# Patient Record
Sex: FEMALE | Race: WHITE | NOT HISPANIC OR LATINO | URBAN - METROPOLITAN AREA
[De-identification: names, ages, dates, MRNs, and addresses within clinical notes are randomized per-mention and may not be internally consistent; named-entity substitution may affect disease eponyms.]

---

## 2018-05-22 ENCOUNTER — INPATIENT (INPATIENT)
Facility: HOSPITAL | Age: 63
LOS: 2 days | Discharge: HOME | DRG: 193 | End: 2018-05-25
Attending: HOSPITALIST | Admitting: INTERNAL MEDICINE
Payer: COMMERCIAL

## 2018-05-22 DIAGNOSIS — J44.1 CHRONIC OBSTRUCTIVE PULMONARY DISEASE WITH (ACUTE) EXACERBATION: ICD-10-CM

## 2018-05-22 PROCEDURE — 93010 ELECTROCARDIOGRAM REPORT: CPT

## 2018-05-22 PROCEDURE — 71045 X-RAY EXAM CHEST 1 VIEW: CPT | Mod: 26

## 2018-05-22 PROCEDURE — 71275 CT ANGIOGRAPHY CHEST: CPT | Mod: 26

## 2018-05-23 PROCEDURE — 99255 IP/OBS CONSLTJ NEW/EST HI 80: CPT

## 2018-05-23 PROCEDURE — 74178 CT ABD&PLV WO CNTR FLWD CNTR: CPT | Mod: 26

## 2018-05-24 PROCEDURE — 99232 SBSQ HOSP IP/OBS MODERATE 35: CPT

## 2018-05-25 PROCEDURE — 87486 CHLMYD PNEUM DNA AMP PROBE: CPT

## 2018-05-25 PROCEDURE — 97162 PT EVAL MOD COMPLEX 30 MIN: CPT

## 2018-05-25 PROCEDURE — 84484 ASSAY OF TROPONIN QUANT: CPT

## 2018-05-25 PROCEDURE — 94660 CPAP INITIATION&MGMT: CPT

## 2018-05-25 PROCEDURE — 85025 COMPLETE CBC W/AUTO DIFF WBC: CPT

## 2018-05-25 PROCEDURE — 85610 PROTHROMBIN TIME: CPT

## 2018-05-25 PROCEDURE — 96374 THER/PROPH/DIAG INJ IV PUSH: CPT | Mod: XU

## 2018-05-25 PROCEDURE — 36600 WITHDRAWAL OF ARTERIAL BLOOD: CPT

## 2018-05-25 PROCEDURE — 99239 HOSP IP/OBS DSCHRG MGMT >30: CPT

## 2018-05-25 PROCEDURE — 71275 CT ANGIOGRAPHY CHEST: CPT

## 2018-05-25 PROCEDURE — 82553 CREATINE MB FRACTION: CPT

## 2018-05-25 PROCEDURE — 99221 1ST HOSP IP/OBS SF/LOW 40: CPT

## 2018-05-25 PROCEDURE — 74178 CT ABD&PLV WO CNTR FLWD CNTR: CPT

## 2018-05-25 PROCEDURE — 99232 SBSQ HOSP IP/OBS MODERATE 35: CPT

## 2018-05-25 PROCEDURE — 82550 ASSAY OF CK (CPK): CPT

## 2018-05-25 PROCEDURE — 80069 RENAL FUNCTION PANEL: CPT

## 2018-05-25 PROCEDURE — 84100 ASSAY OF PHOSPHORUS: CPT

## 2018-05-25 PROCEDURE — 84145 PROCALCITONIN (PCT): CPT

## 2018-05-25 PROCEDURE — 87581 M.PNEUMON DNA AMP PROBE: CPT

## 2018-05-25 PROCEDURE — 83036 HEMOGLOBIN GLYCOSYLATED A1C: CPT

## 2018-05-25 PROCEDURE — 85027 COMPLETE CBC AUTOMATED: CPT

## 2018-05-25 PROCEDURE — 86300 IMMUNOASSAY TUMOR CA 15-3: CPT

## 2018-05-25 PROCEDURE — 82803 BLOOD GASES ANY COMBINATION: CPT

## 2018-05-25 PROCEDURE — 80048 BASIC METABOLIC PNL TOTAL CA: CPT

## 2018-05-25 PROCEDURE — 83880 ASSAY OF NATRIURETIC PEPTIDE: CPT

## 2018-05-25 PROCEDURE — 83735 ASSAY OF MAGNESIUM: CPT

## 2018-05-25 PROCEDURE — 85730 THROMBOPLASTIN TIME PARTIAL: CPT

## 2018-05-25 PROCEDURE — 93005 ELECTROCARDIOGRAM TRACING: CPT

## 2018-05-25 PROCEDURE — 94640 AIRWAY INHALATION TREATMENT: CPT

## 2018-05-25 PROCEDURE — 87400 INFLUENZA A/B EACH AG IA: CPT

## 2018-05-25 PROCEDURE — 80053 COMPREHEN METABOLIC PANEL: CPT

## 2018-05-25 PROCEDURE — 87633 RESP VIRUS 12-25 TARGETS: CPT

## 2018-05-25 PROCEDURE — 99291 CRITICAL CARE FIRST HOUR: CPT | Mod: 25

## 2018-05-25 PROCEDURE — 71045 X-RAY EXAM CHEST 1 VIEW: CPT

## 2018-11-30 ENCOUNTER — OUTPATIENT (OUTPATIENT)
Dept: OUTPATIENT SERVICES | Facility: HOSPITAL | Age: 63
LOS: 1 days | End: 2018-11-30
Payer: COMMERCIAL

## 2018-11-30 DIAGNOSIS — C50.919 MALIGNANT NEOPLASM OF UNSPECIFIED SITE OF UNSPECIFIED FEMALE BREAST: ICD-10-CM

## 2018-11-30 PROCEDURE — 71260 CT THORAX DX C+: CPT | Mod: 26

## 2018-11-30 PROCEDURE — 74177 CT ABD & PELVIS W/CONTRAST: CPT

## 2018-11-30 PROCEDURE — 71260 CT THORAX DX C+: CPT

## 2018-11-30 PROCEDURE — 74177 CT ABD & PELVIS W/CONTRAST: CPT | Mod: 26

## 2019-01-08 PROBLEM — Z00.00 ENCOUNTER FOR PREVENTIVE HEALTH EXAMINATION: Status: ACTIVE | Noted: 2019-01-08

## 2019-01-18 ENCOUNTER — INBOUND DOCUMENT (OUTPATIENT)
Age: 64
End: 2019-01-18

## 2019-01-22 ENCOUNTER — APPOINTMENT (OUTPATIENT)
Dept: UROLOGY | Facility: CLINIC | Age: 64
End: 2019-01-22
Payer: COMMERCIAL

## 2019-01-22 VITALS
RESPIRATION RATE: 17 BRPM | HEART RATE: 81 BPM | DIASTOLIC BLOOD PRESSURE: 68 MMHG | BODY MASS INDEX: 26.58 KG/M2 | TEMPERATURE: 98.3 F | SYSTOLIC BLOOD PRESSURE: 155 MMHG | WEIGHT: 150 LBS | HEIGHT: 63 IN

## 2019-01-22 PROCEDURE — 99204 OFFICE O/P NEW MOD 45 MIN: CPT

## 2019-01-22 RX ORDER — SENNOSIDES 8.6 MG TABLETS 8.6 MG/1
8.6 TABLET ORAL
Refills: 0 | Status: ACTIVE | COMMUNITY

## 2019-01-22 NOTE — REVIEW OF SYSTEMS
[Feeling Poorly] : feeling poorly [Feeling Tired] : feeling tired [Recent Weight Loss (___ Lbs)] : recent [unfilled] ~Ulb weight loss [see HPI] : see HPI [Arthralgias] : arthralgias [Fever] : fever [Abdominal Pain] : abdominal pain [Vomiting] : vomiting [Constipation] : constipation [Heartburn] : heartburn [Date of last menstrual period ____] : date of last menstrual period: [unfilled] [Leakage of urine with straining, coughing, laughing] : leakage of urine with straining, coughing, laughing [Joint Pain] : joint pain [Itching] : itching [Dizziness] : dizziness [Limb Weakness] : limb weakness [Difficulty Walking] : difficulty walking [Muscle Weakness] : muscle weakness [Feelings Of Weakness] : feelings of weakness [Negative] : Heme/Lymph

## 2019-01-22 NOTE — HISTORY OF PRESENT ILLNESS
[FreeTextEntry1] : This is a 63 year old female with a hx of breast cancer s/p mastectomy and chemotherapy 13 years ago who had undergone surveillance imaging which showed resolution of previously noted pulmonary nodules but new bony metastasis, in addition to interval growth in a known left renal mass.  She has been treated with Ibrance and Femara for the past several months and has been deemed stable for intervention.  \par \par She denies any hematuria.  But notes weakness, poor appetite, unintentional weight loss over the past several months.

## 2019-01-22 NOTE — ASSESSMENT
[FreeTextEntry1] : This is a 63 year old female with a 9 cm left upper pole renal mass presenting for evaluation.\par \par - counseled on risks/benefits/alternatives and wishes to proceed with left laparoscopic radical nephrectomy, possible radical, possible open

## 2019-01-31 ENCOUNTER — OUTPATIENT (OUTPATIENT)
Dept: OUTPATIENT SERVICES | Facility: HOSPITAL | Age: 64
LOS: 1 days | End: 2019-01-31
Payer: COMMERCIAL

## 2019-01-31 VITALS
DIASTOLIC BLOOD PRESSURE: 72 MMHG | HEART RATE: 87 BPM | OXYGEN SATURATION: 97 % | TEMPERATURE: 99 F | SYSTOLIC BLOOD PRESSURE: 130 MMHG | HEIGHT: 63 IN | RESPIRATION RATE: 14 BRPM | WEIGHT: 147.93 LBS

## 2019-01-31 DIAGNOSIS — N28.89 OTHER SPECIFIED DISORDERS OF KIDNEY AND URETER: ICD-10-CM

## 2019-01-31 DIAGNOSIS — Z90.12 ACQUIRED ABSENCE OF LEFT BREAST AND NIPPLE: Chronic | ICD-10-CM

## 2019-01-31 DIAGNOSIS — J43.9 EMPHYSEMA, UNSPECIFIED: ICD-10-CM

## 2019-01-31 DIAGNOSIS — R06.83 SNORING: ICD-10-CM

## 2019-01-31 DIAGNOSIS — Z98.890 OTHER SPECIFIED POSTPROCEDURAL STATES: Chronic | ICD-10-CM

## 2019-01-31 LAB
ALBUMIN SERPL ELPH-MCNC: 3.7 G/DL — SIGNIFICANT CHANGE UP (ref 3.3–5)
ALP SERPL-CCNC: 53 U/L — SIGNIFICANT CHANGE UP (ref 40–120)
ALT FLD-CCNC: < 4 U/L — LOW (ref 4–33)
ANION GAP SERPL CALC-SCNC: 15 MMO/L — HIGH (ref 7–14)
APPEARANCE UR: CLEAR — SIGNIFICANT CHANGE UP
AST SERPL-CCNC: 9 U/L — SIGNIFICANT CHANGE UP (ref 4–32)
BACTERIA # UR AUTO: NEGATIVE — SIGNIFICANT CHANGE UP
BILIRUB SERPL-MCNC: 0.2 MG/DL — SIGNIFICANT CHANGE UP (ref 0.2–1.2)
BILIRUB UR-MCNC: NEGATIVE — SIGNIFICANT CHANGE UP
BLD GP AB SCN SERPL QL: NEGATIVE — SIGNIFICANT CHANGE UP
BLOOD UR QL VISUAL: NEGATIVE — SIGNIFICANT CHANGE UP
BUN SERPL-MCNC: 12 MG/DL — SIGNIFICANT CHANGE UP (ref 7–23)
CALCIUM SERPL-MCNC: 9.1 MG/DL — SIGNIFICANT CHANGE UP (ref 8.4–10.5)
CHLORIDE SERPL-SCNC: 102 MMOL/L — SIGNIFICANT CHANGE UP (ref 98–107)
CO2 SERPL-SCNC: 27 MMOL/L — SIGNIFICANT CHANGE UP (ref 22–31)
COLOR SPEC: YELLOW — SIGNIFICANT CHANGE UP
CREAT SERPL-MCNC: 0.74 MG/DL — SIGNIFICANT CHANGE UP (ref 0.5–1.3)
GLUCOSE SERPL-MCNC: 94 MG/DL — SIGNIFICANT CHANGE UP (ref 70–99)
GLUCOSE UR-MCNC: NEGATIVE — SIGNIFICANT CHANGE UP
HCT VFR BLD CALC: 27.5 % — LOW (ref 34.5–45)
HGB BLD-MCNC: 8.1 G/DL — LOW (ref 11.5–15.5)
HYALINE CASTS # UR AUTO: HIGH
KETONES UR-MCNC: NEGATIVE — SIGNIFICANT CHANGE UP
LEUKOCYTE ESTERASE UR-ACNC: NEGATIVE — SIGNIFICANT CHANGE UP
MCHC RBC-ENTMCNC: 28.5 PG — SIGNIFICANT CHANGE UP (ref 27–34)
MCHC RBC-ENTMCNC: 29.5 % — LOW (ref 32–36)
MCV RBC AUTO: 96.8 FL — SIGNIFICANT CHANGE UP (ref 80–100)
NITRITE UR-MCNC: NEGATIVE — SIGNIFICANT CHANGE UP
NRBC # FLD: 0 K/UL — LOW (ref 25–125)
PH UR: 6 — SIGNIFICANT CHANGE UP (ref 5–8)
PLATELET # BLD AUTO: 438 K/UL — HIGH (ref 150–400)
PMV BLD: 10 FL — SIGNIFICANT CHANGE UP (ref 7–13)
POTASSIUM SERPL-MCNC: 3.6 MMOL/L — SIGNIFICANT CHANGE UP (ref 3.5–5.3)
POTASSIUM SERPL-SCNC: 3.6 MMOL/L — SIGNIFICANT CHANGE UP (ref 3.5–5.3)
PROT SERPL-MCNC: 7.6 G/DL — SIGNIFICANT CHANGE UP (ref 6–8.3)
PROT UR-MCNC: 30 — SIGNIFICANT CHANGE UP
RBC # BLD: 2.84 M/UL — LOW (ref 3.8–5.2)
RBC # FLD: 17.8 % — HIGH (ref 10.3–14.5)
RBC CASTS # UR COMP ASSIST: SIGNIFICANT CHANGE UP (ref 0–?)
RH IG SCN BLD-IMP: POSITIVE — SIGNIFICANT CHANGE UP
SODIUM SERPL-SCNC: 144 MMOL/L — SIGNIFICANT CHANGE UP (ref 135–145)
SP GR SPEC: 1.03 — SIGNIFICANT CHANGE UP (ref 1–1.04)
SQUAMOUS # UR AUTO: SIGNIFICANT CHANGE UP
UROBILINOGEN FLD QL: SIGNIFICANT CHANGE UP
WBC # BLD: 4.27 K/UL — SIGNIFICANT CHANGE UP (ref 3.8–10.5)
WBC # FLD AUTO: 4.27 K/UL — SIGNIFICANT CHANGE UP (ref 3.8–10.5)
WBC UR QL: SIGNIFICANT CHANGE UP (ref 0–?)

## 2019-01-31 PROCEDURE — 93010 ELECTROCARDIOGRAM REPORT: CPT

## 2019-01-31 RX ORDER — LETROZOLE 2.5 MG/1
1 TABLET, FILM COATED ORAL
Qty: 0 | Refills: 0 | COMMUNITY

## 2019-01-31 NOTE — H&P PST ADULT - PROBLEM SELECTOR PLAN 1
Patient is scheduled for Left laparoscopic radical nephrectomy, possible radical, possible open scheduled on 2/13/2019.     Preop instructions, pepcid, surgical scrub provided. Pt stated understanding.      Pending medical evaluation per surgeon and PST- History of Emphysema, managed by PMD -Dr. Lester Lebron - [phone]-PMD,    Pending last oncology note per PST-Dr. Shields [phone]- oncologist.

## 2019-01-31 NOTE — H&P PST ADULT - PMH
Anemia    Breast cancer  Left breast cancer - 13 years ago, s/p mastectomy, and chemoptherapy  Emphysema lung    Other specified disorders of kidney and ureter

## 2019-01-31 NOTE — H&P PST ADULT - NEGATIVE GENERAL GENITOURINARY SYMPTOMS
no flank pain R/no hematuria/no urinary hesitancy/no incontinence/no flank pain L/no urine discoloration/normal urinary frequency/no dysuria

## 2019-01-31 NOTE — H&P PST ADULT - ASSESSMENT
Pre op diagnosis : Other specified disorders of kidney and ureter. Patient is scheduled for Left laparoscopic radical nephrectomy, possible radical, possible open scheduled on 2/13/2019.

## 2019-01-31 NOTE — H&P PST ADULT - HISTORY OF PRESENT ILLNESS
63 year old female with a history of breast cancer, s/p left breast mastectomy and chemotherapy 13 years ago. Undergone surveillance   imaging approximately 8 months ago ( per patient ) noted to have new bony metastasis, left renal mass. Patient is currently under treatment with Ibrance and Femara. Patient was referred to Dr. Mathias for an evaluation. Pre op diagnosis : Other specified disorders of kidney and ureter. Patient is scheduled for Left laparoscopic radical nephrectomy, possible radical, possible open scheduled on 2/13/2019.

## 2019-01-31 NOTE — H&P PST ADULT - NEGATIVE NEUROLOGICAL SYMPTOMS
no difficulty walking/no loss of sensation/no transient paralysis/no paresthesias/no syncope/no vertigo/no tremors/no headache/no focal seizures/no generalized seizures

## 2019-01-31 NOTE — H&P PST ADULT - NEGATIVE ENMT SYMPTOMS
no vertigo/no hearing difficulty/no sinus symptoms/no nasal congestion/no gum bleeding/no throat pain/no recurrent cold sores/no nasal obstruction/no post-nasal discharge/no nose bleeds/no abnormal taste sensation/no dry mouth/no tinnitus/no ear pain/no dysphagia/no nasal discharge

## 2019-01-31 NOTE — H&P PST ADULT - NEGATIVE MUSCULOSKELETAL SYMPTOMS
no arthritis/no myalgia/no stiffness/no neck pain/no leg pain R/no arm pain L/no arm pain R/no muscle cramps/no joint swelling/no back pain/no leg pain L

## 2019-01-31 NOTE — H&P PST ADULT - NEGATIVE OPHTHALMOLOGIC SYMPTOMS
no discharge L/no pain R/no pain L/no photophobia/no irritation L/no irritation R/no blurred vision R/no discharge R/no diplopia/no blurred vision L

## 2019-01-31 NOTE — H&P PST ADULT - NSANTHOSAYNRD_GEN_A_CORE
No. ROCAEL screening performed.  STOP BANG Legend: 0-2 = LOW Risk; 3-4 = INTERMEDIATE Risk; 5-8 = HIGH Risk

## 2019-01-31 NOTE — H&P PST ADULT - RS GEN PE MLT RESP DETAILS PC
airway patent/breath sounds equal/no rales/good air movement/respirations non-labored/no wheezes/clear to auscultation bilaterally/no rhonchi

## 2019-01-31 NOTE — H&P PST ADULT - NEGATIVE BREAST SYMPTOMS
no nipple discharge R/no nipple discharge L/no breast tenderness R/no breast lump L/no breast tenderness L/no breast lump R

## 2019-02-02 LAB
BACTERIA UR CULT: SIGNIFICANT CHANGE UP
SPECIMEN SOURCE: SIGNIFICANT CHANGE UP

## 2019-02-12 ENCOUNTER — TRANSCRIPTION ENCOUNTER (OUTPATIENT)
Age: 64
End: 2019-02-12

## 2019-02-13 ENCOUNTER — INPATIENT (INPATIENT)
Facility: HOSPITAL | Age: 64
LOS: 2 days | Discharge: HOME | End: 2019-02-16
Attending: UROLOGY | Admitting: UROLOGY
Payer: COMMERCIAL

## 2019-02-13 ENCOUNTER — RESULT REVIEW (OUTPATIENT)
Age: 64
End: 2019-02-13

## 2019-02-13 ENCOUNTER — APPOINTMENT (OUTPATIENT)
Dept: UROLOGY | Facility: HOSPITAL | Age: 64
End: 2019-02-13

## 2019-02-13 VITALS
HEART RATE: 84 BPM | SYSTOLIC BLOOD PRESSURE: 138 MMHG | TEMPERATURE: 99 F | DIASTOLIC BLOOD PRESSURE: 64 MMHG | OXYGEN SATURATION: 95 % | HEIGHT: 63 IN | RESPIRATION RATE: 16 BRPM | WEIGHT: 147.93 LBS

## 2019-02-13 DIAGNOSIS — F32.9 MAJOR DEPRESSIVE DISORDER, SINGLE EPISODE, UNSPECIFIED: ICD-10-CM

## 2019-02-13 DIAGNOSIS — N28.89 OTHER SPECIFIED DISORDERS OF KIDNEY AND URETER: ICD-10-CM

## 2019-02-13 DIAGNOSIS — Z98.890 OTHER SPECIFIED POSTPROCEDURAL STATES: Chronic | ICD-10-CM

## 2019-02-13 DIAGNOSIS — Z29.9 ENCOUNTER FOR PROPHYLACTIC MEASURES, UNSPECIFIED: ICD-10-CM

## 2019-02-13 DIAGNOSIS — C50.919 MALIGNANT NEOPLASM OF UNSPECIFIED SITE OF UNSPECIFIED FEMALE BREAST: ICD-10-CM

## 2019-02-13 DIAGNOSIS — Z90.12 ACQUIRED ABSENCE OF LEFT BREAST AND NIPPLE: Chronic | ICD-10-CM

## 2019-02-13 DIAGNOSIS — D62 ACUTE POSTHEMORRHAGIC ANEMIA: ICD-10-CM

## 2019-02-13 LAB
ANION GAP SERPL CALC-SCNC: 11 MMO/L — SIGNIFICANT CHANGE UP (ref 7–14)
BASE EXCESS BLDA CALC-SCNC: -1.4 MMOL/L — SIGNIFICANT CHANGE UP
BASE EXCESS BLDA CALC-SCNC: 1.9 MMOL/L — SIGNIFICANT CHANGE UP
BUN SERPL-MCNC: 12 MG/DL — SIGNIFICANT CHANGE UP (ref 7–23)
CA-I BLDA-SCNC: 0.98 MMOL/L — LOW (ref 1.15–1.29)
CA-I BLDA-SCNC: 1.13 MMOL/L — LOW (ref 1.15–1.29)
CALCIUM SERPL-MCNC: 8.8 MG/DL — SIGNIFICANT CHANGE UP (ref 8.4–10.5)
CHLORIDE SERPL-SCNC: 100 MMOL/L — SIGNIFICANT CHANGE UP (ref 98–107)
CO2 SERPL-SCNC: 26 MMOL/L — SIGNIFICANT CHANGE UP (ref 22–31)
CREAT SERPL-MCNC: 0.77 MG/DL — SIGNIFICANT CHANGE UP (ref 0.5–1.3)
GLUCOSE BLDA-MCNC: 138 MG/DL — HIGH (ref 70–99)
GLUCOSE BLDA-MCNC: 163 MG/DL — HIGH (ref 70–99)
GLUCOSE SERPL-MCNC: 161 MG/DL — HIGH (ref 70–99)
HCO3 BLDA-SCNC: 23 MMOL/L — SIGNIFICANT CHANGE UP (ref 22–26)
HCO3 BLDA-SCNC: 26 MMOL/L — SIGNIFICANT CHANGE UP (ref 22–26)
HCT VFR BLD CALC: 27.3 % — LOW (ref 34.5–45)
HCT VFR BLDA CALC: 25 % — LOW (ref 34.5–46.5)
HCT VFR BLDA CALC: 26 % — LOW (ref 34.5–46.5)
HGB BLD-MCNC: 8.7 G/DL — LOW (ref 11.5–15.5)
HGB BLDA-MCNC: 8 G/DL — LOW (ref 11.5–15.5)
HGB BLDA-MCNC: 8.4 G/DL — LOW (ref 11.5–15.5)
MCHC RBC-ENTMCNC: 28.2 PG — SIGNIFICANT CHANGE UP (ref 27–34)
MCHC RBC-ENTMCNC: 31.9 % — LOW (ref 32–36)
MCV RBC AUTO: 88.3 FL — SIGNIFICANT CHANGE UP (ref 80–100)
NRBC # FLD: 0 K/UL — LOW (ref 25–125)
PCO2 BLDA: 37 MMHG — SIGNIFICANT CHANGE UP (ref 32–48)
PCO2 BLDA: 39 MMHG — SIGNIFICANT CHANGE UP (ref 32–48)
PH BLDA: 7.38 PH — SIGNIFICANT CHANGE UP (ref 7.35–7.45)
PH BLDA: 7.45 PH — SIGNIFICANT CHANGE UP (ref 7.35–7.45)
PLATELET # BLD AUTO: 279 K/UL — SIGNIFICANT CHANGE UP (ref 150–400)
PMV BLD: 10.7 FL — SIGNIFICANT CHANGE UP (ref 7–13)
PO2 BLDA: 460 MMHG — HIGH (ref 83–108)
PO2 BLDA: 484 MMHG — HIGH (ref 83–108)
POTASSIUM BLDA-SCNC: 4.1 MMOL/L — SIGNIFICANT CHANGE UP (ref 3.4–4.5)
POTASSIUM BLDA-SCNC: 4.3 MMOL/L — SIGNIFICANT CHANGE UP (ref 3.4–4.5)
POTASSIUM SERPL-MCNC: 4.2 MMOL/L — SIGNIFICANT CHANGE UP (ref 3.5–5.3)
POTASSIUM SERPL-SCNC: 4.2 MMOL/L — SIGNIFICANT CHANGE UP (ref 3.5–5.3)
RBC # BLD: 3.09 M/UL — LOW (ref 3.8–5.2)
RBC # FLD: 18.4 % — HIGH (ref 10.3–14.5)
RH IG SCN BLD-IMP: POSITIVE — SIGNIFICANT CHANGE UP
SAO2 % BLDA: 100 % — HIGH (ref 95–99)
SAO2 % BLDA: 99.9 % — HIGH (ref 95–99)
SODIUM BLDA-SCNC: 135 MMOL/L — LOW (ref 136–146)
SODIUM BLDA-SCNC: 137 MMOL/L — SIGNIFICANT CHANGE UP (ref 136–146)
SODIUM SERPL-SCNC: 137 MMOL/L — SIGNIFICANT CHANGE UP (ref 135–145)
WBC # BLD: 6.71 K/UL — SIGNIFICANT CHANGE UP (ref 3.8–10.5)
WBC # FLD AUTO: 6.71 K/UL — SIGNIFICANT CHANGE UP (ref 3.8–10.5)

## 2019-02-13 PROCEDURE — 88307 TISSUE EXAM BY PATHOLOGIST: CPT | Mod: 26

## 2019-02-13 PROCEDURE — 99223 1ST HOSP IP/OBS HIGH 75: CPT

## 2019-02-13 PROCEDURE — 50545 LAPARO RADICAL NEPHRECTOMY: CPT | Mod: LT

## 2019-02-13 PROCEDURE — 88305 TISSUE EXAM BY PATHOLOGIST: CPT | Mod: 26

## 2019-02-13 RX ORDER — PALBOCICLIB 100 MG/1
100 CAPSULE ORAL DAILY
Qty: 0 | Refills: 0 | Status: DISCONTINUED | OUTPATIENT
Start: 2019-02-13 | End: 2019-02-16

## 2019-02-13 RX ORDER — SODIUM CHLORIDE 9 MG/ML
1000 INJECTION, SOLUTION INTRAVENOUS
Qty: 0 | Refills: 0 | Status: DISCONTINUED | OUTPATIENT
Start: 2019-02-13 | End: 2019-02-13

## 2019-02-13 RX ORDER — TRAMADOL HYDROCHLORIDE 50 MG/1
50 TABLET ORAL EVERY 4 HOURS
Qty: 0 | Refills: 0 | Status: DISCONTINUED | OUTPATIENT
Start: 2019-02-13 | End: 2019-02-16

## 2019-02-13 RX ORDER — LETROZOLE 2.5 MG/1
2.5 TABLET, FILM COATED ORAL DAILY
Qty: 0 | Refills: 0 | Status: DISCONTINUED | OUTPATIENT
Start: 2019-02-13 | End: 2019-02-16

## 2019-02-13 RX ORDER — SODIUM CHLORIDE 9 MG/ML
1000 INJECTION, SOLUTION INTRAVENOUS
Qty: 0 | Refills: 0 | Status: DISCONTINUED | OUTPATIENT
Start: 2019-02-13 | End: 2019-02-14

## 2019-02-13 RX ORDER — HEPARIN SODIUM 5000 [USP'U]/ML
5000 INJECTION INTRAVENOUS; SUBCUTANEOUS EVERY 8 HOURS
Qty: 0 | Refills: 0 | Status: DISCONTINUED | OUTPATIENT
Start: 2019-02-13 | End: 2019-02-16

## 2019-02-13 RX ORDER — TRAMADOL HYDROCHLORIDE 50 MG/1
25 TABLET ORAL EVERY 4 HOURS
Qty: 0 | Refills: 0 | Status: DISCONTINUED | OUTPATIENT
Start: 2019-02-13 | End: 2019-02-16

## 2019-02-13 RX ORDER — KETOROLAC TROMETHAMINE 30 MG/ML
15 SYRINGE (ML) INJECTION EVERY 6 HOURS
Qty: 0 | Refills: 0 | Status: DISCONTINUED | OUTPATIENT
Start: 2019-02-13 | End: 2019-02-15

## 2019-02-13 RX ORDER — ESCITALOPRAM OXALATE 10 MG/1
20 TABLET, FILM COATED ORAL DAILY
Qty: 0 | Refills: 0 | Status: DISCONTINUED | OUTPATIENT
Start: 2019-02-13 | End: 2019-02-16

## 2019-02-13 RX ORDER — HYDROMORPHONE HYDROCHLORIDE 2 MG/ML
0.5 INJECTION INTRAMUSCULAR; INTRAVENOUS; SUBCUTANEOUS
Qty: 0 | Refills: 0 | Status: DISCONTINUED | OUTPATIENT
Start: 2019-02-13 | End: 2019-02-14

## 2019-02-13 RX ORDER — ONDANSETRON 8 MG/1
4 TABLET, FILM COATED ORAL ONCE
Qty: 0 | Refills: 0 | Status: DISCONTINUED | OUTPATIENT
Start: 2019-02-13 | End: 2019-02-16

## 2019-02-13 RX ADMIN — HYDROMORPHONE HYDROCHLORIDE 0.5 MILLIGRAM(S): 2 INJECTION INTRAMUSCULAR; INTRAVENOUS; SUBCUTANEOUS at 16:00

## 2019-02-13 RX ADMIN — HYDROMORPHONE HYDROCHLORIDE 0.5 MILLIGRAM(S): 2 INJECTION INTRAMUSCULAR; INTRAVENOUS; SUBCUTANEOUS at 16:40

## 2019-02-13 RX ADMIN — SODIUM CHLORIDE 125 MILLILITER(S): 9 INJECTION, SOLUTION INTRAVENOUS at 23:23

## 2019-02-13 RX ADMIN — SODIUM CHLORIDE 125 MILLILITER(S): 9 INJECTION, SOLUTION INTRAVENOUS at 14:07

## 2019-02-13 RX ADMIN — TRAMADOL HYDROCHLORIDE 50 MILLIGRAM(S): 50 TABLET ORAL at 21:32

## 2019-02-13 RX ADMIN — TRAMADOL HYDROCHLORIDE 50 MILLIGRAM(S): 50 TABLET ORAL at 22:20

## 2019-02-13 RX ADMIN — Medication 15 MILLIGRAM(S): at 23:19

## 2019-02-13 RX ADMIN — HYDROMORPHONE HYDROCHLORIDE 0.5 MILLIGRAM(S): 2 INJECTION INTRAMUSCULAR; INTRAVENOUS; SUBCUTANEOUS at 16:50

## 2019-02-13 RX ADMIN — SODIUM CHLORIDE 30 MILLILITER(S): 9 INJECTION, SOLUTION INTRAVENOUS at 09:23

## 2019-02-13 RX ADMIN — HYDROMORPHONE HYDROCHLORIDE 0.5 MILLIGRAM(S): 2 INJECTION INTRAMUSCULAR; INTRAVENOUS; SUBCUTANEOUS at 16:10

## 2019-02-13 RX ADMIN — SODIUM CHLORIDE 125 MILLILITER(S): 9 INJECTION, SOLUTION INTRAVENOUS at 14:10

## 2019-02-13 RX ADMIN — HEPARIN SODIUM 5000 UNIT(S): 5000 INJECTION INTRAVENOUS; SUBCUTANEOUS at 21:31

## 2019-02-13 NOTE — CONSULT NOTE ADULT - PROBLEM SELECTOR RECOMMENDATION 9
-s/p left lap radical nephrectomy/adrenalectomy on 2/13, Intraop EBL 1.5 L, infused 2 L crystalloids and transfused 3 U PRBC  -postop management per , pain control, IVF, check labs, incentive spirometry, DVT ppx

## 2019-02-13 NOTE — BRIEF OPERATIVE NOTE - PROCEDURE
<<-----Click on this checkbox to enter Procedure Radical nephrectomy and ipsilateral adrenalectomy  02/13/2019  left  Active  [email or web address]

## 2019-02-13 NOTE — CONSULT NOTE ADULT - PROBLEM SELECTOR RECOMMENDATION 3
-H/o L breast cancer s/p mastectomy/chemo 13 years ago, but now with cancer recurrence and bone mets  -c/w Femara and Ibrance  -outpt f/u oncology

## 2019-02-13 NOTE — CONSULT NOTE ADULT - SUBJECTIVE AND OBJECTIVE BOX
Carla Ruiz MD  Pager [phone]    HPI:  63 year old female with a history of COPD, depression, left breast cancer, s/p mastectomy and chemotherapy 13 years ago, undergone surveillance  imaging approximately 8 months ago and noted to have new bony metastasis, large left renal mass, admitted for left lap radical nephrectomy/adrenalectomy on 2/13. Patient seen postop in PACU, c/o incisional pain, denies chest pain/sob.  Denies h/o cardiac disease.  Intraop EBL 1.5 L, infused 2 L crystalloids and transfused 3 U PRBC.  Postop she's hemodynamically stable.    PAST MEDICAL & SURGICAL HISTORY:  Emphysema lung  Breast cancer: Left breast cancer - 13 years ago, s/p mastectomy, and chemoptherapy  Anemia  Other specified disorders of kidney and ureter  H/O left mastectomy  History of hip surgery: in 2007- Right hip surgery      Review of Systems:   CONSTITUTIONAL: No fever, weight loss, or fatigue  EYES: No eye pain, visual disturbances, or discharge  ENMT:  No difficulty hearing, tinnitus, vertigo; No sinus or throat pain  NECK: No pain or stiffness  BREASTS: No pain, masses, or nipple discharge  RESPIRATORY: No cough, wheezing, chills or hemoptysis; No shortness of breath  CARDIOVASCULAR: No chest pain, palpitations, dizziness, or leg swelling  GASTROINTESTINAL: No abdominal or epigastric pain. No nausea, vomiting, or hematemesis; No diarrhea or constipation. No melena or hematochezia.  GENITOURINARY: No dysuria, frequency, hematuria, or incontinence  NEUROLOGICAL: No headaches, memory loss, loss of strength, numbness, or tremors  SKIN: No itching, burning, rashes, or lesions   LYMPH NODES: No enlarged glands  ENDOCRINE: No heat or cold intolerance; No hair loss  MUSCULOSKELETAL: +bone pains  PSYCHIATRIC: No depression, anxiety, mood swings, or difficulty sleeping  HEME/LYMPH: No easy bruising, or bleeding gums  ALLERY AND IMMUNOLOGIC: No hives or eczema    Allergies    Cipro (Unknown)    Intolerances        Social History: former smoker 1 ppd x30 years, 1-2 drinks wine/month    FAMILY HISTORY:  No pertinent family history in first degree relatives      Home Medications:  Anoro Ellipta 62.5 mcg-25 mcg/inh inhalation powder: 1 puff(s) inhaled once a day in am (13 Feb 2019 08:46)  diphenhydrAMINE 25 mg oral tablet: 1 tab(s) orally once a day (at bedtime), As Needed (13 Feb 2019 08:46)  Femara 2.5 mg oral tablet: 1 tab(s) orally once a day in am (13 Feb 2019 08:46)  Ibrance 100 mg oral capsule: 1 cap(s) orally once a day in am (13 Feb 2019 08:46)  Lexapro 20 mg oral tablet: 1 tab(s) orally once a day in am (13 Feb 2019 08:46)  Milk of Magnesia 8% oral suspension: 15 milliliter(s) orally once a day (at bedtime), As Needed (13 Feb 2019 08:46)  Senna 8.6 mg oral tablet: 2 tab(s) orally once a day (at bedtime), As Needed (13 Feb 2019 08:46)  Vitamin D3 2000 intl units oral tablet: 1 tab(s) orally once a day (13 Feb 2019 08:46)      MEDICATIONS  (STANDING):  escitalopram 20 milliGRAM(s) Oral daily  heparin  Injectable 5000 Unit(s) SubCutaneous every 8 hours  lactated ringers. 1000 milliLiter(s) (125 mL/Hr) IV Continuous <Continuous>  letrozole 2.5 milliGRAM(s) Oral daily  palbociclib 100 milliGRAM(s) Oral daily    MEDICATIONS  (PRN):  HYDROmorphone  Injectable 0.5 milliGRAM(s) IV Push every 10 minutes PRN Severe Pain (7 - 10)  ondansetron Injectable 4 milliGRAM(s) IV Push once PRN Nausea and/or Vomiting  traMADol 50 milliGRAM(s) Oral every 4 hours PRN Severe Pain (7 - 10)  traMADol 25 milliGRAM(s) Oral every 4 hours PRN Moderate Pain (4 - 6)      Vital Signs Last 24 Hrs  T(C): 36.5 (13 Feb 2019 13:56), Max: 37 (13 Feb 2019 08:34)  T(F): 97.7 (13 Feb 2019 13:56), Max: 98.6 (13 Feb 2019 08:34)  HR: 67 (13 Feb 2019 15:00) (67 - 84)  BP: 140/69 (13 Feb 2019 15:00) (132/70 - 141/65)  BP(mean): 87 (13 Feb 2019 15:00) (84 - 87)  RR: 19 (13 Feb 2019 15:00) (16 - 21)  SpO2: 97% (13 Feb 2019 15:00) (95% - 98%)  CAPILLARY BLOOD GLUCOSE        I&O's Summary    13 Feb 2019 07:01  -  13 Feb 2019 15:20  --------------------------------------------------------  IN: 125 mL / OUT: 500 mL / NET: -375 mL        PHYSICAL EXAM:  GENERAL: NAD, well-developed  HEAD:  Atraumatic, Normocephalic  EYES: EOMI, PERRLA, conjunctiva and sclera clear  NECK: Supple, No JVD  CHEST/LUNG: Clear to auscultation bilaterally; No wheeze  HEART: Regular rate and rhythm; No murmurs, rubs, or gallops  ABDOMEN: Soft, incisional tenderness, ND  : mcgee  EXTREMITIES:  2+ Peripheral Pulses, No clubbing, cyanosis, or edema  PSYCH: AAOx3  NEUROLOGY: non-focal  SKIN: No rashes or lesions    LABS:                        8.7    6.71  )-----------( 279      ( 13 Feb 2019 14:00 )             27.3     02-13    137  |  100  |  12  ----------------------------<  161<H>  4.2   |  26  |  0.77    Ca    8.8      13 Feb 2019 14:00    Microbiology     RADIOLOGY & ADDITIONAL TESTS:    Imaging Personally Reviewed:  < from: CT Abdomen and Pelvis w/ IV Cont (11.30.18 @ 09:36) >  IMPRESSION:   1.  Stable to minimally larger heterogeneous appearing mass arising from   the upper pole of the left kidney, as above. This mass appears to abut   the left adrenal gland, spleen and pancreatic tail. No evidence of renal   vein invasion. No adenopathy.  2.  Indeterminate 1.2 cm lesion in the midpole of the right kidney,   unchanged in size when compared to prior CT from May 2018.  3.  Resolution of the previously noted pulmonary nodules, with newly   noted pulmonary nodules, including a 8 mm probable metastasis in the left   lower lobe, as above.  4.  Diffuse sclerotic appearing osseous metastases again noted.  5.  Stable small left adnexal cyst, which may again be further evaluated   with pelvic ultrasound if clinically deemed necessary.    Consultant(s) Notes Reviewed:      Care Discussed with Consultants/Other Providers:

## 2019-02-13 NOTE — CONSULT NOTE ADULT - PROBLEM SELECTOR RECOMMENDATION 2
c/w bronchodilator, incentive spirometry  if Anoro Ellipta is nonformulary, can use albuterol inhaler c/w bronchodilator, incentive spirometry  patient can use her own medication (Anoro Ellipta), if not able, then can substitute with albuterol inhaler 2 puffs bid and spiriva  daily

## 2019-02-13 NOTE — BRIEF OPERATIVE NOTE - PROCEDURE
<<-----Click on this checkbox to enter Procedure Laparoscopic radical nephrectomy  02/13/2019    Active  [id number]

## 2019-02-13 NOTE — CONSULT NOTE ADULT - ASSESSMENT
63 year old female with a history of COPD, depression, left breast cancer, s/p mastectomy and chemo 13 years ago, now with new bone mets, on Femera and Ibrance, large left renal mass, s/p left lap radical nephrectomy/adrenalectomy on 2/13, Intraop EBL 1.5 L, infused 2 L crystalloids and transfused 3 U PRBC

## 2019-02-14 LAB
ANION GAP SERPL CALC-SCNC: 10 MMO/L — SIGNIFICANT CHANGE UP (ref 7–14)
ANISOCYTOSIS BLD QL: SLIGHT — SIGNIFICANT CHANGE UP
BASOPHILS # BLD AUTO: 0.02 K/UL — SIGNIFICANT CHANGE UP (ref 0–0.2)
BASOPHILS NFR BLD AUTO: 0.4 % — SIGNIFICANT CHANGE UP (ref 0–2)
BASOPHILS NFR SPEC: 0.9 % — SIGNIFICANT CHANGE UP (ref 0–2)
BLASTS # FLD: 0 % — SIGNIFICANT CHANGE UP (ref 0–0)
BUN SERPL-MCNC: 13 MG/DL — SIGNIFICANT CHANGE UP (ref 7–23)
CALCIUM SERPL-MCNC: 7.8 MG/DL — LOW (ref 8.4–10.5)
CHLORIDE SERPL-SCNC: 101 MMOL/L — SIGNIFICANT CHANGE UP (ref 98–107)
CO2 SERPL-SCNC: 27 MMOL/L — SIGNIFICANT CHANGE UP (ref 22–31)
CREAT SERPL-MCNC: 1.05 MG/DL — SIGNIFICANT CHANGE UP (ref 0.5–1.3)
DACRYOCYTES BLD QL SMEAR: SLIGHT — SIGNIFICANT CHANGE UP
EOSINOPHIL # BLD AUTO: 0.05 K/UL — SIGNIFICANT CHANGE UP (ref 0–0.5)
EOSINOPHIL NFR BLD AUTO: 0.9 % — SIGNIFICANT CHANGE UP (ref 0–6)
EOSINOPHIL NFR FLD: 0 % — SIGNIFICANT CHANGE UP (ref 0–6)
GIANT PLATELETS BLD QL SMEAR: PRESENT — SIGNIFICANT CHANGE UP
GLUCOSE SERPL-MCNC: 97 MG/DL — SIGNIFICANT CHANGE UP (ref 70–99)
HCT VFR BLD CALC: 26.8 % — LOW (ref 34.5–45)
HGB BLD-MCNC: 8.2 G/DL — LOW (ref 11.5–15.5)
HYPOCHROMIA BLD QL: SLIGHT — SIGNIFICANT CHANGE UP
IMM GRANULOCYTES NFR BLD AUTO: 0.2 % — SIGNIFICANT CHANGE UP (ref 0–1.5)
LYMPHOCYTES # BLD AUTO: 1.28 K/UL — SIGNIFICANT CHANGE UP (ref 1–3.3)
LYMPHOCYTES # BLD AUTO: 23.4 % — SIGNIFICANT CHANGE UP (ref 13–44)
LYMPHOCYTES NFR SPEC AUTO: 17.6 % — SIGNIFICANT CHANGE UP (ref 13–44)
MACROCYTES BLD QL: SLIGHT — SIGNIFICANT CHANGE UP
MCHC RBC-ENTMCNC: 27.3 PG — SIGNIFICANT CHANGE UP (ref 27–34)
MCHC RBC-ENTMCNC: 30.6 % — LOW (ref 32–36)
MCV RBC AUTO: 89.3 FL — SIGNIFICANT CHANGE UP (ref 80–100)
METAMYELOCYTES # FLD: 0 % — SIGNIFICANT CHANGE UP (ref 0–1)
MONOCYTES # BLD AUTO: 0.47 K/UL — SIGNIFICANT CHANGE UP (ref 0–0.9)
MONOCYTES NFR BLD AUTO: 8.6 % — SIGNIFICANT CHANGE UP (ref 2–14)
MONOCYTES NFR BLD: 6.1 % — SIGNIFICANT CHANGE UP (ref 2–9)
MYELOCYTES NFR BLD: 0 % — SIGNIFICANT CHANGE UP (ref 0–0)
NEUTROPHIL AB SER-ACNC: 67.5 % — SIGNIFICANT CHANGE UP (ref 43–77)
NEUTROPHILS # BLD AUTO: 3.64 K/UL — SIGNIFICANT CHANGE UP (ref 1.8–7.4)
NEUTROPHILS NFR BLD AUTO: 66.5 % — SIGNIFICANT CHANGE UP (ref 43–77)
NEUTS BAND # BLD: 4.4 % — SIGNIFICANT CHANGE UP (ref 0–6)
NRBC # FLD: 0 K/UL — LOW (ref 25–125)
OTHER - HEMATOLOGY %: 0 — SIGNIFICANT CHANGE UP
OVALOCYTES BLD QL SMEAR: SLIGHT — SIGNIFICANT CHANGE UP
PLATELET # BLD AUTO: 293 K/UL — SIGNIFICANT CHANGE UP (ref 150–400)
PLATELET COUNT - ESTIMATE: NORMAL — SIGNIFICANT CHANGE UP
PMV BLD: 10.5 FL — SIGNIFICANT CHANGE UP (ref 7–13)
POIKILOCYTOSIS BLD QL AUTO: SLIGHT — SIGNIFICANT CHANGE UP
POLYCHROMASIA BLD QL SMEAR: SLIGHT — SIGNIFICANT CHANGE UP
POTASSIUM SERPL-MCNC: 3.8 MMOL/L — SIGNIFICANT CHANGE UP (ref 3.5–5.3)
POTASSIUM SERPL-SCNC: 3.8 MMOL/L — SIGNIFICANT CHANGE UP (ref 3.5–5.3)
PROMYELOCYTES # FLD: 0 % — SIGNIFICANT CHANGE UP (ref 0–0)
RBC # BLD: 3 M/UL — LOW (ref 3.8–5.2)
RBC # FLD: 18.9 % — HIGH (ref 10.3–14.5)
SODIUM SERPL-SCNC: 138 MMOL/L — SIGNIFICANT CHANGE UP (ref 135–145)
VARIANT LYMPHS # BLD: 3.5 % — SIGNIFICANT CHANGE UP
WBC # BLD: 5.47 K/UL — SIGNIFICANT CHANGE UP (ref 3.8–10.5)
WBC # FLD AUTO: 5.47 K/UL — SIGNIFICANT CHANGE UP (ref 3.8–10.5)

## 2019-02-14 PROCEDURE — 99233 SBSQ HOSP IP/OBS HIGH 50: CPT

## 2019-02-14 RX ORDER — TIOTROPIUM BROMIDE 18 UG/1
1 CAPSULE ORAL; RESPIRATORY (INHALATION) DAILY
Qty: 0 | Refills: 0 | Status: DISCONTINUED | OUTPATIENT
Start: 2019-02-14 | End: 2019-02-16

## 2019-02-14 RX ORDER — ACETAMINOPHEN 500 MG
1000 TABLET ORAL
Qty: 0 | Refills: 0 | Status: COMPLETED | OUTPATIENT
Start: 2019-02-14 | End: 2019-02-14

## 2019-02-14 RX ORDER — SODIUM CHLORIDE 9 MG/ML
1000 INJECTION, SOLUTION INTRAVENOUS
Qty: 0 | Refills: 0 | Status: DISCONTINUED | OUTPATIENT
Start: 2019-02-14 | End: 2019-02-15

## 2019-02-14 RX ORDER — ALBUTEROL 90 UG/1
1 AEROSOL, METERED ORAL
Qty: 0 | Refills: 0 | Status: DISCONTINUED | OUTPATIENT
Start: 2019-02-14 | End: 2019-02-16

## 2019-02-14 RX ADMIN — Medication 400 MILLIGRAM(S): at 15:01

## 2019-02-14 RX ADMIN — HEPARIN SODIUM 5000 UNIT(S): 5000 INJECTION INTRAVENOUS; SUBCUTANEOUS at 21:46

## 2019-02-14 RX ADMIN — Medication 15 MILLIGRAM(S): at 12:28

## 2019-02-14 RX ADMIN — HEPARIN SODIUM 5000 UNIT(S): 5000 INJECTION INTRAVENOUS; SUBCUTANEOUS at 05:18

## 2019-02-14 RX ADMIN — Medication 15 MILLIGRAM(S): at 18:23

## 2019-02-14 RX ADMIN — Medication 1000 MILLIGRAM(S): at 10:02

## 2019-02-14 RX ADMIN — Medication 400 MILLIGRAM(S): at 09:32

## 2019-02-14 RX ADMIN — SODIUM CHLORIDE 75 MILLILITER(S): 9 INJECTION, SOLUTION INTRAVENOUS at 09:32

## 2019-02-14 RX ADMIN — TRAMADOL HYDROCHLORIDE 50 MILLIGRAM(S): 50 TABLET ORAL at 05:30

## 2019-02-14 RX ADMIN — ESCITALOPRAM OXALATE 20 MILLIGRAM(S): 10 TABLET, FILM COATED ORAL at 05:18

## 2019-02-14 RX ADMIN — Medication 15 MILLIGRAM(S): at 23:53

## 2019-02-14 RX ADMIN — TRAMADOL HYDROCHLORIDE 50 MILLIGRAM(S): 50 TABLET ORAL at 06:28

## 2019-02-14 RX ADMIN — HEPARIN SODIUM 5000 UNIT(S): 5000 INJECTION INTRAVENOUS; SUBCUTANEOUS at 15:01

## 2019-02-14 RX ADMIN — Medication 15 MILLIGRAM(S): at 05:18

## 2019-02-14 RX ADMIN — LETROZOLE 2.5 MILLIGRAM(S): 2.5 TABLET, FILM COATED ORAL at 05:18

## 2019-02-14 RX ADMIN — Medication 400 MILLIGRAM(S): at 21:46

## 2019-02-14 NOTE — PROGRESS NOTE ADULT - PROBLEM SELECTOR PLAN 1
-s/p left lap radical nephrectomy/adrenalectomy on 2/13, Intraop EBL 1.5 L, infused 2 L crystalloids and transfused 3 U PRBC  -postop management per , pain control, IVF, check labs, incentive spirometry, DVT ppx.

## 2019-02-15 ENCOUNTER — TRANSCRIPTION ENCOUNTER (OUTPATIENT)
Age: 64
End: 2019-02-15

## 2019-02-15 PROCEDURE — 99233 SBSQ HOSP IP/OBS HIGH 50: CPT

## 2019-02-15 PROCEDURE — 71045 X-RAY EXAM CHEST 1 VIEW: CPT | Mod: 26

## 2019-02-15 RX ORDER — TRAMADOL HYDROCHLORIDE 50 MG/1
1 TABLET ORAL
Qty: 15 | Refills: 0 | OUTPATIENT
Start: 2019-02-15

## 2019-02-15 RX ADMIN — TRAMADOL HYDROCHLORIDE 25 MILLIGRAM(S): 50 TABLET ORAL at 23:25

## 2019-02-15 RX ADMIN — HEPARIN SODIUM 5000 UNIT(S): 5000 INJECTION INTRAVENOUS; SUBCUTANEOUS at 13:35

## 2019-02-15 RX ADMIN — ALBUTEROL 1 PUFF(S): 90 AEROSOL, METERED ORAL at 09:13

## 2019-02-15 RX ADMIN — ESCITALOPRAM OXALATE 20 MILLIGRAM(S): 10 TABLET, FILM COATED ORAL at 05:33

## 2019-02-15 RX ADMIN — TRAMADOL HYDROCHLORIDE 25 MILLIGRAM(S): 50 TABLET ORAL at 22:27

## 2019-02-15 RX ADMIN — TRAMADOL HYDROCHLORIDE 50 MILLIGRAM(S): 50 TABLET ORAL at 06:30

## 2019-02-15 RX ADMIN — TRAMADOL HYDROCHLORIDE 50 MILLIGRAM(S): 50 TABLET ORAL at 13:00

## 2019-02-15 RX ADMIN — TRAMADOL HYDROCHLORIDE 50 MILLIGRAM(S): 50 TABLET ORAL at 05:33

## 2019-02-15 RX ADMIN — TIOTROPIUM BROMIDE 1 CAPSULE(S): 18 CAPSULE ORAL; RESPIRATORY (INHALATION) at 09:13

## 2019-02-15 RX ADMIN — HEPARIN SODIUM 5000 UNIT(S): 5000 INJECTION INTRAVENOUS; SUBCUTANEOUS at 22:26

## 2019-02-15 RX ADMIN — HEPARIN SODIUM 5000 UNIT(S): 5000 INJECTION INTRAVENOUS; SUBCUTANEOUS at 05:33

## 2019-02-15 RX ADMIN — TRAMADOL HYDROCHLORIDE 50 MILLIGRAM(S): 50 TABLET ORAL at 12:16

## 2019-02-15 RX ADMIN — Medication 15 MILLIGRAM(S): at 05:32

## 2019-02-15 RX ADMIN — ALBUTEROL 1 PUFF(S): 90 AEROSOL, METERED ORAL at 22:27

## 2019-02-15 RX ADMIN — LETROZOLE 2.5 MILLIGRAM(S): 2.5 TABLET, FILM COATED ORAL at 05:33

## 2019-02-15 NOTE — DISCHARGE NOTE ADULT - NS AS ACTIVITY OBS
No Heavy lifting/straining/Stairs allowed/Showering allowed/Walking-Outdoors allowed/Walking-Indoors allowed

## 2019-02-15 NOTE — DISCHARGE NOTE ADULT - MEDICATION SUMMARY - MEDICATIONS TO TAKE
I will START or STAY ON the medications listed below when I get home from the hospital:    traMADol 50 mg oral tablet  -- 1 tab(s) by mouth every 6 hours, As Needed - for moderate pain - for severe pain MDD:4 tabs  -- Indication: For Pain    Milk of Magnesia 8% oral suspension  -- 15 milliliter(s) by mouth once a day (at bedtime), As Needed  -- Indication: For Constipation    Lexapro 20 mg oral tablet  -- 1 tab(s) by mouth once a day in am  -- Indication: For Anti-depressant    diphenhydrAMINE 25 mg oral tablet  -- 1 tab(s) by mouth once a day (at bedtime), As Needed  -- Indication: For Home med    Femara 2.5 mg oral tablet  -- 1 tab(s) by mouth once a day in am  -- Indication: For Breast cancer    Anoro Ellipta 62.5 mcg-25 mcg/inh inhalation powder  -- 1 puff(s) inhaled once a day in am  -- Indication: For Emphysema lung    Ibrance 100 mg oral capsule  -- 1 cap(s) by mouth once a day in am  -- Indication: For Breast cancer    Senna 8.6 mg oral tablet  -- 2 tab(s) by mouth once a day (at bedtime), As Needed  -- Indication: For Constipation    Vitamin D3 2000 intl units oral tablet  -- 1 tab(s) by mouth once a day  -- Indication: For Home med I will START or STAY ON the medications listed below when I get home from the hospital:    traMADol 50 mg oral tablet  -- 1 tab(s) by mouth every 6 hours, As Needed - for moderate pain - for severe pain MDD:4 tabs  -- Indication: For Pain    Milk of Magnesia 8% oral suspension  -- 15 milliliter(s) by mouth once a day (at bedtime), As Needed  -- Indication: For Constipation    Lexapro 20 mg oral tablet  -- 1 tab(s) by mouth once a day in am  -- Indication: For Anti-depressant    diphenhydrAMINE 25 mg oral tablet  -- 1 tab(s) by mouth once a day (at bedtime), As Needed  -- Indication: For Home med    Femara 2.5 mg oral tablet  -- 1 tab(s) by mouth once a day in am  -- Indication: For Breast cancer    Anoro Ellipta 62.5 mcg-25 mcg/inh inhalation powder  -- 1 puff(s) inhaled once a day in am  -- Indication: For Emphysema lung    Ibrance 100 mg oral capsule  -- 1 cap(s) by mouth once a day in am  -- Indication: For Breast cancer    Senna 8.6 mg oral tablet  -- 2 tab(s) by mouth once a day (at bedtime), As Needed  -- Indication: For Constipation    Bactrim  mg-160 mg oral tablet  -- 1 tab(s) by mouth 2 times a day   -- Avoid prolonged or excessive exposure to direct and/or artificial sunlight while taking this medication.  Finish all this medication unless otherwise directed by prescriber.  Medication should be taken with plenty of water.    -- Indication: For Treatment of possible UTI    Vitamin D3 2000 intl units oral tablet  -- 1 tab(s) by mouth once a day  -- Indication: For Home med

## 2019-02-15 NOTE — DISCHARGE NOTE ADULT - PATIENT PORTAL LINK FT
You can access the [facility], offered by [facility], by registering with the following website: [email or web address]

## 2019-02-15 NOTE — DISCHARGE NOTE ADULT - HOSPITAL COURSE
Pt had L radical Nephrectomy, pt NPO post-op, passed TOV on POD#1, passed flatus POD#2, advanced to clears then regular, tolerated well.  Will be discharged home Pt had L radical Nephrectomy, pt NPO post-op, passed TOV on POD#1, passed flatus POD#2, advanced to clears then regular, tolerated well.  Patient with mild fever POD #2, no further fevers. Presumed atelectasis but will discharge with abx. Will be discharged home

## 2019-02-15 NOTE — DISCHARGE NOTE ADULT - CARE PLAN
Principal Discharge DX:	Renal mass, left  Goal:	Recovery  Assessment and plan of treatment:	WOUND CARE:  Clean the area with warm soapy water, pat dry.   BATHING: Please do not submerge wound underwater. You may shower and/or sponge bathe.  ACTIVITY: No heavy lifting or straining. Otherwise, you may return to your usual level of physical activity. If you are taking narcotic pain medication (such as Percocet) DO NOT drive a car, operate machinery or make important decisions.  DIET: You may resume your regular diet.  NOTIFY YOUR SURGEON IF: You have any bleeding that does not stop, any pus draining from your wound(s), any fever (over 100.4 F) or chills, persistent nausea/vomiting, persistent diarrhea, or if your pain is not controlled on your discharge pain medications.  FOLLOW-UP: Please follow up with your primary care physician in week regarding your hospitalization.  Secondary Diagnosis:	Postoperative anemia due to acute blood loss

## 2019-02-15 NOTE — DISCHARGE NOTE ADULT - CARE PROVIDER_API CALL
Cassius Mathias)  Urology  [address] [place] [address]  Phone: [phone]  Fax: [phone]  Follow Up Time:

## 2019-02-15 NOTE — PROGRESS NOTE ADULT - PROBLEM SELECTOR PLAN 1
-s/p left lap radical nephrectomy/adrenalectomy on 2/13, Intraop EBL 1.5 L, infused 2 L crystalloids and transfused 3 U PRBC  -progressing well postop, advance diet as tolerated, pain control, d/c plan per primary team

## 2019-02-15 NOTE — DISCHARGE NOTE ADULT - INSTRUCTIONS
No changes, drink plenty of fluids. WOUND CARE:  Clean the area with warm soapy water, pat dry.   BATHING: Please do not submerge wound underwater. You may shower and/or sponge bathe.  ACTIVITY: No heavy lifting or straining. Otherwise, you may return to your usual level of physical activity. If you are taking narcotic pain medication (such as Percocet) DO NOT drink alcohol, drive a car, operate machinery or make important decisions.  DIET: You may resume your regular diet.  NOTIFY YOUR SURGEON IF: You have any bleeding that does not stop, any pus draining from your wound(s), any fever (over 100.4 F) or chills, persistent nausea/vomiting, persistent diarrhea, or if your pain is not controlled on your discharge pain medications.

## 2019-02-15 NOTE — CHART NOTE - NSCHARTNOTEFT_GEN_A_CORE
Patient with Temp of 100.1, , /63, 93% RA ~9PM.  Rectal temp 101.8.    Patient evaluated at that time.    Patient denies any complaints, denies feeling febrile, no diaphoresis.    Denies cough, shortness of breath, dysuria, worsening pain.    On exam HR is regular  Lungs with crackles at bases  Abdomen is soft and nontender    -Blood cultures and urine culture ordered.  -CXR ordered.  -Encouraged Incentive Spirometry.  -Patient received Tylenol.

## 2019-02-15 NOTE — DISCHARGE NOTE ADULT - PLAN OF CARE
Recovery WOUND CARE:  Clean the area with warm soapy water, pat dry.   BATHING: Please do not submerge wound underwater. You may shower and/or sponge bathe.  ACTIVITY: No heavy lifting or straining. Otherwise, you may return to your usual level of physical activity. If you are taking narcotic pain medication (such as Percocet) DO NOT drive a car, operate machinery or make important decisions.  DIET: You may resume your regular diet.  NOTIFY YOUR SURGEON IF: You have any bleeding that does not stop, any pus draining from your wound(s), any fever (over 100.4 F) or chills, persistent nausea/vomiting, persistent diarrhea, or if your pain is not controlled on your discharge pain medications.  FOLLOW-UP: Please follow up with your primary care physician in week regarding your hospitalization.

## 2019-02-16 VITALS
TEMPERATURE: 99 F | HEART RATE: 84 BPM | RESPIRATION RATE: 18 BRPM | DIASTOLIC BLOOD PRESSURE: 59 MMHG | SYSTOLIC BLOOD PRESSURE: 117 MMHG | OXYGEN SATURATION: 98 %

## 2019-02-16 DIAGNOSIS — R50.82 POSTPROCEDURAL FEVER: ICD-10-CM

## 2019-02-16 LAB
HCT VFR BLD CALC: 25.3 % — LOW (ref 34.5–45)
HGB BLD-MCNC: 7.7 G/DL — LOW (ref 11.5–15.5)
MCHC RBC-ENTMCNC: 27.7 PG — SIGNIFICANT CHANGE UP (ref 27–34)
MCHC RBC-ENTMCNC: 30.4 % — LOW (ref 32–36)
MCV RBC AUTO: 91 FL — SIGNIFICANT CHANGE UP (ref 80–100)
NRBC # FLD: 0 K/UL — LOW (ref 25–125)
PLATELET # BLD AUTO: 295 K/UL — SIGNIFICANT CHANGE UP (ref 150–400)
PMV BLD: 10.3 FL — SIGNIFICANT CHANGE UP (ref 7–13)
RBC # BLD: 2.78 M/UL — LOW (ref 3.8–5.2)
RBC # FLD: 17.9 % — HIGH (ref 10.3–14.5)
SPECIMEN SOURCE: SIGNIFICANT CHANGE UP
WBC # BLD: 12.02 K/UL — HIGH (ref 3.8–10.5)
WBC # FLD AUTO: 12.02 K/UL — HIGH (ref 3.8–10.5)

## 2019-02-16 PROCEDURE — 99233 SBSQ HOSP IP/OBS HIGH 50: CPT

## 2019-02-16 RX ORDER — AZTREONAM 2 G
1 VIAL (EA) INJECTION
Qty: 6 | Refills: 0 | OUTPATIENT
Start: 2019-02-16 | End: 2019-02-18

## 2019-02-16 RX ADMIN — TIOTROPIUM BROMIDE 1 CAPSULE(S): 18 CAPSULE ORAL; RESPIRATORY (INHALATION) at 09:46

## 2019-02-16 RX ADMIN — ALBUTEROL 1 PUFF(S): 90 AEROSOL, METERED ORAL at 09:45

## 2019-02-16 RX ADMIN — HEPARIN SODIUM 5000 UNIT(S): 5000 INJECTION INTRAVENOUS; SUBCUTANEOUS at 05:23

## 2019-02-16 RX ADMIN — LETROZOLE 2.5 MILLIGRAM(S): 2.5 TABLET, FILM COATED ORAL at 05:23

## 2019-02-16 RX ADMIN — ESCITALOPRAM OXALATE 20 MILLIGRAM(S): 10 TABLET, FILM COATED ORAL at 05:23

## 2019-02-16 NOTE — PROGRESS NOTE ADULT - PROBLEM SELECTOR PLAN 4
-acute blood loss anemia superimposed on anemia of chronic disease  -monitor CBC, transfused 3 U PRBC intraop, transfuse prn.
-H/o L breast cancer s/p mastectomy/chemo 13 years ago, but now with cancer recurrence and bone mets  -c/w Femara and Ibrance  -outpt f/u oncology.
-acute blood loss anemia superimposed on anemia of chronic disease  -monitor CBC, transfused 3 U PRBC intraop, transfuse prn.

## 2019-02-16 NOTE — PROGRESS NOTE ADULT - PROBLEM SELECTOR PLAN 1
-could be due to atelectasis  -monitor temp curve, f/u cultures and CXR official read (no focal infiltrate to my view)  -incentive spirometry and early mobilization

## 2019-02-16 NOTE — PROGRESS NOTE ADULT - PROBLEM SELECTOR PROBLEM 4
Postoperative anemia due to acute blood loss
Breast cancer
Postoperative anemia due to acute blood loss

## 2019-02-16 NOTE — PROGRESS NOTE ADULT - PROBLEM SELECTOR PLAN 1
- CBC to trend white count  - f/u UCx and BCx  - CXR reviewed, no consolidation -> await official read  - OOB  - c/w regular diet - CBC to trend white count  - f/u UCx and BCx  - CXR reviewed, no consolidation -> await official read  - OOB  - c/w regular diet  - likely d/c home

## 2019-02-16 NOTE — PROGRESS NOTE ADULT - PROBLEM SELECTOR PLAN 2
c/w bronchodilator, incentive spirometry  patient advised to bring her home med Anoro Ellipta, if unable can substitute with   albuterol inhaler 1 puff bid and spiriva daily.
-s/p left lap radical nephrectomy/adrenalectomy on 2/13, Intraop EBL 1.5 L, infused 2 L crystalloids and transfused 3 U PRBC  -progressing well postop, tolerating diet, d/c plan per primary team
c/w bronchodilator, incentive spirometry  patient's sister brought in her home med Anoro Ellipta, pt can use her own med instead of albuterol inhaler/spiriva

## 2019-02-16 NOTE — PROGRESS NOTE ADULT - ASSESSMENT
63 year old female with a history of COPD, depression, left breast cancer, s/p mastectomy and chemo 13 years ago, now with new bone mets, on Femera and Ibrance, large left renal mass, s/p left lap radical nephrectomy/adrenalectomy on 2/13, Intraop EBL 1.5 L, infused 2 L crystalloids and transfused 3 U PRBC
63y female s/p L lap rad nephx
64 y/o F POD #3 s/p left laparoscopic radical nephrectomy, now w/ post op fever, clinically stable
This is a 62 y/o F POD #1 s/p left laparoscopic radical nephrectomy.    - CBC, BMP  - NPO, IVF, monitor GI function  - pain control: tylenol, toradol, tramadol  - OOB/Amb  - DVT PPx/IS
This is a 64 y/o F POD #2 s/p left laparoscopic radical nephrectomy.    - will send UCx today  - CLD, IVF, monitor GI function --> may advance to Reg diet if tolerates  - pain control: tylenol, toradol, tramadol  - OOB/Amb  - DVT PPx/IS  - Possible discharge home later today

## 2019-02-16 NOTE — PROGRESS NOTE ADULT - PROBLEM SELECTOR PLAN 5
c/w lexapro
-acute blood loss anemia superimposed on anemia of chronic disease  -monitor CBC, transfused 3 U PRBC intraop, transfuse prn.
c/w lexapro

## 2019-02-16 NOTE — PROGRESS NOTE ADULT - ATTENDING COMMENTS
PT seen and examined agree with assessment and plan
On call attending covering for Dr Mathias. Pt seen and examined. Agree with above and edited as appropriate.

## 2019-02-16 NOTE — PROGRESS NOTE ADULT - PROBLEM SELECTOR PLAN 3
-H/o L breast cancer s/p mastectomy/chemo 13 years ago, but now with cancer recurrence and bone mets  -c/w Femara and Ibrance  -outpt f/u oncology.
-H/o L breast cancer s/p mastectomy/chemo 13 years ago, but now with cancer recurrence and bone mets  -c/w Femara and Ibrance  -outpt f/u oncology.
c/w bronchodilator, incentive spirometry  patient's sister brought in her home med Anoro Ellipta, pt can use her own med instead of albuterol inhaler/spiriva

## 2019-02-16 NOTE — PROGRESS NOTE ADULT - SUBJECTIVE AND OBJECTIVE BOX
Carla Ruiz MD  Pager [phone]    CHIEF COMPLAINT: Patient is a 63y old  female who presents with a chief complaint of left renal mass (14 Feb 2019 06:33)      SUBJECTIVE / OVERNIGHT EVENTS:  pt reports pain relatively controlled 6/10, no flatus, tolerating clears, no chest pain/sob    MEDICATIONS  (STANDING):  acetaminophen  IVPB .. 1000 milliGRAM(s) IV Intermittent <User Schedule>  acetaminophen  IVPB .. 1000 milliGRAM(s) IV Intermittent <User Schedule>  acetaminophen  IVPB .. 1000 milliGRAM(s) IV Intermittent <User Schedule>  dextrose 5% + sodium chloride 0.45%. 1000 milliLiter(s) (75 mL/Hr) IV Continuous <Continuous>  escitalopram 20 milliGRAM(s) Oral daily  heparin  Injectable 5000 Unit(s) SubCutaneous every 8 hours  ketorolac   Injectable 15 milliGRAM(s) IV Push every 6 hours  letrozole 2.5 milliGRAM(s) Oral daily  palbociclib 100 milliGRAM(s) Oral daily    MEDICATIONS  (PRN):  ondansetron Injectable 4 milliGRAM(s) IV Push once PRN Nausea and/or Vomiting  traMADol 50 milliGRAM(s) Oral every 4 hours PRN Severe Pain (7 - 10)  traMADol 25 milliGRAM(s) Oral every 4 hours PRN Moderate Pain (4 - 6)      VITALS:  T(F): 98.1 (02-14-19 @ 10:20), Max: 98.3 (02-14-19 @ 00:55)  HR: 73 (02-14-19 @ 10:20) (66 - 76)  BP: 96/51 (02-14-19 @ 10:20) (96/51 - 142/70)  RR: 18 (02-14-19 @ 10:20) (14 - 21)  SpO2: 97% (02-14-19 @ 10:20)    Weight (kg): 65 (13:56)    CAPILLARY BLOOD GLUCOSE    Output     Weight (kg): 65 (13:56)  I&O's Summary  T(F): 98.1 (02-14-19 @ 10:20), Max: 98.3 (02-14-19 @ 00:55)  HR: 73 (02-14-19 @ 10:20) (66 - 76)  BP: 96/51 (02-14-19 @ 10:20) (96/51 - 142/70)  RR: 18 (02-14-19 @ 10:20) (14 - 21)  SpO2: 97% (02-14-19 @ 10:20)    PHYSICAL EXAM:  GENERAL: NAD, well-developed  HEAD:  Atraumatic, Normocephalic  EYES: EOMI, PERRLA, conjunctiva and sclera clear  NECK: Supple, No JVD  CHEST/LUNG: Clear to auscultation bilaterally; No wheeze  HEART: Regular rate and rhythm; No murmurs, rubs, or gallops  ABDOMEN: Soft, NT, ND, incision c/d/i  EXTREMITIES:  2+ Peripheral Pulses, No clubbing, cyanosis, or edema  PSYCH: AAOx3  NEUROLOGY: non-focal  SKIN: No rashes or lesions    LABS:              8.2                  138  | 27   | 13           5.47  >-----------< 293     ------------------------< 97                    26.8                 3.8  | 101  | 1.05                                         Ca 7.8   Mg x     Ph x                    ABG - ( 13 Feb 2019 13:19 )  pH, Arterial: 7.45  pH, Blood: x     /  pCO2: 37    /  pO2: 460   / HCO3: 26    / Base Excess: 1.9   /  SaO2: 100                   MICROBIOLOGY:        RADIOLOGY & ADDITIONAL TESTS:    Imaging Personally Reviewed:    [ ] Consultant(s) Notes Reviewed:  [ ] Care Discussed with Consultants/Other Providers:
 Note    Post op Check    s/p : L lap rad nephx    Pt seen / examined without complaints pain controlled    Vital Signs Last 24 Hrs  T(C): 36.5 (13 Feb 2019 21:04), Max: 37 (13 Feb 2019 08:34)  T(F): 97.7 (13 Feb 2019 21:04), Max: 98.6 (13 Feb 2019 08:34)  HR: 73 (13 Feb 2019 21:04) (66 - 84)  BP: 121/61 (13 Feb 2019 21:04) (97/61 - 142/70)  BP(mean): 75 (13 Feb 2019 19:30) (70 - 89)  RR: 16 (13 Feb 2019 21:04) (14 - 21)  SpO2: 98% (13 Feb 2019 21:04) (91% - 100%)    I&O's Summary    13 Feb 2019 07:01  -  13 Feb 2019 21:33  --------------------------------------------------------  IN: 625 mL / OUT: 1155 mL / NET: -530 mL    QTT=4744gv  Fol=1055    PHYSICAL EXAM:       Constitutional: awake alert NAD    Respiratory: no resp distress    Cardiovascular: RRR    Gastrointestinal: softly distended, trocar sites / dressings CDI,     Genitourinary: mcgee in place , draining well yellow    Extremities: (+) venodynes                              8.7    6.71  )-----------( 279      ( 13 Feb 2019 14:00 )             27.3       02-13    137  |  100  |  12  ----------------------------<  161<H>  4.2   |  26  |  0.77    Ca    8.8      13 Feb 2019 14:00
ANESTHESIA POSTOP CHECK    63y Female POSTOP DAY 1    NO APPARENT ANESTHESIA COMPLICATIONS
Carla Ruiz MD  Pager [phone]    CHIEF COMPLAINT: Patient is a 63y old  female who presents with a chief complaint of s/p surgery (15 Feb 2019 12:17)      SUBJECTIVE / OVERNIGHT EVENTS:  pt had fever 101.8F last night, cultured and CXR done no focal infiltrate, has cough with white sputum, no dysuria/abd pain/diarrhea    MEDICATIONS  (STANDING):  ALBUTerol    90 MICROgram(s) HFA Inhaler 1 Puff(s) Inhalation two times a day  escitalopram 20 milliGRAM(s) Oral daily  heparin  Injectable 5000 Unit(s) SubCutaneous every 8 hours  letrozole 2.5 milliGRAM(s) Oral daily  palbociclib 100 milliGRAM(s) Oral daily  tiotropium 18 MICROgram(s) Capsule 1 Capsule(s) Inhalation daily    MEDICATIONS  (PRN):  ondansetron Injectable 4 milliGRAM(s) IV Push once PRN Nausea and/or Vomiting  traMADol 50 milliGRAM(s) Oral every 4 hours PRN Severe Pain (7 - 10)  traMADol 25 milliGRAM(s) Oral every 4 hours PRN Moderate Pain (4 - 6)      VITALS:  T(F): 99.3 (02-16-19 @ 09:46), Max: 101.8 (02-15-19 @ 21:19)  HR: 84 (02-16-19 @ 09:46) (73 - 100)  BP: 117/59 (02-16-19 @ 09:46) (117/59 - 155/63)  RR: 18 (02-16-19 @ 09:46) (16 - 98)  SpO2: 98% (02-16-19 @ 09:46)      CAPILLARY BLOOD GLUCOSE    Output     I&O's Summary  T(F): 99.3 (02-16-19 @ 09:46), Max: 101.8 (02-15-19 @ 21:19)  HR: 84 (02-16-19 @ 09:46) (73 - 100)  BP: 117/59 (02-16-19 @ 09:46) (117/59 - 155/63)  RR: 18 (02-16-19 @ 09:46) (16 - 98)  SpO2: 98% (02-16-19 @ 09:46)    PHYSICAL EXAM:  GENERAL: NAD, well-developed  HEAD:  Atraumatic, Normocephalic  EYES: EOMI,  conjunctiva and sclera clear  NECK: Supple, No JVD  CHEST/LUNG: Clear to auscultation bilaterally; No wheeze  HEART: Regular rate and rhythm; No murmurs, rubs, or gallops  ABDOMEN: Soft, NT, ND, incision c/d/i  EXTREMITIES:  2+ Peripheral Pulses, No clubbing, cyanosis, or edema  PSYCH: AAOx3  NEUROLOGY: non-focal  SKIN: No rashes or lesions    LABS:              7.7                  x    | x    | x            12.02 >-----------< 295     ------------------------< x                     25.3                 x    | x    | x                                            Ca x     Mg x     Ph x            MICROBIOLOGY:    Culture - Urine (collected 15 Feb 2019 11:49)  Source: URINE MIDSTREAM  Preliminary Report (16 Feb 2019 07:56):    NO GROWTH TO DATE      RADIOLOGY & ADDITIONAL TESTS:    Imaging Personally Reviewed:    [ ] Consultant(s) Notes Reviewed:  [ ] Care Discussed with Consultants/Other Providers:
Carla Ruiz MD  Pager [phone]    CHIEF COMPLAINT: Patient is a 63y old  female who presents with a chief complaint of left renal mass (15 Feb 2019 06:39)      SUBJECTIVE / OVERNIGHT EVENTS:  pt passed flatus, tolerating diet , no sob    MEDICATIONS  (STANDING):  ALBUTerol    90 MICROgram(s) HFA Inhaler 1 Puff(s) Inhalation two times a day  dextrose 5% + sodium chloride 0.45%. 1000 milliLiter(s) (75 mL/Hr) IV Continuous <Continuous>  escitalopram 20 milliGRAM(s) Oral daily  heparin  Injectable 5000 Unit(s) SubCutaneous every 8 hours  letrozole 2.5 milliGRAM(s) Oral daily  palbociclib 100 milliGRAM(s) Oral daily  tiotropium 18 MICROgram(s) Capsule 1 Capsule(s) Inhalation daily    MEDICATIONS  (PRN):  ondansetron Injectable 4 milliGRAM(s) IV Push once PRN Nausea and/or Vomiting  traMADol 50 milliGRAM(s) Oral every 4 hours PRN Severe Pain (7 - 10)  traMADol 25 milliGRAM(s) Oral every 4 hours PRN Moderate Pain (4 - 6)      VITALS:  T(F): 98.1 (02-15-19 @ 09:17), Max: 98.4 (02-14-19 @ 21:41)  HR: 66 (02-15-19 @ 09:17) (62 - 71)  BP: 111/62 (02-15-19 @ 09:17) (99/54 - 136/66)  RR: 16 (02-15-19 @ 09:17) (16 - 17)  SpO2: 100% (02-15-19 @ 09:17)      CAPILLARY BLOOD GLUCOSE    Output     I&O's Summary  T(F): 98.1 (02-15-19 @ 09:17), Max: 98.4 (02-14-19 @ 21:41)  HR: 66 (02-15-19 @ 09:17) (62 - 71)  BP: 111/62 (02-15-19 @ 09:17) (99/54 - 136/66)  RR: 16 (02-15-19 @ 09:17) (16 - 17)  SpO2: 100% (02-15-19 @ 09:17)    PHYSICAL EXAM:  GENERAL: NAD, well-developed  HEAD:  Atraumatic, Normocephalic  EYES: EOMI,  conjunctiva and sclera clear  NECK: Supple, No JVD  CHEST/LUNG: Clear to auscultation bilaterally; No wheeze  HEART: Regular rate and rhythm; No murmurs, rubs, or gallops  ABDOMEN: Soft, NT, ND, incision c/d/i  EXTREMITIES:  2+ Peripheral Pulses, No clubbing, cyanosis, or edema  PSYCH: AAOx3  NEUROLOGY: non-focal  SKIN: No rashes or lesions    LABS:              8.2                  138  | 27   | 13           5.47  >-----------< 293     ------------------------< 97                    26.8                 3.8  | 101  | 1.05                                         Ca 7.8   Mg x     Ph x                    ABG - ( 13 Feb 2019 13:19 )  pH, Arterial: 7.45  pH, Blood: x     /  pCO2: 37    /  pO2: 460   / HCO3: 26    / Base Excess: 1.9   /  SaO2: 100                   MICROBIOLOGY:        RADIOLOGY & ADDITIONAL TESTS:    Imaging Personally Reviewed:    [ ] Consultant(s) Notes Reviewed:  [ ] Care Discussed with Consultants/Other Providers:
Subjective    Overnight events reviewed, fever of 101.8, cultures were obtained. Pt states she feels well, pain controlled, OOB, passing flatus and having BMs.    Objective    Vital signs  T(F): , Max: 101.8 (02-15-19 @ 21:19)  HR: 73 (02-16-19 @ 05:19)  BP: 127/63 (02-16-19 @ 05:19)  SpO2: 98% (02-16-19 @ 01:08)  Wt(kg): --    Output     OUT:    Voided: 1000 mL  Total OUT: 1000 mL    Total NET: -1000 mL    Gen NAD  Abd S/mild distension/NT, incisions clean, no erythema or drainage   no suprapubic tenderness, no flank tenderness    Labs    CBC pending    Urine Cx: pending  Blood Cx: pending    Imaging    CXR (prelim): no consolidation, no blunting of costophrenic angle
Subjective  Awaiting GI function   No nausea/vomiting  Pain control greatly improved    Objective  Vital signs  T(F): , Max: 98.6 (02-13-19 @ 08:34)  HR: 75 (02-14-19 @ 05:12)  BP: 118/54 (02-14-19 @ 05:12)  SpO2: 92% (02-14-19 @ 05:12)  Mcgee 350 - marc    Gen: NAD  Abd: Soft, non-tender, incisions c/d/i with staples in place  : mcgee removed    Labs      02-13 @ 14:00    WBC 6.71  / Hct 27.3  / SCr 0.77
Subjective  Passing flatus overnight  Reports urinary frequency  Pain controlled    Objective  Vital signs  T(F): , Max: 98.4 (02-14-19 @ 21:41)  HR: 71 (02-15-19 @ 05:27)  BP: 136/66 (02-15-19 @ 05:27)  SpO2: 98% (02-15-19 @ 05:27)  Void -1560      Gen: NAD  Abd: Soft, incisions c/d/i with staples in place  : bladder non-palpable    Labs      02-14 @ 06:15    WBC 5.47  / Hct 26.8  / SCr 1.05     02-13 @ 14:00    WBC 6.71  / Hct 27.3  / SCr 0.77

## 2019-02-17 ENCOUNTER — INPATIENT (INPATIENT)
Facility: HOSPITAL | Age: 64
LOS: 11 days | Discharge: NURSING, REHAB OR LONG-TERM CARE | End: 2019-03-01
Attending: UROLOGY | Admitting: UROLOGY
Payer: COMMERCIAL

## 2019-02-17 ENCOUNTER — EMERGENCY (EMERGENCY)
Facility: HOSPITAL | Age: 64
LOS: 1 days | Discharge: HOME | End: 2019-02-17
Attending: EMERGENCY MEDICINE | Admitting: EMERGENCY MEDICINE
Payer: COMMERCIAL

## 2019-02-17 VITALS
OXYGEN SATURATION: 98 % | RESPIRATION RATE: 19 BRPM | HEART RATE: 87 BPM | DIASTOLIC BLOOD PRESSURE: 36 MMHG | TEMPERATURE: 97 F | SYSTOLIC BLOOD PRESSURE: 75 MMHG | HEIGHT: 64 IN | WEIGHT: 149.91 LBS

## 2019-02-17 VITALS
SYSTOLIC BLOOD PRESSURE: 84 MMHG | OXYGEN SATURATION: 100 % | RESPIRATION RATE: 16 BRPM | HEART RATE: 82 BPM | DIASTOLIC BLOOD PRESSURE: 55 MMHG | TEMPERATURE: 98 F

## 2019-02-17 VITALS
RESPIRATION RATE: 20 BRPM | OXYGEN SATURATION: 97 % | DIASTOLIC BLOOD PRESSURE: 46 MMHG | HEART RATE: 88 BPM | TEMPERATURE: 98 F | SYSTOLIC BLOOD PRESSURE: 88 MMHG

## 2019-02-17 DIAGNOSIS — E87.2 ACIDOSIS: ICD-10-CM

## 2019-02-17 DIAGNOSIS — E89.6 POSTPROCEDURAL ADRENOCORTICAL (-MEDULLARY) HYPOFUNCTION: Chronic | ICD-10-CM

## 2019-02-17 DIAGNOSIS — Z98.890 OTHER SPECIFIED POSTPROCEDURAL STATES: Chronic | ICD-10-CM

## 2019-02-17 DIAGNOSIS — Z90.12 ACQUIRED ABSENCE OF LEFT BREAST AND NIPPLE: Chronic | ICD-10-CM

## 2019-02-17 DIAGNOSIS — N28.89 OTHER SPECIFIED DISORDERS OF KIDNEY AND URETER: ICD-10-CM

## 2019-02-17 DIAGNOSIS — R57.8 OTHER SHOCK: ICD-10-CM

## 2019-02-17 DIAGNOSIS — Z90.5 ACQUIRED ABSENCE OF KIDNEY: Chronic | ICD-10-CM

## 2019-02-17 DIAGNOSIS — R57.1 HYPOVOLEMIC SHOCK: ICD-10-CM

## 2019-02-17 DIAGNOSIS — C50.919 MALIGNANT NEOPLASM OF UNSPECIFIED SITE OF UNSPECIFIED FEMALE BREAST: ICD-10-CM

## 2019-02-17 DIAGNOSIS — J43.9 EMPHYSEMA, UNSPECIFIED: ICD-10-CM

## 2019-02-17 DIAGNOSIS — N17.9 ACUTE KIDNEY FAILURE, UNSPECIFIED: ICD-10-CM

## 2019-02-17 PROBLEM — D64.9 ANEMIA, UNSPECIFIED: Chronic | Status: ACTIVE | Noted: 2019-01-31

## 2019-02-17 LAB
ABO RH CONFIRMATION: SIGNIFICANT CHANGE UP
ALBUMIN SERPL ELPH-MCNC: 2 G/DL — LOW (ref 3.3–5)
ALBUMIN SERPL ELPH-MCNC: 2.2 G/DL — LOW (ref 3.3–5)
ALBUMIN SERPL ELPH-MCNC: 2.3 G/DL — LOW (ref 3.3–5)
ALP SERPL-CCNC: 119 U/L — SIGNIFICANT CHANGE UP (ref 40–120)
ALP SERPL-CCNC: 57 U/L — SIGNIFICANT CHANGE UP (ref 40–120)
ALP SERPL-CCNC: 92 U/L — SIGNIFICANT CHANGE UP (ref 40–120)
ALT FLD-CCNC: 5 U/L — SIGNIFICANT CHANGE UP (ref 4–33)
ALT FLD-CCNC: 7 U/L DA — LOW (ref 10–45)
ALT FLD-CCNC: < 5 U/L — SIGNIFICANT CHANGE UP (ref 4–33)
ANION GAP SERPL CALC-SCNC: 12 MMO/L — SIGNIFICANT CHANGE UP (ref 7–14)
ANION GAP SERPL CALC-SCNC: 16 MMO/L — HIGH (ref 7–14)
ANION GAP SERPL CALC-SCNC: 20 MMOL/L — HIGH (ref 5–17)
ANISOCYTOSIS BLD QL: SLIGHT — SIGNIFICANT CHANGE UP
APTT BLD: 20.3 SEC — LOW (ref 27.5–36.3)
APTT BLD: 23.2 SEC — LOW (ref 27.5–36.3)
APTT BLD: 27.1 SEC — LOW (ref 27.5–36.3)
AST SERPL-CCNC: 14 U/L — SIGNIFICANT CHANGE UP (ref 4–32)
AST SERPL-CCNC: 14 U/L — SIGNIFICANT CHANGE UP (ref 4–32)
AST SERPL-CCNC: 18 U/L — SIGNIFICANT CHANGE UP (ref 10–40)
BACTERIA UR CULT: SIGNIFICANT CHANGE UP
BACTERIA UR CULT: SIGNIFICANT CHANGE UP
BASE EXCESS BLDA CALC-SCNC: -3.9 MMOL/L — SIGNIFICANT CHANGE UP
BASE EXCESS BLDA CALC-SCNC: -4.5 MMOL/L — SIGNIFICANT CHANGE UP
BASE EXCESS BLDA CALC-SCNC: -4.7 MMOL/L — SIGNIFICANT CHANGE UP
BASE EXCESS BLDA CALC-SCNC: -4.8 MMOL/L — SIGNIFICANT CHANGE UP
BASE EXCESS BLDA CALC-SCNC: -7.9 MMOL/L — SIGNIFICANT CHANGE UP
BASE EXCESS BLDV CALC-SCNC: -7.7 MMOL/L — SIGNIFICANT CHANGE UP
BASOPHILS # BLD AUTO: 0.07 K/UL — SIGNIFICANT CHANGE UP (ref 0–0.2)
BASOPHILS # BLD AUTO: 0.2 K/UL — SIGNIFICANT CHANGE UP (ref 0–0.2)
BASOPHILS NFR BLD AUTO: 0.4 % — SIGNIFICANT CHANGE UP (ref 0–2)
BASOPHILS NFR BLD AUTO: 1.3 % — SIGNIFICANT CHANGE UP (ref 0–2)
BASOPHILS NFR SPEC: 0 % — SIGNIFICANT CHANGE UP (ref 0–2)
BILIRUB SERPL-MCNC: 0.4 MG/DL — SIGNIFICANT CHANGE UP (ref 0.2–1.2)
BILIRUB SERPL-MCNC: 0.6 MG/DL — SIGNIFICANT CHANGE UP (ref 0.2–1.2)
BILIRUB SERPL-MCNC: 0.8 MG/DL — SIGNIFICANT CHANGE UP (ref 0.2–1.2)
BLASTS # FLD: 0 % — SIGNIFICANT CHANGE UP (ref 0–0)
BLD GP AB SCN SERPL QL: NEGATIVE — SIGNIFICANT CHANGE UP
BLD GP AB SCN SERPL QL: SIGNIFICANT CHANGE UP
BLOOD GAS VENOUS - CREATININE: 1.44 MG/DL — HIGH (ref 0.5–1.3)
BUN SERPL-MCNC: 14 MG/DL — SIGNIFICANT CHANGE UP (ref 7–23)
BUN SERPL-MCNC: 16 MG/DL — SIGNIFICANT CHANGE UP (ref 7–23)
BUN SERPL-MCNC: 17 MG/DL — SIGNIFICANT CHANGE UP (ref 7–23)
BURR CELLS BLD QL SMEAR: PRESENT — SIGNIFICANT CHANGE UP
CA-I BLDA-SCNC: 0.94 MMOL/L — LOW (ref 1.15–1.29)
CA-I BLDA-SCNC: 0.94 MMOL/L — LOW (ref 1.15–1.29)
CA-I BLDA-SCNC: 0.95 MMOL/L — LOW (ref 1.15–1.29)
CA-I BLDA-SCNC: 0.96 MMOL/L — LOW (ref 1.15–1.29)
CA-I BLDA-SCNC: 0.96 MMOL/L — LOW (ref 1.15–1.29)
CALCIUM SERPL-MCNC: 6.2 MG/DL — CRITICAL LOW (ref 8.4–10.5)
CALCIUM SERPL-MCNC: 6.7 MG/DL — LOW (ref 8.4–10.5)
CALCIUM SERPL-MCNC: 7.7 MG/DL — LOW (ref 8.4–10.5)
CHLORIDE BLDV-SCNC: 109 MMOL/L — HIGH (ref 96–108)
CHLORIDE SERPL-SCNC: 102 MMOL/L — SIGNIFICANT CHANGE UP (ref 96–108)
CHLORIDE SERPL-SCNC: 106 MMOL/L — SIGNIFICANT CHANGE UP (ref 98–107)
CHLORIDE SERPL-SCNC: 107 MMOL/L — SIGNIFICANT CHANGE UP (ref 98–107)
CK MB BLD-MCNC: 2.46 NG/ML — SIGNIFICANT CHANGE UP (ref 1–4.7)
CK MB BLD-MCNC: SIGNIFICANT CHANGE UP (ref 0–2.5)
CK SERPL-CCNC: 55 U/L — SIGNIFICANT CHANGE UP (ref 25–170)
CO2 SERPL-SCNC: 18 MMOL/L — LOW (ref 22–31)
CO2 SERPL-SCNC: 19 MMOL/L — LOW (ref 22–31)
CO2 SERPL-SCNC: 20 MMOL/L — LOW (ref 22–31)
CREAT SERPL-MCNC: 1.34 MG/DL — HIGH (ref 0.5–1.3)
CREAT SERPL-MCNC: 1.46 MG/DL — HIGH (ref 0.5–1.3)
CREAT SERPL-MCNC: 1.55 MG/DL — HIGH (ref 0.5–1.3)
EOSINOPHIL # BLD AUTO: 0.02 K/UL — SIGNIFICANT CHANGE UP (ref 0–0.5)
EOSINOPHIL # BLD AUTO: 0.2 K/UL — SIGNIFICANT CHANGE UP (ref 0–0.5)
EOSINOPHIL NFR BLD AUTO: 0.1 % — SIGNIFICANT CHANGE UP (ref 0–6)
EOSINOPHIL NFR BLD AUTO: 1.6 % — SIGNIFICANT CHANGE UP (ref 0–6)
EOSINOPHIL NFR FLD: 0 % — SIGNIFICANT CHANGE UP (ref 0–6)
FIBRINOGEN PPP-MCNC: 491.6 MG/DL — SIGNIFICANT CHANGE UP (ref 350–510)
FLU A RESULT: SIGNIFICANT CHANGE UP
FLU A RESULT: SIGNIFICANT CHANGE UP
FLUAV AG NPH QL: SIGNIFICANT CHANGE UP
FLUBV AG NPH QL: SIGNIFICANT CHANGE UP
GAS PNL BLDV: 138 MMOL/L — SIGNIFICANT CHANGE UP (ref 136–146)
GLUCOSE BLDA-MCNC: 104 MG/DL — HIGH (ref 70–99)
GLUCOSE BLDA-MCNC: 107 MG/DL — HIGH (ref 70–99)
GLUCOSE BLDA-MCNC: 114 MG/DL — HIGH (ref 70–99)
GLUCOSE BLDA-MCNC: 119 MG/DL — HIGH (ref 70–99)
GLUCOSE BLDA-MCNC: 93 MG/DL — SIGNIFICANT CHANGE UP (ref 70–99)
GLUCOSE BLDV-MCNC: 113 — HIGH (ref 70–99)
GLUCOSE SERPL-MCNC: 118 MG/DL — HIGH (ref 70–99)
GLUCOSE SERPL-MCNC: 128 MG/DL — HIGH (ref 70–99)
GLUCOSE SERPL-MCNC: 76 MG/DL — SIGNIFICANT CHANGE UP (ref 70–99)
HCO3 BLDA-SCNC: 18 MMOL/L — LOW (ref 22–26)
HCO3 BLDA-SCNC: 20 MMOL/L — LOW (ref 22–26)
HCO3 BLDA-SCNC: 20 MMOL/L — LOW (ref 22–26)
HCO3 BLDA-SCNC: 21 MMOL/L — LOW (ref 22–26)
HCO3 BLDA-SCNC: 21 MMOL/L — LOW (ref 22–26)
HCO3 BLDV-SCNC: 17 MMOL/L — LOW (ref 20–27)
HCT VFR BLD CALC: 21.1 % — LOW (ref 34.5–45)
HCT VFR BLD CALC: 24.4 % — LOW (ref 34.5–45)
HCT VFR BLD CALC: 27.4 % — LOW (ref 34.5–45)
HCT VFR BLDA CALC: 18.1 % — CRITICAL LOW (ref 34.5–46.5)
HCT VFR BLDA CALC: 18.1 % — CRITICAL LOW (ref 34.5–46.5)
HCT VFR BLDA CALC: 19 % — CRITICAL LOW (ref 34.5–46.5)
HCT VFR BLDA CALC: 28.5 % — LOW (ref 34.5–46.5)
HCT VFR BLDA CALC: 29 % — LOW (ref 34.5–46.5)
HCT VFR BLDV CALC: 24.5 % — LOW (ref 34.5–45)
HGB BLD-MCNC: 6.5 G/DL — CRITICAL LOW (ref 11.5–15.5)
HGB BLD-MCNC: 7.5 G/DL — LOW (ref 11.5–15.5)
HGB BLD-MCNC: 9.1 G/DL — LOW (ref 11.5–15.5)
HGB BLDA-MCNC: 5.7 G/DL — CRITICAL LOW (ref 11.5–15.5)
HGB BLDA-MCNC: 5.7 G/DL — CRITICAL LOW (ref 11.5–15.5)
HGB BLDA-MCNC: 6 G/DL — CRITICAL LOW (ref 11.5–15.5)
HGB BLDA-MCNC: 9.2 G/DL — LOW (ref 11.5–15.5)
HGB BLDA-MCNC: 9.4 G/DL — LOW (ref 11.5–15.5)
HGB BLDV-MCNC: 7.9 G/DL — LOW (ref 11.5–15.5)
IMM GRANULOCYTES NFR BLD AUTO: 4.1 % — HIGH (ref 0–1.5)
INR BLD: 1.24 RATIO — HIGH (ref 0.88–1.16)
INR BLD: 1.25 — HIGH (ref 0.88–1.17)
INR BLD: 1.36 — HIGH (ref 0.88–1.17)
LACTATE BLDA-SCNC: 1.4 MMOL/L — SIGNIFICANT CHANGE UP (ref 0.5–2)
LACTATE BLDA-SCNC: 1.8 MMOL/L — SIGNIFICANT CHANGE UP (ref 0.5–2)
LACTATE BLDV-MCNC: 3.9 MMOL/L — HIGH (ref 0.5–2)
LACTATE SERPL-SCNC: 8 MMOL/L — CRITICAL HIGH (ref 0.7–2)
LYMPHOCYTES # BLD AUTO: 1.13 K/UL — SIGNIFICANT CHANGE UP (ref 1–3.3)
LYMPHOCYTES # BLD AUTO: 1.5 K/UL — SIGNIFICANT CHANGE UP (ref 1–3.3)
LYMPHOCYTES # BLD AUTO: 12 % — LOW (ref 13–44)
LYMPHOCYTES # BLD AUTO: 6.5 % — LOW (ref 13–44)
LYMPHOCYTES NFR SPEC AUTO: 2.6 % — LOW (ref 13–44)
MACROCYTES BLD QL: SLIGHT — SIGNIFICANT CHANGE UP
MAGNESIUM SERPL-MCNC: 1.9 MG/DL — SIGNIFICANT CHANGE UP (ref 1.6–2.6)
MCHC RBC-ENTMCNC: 28.9 PG — SIGNIFICANT CHANGE UP (ref 27–34)
MCHC RBC-ENTMCNC: 29.1 PG — SIGNIFICANT CHANGE UP (ref 27–34)
MCHC RBC-ENTMCNC: 29.6 PG — SIGNIFICANT CHANGE UP (ref 27–34)
MCHC RBC-ENTMCNC: 30.7 % — LOW (ref 32–36)
MCHC RBC-ENTMCNC: 30.8 GM/DL — LOW (ref 32–36)
MCHC RBC-ENTMCNC: 33.2 % — SIGNIFICANT CHANGE UP (ref 32–36)
MCV RBC AUTO: 87.5 FL — SIGNIFICANT CHANGE UP (ref 80–100)
MCV RBC AUTO: 94 FL — SIGNIFICANT CHANGE UP (ref 80–100)
MCV RBC AUTO: 96.4 FL — SIGNIFICANT CHANGE UP (ref 80–100)
METAMYELOCYTES # FLD: 0.9 % — SIGNIFICANT CHANGE UP (ref 0–1)
MONOCYTES # BLD AUTO: 0.6 K/UL — SIGNIFICANT CHANGE UP (ref 0–0.9)
MONOCYTES # BLD AUTO: 1.22 K/UL — HIGH (ref 0–0.9)
MONOCYTES NFR BLD AUTO: 4.7 % — SIGNIFICANT CHANGE UP (ref 1–9)
MONOCYTES NFR BLD AUTO: 7.1 % — SIGNIFICANT CHANGE UP (ref 2–14)
MONOCYTES NFR BLD: 6.1 % — SIGNIFICANT CHANGE UP (ref 2–9)
MYELOCYTES NFR BLD: 0.9 % — HIGH (ref 0–0)
NEUTROPHIL AB SER-ACNC: 86.1 % — HIGH (ref 43–77)
NEUTROPHILS # BLD AUTO: 14.14 K/UL — HIGH (ref 1.8–7.4)
NEUTROPHILS # BLD AUTO: 9.9 K/UL — HIGH (ref 1.8–7.4)
NEUTROPHILS NFR BLD AUTO: 80.4 % — HIGH (ref 43–77)
NEUTROPHILS NFR BLD AUTO: 81.8 % — HIGH (ref 43–77)
NEUTS BAND # BLD: 2.6 % — SIGNIFICANT CHANGE UP (ref 0–6)
NRBC # FLD: 0 K/UL — LOW (ref 25–125)
NRBC # FLD: 0 K/UL — LOW (ref 25–125)
OTHER - HEMATOLOGY %: 0 — SIGNIFICANT CHANGE UP
OVALOCYTES BLD QL SMEAR: SLIGHT — SIGNIFICANT CHANGE UP
PCO2 BLDA: 36 MMHG — SIGNIFICANT CHANGE UP (ref 32–48)
PCO2 BLDA: 37 MMHG — SIGNIFICANT CHANGE UP (ref 32–48)
PCO2 BLDA: 39 MMHG — SIGNIFICANT CHANGE UP (ref 32–48)
PCO2 BLDA: 41 MMHG — SIGNIFICANT CHANGE UP (ref 32–48)
PCO2 BLDA: 46 MMHG — SIGNIFICANT CHANGE UP (ref 32–48)
PCO2 BLDV: 51 MMHG — SIGNIFICANT CHANGE UP (ref 41–51)
PH BLDA: 7.29 PH — LOW (ref 7.35–7.45)
PH BLDA: 7.31 PH — LOW (ref 7.35–7.45)
PH BLDA: 7.33 PH — LOW (ref 7.35–7.45)
PH BLDA: 7.33 PH — LOW (ref 7.35–7.45)
PH BLDA: 7.35 PH — SIGNIFICANT CHANGE UP (ref 7.35–7.45)
PH BLDV: 7.2 PH — CRITICAL LOW (ref 7.32–7.43)
PHOSPHATE SERPL-MCNC: 4.2 MG/DL — SIGNIFICANT CHANGE UP (ref 2.5–4.5)
PLATELET # BLD AUTO: 218 K/UL — SIGNIFICANT CHANGE UP (ref 150–400)
PLATELET # BLD AUTO: 283 K/UL — SIGNIFICANT CHANGE UP (ref 150–400)
PLATELET # BLD AUTO: 385 K/UL — SIGNIFICANT CHANGE UP (ref 150–400)
PLATELET COUNT - ESTIMATE: NORMAL — SIGNIFICANT CHANGE UP
PMV BLD: 10.6 FL — SIGNIFICANT CHANGE UP (ref 7–13)
PMV BLD: 10.8 FL — SIGNIFICANT CHANGE UP (ref 7–13)
PO2 BLDA: 189 MMHG — HIGH (ref 83–108)
PO2 BLDA: 223 MMHG — HIGH (ref 83–108)
PO2 BLDA: 232 MMHG — HIGH (ref 83–108)
PO2 BLDA: 267 MMHG — HIGH (ref 83–108)
PO2 BLDA: 67 MMHG — LOW (ref 83–108)
PO2 BLDV: < 24 MMHG — LOW (ref 35–40)
POIKILOCYTOSIS BLD QL AUTO: SLIGHT — SIGNIFICANT CHANGE UP
POLYCHROMASIA BLD QL SMEAR: SLIGHT — SIGNIFICANT CHANGE UP
POTASSIUM BLDA-SCNC: 3.9 MMOL/L — SIGNIFICANT CHANGE UP (ref 3.4–4.5)
POTASSIUM BLDA-SCNC: 3.9 MMOL/L — SIGNIFICANT CHANGE UP (ref 3.4–4.5)
POTASSIUM BLDA-SCNC: 4 MMOL/L — SIGNIFICANT CHANGE UP (ref 3.4–4.5)
POTASSIUM BLDA-SCNC: 4 MMOL/L — SIGNIFICANT CHANGE UP (ref 3.4–4.5)
POTASSIUM BLDA-SCNC: 4.2 MMOL/L — SIGNIFICANT CHANGE UP (ref 3.4–4.5)
POTASSIUM BLDV-SCNC: 3.8 MMOL/L — SIGNIFICANT CHANGE UP (ref 3.4–4.5)
POTASSIUM SERPL-MCNC: 3.3 MMOL/L — LOW (ref 3.5–5.3)
POTASSIUM SERPL-MCNC: 4 MMOL/L — SIGNIFICANT CHANGE UP (ref 3.5–5.3)
POTASSIUM SERPL-MCNC: 4.3 MMOL/L — SIGNIFICANT CHANGE UP (ref 3.5–5.3)
POTASSIUM SERPL-SCNC: 3.3 MMOL/L — LOW (ref 3.5–5.3)
POTASSIUM SERPL-SCNC: 4 MMOL/L — SIGNIFICANT CHANGE UP (ref 3.5–5.3)
POTASSIUM SERPL-SCNC: 4.3 MMOL/L — SIGNIFICANT CHANGE UP (ref 3.5–5.3)
PROMYELOCYTES # FLD: 0 % — SIGNIFICANT CHANGE UP (ref 0–0)
PROT SERPL-MCNC: 4.1 G/DL — LOW (ref 6–8.3)
PROT SERPL-MCNC: 4.6 G/DL — LOW (ref 6–8.3)
PROT SERPL-MCNC: 5.6 G/DL — LOW (ref 6–8.3)
PROTHROM AB SERPL-ACNC: 14 SEC — HIGH (ref 10–12.9)
PROTHROM AB SERPL-ACNC: 14 SEC — HIGH (ref 9.8–13.1)
PROTHROM AB SERPL-ACNC: 15.6 SEC — HIGH (ref 9.8–13.1)
RBC # BLD: 2.25 M/UL — LOW (ref 3.8–5.2)
RBC # BLD: 2.53 M/UL — LOW (ref 3.8–5.2)
RBC # BLD: 3.13 M/UL — LOW (ref 3.8–5.2)
RBC # FLD: 14.8 % — HIGH (ref 10.3–14.5)
RBC # FLD: 15.9 % — HIGH (ref 10.3–14.5)
RBC # FLD: 18 % — HIGH (ref 10.3–14.5)
RH IG SCN BLD-IMP: POSITIVE — SIGNIFICANT CHANGE UP
RSV RESULT: SIGNIFICANT CHANGE UP
RSV RNA RESP QL NAA+PROBE: SIGNIFICANT CHANGE UP
SAO2 % BLDA: 100 % — HIGH (ref 95–99)
SAO2 % BLDA: 94.3 % — LOW (ref 95–99)
SAO2 % BLDA: 99.6 % — HIGH (ref 95–99)
SAO2 % BLDA: 99.7 % — HIGH (ref 95–99)
SAO2 % BLDA: 99.8 % — HIGH (ref 95–99)
SAO2 % BLDV: 23.5 % — LOW (ref 60–85)
SODIUM BLDA-SCNC: 136 MMOL/L — SIGNIFICANT CHANGE UP (ref 136–146)
SODIUM BLDA-SCNC: 137 MMOL/L — SIGNIFICANT CHANGE UP (ref 136–146)
SODIUM SERPL-SCNC: 139 MMOL/L — SIGNIFICANT CHANGE UP (ref 135–145)
SODIUM SERPL-SCNC: 140 MMOL/L — SIGNIFICANT CHANGE UP (ref 135–145)
SODIUM SERPL-SCNC: 141 MMOL/L — SIGNIFICANT CHANGE UP (ref 135–145)
SPECIMEN SOURCE: SIGNIFICANT CHANGE UP
TROPONIN T, HIGH SENSITIVITY: 44 NG/L — SIGNIFICANT CHANGE UP (ref ?–14)
TROPONIN T, HIGH SENSITIVITY: 70 NG/L — CRITICAL HIGH (ref ?–14)
VARIANT LYMPHS # BLD: 0.8 % — SIGNIFICANT CHANGE UP
WBC # BLD: 10.12 K/UL — SIGNIFICANT CHANGE UP (ref 3.8–10.5)
WBC # BLD: 12.3 K/UL — HIGH (ref 3.8–10.5)
WBC # BLD: 17.29 K/UL — HIGH (ref 3.8–10.5)
WBC # FLD AUTO: 10.12 K/UL — SIGNIFICANT CHANGE UP (ref 3.8–10.5)
WBC # FLD AUTO: 12.3 K/UL — HIGH (ref 3.8–10.5)
WBC # FLD AUTO: 17.29 K/UL — HIGH (ref 3.8–10.5)

## 2019-02-17 PROCEDURE — 76937 US GUIDE VASCULAR ACCESS: CPT | Mod: 26

## 2019-02-17 PROCEDURE — 36556 INSERT NON-TUNNEL CV CATH: CPT

## 2019-02-17 PROCEDURE — 93010 ELECTROCARDIOGRAM REPORT: CPT

## 2019-02-17 PROCEDURE — 71045 X-RAY EXAM CHEST 1 VIEW: CPT | Mod: 26

## 2019-02-17 PROCEDURE — 99291 CRITICAL CARE FIRST HOUR: CPT

## 2019-02-17 PROCEDURE — 99223 1ST HOSP IP/OBS HIGH 75: CPT

## 2019-02-17 PROCEDURE — 74176 CT ABD & PELVIS W/O CONTRAST: CPT | Mod: 26

## 2019-02-17 PROCEDURE — 36620 INSERTION CATHETER ARTERY: CPT

## 2019-02-17 PROCEDURE — 99254 IP/OBS CNSLTJ NEW/EST MOD 60: CPT | Mod: 25

## 2019-02-17 RX ORDER — CHLORHEXIDINE GLUCONATE 213 G/1000ML
1 SOLUTION TOPICAL
Qty: 0 | Refills: 0 | Status: DISCONTINUED | OUTPATIENT
Start: 2019-02-17 | End: 2019-02-22

## 2019-02-17 RX ORDER — ACETAMINOPHEN 500 MG
1000 TABLET ORAL ONCE
Qty: 0 | Refills: 0 | Status: COMPLETED | OUTPATIENT
Start: 2019-02-17 | End: 2019-02-17

## 2019-02-17 RX ORDER — CALCIUM GLUCONATE 100 MG/ML
2 VIAL (ML) INTRAVENOUS ONCE
Qty: 0 | Refills: 0 | Status: COMPLETED | OUTPATIENT
Start: 2019-02-17 | End: 2019-02-17

## 2019-02-17 RX ORDER — HYDROMORPHONE HYDROCHLORIDE 2 MG/ML
0.5 INJECTION INTRAMUSCULAR; INTRAVENOUS; SUBCUTANEOUS ONCE
Qty: 0 | Refills: 0 | Status: DISCONTINUED | OUTPATIENT
Start: 2019-02-17 | End: 2019-02-17

## 2019-02-17 RX ORDER — SODIUM CHLORIDE 9 MG/ML
2200 INJECTION INTRAMUSCULAR; INTRAVENOUS; SUBCUTANEOUS ONCE
Qty: 0 | Refills: 0 | Status: COMPLETED | OUTPATIENT
Start: 2019-02-17 | End: 2019-02-17

## 2019-02-17 RX ORDER — MAGNESIUM SULFATE 500 MG/ML
2 VIAL (ML) INJECTION ONCE
Qty: 0 | Refills: 0 | Status: COMPLETED | OUTPATIENT
Start: 2019-02-17 | End: 2019-02-18

## 2019-02-17 RX ORDER — SODIUM CHLORIDE 9 MG/ML
1000 INJECTION, SOLUTION INTRAVENOUS
Qty: 0 | Refills: 0 | Status: DISCONTINUED | OUTPATIENT
Start: 2019-02-17 | End: 2019-02-19

## 2019-02-17 RX ADMIN — Medication 1000 MILLIGRAM(S): at 17:00

## 2019-02-17 RX ADMIN — Medication 400 MILLIGRAM(S): at 16:30

## 2019-02-17 RX ADMIN — SODIUM CHLORIDE 2200 MILLILITER(S): 9 INJECTION INTRAMUSCULAR; INTRAVENOUS; SUBCUTANEOUS at 11:40

## 2019-02-17 RX ADMIN — Medication 400 MILLIGRAM(S): at 23:24

## 2019-02-17 RX ADMIN — SODIUM CHLORIDE 100 MILLILITER(S): 9 INJECTION, SOLUTION INTRAVENOUS at 22:30

## 2019-02-17 RX ADMIN — SODIUM CHLORIDE 100 MILLILITER(S): 9 INJECTION, SOLUTION INTRAVENOUS at 16:48

## 2019-02-17 RX ADMIN — Medication 200 GRAM(S): at 23:37

## 2019-02-17 RX ADMIN — Medication 1000 MILLIGRAM(S): at 23:54

## 2019-02-17 RX ADMIN — HYDROMORPHONE HYDROCHLORIDE 0.5 MILLIGRAM(S): 2 INJECTION INTRAMUSCULAR; INTRAVENOUS; SUBCUTANEOUS at 23:37

## 2019-02-17 NOTE — ED ADULT NURSE NOTE - CHIEF COMPLAINT QUOTE
Pt a transfer from [place] for bleeding s/p left nephrostomy 4 days ago. Pt arrives with 2 units of blood that completed during transfer.

## 2019-02-17 NOTE — ED ADULT NURSE NOTE - ED STAT RN HANDOFF DETAILS
pt being transferred by [facility] emt. pt being transfused. report given to caitie bo at [facility].

## 2019-02-17 NOTE — CONSULT NOTE ADULT - ASSESSMENT
63 year old female with a history of COPD 4 days s/p left lap radical nephrectomy/adrenalectomy on 2/13 for renal mass presented with weakness associated with dyspnea found to be in hemorrhagic shock secondary to large subcapsular splenic hematoma measuring seen on CT scan requiring multiple blood transfusion (2 OSH and 2 here).    - Patient is planned for IR angiogram with possible embolization for hemorrhage control   - General surgery (Dr. Christiano Vazquez) will be standby in the event emergent splenectomy is required 63 year old female with a history of COPD 4 days s/p left lap radical nephrectomy/adrenalectomy on 2/13 for renal mass presented with weakness associated with dyspnea found to be in hemorrhagic shock secondary to large subcapsular splenic hematoma measuring seen on CT scan requiring multiple blood transfusion (2 OSH and 2 here).    - Patient is planned for IR angiogram with possible embolization for hemorrhage control   - General surgery will be standby in the event emergent splenectomy is required

## 2019-02-17 NOTE — ED ADULT NURSE NOTE - OBJECTIVE STATEMENT
pt received to [facility], as a transfer from [place] ed.  Pt is s/p left nephrectomy and adrenalectomy. pt presents with staples to LLQ area and suprapubic area.  pt received PRBC's x 2 units by EMS.  emergency blood release initiated, blood bank notified.  pt aox3.  appears pale.  pt on tele monitor, v/s as noted.  presents with SL x 2, no active bleeding noted.  Surgery team at bedside, pt to go to SICU for further eval

## 2019-02-17 NOTE — ED ADULT NURSE REASSESSMENT NOTE - NS ED NURSE REASSESS COMMENT FT1
pt first name misspelled on blood transfusion form by blood bank specialist Bernadine. blood product and form taken upstairs for correction. correction made. see blood transfusion form.

## 2019-02-17 NOTE — ED PROVIDER NOTE - PHYSICAL EXAMINATION
Gen:  alert, awake, no acute distress  Head:  atraumatic, normocephalic  HEENT: PERRLA, EOMI, normal nose, normal oropharynx, no tonsillar edema, erythema, or exudate  CV:  rrr, nl S1, S2, no m/r/g  Pulm:  lungs CTA b/l  Abd: stapled left abd incision, c/d/i  MSK:  moving all extremities, no back midline ttp, no stepoffs, no cva TTP  Neuro:  grossly intact, no focal deficits  Skin:  clear, dry, intact, pale  Psych: AOx3, normal affect, no apparent risk to self or others Gen:  alert, awake, ill appearing  Head:  atraumatic, normocephalic  HEENT: PERRLA, EOMI, normal nose, normal oropharynx, no tonsillar edema, erythema, or exudate  CV:  rrr, nl S1, S2, no m/r/g  Pulm:  lungs CTA b/l  Abd: stapled left abd incision, c/d/i  MSK:  moving all extremities, no back midline ttp, no stepoffs, no cva TTP  Neuro:  grossly intact, no focal deficits  Skin:  clear, dry, intact, pale  Psych: AOx3, normal affect, no apparent risk to self or others

## 2019-02-17 NOTE — ED PROVIDER NOTE - NS ED ROS FT
Except as otherwise indicated in HPI:  CONSTITUTIONAL: no fever no chills, +generalized fatigue  HEENT: neg  CV: neg  Resp: neg  GI: Neg  : Neg  MSK: Neg  SKIN: Neg  NEURO: Neg  PSYCHIATRIC: Neg  Heme/Onc: Neg Except as otherwise indicated in HPI:  CONSTITUTIONAL: no fever no chills, +generalized fatigue  HEENT: neg  CV: neg  Resp: cough- dry  GI: Neg  : Neg  MSK: Neg  SKIN: Neg  NEURO: Neg  PSYCHIATRIC: Neg  Heme/Onc: Neg

## 2019-02-17 NOTE — H&P ADULT - ATTENDING COMMENTS
Pt seen and examined and evaluated in SICU. Hemodynamically improved and not on pressors but now on massive transfusion protocol with inappropriate response in hct (21 to 24) with 3U PRBC. Concern for ongoing bleeding and now with increasing abd distension and resultant increased work of breathing. Discussed with gen surg and IR. Plan on OR in hybrid room for angiograophy with splenic embolization and possible ex-lap. Case discussed with Dr Mathias.

## 2019-02-17 NOTE — ED ADULT TRIAGE NOTE - CHIEF COMPLAINT QUOTE
Pt a transfer from [place] for bleeding s/p left nephrostomy 4 days ago. Pt arrives with 2 units of blood that completed during transfer.

## 2019-02-17 NOTE — ED PROVIDER NOTE - ATTENDING CONTRIBUTION TO CARE
Dr. Saldaña: I performed a face to face bedside interview with patient regarding history of present illness, review of symptoms and past medical history. I completed an independent physical exam.  I have discussed patient's plan of care with PA.   I agree with note as stated above, having amended the EMR as needed to reflect my findings.   This includes HISTORY OF PRESENT ILLNESS, HIV, PAST MEDICAL/SURGICAL/FAMILY/SOCIAL HISTORY, ALLERGIES AND HOME MEDICATIONS, REVIEW OF SYSTEMS, PHYSICAL EXAM, and any PROGRESS NOTES during the time I functioned as the attending physician for this patient.    dr saldaña: pt s/p left adrenalectomy and nephrectomy 2/11, had post op fever, now with generalzid weakenss, will check bloodwrok, sepsis eval, re-asess, will contact surgeon

## 2019-02-17 NOTE — CHART NOTE - NSCHARTNOTEFT_GEN_A_CORE
Interventional Radiology Brief- Operative Note    Operators: Matt Clayton MD; Stanton Telles MD    Procedure:   Celiac angiogram, splenic angiography  proximal splenic artery embolization  Mynx and manual compression for hemostasis    Post-op Dx: subcapsular splenic hemorrhage    EBL: 20 mL    Medications: 1% lidocaine, IV sedation administered by anesthesia    Contrast: 40 cc    Complications: no immediate complications    Findings/Plan:  No active extravasation, pseudoaneurysm, AV fistula, or arterial injury identified with splenic angiography  Successful proximal splenic artery embolization with diminished flow into the spleen

## 2019-02-17 NOTE — ED PROVIDER NOTE - OBJECTIVE STATEMENT
Pt s/p left nephrectomy and adrenalectomy at [facility] 4 days ago. Pt d/c yesterday. Had post op fever. Pt c/o generalzied weakness and fatigue today. No fever. States mild perincisional pain. No back pain. No cp, no sob. Pt s/p left nephrectomy and adrenalectomy at [facility] 4 days ago. Pt d/c yesterday. Had post op fever and was given a few days of Bactrim. Bcx, Ucx and cxr were all normal. Pt c/o generalized weakness and fatigue today. No fever. States mild perincisional pain. No back pain. No cp, no sob. Pt s/p left nephrectomy and adrenalectomy at [facility] 4 days ago. Pt d/c yesterday. Had post op fever and was given a few days of Bactrim. Bcx, Ucx and cxr were all normal. Pt c/o generalized severe weakness and fatigue today. No fever. States mild abi-incisional pain. No back pain. No cp, no sob.

## 2019-02-17 NOTE — ED PROVIDER NOTE - PROGRESS NOTE DETAILS
Sidle: weight given by pt and confirmed from recent hospital note Sidle: weight given by pt and confirmed from recent hospital note  will contact surgeon no evidence of infection, pt with internal bleeding, emergent blood transfusion, xfer to dr shira ojeda aware [place]: called blood bank and pt has type and screen from [facility] 2/13 and they are unable to use that result and need a new type and screen sent down even if it is an emergency. john: spoke with blood bank. will need to for emergency release of blood form to release o neg blood. concern for active internal bleeding. form signed and sent john: spoke with Dr. Devi resident for Dr. silva and rec if wound exam is normal: checking h/h to make sure it is stable, RVP, Influenza john: h/h dropped from yesterday. Ct abd/pelvis ordered to eval for bleed. Spoke with Dr. silva's fellow and rec transfer to [facility] after CT and blood given to pt.  Dr. Saldaña spoke with transfer center and accepted to ED. Dr Jacobson and will be placed in ICU john: name wrong on 2nd unit of blood. spoke with blood bank and he said he could fix the form but we would have to walk the blood back and he could send anther blood. discussed the urgency of the situation that pt needs blood now. blood bank not able to send new blood while blood with wrong name on it is being walked back to blood bank

## 2019-02-17 NOTE — ED PROVIDER NOTE - PSH
H/O left mastectomy    H/O total adrenalectomy    History of hip surgery  in 2007- Right hip surgery  S/p nephrectomy

## 2019-02-17 NOTE — ED ADULT NURSE NOTE - NSIMPLEMENTINTERV_GEN_ALL_ED
Implemented All Fall with Harm Risk Interventions:  [place] to call system. Call bell, personal items and telephone within reach. Instruct patient to call for assistance. Room bathroom lighting operational. Non-slip footwear when patient is off stretcher. Physically safe environment: no spills, clutter or unnecessary equipment. Stretcher in lowest position, wheels locked, appropriate side rails in place. Provide visual cue, wrist band, yellow gown, etc. Monitor gait and stability. Monitor for mental status changes and reorient to person, place, and time. Review medications for side effects contributing to fall risk. Reinforce activity limits and safety measures with patient and family. Provide visual clues: red socks.

## 2019-02-17 NOTE — ED ADULT NURSE NOTE - NSIMPLEMENTINTERV_GEN_ALL_ED
Implemented All Fall Risk Interventions:  [place] to call system. Call bell, personal items and telephone within reach. Instruct patient to call for assistance. Room bathroom lighting operational. Non-slip footwear when patient is off stretcher. Physically safe environment: no spills, clutter or unnecessary equipment. Stretcher in lowest position, wheels locked, appropriate side rails in place. Provide visual cue, wrist band, yellow gown, etc. Monitor gait and stability. Monitor for mental status changes and reorient to person, place, and time. Review medications for side effects contributing to fall risk. Reinforce activity limits and safety measures with patient and family.

## 2019-02-17 NOTE — ED ADULT NURSE NOTE - OBJECTIVE STATEMENT
pt biba c/o weakness. pt is a/3, reports had surgery last week and woke up this morning feeling weak. pt placed on cardiac monitor and . md at bedside.

## 2019-02-17 NOTE — PROGRESS NOTE ADULT - SUBJECTIVE AND OBJECTIVE BOX
Vascular & Interventional Radiology Pre-Procedure Note    Procedure Name: visceral angiogram with possible embolization    HPI: 63y Female with acute blood loss anemia found to have large splenic hematoma requiring multiple transfusion. The patient is status post left nephrectomy for RCC on 2/13/2019.     Allergies: Cipro (Unknown)    Medications (Abx/Cardiac/Anticoagulation/Blood Products)      Data:  162.56, 162.56  71.2, 68  T(C): 36.6  HR: 85  BP: 84/38  RR: 19  SpO2: 100%    -WBC 17.29 / HgB 7.5 / Hct 24.4 / Plt 283  -Na 141 / Cl 106 / BUN 16 / Glucose 118  -K 4.0 / CO2 19 / Cr 1.46  -ALT < 5 / Alk Phos 92 / T.Bili 0.4  -INR1.36    Imaging:   CT a/p: New large subcapsular hematoma of the spleen measuring approximately 10.0 x 8.3 x 14.2 cm with associated mass effect on the spleen.     Plan:   -63y Female presents for visceral angiogram and possible embolization.   -Risks/Benefits/alternatives explained with the patient and/or healthcare proxy and witnessed informed consent obtained.

## 2019-02-17 NOTE — ED CLERICAL - NS ED CLERK NOTE PRE-ARRIVAL INFORMATION; ADDITIONAL PRE-ARRIVAL INFORMATION
S/P left nephrectomy 4 days ago at [facility].  c/o weakness Hb6.5 Hct 21.1. 1st unit PRCB up at 1:45pm.  CT abd pelvis done.  Lactate 8.  To be accepted by SICU.

## 2019-02-17 NOTE — ED ADULT NURSE REASSESSMENT NOTE - NS ED NURSE REASSESS COMMENT FT1
blood rapid transfused as per md urolayo blood rapid transfused as per md urolayo. see transfusion form.

## 2019-02-17 NOTE — H&P ADULT - ASSESSMENT
This is a 62 y/o F with a splenic hematoma after a left radical nephrectomy on 2/13/19.    - NPO, IVF  - aggressive resuscitation  - may need operative splenectomy versus embolization (general surgery recommendations pending)  - appreciate SICU care

## 2019-02-17 NOTE — ED PROVIDER NOTE - CLINICAL SUMMARY MEDICAL DECISION MAKING FREE TEXT BOX
pt s/p left adrenalectomy and nephrectomy 2/11, had post op fever, now with generalzid weakenss, will check bloodwrok, sepsis eval, re-asess, will contact surgeon pt s/p left adrenalectomy and nephrectomy 2/11, had post op fever, now with generalized weakness, will check bloodwork, sepsis eval, re-asess, will contact surgeon

## 2019-02-17 NOTE — CONSULT NOTE ADULT - ASSESSMENT
63F s/p COPD, breast CA, L lap radical nephrectomy/adrenalectomy for large L renal mass on 2/13, discharged yesterday, had postop fever 101.8F, pre-discharge Hgb 7.7, developed weakness, dizziness, found to be in shock, Hgb 6.6, CT showed large perisplenic hematoma [id number] cm, transfused 2 units so far

## 2019-02-17 NOTE — ED PROVIDER NOTE - CLINICAL SUMMARY MEDICAL DECISION MAKING FREE TEXT BOX
hypovolemic shock from post op with known abdominal hematoma. Pt hypotensive will give Blood and admit to SICU.

## 2019-02-17 NOTE — ED PROVIDER NOTE - NS ED ROS FT
CONSTITUTIONAL: No fevers, no chills  Eyes: no visual changes  Ears: no ear drainage, no ear pain  Nose: no nasal congestion  Mouth/Throat: no sore throat  Cardiovascular: No Chest pain  Respiratory: No SOB  Gastrointestinal: No n/v/d, +abd pain  Genitourinary: no dysuria  SKIN: no rashes.  NEURO: +weakness

## 2019-02-17 NOTE — ED PROVIDER NOTE - CONSTITUTIONAL, MLM
normal... ill appearing  awake, alert, oriented to person, place, time/situation and in no apparent distress.

## 2019-02-17 NOTE — ED PROVIDER NOTE - OBJECTIVE STATEMENT
63 YOF Transfer with Hematoma in abd s/p nephrectomy 2 days ago. Pt received 2 units prbc but remains hypotensive. Pt states she feels unwell, denies chest pain, denies SOB, endorses lightheadedness.

## 2019-02-17 NOTE — CONSULT NOTE ADULT - ASSESSMENT
Assessment:   63 year old female with a history of COPD, depression, left breast cancer, large left renal mass, s/p  left lap radical nephrectomy/adrenalectomy on 2/13, discharged yesterday, had postop fever 101.8F, pre-discharge Hgb 7.7, developed weakness, dizziness, found to be in shock, Hgb 6.6, CT showed large subcapsular splenic hematoma measuring [id number] cm, 2 units pRBC at [facility] ED and transferred to [facility] for further management, now going to IR for possible intervention     Plan:  Neurologic: patient AAOx3  - tylenol PRN for pain    Respiratory: hx of empyhsema,   - saturating 100% on RA    Cardiovascular: + hemorrhagic shock  - hypotensive to 50s on arrival; improving to 80s systolic  - will resuscitate with transfusion   - start pressors if BPs dont improve  - lactate 8  - IVF @ 100     Gastrointenstinal: CT w/ subcapsular splenic hematoma, + hemoperitoneum  - pending IR for possible embolization   - NPO    Renal/: s/p left lap radical nephrectomy/adrenalectomy on 2/13/19  - mcgee inserted  - ERASMO: worsening Cr fx 1.55, baseline .77    Heme: acute blood loss anemia  - current H/H 6.5/21.1  - monitor H/H  - patient requiring 2 u pRBC at ; predischarge Hgb 7.7  - rapid transfusion protocol initiated: 2 pRBC, 2 ffp, 1 plt  - IVC filter in place   - going to IR     Infectious Disease:  - flu and RVP (-)   - white count elevated (14)     Endocrine: VITALIY     Disposition: SICU     --------------------------------------------------------------------------------------    Critical Care Diagnoses: acute blood loss anemia, subcapsular hematoma, s/p nephrectomy, hemorrhagic shock

## 2019-02-17 NOTE — H&P ADULT - NSHPLABSRESULTS_GEN_ALL_CORE
7.5    17.29 )-----------( 283      ( 17 Feb 2019 15:45 )             24.4   17 Feb 2019 15:45    141    |  106    |  16     ----------------------------<  118    4.0     |  19     |  1.46     Ca    6.7        17 Feb 2019 15:45    PT/INR - ( 17 Feb 2019 15:45 )   PT: 15.6 SEC;   INR: 1.36          PTT - ( 17 Feb 2019 15:45 )  PTT:23.2 SEC    < from: CT Abdomen and Pelvis No Cont (02.17.19 @ 13:03) >      EXAM:  CT ABDOMEN AND PELVIS      PROCEDURE DATE:  02/17/2019      Impression: Small bilateral pleural effusions. Smallanterior pericardial   effusion.    Small right lung nodule; metastasis cannot be excluded.    Interval left nephrectomy. Small pneumoperitoneum and air in the   nephrectomy surgical bed, likely postoperative in nature. New large   subcapsular hematoma of the spleen measuring approximately 10.0 x 8.3 x   14.2 cm with associated mass effect on the spleen. Mild to moderate   hemoperitoneum is also noted in the bilateral abdomen and pelvis. Small   amount of fluid in the nephrectomy bed measuring approximately 2.3 x 4.8   cm, probably representing postsurgical seroma.    Small pneumoperitoneum and air in the nephrectomy surgical bed, likely   postoperative in nature.    Extensive sclerotic lesions in the visualized osseous structures,   compatible with osseous metastasis.    PA Ms. Barrow is informed.                  MAYRA TIWARI M.D., ATTENDING RADIOLOGIST  This document has been electronically signed. Feb 17 2019  1:37PM                < end of copied text >

## 2019-02-18 DIAGNOSIS — Z09 ENCOUNTER FOR FOLLOW-UP EXAMINATION AFTER COMPLETED TREATMENT FOR CONDITIONS OTHER THAN MALIGNANT NEOPLASM: ICD-10-CM

## 2019-02-18 LAB
BASE EXCESS BLDA CALC-SCNC: -3.6 MMOL/L — SIGNIFICANT CHANGE UP
CHLORIDE BLDA-SCNC: 108 MMOL/L — SIGNIFICANT CHANGE UP (ref 96–108)
GLUCOSE BLDA-MCNC: 120 MG/DL — HIGH (ref 70–99)
HCO3 BLDA-SCNC: 21 MMOL/L — LOW (ref 22–26)
HCT VFR BLD CALC: 21.2 % — LOW (ref 34.5–45)
HCT VFR BLD CALC: 22.9 % — LOW (ref 34.5–45)
HCT VFR BLD CALC: 23.8 % — LOW (ref 34.5–45)
HCT VFR BLD CALC: 24.6 % — LOW (ref 34.5–45)
HCT VFR BLD CALC: 24.7 % — LOW (ref 34.5–45)
HCT VFR BLD CALC: 24.9 % — LOW (ref 34.5–45)
HCT VFR BLDA CALC: 27.7 % — LOW (ref 34.5–46.5)
HGB BLD-MCNC: 7.2 G/DL — LOW (ref 11.5–15.5)
HGB BLD-MCNC: 7.8 G/DL — LOW (ref 11.5–15.5)
HGB BLD-MCNC: 8 G/DL — LOW (ref 11.5–15.5)
HGB BLD-MCNC: 8.4 G/DL — LOW (ref 11.5–15.5)
HGB BLD-MCNC: 8.4 G/DL — LOW (ref 11.5–15.5)
HGB BLD-MCNC: 8.7 G/DL — LOW (ref 11.5–15.5)
HGB BLDA-MCNC: 8.9 G/DL — LOW (ref 11.5–15.5)
LACTATE BLDA-SCNC: 1 MMOL/L — SIGNIFICANT CHANGE UP (ref 0.5–2)
MCHC RBC-ENTMCNC: 28.5 PG — SIGNIFICANT CHANGE UP (ref 27–34)
MCHC RBC-ENTMCNC: 28.7 PG — SIGNIFICANT CHANGE UP (ref 27–34)
MCHC RBC-ENTMCNC: 28.9 PG — SIGNIFICANT CHANGE UP (ref 27–34)
MCHC RBC-ENTMCNC: 29.1 PG — SIGNIFICANT CHANGE UP (ref 27–34)
MCHC RBC-ENTMCNC: 29.2 PG — SIGNIFICANT CHANGE UP (ref 27–34)
MCHC RBC-ENTMCNC: 29.9 PG — SIGNIFICANT CHANGE UP (ref 27–34)
MCHC RBC-ENTMCNC: 33.6 % — SIGNIFICANT CHANGE UP (ref 32–36)
MCHC RBC-ENTMCNC: 33.7 % — SIGNIFICANT CHANGE UP (ref 32–36)
MCHC RBC-ENTMCNC: 34 % — SIGNIFICANT CHANGE UP (ref 32–36)
MCHC RBC-ENTMCNC: 34 % — SIGNIFICANT CHANGE UP (ref 32–36)
MCHC RBC-ENTMCNC: 34.1 % — SIGNIFICANT CHANGE UP (ref 32–36)
MCHC RBC-ENTMCNC: 35.4 % — SIGNIFICANT CHANGE UP (ref 32–36)
MCV RBC AUTO: 84.3 FL — SIGNIFICANT CHANGE UP (ref 80–100)
MCV RBC AUTO: 84.5 FL — SIGNIFICANT CHANGE UP (ref 80–100)
MCV RBC AUTO: 84.7 FL — SIGNIFICANT CHANGE UP (ref 80–100)
MCV RBC AUTO: 85.1 FL — SIGNIFICANT CHANGE UP (ref 80–100)
MCV RBC AUTO: 85.8 FL — SIGNIFICANT CHANGE UP (ref 80–100)
MCV RBC AUTO: 86.2 FL — SIGNIFICANT CHANGE UP (ref 80–100)
NRBC # FLD: 0.02 K/UL — LOW (ref 25–125)
NRBC # FLD: 0.03 K/UL — LOW (ref 25–125)
NRBC # FLD: 0.06 K/UL — LOW (ref 25–125)
NRBC # FLD: 0.07 K/UL — LOW (ref 25–125)
NRBC # FLD: 0.1 K/UL — LOW (ref 25–125)
NRBC # FLD: 0.11 K/UL — LOW (ref 25–125)
PCO2 BLDA: 41 MMHG — SIGNIFICANT CHANGE UP (ref 32–48)
PH BLDA: 7.34 PH — LOW (ref 7.35–7.45)
PLATELET # BLD AUTO: 203 K/UL — SIGNIFICANT CHANGE UP (ref 150–400)
PLATELET # BLD AUTO: 207 K/UL — SIGNIFICANT CHANGE UP (ref 150–400)
PLATELET # BLD AUTO: 209 K/UL — SIGNIFICANT CHANGE UP (ref 150–400)
PLATELET # BLD AUTO: 221 K/UL — SIGNIFICANT CHANGE UP (ref 150–400)
PLATELET # BLD AUTO: 225 K/UL — SIGNIFICANT CHANGE UP (ref 150–400)
PLATELET # BLD AUTO: 226 K/UL — SIGNIFICANT CHANGE UP (ref 150–400)
PMV BLD: 10.7 FL — SIGNIFICANT CHANGE UP (ref 7–13)
PMV BLD: 10.9 FL — SIGNIFICANT CHANGE UP (ref 7–13)
PMV BLD: 10.9 FL — SIGNIFICANT CHANGE UP (ref 7–13)
PMV BLD: 11 FL — SIGNIFICANT CHANGE UP (ref 7–13)
PMV BLD: 11.2 FL — SIGNIFICANT CHANGE UP (ref 7–13)
PMV BLD: 11.2 FL — SIGNIFICANT CHANGE UP (ref 7–13)
PO2 BLDA: 89 MMHG — SIGNIFICANT CHANGE UP (ref 83–108)
POTASSIUM BLDA-SCNC: 4 MMOL/L — SIGNIFICANT CHANGE UP (ref 3.4–4.5)
RBC # BLD: 2.49 M/UL — LOW (ref 3.8–5.2)
RBC # BLD: 2.67 M/UL — LOW (ref 3.8–5.2)
RBC # BLD: 2.81 M/UL — LOW (ref 3.8–5.2)
RBC # BLD: 2.89 M/UL — LOW (ref 3.8–5.2)
RBC # BLD: 2.91 M/UL — LOW (ref 3.8–5.2)
RBC # BLD: 2.93 M/UL — LOW (ref 3.8–5.2)
RBC # FLD: 15.3 % — HIGH (ref 10.3–14.5)
RBC # FLD: 15.5 % — HIGH (ref 10.3–14.5)
RBC # FLD: 15.7 % — HIGH (ref 10.3–14.5)
RBC # FLD: 15.7 % — HIGH (ref 10.3–14.5)
RBC # FLD: 15.8 % — HIGH (ref 10.3–14.5)
RBC # FLD: 16.7 % — HIGH (ref 10.3–14.5)
SAO2 % BLDA: 97.4 % — SIGNIFICANT CHANGE UP (ref 95–99)
SODIUM BLDA-SCNC: 137 MMOL/L — SIGNIFICANT CHANGE UP (ref 136–146)
WBC # BLD: 11.55 K/UL — HIGH (ref 3.8–10.5)
WBC # BLD: 11.67 K/UL — HIGH (ref 3.8–10.5)
WBC # BLD: 13.14 K/UL — HIGH (ref 3.8–10.5)
WBC # BLD: 13.82 K/UL — HIGH (ref 3.8–10.5)
WBC # BLD: 14.92 K/UL — HIGH (ref 3.8–10.5)
WBC # BLD: 16.29 K/UL — HIGH (ref 3.8–10.5)
WBC # FLD AUTO: 11.55 K/UL — HIGH (ref 3.8–10.5)
WBC # FLD AUTO: 11.67 K/UL — HIGH (ref 3.8–10.5)
WBC # FLD AUTO: 13.14 K/UL — HIGH (ref 3.8–10.5)
WBC # FLD AUTO: 13.82 K/UL — HIGH (ref 3.8–10.5)
WBC # FLD AUTO: 14.92 K/UL — HIGH (ref 3.8–10.5)
WBC # FLD AUTO: 16.29 K/UL — HIGH (ref 3.8–10.5)

## 2019-02-18 PROCEDURE — 99291 CRITICAL CARE FIRST HOUR: CPT

## 2019-02-18 PROCEDURE — 71045 X-RAY EXAM CHEST 1 VIEW: CPT | Mod: 26

## 2019-02-18 RX ORDER — PANTOPRAZOLE SODIUM 20 MG/1
40 TABLET, DELAYED RELEASE ORAL DAILY
Qty: 0 | Refills: 0 | Status: DISCONTINUED | OUTPATIENT
Start: 2019-02-18 | End: 2019-02-19

## 2019-02-18 RX ORDER — TIOTROPIUM BROMIDE 18 UG/1
1 CAPSULE ORAL; RESPIRATORY (INHALATION) DAILY
Qty: 0 | Refills: 0 | Status: DISCONTINUED | OUTPATIENT
Start: 2019-02-18 | End: 2019-03-01

## 2019-02-18 RX ORDER — ESCITALOPRAM OXALATE 10 MG/1
20 TABLET, FILM COATED ORAL DAILY
Qty: 0 | Refills: 0 | Status: DISCONTINUED | OUTPATIENT
Start: 2019-02-18 | End: 2019-03-01

## 2019-02-18 RX ORDER — ALBUTEROL 90 UG/1
1 AEROSOL, METERED ORAL
Qty: 0 | Refills: 0 | Status: DISCONTINUED | OUTPATIENT
Start: 2019-02-18 | End: 2019-03-01

## 2019-02-18 RX ORDER — HYDROMORPHONE HYDROCHLORIDE 2 MG/ML
0.5 INJECTION INTRAMUSCULAR; INTRAVENOUS; SUBCUTANEOUS EVERY 4 HOURS
Qty: 0 | Refills: 0 | Status: DISCONTINUED | OUTPATIENT
Start: 2019-02-18 | End: 2019-02-19

## 2019-02-18 RX ORDER — HYDROMORPHONE HYDROCHLORIDE 2 MG/ML
0.5 INJECTION INTRAMUSCULAR; INTRAVENOUS; SUBCUTANEOUS ONCE
Qty: 0 | Refills: 0 | Status: DISCONTINUED | OUTPATIENT
Start: 2019-02-18 | End: 2019-02-18

## 2019-02-18 RX ORDER — CALCIUM CARBONATE 500(1250)
1 TABLET ORAL ONCE
Qty: 0 | Refills: 0 | Status: COMPLETED | OUTPATIENT
Start: 2019-02-18 | End: 2019-02-18

## 2019-02-18 RX ORDER — HYDROMORPHONE HYDROCHLORIDE 2 MG/ML
0.5 INJECTION INTRAMUSCULAR; INTRAVENOUS; SUBCUTANEOUS EVERY 4 HOURS
Qty: 0 | Refills: 0 | Status: DISCONTINUED | OUTPATIENT
Start: 2019-02-18 | End: 2019-02-18

## 2019-02-18 RX ADMIN — ESCITALOPRAM OXALATE 20 MILLIGRAM(S): 10 TABLET, FILM COATED ORAL at 12:22

## 2019-02-18 RX ADMIN — HYDROMORPHONE HYDROCHLORIDE 0.5 MILLIGRAM(S): 2 INJECTION INTRAMUSCULAR; INTRAVENOUS; SUBCUTANEOUS at 06:15

## 2019-02-18 RX ADMIN — HYDROMORPHONE HYDROCHLORIDE 0.5 MILLIGRAM(S): 2 INJECTION INTRAMUSCULAR; INTRAVENOUS; SUBCUTANEOUS at 19:59

## 2019-02-18 RX ADMIN — HYDROMORPHONE HYDROCHLORIDE 0.5 MILLIGRAM(S): 2 INJECTION INTRAMUSCULAR; INTRAVENOUS; SUBCUTANEOUS at 20:10

## 2019-02-18 RX ADMIN — Medication 1 TABLET(S): at 10:24

## 2019-02-18 RX ADMIN — HYDROMORPHONE HYDROCHLORIDE 0.5 MILLIGRAM(S): 2 INJECTION INTRAMUSCULAR; INTRAVENOUS; SUBCUTANEOUS at 08:27

## 2019-02-18 RX ADMIN — ALBUTEROL 1 PUFF(S): 90 AEROSOL, METERED ORAL at 23:58

## 2019-02-18 RX ADMIN — SODIUM CHLORIDE 100 MILLILITER(S): 9 INJECTION, SOLUTION INTRAVENOUS at 19:58

## 2019-02-18 RX ADMIN — Medication 50 GRAM(S): at 00:28

## 2019-02-18 RX ADMIN — Medication 30 MILLILITER(S): at 16:16

## 2019-02-18 RX ADMIN — HYDROMORPHONE HYDROCHLORIDE 0.5 MILLIGRAM(S): 2 INJECTION INTRAMUSCULAR; INTRAVENOUS; SUBCUTANEOUS at 00:07

## 2019-02-18 RX ADMIN — HYDROMORPHONE HYDROCHLORIDE 0.5 MILLIGRAM(S): 2 INJECTION INTRAMUSCULAR; INTRAVENOUS; SUBCUTANEOUS at 10:26

## 2019-02-18 RX ADMIN — HYDROMORPHONE HYDROCHLORIDE 0.5 MILLIGRAM(S): 2 INJECTION INTRAMUSCULAR; INTRAVENOUS; SUBCUTANEOUS at 06:00

## 2019-02-18 RX ADMIN — TIOTROPIUM BROMIDE 1 CAPSULE(S): 18 CAPSULE ORAL; RESPIRATORY (INHALATION) at 18:01

## 2019-02-18 RX ADMIN — HYDROMORPHONE HYDROCHLORIDE 0.5 MILLIGRAM(S): 2 INJECTION INTRAMUSCULAR; INTRAVENOUS; SUBCUTANEOUS at 12:22

## 2019-02-18 RX ADMIN — PANTOPRAZOLE SODIUM 40 MILLIGRAM(S): 20 TABLET, DELAYED RELEASE ORAL at 12:22

## 2019-02-18 RX ADMIN — SODIUM CHLORIDE 100 MILLILITER(S): 9 INJECTION, SOLUTION INTRAVENOUS at 08:27

## 2019-02-18 RX ADMIN — HYDROMORPHONE HYDROCHLORIDE 0.5 MILLIGRAM(S): 2 INJECTION INTRAMUSCULAR; INTRAVENOUS; SUBCUTANEOUS at 13:58

## 2019-02-18 NOTE — PROGRESS NOTE ADULT - SUBJECTIVE AND OBJECTIVE BOX
SICU Daily Progress Note  =====================================================  Interval/Overnight Events:  Patient went to OR w/ IR/Urology team. No active extravasation, pseudoaneurysm, AV fistula, or arterial injury identified with splenic angiography. Successful proximal splenic artery embolization with diminished flow into the spleen. H/H remained stable post procedure, patient hemodynamically stable, abdominal exam stable.     POD # 1         	SICU Day #   2    HPI:  63 year old female with a history of COPD, depression, left breast cancer, large left renal mass, s/p  left lap radical nephrectomy/adrenalectomy on 2/13, discharged yesterday, had postop fever 101.8F, pre-discharge Hgb 7.7, developed weakness, dizziness, found to be in shock, Hgb 6.6, CT showed large perisplenic hematoma [id number] cm. She was transfused 2 units pRBC at [facility] ED and transferred to [facility] for further management. SICU consulted for hemodynamic monitoring. Patient seen and exmained in ED. Patient awake and alert, urology at bedside in ED. Patient hypotensive, receiving an additional 2 prbc. IR consulted for possible intervention.     Allergies: Cipro (Unknown)      MEDICATIONS:   --------------------------------------------------------------------------------------  Neurologic Medications    Respiratory Medications    Cardiovascular Medications    Gastrointestinal Medications  lactated ringers. 1000 milliLiter(s) IV Continuous <Continuous>    Genitourinary Medications    Hematologic/Oncologic Medications    Antimicrobial/Immunologic Medications    Endocrine/Metabolic Medications    Topical/Other Medications  chlorhexidine 4% Liquid 1 Application(s) Topical <User Schedule>    --------------------------------------------------------------------------------------    VITAL SIGNS, INS/OUTS (last 24 hours):  --------------------------------------------------------------------------------------  ((Insert SICU Vitals/Is+Os here))***  --------------------------------------------------------------------------------------    EXAM  NEUROLOGY  RASS: 0  Exam: Normal, NAD, alert, oriented x3, no focal deficits.     HEENT  Exam: Normocephalic, atraumatic, EOMI.     RESPIRATORY  Exam: Lungs clear to auscultation, Normal expansion/effort.     CARDIOVASCULAR  Exam: S1, S2.  Regular rate and rhythm.       GI/NUTRITION  Exam: Abdomen taught & distended, soft, appropriately tender   Current Diet:  NPO    VASCULAR  Exam: Extremities warm, pink, well-perfused.    MUSCULOSKELETAL  Exam: All extremities moving spontaneously without limitations.     SKIN  Exam: Good skin turgor, no skin breakdown.    METABOLIC/FLUIDS/ELECTROLYTES  lactated ringers. 1000 milliLiter(s) IV Continuous <Continuous>      HEMATOLOGIC  [x] VTE Prophylaxis:   Transfusions:	[] PRBC	[] Platelets		[] FFP	[] Cryoprecipitate    INFECTIOUS DISEASE  Antimicrobials/Immunologic Medications: None    Tubes/Lines/Drains    [x] Peripheral IV  [x] Central Venous Line     	[] R	[x] L	[] IJ	[x] Fem	[] SC	Date Placed:   [x] Arterial Line		[] R	[x] L	[x] Fem	[] Rad	[] Ax	Date Placed:   [] PICC		[] Midline		[] Mediport  [] Urinary Catheter		Date Placed:   [x] Necessity of urinary, arterial, and venous catheters discussed    LABS  --------------------------------------------------------------------------------------  ((Insert SICU Labs here))***  --------------------------------------------------------------------------------------    OTHER LABORATORY:     IMAGING STUDIES:   CXR:

## 2019-02-18 NOTE — PROGRESS NOTE ADULT - SUBJECTIVE AND OBJECTIVE BOX
Subjective  Patient is complaining of lower abdominal pain, no nausea, no vomiting.  Objective    Vital signs  T(F): , Max: 99 (02-17-19 @ 22:02)  HR: 85 (02-17-19 @ 23:00)  BP: 133/59 (02-17-19 @ 22:02)  SpO2: 100% (02-17-19 @ 23:00)  Wt(kg): --    Output     02-17 @ 07:01  -  02-18 @ 00:05  --------------------------------------------------------  IN: 1211 mL / OUT: 190 mL / NET: 1021 mL        Gen: no distress observed  Abd: soft,  distended, + mild generalized tenderness, + staples, no drainage, no bleeding  + dressing in right groin c/d/i  : + mcgee, clear urine    Labs      02-17 @ 22:28    WBC 10.12 / Hct 27.4  / SCr 1.34     02-17 @ 15:45    WBC 17.29 / Hct 24.4  / SCr 1.46       Urine Cx: ?  Blood Cx: ?    Imaging

## 2019-02-18 NOTE — PROCEDURE NOTE - NSINDICATIONS_GEN_A_CORE
monitoring purposes/critical patient/arterial puncture to obtain ABG's
venous access/volume resuscitation/critical illness

## 2019-02-18 NOTE — PROGRESS NOTE ADULT - SUBJECTIVE AND OBJECTIVE BOX
Surgery Progress Note    S: Patient seen and examined. s/p IR last night, pt now stable. Off pressors. Last tranfusions 2u pRBCs, 2 FFP & 1 Plt around 5-6pm yesterday.    O:  Vital Signs Last 24 Hrs  T(C): 36.7 (18 Feb 2019 04:00), Max: 37.2 (17 Feb 2019 22:02)  T(F): 98 (18 Feb 2019 04:00), Max: 99 (17 Feb 2019 22:02)  HR: 85 (18 Feb 2019 06:00) (76 - 101)  BP: 133/59 (17 Feb 2019 22:02) (75/36 - 133/59)  BP(mean): 76 (17 Feb 2019 22:02) (47 - 76)  RR: 19 (18 Feb 2019 06:00) (15 - 24)  SpO2: 99% (18 Feb 2019 06:00) (94% - 100%)    I&O's Detail    17 Feb 2019 07:01  -  18 Feb 2019 07:00  --------------------------------------------------------  IN:    Cryoprecipitate: 211 mL    lactated ringers.: 1200 mL    Packed Red Blood Cells: 300 mL    Plasma: 400 mL  Total IN: 2111 mL    OUT:    Indwelling Catheter - Urethral: 410 mL  Total OUT: 410 mL    Total NET: 1701 mL          MEDICATIONS  (STANDING):  chlorhexidine 4% Liquid 1 Application(s) Topical <User Schedule>  escitalopram 20 milliGRAM(s) Oral daily  lactated ringers. 1000 milliLiter(s) (100 mL/Hr) IV Continuous <Continuous>  pantoprazole  Injectable 40 milliGRAM(s) IV Push daily    MEDICATIONS  (PRN):                            8.4    11.67 )-----------( 226      ( 18 Feb 2019 06:15 )             24.9       02-17    139  |  107  |  17  ----------------------------<  128<H>  4.3   |  20<L>  |  1.34<H>    Ca    6.2<LL>      17 Feb 2019 22:28  Phos  4.2     02-17  Mg     1.9     02-17    TPro  4.1<L>  /  Alb  2.2<L>  /  TBili  0.8  /  DBili  x   /  AST  14  /  ALT  5   /  AlkPhos  57  02-17      Physical Exam:  Gen: Laying in bed, NAD, mentating appropriately  Resp: Unlabored breathing  Abd: soft, moderately distended, tender diffusely but mostly in LUQ  Ext: WWP  Skin: No rashes

## 2019-02-18 NOTE — PROGRESS NOTE ADULT - SUBJECTIVE AND OBJECTIVE BOX
Subjective  Pain controlled on 0.5mg dilaudid q 6 hours    Objective  Vital signs  T(F): , Max: 99 (02-17-19 @ 22:02)  HR: 85 (02-18-19 @ 06:00)  BP: 133/59 (02-17-19 @ 22:02)  SpO2: 99% (02-18-19 @ 06:00)  Mcgee 280 - clear yellow    Gen: NAD  Abd: distended, mild TTP in LUQ, incisions w/ staples in place  : mcgee secure, draining clear yellow    Labs      02-18 @ 04:00    WBC 11.55 / Hct 24.6  / SCr --       02-17 @ 22:28    WBC 10.12 / Hct 27.4  / SCr 1.34

## 2019-02-18 NOTE — PROGRESS NOTE ADULT - ASSESSMENT
This is a 64 y/o F s/p left laparoscopic radical nephrectomy on 2/14 who presented with an acute splenic hemorrhage requiring 6 U PRBCs, 2 U FFP, 1 Plt and ablation of a proximal anterior splenic artery, PPD #1    - NPO, IVF  - serial CBC, BMP  - monitor abdominal exams  - monitor GI function  - bedrest until Hct stable  - appreciate SICU and general surgery input

## 2019-02-18 NOTE — PROGRESS NOTE ADULT - ASSESSMENT
63 year old female with a history of COPD 4 days s/p left lap radical nephrectomy/adrenalectomy on 2/13 for renal mass presented with weakness associated with dyspnea found to be in hemorrhagic shock secondary to large subcapsular splenic hematoma requiring multiple blood transfusions, now s/p IR coiling, stable.    - Monitor hemodynamics & H&H, transfuse prn  - General surgery will be standby in the event emergent splenectomy is required     Troy Yap, PGY-2  B Team Surgery [id number]

## 2019-02-18 NOTE — PROCEDURE NOTE - NSPROCDETAILS_GEN_ALL_CORE
sutured in place/hemostasis with direct pressure, dressing applied/all materials/supplies accounted for at end of procedure/connected to a pressurized flush line/Seldinger technique/ultrasound guidance
lumen(s) aspirated and flushed/sterile dressing applied/ultrasound guidance/guidewire recovered/sterile technique, catheter placed

## 2019-02-19 LAB
ANION GAP SERPL CALC-SCNC: 15 MMO/L — HIGH (ref 7–14)
APTT BLD: 25.2 SEC — LOW (ref 27.5–36.3)
BUN SERPL-MCNC: 29 MG/DL — HIGH (ref 7–23)
CA-I BLD-SCNC: 1.01 MMOL/L — LOW (ref 1.03–1.23)
CALCIUM SERPL-MCNC: 7.1 MG/DL — LOW (ref 8.4–10.5)
CHLORIDE SERPL-SCNC: 105 MMOL/L — SIGNIFICANT CHANGE UP (ref 98–107)
CHLORIDE UR-SCNC: 85 MMOL/L — SIGNIFICANT CHANGE UP
CO2 SERPL-SCNC: 19 MMOL/L — LOW (ref 22–31)
CREAT SERPL-MCNC: 1.38 MG/DL — HIGH (ref 0.5–1.3)
GLUCOSE SERPL-MCNC: 105 MG/DL — HIGH (ref 70–99)
HCT VFR BLD CALC: 21.9 % — LOW (ref 34.5–45)
HCT VFR BLD CALC: 22.1 % — LOW (ref 34.5–45)
HCT VFR BLD CALC: 22.7 % — LOW (ref 34.5–45)
HGB BLD-MCNC: 7.4 G/DL — LOW (ref 11.5–15.5)
HGB BLD-MCNC: 7.5 G/DL — LOW (ref 11.5–15.5)
HGB BLD-MCNC: 7.8 G/DL — LOW (ref 11.5–15.5)
INR BLD: 1.04 — SIGNIFICANT CHANGE UP (ref 0.88–1.17)
MAGNESIUM SERPL-MCNC: 2.6 MG/DL — SIGNIFICANT CHANGE UP (ref 1.6–2.6)
MCHC RBC-ENTMCNC: 28.6 PG — SIGNIFICANT CHANGE UP (ref 27–34)
MCHC RBC-ENTMCNC: 29 PG — SIGNIFICANT CHANGE UP (ref 27–34)
MCHC RBC-ENTMCNC: 29.3 PG — SIGNIFICANT CHANGE UP (ref 27–34)
MCHC RBC-ENTMCNC: 33.8 % — SIGNIFICANT CHANGE UP (ref 32–36)
MCHC RBC-ENTMCNC: 33.9 % — SIGNIFICANT CHANGE UP (ref 32–36)
MCHC RBC-ENTMCNC: 34.4 % — SIGNIFICANT CHANGE UP (ref 32–36)
MCV RBC AUTO: 84.6 FL — SIGNIFICANT CHANGE UP (ref 80–100)
MCV RBC AUTO: 85.3 FL — SIGNIFICANT CHANGE UP (ref 80–100)
MCV RBC AUTO: 85.3 FL — SIGNIFICANT CHANGE UP (ref 80–100)
NRBC # FLD: 0.09 K/UL — LOW (ref 25–125)
NRBC # FLD: 0.12 K/UL — LOW (ref 25–125)
NRBC # FLD: 0.12 K/UL — LOW (ref 25–125)
PHOSPHATE SERPL-MCNC: 2 MG/DL — LOW (ref 2.5–4.5)
PLATELET # BLD AUTO: 203 K/UL — SIGNIFICANT CHANGE UP (ref 150–400)
PLATELET # BLD AUTO: 207 K/UL — SIGNIFICANT CHANGE UP (ref 150–400)
PLATELET # BLD AUTO: 214 K/UL — SIGNIFICANT CHANGE UP (ref 150–400)
PMV BLD: 10.4 FL — SIGNIFICANT CHANGE UP (ref 7–13)
PMV BLD: 10.7 FL — SIGNIFICANT CHANGE UP (ref 7–13)
PMV BLD: 10.8 FL — SIGNIFICANT CHANGE UP (ref 7–13)
POTASSIUM SERPL-MCNC: 4.1 MMOL/L — SIGNIFICANT CHANGE UP (ref 3.5–5.3)
POTASSIUM SERPL-SCNC: 4.1 MMOL/L — SIGNIFICANT CHANGE UP (ref 3.5–5.3)
POTASSIUM UR-SCNC: 33.7 MMOL/L — SIGNIFICANT CHANGE UP
PROTHROM AB SERPL-ACNC: 11.6 SEC — SIGNIFICANT CHANGE UP (ref 9.8–13.1)
RBC # BLD: 2.59 M/UL — LOW (ref 3.8–5.2)
RBC # BLD: 2.59 M/UL — LOW (ref 3.8–5.2)
RBC # BLD: 2.66 M/UL — LOW (ref 3.8–5.2)
RBC # FLD: 16.9 % — HIGH (ref 10.3–14.5)
RBC # FLD: 17 % — HIGH (ref 10.3–14.5)
RBC # FLD: 17.1 % — HIGH (ref 10.3–14.5)
SODIUM SERPL-SCNC: 139 MMOL/L — SIGNIFICANT CHANGE UP (ref 135–145)
SODIUM UR-SCNC: 56 MMOL/L — SIGNIFICANT CHANGE UP
WBC # BLD: 15.16 K/UL — HIGH (ref 3.8–10.5)
WBC # BLD: 16.91 K/UL — HIGH (ref 3.8–10.5)
WBC # BLD: 17.15 K/UL — HIGH (ref 3.8–10.5)
WBC # FLD AUTO: 15.16 K/UL — HIGH (ref 3.8–10.5)
WBC # FLD AUTO: 16.91 K/UL — HIGH (ref 3.8–10.5)
WBC # FLD AUTO: 17.15 K/UL — HIGH (ref 3.8–10.5)

## 2019-02-19 PROCEDURE — 99233 SBSQ HOSP IP/OBS HIGH 50: CPT

## 2019-02-19 RX ORDER — HEPARIN SODIUM 5000 [USP'U]/ML
5000 INJECTION INTRAVENOUS; SUBCUTANEOUS EVERY 8 HOURS
Qty: 0 | Refills: 0 | Status: DISCONTINUED | OUTPATIENT
Start: 2019-02-19 | End: 2019-03-01

## 2019-02-19 RX ORDER — PANTOPRAZOLE SODIUM 20 MG/1
40 TABLET, DELAYED RELEASE ORAL
Qty: 0 | Refills: 0 | Status: DISCONTINUED | OUTPATIENT
Start: 2019-02-19 | End: 2019-02-20

## 2019-02-19 RX ORDER — ACETAMINOPHEN 500 MG
650 TABLET ORAL EVERY 6 HOURS
Qty: 0 | Refills: 0 | Status: COMPLETED | OUTPATIENT
Start: 2019-02-19 | End: 2019-02-22

## 2019-02-19 RX ORDER — METOPROLOL TARTRATE 50 MG
25 TABLET ORAL ONCE
Qty: 0 | Refills: 0 | Status: COMPLETED | OUTPATIENT
Start: 2019-02-19 | End: 2019-02-19

## 2019-02-19 RX ORDER — METOPROLOL TARTRATE 50 MG
5 TABLET ORAL EVERY 6 HOURS
Qty: 0 | Refills: 0 | Status: DISCONTINUED | OUTPATIENT
Start: 2019-02-19 | End: 2019-02-21

## 2019-02-19 RX ORDER — OXYCODONE HYDROCHLORIDE 5 MG/1
10 TABLET ORAL EVERY 4 HOURS
Qty: 0 | Refills: 0 | Status: DISCONTINUED | OUTPATIENT
Start: 2019-02-19 | End: 2019-02-23

## 2019-02-19 RX ORDER — LETROZOLE 2.5 MG/1
2.5 TABLET, FILM COATED ORAL DAILY
Qty: 0 | Refills: 0 | Status: DISCONTINUED | OUTPATIENT
Start: 2019-02-19 | End: 2019-02-19

## 2019-02-19 RX ORDER — SODIUM CHLORIDE 9 MG/ML
1000 INJECTION, SOLUTION INTRAVENOUS
Qty: 0 | Refills: 0 | Status: DISCONTINUED | OUTPATIENT
Start: 2019-02-19 | End: 2019-02-21

## 2019-02-19 RX ORDER — PALBOCICLIB 100 MG/1
100 CAPSULE ORAL DAILY
Qty: 0 | Refills: 0 | Status: DISCONTINUED | OUTPATIENT
Start: 2019-02-19 | End: 2019-02-19

## 2019-02-19 RX ORDER — OXYCODONE HYDROCHLORIDE 5 MG/1
5 TABLET ORAL EVERY 4 HOURS
Qty: 0 | Refills: 0 | Status: DISCONTINUED | OUTPATIENT
Start: 2019-02-19 | End: 2019-02-23

## 2019-02-19 RX ADMIN — Medication 650 MILLIGRAM(S): at 23:23

## 2019-02-19 RX ADMIN — SODIUM CHLORIDE 50 MILLILITER(S): 9 INJECTION, SOLUTION INTRAVENOUS at 09:16

## 2019-02-19 RX ADMIN — HEPARIN SODIUM 5000 UNIT(S): 5000 INJECTION INTRAVENOUS; SUBCUTANEOUS at 12:06

## 2019-02-19 RX ADMIN — ALBUTEROL 1 PUFF(S): 90 AEROSOL, METERED ORAL at 10:38

## 2019-02-19 RX ADMIN — CHLORHEXIDINE GLUCONATE 1 APPLICATION(S): 213 SOLUTION TOPICAL at 09:14

## 2019-02-19 RX ADMIN — HYDROMORPHONE HYDROCHLORIDE 0.5 MILLIGRAM(S): 2 INJECTION INTRAMUSCULAR; INTRAVENOUS; SUBCUTANEOUS at 09:15

## 2019-02-19 RX ADMIN — OXYCODONE HYDROCHLORIDE 5 MILLIGRAM(S): 5 TABLET ORAL at 20:43

## 2019-02-19 RX ADMIN — ALBUTEROL 1 PUFF(S): 90 AEROSOL, METERED ORAL at 22:37

## 2019-02-19 RX ADMIN — TIOTROPIUM BROMIDE 1 CAPSULE(S): 18 CAPSULE ORAL; RESPIRATORY (INHALATION) at 10:39

## 2019-02-19 RX ADMIN — OXYCODONE HYDROCHLORIDE 5 MILLIGRAM(S): 5 TABLET ORAL at 21:13

## 2019-02-19 RX ADMIN — PANTOPRAZOLE SODIUM 40 MILLIGRAM(S): 20 TABLET, DELAYED RELEASE ORAL at 12:06

## 2019-02-19 RX ADMIN — HYDROMORPHONE HYDROCHLORIDE 0.5 MILLIGRAM(S): 2 INJECTION INTRAMUSCULAR; INTRAVENOUS; SUBCUTANEOUS at 09:00

## 2019-02-19 RX ADMIN — Medication 650 MILLIGRAM(S): at 23:53

## 2019-02-19 RX ADMIN — ESCITALOPRAM OXALATE 20 MILLIGRAM(S): 10 TABLET, FILM COATED ORAL at 12:06

## 2019-02-19 RX ADMIN — HEPARIN SODIUM 5000 UNIT(S): 5000 INJECTION INTRAVENOUS; SUBCUTANEOUS at 23:22

## 2019-02-19 NOTE — PHYSICAL THERAPY INITIAL EVALUATION ADULT - PERTINENT HX OF CURRENT PROBLEM, REHAB EVAL
This is a 63 year old F s/p left laparoscopic radical nephrectomy for RCC on 2/13/19 who had presented with malaise, weakness, dyspnea, and a persistent cough starting this morning.  She was taken to [facility] ED where a CT scan revealed a 10cm left perisplenic hematoma.  She was also found to be severely  anemic

## 2019-02-19 NOTE — PROGRESS NOTE ADULT - SUBJECTIVE AND OBJECTIVE BOX
Surgery Progress Note    S: Patient seen and examined. No acute events overnight. Transfused 1 unit pRBCs at 15:00 yesterday. Feels well. Had a BM this morning. Passing flatus.    O:  Vital Signs Last 24 Hrs  T(C): 36.4 (19 Feb 2019 04:00), Max: 36.6 (18 Feb 2019 12:00)  T(F): 97.6 (19 Feb 2019 04:00), Max: 97.9 (18 Feb 2019 12:00)  HR: 79 (19 Feb 2019 06:00) (73 - 92)  BP: 122/65 (18 Feb 2019 20:00) (112/59 - 122/65)  BP(mean): 78 (18 Feb 2019 20:00) (72 - 78)  RR: 22 (19 Feb 2019 06:00) (18 - 24)  SpO2: 98% (19 Feb 2019 06:00) (92% - 99%)    I&O's Detail    17 Feb 2019 07:01  -  18 Feb 2019 07:00  --------------------------------------------------------  IN:    Cryoprecipitate: 211 mL    lactated ringers.: 1200 mL    Packed Red Blood Cells: 300 mL    Plasma: 400 mL  Total IN: 2111 mL    OUT:    Indwelling Catheter - Urethral: 410 mL  Total OUT: 410 mL    Total NET: 1701 mL      18 Feb 2019 07:01  -  19 Feb 2019 06:39  --------------------------------------------------------  IN:    lactated ringers.: 2400 mL    Packed Red Blood Cells: 300 mL  Total IN: 2700 mL    OUT:    Indwelling Catheter - Urethral: 517 mL  Total OUT: 517 mL    Total NET: 2183 mL          MEDICATIONS  (STANDING):  ALBUTerol    90 MICROgram(s) HFA Inhaler 1 Puff(s) Inhalation two times a day  chlorhexidine 4% Liquid 1 Application(s) Topical <User Schedule>  escitalopram 20 milliGRAM(s) Oral daily  lactated ringers. 1000 milliLiter(s) (100 mL/Hr) IV Continuous <Continuous>  pantoprazole  Injectable 40 milliGRAM(s) IV Push daily  tiotropium 18 MICROgram(s) Capsule 1 Capsule(s) Inhalation daily    MEDICATIONS  (PRN):  HYDROmorphone  Injectable 0.5 milliGRAM(s) IV Push every 4 hours PRN moderate and severe pain                            7.8    15.16 )-----------( 203      ( 19 Feb 2019 03:00 )             22.7       02-19    139  |  105  |  29<H>  ----------------------------<  105<H>  4.1   |  19<L>  |  1.38<H>    Ca    7.1<L>      19 Feb 2019 03:00  Phos  2.0     02-19  Mg     2.6     02-19    TPro  4.1<L>  /  Alb  2.2<L>  /  TBili  0.8  /  DBili  x   /  AST  14  /  ALT  5   /  AlkPhos  57  02-17      Physical Exam:  Gen: Laying in bed, NAD  Resp: Unlabored breathing  Abd: soft, moderately distended, tender diffusely but mostly in LUQ  Ext: WWP  Skin: No rashes

## 2019-02-19 NOTE — PROGRESS NOTE ADULT - ASSESSMENT
This is a 64 y/o F s/p L laparoscopic radical nephrectomy on 2/14, post-operative course c/b readmission for splenic bleed requiring IR angioembolization of a proximal splenic artery on 2/17, now with persistently downtrending Hct    - Summary of units received: 7U PRBCs, 2FFP, 1 Plt  - serial Hct  - Would continue aggressive resuscitation in light of oliguria and downtrending Hct  - would consider GI consultation given heartburn and ? hemoptysis vs hematemesis yesterday, monitor stool quality  - Strict intake and output  - appreciate general surgery input regarding downtrending Hct after IR embolization, ? need for repeat CT  - appreciate SICU care

## 2019-02-19 NOTE — PHYSICAL THERAPY INITIAL EVALUATION ADULT - DIAGNOSIS, PT EVAL
Pt admitted for bleeding s/p left nephrostomy; pt presents with decreased strength Pt admitted for bleeding s/p left nephrostomy; hemorrhagic shock;  pt presents with decreased strength

## 2019-02-19 NOTE — PROGRESS NOTE ADULT - ASSESSMENT
63 year old female with a history of COPD 4 days s/p left lap radical nephrectomy/adrenalectomy on 2/13 for renal mass presented with weakness associated with dyspnea found to be in hemorrhagic shock secondary to large subcapsular splenic hematoma requiring multiple blood transfusions, now s/p IR coiling, stable.    - Monitor hemodynamics & H&H, transfuse as indicated  - General surgery will be standby in the event emergent splenectomy is required     Troy Yap, PGY-2  B Team Surgery [id number]

## 2019-02-19 NOTE — PHYSICAL THERAPY INITIAL EVALUATION ADULT - PATIENT PROFILE REVIEW, REHAB EVAL
No formal Activity Order in the computer; spoke with RN Rona Kay prior to PT evaluation--> Pt OK for PT consult/yes

## 2019-02-19 NOTE — PROVIDER CONTACT NOTE (OTHER) - BACKGROUND
arterial line and non invasive blood pressure cuff not correlating-(20 pts off) and patients SBP in 70's for several hours.

## 2019-02-19 NOTE — PHYSICAL THERAPY INITIAL EVALUATION ADULT - GENERAL OBSERVATIONS, REHAB EVAL
Pt encountered in semisupine position in SICU, no distress, AxOX4,+ O2 through nasal cannula, with +IV, Left femoral line, and +tele

## 2019-02-19 NOTE — PHYSICAL THERAPY INITIAL EVALUATION ADULT - ACTIVE RANGE OF MOTION EXAMINATION, REHAB EVAL
leonor. upper extremity Active ROM was WNL (within normal limits)/bilateral lower extremity Active ROM was WNL (within normal limits)

## 2019-02-19 NOTE — PROGRESS NOTE ADULT - ASSESSMENT
Assessment:   63 year old female with a history of COPD, depression, left breast cancer, large left renal mass, s/p  left lap radical nephrectomy/adrenalectomy on 2/13, discharged yesterday, had postop fever 101.8F, pre-discharge Hgb 7.7, developed weakness, dizziness, found to be in shock, Hgb 6.6, CT showed large subcapsular splenic hematoma measuring [id number] cm, 2 units pRBC at [facility] ED and transferred to [facility] for further management. Now s/p IR angiogram     Plan:  Neurologic: patient AAOx3  - tylenol PRN for pain  - dilaudid PRN    Respiratory: hx of empyhsema,   - on 2L NC  - oxygen at home intermittently     Cardiovascular: + hemorrhagic shock  - hypotensive to 50s on arrival; improving to 80s systolic  -resuscitated with transfusion   - did not require pressors  - lactate normal  - IVF @ 100     Gastrointenstinal: CT w/ subcapsular splenic hematoma, + hemoperitoneum  - s/p IR embolization  - protonix  - NPO; 3 stable h/hs and will feed     Renal/: s/p left lap radical nephrectomy/adrenalectomy on 2/13/19  - mcgee inserted, reduced UOP  - repleted ca   - ERASMO: worsening Cr fx 1.34, baseline .77; downtrending     Heme: acute blood loss anemia  - monitor H/H  - patient requiring 2 u pRBC at ; predischarge Hgb 7.7 = in total 6prb, 2ffp, 1 platelet  - IVC filter in place   - s/p embolization of proximal portion  of splenic artery by IR  - SCDS in place       Infectious Disease:  - flu and RVP (-)   - white count elevated (11)  - take introducer out      Endocrine: VITALIY     Disposition: SICU     --------------------------------------------------------------------------------------    Critical Care Diagnoses: acute blood loss anemia, subcapsular splenic hematoma, s/p embolization of splenic artery, hemorrhagic shock, COPD, respiratory abnormality Assessment:   63 year old female with a history of COPD, depression, left breast cancer, large left renal mass, s/p  left lap radical nephrectomy/adrenalectomy on 2/13, discharged yesterday, had postop fever 101.8F, pre-discharge Hgb 7.7, developed weakness, dizziness, found to be in shock, Hgb 6.6, CT showed large subcapsular splenic hematoma measuring [id number] cm, 2 units pRBC at [facility] ED and transferred to [facility] for further management. Now s/p IR angiogram     Plan:  Neurologic: patient AAOx3  - tylenol PRN for pain  - dilaudid PRN  - lexapro, home med     Respiratory: hx of empyhsema,   - on 2L NC  - oxygen at home intermittently     Cardiovascular: + hemorrhagic shock  - BPs overnight in the 140-160  - add lopressor   - lactate normal  - maintenance IVF    Gastrointenstinal: CT w/ subcapsular splenic hematoma, + hemoperitoneum  - s/p IR embolization  - protonix  - NPO; 3 stable h/hs and will feed     Renal/: s/p left lap radical nephrectomy/adrenalectomy on 2/13/19  - Nicholas, reduced UOP 25cc/hr   - repleted ca   - ERASMO: worsening Cr fx 1.38, baseline .77; downtrending, BUN increased to 29 from 17  - Urine lytes, continue to monitor AG    Heme: acute blood loss anemia  - monitor H/H  - 1 unit PRBC yesterday 2/19, Hgb 7.5  - patient requiring 2 u pRBC at ; predischarge Hgb 7.7 = in total 6prb, 2ffp, 1 platelet  - IVC filter in place   - s/p embolization of proximal portion of splenic artery by IR  - SCDS in place   - [facility]    Infectious Disease:  - flu and RVP (-)   - white count elevated (11)  - take introducer out      Endocrine: VITALIY     Disposition: SICU     ----------------------------------------------------------------------------------  Critical Care Diagnoses: acute blood loss anemia, subcapsular splenic hematoma, s/p embolization of splenic artery, hemorrhagic shock, COPD, respiratory abnormality

## 2019-02-19 NOTE — PROGRESS NOTE ADULT - SUBJECTIVE AND OBJECTIVE BOX
Subjective  Pt noted to have downtrending Hct despite 1 U PRBCs yesterday: 24.7 --> 23.9 --> 22.7  No further episodes of hemoptysis, but does endorse some heartburn  Continued oliguria  Reports energy and breathing have improved    Objective  Vital signs  T(F): , Max: 97.9 (02-18-19 @ 12:00)  HR: 79 (02-19-19 @ 06:00)  BP: 122/65 (02-18-19 @ 20:00)  SpO2: 98% (02-19-19 @ 06:00)  F 285 - clear yellow    Gen: NAD  Abd: Moderately distended, non-tender to palpation, incisions c/d/i with staples in place  : mcgee draining as above    Labs  02-19 @ 03:00  WBC 15.16 / Hct 22.7  / SCr 1.38     02-18 @ 22:30    WBC 16.29 / Hct 23.8  / SCr --

## 2019-02-19 NOTE — PROVIDER CONTACT NOTE (OTHER) - ACTION/TREATMENT ORDERED:
-MIGUELINA Amaya and MD Rollins aware and assessed pt at bedside  -500cc LR bolus administered  -no further interventions at this time as per MIGUELINA Rollins. Will continue to monitor closely.

## 2019-02-19 NOTE — PROGRESS NOTE ADULT - SUBJECTIVE AND OBJECTIVE BOX
SICU Daily Progress Note  =====================================================  Interval/Overnight Events: Pt stable; 1u PRBCs given due to downtrending H/h, which the patient responded adequately. Urine output decreased,     POD # 2         	SICU Day #   3    HPI:  63 year old female with a history of COPD, depression, left breast cancer, large left renal mass, s/p  left lap radical nephrectomy/adrenalectomy on 2/13, discharged yesterday, had postop fever 101.8F, pre-discharge Hgb 7.7, developed weakness, dizziness, found to be in shock, Hgb 6.6, CT showed large perisplenic hematoma 53l16y2 cm. She was transfused 2 units pRBC at [facility] ED and transferred to [facility] for further management. SICU consulted for hemodynamic monitoring. Patient seen and exmained in ED. Patient awake and alert, urology at bedside in ED. Patient hypotensive, receiving an additional 2 prbc. IR consulted for possible intervention.     SUBJECTIVE/ROS:  [x] A ten-point review of systems was otherwise negative except as noted.  [ ] Due to altered mental status/intubation, subjective information were not able to be obtained from the patient. History was obtained, to the extent possible, from review of the chart and collateral sources of information.    NEURO  RASS:     GCS:     CAM ICU:  Exam:   Meds: escitalopram 20 milliGRAM(s) Oral daily  HYDROmorphone  Injectable 0.5 milliGRAM(s) IV Push every 4 hours PRN moderate and severe pain    [x] Adequacy of sedation and pain control has been assessed and adjusted      RESPIRATORY  RR: 20 (02-19-19 @ 00:00) (17 - 24)  SpO2: 96% (02-19-19 @ 00:03) (95% - 100%)  Wt(kg): --  Exam: unlabored, clear to auscultation bilaterally  Mechanical Ventilation:   ABG - ( 18 Feb 2019 04:00 )  pH: 7.34  /  pCO2: 41    /  pO2: 89    / HCO3: 21    / Base Excess: -3.6  /  SaO2: 97.4    Lactate: 1.0        [ ] Extubation Readiness Assessed  Meds: ALBUTerol    90 MICROgram(s) HFA Inhaler 1 Puff(s) Inhalation two times a day  tiotropium 18 MICROgram(s) Capsule 1 Capsule(s) Inhalation daily        CARDIOVASCULAR  HR: 73 (02-19-19 @ 00:03) (73 - 92)  BP: 122/65 (02-18-19 @ 20:00) (112/59 - 122/65)  BP(mean): 78 (02-18-19 @ 20:00) (72 - 78)  ABP: 149/70 (02-19-19 @ 00:00) (109/58 - 156/71)  ABP(mean): 95 (02-19-19 @ 00:00) (73 - 100)  Wt(kg): --  CVP(cm H2O): --  VBG - ( 17 Feb 2019 15:45 )  pH: 7.20  /  pCO2: 51    /  pO2: < 24  / HCO3: 17    / Base Excess: -7.7  /  SaO2: 23.5   Lactate: 3.9        Exam:  Cardiac Rhythm:  Perfusion     [ ]Adequate   [ ]Inadequate  Mentation   [ ]Normal       [ ]Reduced  Extremities  [ ]Warm         [ ]Cool  Volume Status [ ]Hypervolemic [ ]Euvolemic [ ]Hypovolemic  Meds:       GI/NUTRITION  Exam:  Diet:  Meds: pantoprazole  Injectable 40 milliGRAM(s) IV Push daily      GENITOURINARY  I&O's Detail    02-17 @ 07:01  -  02-18 @ 07:00  --------------------------------------------------------  IN:    Cryoprecipitate: 211 mL    lactated ringers.: 1200 mL    Packed Red Blood Cells: 300 mL    Plasma: 400 mL  Total IN: 2111 mL    OUT:    Indwelling Catheter - Urethral: 410 mL  Total OUT: 410 mL    Total NET: 1701 mL      02-18 @ 07:01  -  02-19 @ 01:28  --------------------------------------------------------  IN:    lactated ringers.: 1700 mL    Packed Red Blood Cells: 300 mL  Total IN: 2000 mL    OUT:    Indwelling Catheter - Urethral: 357 mL  Total OUT: 357 mL    Total NET: 1643 mL          02-17    139  |  107  |  17  ----------------------------<  128<H>  4.3   |  20<L>  |  1.34<H>    Ca    6.2<LL>      17 Feb 2019 22:28  Phos  4.2     02-17  Mg     1.9     02-17    TPro  4.1<L>  /  Alb  2.2<L>  /  TBili  0.8  /  DBili  x   /  AST  14  /  ALT  5   /  AlkPhos  57  02-17    [ ] Amador catheter, indication: N/A  Meds: lactated ringers. 1000 milliLiter(s) IV Continuous <Continuous>        HEMATOLOGIC  Meds:   [x] VTE Prophylaxis                        8.0    16.29 )-----------( 207      ( 18 Feb 2019 22:30 )             23.8     PT/INR - ( 17 Feb 2019 22:28 )   PT: 14.0 SEC;   INR: 1.25          PTT - ( 17 Feb 2019 22:28 )  PTT:27.1 SEC  Transfusion     [ ] PRBC   [ ] Platelets   [ ] FFP   [ ] Cryoprecipitate      INFECTIOUS DISEASES  T(C): 36.4 (02-19-19 @ 00:00), Max: 36.7 (02-18-19 @ 04:00)  Wt(kg): --  WBC Count: 16.29 K/uL (02-18 @ 22:30)  WBC Count: 14.92 K/uL (02-18 @ 18:00)  WBC Count: 13.82 K/uL (02-18 @ 14:30)  WBC Count: 13.14 K/uL (02-18 @ 10:48)  WBC Count: 11.67 K/uL (02-18 @ 06:15)  WBC Count: 11.55 K/uL (02-18 @ 04:00)    Recent Cultures:  Specimen Source: .Blood Blood-Peripheral, 02-17 @ 12:40; Results   No growth to date.; Gram Stain: --; Organism: --  Specimen Source: URINE MIDSTREAM, 02-16 @ 07:32; Results --; Gram Stain: --; Organism: --  Specimen Source: BLOOD PERIPHERAL, 02-16 @ 01:42; Results --; Gram Stain: --; Organism: --  Specimen Source: URINE MIDSTREAM, 02-15 @ 11:49; Results --; Gram Stain: --; Organism: --    Meds:       ENDOCRINE  Capillary Blood Glucose    Meds:     Tubes/Lines/Drains    [x] Peripheral IV  [x] Central Venous Line     	[] R	[x] L	[] IJ	[x] Fem	[] SC	Date Placed:   [x] Arterial Line		[] R	[x] L	[x] Fem	[] Rad	[] Ax	Date Placed:   [] PICC		[] Midline		[] Mediport  [] Urinary Catheter		Date Placed:   [x] Necessity of urinary, arterial, and venous catheters discussed    LABS  --------------------------------------------------------------------------------------  ((Insert SICU Labs here))***  --------------------------------------------------------------------------------------    OTHER LABORATORY:

## 2019-02-19 NOTE — PHYSICAL THERAPY INITIAL EVALUATION ADULT - ADDITIONAL COMMENTS
Pt reports that she lives alone in a private house with no steps to negotiate. Prior to hospital admission pt was completely independent and used no assistive device with ambulation. Pt denies any recent falls.    Pt left comfortable in bed, NAD, all lines intact, all precautions maintained, with call bell in reach, and RN aware of PT evaluation.

## 2019-02-20 LAB
ANION GAP SERPL CALC-SCNC: 11 MMO/L — SIGNIFICANT CHANGE UP (ref 7–14)
BUN SERPL-MCNC: 20 MG/DL — SIGNIFICANT CHANGE UP (ref 7–23)
CALCIUM SERPL-MCNC: 7.1 MG/DL — LOW (ref 8.4–10.5)
CHLORIDE SERPL-SCNC: 107 MMOL/L — SIGNIFICANT CHANGE UP (ref 98–107)
CO2 SERPL-SCNC: 22 MMOL/L — SIGNIFICANT CHANGE UP (ref 22–31)
CREAT SERPL-MCNC: 1.01 MG/DL — SIGNIFICANT CHANGE UP (ref 0.5–1.3)
GLUCOSE SERPL-MCNC: 117 MG/DL — HIGH (ref 70–99)
HCT VFR BLD CALC: 20.3 % — CRITICAL LOW (ref 34.5–45)
HCT VFR BLD CALC: 25.6 % — LOW (ref 34.5–45)
HCT VFR BLD CALC: 26.1 % — LOW (ref 34.5–45)
HGB BLD-MCNC: 6.7 G/DL — CRITICAL LOW (ref 11.5–15.5)
HGB BLD-MCNC: 8.3 G/DL — LOW (ref 11.5–15.5)
HGB BLD-MCNC: 8.3 G/DL — LOW (ref 11.5–15.5)
MAGNESIUM SERPL-MCNC: 2.4 MG/DL — SIGNIFICANT CHANGE UP (ref 1.6–2.6)
MCHC RBC-ENTMCNC: 28.1 PG — SIGNIFICANT CHANGE UP (ref 27–34)
MCHC RBC-ENTMCNC: 28.9 PG — SIGNIFICANT CHANGE UP (ref 27–34)
MCHC RBC-ENTMCNC: 28.9 PG — SIGNIFICANT CHANGE UP (ref 27–34)
MCHC RBC-ENTMCNC: 31.8 % — LOW (ref 32–36)
MCHC RBC-ENTMCNC: 32.4 % — SIGNIFICANT CHANGE UP (ref 32–36)
MCHC RBC-ENTMCNC: 33 % — SIGNIFICANT CHANGE UP (ref 32–36)
MCV RBC AUTO: 87.5 FL — SIGNIFICANT CHANGE UP (ref 80–100)
MCV RBC AUTO: 88.5 FL — SIGNIFICANT CHANGE UP (ref 80–100)
MCV RBC AUTO: 89.2 FL — SIGNIFICANT CHANGE UP (ref 80–100)
NRBC # FLD: 0.16 K/UL — LOW (ref 25–125)
NRBC # FLD: 0.21 K/UL — LOW (ref 25–125)
NRBC # FLD: 0.31 K/UL — LOW (ref 25–125)
NRBC FLD-RTO: 1.2 — SIGNIFICANT CHANGE UP
NRBC FLD-RTO: 1.9 — SIGNIFICANT CHANGE UP
OB PNL STL: POSITIVE — SIGNIFICANT CHANGE UP
PHOSPHATE SERPL-MCNC: 1.3 MG/DL — LOW (ref 2.5–4.5)
PLATELET # BLD AUTO: 210 K/UL — SIGNIFICANT CHANGE UP (ref 150–400)
PLATELET # BLD AUTO: 212 K/UL — SIGNIFICANT CHANGE UP (ref 150–400)
PLATELET # BLD AUTO: 226 K/UL — SIGNIFICANT CHANGE UP (ref 150–400)
PMV BLD: 10.6 FL — SIGNIFICANT CHANGE UP (ref 7–13)
PMV BLD: 10.6 FL — SIGNIFICANT CHANGE UP (ref 7–13)
PMV BLD: 10.7 FL — SIGNIFICANT CHANGE UP (ref 7–13)
POTASSIUM SERPL-MCNC: 3.9 MMOL/L — SIGNIFICANT CHANGE UP (ref 3.5–5.3)
POTASSIUM SERPL-SCNC: 3.9 MMOL/L — SIGNIFICANT CHANGE UP (ref 3.5–5.3)
RBC # BLD: 2.32 M/UL — LOW (ref 3.8–5.2)
RBC # BLD: 2.87 M/UL — LOW (ref 3.8–5.2)
RBC # BLD: 2.95 M/UL — LOW (ref 3.8–5.2)
RBC # FLD: 16.4 % — HIGH (ref 10.3–14.5)
RBC # FLD: 16.5 % — HIGH (ref 10.3–14.5)
RBC # FLD: 17.1 % — HIGH (ref 10.3–14.5)
SODIUM SERPL-SCNC: 140 MMOL/L — SIGNIFICANT CHANGE UP (ref 135–145)
WBC # BLD: 16.59 K/UL — HIGH (ref 3.8–10.5)
WBC # BLD: 16.86 K/UL — HIGH (ref 3.8–10.5)
WBC # BLD: 18.24 K/UL — HIGH (ref 3.8–10.5)
WBC # FLD AUTO: 16.59 K/UL — HIGH (ref 3.8–10.5)
WBC # FLD AUTO: 16.86 K/UL — HIGH (ref 3.8–10.5)
WBC # FLD AUTO: 18.24 K/UL — HIGH (ref 3.8–10.5)

## 2019-02-20 PROCEDURE — 87040 BLOOD CULTURE FOR BACTERIA: CPT

## 2019-02-20 PROCEDURE — 74176 CT ABD & PELVIS W/O CONTRAST: CPT

## 2019-02-20 PROCEDURE — 86850 RBC ANTIBODY SCREEN: CPT

## 2019-02-20 PROCEDURE — 86900 BLOOD TYPING SEROLOGIC ABO: CPT

## 2019-02-20 PROCEDURE — 99285 EMERGENCY DEPT VISIT HI MDM: CPT | Mod: 25

## 2019-02-20 PROCEDURE — 83605 ASSAY OF LACTIC ACID: CPT

## 2019-02-20 PROCEDURE — 36415 COLL VENOUS BLD VENIPUNCTURE: CPT

## 2019-02-20 PROCEDURE — P9016: CPT

## 2019-02-20 PROCEDURE — 85730 THROMBOPLASTIN TIME PARTIAL: CPT

## 2019-02-20 PROCEDURE — 87631 RESP VIRUS 3-5 TARGETS: CPT

## 2019-02-20 PROCEDURE — 80053 COMPREHEN METABOLIC PANEL: CPT

## 2019-02-20 PROCEDURE — 86923 COMPATIBILITY TEST ELECTRIC: CPT

## 2019-02-20 PROCEDURE — 85027 COMPLETE CBC AUTOMATED: CPT

## 2019-02-20 PROCEDURE — 71045 X-RAY EXAM CHEST 1 VIEW: CPT

## 2019-02-20 PROCEDURE — 86901 BLOOD TYPING SEROLOGIC RH(D): CPT

## 2019-02-20 PROCEDURE — 99222 1ST HOSP IP/OBS MODERATE 55: CPT | Mod: GC

## 2019-02-20 PROCEDURE — 99233 SBSQ HOSP IP/OBS HIGH 50: CPT

## 2019-02-20 PROCEDURE — 93005 ELECTROCARDIOGRAM TRACING: CPT

## 2019-02-20 PROCEDURE — 85610 PROTHROMBIN TIME: CPT

## 2019-02-20 PROCEDURE — 36430 TRANSFUSION BLD/BLD COMPNT: CPT

## 2019-02-20 RX ORDER — PANTOPRAZOLE SODIUM 20 MG/1
40 TABLET, DELAYED RELEASE ORAL
Qty: 0 | Refills: 0 | Status: DISCONTINUED | OUTPATIENT
Start: 2019-02-20 | End: 2019-02-21

## 2019-02-20 RX ADMIN — Medication 5 MILLIGRAM(S): at 00:08

## 2019-02-20 RX ADMIN — Medication 5 MILLIGRAM(S): at 17:54

## 2019-02-20 RX ADMIN — Medication 63.75 MILLIMOLE(S): at 07:12

## 2019-02-20 RX ADMIN — ALBUTEROL 1 PUFF(S): 90 AEROSOL, METERED ORAL at 10:23

## 2019-02-20 RX ADMIN — HEPARIN SODIUM 5000 UNIT(S): 5000 INJECTION INTRAVENOUS; SUBCUTANEOUS at 05:33

## 2019-02-20 RX ADMIN — TIOTROPIUM BROMIDE 1 CAPSULE(S): 18 CAPSULE ORAL; RESPIRATORY (INHALATION) at 10:23

## 2019-02-20 RX ADMIN — Medication 650 MILLIGRAM(S): at 06:03

## 2019-02-20 RX ADMIN — Medication 5 MILLIGRAM(S): at 11:55

## 2019-02-20 RX ADMIN — Medication 650 MILLIGRAM(S): at 11:56

## 2019-02-20 RX ADMIN — HEPARIN SODIUM 5000 UNIT(S): 5000 INJECTION INTRAVENOUS; SUBCUTANEOUS at 13:59

## 2019-02-20 RX ADMIN — ESCITALOPRAM OXALATE 20 MILLIGRAM(S): 10 TABLET, FILM COATED ORAL at 11:55

## 2019-02-20 RX ADMIN — SODIUM CHLORIDE 50 MILLILITER(S): 9 INJECTION, SOLUTION INTRAVENOUS at 13:59

## 2019-02-20 RX ADMIN — Medication 650 MILLIGRAM(S): at 05:33

## 2019-02-20 RX ADMIN — ALBUTEROL 1 PUFF(S): 90 AEROSOL, METERED ORAL at 23:43

## 2019-02-20 RX ADMIN — CHLORHEXIDINE GLUCONATE 1 APPLICATION(S): 213 SOLUTION TOPICAL at 11:06

## 2019-02-20 RX ADMIN — Medication 5 MILLIGRAM(S): at 05:33

## 2019-02-20 RX ADMIN — Medication 650 MILLIGRAM(S): at 19:00

## 2019-02-20 RX ADMIN — Medication 650 MILLIGRAM(S): at 23:54

## 2019-02-20 RX ADMIN — Medication 650 MILLIGRAM(S): at 11:55

## 2019-02-20 RX ADMIN — PANTOPRAZOLE SODIUM 40 MILLIGRAM(S): 20 TABLET, DELAYED RELEASE ORAL at 17:54

## 2019-02-20 RX ADMIN — Medication 650 MILLIGRAM(S): at 17:54

## 2019-02-20 RX ADMIN — HEPARIN SODIUM 5000 UNIT(S): 5000 INJECTION INTRAVENOUS; SUBCUTANEOUS at 23:54

## 2019-02-20 RX ADMIN — PANTOPRAZOLE SODIUM 40 MILLIGRAM(S): 20 TABLET, DELAYED RELEASE ORAL at 06:02

## 2019-02-20 NOTE — PROGRESS NOTE ADULT - ASSESSMENT
This is a 62 y/o F s/p L laparoscopic radical nephrectomy on 2/14, post-operative course c/b readmission for splenic bleed requiring IR angioembolization of a proximal splenic artery on 2/17, now with persistently downtrending Hct    - Summary of units received: 7U PRBCs, 2FFP, 1 Plt  - serial Hct  - transfuse for downtrending Hct  - FOBT if tarry stools  - Strict intake and output  - appreciate general surgery input, repeat CT today  - appreciate SICU care

## 2019-02-20 NOTE — PROGRESS NOTE ADULT - SUBJECTIVE AND OBJECTIVE BOX
Subjective  Hct noted to be downtrending  Respiratory symptoms improving  Abdominal pain improving    Objective  Vital signs  T(F): , Max: 98.4 (02-19-19 @ 20:00)  HR: 66 (02-20-19 @ 06:00)  BP: 126/61 (02-20-19 @ 06:00)  SpO2: 99% (02-20-19 @ 06:00)  Mcgee 430 - clear yellow      Gen: NAD  Abd: abdomen softly distended, incision c/d/i with staples in place  : mcgee secure    Labs      02-20 @ 02:20    WBC 18.24 / Hct 20.3  / SCr 1.01     02-19 @ 12:15    WBC 17.15 / Hct 21.9  / SCr --

## 2019-02-20 NOTE — CONSULT NOTE ADULT - ASSESSMENT
Impression:  1)Acute blood loss anemia- initially likely 2/2 perisplenic hematoma, but in setting of black stools concerned for upper GI bleed versus left sided lower GI bleed. Differential includes peptic ulcer disease, stress-induced ulcer, NSAID gastropathy, angioectasia, Impression:  1)Acute blood loss anemia- initially likely 2/2 perisplenic hematoma, but in setting of black stools concerned for upper GI bleed versus left sided lower GI bleed. Differential includes peptic ulcer disease, stress-induced ulcer, NSAID gastropathy, angioectasia, left sided colonic angioectasia, diverticular bleed, colon cancer. In addition could be concerned about ischemic injury to stomach or intestine given hypovolemic shock on admission.     Recommendations:  -f/u CBC to be performed this evening  -consider repeat abdominal CT imaging to reassess size of perisplenic hematoma  -would start IV PPI therapy (via gtt)  -would make patient NPO past MN tonight, plan for EGD tomorrow  Risks, benefits, alternatives described to patient including, but not limited to, bleeding, infection, organ damage, perforation, missed lesions and risks of anesthesia. Patient understands and agrees to these risks.  Discussed with SICU team     Please call with questions  Adrianna Arango  GI Fellow  Pager: [phone] Impression:  1)Acute blood loss anemia- initially likely 2/2 perisplenic hematoma, but in setting of black stools concerned for upper GI bleed versus left sided lower GI bleed. Differential includes peptic ulcer disease, stress-induced ulcer, NSAID gastropathy, tumor metastic to the stomach or duodenum, angioectasia, left sided colonic angioectasia, diverticular bleed, colon cancer. In addition could be concerned about ischemic injury to stomach or intestine given hypovolemic shock on admission.     Recommendations:  -f/u CBC to be performed this evening  -consider repeat abdominal CT imaging to reassess size of perisplenic hematoma  -would start IV PPI therapy (via gtt)  -would make patient NPO past MN tonight, plan for EGD tomorrow  Risks, benefits, alternatives described to patient including, but not limited to, bleeding, infection, organ damage, perforation, missed lesions and risks of anesthesia. Patient understands and agrees to these risks.  Discussed with SICU team     Please call with questions  Adrianna Arango  GI Fellow  Pager: [phone]

## 2019-02-20 NOTE — PROGRESS NOTE ADULT - ASSESSMENT
Assessment:   63 year old female with a history of COPD, depression, left breast cancer, large left renal mass, s/p  left lap radical nephrectomy/adrenalectomy on 2/13, discharged yesterday, had postop fever 101.8F, pre-discharge Hgb 7.7, developed weakness, dizziness, found to be in shock, Hgb 6.6, CT showed large subcapsular splenic hematoma measuring 79m09x4 cm, 2 units pRBC at [facility] ED and transferred to [facility] for further management. Now s/p IR angiogram     Plan:  Neurologic: patient AAOx3  - Continue tylenol for pain control  - PRN oxycodone   - Continue lexapro     Respiratory: hx of empyhsema,   - on 2L NC  - oxygen at home intermittently   - IS/OOB as tolerated    Cardiovascular: + hemorrhagic shock  - BPs overnight in the 140-160  - Metoprolol added for hypertension   - Maintenance IVF, discontinue when diet advanced     Gastrointenstinal: CT w/ subcapsular splenic hematoma, + hemoperitoneum  - s/p IR embolization  - protonix  - Tolerating CLD    Renal/: s/p left lap radical nephrectomy/adrenalectomy on 2/13/19  - Maintain mcgee, UOP borderline though increased from yesterday  - ERASMO on CKD  - Monitor electrolytes, replete PRN    Heme: acute blood loss anemia  - Monitor daily H/H  - IVC filter in place   - s/p embolization of proximal portion of splenic artery by IR  - SCDS in place   - [facility]    Infectious Disease:  - flu and RVP (-)   - Mild leukocytosis  - Monitor off antibiotics     Endocrine: VITALIY     Disposition: SICU     ----------------------------------------------------------------------------------  Critical Care Diagnoses: acute blood loss anemia, subcapsular splenic hematoma, s/p embolization of splenic artery, hemorrhagic shock, COPD, respiratory abnormality Assessment:   63 year old female with a history of COPD, depression, left breast cancer, large left renal mass, s/p  left lap radical nephrectomy/adrenalectomy on 2/13, discharged yesterday, had postop fever 101.8F, pre-discharge Hgb 7.7, developed weakness, dizziness, found to be in shock, Hgb 6.6, CT showed large subcapsular splenic hematoma measuring [id number] cm, 2 units pRBC at [facility] ED and transferred to [facility] for further management. Now s/p IR angiogram     Plan:  Neurologic: patient AAOx3  - Continue tylenol for pain control  - PRN oxycodone   - Continue lexapro     Respiratory: hx of empyhsema,   - on 2L NC  - oxygen at home intermittently   - inhalers  - IS/OOB as tolerated    Cardiovascular: + hemorrhagic shock  - BPs overnight in the 140-160  - Metoprolol added for hypertension   - Maintenance IVF, discontinue when diet advanced     Gastrointenstinal: CT w/ subcapsular splenic hematoma, + hemoperitoneum  - s/p IR embolization  - protonix  - Tolerating CLD, will advance to regular today     Renal/: s/p left lap radical nephrectomy/adrenalectomy on 2/13/19  - Maintain mcgee, UOP borderline though increased from yesterday  - ERASMO on CKD  - Monitor electrolytes, replete PRN  - 1L pre and post CTA    Heme: acute blood loss anemia  - Monitor daily H/H, due at 4pm. if low, will order H&H  - IVC filter in place   - s/p embolization of proximal portion of splenic artery by IR  - SCDS in place   - [facility]    Infectious Disease:  - flu and RVP (-)   - Mild leukocytosis  - Monitor off antibiotics     Endocrine: VIATLIY     Disposition: SICU     ----------------------------------------------------------------------------------  Critical Care Diagnoses: acute blood loss anemia, subcapsular splenic hematoma, s/p embolization of splenic artery, hemorrhagic shock, COPD, respiratory abnormality

## 2019-02-20 NOTE — CONSULT NOTE ADULT - ATTENDING COMMENTS
63 year old female with a history of COPD, depression, left breast cancer, large left renal mass, s/p  left lap radical nephrectomy/adrenalectomy on 2/13, discharged yesterday, had postop fever 101.8F, pre-discharge Hgb 7.7, developed weakness, dizziness, found to be in shock, Hgb 6.6, CT showed large subcapsular splenic hematoma measuring 22z20d2 cm, 2 units pRBC at [facility] ED and transferred to [facility] for further management, now going to IR for possible intervention     Plan:  Neurologic: patient AAOx3  - tylenol PRN for pain    Respiratory: hx of empyhsema,   - saturating 100% on RA    Cardiovascular: + hemorrhagic shock  - hypotensive to 50s on arrival; improving to 80s systolic  - will resuscitate with transfusion   - start pressors if BPs dont improve  - lactate 8  - IVF @ 100     Gastrointenstinal: CT w/ subcapsular splenic hematoma, + hemoperitoneum  - pending IR for possible embolization   - NPO    Renal/: s/p left lap radical nephrectomy/adrenalectomy on 2/13/19  - mcgee inserted  - ERASMO: worsening Cr fx 1.55, baseline .77    Heme: acute blood loss anemia  - current H/H 6.5/21.1  - monitor H/H  - patient requiring 2 u pRBC at ; predischarge Hgb 7.7  - rapid transfusion protocol initiated: 2 pRBC, 2 ffp, 1 plt  - IVC filter in place   - going to IR     Infectious Disease:  - flu and RVP (-)   - white count elevated (14)     Endocrine: VITALIY     Disposition: SICU     Critical Care Diagnoses: acute blood loss anemia, subcapsular hematoma, s/p nephrectomy, hemorrhagic shock.  The patient is a critical care patient with life threatening hemodynamic and metabolic instability in SICU.  I have personally interviewed when possible and examined the patient, reviewed data and laboratory tests/x-rays and all pertinent electronic images.  I was physically present for the key portions of the evaluation and management (E/M) service provided.   The SICU team has a constant risk benefit analyzes discussion with the primary team, all consultants, House Staff and PA's on all decisions.  These diagnoses are unrelated to the surgical procedure noted above.  I meet with family if needed to get further history, discuss the case and make care decisions for this patient who might not be able to participate.  Time involved in performance of separately billable procedures was not counted toward my critical care time. There is no overlap.  I spent 55-75 minutes of critical care time for the diagnoses, assessment, plan and interventions.
I have seen and evaluated the patient with the GI Fellow and GI Team.  I agree with the findings, formulation and plan of care as documented in the GI fellow's note, except as noted.

## 2019-02-20 NOTE — PROGRESS NOTE ADULT - SUBJECTIVE AND OBJECTIVE BOX
Surgery Progress Note    S: Patient seen and examined. No acute events overnight. Patient having BMs and passing flatus. Denies abd pain. Hemodynamically stable. Transfused 1 unit pRBCs around 3am this morning.    O:  Vital Signs Last 24 Hrs  T(C): 36.6 (20 Feb 2019 04:00), Max: 36.9 (19 Feb 2019 20:00)  T(F): 97.8 (20 Feb 2019 04:00), Max: 98.4 (19 Feb 2019 20:00)  HR: 66 (20 Feb 2019 06:00) (66 - 96)  BP: 126/61 (20 Feb 2019 06:00) (126/61 - 156/68)  BP(mean): 77 (20 Feb 2019 06:00) (77 - 95)  RR: 20 (20 Feb 2019 06:00) (18 - 28)  SpO2: 99% (20 Feb 2019 06:00) (91% - 100%)    I&O's Detail    19 Feb 2019 07:01  -  20 Feb 2019 07:00  --------------------------------------------------------  IN:    dextrose 5% + sodium chloride 0.45%.: 1150 mL    IV PiggyBack: 250 mL    lactated ringers.: 100 mL    Packed Red Blood Cells: 300 mL  Total IN: 1800 mL    OUT:    Indwelling Catheter - Urethral: 975 mL  Total OUT: 975 mL    Total NET: 825 mL          MEDICATIONS  (STANDING):  acetaminophen   Tablet .. 650 milliGRAM(s) Oral every 6 hours  ALBUTerol    90 MICROgram(s) HFA Inhaler 1 Puff(s) Inhalation two times a day  chlorhexidine 4% Liquid 1 Application(s) Topical <User Schedule>  dextrose 5% + sodium chloride 0.45%. 1000 milliLiter(s) (50 mL/Hr) IV Continuous <Continuous>  escitalopram 20 milliGRAM(s) Oral daily  heparin  Injectable 5000 Unit(s) SubCutaneous every 8 hours  metoprolol tartrate Injectable 5 milliGRAM(s) IV Push every 6 hours  pantoprazole    Tablet 40 milliGRAM(s) Oral before breakfast  tiotropium 18 MICROgram(s) Capsule 1 Capsule(s) Inhalation daily    MEDICATIONS  (PRN):  oxyCODONE    IR 5 milliGRAM(s) Oral every 4 hours PRN Moderate Pain (4 - 6)  oxyCODONE    IR 10 milliGRAM(s) Oral every 4 hours PRN Severe Pain (7 - 10)                            6.7    18.24 )-----------( 210      ( 20 Feb 2019 02:20 )             20.3       02-20    140  |  107  |  20  ----------------------------<  117<H>  3.9   |  22  |  1.01    Ca    7.1<L>      20 Feb 2019 02:20  Phos  1.3     02-20  Mg     2.4     02-20        Physical Exam:  Gen: Laying in bed, NAD  Resp: Unlabored breathing  Abd: soft, distended, minimally tender in the epigastrium/LUQ  Ext: WWP  Skin: No rashes

## 2019-02-20 NOTE — PROGRESS NOTE ADULT - ASSESSMENT
63 year old female with a history of COPD 4 days s/p left lap radical nephrectomy/adrenalectomy on 2/13 for renal mass presented with weakness associated with dyspnea found to be in hemorrhagic shock secondary to large subcapsular splenic hematoma requiring multiple blood transfusions, now s/p IR coiling, stable.    - Post-transfusion CBC this morning  - Advance diet as tolerated  - Monitor hemodynamics & H&H, transfuse as indicated    Troy Yap, PGY-2  B Team Surgery [id number]

## 2019-02-21 ENCOUNTER — RESULT REVIEW (OUTPATIENT)
Age: 64
End: 2019-02-21

## 2019-02-21 LAB
ANION GAP SERPL CALC-SCNC: 11 MMO/L — SIGNIFICANT CHANGE UP (ref 7–14)
ANION GAP SERPL CALC-SCNC: 9 MMO/L — SIGNIFICANT CHANGE UP (ref 7–14)
BACTERIA BLD CULT: SIGNIFICANT CHANGE UP
BACTERIA BLD CULT: SIGNIFICANT CHANGE UP
BLD GP AB SCN SERPL QL: NEGATIVE — SIGNIFICANT CHANGE UP
BUN SERPL-MCNC: 13 MG/DL — SIGNIFICANT CHANGE UP (ref 7–23)
BUN SERPL-MCNC: 14 MG/DL — SIGNIFICANT CHANGE UP (ref 7–23)
CA-I BLD-SCNC: 0.93 MMOL/L — LOW (ref 1.03–1.23)
CALCIUM SERPL-MCNC: 6.9 MG/DL — LOW (ref 8.4–10.5)
CALCIUM SERPL-MCNC: 7.5 MG/DL — LOW (ref 8.4–10.5)
CHLORIDE SERPL-SCNC: 105 MMOL/L — SIGNIFICANT CHANGE UP (ref 98–107)
CHLORIDE SERPL-SCNC: 107 MMOL/L — SIGNIFICANT CHANGE UP (ref 98–107)
CO2 SERPL-SCNC: 23 MMOL/L — SIGNIFICANT CHANGE UP (ref 22–31)
CO2 SERPL-SCNC: 26 MMOL/L — SIGNIFICANT CHANGE UP (ref 22–31)
CREAT SERPL-MCNC: 0.81 MG/DL — SIGNIFICANT CHANGE UP (ref 0.5–1.3)
CREAT SERPL-MCNC: 0.84 MG/DL — SIGNIFICANT CHANGE UP (ref 0.5–1.3)
GLUCOSE SERPL-MCNC: 116 MG/DL — HIGH (ref 70–99)
GLUCOSE SERPL-MCNC: 131 MG/DL — HIGH (ref 70–99)
HCT VFR BLD CALC: 25.4 % — LOW (ref 34.5–45)
HGB BLD-MCNC: 8.2 G/DL — LOW (ref 11.5–15.5)
MAGNESIUM SERPL-MCNC: 2 MG/DL — SIGNIFICANT CHANGE UP (ref 1.6–2.6)
MAGNESIUM SERPL-MCNC: 2.1 MG/DL — SIGNIFICANT CHANGE UP (ref 1.6–2.6)
MCHC RBC-ENTMCNC: 28.5 PG — SIGNIFICANT CHANGE UP (ref 27–34)
MCHC RBC-ENTMCNC: 32.3 % — SIGNIFICANT CHANGE UP (ref 32–36)
MCV RBC AUTO: 88.2 FL — SIGNIFICANT CHANGE UP (ref 80–100)
NRBC # FLD: 0.6 K/UL — LOW (ref 25–125)
NRBC FLD-RTO: 3.4 — SIGNIFICANT CHANGE UP
PHOSPHATE SERPL-MCNC: 1.3 MG/DL — LOW (ref 2.5–4.5)
PHOSPHATE SERPL-MCNC: 2.6 MG/DL — SIGNIFICANT CHANGE UP (ref 2.5–4.5)
PLATELET # BLD AUTO: 253 K/UL — SIGNIFICANT CHANGE UP (ref 150–400)
PMV BLD: 10.6 FL — SIGNIFICANT CHANGE UP (ref 7–13)
POTASSIUM SERPL-MCNC: 3.5 MMOL/L — SIGNIFICANT CHANGE UP (ref 3.5–5.3)
POTASSIUM SERPL-MCNC: 4.3 MMOL/L — SIGNIFICANT CHANGE UP (ref 3.5–5.3)
POTASSIUM SERPL-SCNC: 3.5 MMOL/L — SIGNIFICANT CHANGE UP (ref 3.5–5.3)
POTASSIUM SERPL-SCNC: 4.3 MMOL/L — SIGNIFICANT CHANGE UP (ref 3.5–5.3)
RBC # BLD: 2.88 M/UL — LOW (ref 3.8–5.2)
RBC # FLD: 16.7 % — HIGH (ref 10.3–14.5)
RH IG SCN BLD-IMP: POSITIVE — SIGNIFICANT CHANGE UP
SODIUM SERPL-SCNC: 140 MMOL/L — SIGNIFICANT CHANGE UP (ref 135–145)
SODIUM SERPL-SCNC: 141 MMOL/L — SIGNIFICANT CHANGE UP (ref 135–145)
SURGICAL PATHOLOGY STUDY: SIGNIFICANT CHANGE UP
WBC # BLD: 17.62 K/UL — HIGH (ref 3.8–10.5)
WBC # FLD AUTO: 17.62 K/UL — HIGH (ref 3.8–10.5)

## 2019-02-21 PROCEDURE — 88305 TISSUE EXAM BY PATHOLOGIST: CPT | Mod: 26

## 2019-02-21 PROCEDURE — 88312 SPECIAL STAINS GROUP 1: CPT | Mod: 26

## 2019-02-21 PROCEDURE — 99233 SBSQ HOSP IP/OBS HIGH 50: CPT

## 2019-02-21 PROCEDURE — 88342 IMHCHEM/IMCYTCHM 1ST ANTB: CPT | Mod: 26

## 2019-02-21 PROCEDURE — 43239 EGD BIOPSY SINGLE/MULTIPLE: CPT | Mod: GC

## 2019-02-21 RX ORDER — CALCIUM GLUCONATE 100 MG/ML
2 VIAL (ML) INTRAVENOUS ONCE
Qty: 0 | Refills: 0 | Status: COMPLETED | OUTPATIENT
Start: 2019-02-21 | End: 2019-02-21

## 2019-02-21 RX ORDER — PANTOPRAZOLE SODIUM 20 MG/1
40 TABLET, DELAYED RELEASE ORAL
Qty: 0 | Refills: 0 | Status: DISCONTINUED | OUTPATIENT
Start: 2019-02-21 | End: 2019-03-01

## 2019-02-21 RX ORDER — POTASSIUM PHOSPHATE, MONOBASIC POTASSIUM PHOSPHATE, DIBASIC 236; 224 MG/ML; MG/ML
30 INJECTION, SOLUTION INTRAVENOUS ONCE
Qty: 0 | Refills: 0 | Status: COMPLETED | OUTPATIENT
Start: 2019-02-21 | End: 2019-02-21

## 2019-02-21 RX ORDER — POTASSIUM CHLORIDE 20 MEQ
10 PACKET (EA) ORAL
Qty: 0 | Refills: 0 | Status: COMPLETED | OUTPATIENT
Start: 2019-02-21 | End: 2019-02-21

## 2019-02-21 RX ORDER — METOPROLOL TARTRATE 50 MG
12.5 TABLET ORAL
Qty: 0 | Refills: 0 | Status: DISCONTINUED | OUTPATIENT
Start: 2019-02-21 | End: 2019-03-01

## 2019-02-21 RX ADMIN — ALBUTEROL 1 PUFF(S): 90 AEROSOL, METERED ORAL at 22:23

## 2019-02-21 RX ADMIN — ALBUTEROL 1 PUFF(S): 90 AEROSOL, METERED ORAL at 09:23

## 2019-02-21 RX ADMIN — Medication 63.75 MILLIMOLE(S): at 18:33

## 2019-02-21 RX ADMIN — PANTOPRAZOLE SODIUM 40 MILLIGRAM(S): 20 TABLET, DELAYED RELEASE ORAL at 18:29

## 2019-02-21 RX ADMIN — Medication 650 MILLIGRAM(S): at 18:36

## 2019-02-21 RX ADMIN — ESCITALOPRAM OXALATE 20 MILLIGRAM(S): 10 TABLET, FILM COATED ORAL at 18:29

## 2019-02-21 RX ADMIN — Medication 100 MILLIEQUIVALENT(S): at 06:03

## 2019-02-21 RX ADMIN — Medication 12.5 MILLIGRAM(S): at 18:29

## 2019-02-21 RX ADMIN — Medication 200 GRAM(S): at 09:31

## 2019-02-21 RX ADMIN — POTASSIUM PHOSPHATE, MONOBASIC POTASSIUM PHOSPHATE, DIBASIC 85 MILLIMOLE(S): 236; 224 INJECTION, SOLUTION INTRAVENOUS at 06:04

## 2019-02-21 RX ADMIN — SODIUM CHLORIDE 75 MILLILITER(S): 9 INJECTION, SOLUTION INTRAVENOUS at 08:37

## 2019-02-21 RX ADMIN — TIOTROPIUM BROMIDE 1 CAPSULE(S): 18 CAPSULE ORAL; RESPIRATORY (INHALATION) at 09:23

## 2019-02-21 RX ADMIN — HEPARIN SODIUM 5000 UNIT(S): 5000 INJECTION INTRAVENOUS; SUBCUTANEOUS at 14:52

## 2019-02-21 RX ADMIN — Medication 650 MILLIGRAM(S): at 00:24

## 2019-02-21 RX ADMIN — HEPARIN SODIUM 5000 UNIT(S): 5000 INJECTION INTRAVENOUS; SUBCUTANEOUS at 22:38

## 2019-02-21 RX ADMIN — Medication 100 MILLIEQUIVALENT(S): at 08:37

## 2019-02-21 RX ADMIN — Medication 650 MILLIGRAM(S): at 23:38

## 2019-02-21 RX ADMIN — Medication 5 MILLIGRAM(S): at 12:00

## 2019-02-21 RX ADMIN — Medication 650 MILLIGRAM(S): at 18:29

## 2019-02-21 RX ADMIN — HEPARIN SODIUM 5000 UNIT(S): 5000 INJECTION INTRAVENOUS; SUBCUTANEOUS at 06:26

## 2019-02-21 RX ADMIN — PANTOPRAZOLE SODIUM 40 MILLIGRAM(S): 20 TABLET, DELAYED RELEASE ORAL at 06:26

## 2019-02-21 RX ADMIN — Medication 650 MILLIGRAM(S): at 06:26

## 2019-02-21 RX ADMIN — Medication 650 MILLIGRAM(S): at 06:56

## 2019-02-21 RX ADMIN — Medication 100 MILLIEQUIVALENT(S): at 04:31

## 2019-02-21 NOTE — PROGRESS NOTE ADULT - SUBJECTIVE AND OBJECTIVE BOX
Subjective  Tx 1 U PRBCs yesterday, Hct stable  + FOBT, planned for endoscopy today    Objective  Vital signs  T(F): , Max: 99.5 (02-21-19 @ 04:00)  HR: 76 (02-21-19 @ 04:00)  BP: 145/63 (02-21-19 @ 04:00)  SpO2: 98% (02-21-19 @ 04:00)  Mcgee 330 - clear yellow    Gen: NAD  Abd: Softly distended, non-tender, incisions c/d/i with staples in place  : mcgee secure and draining as above    Labs      02-21 @ 03:24    WBC 17.62 / Hct 25.4  / SCr 0.81     02-20 @ 17:10    WBC 16.59 / Hct 25.6  / SCr --

## 2019-02-21 NOTE — PROGRESS NOTE ADULT - SUBJECTIVE AND OBJECTIVE BOX
SICU Daily Progress Note  =====================================================  Interval/Overnight Events:   VITALIY overnight    HPI: 63 year old female with a history of COPD, depression, left breast cancer, large left renal mass, s/p  left lap radical nephrectomy/adrenalectomy on 2/13, discharged yesterday, had postop fever 101.8F, pre-discharge Hgb 7.7, developed weakness, dizziness, found to be in shock, Hgb 6.6, CT showed large perisplenic hematoma [id number] cm. She was transfused 2 units pRBC at [facility] ED and transferred to [facility] for further management. SICU consulted for hemodynamic monitoring. Patient hypotensive, receiving an additional 2 prbc. IR consulted for possible intervention s/p embolization of splenic artery. Pt received an additional 1u yesterday 2/20/19 and 1u today 2/21/19.    Allergies: Cipro (Unknown)    MEDICATIONS:   --------------------------------------------------------------------------------------  Neurologic Medications  acetaminophen   Tablet .. 650 milliGRAM(s) Oral every 6 hours  escitalopram 20 milliGRAM(s) Oral daily  oxyCODONE    IR 5 milliGRAM(s) Oral every 4 hours PRN Moderate Pain (4 - 6)  oxyCODONE    IR 10 milliGRAM(s) Oral every 4 hours PRN Severe Pain (7 - 10)    Respiratory Medications  ALBUTerol    90 MICROgram(s) HFA Inhaler 1 Puff(s) Inhalation two times a day  tiotropium 18 MICROgram(s) Capsule 1 Capsule(s) Inhalation daily    Cardiovascular Medications  metoprolol tartrate Injectable 5 milliGRAM(s) IV Push every 6 hours    Gastrointestinal Medications  dextrose 5% + sodium chloride 0.45%. 1000 milliLiter(s) IV Continuous <Continuous>  pantoprazole  Injectable 40 milliGRAM(s) IV Push two times a day    Genitourinary Medications    Hematologic/Oncologic Medications  heparin  Injectable 5000 Unit(s) SubCutaneous every 8 hours    Antimicrobial/Immunologic Medications    Endocrine/Metabolic Medications    Topical/Other Medications  chlorhexidine 4% Liquid 1 Application(s) Topical <User Schedule>    --------------------------------------------------------------------------------------  ICU Vital Signs Last 24 Hrs  T(C): 37.3 (21 Feb 2019 00:00), Max: 37.3 (20 Feb 2019 12:00)  T(F): 99.2 (21 Feb 2019 00:00), Max: 99.2 (21 Feb 2019 00:00)  HR: 76 (21 Feb 2019 00:00) (61 - 84)  BP: 131/97 (21 Feb 2019 00:00) (122/69 - 150/64)  BP(mean): 104 (21 Feb 2019 00:00) (73 - 121)  ABP: 162/71 (21 Feb 2019 00:00) (138/54 - 187/89)  ABP(mean): 100 (21 Feb 2019 00:00) (81 - 123)  RR: 19 (21 Feb 2019 00:00) (18 - 25)  SpO2: 100% (21 Feb 2019 00:00) (95% - 100%)    --------------------------------------------------------------------------------------  I&O's Detail    19 Feb 2019 07:01  -  20 Feb 2019 07:00  --------------------------------------------------------  IN:    dextrose 5% + sodium chloride 0.45%.: 1150 mL    IV PiggyBack: 250 mL    lactated ringers.: 100 mL    Packed Red Blood Cells: 300 mL  Total IN: 1800 mL    OUT:    Indwelling Catheter - Urethral: 975 mL  Total OUT: 975 mL    Total NET: 825 mL      20 Feb 2019 07:01  -  21 Feb 2019 01:33  --------------------------------------------------------  IN:    dextrose 5% + sodium chloride 0.45%.: 875 mL  Total IN: 875 mL    OUT:    Indwelling Catheter - Urethral: 530 mL  Total OUT: 530 mL    Total NET: 345 mL    --------------------------------------------------------------------------------------    EXAM  NEUROLOGY  Exam: Normal, NAD, alert, oriented x3, no focal deficits.    RESPIRATORY  Exam: Lungs clear to auscultation, Normal expansion/effort.     CARDIOVASCULAR  Exam: S1, S2.  Regular rate and rhythm.      GI/NUTRITION  Exam: Abdomen soft, distended, appropriately tender   Current Diet:  CLD    VASCULAR  Exam: Extremities warm, pink, well-perfused.     METABOLIC/FLUIDS/ELECTROLYTES  dextrose 5% + sodium chloride 0.45%. 1000 milliLiter(s) IV Continuous <Continuous>      HEMATOLOGIC  [x] VTE Prophylaxis: heparin  Injectable 5000 Unit(s) SubCutaneous every 8 hours    INFECTIOUS DISEASE  Antimicrobials/Immunologic Medications:    Tubes/Lines/Drains  Left femoral A line  [x] Peripheral IV  x[] Urinary Catheter	  [x] Necessity of urinary, arterial, and venous catheters discussed    LABS  --------------------------------------------------------------------------------------  CBC (02-20 @ 17:10)                              8.3<L>                         16.59<H>  )----------------(  212        --    % Neutrophils, --    % Lymphocytes, ANC: --                                  25.6<L>  CBC (02-20 @ 11:20)                              8.3<L>                         16.86<H>  )----------------(  226        --    % Neutrophils, --    % Lymphocytes, ANC: --                                  26.1<L>    BMP (02-20 @ 02:20)             140     |  107     |  20    		Ca++ --      Ca 7.1<L>             ---------------------------------( 117<H>		Mg 2.4                3.9     |  22      |  1.01  			Ph 1.3<L>        --------------------------------------------------------------------------------------  Assessment:   63 year old female with a history of COPD, depression, left breast cancer, large left renal mass, s/p left lap radical nephrectomy/adrenalectomy on 2/13, discharged, re-presented in hemorrhagic shock with CT showing large subcapsular splenic hematoma measuring [id number] cm, s/p multiple units PRBC given now, transferred to [facility] for further mgmt now s/p IR for embolization of splenic artery     Plan:  Neurologic:  - AAOx3  - Continue tylenol for pain control  - PRN oxycodone   - Continue lexapro     Respiratory: hx of empyhsema   - on 2L NC  - oxygen at home intermittently   - inhalers  - IS/OOB as tolerated    Cardiovascular: + hemorrhagic shock  - BPs overnight in the 120-140 (cuff pressure), A line pressures higher  - Metoprolol added for hypertension   - Maintenance IVF, discontinue when diet advanced     Gastrointenstinal: CT w/ subcapsular splenic hematoma, + hemoperitoneum  - s/p IR embolization  - protonix  - Tolerating CLD, will advance as tolerated    Renal/: s/p left lap radical nephrectomy/adrenalectomy on 2/13/19  - Maintain mcgee, monitor UOP   - ERASMO on CKD  - Monitor electrolytes, replete PRN  - 1L pre and post CTA    Heme: acute blood loss anemia  - Monitor daily H/H, 8.3/25.6  - IVC filter in place   - s/p embolization of proximal portion of splenic artery by IR  - SCDS in place   - [facility]    Infectious Disease:  - flu and RVP (-)   - Mild leukocytosis  - Monitor off antibiotics     Endocrine: VITALIY     Disposition: SICU     ----------------------------------------------------------------------------------  Critical Care Diagnoses: acute blood loss anemia, subcapsular splenic hematoma, s/p embolization of splenic artery, hemorrhagic shock, COPD, respiratory abnormality SICU Daily Progress Note  =====================================================  Interval/Overnight Events:   VITALIY overnight, h&h stable, but stool heme occult positive, endoscopy today. canceled by GI for low phosphorus. 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 and MRSA bacteremia, noted to have paraspinal abscess from staples to L1 with associated loss of lower extremity motor and sensation s/p washout 2/18    PLAN:   Neurologic:   - PRN pain control w/ acetaminophen   - oxycodone solution added as adjuvant     Respiratory:   - on BIPAP Home settings 20/12   - CXR w/ atelectasis   - Frequent turning/chest PT/metanebs/hypertonic saline     Cardiovascular:   - Monitor vital signs  - Continue midodrine for chronic hypotension     Gastrointestinal/Nutrition:   - Resume J-tube feeds (Peptamen @ 45)   - Continue senna/colace     Renal/Genitourinary:   - Intake & output per routine   - Continue home oxybutynin   - Replete electrolytes PRN  - LR @ 75, IVL    Hematologic:   - Trend H/H, stable s/p 1u PRBC  - Follow output of hemovacs     Infectious Disease:   - Continue Daptomycin per ID for MRSA bacteremia/wound via PICC line  - Day 14/42 of Daptomycin   - Meropenem Day 3    Tubes/Lines/Drains:  - PICC, urostomy, colostomy, hemovac x 2, J tube, mucous fistula    Endocrine:   - No active issues     Disposition: SICU    --------------------------------------------------------------------------------------    Critical Care Diagnoses: MRSA bacteremia, MRSA hardware associated wound infection, Chronic respiratory failure with hypercapniaASSESSMENT:  26y Male with spina bifida with multiple sequale requiring surgical procedures, s/p thoracolumbar wound washout and removal of hardware with complex closure and removal of infected tissue for infection of previously done lumbar spinal fusion (2003) with revision (12/2018), 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 and MRSA bacteremia, noted to have paraspinal abscess from staples to L1 with associated loss of lower extremity motor and sensation s/p washout 2/18    PLAN:   Neurologic:   - PRN pain control w/ acetaminophen   - oxycodone solution added as adjuvant     Respiratory:   - on BIPAP Home settings 20/12   - CXR w/ atelectasis   - Frequent turning/chest PT/metanebs/hypertonic saline     Cardiovascular:   - Monitor vital signs  - Continue midodrine for chronic hypotension     Gastrointestinal/Nutrition:   - Resume J-tube feeds (Peptamen @ 45)   - Continue senna/colace     Renal/Genitourinary:   - Intake & output per routine   - Continue home oxybutynin   - Replete electrolytes PRN  - LR @ 75, IVL    Hematologic:   - Trend H/H, stable s/p 1u PRBC  - Follow output of hemovacs     Infectious Disease:   - Continue Daptomycin per ID for MRSA bacteremia/wound via PICC line  - Day 14/42 of Daptomycin   - Meropenem Day 3    Tubes/Lines/Drains:  - PICC, urostomy, colostomy, hemovac x 2, J tube, mucous fistula    Endocrine:   - No active issues     Disposition: SICU    --------------------------------------------------------------------------------------    Critical Care Diagnoses: MRSA bacteremia, MRSA hardware associated wound infection, Chronic respiratory failure with hypercapniaASSESSMENT:  26y Male with spina bifida with multiple sequale requiring surgical procedures, s/p thoracolumbar wound washout and removal of hardware with complex closure and removal of infected tissue for infection of previously done lumbar spinal fusion (2003) with revision (12/2018), 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 and MRSA bacteremia, noted to have paraspinal abscess from staples to L1 with associated loss of lower extremity motor and sensation s/p washout 2/18    PLAN:   Neurologic:   - PRN pain control w/ acetaminophen   - oxycodone solution added as adjuvant     Respiratory:   - on BIPAP Home settings 20/12   - CXR w/ atelectasis   - Frequent turning/chest PT/metanebs/hypertonic saline     Cardiovascular:   - Monitor vital signs  - Continue midodrine for chronic hypotension     Gastrointestinal/Nutrition:   - Resume J-tube feeds (Peptamen @ 45)   - Continue senna/colace     Renal/Genitourinary:   - Intake & output per routine   - Continue home oxybutynin   - Replete electrolytes PRN  - LR @ 75, IVL    Hematologic:   - Trend H/H, stable s/p 1u PRBC  - Follow output of hemovacs     Infectious Disease:   - Continue Daptomycin per ID for MRSA bacteremia/wound via PICC line  - Day 14/42 of Daptomycin   - Meropenem Day 3    Tubes/Lines/Drains:  - PICC, urostomy, colostomy, hemovac x 2, J tube, mucous fistula    Endocrine:   - No active issues     Disposition: SICU    --------------------------------------------------------------------------------------    Critical Care Diagnoses: MRSA bacteremia, MRSA hardware associated wound infection, Chronic respiratory failure with hypercapniame occult positive yesterday, upper endoscopy today. Cancelled by anesthesia for low phosphate.     HPI: 63 year old female with a history of COPD, depression, left breast cancer, large left renal mass, s/p  left lap radical nephrectomy/adrenalectomy on 2/13, discharged yesterday, had postop fever 101.8F, pre-discharge Hgb 7.7, developed weakness, dizziness, found to be in shock, Hgb 6.6, CT showed large perisplenic hematoma [id number] cm. She was transfused 2 units pRBC at [facility] ED and transferred to [facility] for further management. SICU consulted for hemodynamic monitoring. Patient hypotensive, receiving an additional 2 prbc. IR consulted for possible intervention s/p embolization of splenic artery. Pt received an additional 1u yesterday 2/20/19 and 1u today 2/21/19.    Allergies: Cipro (Unknown)    MEDICATIONS:   --------------------------------------------------------------------------------------  Neurologic Medications  acetaminophen   Tablet .. 650 milliGRAM(s) Oral every 6 hours  escitalopram 20 milliGRAM(s) Oral daily  oxyCODONE    IR 5 milliGRAM(s) Oral every 4 hours PRN Moderate Pain (4 - 6)  oxyCODONE    IR 10 milliGRAM(s) Oral every 4 hours PRN Severe Pain (7 - 10)    Respiratory Medications  ALBUTerol    90 MICROgram(s) HFA Inhaler 1 Puff(s) Inhalation two times a day  tiotropium 18 MICROgram(s) Capsule 1 Capsule(s) Inhalation daily    Cardiovascular Medications  metoprolol tartrate Injectable 5 milliGRAM(s) IV Push every 6 hours    Gastrointestinal Medications  dextrose 5% + sodium chloride 0.45%. 1000 milliLiter(s) IV Continuous <Continuous>  pantoprazole  Injectable 40 milliGRAM(s) IV Push two times a day    Genitourinary Medications    Hematologic/Oncologic Medications  heparin  Injectable 5000 Unit(s) SubCutaneous every 8 hours    Antimicrobial/Immunologic Medications    Endocrine/Metabolic Medications    Topical/Other Medications  chlorhexidine 4% Liquid 1 Application(s) Topical <User Schedule>    --------------------------------------------------------------------------------------  ICU Vital Signs Last 24 Hrs  T(C): 37.3 (21 Feb 2019 00:00), Max: 37.3 (20 Feb 2019 12:00)  T(F): 99.2 (21 Feb 2019 00:00), Max: 99.2 (21 Feb 2019 00:00)  HR: 76 (21 Feb 2019 00:00) (61 - 84)  BP: 131/97 (21 Feb 2019 00:00) (122/69 - 150/64)  BP(mean): 104 (21 Feb 2019 00:00) (73 - 121)  ABP: 162/71 (21 Feb 2019 00:00) (138/54 - 187/89)  ABP(mean): 100 (21 Feb 2019 00:00) (81 - 123)  RR: 19 (21 Feb 2019 00:00) (18 - 25)  SpO2: 100% (21 Feb 2019 00:00) (95% - 100%)    --------------------------------------------------------------------------------------  I&O's Detail    19 Feb 2019 07:01  -  20 Feb 2019 07:00  --------------------------------------------------------  IN:    dextrose 5% + sodium chloride 0.45%.: 1150 mL    IV PiggyBack: 250 mL    lactated ringers.: 100 mL    Packed Red Blood Cells: 300 mL  Total IN: 1800 mL    OUT:    Indwelling Catheter - Urethral: 975 mL  Total OUT: 975 mL    Total NET: 825 mL      20 Feb 2019 07:01  -  21 Feb 2019 01:33  --------------------------------------------------------  IN:    dextrose 5% + sodium chloride 0.45%.: 875 mL  Total IN: 875 mL    OUT:    Indwelling Catheter - Urethral: 530 mL  Total OUT: 530 mL    Total NET: 345 mL    --------------------------------------------------------------------------------------    EXAM  NEUROLOGY  Exam: Normal, NAD, alert, oriented x3, no focal deficits.    RESPIRATORY  Exam: Lungs clear to auscultation, Normal expansion/effort.     CARDIOVASCULAR  Exam: S1, S2.  Regular rate and rhythm.      GI/NUTRITION  Exam: Abdomen soft, distended, appropriately tender   Current Diet:  CLD    VASCULAR  Exam: Extremities warm, pink, well-perfused.     METABOLIC/FLUIDS/ELECTROLYTES  dextrose 5% + sodium chloride 0.45%. 1000 milliLiter(s) IV Continuous <Continuous>      HEMATOLOGIC  [x] VTE Prophylaxis: heparin  Injectable 5000 Unit(s) SubCutaneous every 8 hours    INFECTIOUS DISEASE  Antimicrobials/Immunologic Medications:    Tubes/Lines/Drains  Left femoral A line  [x] Peripheral IV  x[] Urinary Catheter	  [x] Necessity of urinary, arterial, and venous catheters discussed    LABS  --------------------------------------------------------------------------------------  CBC (02-20 @ 17:10)                              8.3<L>                         16.59<H>  )----------------(  212        --    % Neutrophils, --    % Lymphocytes, ANC: --                                  25.6<L>  CBC (02-20 @ 11:20)                              8.3<L>                         16.86<H>  )----------------(  226        --    % Neutrophils, --    % Lymphocytes, ANC: --                                  26.1<L>    BMP (02-20 @ 02:20)             140     |  107     |  20    		Ca++ --      Ca 7.1<L>             ---------------------------------( 117<H>		Mg 2.4                3.9     |  22      |  1.01  			Ph 1.3<L>        --------------------------------------------------------------------------------------  Assessment:   63 year old female with a history of COPD, depression, left breast cancer, large left renal mass, s/p left lap radical nephrectomy/adrenalectomy on 2/13, discharged, re-presented in hemorrhagic shock with CT showing large subcapsular splenic hematoma measuring [id number] cm, s/p multiple units PRBC given now, transferred to [facility] for further mgmt now s/p IR for embolization of splenic artery     Plan:  Neurologic:  - AAOx3  - Continue tylenol for pain control  - PRN oxycodone   - Continue lexapro     Respiratory: hx of empyhsema   - on 2L NC  - oxygen at home intermittently   - inhalers  - IS/OOB as tolerated    Cardiovascular: + hemorrhagic shock  - BPs overnight in the 120-140 (cuff pressure), A line pressures higher  - Metoprolol added for hypertension   - Maintenance IVF, discontinue when diet advanced     Gastrointenstinal: CT w/ subcapsular splenic hematoma, + hemoperitoneum  - s/p IR embolization  - protonix  - Tolerating CLD, will advance as tolerated    Renal/: s/p left lap radical nephrectomy/adrenalectomy on 2/13/19  - Maintain mcgee, monitor UOP   - ERASMO on CKD  - Monitor electrolytes, replete PRN  - 1L pre and post CTA    Heme: acute blood loss anemia  - Monitor daily H/H, 8.3/25.6  - IVC filter in place   - s/p embolization of proximal portion of splenic artery by IR  - SCDS in place   - [facility]    Infectious Disease:  - flu and RVP (-)   - Mild leukocytosis  - Monitor off antibiotics     Endocrine: VITALIY     Disposition: SICU     ----------------------------------------------------------------------------------  Critical Care Diagnoses: acute blood loss anemia, subcapsular splenic hematoma, s/p embolization of splenic artery, hemorrhagic shock, COPD, respiratory abnormality SICU Daily Progress Note  =====================================================  Interval/Overnight Events:   VITALIY overnight. h&h stable, but stool heme occult positive, endoscopy scheduled for today. Initially canceled by GI for low phos. after electrolytes repletion/labs repeated will have scope. Post scope appropriate for floor.     HPI: 63 year old female with a history of COPD, depression, left breast cancer, large left renal mass, s/p  left lap radical nephrectomy/adrenalectomy on 2/13, discharged yesterday, had postop fever 101.8F, pre-discharge Hgb 7.7, developed weakness, dizziness, found to be in shock, Hgb 6.6, CT showed large perisplenic hematoma [id number] cm. She was transfused 2 units pRBC at [facility] ED and transferred to [facility] for further management. SICU consulted for hemodynamic monitoring. Patient hypotensive, receiving an additional 2 prbc. IR consulted for possible intervention s/p embolization of splenic artery. Pt received an additional 1u yesterday 2/20/19 and 1u today 2/21/19.    Allergies: Cipro (Unknown)    MEDICATIONS:   --------------------------------------------------------------------------------------  Neurologic Medications  acetaminophen   Tablet .. 650 milliGRAM(s) Oral every 6 hours  escitalopram 20 milliGRAM(s) Oral daily  oxyCODONE    IR 5 milliGRAM(s) Oral every 4 hours PRN Moderate Pain (4 - 6)  oxyCODONE    IR 10 milliGRAM(s) Oral every 4 hours PRN Severe Pain (7 - 10)    Respiratory Medications  ALBUTerol    90 MICROgram(s) HFA Inhaler 1 Puff(s) Inhalation two times a day  tiotropium 18 MICROgram(s) Capsule 1 Capsule(s) Inhalation daily    Cardiovascular Medications  metoprolol tartrate Injectable 5 milliGRAM(s) IV Push every 6 hours    Gastrointestinal Medications  dextrose 5% + sodium chloride 0.45%. 1000 milliLiter(s) IV Continuous <Continuous>  pantoprazole  Injectable 40 milliGRAM(s) IV Push two times a day    Genitourinary Medications    Hematologic/Oncologic Medications  heparin  Injectable 5000 Unit(s) SubCutaneous every 8 hours    Antimicrobial/Immunologic Medications    Endocrine/Metabolic Medications    Topical/Other Medications  chlorhexidine 4% Liquid 1 Application(s) Topical <User Schedule>    --------------------------------------------------------------------------------------  ICU Vital Signs Last 24 Hrs  T(C): 37.3 (21 Feb 2019 00:00), Max: 37.3 (20 Feb 2019 12:00)  T(F): 99.2 (21 Feb 2019 00:00), Max: 99.2 (21 Feb 2019 00:00)  HR: 76 (21 Feb 2019 00:00) (61 - 84)  BP: 131/97 (21 Feb 2019 00:00) (122/69 - 150/64)  BP(mean): 104 (21 Feb 2019 00:00) (73 - 121)  ABP: 162/71 (21 Feb 2019 00:00) (138/54 - 187/89)  ABP(mean): 100 (21 Feb 2019 00:00) (81 - 123)  RR: 19 (21 Feb 2019 00:00) (18 - 25)  SpO2: 100% (21 Feb 2019 00:00) (95% - 100%)    --------------------------------------------------------------------------------------  I&O's Detail    19 Feb 2019 07:01  -  20 Feb 2019 07:00  --------------------------------------------------------  IN:    dextrose 5% + sodium chloride 0.45%.: 1150 mL    IV PiggyBack: 250 mL    lactated ringers.: 100 mL    Packed Red Blood Cells: 300 mL  Total IN: 1800 mL    OUT:    Indwelling Catheter - Urethral: 975 mL  Total OUT: 975 mL    Total NET: 825 mL      20 Feb 2019 07:01  -  21 Feb 2019 01:33  --------------------------------------------------------  IN:    dextrose 5% + sodium chloride 0.45%.: 875 mL  Total IN: 875 mL    OUT:    Indwelling Catheter - Urethral: 530 mL  Total OUT: 530 mL    Total NET: 345 mL    --------------------------------------------------------------------------------------    EXAM  NEUROLOGY  Exam: Normal, NAD, alert, oriented x3, no focal deficits.    RESPIRATORY  Exam: Lungs clear to auscultation, Normal expansion/effort.     CARDIOVASCULAR  Exam: S1, S2.  Regular rate and rhythm.      GI/NUTRITION  Exam: Abdomen soft, distended, appropriately tender   Current Diet:  CLD    VASCULAR  Exam: Extremities warm, pink, well-perfused.     METABOLIC/FLUIDS/ELECTROLYTES  dextrose 5% + sodium chloride 0.45%. 1000 milliLiter(s) IV Continuous <Continuous>      HEMATOLOGIC  [x] VTE Prophylaxis: heparin  Injectable 5000 Unit(s) SubCutaneous every 8 hours    INFECTIOUS DISEASE  Antimicrobials/Immunologic Medications:    Tubes/Lines/Drains  Left femoral A line  [x] Peripheral IV  x[] Urinary Catheter	  [x] Necessity of urinary, arterial, and venous catheters discussed    LABS  --------------------------------------------------------------------------------------  CBC (02-20 @ 17:10)                              8.3<L>                         16.59<H>  )----------------(  212        --    % Neutrophils, --    % Lymphocytes, ANC: --                                  25.6<L>  CBC (02-20 @ 11:20)                              8.3<L>                         16.86<H>  )----------------(  226        --    % Neutrophils, --    % Lymphocytes, ANC: --                                  26.1<L>    BMP (02-20 @ 02:20)             140     |  107     |  20    		Ca++ --      Ca 7.1<L>             ---------------------------------( 117<H>		Mg 2.4                3.9     |  22      |  1.01  			Ph 1.3<L>        --------------------------------------------------------------------------------------

## 2019-02-21 NOTE — DIETITIAN INITIAL EVALUATION ADULT. - PERTINENT MEDS FT
Test Reason : 

Blood Pressure : ***/*** mmHG

Vent. Rate : 092 BPM     Atrial Rate : 092 BPM

   P-R Int : 120 ms          QRS Dur : 072 ms

    QT Int : 266 ms       P-R-T Axes : [phone] degrees

   QTc Int : 328 ms

 

NORMAL SINUS RHYTHM

POSSIBLE LEFT ATRIAL ENLARGEMENT

LEFT VENTRICULAR HYPERTROPHY

NONSPECIFIC T WAVE ABNORMALITY

ABNORMAL ECG

NO PREVIOUS ECGS AVAILABLE

Confirmed by CLARIBEL LEAL MD ([id number]) on 1/19/2017 5:13:02 PM

 

Referred By:             Confirmed By:CLARIBEL LEAL MD
IVF. Pt supplemented w/ calcium gluconate, KPhos, and KCl- now d/c'd

## 2019-02-21 NOTE — PROGRESS NOTE ADULT - ASSESSMENT
This is a 62 y/o F s/p L laparoscopic radical nephrectomy on 2/14, post-operative course c/b readmission for splenic bleed requiring IR angioembolization of a proximal splenic artery on 2/17, now with stable Hct but newly diagnosed GIB.    - Summary of units received: 8U PRBCs, 2FFP, 1 Plt  - serial Hct  - transfuse for downtrending Hct  - appreciate GI input, will f/u EGD results  - Strict intake and output  - appreciate general surgery input  - appreciate SICU care

## 2019-02-21 NOTE — DIETITIAN INITIAL EVALUATION ADULT. - NS AS NUTRI INTERV COLLABORAT
Collaboration with other providers/SLP to determine safety of oral food intake and appropriate food/beverage consistencies

## 2019-02-21 NOTE — CHART NOTE - NSCHARTNOTEFT_GEN_A_CORE
NUTRITION SERVICES     Upon Nutritional Assessment by the Registered Dietitian your patient was determined to meet criteria/ has evidence of the following diagnosis/diagnoses:  [ ] Mild Protein Calorie Malnutrition   [ x Moderate Protein Calorie Malnutrition   [ ] Severe Protein Calorie Malnutrition   [ ] Unspecified Protein Calorie Malnutrition   [ ] Underweight / BMI <19  [ ] Morbid Obesity / BMI >40    Findings as based on:  •  Comprehensive nutrition assessment and consultation NUTRITION SERVICES     Upon Nutritional Assessment by the Registered Dietitian your patient was determined to meet criteria/ has evidence of the following diagnosis/diagnoses:  [ ] Mild Protein Calorie Malnutrition   [ x Moderate Protein Calorie Malnutrition   [ ] Severe Protein Calorie Malnutrition   [ ] Unspecified Protein Calorie Malnutrition   [ ] Underweight / BMI <19  [ ] Morbid Obesity / BMI >40    Findings as based on:  •  Comprehensive nutrition assessment and consultation  Agree

## 2019-02-21 NOTE — DIETITIAN INITIAL EVALUATION ADULT. - PHYSICAL APPEARANCE
overweight/NFPE:  pt noted w/moderate temporal wasting, mild clavicle wasting and moderate loss of suborbital fat stores.

## 2019-02-21 NOTE — DIETITIAN INITIAL EVALUATION ADULT. - OTHER INFO
Pt seen for critical care nutrition extended LOS.  Pt admitted w/dx of hypovolemic shock.  Pt w/recent surgery for nephrectomy/adrenalectomy PTA.  Returned to hospital 1 day after d/c.  Met w/ pt today who provided nutrition hx.  Pt denies food allergies or difficulty swallowing PTA.  She reports that she is now having issues swallowing fluids and medications.  Noted in flowsheet, that pt tends to hold fluids in her mouth.  Pt reports that she has not been eating for the last 2 weeks.  Denies modified diet PTA.

## 2019-02-21 NOTE — PROGRESS NOTE ADULT - ASSESSMENT
Assessment:   63 year old female with a history of COPD, depression, left breast cancer, large left renal mass, s/p left lap radical nephrectomy/adrenalectomy on 2/13, discharged, re-presented in hemorrhagic shock with CT showing large subcapsular splenic hematoma measuring [id number] cm, s/p multiple units PRBC given now, transferred to [facility] for further mgmt now s/p IR for embolization of splenic artery     Plan:  Neurologic:  - AAOx3  - Continue tylenol for pain control  - PRN oxycodone   - Continue home lexapro     Respiratory: hx of empyhsema   - on 2L NC  - oxygen at home intermittently   - inhalers  - IS/OOB as tolerated    Cardiovascular: + hemorrhagic shock  - BPs overnight in the 120-140 (cuff pressure), A line pressures higher  - Metoprolol added for hypertension   - Maintenance IVF, discontinue when diet advanced     Gastrointenstinal: CT w/ subcapsular splenic hematoma, + hemoperitoneum  - s/p IR embolization  - protonix  - NPO for endoscopy, will resume diet after scope    Renal/: s/p left lap radical nephrectomy/adrenalectomy on 2/13/19  - Maintain mcgee, monitor UOP   - ERASMO on CKD  - Monitor electrolytes, replete PRN  - Low phos in AM, repleted. After BMP will have scope.     Heme: acute blood loss anemia  - Monitor daily H/H, 8.3/25.6  - IVC filter in place   - s/p embolization of proximal portion of splenic artery by IR  - SCDS in place   - [facility]    Infectious Disease:  - flu and RVP (-)   - Mild leukocytosis  - Monitor off antibiotics     Endocrine: VITALIY     Disposition: Post endoscopy to the floors    ----------------------------------------------------------------------------------  Critical Care Diagnoses: acute blood loss anemia, subcapsular splenic hematoma, s/p embolization of splenic artery, hemorrhagic shock, COPD, respiratory abnormality

## 2019-02-21 NOTE — PROGRESS NOTE ADULT - SUBJECTIVE AND OBJECTIVE BOX
Surgery Progress Note    S: Patient seen and examined. No acute events overnight. No new transfusions in last 24 hours. H&H stable.    O:  Vital Signs Last 24 Hrs  T(C): 36 (21 Feb 2019 08:00), Max: 37.5 (21 Feb 2019 04:00)  T(F): 96.8 (21 Feb 2019 08:00), Max: 99.5 (21 Feb 2019 04:00)  HR: 72 (21 Feb 2019 10:00) (61 - 79)  BP: 150/62 (21 Feb 2019 10:00) (122/69 - 150/62)  BP(mean): 84 (21 Feb 2019 10:00) (72 - 121)  RR: 25 (21 Feb 2019 10:00) (19 - 27)  SpO2: 99% (21 Feb 2019 10:00) (96% - 100%)    I&O's Detail    20 Feb 2019 07:01  -  21 Feb 2019 07:00  --------------------------------------------------------  IN:    dextrose 5% + sodium chloride 0.45%.: 1325 mL    IV PiggyBack: 700 mL  Total IN: 2025 mL    OUT:    Indwelling Catheter - Urethral: 845 mL  Total OUT: 845 mL    Total NET: 1180 mL      21 Feb 2019 07:01  -  21 Feb 2019 10:21  --------------------------------------------------------  IN:    dextrose 5% + sodium chloride 0.45%.: 225 mL    IV PiggyBack: 200 mL  Total IN: 425 mL    OUT:    Indwelling Catheter - Urethral: 145 mL  Total OUT: 145 mL    Total NET: 280 mL          MEDICATIONS  (STANDING):  acetaminophen   Tablet .. 650 milliGRAM(s) Oral every 6 hours  ALBUTerol    90 MICROgram(s) HFA Inhaler 1 Puff(s) Inhalation two times a day  chlorhexidine 4% Liquid 1 Application(s) Topical <User Schedule>  dextrose 5% + sodium chloride 0.45%. 1000 milliLiter(s) (75 mL/Hr) IV Continuous <Continuous>  escitalopram 20 milliGRAM(s) Oral daily  heparin  Injectable 5000 Unit(s) SubCutaneous every 8 hours  metoprolol tartrate Injectable 5 milliGRAM(s) IV Push every 6 hours  pantoprazole  Injectable 40 milliGRAM(s) IV Push two times a day  tiotropium 18 MICROgram(s) Capsule 1 Capsule(s) Inhalation daily    MEDICATIONS  (PRN):  oxyCODONE    IR 5 milliGRAM(s) Oral every 4 hours PRN Moderate Pain (4 - 6)  oxyCODONE    IR 10 milliGRAM(s) Oral every 4 hours PRN Severe Pain (7 - 10)                            8.2    17.62 )-----------( 253      ( 21 Feb 2019 03:24 )             25.4       02-21    141  |  107  |  14  ----------------------------<  116<H>  3.5   |  23  |  0.81    Ca    6.9<L>      21 Feb 2019 03:24  Phos  1.3     02-21  Mg     2.1     02-21        Physical Exam:  Gen: Laying in bed, NAD  Resp: Unlabored breathing  Abd: soft, distended, minimally tender in the epigastrium/LUQ  Ext: WWP  Skin: No rashes

## 2019-02-21 NOTE — PROGRESS NOTE ADULT - ASSESSMENT
63 year old female with a history of COPD 4 days s/p left lap radical nephrectomy/adrenalectomy on 2/13 for renal mass presented with weakness associated with dyspnea found to be in hemorrhagic shock secondary to large subcapsular splenic hematoma requiring multiple blood transfusions, now s/p IR coiling, stable.    - NPO since midnight for EGD today  - Monitor hemodynamics & H&H, transfuse as indicated    Troy Yap, PGY-2  B Team Surgery [id number]

## 2019-02-22 ENCOUNTER — TRANSCRIPTION ENCOUNTER (OUTPATIENT)
Age: 64
End: 2019-02-22

## 2019-02-22 DIAGNOSIS — C50.912 MALIGNANT NEOPLASM OF UNSPECIFIED SITE OF LEFT FEMALE BREAST: ICD-10-CM

## 2019-02-22 DIAGNOSIS — J43.9 EMPHYSEMA, UNSPECIFIED: ICD-10-CM

## 2019-02-22 DIAGNOSIS — D72.829 ELEVATED WHITE BLOOD CELL COUNT, UNSPECIFIED: ICD-10-CM

## 2019-02-22 DIAGNOSIS — D62 ACUTE POSTHEMORRHAGIC ANEMIA: ICD-10-CM

## 2019-02-22 DIAGNOSIS — F32.9 MAJOR DEPRESSIVE DISORDER, SINGLE EPISODE, UNSPECIFIED: ICD-10-CM

## 2019-02-22 DIAGNOSIS — E44.0 MODERATE PROTEIN-CALORIE MALNUTRITION: ICD-10-CM

## 2019-02-22 DIAGNOSIS — Z29.9 ENCOUNTER FOR PROPHYLACTIC MEASURES, UNSPECIFIED: ICD-10-CM

## 2019-02-22 LAB
ANION GAP SERPL CALC-SCNC: 11 MMO/L — SIGNIFICANT CHANGE UP (ref 7–14)
BUN SERPL-MCNC: 12 MG/DL — SIGNIFICANT CHANGE UP (ref 7–23)
CALCIUM SERPL-MCNC: 7.6 MG/DL — LOW (ref 8.4–10.5)
CHLORIDE SERPL-SCNC: 105 MMOL/L — SIGNIFICANT CHANGE UP (ref 98–107)
CO2 SERPL-SCNC: 26 MMOL/L — SIGNIFICANT CHANGE UP (ref 22–31)
CREAT SERPL-MCNC: 0.81 MG/DL — SIGNIFICANT CHANGE UP (ref 0.5–1.3)
CULTURE RESULTS: SIGNIFICANT CHANGE UP
CULTURE RESULTS: SIGNIFICANT CHANGE UP
GLUCOSE SERPL-MCNC: 104 MG/DL — HIGH (ref 70–99)
HCT VFR BLD CALC: 27.4 % — LOW (ref 34.5–45)
HGB BLD-MCNC: 8.8 G/DL — LOW (ref 11.5–15.5)
MAGNESIUM SERPL-MCNC: 1.9 MG/DL — SIGNIFICANT CHANGE UP (ref 1.6–2.6)
MCHC RBC-ENTMCNC: 28.7 PG — SIGNIFICANT CHANGE UP (ref 27–34)
MCHC RBC-ENTMCNC: 32.1 % — SIGNIFICANT CHANGE UP (ref 32–36)
MCV RBC AUTO: 89.3 FL — SIGNIFICANT CHANGE UP (ref 80–100)
NRBC # FLD: 0.6 K/UL — LOW (ref 25–125)
NRBC FLD-RTO: 3.3 — SIGNIFICANT CHANGE UP
PLATELET # BLD AUTO: 326 K/UL — SIGNIFICANT CHANGE UP (ref 150–400)
PMV BLD: 10.6 FL — SIGNIFICANT CHANGE UP (ref 7–13)
POTASSIUM SERPL-MCNC: 3.7 MMOL/L — SIGNIFICANT CHANGE UP (ref 3.5–5.3)
POTASSIUM SERPL-SCNC: 3.7 MMOL/L — SIGNIFICANT CHANGE UP (ref 3.5–5.3)
RBC # BLD: 3.07 M/UL — LOW (ref 3.8–5.2)
RBC # FLD: 16.9 % — HIGH (ref 10.3–14.5)
SODIUM SERPL-SCNC: 142 MMOL/L — SIGNIFICANT CHANGE UP (ref 135–145)
SPECIMEN SOURCE: SIGNIFICANT CHANGE UP
SPECIMEN SOURCE: SIGNIFICANT CHANGE UP
WBC # BLD: 18.21 K/UL — HIGH (ref 3.8–10.5)
WBC # FLD AUTO: 18.21 K/UL — HIGH (ref 3.8–10.5)

## 2019-02-22 PROCEDURE — 99232 SBSQ HOSP IP/OBS MODERATE 35: CPT | Mod: GC

## 2019-02-22 PROCEDURE — 99233 SBSQ HOSP IP/OBS HIGH 50: CPT

## 2019-02-22 RX ORDER — LETROZOLE 2.5 MG/1
2.5 TABLET, FILM COATED ORAL DAILY
Qty: 0 | Refills: 0 | Status: DISCONTINUED | OUTPATIENT
Start: 2019-02-22 | End: 2019-03-01

## 2019-02-22 RX ORDER — SODIUM CHLORIDE 9 MG/ML
1000 INJECTION, SOLUTION INTRAVENOUS
Qty: 0 | Refills: 0 | Status: DISCONTINUED | OUTPATIENT
Start: 2019-02-22 | End: 2019-02-24

## 2019-02-22 RX ORDER — PALBOCICLIB 100 MG/1
100 CAPSULE ORAL DAILY
Qty: 0 | Refills: 0 | Status: DISCONTINUED | OUTPATIENT
Start: 2019-02-22 | End: 2019-02-22

## 2019-02-22 RX ADMIN — HEPARIN SODIUM 5000 UNIT(S): 5000 INJECTION INTRAVENOUS; SUBCUTANEOUS at 21:27

## 2019-02-22 RX ADMIN — HEPARIN SODIUM 5000 UNIT(S): 5000 INJECTION INTRAVENOUS; SUBCUTANEOUS at 15:14

## 2019-02-22 RX ADMIN — OXYCODONE HYDROCHLORIDE 5 MILLIGRAM(S): 5 TABLET ORAL at 12:18

## 2019-02-22 RX ADMIN — Medication 12.5 MILLIGRAM(S): at 18:14

## 2019-02-22 RX ADMIN — OXYCODONE HYDROCHLORIDE 5 MILLIGRAM(S): 5 TABLET ORAL at 11:05

## 2019-02-22 RX ADMIN — LETROZOLE 2.5 MILLIGRAM(S): 2.5 TABLET, FILM COATED ORAL at 12:23

## 2019-02-22 RX ADMIN — OXYCODONE HYDROCHLORIDE 5 MILLIGRAM(S): 5 TABLET ORAL at 17:30

## 2019-02-22 RX ADMIN — OXYCODONE HYDROCHLORIDE 5 MILLIGRAM(S): 5 TABLET ORAL at 16:39

## 2019-02-22 RX ADMIN — HEPARIN SODIUM 5000 UNIT(S): 5000 INJECTION INTRAVENOUS; SUBCUTANEOUS at 06:03

## 2019-02-22 RX ADMIN — SODIUM CHLORIDE 75 MILLILITER(S): 9 INJECTION, SOLUTION INTRAVENOUS at 18:48

## 2019-02-22 RX ADMIN — ESCITALOPRAM OXALATE 20 MILLIGRAM(S): 10 TABLET, FILM COATED ORAL at 12:23

## 2019-02-22 RX ADMIN — Medication 12.5 MILLIGRAM(S): at 06:02

## 2019-02-22 RX ADMIN — Medication 650 MILLIGRAM(S): at 12:22

## 2019-02-22 RX ADMIN — TIOTROPIUM BROMIDE 1 CAPSULE(S): 18 CAPSULE ORAL; RESPIRATORY (INHALATION) at 12:23

## 2019-02-22 RX ADMIN — Medication 650 MILLIGRAM(S): at 18:14

## 2019-02-22 RX ADMIN — Medication 650 MILLIGRAM(S): at 06:03

## 2019-02-22 RX ADMIN — Medication 650 MILLIGRAM(S): at 00:08

## 2019-02-22 RX ADMIN — ALBUTEROL 1 PUFF(S): 90 AEROSOL, METERED ORAL at 10:13

## 2019-02-22 RX ADMIN — ALBUTEROL 1 PUFF(S): 90 AEROSOL, METERED ORAL at 21:29

## 2019-02-22 NOTE — CONSULT NOTE ADULT - PROBLEM SELECTOR RECOMMENDATION 9
-postop acute blood loss anemia with large subcapsular hematoma of the spleen measuring approximately 10.0 x 8.3 x 14.2 cm with associated mass effect on the spleen.  -rapid blood transfusion , transfused 2 units pRBC at [facility] ED, need more blood transfusion until hemodynamically stable  -consider 1 unit platelet transfusion if pt requires massive transfusion  -admit patient to SICU for hemodynamic stabilization/monitor, may need vasopressor if remains hypotension despite transfusion/IVF
Presented with hemorrhagic shock on admission requiring SICU course due to large subcapsular splenic hematoma s/p IR angioembolization of a proximal splenic artery on 2/17  s/p total 8 U PRBC, 2 U FFP, 1 platelet. H/H now stable, continue CBC checks daily  Last PRBC transfusion on 2/20  EGD performed 2/21/19 with finding of single localized, 2 mm non-bleeding erosion was found in the prepyloric region of the stomach. Appreciate GI input  Continue protonix 40mg PO qd

## 2019-02-22 NOTE — DISCHARGE NOTE ADULT - PROVIDER TOKENS
PROVIDER:[TOKEN:[[id number]]],PROVIDER:[TOKEN:[[id number]]],PROVIDER:[TOKEN:[[id number]]] PROVIDER:[TOKEN:[[id number]]],PROVIDER:[TOKEN:[[id number]]],PROVIDER:[TOKEN:[[id number]]],PROVIDER:[TOKEN:[[id number]]]

## 2019-02-22 NOTE — PROGRESS NOTE ADULT - ASSESSMENT
This is a 64 y/o F s/p L laparoscopic radical nephrectomy on 2/14, post-operative course c/b readmission for splenic bleed requiring IR angioembolization of a proximal splenic artery on 2/17, now with stable Hct and EGD notable only for erosions without overt ulceration.    - Summary of units received: 8U PRBCs, 2FFP, 1 Plt  - serial Hct  - transfuse for downtrending Hct  - Strict intake and output  - appreciate general surgery input  - appreciate SICU care

## 2019-02-22 NOTE — CONSULT NOTE ADULT - PROBLEM SELECTOR RECOMMENDATION 4
recs as per nutrition
lactate 8 due to hemorrhagic shock,   need transfusion, IVF, hemodynamic stabilization

## 2019-02-22 NOTE — DISCHARGE NOTE ADULT - MEDICATION SUMMARY - MEDICATIONS TO STOP TAKING
I will STOP taking the medications listed below when I get home from the hospital:    Ibrance 100 mg oral capsule  -- 1 cap(s) by mouth once a day in am    Lexapro 20 mg oral tablet  -- 1 tab(s) by mouth once a day in am    diphenhydrAMINE 25 mg oral tablet  -- 1 tab(s) by mouth once a day (at bedtime), As Needed    Senna 8.6 mg oral tablet  -- 2 tab(s) by mouth once a day (at bedtime), As Needed    traMADol 50 mg oral tablet  -- 1 tab(s) by mouth every 6 hours, As Needed - for moderate pain - for severe pain MDD:4 tabs    Bactrim  mg-160 mg oral tablet  -- 1 tab(s) by mouth 2 times a day   -- Avoid prolonged or excessive exposure to direct and/or artificial sunlight while taking this medication.  Finish all this medication unless otherwise directed by prescriber.  Medication should be taken with plenty of water.

## 2019-02-22 NOTE — CONSULT NOTE ADULT - PROBLEM SELECTOR RECOMMENDATION 6
continue Femara  Also on Ibrance but currently on hold per Onc recs
-s/p L radical nephrectomy, c/b subcapsular splenic hematoma  -transfusion, ICU admission, management per surgery/urology

## 2019-02-22 NOTE — CONSULT NOTE ADULT - ASSESSMENT
63 female PMH COPD/emphysema on 2 L home O2 at bedtime, depression, left breast cancer with bone mets s/p mastectomy and chemo 13 years ago (on Femera and Ibrance), large left renal mass, s/p left lap radical nephrectomy/adrenalectomy on 2/13 presented with weakness associated with dyspnea found to be in hemorrhagic shock secondary to large subcapsular splenic hematoma measuring seen on CT scan requiring multiple blood transfusion and IR angioembolization of a proximal splenic artery on 2/17

## 2019-02-22 NOTE — DISCHARGE NOTE ADULT - CARE PROVIDERS DIRECT ADDRESSES
,[email or web address],DirectAddress_Unknown,DirectAddress_Unknown [email or web address],DirectAddress_Unknown,DirectAddress_Unknown,[email or web address]

## 2019-02-22 NOTE — DISCHARGE NOTE ADULT - PLAN OF CARE
Recovery WOUND CARE:  Clean the area with warm soapy water, pat dry.   BATHING: Please do not submerge wound underwater. You may shower and/or sponge bathe.  ACTIVITY: No heavy lifting or straining. Otherwise, you may return to your usual level of physical activity. If you are taking narcotic pain medication (such as Tramadol) DO NOT drive a car, operate machinery or make important decisions.  DIET: You may resume your regular diet.  NOTIFY YOUR SURGEON IF: You have any bleeding that does not stop, any pus draining from your wound(s), any fever (over 100.4 F) or chills, persistent nausea/vomiting, persistent diarrhea, or if your pain is not controlled on your discharge pain medications.  FOLLOW-UP: Please follow up with your primary care physician in week regarding your hospitalization. WOUND CARE:  Clean the area with warm soapy water, pat dry.   BATHING: Please do not submerge wound underwater. You may shower and/or sponge bathe.  ACTIVITY: No heavy lifting or straining. Otherwise, you may return to your usual level of physical activity. If you are taking narcotic pain medication (such as Tramadol) DO NOT drive a car, operate machinery or make important decisions.  DIET: You may resume your regular diet.  NOTIFY YOUR SURGEON IF: You have any bleeding that does not stop, any pus draining from your wound(s), any fever (over 100.4 F) or chills, persistent nausea/vomiting, persistent diarrhea, or if your pain is not controlled on your discharge pain medications.  FOLLOW-UP: Please follow up with your primary care physician in week regarding your hospitalization.  Two weeks follow up with Dr. Damon for Pulmonary Function Tests and a repeat Chest -[phone]

## 2019-02-22 NOTE — DISCHARGE NOTE ADULT - ADDITIONAL INSTRUCTIONS
Please follow up with Dr. Mathias in 1-2 weeks of discharge.    Please follow up with Dr. Campbell within 2 weeks of discharge. Please follow up with Dr. Mathias in 1-2 weeks of discharge.    Please follow up with Dr. Campbell within 2 weeks of discharge.  Please follow up with Dr. Peralta in 2 weeks as above

## 2019-02-22 NOTE — DISCHARGE NOTE ADULT - NS AS ACTIVITY OBS
No Heavy lifting/straining/Walking-Indoors allowed/Walking-Outdoors allowed/Stairs allowed/Showering allowed

## 2019-02-22 NOTE — DISCHARGE NOTE ADULT - PATIENT PORTAL LINK FT
You can access the [facility] Patient Portal, offered by [facility], by registering with the following website: [email or web address]

## 2019-02-22 NOTE — PROGRESS NOTE ADULT - ASSESSMENT
63 year old female with a history of COPD 4 days s/p left lap radical nephrectomy/adrenalectomy on 2/13 for renal mass presented with weakness associated with dyspnea found to be in hemorrhagic shock secondary to large subcapsular splenic hematoma requiring multiple blood transfusions, now s/p IR coiling, stable.    - H&H stable with no new transfusions x48 hrs  - Unlikely to require splenectomy at this point  - Please page with further questions    Troy Yap, PGY-2  B Team Surgery [id number]

## 2019-02-22 NOTE — PROGRESS NOTE ADULT - SUBJECTIVE AND OBJECTIVE BOX
Chief Complaint:  Patient is a 63y old  Female who presents with a chief complaint of splenic bleed after left nephrectomy (2019 08:12)      Interval Events:   Patient without complaints this morning. Denies any nausea, vomiting. She had one brown BM this morning.     Hospital Medications:  acetaminophen   Tablet .. 650 milliGRAM(s) Oral every 6 hours  ALBUTerol    90 MICROgram(s) HFA Inhaler 1 Puff(s) Inhalation two times a day  escitalopram 20 milliGRAM(s) Oral daily  heparin  Injectable 5000 Unit(s) SubCutaneous every 8 hours  letrozole 2.5 milliGRAM(s) Oral daily  metoprolol tartrate 12.5 milliGRAM(s) Oral two times a day  oxyCODONE    IR 5 milliGRAM(s) Oral every 4 hours PRN  oxyCODONE    IR 10 milliGRAM(s) Oral every 4 hours PRN  palbociclib 100 milliGRAM(s) Oral daily  pantoprazole    Tablet 40 milliGRAM(s) Oral before breakfast  tiotropium 18 MICROgram(s) Capsule 1 Capsule(s) Inhalation daily        PHYSICAL EXAM:   Vital Signs:  Vital Signs Last 24 Hrs  T(C): 36.9 (2019 06:00), Max: 37.8 (2019 20:00)  T(F): 98.4 (2019 06:00), Max: 100 (2019 20:00)  HR: 80 (2019 06:00) (70 - 96)  BP: 147/72 (2019 06:00) (133/64 - 157/81)  BP(mean): 79 (2019 00:00) (79 - 100)  RR: 18 (2019 06:00) (18 - 27)  SpO2: 99% (2019 06:00) (96% - 100%)  Daily     Daily Weight in k.9 (2019 14:16)    GENERAL:  Appears stated age,  no distress  HEENT:   sclera -anicteric  CHEST:   no increased effort, breath sounds clear  HEART:  Regular rhythm, S1, S2,   ABDOMEN:  Soft, mild tenderness to palpation, non-distended, normoactive bowel sounds,  +surgical scars   EXTEREMITIES:  no cyanosis,clubbing or edema  SKIN:  No rash/no jaundice   NEURO:  Alert, oriented    LABS:                        8.2    17.62 )-----------( 253      ( 2019 03:24 )             25.4       02-    140  |  105  |  13  ----------------------------<  131<H>  4.3   |  26  |  0.84    Ca    7.5<L>      2019 12:35  Phos  2.6       Mg     2.0                                         8.2    17.62 )-----------( 253      ( 2019 03:24 )             25.4                         8.3    16.59 )-----------( 212      ( 2019 17:10 )             25.6                         8.3    16.86 )-----------( 226      ( 2019 11:20 )             26.1                         6.7    18.24 )-----------( 210      ( 2019 02:20 )             20.3                         7.4    17.15 )-----------( 207      ( 2019 12:15 )             21.9     Imaging:  < from: Upper Endoscopy (19 @ 15:48) >  [facility]  _______________________________________________________________________________  Patient Name: Marissa Ortega      Procedure Date: 2019 3:48 PM  MRN: [id number]                     Account Number: [id number]  YOB: 1955             Admit Type: Inpatient  Room: 3                               Gender: Female  Attending MD: LOUISA PAVON MD    _______________________________________________________________________________     Procedure:           Upper GI endoscopy  Indications:         Acute post hemorrhagic anemia, Melena  Providers:           LOUISA PAVON MD, HENRI KELLY MD (Fellow)  Referring MD:        PAULINE JURADO MD  Medicines:           Monitored AnesthesiaCare  Complications:       No immediate complications. Estimated blood loss:                        Minimal.  Procedure:           Pre-Anesthesia Assessment:                       - Universal Protocol:                       - Pre-procedure Verification: Prior to the procedure,                        the patient's identity was verified by full name, date                        of birth and medical record number. The patient's                        identity was verified on all pertinent medical records,                        including History and Physical, nursing assessment and                        pre-anesthesia assessment. Also prior to the procedure,                        a History and Physical was performed, and patient           medications, allergies and sensitivities were reviewed.                        The patient's tolerance of previous anesthesia was                        reviewed. The risks and benefits of the procedure and                        the sedationoptions and risks were discussed with the                        patient. All questions were answered and informed                        consent was obtained.                       - Marking: The endoscopic procedure was visually marked             on a patient wrist band delineating the patient name,                        proposed procedure and endoscopist's initials.                       - Time-Out: Prior to the start of the procedure, the                        patient's identification, proposed procedure, accurate                        signed consent, correctly labeled images and records,                        and need for prophylactic antibiotics were verified by                        the physician, the nurse, the anesthesiologist and the                        anesthetist in the pre-procedure area in the procedure                        room.                       After obtaining informed consent, the endoscope was                        passed under direct vision. Throughout the procedure,                        the patient's blood pressure, pulse, and oxygen                        saturations were monitored continuously. The Endoscope                        was introduced through the mouth, and advanced to the                       second part of duodenum. The upper GI endoscopy was                        accomplished without difficulty. The patient tolerated                        the procedure well.                             Findings:       The oropharynx was normal.       A 3 cm hiatus hernia was present.       The Z-line was regular and was found 35 cm from the incisors.       The exam of the esophagus was otherwise normal.       A single localized, 2 mm non-bleeding erosion was found in the        prepyloric region of the stomach. There were no stigmata of recent        bleeding. Biopsies were taken with a cold forceps for Helicobacter        pylori testing. Verification of patient identification for the specimen        was done. Estimated blood loss was minimal.       No other significant abnormalities were identified in a careful        examination of the stomach.       The examined duodenum was normal.                                                       Impression:          - Normal oropharynx.                       - 3 cm hiatus hernia.                       - Z-line regular, 35 cm from the incisors.                       - Non-bleeding erosive gastropathy. Biopsied.                       - Normal examined duodenum.  Recommendation:      - Return patient to ICU for ongoing care.                       - Advance diet as tolerated.                       - Await pathology results.        - Use a proton pump inhibitor PO daily.                                                                                     < end of copied text >

## 2019-02-22 NOTE — DISCHARGE NOTE ADULT - CARE PLAN
Principal Discharge DX:	Hypovolemic shock  Goal:	Recovery  Assessment and plan of treatment:	WOUND CARE:  Clean the area with warm soapy water, pat dry.   BATHING: Please do not submerge wound underwater. You may shower and/or sponge bathe.  ACTIVITY: No heavy lifting or straining. Otherwise, you may return to your usual level of physical activity. If you are taking narcotic pain medication (such as Tramadol) DO NOT drive a car, operate machinery or make important decisions.  DIET: You may resume your regular diet.  NOTIFY YOUR SURGEON IF: You have any bleeding that does not stop, any pus draining from your wound(s), any fever (over 100.4 F) or chills, persistent nausea/vomiting, persistent diarrhea, or if your pain is not controlled on your discharge pain medications.  FOLLOW-UP: Please follow up with your primary care physician in week regarding your hospitalization. Principal Discharge DX:	Hypovolemic shock  Goal:	Recovery  Assessment and plan of treatment:	WOUND CARE:  Clean the area with warm soapy water, pat dry.   BATHING: Please do not submerge wound underwater. You may shower and/or sponge bathe.  ACTIVITY: No heavy lifting or straining. Otherwise, you may return to your usual level of physical activity. If you are taking narcotic pain medication (such as Tramadol) DO NOT drive a car, operate machinery or make important decisions.  DIET: You may resume your regular diet.  NOTIFY YOUR SURGEON IF: You have any bleeding that does not stop, any pus draining from your wound(s), any fever (over 100.4 F) or chills, persistent nausea/vomiting, persistent diarrhea, or if your pain is not controlled on your discharge pain medications.  FOLLOW-UP: Please follow up with your primary care physician in week regarding your hospitalization.  Two weeks follow up with Dr. Damon for Pulmonary Function Tests and a repeat Chest [phone]

## 2019-02-22 NOTE — DISCHARGE NOTE ADULT - CARE PROVIDER_API CALL
Cassius Mathias)  Urology  [address], [address]  [place] [address]  Phone: [phone]  Fax: [phone]  Follow Up Time:     Serafin Campbell)  Surgery  [address], [address]  [place] [address]  Phone: [phone]  Fax: [phone]  Follow Up Time:     Matt Clayton)  Radiology Intrventional  [address] [place] [address]  Phone: [phone]  Fax: [phone]  Follow Up Time: Cassius Mathias (MD)  Urology  [address], [address]  [place] [address]  Phone: [phone]  Fax: [phone]  Follow Up Time:     Serafin Campbell)  Surgery  [address], [address]  [place] [address]  Phone: [phone]  Fax: [phone]  Follow Up Time:     Matt Clayton)  Radiology Intrventional  [address] [place] [address]  Phone: [phone]  Fax: [phone]  Follow Up Time:     Bill Damon)  Surgery; Thoracic Surgery  [address] [place] [address], [place] [address]  Phone: [phone]  Fax: [phone]  Follow Up Time:

## 2019-02-22 NOTE — DISCHARGE NOTE ADULT - INSTRUCTIONS
Call MD for any c/o bloody urine, difficulty urinating, nausea, vomit, fever pain not relieved after medicated for pain, and a return appointment. continue current diet

## 2019-02-22 NOTE — DISCHARGE NOTE ADULT - MEDICATION SUMMARY - MEDICATIONS TO TAKE
I will START or STAY ON the medications listed below when I get home from the hospital:    acetaminophen 325 mg oral tablet  -- 2 tab(s) by mouth every 6 hours  -- Indication: For Pain    oxyCODONE 5 mg oral tablet  -- 1 tab(s) by mouth every 4 hours, As needed, mild-mod pain  -- Indication: For Pain    oxyCODONE 10 mg oral tablet  -- 1 tab(s) by mouth every 4 hours, As needed, Severe Pain (7 - 10)  -- Indication: For Pain    Milk of Magnesia 8% oral suspension  -- 15 milliliter(s) by mouth once a day (at bedtime), As Needed  -- Indication: For Dyspepsia    escitalopram 20 mg oral tablet  -- 1 tab(s) by mouth once a day  -- Indication: For Anxiety ,Depression    letrozole 2.5 mg oral tablet  -- 1 tab(s) by mouth once a day  -- Indication: For Antineoplastic    metoprolol  -- 12.5 milligram(s) by mouth 2 times a day  -- Indication: For HTN    albuterol 90 mcg/inh inhalation aerosol  -- 1 puff(s) inhaled 2 times a day  -- Indication: For Emphysema    tiotropium 18 mcg inhalation capsule  -- 1 cap(s) inhaled once a day  -- Indication: For Emphysema    Anoro Ellipta 62.5 mcg-25 mcg/inh inhalation powder  -- 1 puff(s) inhaled once a day in am  -- Indication: For Emphysema    senna oral tablet  -- 2 tab(s) by mouth once a day (at bedtime)  -- Indication: For Bowel regimen    docusate sodium 100 mg oral capsule  -- 1 cap(s) by mouth 2 times a day  -- Indication: For Bowel regimen    polyethylene glycol 3350 oral powder for reconstitution  -- 17 gram(s) by mouth once a day, As needed, Constipation  -- Indication: For Bowel regimen    pantoprazole 40 mg oral delayed release tablet  -- 1 tab(s) by mouth once a day (before a meal)  -- Indication: For Gastric Erosions    Vitamin D3 2000 intl units oral tablet  -- 1 tab(s) by mouth once a day  -- Indication: For Home med

## 2019-02-22 NOTE — PROGRESS NOTE ADULT - SUBJECTIVE AND OBJECTIVE BOX
Surgery Progress Note    S: Patient seen and examined. No acute events overnight. Vitals stable. No new transfusions. Pt ambulating. Having GI function.    O:  Vital Signs Last 24 Hrs  T(C): 36.9 (22 Feb 2019 06:00), Max: 37.8 (21 Feb 2019 20:00)  T(F): 98.4 (22 Feb 2019 06:00), Max: 100 (21 Feb 2019 20:00)  HR: 80 (22 Feb 2019 06:00) (70 - 96)  BP: 147/72 (22 Feb 2019 06:00) (133/64 - 157/81)  BP(mean): 79 (22 Feb 2019 00:00) (79 - 100)  RR: 18 (22 Feb 2019 06:00) (18 - 27)  SpO2: 99% (22 Feb 2019 06:00) (96% - 100%)    I&O's Detail    20 Feb 2019 07:01  -  21 Feb 2019 07:00  --------------------------------------------------------  IN:    dextrose 5% + sodium chloride 0.45%.: 1325 mL    IV PiggyBack: 700 mL  Total IN: 2025 mL    OUT:    Indwelling Catheter - Urethral: 845 mL  Total OUT: 845 mL    Total NET: 1180 mL      21 Feb 2019 07:01  -  22 Feb 2019 06:38  --------------------------------------------------------  IN:    dextrose 5% + sodium chloride 0.45%.: 600 mL    IV PiggyBack: 450 mL  Total IN: 1050 mL    OUT:    Indwelling Catheter - Urethral: 890 mL  Total OUT: 890 mL    Total NET: 160 mL          MEDICATIONS  (STANDING):  acetaminophen   Tablet .. 650 milliGRAM(s) Oral every 6 hours  ALBUTerol    90 MICROgram(s) HFA Inhaler 1 Puff(s) Inhalation two times a day  chlorhexidine 4% Liquid 1 Application(s) Topical <User Schedule>  escitalopram 20 milliGRAM(s) Oral daily  heparin  Injectable 5000 Unit(s) SubCutaneous every 8 hours  letrozole 2.5 milliGRAM(s) Oral daily  metoprolol tartrate 12.5 milliGRAM(s) Oral two times a day  palbociclib 100 milliGRAM(s) Oral daily  pantoprazole    Tablet 40 milliGRAM(s) Oral before breakfast  tiotropium 18 MICROgram(s) Capsule 1 Capsule(s) Inhalation daily    MEDICATIONS  (PRN):  oxyCODONE    IR 5 milliGRAM(s) Oral every 4 hours PRN Moderate Pain (4 - 6)  oxyCODONE    IR 10 milliGRAM(s) Oral every 4 hours PRN Severe Pain (7 - 10)                            8.2    17.62 )-----------( 253      ( 21 Feb 2019 03:24 )             25.4       02-21    140  |  105  |  13  ----------------------------<  131<H>  4.3   |  26  |  0.84    Ca    7.5<L>      21 Feb 2019 12:35  Phos  2.6     02-21  Mg     2.0     02-21        Physical Exam:  Gen: Laying in bed, NAD  Resp: Unlabored breathing  Abd: soft, NTND  Ext: WWP  Skin: No rashes

## 2019-02-22 NOTE — DISCHARGE NOTE ADULT - HOSPITAL COURSE
Pt presented day after discharge to OSH with c/o cough and weakness.  Found to be anemic and hypotensive, CT showed large splenic given 2U pRBC then transferred to [facility].  At [facility], pt noted to be severely hypotensive (52/30mmHg) after third unit of blood.  Gen surg consulted for emergent splenectomy, and they recommended IR consult for splenic angio embo.  IR was able to successful embolix the proximal splenic artery. Pt presented day after discharge to OSH with c/o cough and weakness.  Found to be anemic and hypotensive, CT showed large perisplenic hematoma given 2U pRBC then transferred to [facility].  At [facility], pt noted to be in hemorrhagic shock.  Gen surg consulted for emergent splenectomy, and they recommended IR consult for splenic angio embo.  IR was able to successful embolization of  the proximal splenic artery.  Pt was then monitored in SICU for next 5 days.  She was treated with additional PRBC's totalling 8 units for symptomatic anemia.  During SICU stay there was concern for GI bleeding and subsequently a GI consult was called and an EGD performed.  The results showed GI erosions but no overt ulcerations and pt was  started on a PPI.  She was stabilized and transferred to the floor on 2/22.  A chest and Abdominal CT was performed which showed unchanged size of splenic hematoma and increased size of known pulmonary nodule which  resulted in a Thoracic surgery consult.  Dr. Damon concluded there was no need for emergent intervention and advised outpatient  follow up in two weeks for PFT's and repeat chest CT.   ([phone]).   Pt continued to do well, was seen by PT / PM&R  and will be transferred to inpatient rehab.

## 2019-02-22 NOTE — CONSULT NOTE ADULT - PROBLEM SELECTOR RECOMMENDATION 3
likely reactive  monitor off Abx, monitor for fevers  2/17 Bcx NGTD, continue incentive spirometry  Consider repeat CXR. 2/18 CXR: patchy opacity within the left lower lung parts of which are new since the prior study may be due to pulmonary edema or infection
c/w Anoro Ellipita, can substitute with albuterol inhaler 1 puff bid and spiriva 18 mg daily   monitor O2 sats

## 2019-02-22 NOTE — CONSULT NOTE ADULT - PROBLEM SELECTOR PROBLEM 6
Malignant neoplasm of left female breast, unspecified estrogen receptor status, unspecified site of breast
Renal mass, left

## 2019-02-22 NOTE — CONSULT NOTE ADULT - PROBLEM SELECTOR RECOMMENDATION 5
s/p left lap radical nephrectomy/adrenalectomy on 2/13 c/b subcapsular splenic hematoma
-likely ischemic ATN in setting of shock, creat bump from 1 to 1.5 mg/dl  -transfusion, hemodynamic stabilization, avoid nephrotoxins, dose meds per renal fxn

## 2019-02-22 NOTE — PROGRESS NOTE ADULT - SUBJECTIVE AND OBJECTIVE BOX
Subjective    Objective    Vital signs  T(F): , Max: 100 (02-21-19 @ 20:00)  HR: 80 (02-22-19 @ 06:00)  BP: 147/72 (02-22-19 @ 06:00)  SpO2: 99% (02-22-19 @ 06:00)  Mcgee - 825    Gen: NAD  Abd: incisions c/d/i with staples  : mcgee draining as above    Labs  02-21 @ 12:35  WBC --    / Hct --    / SCr 0.84     02-21 @ 03:24  WBC 17.62 / Hct 25.4  / SCr 0.81

## 2019-02-22 NOTE — PROGRESS NOTE ADULT - ASSESSMENT
Impression:  1)Acute blood loss anemia- initially 2/2 perisplenic hematoma, possible component of GI blood loss as erosions seen on EGD performed 2/21/19.     Recommendations:  -monitor hemoglobin and bowel movements  -c/w PPI PO daily   -advance diet as tolerated  -f/u surgery recs     Please call with questions  Adrianna Arango  GI Fellow  Pager: [phone]

## 2019-02-23 DIAGNOSIS — T14.8XXA OTHER INJURY OF UNSPECIFIED BODY REGION, INITIAL ENCOUNTER: ICD-10-CM

## 2019-02-23 LAB
HCT VFR BLD CALC: 30.5 % — LOW (ref 34.5–45)
HGB BLD-MCNC: 9.2 G/DL — LOW (ref 11.5–15.5)
MCHC RBC-ENTMCNC: 28.6 PG — SIGNIFICANT CHANGE UP (ref 27–34)
MCHC RBC-ENTMCNC: 30.2 % — LOW (ref 32–36)
MCV RBC AUTO: 94.7 FL — SIGNIFICANT CHANGE UP (ref 80–100)
NRBC # FLD: 0.3 K/UL — LOW (ref 25–125)
NRBC FLD-RTO: 1.3 — SIGNIFICANT CHANGE UP
PLATELET # BLD AUTO: 419 K/UL — HIGH (ref 150–400)
PMV BLD: 10.7 FL — SIGNIFICANT CHANGE UP (ref 7–13)
RBC # BLD: 3.22 M/UL — LOW (ref 3.8–5.2)
RBC # FLD: 17 % — HIGH (ref 10.3–14.5)
WBC # BLD: 22.91 K/UL — HIGH (ref 3.8–10.5)
WBC # FLD AUTO: 22.91 K/UL — HIGH (ref 3.8–10.5)

## 2019-02-23 PROCEDURE — 99233 SBSQ HOSP IP/OBS HIGH 50: CPT

## 2019-02-23 PROCEDURE — 71045 X-RAY EXAM CHEST 1 VIEW: CPT | Mod: 26

## 2019-02-23 RX ORDER — FUROSEMIDE 40 MG
10 TABLET ORAL ONCE
Qty: 0 | Refills: 0 | Status: COMPLETED | OUTPATIENT
Start: 2019-02-23 | End: 2019-02-23

## 2019-02-23 RX ORDER — DOCUSATE SODIUM 100 MG
100 CAPSULE ORAL
Qty: 0 | Refills: 0 | Status: DISCONTINUED | OUTPATIENT
Start: 2019-02-23 | End: 2019-03-01

## 2019-02-23 RX ORDER — ACETAMINOPHEN 500 MG
650 TABLET ORAL EVERY 6 HOURS
Qty: 0 | Refills: 0 | Status: DISCONTINUED | OUTPATIENT
Start: 2019-02-23 | End: 2019-03-01

## 2019-02-23 RX ORDER — SODIUM CHLORIDE 9 MG/ML
500 INJECTION, SOLUTION INTRAVENOUS ONCE
Qty: 0 | Refills: 0 | Status: COMPLETED | OUTPATIENT
Start: 2019-02-23 | End: 2019-02-23

## 2019-02-23 RX ORDER — SENNA PLUS 8.6 MG/1
2 TABLET ORAL AT BEDTIME
Qty: 0 | Refills: 0 | Status: DISCONTINUED | OUTPATIENT
Start: 2019-02-23 | End: 2019-03-01

## 2019-02-23 RX ORDER — POLYETHYLENE GLYCOL 3350 17 G/17G
17 POWDER, FOR SOLUTION ORAL DAILY
Qty: 0 | Refills: 0 | Status: DISCONTINUED | OUTPATIENT
Start: 2019-02-23 | End: 2019-03-01

## 2019-02-23 RX ORDER — KETOROLAC TROMETHAMINE 30 MG/ML
15 SYRINGE (ML) INJECTION EVERY 6 HOURS
Qty: 0 | Refills: 0 | Status: DISCONTINUED | OUTPATIENT
Start: 2019-02-23 | End: 2019-02-26

## 2019-02-23 RX ADMIN — Medication 10 MILLIGRAM(S): at 20:44

## 2019-02-23 RX ADMIN — HEPARIN SODIUM 5000 UNIT(S): 5000 INJECTION INTRAVENOUS; SUBCUTANEOUS at 21:08

## 2019-02-23 RX ADMIN — Medication 650 MILLIGRAM(S): at 12:08

## 2019-02-23 RX ADMIN — ESCITALOPRAM OXALATE 20 MILLIGRAM(S): 10 TABLET, FILM COATED ORAL at 12:08

## 2019-02-23 RX ADMIN — OXYCODONE HYDROCHLORIDE 10 MILLIGRAM(S): 5 TABLET ORAL at 10:57

## 2019-02-23 RX ADMIN — Medication 10 MILLIGRAM(S): at 16:10

## 2019-02-23 RX ADMIN — LETROZOLE 2.5 MILLIGRAM(S): 2.5 TABLET, FILM COATED ORAL at 12:09

## 2019-02-23 RX ADMIN — OXYCODONE HYDROCHLORIDE 10 MILLIGRAM(S): 5 TABLET ORAL at 03:35

## 2019-02-23 RX ADMIN — Medication 15 MILLIGRAM(S): at 21:08

## 2019-02-23 RX ADMIN — ALBUTEROL 1 PUFF(S): 90 AEROSOL, METERED ORAL at 09:37

## 2019-02-23 RX ADMIN — Medication 12.5 MILLIGRAM(S): at 18:26

## 2019-02-23 RX ADMIN — HEPARIN SODIUM 5000 UNIT(S): 5000 INJECTION INTRAVENOUS; SUBCUTANEOUS at 05:04

## 2019-02-23 RX ADMIN — Medication 12.5 MILLIGRAM(S): at 05:04

## 2019-02-23 RX ADMIN — Medication 650 MILLIGRAM(S): at 23:56

## 2019-02-23 RX ADMIN — HEPARIN SODIUM 5000 UNIT(S): 5000 INJECTION INTRAVENOUS; SUBCUTANEOUS at 13:38

## 2019-02-23 RX ADMIN — OXYCODONE HYDROCHLORIDE 10 MILLIGRAM(S): 5 TABLET ORAL at 09:29

## 2019-02-23 RX ADMIN — OXYCODONE HYDROCHLORIDE 10 MILLIGRAM(S): 5 TABLET ORAL at 03:05

## 2019-02-23 RX ADMIN — TIOTROPIUM BROMIDE 1 CAPSULE(S): 18 CAPSULE ORAL; RESPIRATORY (INHALATION) at 09:38

## 2019-02-23 RX ADMIN — Medication 650 MILLIGRAM(S): at 18:25

## 2019-02-23 RX ADMIN — ALBUTEROL 1 PUFF(S): 90 AEROSOL, METERED ORAL at 20:44

## 2019-02-23 RX ADMIN — SENNA PLUS 2 TABLET(S): 8.6 TABLET ORAL at 21:08

## 2019-02-23 RX ADMIN — SODIUM CHLORIDE 500 MILLILITER(S): 9 INJECTION, SOLUTION INTRAVENOUS at 18:28

## 2019-02-23 RX ADMIN — Medication 15 MILLIGRAM(S): at 13:35

## 2019-02-23 RX ADMIN — SODIUM CHLORIDE 500 MILLILITER(S): 9 INJECTION, SOLUTION INTRAVENOUS at 16:07

## 2019-02-23 NOTE — PROGRESS NOTE ADULT - SUBJECTIVE AND OBJECTIVE BOX
Patient is a 63y old  Female who presents with a chief complaint of splenic bleed after left nephrectomy (23 Feb 2019 07:37)      SUBJECTIVE / OVERNIGHT EVENTS:  no melena or tarry stools.  minimum abd discomfort at incision site - no n/v, tolerating po.      MEDICATIONS  (STANDING):  ALBUTerol    90 MICROgram(s) HFA Inhaler 1 Puff(s) Inhalation two times a day  bisacodyl Suppository 10 milliGRAM(s) Rectal once  escitalopram 20 milliGRAM(s) Oral daily  heparin  Injectable 5000 Unit(s) SubCutaneous every 8 hours  lactated ringers. 1000 milliLiter(s) (75 mL/Hr) IV Continuous <Continuous>  letrozole 2.5 milliGRAM(s) Oral daily  metoprolol tartrate 12.5 milliGRAM(s) Oral two times a day  pantoprazole    Tablet 40 milliGRAM(s) Oral before breakfast  tiotropium 18 MICROgram(s) Capsule 1 Capsule(s) Inhalation daily    MEDICATIONS  (PRN):  oxyCODONE    IR 5 milliGRAM(s) Oral every 4 hours PRN Moderate Pain (4 - 6)  oxyCODONE    IR 10 milliGRAM(s) Oral every 4 hours PRN Severe Pain (7 - 10)      Vital Signs Last 24 Hrs  T(C): 37 (23 Feb 2019 08:56), Max: 37 (23 Feb 2019 08:56)  T(F): 98.6 (23 Feb 2019 08:56), Max: 98.6 (23 Feb 2019 08:56)  HR: 87 (23 Feb 2019 08:56) (72 - 98)  BP: 123/75 (23 Feb 2019 08:56) (121/69 - 150/171)  BP(mean): --  RR: 16 (23 Feb 2019 08:56) (14 - 17)  SpO2: 97% (23 Feb 2019 08:56) (96% - 100%)  CAPILLARY BLOOD GLUCOSE        I&O's Summary    22 Feb 2019 07:01  -  23 Feb 2019 07:00  --------------------------------------------------------  IN: 150 mL / OUT: 1100 mL / NET: -950 mL        PHYSICAL EXAM:  GENERAL: NAD, on 2 L NC  EYES: EOMI, sclera clear  ENMT: Moist mucous membranes  BREAST: left mastectomy s/p reconstruction, implant of right breast palpable  NECK: Supple, No JVD  RESPIRATORY: Clear to auscultation bilaterally; No crackles or wheezing  CARDIOVASCULAR: s1/s2, Regular rate and rhythm; No murmurs appreciated  GASTROINTESTINAL: Soft, slightly tender, + suprapubic staples C/D/I, Nondistended; Bowel sounds present  GENITOURINARY: no mcgee  EXTREMITIES:  2+ Peripheral Pulses, No clubbing, cyanosis, or edema  NERVOUS SYSTEM:  Alert & Oriented X3; Moving all 4 extremities; No gross sensory deficits  SKIN: No rashes or lesions; Incisions C/D/I    LABS:                        9.2    22.91 )-----------( 419      ( 23 Feb 2019 05:49 )             30.5     02-22    142  |  105  |  12  ----------------------------<  104<H>  3.7   |  26  |  0.81    Ca    7.6<L>      22 Feb 2019 09:20  Phos  2.6     02-21  Mg     1.9     02-22

## 2019-02-23 NOTE — PROGRESS NOTE ADULT - PROBLEM SELECTOR PLAN 3
likely reactive  monitor off Abx, monitor for fevers  2/17 Bcx NGTD, continue incentive spirometry  Consider repeat CXR. 2/18 CXR: patchy opacity within the left lower lung parts of which are new since the prior study may be due to pulmonary edema or infection.

## 2019-02-23 NOTE — PROGRESS NOTE ADULT - ASSESSMENT
This is a 62 y/o F s/p L laparoscopic radical nephrectomy on 2/14, post-operative course c/b readmission for splenic bleed requiring IR angioembolization of a proximal splenic artery on 2/17, now with stable Hct and EGD notable only for erosions without overt ulceration.    - AM CBC reviewed  - Summary of units received: 8U PRBCs, 2FFP, 1 Plt  - Hct stable  - Dulcolax  - Strict intake and output  - Check diet tolerance  - Follow up Med, Surgery, GI, Heme/onc recs  - Rehab planning

## 2019-02-23 NOTE — PROGRESS NOTE ADULT - PROBLEM SELECTOR PROBLEM 5
Renal mass, left Malignant neoplasm of left female breast, unspecified estrogen receptor status, unspecified site of breast

## 2019-02-23 NOTE — PROGRESS NOTE ADULT - ASSESSMENT
This is a 64 y/o F s/p L laparoscopic radical nephrectomy on 2/14, post-operative course c/b readmission for splenic bleed requiring IR angioembolization of a proximal splenic artery on 2/17, now with stable Hct and EGD notable only for erosions without overt ulceration.    - Summary of units received: 8U PRBCs, 2FFP, 1 Plt  - Dulcolax given, senna, colace and PRN miralax ordered  - On standing Tylenol and Toradol  - Avoid narcotics  - Strict intake and output  - Getting 500cc bolus, monitor UOP  - Tolerating regular diet  - Follow up Med, Surgery, GI, Heme/onc recs  - Rehab planning

## 2019-02-23 NOTE — PROGRESS NOTE ADULT - SUBJECTIVE AND OBJECTIVE BOX
Patient now on standing Tylenol and Toradol, pain better controlled.  Only voided 100cc today, bladder scan demonstrated 380cc, straight cath performed but only returned 75cc dark mrac urine.  CXR done this AM at hospitalist recommendations, shows increased pulmonary edema.  Hospitalist consulted, will give 500cc bolus now for low UOP and monitor UOP over next 1-2 hours prior to giving more fluids.  Tolerating regular diet.  OOB in halls today.    T(F): 98.2, Max: 98.6 (02-23-19 @ 08:56)  HR: 100  BP: 129/68  SpO2: 98%    OUT:    Void: 100    Straight cath: 75    Gen NAD  Abd Soft, mod distended   Urine dark marc    02-23 @ 05:49  WBC 22.91 / Hct 30.5  / SCr --       02-22 @ 09:20  WBC 18.21 / Hct 27.4  / SCr 0.81

## 2019-02-23 NOTE — PROGRESS NOTE ADULT - PROBLEM SELECTOR PROBLEM 6
Malignant neoplasm of left female breast, unspecified estrogen receptor status, unspecified site of breast Depression, unspecified depression type

## 2019-02-24 DIAGNOSIS — K59.00 CONSTIPATION, UNSPECIFIED: ICD-10-CM

## 2019-02-24 DIAGNOSIS — R34 ANURIA AND OLIGURIA: ICD-10-CM

## 2019-02-24 LAB
ANION GAP SERPL CALC-SCNC: 8 MMO/L — SIGNIFICANT CHANGE UP (ref 7–14)
BUN SERPL-MCNC: 19 MG/DL — SIGNIFICANT CHANGE UP (ref 7–23)
CALCIUM SERPL-MCNC: 7.7 MG/DL — LOW (ref 8.4–10.5)
CHLORIDE SERPL-SCNC: 108 MMOL/L — HIGH (ref 98–107)
CO2 SERPL-SCNC: 28 MMOL/L — SIGNIFICANT CHANGE UP (ref 22–31)
CREAT SERPL-MCNC: 0.96 MG/DL — SIGNIFICANT CHANGE UP (ref 0.5–1.3)
GLUCOSE SERPL-MCNC: 95 MG/DL — SIGNIFICANT CHANGE UP (ref 70–99)
HCT VFR BLD CALC: 30.4 % — LOW (ref 34.5–45)
HGB BLD-MCNC: 8.9 G/DL — LOW (ref 11.5–15.5)
MAGNESIUM SERPL-MCNC: 1.9 MG/DL — SIGNIFICANT CHANGE UP (ref 1.6–2.6)
MCHC RBC-ENTMCNC: 28.4 PG — SIGNIFICANT CHANGE UP (ref 27–34)
MCHC RBC-ENTMCNC: 29.3 % — LOW (ref 32–36)
MCV RBC AUTO: 97.1 FL — SIGNIFICANT CHANGE UP (ref 80–100)
NRBC # FLD: 0.12 K/UL — LOW (ref 25–125)
PLATELET # BLD AUTO: 464 K/UL — HIGH (ref 150–400)
PMV BLD: 10.7 FL — SIGNIFICANT CHANGE UP (ref 7–13)
POTASSIUM SERPL-MCNC: 4.1 MMOL/L — SIGNIFICANT CHANGE UP (ref 3.5–5.3)
POTASSIUM SERPL-SCNC: 4.1 MMOL/L — SIGNIFICANT CHANGE UP (ref 3.5–5.3)
RBC # BLD: 3.13 M/UL — LOW (ref 3.8–5.2)
RBC # FLD: 17.5 % — HIGH (ref 10.3–14.5)
SODIUM SERPL-SCNC: 144 MMOL/L — SIGNIFICANT CHANGE UP (ref 135–145)
WBC # BLD: 19.2 K/UL — HIGH (ref 3.8–10.5)
WBC # FLD AUTO: 19.2 K/UL — HIGH (ref 3.8–10.5)

## 2019-02-24 PROCEDURE — 99233 SBSQ HOSP IP/OBS HIGH 50: CPT

## 2019-02-24 RX ADMIN — TIOTROPIUM BROMIDE 1 CAPSULE(S): 18 CAPSULE ORAL; RESPIRATORY (INHALATION) at 11:05

## 2019-02-24 RX ADMIN — Medication 15 MILLIGRAM(S): at 11:06

## 2019-02-24 RX ADMIN — Medication 15 MILLIGRAM(S): at 04:32

## 2019-02-24 RX ADMIN — Medication 12.5 MILLIGRAM(S): at 05:28

## 2019-02-24 RX ADMIN — Medication 15 MILLIGRAM(S): at 21:20

## 2019-02-24 RX ADMIN — LETROZOLE 2.5 MILLIGRAM(S): 2.5 TABLET, FILM COATED ORAL at 11:06

## 2019-02-24 RX ADMIN — Medication 12.5 MILLIGRAM(S): at 17:25

## 2019-02-24 RX ADMIN — ESCITALOPRAM OXALATE 20 MILLIGRAM(S): 10 TABLET, FILM COATED ORAL at 11:06

## 2019-02-24 RX ADMIN — HEPARIN SODIUM 5000 UNIT(S): 5000 INJECTION INTRAVENOUS; SUBCUTANEOUS at 21:20

## 2019-02-24 RX ADMIN — Medication 650 MILLIGRAM(S): at 12:45

## 2019-02-24 RX ADMIN — Medication 650 MILLIGRAM(S): at 05:27

## 2019-02-24 RX ADMIN — ALBUTEROL 1 PUFF(S): 90 AEROSOL, METERED ORAL at 11:05

## 2019-02-24 RX ADMIN — Medication 650 MILLIGRAM(S): at 18:29

## 2019-02-24 RX ADMIN — Medication 15 MILLIGRAM(S): at 17:24

## 2019-02-24 RX ADMIN — HEPARIN SODIUM 5000 UNIT(S): 5000 INJECTION INTRAVENOUS; SUBCUTANEOUS at 13:49

## 2019-02-24 RX ADMIN — HEPARIN SODIUM 5000 UNIT(S): 5000 INJECTION INTRAVENOUS; SUBCUTANEOUS at 05:27

## 2019-02-24 RX ADMIN — ALBUTEROL 1 PUFF(S): 90 AEROSOL, METERED ORAL at 21:22

## 2019-02-24 NOTE — PROGRESS NOTE ADULT - PROBLEM SELECTOR PLAN 2
pt with acute blood loss anemia due to subscapsular hematoma - requiring sicu stay for hemorrhagic shock   - s/p  8 U PRBC, 2 U FFP, 1 platelet transfusions and IR embolization of splenic artery on 2/17  - cbc now stable - continue to monitor - consider adding miralax to the existing bowel regimen  - pt is passing flatus

## 2019-02-24 NOTE — PROGRESS NOTE ADULT - PROBLEM SELECTOR PLAN 4
likely reactive  monitor off Abx, monitor for fevers  2/17 Bcx NGTD, continue incentive spirometry  repeat CXR from 2/23 shows congestion - awaiting official read stable  continue nebs PRN  2 L home O2 via NC at bedtime.

## 2019-02-24 NOTE — PROGRESS NOTE ADULT - ASSESSMENT
This is a 64 y/o F s/p L laparoscopic radical nephrectomy on 2/14, post-operative course c/b readmission for splenic bleed requiring IR angioembolization of a proximal splenic artery on 2/17, now with stable Hct and EGD notable only for erosions without overt ulceration.  Summary of units received: 8U PRBCs, 2FFP, 1 Plt    - AM CBC/BMP reviewed, leukocytosis improving  - Bowel regimen: senna, colace and PRN miralax ordered  - On standing Tylenol and Toradol, avoid narcotics  - Possible IVL, strict intake and output  - Tolerating regular diet, encourage PO intake  - Follow up Med, Surgery, GI, Heme/onc recs  - Rehab planning

## 2019-02-24 NOTE — PROGRESS NOTE ADULT - PROBLEM SELECTOR PLAN 5
s/p left lap radical nephrectomy/adrenalectomy on 2/13 c/b subcapsular splenic hematoma. likely reactive  monitor off Abx, monitor for fevers  2/17 Bcx NGTD, continue incentive spirometry  repeat CXR from 2/23 shows congestion - awaiting official read

## 2019-02-24 NOTE — PROGRESS NOTE ADULT - SUBJECTIVE AND OBJECTIVE BOX
UROLOGY Progress Note  ZEYNEP GOLDMAN    S: Patient seen at bedside. Overnight, she was given 2x boluses of 500cc LR for oliguria.  In addition, CXR showed possible pulmonary edema.  Given 10mg IV Lasix with adequate response. Denies any cp/sob.   Passing flatus/having bowel movements.     O:  T(C): 36.3 (02-24-19 @ 04:49), Max: 37 (02-23-19 @ 08:56)  HR: 69 (02-24-19 @ 04:49) (69 - 100)  BP: 136/57 (02-24-19 @ 04:49) (123/75 - 136/57)  RR: 14 (02-24-19 @ 04:49) (14 - 18)  SpO2: 98% (02-24-19 @ 04:49) (88% - 99%)        Physical Exam:  Gen: NAD  Resp: No acute respiratory distress, normal effort on 2L nc  Abd: Soft, NT, mild-moderate distention  Incision: Clean, no evidence of hematoma or active bleeding      Labs:  CBC (02-24 @ 05:40)                              8.9<L>                         19.20<H>  )----------------(  464<H>     --    % Neutrophils, --    % Lymphocytes, ANC: --                                  30.4<L>              CBC (02-23 @ 05:49)                              9.2<L>                         22.91<H>  )----------------(  419<H>     --    % Neutrophils, --    % Lymphocytes, ANC: --                                  30.5<L>                BMP (02-24 @ 05:40)             144     |  108<H>  |  19    		Ca++ --      Ca 7.7<L>             ---------------------------------( 95    		Mg 1.9                4.1     |  28      |  0.96  			Ph --                -> .Blood Blood-Peripheral Culture (02-17 @ 12:40)     NG    NG    No growth at 5 days.    -> URINE MIDSTREAM Culture (02-16 @ 07:32)     NG    NG  NG    -> BLOOD PERIPHERAL Culture (02-16 @ 01:42)     NG    NG  NG    -> URINE MIDSTREAM Culture (02-15 @ 11:49)     NG    NG  NG

## 2019-02-24 NOTE — PROGRESS NOTE ADULT - PROBLEM SELECTOR PROBLEM 7
Depression, unspecified depression type Malignant neoplasm of left female breast, unspecified estrogen receptor status, unspecified site of breast

## 2019-02-24 NOTE — PROGRESS NOTE ADULT - PROBLEM SELECTOR PLAN 6
continue Femara  Also on Ibrance but currently on hold per Onc recs. s/p left lap radical nephrectomy/adrenalectomy on 2/13 c/b subcapsular splenic hematoma.

## 2019-02-24 NOTE — PROGRESS NOTE ADULT - SUBJECTIVE AND OBJECTIVE BOX
Patient is a 63y old  Female who presents with a chief complaint of splenic bleed after left nephrectomy (24 Feb 2019 08:00)      SUBJECTIVE / OVERNIGHT EVENTS:  s/p 2 X 500cc fluid boluses overnight due to decreased urine outpt - urine outpt increased after that.  she has minimal appetite but encouraged her to drink and eat.  denies pain.      MEDICATIONS  (STANDING):  acetaminophen   Tablet .. 650 milliGRAM(s) Oral every 6 hours  ALBUTerol    90 MICROgram(s) HFA Inhaler 1 Puff(s) Inhalation two times a day  docusate sodium 100 milliGRAM(s) Oral two times a day  escitalopram 20 milliGRAM(s) Oral daily  heparin  Injectable 5000 Unit(s) SubCutaneous every 8 hours  ketorolac   Injectable 15 milliGRAM(s) IV Push every 6 hours  letrozole 2.5 milliGRAM(s) Oral daily  metoprolol tartrate 12.5 milliGRAM(s) Oral two times a day  pantoprazole    Tablet 40 milliGRAM(s) Oral before breakfast  senna 2 Tablet(s) Oral at bedtime  tiotropium 18 MICROgram(s) Capsule 1 Capsule(s) Inhalation daily    MEDICATIONS  (PRN):  polyethylene glycol 3350 17 Gram(s) Oral daily PRN Constipation      Vital Signs Last 24 Hrs  T(C): 36.3 (24 Feb 2019 04:49), Max: 36.8 (23 Feb 2019 13:08)  T(F): 97.4 (24 Feb 2019 04:49), Max: 98.2 (23 Feb 2019 13:08)  HR: 69 (24 Feb 2019 04:49) (69 - 100)  BP: 136/57 (24 Feb 2019 04:49) (123/76 - 136/57)  BP(mean): --  RR: 14 (24 Feb 2019 04:49) (14 - 18)  SpO2: 98% (24 Feb 2019 04:49) (88% - 99%)  CAPILLARY BLOOD GLUCOSE        I&O's Summary    23 Feb 2019 07:01  -  24 Feb 2019 07:00  --------------------------------------------------------  IN: 1750 mL / OUT: 825 mL / NET: 925 mL        PHYSICAL EXAM:  GENERAL: NAD  EYES: EOMI, sclera clear  ENMT: Moist mucous membranes  BREAST: left mastectomy s/p reconstruction, implant of right breast palpable  NECK: Supple, No JVD  RESPIRATORY: Clear to auscultation bilaterally; No crackles or wheezing  CARDIOVASCULAR: s1/s2, Regular rate and rhythm; No murmurs appreciated  GASTROINTESTINAL: Soft, slightly tender, + suprapubic staples C/D/I, Nondistended; Bowel sounds present  GENITOURINARY: no mcgee  EXTREMITIES:  2+ Peripheral Pulses, No clubbing, cyanosis, or edema  NERVOUS SYSTEM:  Alert & Oriented X3; Moving all 4 extremities; No gross sensory deficits  SKIN: No rashes or lesions; Incisions C/D/I    LABS:                        8.9    19.20 )-----------( 464      ( 24 Feb 2019 05:40 )             30.4     02-24    144  |  108<H>  |  19  ----------------------------<  95  4.1   |  28  |  0.96    Ca    7.7<L>      24 Feb 2019 05:40  Mg     1.9     02-24 Patient is a 63y old  Female who presents with a chief complaint of splenic bleed after left nephrectomy (24 Feb 2019 08:00)      SUBJECTIVE / OVERNIGHT EVENTS:  s/p 2 X 500cc fluid boluses overnight due to decreased urine outpt - urine outpt increased after that.  she has minimal appetite but encouraged her to drink and eat.  denies pain.  small hard BM yesterday     MEDICATIONS  (STANDING):  acetaminophen   Tablet .. 650 milliGRAM(s) Oral every 6 hours  ALBUTerol    90 MICROgram(s) HFA Inhaler 1 Puff(s) Inhalation two times a day  docusate sodium 100 milliGRAM(s) Oral two times a day  escitalopram 20 milliGRAM(s) Oral daily  heparin  Injectable 5000 Unit(s) SubCutaneous every 8 hours  ketorolac   Injectable 15 milliGRAM(s) IV Push every 6 hours  letrozole 2.5 milliGRAM(s) Oral daily  metoprolol tartrate 12.5 milliGRAM(s) Oral two times a day  pantoprazole    Tablet 40 milliGRAM(s) Oral before breakfast  senna 2 Tablet(s) Oral at bedtime  tiotropium 18 MICROgram(s) Capsule 1 Capsule(s) Inhalation daily    MEDICATIONS  (PRN):  polyethylene glycol 3350 17 Gram(s) Oral daily PRN Constipation      Vital Signs Last 24 Hrs  T(C): 36.3 (24 Feb 2019 04:49), Max: 36.8 (23 Feb 2019 13:08)  T(F): 97.4 (24 Feb 2019 04:49), Max: 98.2 (23 Feb 2019 13:08)  HR: 69 (24 Feb 2019 04:49) (69 - 100)  BP: 136/57 (24 Feb 2019 04:49) (123/76 - 136/57)  BP(mean): --  RR: 14 (24 Feb 2019 04:49) (14 - 18)  SpO2: 98% (24 Feb 2019 04:49) (88% - 99%)  CAPILLARY BLOOD GLUCOSE        I&O's Summary    23 Feb 2019 07:01  -  24 Feb 2019 07:00  --------------------------------------------------------  IN: 1750 mL / OUT: 825 mL / NET: 925 mL        PHYSICAL EXAM:  GENERAL: NAD  EYES: EOMI, sclera clear  ENMT: Moist mucous membranes  BREAST: left mastectomy s/p reconstruction, implant of right breast palpable  NECK: Supple, No JVD  RESPIRATORY: Clear to auscultation bilaterally; No crackles or wheezing  CARDIOVASCULAR: s1/s2, Regular rate and rhythm; No murmurs appreciated  GASTROINTESTINAL: Soft, slightly tender, + suprapubic staples C/D/I, mildy distended; Bowel sounds present  GENITOURINARY: no mcgee  EXTREMITIES:  2+ Peripheral Pulses, No clubbing, cyanosis, or edema  NERVOUS SYSTEM:  Alert & Oriented X3; Moving all 4 extremities; No gross sensory deficits  SKIN: No rashes or lesions; Incisions C/D/I    LABS:                        8.9    19.20 )-----------( 464      ( 24 Feb 2019 05:40 )             30.4     02-24    144  |  108<H>  |  19  ----------------------------<  95  4.1   |  28  |  0.96    Ca    7.7<L>      24 Feb 2019 05:40  Mg     1.9     02-24

## 2019-02-24 NOTE — PROGRESS NOTE ADULT - PROBLEM SELECTOR PLAN 3
stable  continue nebs PRN  2 L home O2 via NC at bedtime. pt with acute blood loss anemia due to subscapsular hematoma - requiring sicu stay for hemorrhagic shock   - s/p  8 U PRBC, 2 U FFP, 1 platelet transfusions and IR embolization of splenic artery on 2/17  - cbc now stable - continue to monitor

## 2019-02-25 LAB
ANION GAP SERPL CALC-SCNC: 11 MMO/L — SIGNIFICANT CHANGE UP (ref 7–14)
BACTERIA UR CULT: SIGNIFICANT CHANGE UP
BASOPHILS # BLD AUTO: 0.05 K/UL — SIGNIFICANT CHANGE UP (ref 0–0.2)
BASOPHILS NFR BLD AUTO: 0.3 % — SIGNIFICANT CHANGE UP (ref 0–2)
BUN SERPL-MCNC: 20 MG/DL — SIGNIFICANT CHANGE UP (ref 7–23)
CALCIUM SERPL-MCNC: 7.7 MG/DL — LOW (ref 8.4–10.5)
CHLORIDE SERPL-SCNC: 106 MMOL/L — SIGNIFICANT CHANGE UP (ref 98–107)
CO2 SERPL-SCNC: 28 MMOL/L — SIGNIFICANT CHANGE UP (ref 22–31)
CREAT SERPL-MCNC: 0.85 MG/DL — SIGNIFICANT CHANGE UP (ref 0.5–1.3)
EOSINOPHIL # BLD AUTO: 0.83 K/UL — HIGH (ref 0–0.5)
EOSINOPHIL NFR BLD AUTO: 4.3 % — SIGNIFICANT CHANGE UP (ref 0–6)
GLUCOSE SERPL-MCNC: 98 MG/DL — SIGNIFICANT CHANGE UP (ref 70–99)
HCT VFR BLD CALC: 28.2 % — LOW (ref 34.5–45)
HCT VFR BLD CALC: 28.2 % — LOW (ref 34.5–45)
HGB BLD-MCNC: 8.5 G/DL — LOW (ref 11.5–15.5)
HGB BLD-MCNC: 8.5 G/DL — LOW (ref 11.5–15.5)
IMM GRANULOCYTES NFR BLD AUTO: 1.8 % — HIGH (ref 0–1.5)
LYMPHOCYTES # BLD AUTO: 1.18 K/UL — SIGNIFICANT CHANGE UP (ref 1–3.3)
LYMPHOCYTES # BLD AUTO: 6.1 % — LOW (ref 13–44)
MCHC RBC-ENTMCNC: 28.4 PG — SIGNIFICANT CHANGE UP (ref 27–34)
MCHC RBC-ENTMCNC: 28.4 PG — SIGNIFICANT CHANGE UP (ref 27–34)
MCHC RBC-ENTMCNC: 30.1 % — LOW (ref 32–36)
MCHC RBC-ENTMCNC: 30.1 % — LOW (ref 32–36)
MCV RBC AUTO: 94.3 FL — SIGNIFICANT CHANGE UP (ref 80–100)
MCV RBC AUTO: 94.3 FL — SIGNIFICANT CHANGE UP (ref 80–100)
MONOCYTES # BLD AUTO: 2.01 K/UL — HIGH (ref 0–0.9)
MONOCYTES NFR BLD AUTO: 10.3 % — SIGNIFICANT CHANGE UP (ref 2–14)
NEUTROPHILS # BLD AUTO: 15.03 K/UL — HIGH (ref 1.8–7.4)
NEUTROPHILS NFR BLD AUTO: 77.2 % — HIGH (ref 43–77)
NRBC # FLD: 0.06 K/UL — LOW (ref 25–125)
NRBC # FLD: 0.06 K/UL — LOW (ref 25–125)
PLATELET # BLD AUTO: 553 K/UL — HIGH (ref 150–400)
PLATELET # BLD AUTO: 553 K/UL — HIGH (ref 150–400)
PMV BLD: 10.7 FL — SIGNIFICANT CHANGE UP (ref 7–13)
PMV BLD: 10.7 FL — SIGNIFICANT CHANGE UP (ref 7–13)
POTASSIUM SERPL-MCNC: 3.6 MMOL/L — SIGNIFICANT CHANGE UP (ref 3.5–5.3)
POTASSIUM SERPL-SCNC: 3.6 MMOL/L — SIGNIFICANT CHANGE UP (ref 3.5–5.3)
RBC # BLD: 2.99 M/UL — LOW (ref 3.8–5.2)
RBC # BLD: 2.99 M/UL — LOW (ref 3.8–5.2)
RBC # FLD: 17.6 % — HIGH (ref 10.3–14.5)
RBC # FLD: 17.6 % — HIGH (ref 10.3–14.5)
SODIUM SERPL-SCNC: 145 MMOL/L — SIGNIFICANT CHANGE UP (ref 135–145)
SPECIMEN SOURCE: SIGNIFICANT CHANGE UP
WBC # BLD: 19.3 K/UL — HIGH (ref 3.8–10.5)
WBC # BLD: 19.3 K/UL — HIGH (ref 3.8–10.5)
WBC # FLD AUTO: 19.3 K/UL — HIGH (ref 3.8–10.5)
WBC # FLD AUTO: 19.3 K/UL — HIGH (ref 3.8–10.5)

## 2019-02-25 PROCEDURE — 74177 CT ABD & PELVIS W/CONTRAST: CPT | Mod: 26

## 2019-02-25 PROCEDURE — 99233 SBSQ HOSP IP/OBS HIGH 50: CPT

## 2019-02-25 PROCEDURE — 71260 CT THORAX DX C+: CPT | Mod: 26

## 2019-02-25 RX ORDER — FUROSEMIDE 40 MG
40 TABLET ORAL ONCE
Qty: 0 | Refills: 0 | Status: COMPLETED | OUTPATIENT
Start: 2019-02-25 | End: 2019-02-25

## 2019-02-25 RX ADMIN — Medication 15 MILLIGRAM(S): at 10:49

## 2019-02-25 RX ADMIN — Medication 650 MILLIGRAM(S): at 23:49

## 2019-02-25 RX ADMIN — Medication 40 MILLIGRAM(S): at 22:06

## 2019-02-25 RX ADMIN — ESCITALOPRAM OXALATE 20 MILLIGRAM(S): 10 TABLET, FILM COATED ORAL at 13:02

## 2019-02-25 RX ADMIN — Medication 12.5 MILLIGRAM(S): at 17:45

## 2019-02-25 RX ADMIN — Medication 650 MILLIGRAM(S): at 13:02

## 2019-02-25 RX ADMIN — TIOTROPIUM BROMIDE 1 CAPSULE(S): 18 CAPSULE ORAL; RESPIRATORY (INHALATION) at 10:49

## 2019-02-25 RX ADMIN — Medication 12.5 MILLIGRAM(S): at 06:20

## 2019-02-25 RX ADMIN — LETROZOLE 2.5 MILLIGRAM(S): 2.5 TABLET, FILM COATED ORAL at 13:02

## 2019-02-25 RX ADMIN — HEPARIN SODIUM 5000 UNIT(S): 5000 INJECTION INTRAVENOUS; SUBCUTANEOUS at 13:02

## 2019-02-25 RX ADMIN — Medication 650 MILLIGRAM(S): at 06:20

## 2019-02-25 RX ADMIN — Medication 650 MILLIGRAM(S): at 00:12

## 2019-02-25 RX ADMIN — Medication 650 MILLIGRAM(S): at 17:46

## 2019-02-25 RX ADMIN — Medication 100 MILLIGRAM(S): at 17:46

## 2019-02-25 RX ADMIN — ALBUTEROL 1 PUFF(S): 90 AEROSOL, METERED ORAL at 10:49

## 2019-02-25 RX ADMIN — HEPARIN SODIUM 5000 UNIT(S): 5000 INJECTION INTRAVENOUS; SUBCUTANEOUS at 06:20

## 2019-02-25 RX ADMIN — HEPARIN SODIUM 5000 UNIT(S): 5000 INJECTION INTRAVENOUS; SUBCUTANEOUS at 22:06

## 2019-02-25 RX ADMIN — Medication 15 MILLIGRAM(S): at 16:38

## 2019-02-25 RX ADMIN — Medication 15 MILLIGRAM(S): at 22:06

## 2019-02-25 RX ADMIN — PANTOPRAZOLE SODIUM 40 MILLIGRAM(S): 20 TABLET, DELAYED RELEASE ORAL at 06:20

## 2019-02-25 NOTE — PROGRESS NOTE ADULT - PROBLEM SELECTOR PLAN 4
Stable, patient with chronic hypoxic respiratory failure. No evidence of exacerbation. On long term oxygen therapy as outpatient.  continue nebs PRN  c/w 2 L home O2 via NC at bedtime.

## 2019-02-25 NOTE — PROGRESS NOTE ADULT - ASSESSMENT
63 W PMH COPD on long-term oxygen therapy, h/o BrCa with bone mets s/p mastectomy and chemo 13 years ago (on letrozole and palbociclib), large left renal mass, s/p left lap radical nephrectomy/adrenalectomy on 2/13 presented with subcapsular splenic hematoma and hemorrhagic shock. Patient underwent IR-guided angioembolization of proximal splenic artery. She also required a SICU stay and received 8U pRBC and 2U FFPs.

## 2019-02-25 NOTE — PROGRESS NOTE ADULT - SUBJECTIVE AND OBJECTIVE BOX
Patient is a 63y old  Female who presents with a chief complaint of splenic bleed after left nephrectomy (25 Feb 2019 06:37)    SUBJECTIVE / OVERNIGHT EVENTS: No events overnight. This AM, patient states she fells well. Reports an intermittent cough which she's had for at least 3 weeks. No f/c/n/v/d.    MEDICATIONS  (STANDING):  acetaminophen   Tablet .. 650 milliGRAM(s) Oral every 6 hours  ALBUTerol    90 MICROgram(s) HFA Inhaler 1 Puff(s) Inhalation two times a day  docusate sodium 100 milliGRAM(s) Oral two times a day  escitalopram 20 milliGRAM(s) Oral daily  heparin  Injectable 5000 Unit(s) SubCutaneous every 8 hours  ketorolac   Injectable 15 milliGRAM(s) IV Push every 6 hours  letrozole 2.5 milliGRAM(s) Oral daily  metoprolol tartrate 12.5 milliGRAM(s) Oral two times a day  pantoprazole    Tablet 40 milliGRAM(s) Oral before breakfast  senna 2 Tablet(s) Oral at bedtime  tiotropium 18 MICROgram(s) Capsule 1 Capsule(s) Inhalation daily    MEDICATIONS  (PRN):  polyethylene glycol 3350 17 Gram(s) Oral daily PRN Constipation      PHYSICAL EXAM:  T(C): 36.4 (02-25-19 @ 09:33), Max: 36.7 (02-24-19 @ 21:00)  HR: 85 (02-25-19 @ 09:33) (74 - 90)  BP: 140/70 (02-25-19 @ 09:33) (119/73 - 153/70)  RR: 18 (02-25-19 @ 09:33) (16 - 19)  SpO2: 98% (02-25-19 @ 09:33) (96% - 99%)  I&O's Summary    24 Feb 2019 07:01  -  25 Feb 2019 07:00  --------------------------------------------------------  IN: 0 mL / OUT: 700 mL / NET: -700 mL    25 Feb 2019 07:01  -  25 Feb 2019 12:20  --------------------------------------------------------  IN: 0 mL / OUT: 350 mL / NET: -350 mL    GENERAL: NAD, well-developed, lying in bed, pleasant  HEAD:  Atraumatic, Normocephalic, MMM  CHEST/LUNG: Clear to auscultation bilaterally; No wheeze  HEART: Regular rate and rhythm; No murmurs, rubs, or gallops  ABDOMEN: Soft, Nontender, Nondistended; Bowel sounds present; umbilical and suprapubic incisions healing well and without any erythema or discharge  EXTREMITIES:  2+ Peripheral Pulses, No clubbing, cyanosis, or edema  PSYCH: AAOx3  NEUROLOGY: CN II-XII grossly intact, moving all extremities    LABS:  CAPILLARY BLOOD GLUCOSE                      8.5    19.30 )-----------( 553      ( 25 Feb 2019 05:35 )             28.2     02-25    145  |  106  |  20  ----------------------------<  98  3.6   |  28  |  0.85    Ca    7.7<L>      25 Feb 2019 05:35  Mg     1.9     02-24      RADIOLOGY & ADDITIONAL TESTS:    Telemetry Personally Reviewed -     Imaging Personally Reviewed -     Imaging Reviewed -     Consultant(s) Notes Reviewed -       Care Discussed with Consultants/Other Providers -

## 2019-02-25 NOTE — PROGRESS NOTE ADULT - ASSESSMENT
This is a 62 y/o F s/p L laparoscopic radical nephrectomy on 2/14, post-operative course c/b readmission for splenic bleed requiring IR angioembolization of a proximal splenic artery on 2/17, now with stable Hct and EGD notable only for erosions without overt ulceration.  Summary of units received: 8U PRBCs, 2FFP, 1 Plt    - awaiting AM CBC  - follow up urine culture  - Bowel regimen: senna, colace and PRN miralax ordered  - On standing Tylenol and Toradol, avoid narcotics  - IV locked  - Tolerating regular diet, encourage PO intake, dulcolax this AM for continued distension  - Follow up Med, Surgery, GI, Heme/onc recs  - Rehab planning

## 2019-02-25 NOTE — PROGRESS NOTE ADULT - PROBLEM SELECTOR PLAN 3
Pt with acute blood loss anemia due to subscapsular hematoma - requiring SICU stay for hemorrhagic shock   - s/p  8 U PRBC, 2 U FFP, 1 platelet transfusions and IR embolization of splenic artery on 2/17  - CBC now stable - continue to monitor

## 2019-02-25 NOTE — PROGRESS NOTE ADULT - PROBLEM SELECTOR PLAN 5
Likely reactive in setting of critical illness and hemorrhagic shock. She remains afebrile. She reports 3 weeks of a mild cough, but no clear e/o pneumonia on CXR.  Monitor off Abx, temperature  2/17 Bcx NGTD  repeat CXR from 2/23 shows bibasilar lung opacities; c/w incentive spirometry, ambulation as tolerated

## 2019-02-25 NOTE — PROGRESS NOTE ADULT - SUBJECTIVE AND OBJECTIVE BOX
Subjective  Reports bloating after eating overnight  Continued improvement in UOP with improved PO hydration, urine remains dark  No nausea or vomiting  Passing flatus    Objective  Vital signs  T(F): , Max: 98 (02-24-19 @ 21:00)  HR: 84 (02-25-19 @ 06:00)  BP: 153/70 (02-25-19 @ 06:00)  SpO2: 96% (02-25-19 @ 06:00)  Void 200    Gen: NAD  Abd: distended, non-tender, incisions c/d/i   : bladder non-palpable    Labs  02-24 @ 05:40    WBC 19.20 / Hct 30.4  / SCr 0.96       Urine Cx: Pending

## 2019-02-26 LAB
HCT VFR BLD CALC: 30.1 % — LOW (ref 34.5–45)
HGB BLD-MCNC: 9 G/DL — LOW (ref 11.5–15.5)
MCHC RBC-ENTMCNC: 28.4 PG — SIGNIFICANT CHANGE UP (ref 27–34)
MCHC RBC-ENTMCNC: 29.9 % — LOW (ref 32–36)
MCV RBC AUTO: 95 FL — SIGNIFICANT CHANGE UP (ref 80–100)
NRBC # FLD: 0.02 K/UL — LOW (ref 25–125)
PLATELET # BLD AUTO: 611 K/UL — HIGH (ref 150–400)
PMV BLD: 10.7 FL — SIGNIFICANT CHANGE UP (ref 7–13)
RBC # BLD: 3.17 M/UL — LOW (ref 3.8–5.2)
RBC # FLD: 17.7 % — HIGH (ref 10.3–14.5)
SURGICAL PATHOLOGY STUDY: SIGNIFICANT CHANGE UP
WBC # BLD: 16.49 K/UL — HIGH (ref 3.8–10.5)
WBC # FLD AUTO: 16.49 K/UL — HIGH (ref 3.8–10.5)

## 2019-02-26 PROCEDURE — 99233 SBSQ HOSP IP/OBS HIGH 50: CPT

## 2019-02-26 RX ADMIN — Medication 650 MILLIGRAM(S): at 07:10

## 2019-02-26 RX ADMIN — HEPARIN SODIUM 5000 UNIT(S): 5000 INJECTION INTRAVENOUS; SUBCUTANEOUS at 21:29

## 2019-02-26 RX ADMIN — HEPARIN SODIUM 5000 UNIT(S): 5000 INJECTION INTRAVENOUS; SUBCUTANEOUS at 15:26

## 2019-02-26 RX ADMIN — ALBUTEROL 1 PUFF(S): 90 AEROSOL, METERED ORAL at 09:45

## 2019-02-26 RX ADMIN — ESCITALOPRAM OXALATE 20 MILLIGRAM(S): 10 TABLET, FILM COATED ORAL at 12:39

## 2019-02-26 RX ADMIN — Medication 12.5 MILLIGRAM(S): at 07:10

## 2019-02-26 RX ADMIN — Medication 12.5 MILLIGRAM(S): at 18:20

## 2019-02-26 RX ADMIN — PANTOPRAZOLE SODIUM 40 MILLIGRAM(S): 20 TABLET, DELAYED RELEASE ORAL at 07:09

## 2019-02-26 RX ADMIN — LETROZOLE 2.5 MILLIGRAM(S): 2.5 TABLET, FILM COATED ORAL at 12:39

## 2019-02-26 RX ADMIN — Medication 15 MILLIGRAM(S): at 09:46

## 2019-02-26 RX ADMIN — Medication 15 MILLIGRAM(S): at 15:26

## 2019-02-26 RX ADMIN — Medication 650 MILLIGRAM(S): at 23:21

## 2019-02-26 RX ADMIN — Medication 650 MILLIGRAM(S): at 18:20

## 2019-02-26 RX ADMIN — Medication 650 MILLIGRAM(S): at 12:39

## 2019-02-26 RX ADMIN — HEPARIN SODIUM 5000 UNIT(S): 5000 INJECTION INTRAVENOUS; SUBCUTANEOUS at 07:09

## 2019-02-26 RX ADMIN — TIOTROPIUM BROMIDE 1 CAPSULE(S): 18 CAPSULE ORAL; RESPIRATORY (INHALATION) at 09:44

## 2019-02-26 NOTE — PROGRESS NOTE ADULT - SUBJECTIVE AND OBJECTIVE BOX
Subjective  CT with bilateral pleural effusions and ascites, given 40mg lasix IV    Objective  Vital signs  T(F): , Max: 98.1 (02-25-19 @ 16:56)  HR: 75 (02-26-19 @ 00:48)  BP: 142/69 (02-26-19 @ 00:48)  SpO2: 99% (02-26-19 @ 00:48)  Void 1290    Gen: NAD  Abd: incisions c/d/i with staples in place, distension improving  : bladder non-palpable    Labs      02-25 @ 05:35    WBC 19.30 / Hct 28.2  / SCr 0.85     UCx: Neg

## 2019-02-26 NOTE — PROGRESS NOTE ADULT - PROBLEM SELECTOR PLAN 7
Anabel mets and ascites noted on CT. Patient reports she was diagnosed with disease recurrence around 5/2018. Continue letrozole.  Also on Ibrance but currently on hold per Onc recs.

## 2019-02-26 NOTE — PROGRESS NOTE ADULT - PROBLEM SELECTOR PLAN 5
Improving. Likely reactive in setting of critical illness and hemorrhagic shock. She remains afebrile. She reports 3 weeks of a mild cough, but no clear e/o pneumonia on CXR. CT chest/abdo/pelvis without obvious infection.  Monitor off Abx, temperature  2/17 Bcx NG x5 days

## 2019-02-26 NOTE — PROGRESS NOTE ADULT - ASSESSMENT
This is a 64 y/o F s/p L laparoscopic radical nephrectomy on 2/14, post-operative course c/b readmission for splenic bleed requiring IR angioembolization of a proximal splenic artery on 2/17, now with stable Hct and EGD notable only for erosions without overt ulceration.  Summary of units received: 8U PRBCs, 2FFP, 1 Plt    - awaiting AM CBC  - follow up urine culture  - Bowel regimen: senna, colace and PRN miralax ordered  - On standing Tylenol and Toradol, avoid narcotics  - IV locked  - Tolerating regular diet, encourage PO intake, dulcolax this AM for continued distension  - Follow up Med, Surgery, GI, Heme/onc recs  - Rehab planning

## 2019-02-26 NOTE — PROGRESS NOTE ADULT - SUBJECTIVE AND OBJECTIVE BOX
Patient is a 63y old  Female who presents with a chief complaint of splenic bleed after left nephrectomy (25 Feb 2019 06:37)    SUBJECTIVE / OVERNIGHT EVENTS: No events overnight. This AM, patient states she fells well. No f/c/n/v/d.    MEDICATIONS  (STANDING):  acetaminophen   Tablet .. 650 milliGRAM(s) Oral every 6 hours  ALBUTerol    90 MICROgram(s) HFA Inhaler 1 Puff(s) Inhalation two times a day  docusate sodium 100 milliGRAM(s) Oral two times a day  escitalopram 20 milliGRAM(s) Oral daily  heparin  Injectable 5000 Unit(s) SubCutaneous every 8 hours  ketorolac   Injectable 15 milliGRAM(s) IV Push every 6 hours  letrozole 2.5 milliGRAM(s) Oral daily  metoprolol tartrate 12.5 milliGRAM(s) Oral two times a day  pantoprazole    Tablet 40 milliGRAM(s) Oral before breakfast  senna 2 Tablet(s) Oral at bedtime  tiotropium 18 MICROgram(s) Capsule 1 Capsule(s) Inhalation daily    MEDICATIONS  (PRN):  polyethylene glycol 3350 17 Gram(s) Oral daily PRN Constipation      T(C): 36.4 (02-26-19 @ 09:40), Max: 36.7 (02-25-19 @ 16:56)  HR: 72 (02-26-19 @ 09:40) (72 - 87)  BP: 143/67 (02-26-19 @ 09:40) (142/69 - 166/72)  RR: 17 (02-26-19 @ 09:40) (16 - 18)  SpO2: 97% (02-26-19 @ 09:40) (95% - 99%)      02-25-19 @ 07:01  -  02-26-19 @ 07:00  --------------------------------------------------------  IN: 0 mL / OUT: 2090 mL / NET: -2090 mL    02-26-19 @ 07:01  -  02-26-19 @ 13:09  --------------------------------------------------------  IN: 0 mL / OUT: 400 mL / NET: -400 mL        GENERAL: NAD, well-developed, lying in bed, pleasant  HEAD:  Atraumatic, Normocephalic, MMM  CHEST/LUNG: Clear to auscultation bilaterally; No wheeze  HEART: Regular rate and rhythm; No murmurs, rubs, or gallops  ABDOMEN: Soft, Nontender, Nondistended; Bowel sounds present; umbilical and suprapubic incisions healing well and without any erythema or discharge  EXTREMITIES:  2+ Peripheral Pulses, No clubbing, cyanosis, or edema  PSYCH: AAOx3  NEUROLOGY: CN II-XII grossly intact, moving all extremities      LABS:  CAPILLARY BLOOD GLUCOSE                          9.0    16.49 )-----------( 611      ( 26 Feb 2019 06:52 )             30.1     02-25    145  |  106  |  20  ----------------------------<  98  3.6   |  28  |  0.85    Ca    7.7<L>      25 Feb 2019 05:35      RADIOLOGY & ADDITIONAL TESTS:    Telemetry Personally Reviewed -     Imaging Personally Reviewed -     Imaging Reviewed - CT chest/abdo/pelvis - ascites, jeff mets; no pneumonia or collections    Consultant(s) Notes Reviewed -       Care Discussed with Consultants/Other Providers -

## 2019-02-27 DIAGNOSIS — R91.1 SOLITARY PULMONARY NODULE: ICD-10-CM

## 2019-02-27 LAB
HCT VFR BLD CALC: 29.7 % — LOW (ref 34.5–45)
HGB BLD-MCNC: 9 G/DL — LOW (ref 11.5–15.5)
MCHC RBC-ENTMCNC: 28.8 PG — SIGNIFICANT CHANGE UP (ref 27–34)
MCHC RBC-ENTMCNC: 30.3 % — LOW (ref 32–36)
MCV RBC AUTO: 94.9 FL — SIGNIFICANT CHANGE UP (ref 80–100)
NRBC # FLD: 0 K/UL — LOW (ref 25–125)
PLATELET # BLD AUTO: 609 K/UL — HIGH (ref 150–400)
PMV BLD: 11.1 FL — SIGNIFICANT CHANGE UP (ref 7–13)
RBC # BLD: 3.13 M/UL — LOW (ref 3.8–5.2)
RBC # FLD: 17.9 % — HIGH (ref 10.3–14.5)
WBC # BLD: 13.54 K/UL — HIGH (ref 3.8–10.5)
WBC # FLD AUTO: 13.54 K/UL — HIGH (ref 3.8–10.5)

## 2019-02-27 PROCEDURE — 99233 SBSQ HOSP IP/OBS HIGH 50: CPT

## 2019-02-27 RX ORDER — OXYCODONE HYDROCHLORIDE 5 MG/1
5 TABLET ORAL EVERY 4 HOURS
Qty: 0 | Refills: 0 | Status: DISCONTINUED | OUTPATIENT
Start: 2019-02-27 | End: 2019-03-01

## 2019-02-27 RX ORDER — OXYCODONE HYDROCHLORIDE 5 MG/1
10 TABLET ORAL EVERY 4 HOURS
Qty: 0 | Refills: 0 | Status: DISCONTINUED | OUTPATIENT
Start: 2019-02-27 | End: 2019-03-01

## 2019-02-27 RX ADMIN — Medication 100 MILLIGRAM(S): at 17:38

## 2019-02-27 RX ADMIN — HEPARIN SODIUM 5000 UNIT(S): 5000 INJECTION INTRAVENOUS; SUBCUTANEOUS at 13:01

## 2019-02-27 RX ADMIN — PANTOPRAZOLE SODIUM 40 MILLIGRAM(S): 20 TABLET, DELAYED RELEASE ORAL at 06:02

## 2019-02-27 RX ADMIN — Medication 12.5 MILLIGRAM(S): at 06:02

## 2019-02-27 RX ADMIN — Medication 650 MILLIGRAM(S): at 06:03

## 2019-02-27 RX ADMIN — OXYCODONE HYDROCHLORIDE 10 MILLIGRAM(S): 5 TABLET ORAL at 22:00

## 2019-02-27 RX ADMIN — OXYCODONE HYDROCHLORIDE 10 MILLIGRAM(S): 5 TABLET ORAL at 21:00

## 2019-02-27 RX ADMIN — TIOTROPIUM BROMIDE 1 CAPSULE(S): 18 CAPSULE ORAL; RESPIRATORY (INHALATION) at 10:20

## 2019-02-27 RX ADMIN — OXYCODONE HYDROCHLORIDE 10 MILLIGRAM(S): 5 TABLET ORAL at 08:45

## 2019-02-27 RX ADMIN — Medication 650 MILLIGRAM(S): at 13:02

## 2019-02-27 RX ADMIN — LETROZOLE 2.5 MILLIGRAM(S): 2.5 TABLET, FILM COATED ORAL at 13:02

## 2019-02-27 RX ADMIN — HEPARIN SODIUM 5000 UNIT(S): 5000 INJECTION INTRAVENOUS; SUBCUTANEOUS at 21:00

## 2019-02-27 RX ADMIN — ESCITALOPRAM OXALATE 20 MILLIGRAM(S): 10 TABLET, FILM COATED ORAL at 13:01

## 2019-02-27 RX ADMIN — HEPARIN SODIUM 5000 UNIT(S): 5000 INJECTION INTRAVENOUS; SUBCUTANEOUS at 06:02

## 2019-02-27 RX ADMIN — OXYCODONE HYDROCHLORIDE 10 MILLIGRAM(S): 5 TABLET ORAL at 09:30

## 2019-02-27 RX ADMIN — ALBUTEROL 1 PUFF(S): 90 AEROSOL, METERED ORAL at 10:21

## 2019-02-27 RX ADMIN — Medication 12.5 MILLIGRAM(S): at 17:38

## 2019-02-27 NOTE — CONSULT NOTE ADULT - ASSESSMENT
63 W PMH COPD on long-term oxygen therapy, h/o BrCa with bone mets s/p mastectomy and chemo 13 years ago (on letrozole and palbociclib), large left renal mass, s/p left lap radical nephrectomy/adrenalectomy p/w with splenic artery bleed. Pt noted to have multiple lung nodules in CT.     CT Chest 5/22/18 showed a R apical 7mm nodule, a RML 3mm nodule, RML 2mm nodule  CT Chest 11/30/18- resolution of R lung nodules. New LLL 8mm nodule, new LAURIE 4mm nodule  CT Chest 2/25/19- LAURIE nodule ?increased to 5mm. LLL nodule not seen    The resolution of nodules points to likely inflammation vs infection. Only LAURIE nodule seems to be stable. Will d/w Dr. Damon the risk vs. benefits of VATS bx    Delmar Henriquez PAC  [id number]

## 2019-02-27 NOTE — PROGRESS NOTE ADULT - SUBJECTIVE AND OBJECTIVE BOX
Patient is a 63y old  Female who presents with a chief complaint of splenic bleed after left nephrectomy (25 Feb 2019 06:37)    SUBJECTIVE / OVERNIGHT EVENTS: No events overnight. This AM, patient states she fells well. No f/c/n/v/d.    MEDICATIONS  (STANDING):  acetaminophen   Tablet .. 650 milliGRAM(s) Oral every 6 hours  ALBUTerol    90 MICROgram(s) HFA Inhaler 1 Puff(s) Inhalation two times a day  docusate sodium 100 milliGRAM(s) Oral two times a day  escitalopram 20 milliGRAM(s) Oral daily  heparin  Injectable 5000 Unit(s) SubCutaneous every 8 hours  letrozole 2.5 milliGRAM(s) Oral daily  metoprolol tartrate 12.5 milliGRAM(s) Oral two times a day  pantoprazole    Tablet 40 milliGRAM(s) Oral before breakfast  senna 2 Tablet(s) Oral at bedtime  tiotropium 18 MICROgram(s) Capsule 1 Capsule(s) Inhalation daily    MEDICATIONS  (PRN):  oxyCODONE    IR 5 milliGRAM(s) Oral every 4 hours PRN mild-mod pain  oxyCODONE    IR 10 milliGRAM(s) Oral every 4 hours PRN Severe Pain (7 - 10)  polyethylene glycol 3350 17 Gram(s) Oral daily PRN Constipation      T(C): 36.7 (02-27-19 @ 10:25), Max: 36.8 (02-26-19 @ 15:20)  HR: 71 (02-27-19 @ 10:25) (66 - 76)  BP: 164/64 (02-27-19 @ 10:25) (150/72 - 164/64)  RR: 18 (02-27-19 @ 10:25) (17 - 18)  SpO2: 99% (02-27-19 @ 10:25) (96% - 99%)      02-26-19 @ 07:01  -  02-27-19 @ 07:00  --------------------------------------------------------  IN: 0 mL / OUT: 400 mL / NET: -400 mL    02-27-19 @ 07:01  -  02-27-19 @ 11:29  --------------------------------------------------------  IN: 0 mL / OUT: 600 mL / NET: -600 mL          GENERAL: NAD, well-developed, seated in chair watching TV, pleasant  HEAD:  Atraumatic, Normocephalic, MMM  CHEST/LUNG: Clear to auscultation bilaterally; No wheeze  HEART: Regular rate and rhythm; No murmurs, rubs, or gallops  ABDOMEN: Soft, Nontender, Nondistended; Bowel sounds present; umbilical and suprapubic incisions healing well and without any erythema or discharge  EXTREMITIES:  2+ Peripheral Pulses, No clubbing, cyanosis, or edema  PSYCH: AAOx3  NEUROLOGY: CN II-XII grossly intact, moving all extremities    LABS:  CAPILLARY BLOOD GLUCOSE                                9.0    13.54 )-----------( 609      ( 27 Feb 2019 06:18 )             29.7                       RADIOLOGY & ADDITIONAL TESTS:    Telemetry Personally Reviewed:    ECG Personally Reviewed:    Imaging Personally Reviewed:    Imaging Reviewed:     Consultant(s) Notes Reviewed:      Care Discussed with Consultants/Other Providers:

## 2019-02-27 NOTE — PROGRESS NOTE ADULT - SUBJECTIVE AND OBJECTIVE BOX
Subjective  CT notable for interval enlargement in previously seen pulmonary nodule  Objective  Vital signs  T(F): , Max: 98.3 (02-26-19 @ 15:20)  HR: 73 (02-27-19 @ 06:00)  BP: 156/70 (02-27-19 @ 06:00)  SpO2: 98% (02-27-19 @ 06:00)  Voids not recorded      Gen: NAD  Abd: incisions c/d/i with staples in place  : bladder non-palpable    Labs  02-26 @ 06:52    WBC 16.49 / Hct 30.1  / SCr --

## 2019-02-27 NOTE — PROGRESS NOTE ADULT - ASSESSMENT
This is a 64 y/o F s/p L laparoscopic radical nephrectomy on 2/14, post-operative course c/b readmission for splenic bleed requiring IR angioembolization of a proximal splenic artery on 2/17, now with stable Hct and EGD notable only for erosions without overt ulceration.  Summary of units received: 8U PRBCs, 2FFP, 1 Plt    - awaiting AM CBC  - Bowel regimen: senna, colace and PRN miralax ordered  - On standing Tylenol and Toradol, avoid narcotics  - IV locked  - Tolerating regular diet, encourage PO intake, dulcolax this AM for continued distension  - d/c staples today  - Follow up Med, Surgery, GI, Heme/onc recs  - will consult thoracic surgery re: pulmonary nodule  - Rehab planning

## 2019-02-27 NOTE — CONSULT NOTE ADULT - REASON FOR ADMISSION
splenic bleed after left nephrectomy
hemorrhagic shock

## 2019-02-27 NOTE — PROGRESS NOTE ADULT - PROBLEM SELECTOR PLAN 5
Pt with acute blood loss anemia due to subscapsular hematoma - requiring SICU stay for hemorrhagic shock   - s/p  8 U PRBC, 2 U FFP, 1 platelet transfusions and IR embolization of splenic artery on 2/17  - CBC now stable, bleed resolved

## 2019-02-27 NOTE — CONSULT NOTE ADULT - CONSULT REASON
Sun
hemorrhagic shock secondary to subcapsular hematoma
subcapsular splenic hematoma
Lung nodules
management of chronic medical problems: anemia, emphysema, breast ca, anemia
medical comanagement

## 2019-02-27 NOTE — CONSULT NOTE ADULT - SUBJECTIVE AND OBJECTIVE BOX
Carla Ruiz MD  Pager [phone]    HPI:  63 year old female with a history of COPD, depression, left breast cancer, large left renal mass, s/p  left lap radical nephrectomy/adrenalectomy on 2/13, discharged yesterday, had postop fever 101.8F, pre-discharge Hgb 7.7, developed weakness, dizziness, found to be in shock, Hgb 6.6, CT showed large perisplenic hematoma [id number] cm. She was transfused 2 units pRBC at [facility] ED and transferred to [facility] for further management.   Pt feels very weak and dizzy, denies chest pain/sob/abdominal pain/fever.     PAST MEDICAL & SURGICAL HISTORY:  Emphysema lung  Breast cancer: Left breast cancer - 13 years ago, s/p mastectomy, and chemoptherapy  Anemia  Other specified disorders of kidney and ureter  H/O total adrenalectomy  S/p nephrectomy  H/O left mastectomy  History of hip surgery: in 2007- Right hip surgery      Review of Systems:   CONSTITUTIONAL: No fever, +weight loss, fatigue  EYES: No eye pain, visual disturbances, or discharge  ENMT:  No difficulty hearing, tinnitus, vertigo; No sinus or throat pain  NECK: No pain or stiffness  BREASTS: No pain, masses, or nipple discharge  RESPIRATORY: No cough, wheezing, chills or hemoptysis; No shortness of breath  CARDIOVASCULAR: No chest pain, palpitations, + dizziness  GASTROINTESTINAL: No abdominal or epigastric pain. No nausea, vomiting, or hematemesis; No diarrhea or constipation. No melena or hematochezia.  GENITOURINARY: No dysuria, frequency, hematuria, or incontinence  NEUROLOGICAL: No headaches, memory loss, loss of strength, numbness, or tremors  SKIN: No itching, burning, rashes, or lesions   LYMPH NODES: No enlarged glands  ENDOCRINE: No heat or cold intolerance; No hair loss  MUSCULOSKELETAL: No joint pain or swelling; No muscle, back, or extremity pain  PSYCHIATRIC: +depression, anxiety, mood swings, or difficulty sleeping  HEME/LYMPH: No easy bruising, or bleeding gums  ALLERY AND IMMUNOLOGIC: No hives or eczema    Allergies    Cipro (Unknown)    Intolerances    Social History: : former smoker 1 ppd x30 years, 1-2 drinks wine/month      FAMILY HISTORY:  No pertinent family history in first degree relatives      Home Medications:  Anoro Ellipta 62.5 mcg-25 mcg/inh inhalation powder: 1 puff(s) inhaled once a day in am (13 Feb 2019 08:46)  diphenhydrAMINE 25 mg oral tablet: 1 tab(s) orally once a day (at bedtime), As Needed (13 Feb 2019 08:46)  Femara 2.5 mg oral tablet: 1 tab(s) orally once a day in am (13 Feb 2019 08:46)  Ibrance 100 mg oral capsule: 1 cap(s) orally once a day in am (13 Feb 2019 08:46)  Lexapro 20 mg oral tablet: 1 tab(s) orally once a day in am (13 Feb 2019 08:46)  Milk of Magnesia 8% oral suspension: 15 milliliter(s) orally once a day (at bedtime), As Needed (13 Feb 2019 08:46)  Senna 8.6 mg oral tablet: 2 tab(s) orally once a day (at bedtime), As Needed (13 Feb 2019 08:46)  Vitamin D3 2000 intl units oral tablet: 1 tab(s) orally once a day (13 Feb 2019 08:46)      MEDICATIONS  (STANDING):    MEDICATIONS  (PRN):      Vital Signs Last 24 Hrs  T(C): 36.7 (17 Feb 2019 15:09), Max: 36.7 (17 Feb 2019 15:09)  T(F): 98 (17 Feb 2019 15:09), Max: 98 (17 Feb 2019 15:09)  HR: 82 (17 Feb 2019 15:09) (81 - 90)  BP: 84/55 (17 Feb 2019 15:09) (75/36 - 98/59)  BP(mean): --  RR: 16 (17 Feb 2019 15:09) (16 - 20)  SpO2: 100% (17 Feb 2019 15:09) (97% - 100%)  CAPILLARY BLOOD GLUCOSE        I&O's Summary      PHYSICAL EXAM:  GENERAL:very pale  HEAD:  Atraumatic, Normocephalic  EYES: EOMI, conjunctiva and sclera clear  NECK: Supple, No JVD  CHEST/LUNG: Clear to auscultation bilaterally; No wheeze  HEART: Regular rate and rhythm; No murmurs, rubs, or gallops  ABDOMEN: Soft, incision clean, mildlly distended Bowel sounds present  EXTREMITIES:  2+ Peripheral Pulses, No clubbing, cyanosis, or edema  PSYCH: AAOx3  NEUROLOGY: non-focal  SKIN: No rashes or lesions    LABS:                        6.5    12.3  )-----------( 385      ( 17 Feb 2019 11:46 )             21.1     02-17    140  |  102  |  14  ----------------------------<  76  3.3<L>   |  18<L>  |  1.55<H>    Ca    7.7<L>      17 Feb 2019 11:46    TPro  5.6<L>  /  Alb  2.0<L>  /  TBili  0.6  /  DBili  x   /  AST  18  /  ALT  7<L>  /  AlkPhos  119  02-17    PT/INR - ( 17 Feb 2019 11:46 )   PT: 14.0 sec;   INR: 1.24 ratio         PTT - ( 17 Feb 2019 11:46 )  PTT:20.3 sec        Microbiology     RADIOLOGY & ADDITIONAL TESTS:    < from: CT Abdomen and Pelvis No Cont (02.17.19 @ 13:03) >  Interval left nephrectomy. Small pneumoperitoneum and air in the   nephrectomy surgical bed, likely postoperative in nature. There is a   large subcapsular hematoma of the spleen measuring approximately 10.0 x   8.3 x 14.2 cm with associated mass effect on the spleen. Mild to moderate   hemoperitoneum is also noted in the bilateral abdomen and pelvis. Small   amount of fluid in the nephrectomy bed measuring approximately 2.3 x 4.8   cm, probably representing postsurgical seroma.    Allowing for the noncontrast technique, the liver, gallbladder, pancreas,   and the right kidney appear grossly unremarkable. Nonspecific mild right   adrenal thickening. The left adrenal is not visualized.    Stable IVC filter.    Soft tissue air in the anterior abdominal wall, likely due to recent   surgery.    The appendix appears normal. Colonic diverticulosis without evidence for   diverticulitis. No bowel obstruction, or grossly thickened bowel wall.    No grossly enlarged lymph node.    Small air in the urinary bladder, probably due to prior Amador catheter   placement. The uterus is not discernible due to adjacent hemoperitoneum.     Extensive sclerotic lesions in the visualized osseous structures,   compatible with osseous metastasis.    Impression: Small bilateral pleural effusions. Smallanterior pericardial   effusion.    Small right lung nodule; metastasis cannot be excluded.    Interval left nephrectomy. Small pneumoperitoneum and air in the   nephrectomy surgical bed, likely postoperative in nature. New large   subcapsular hematoma of the spleen measuring approximately 10.0 x 8.3 x   14.2 cm with associated mass effect on the spleen. Mild to moderate   hemoperitoneum is also noted in the bilateral abdomen and pelvis. Small   amount of fluid in the nephrectomy bed measuring approximately 2.3 x 4.8   cm, probably representing postsurgical seroma.    Small pneumoperitoneum and air in the nephrectomy surgical bed, likely   postoperative in nature.    Extensive sclerotic lesions in the visualized osseous structures,   compatible with osseous metastasis.      Imaging Personally Reviewed:      < from: Xray Chest 1 View-PORTABLE IMMEDIATE (02.17.19 @ 12:10) >  IMPRESSION:    Clear lungs.          Consultant(s) Notes Reviewed:      Care Discussed with Consultants/Other Providers:
HPI:  This is a 63 year old F s/p left laparoscopic radical nephrectomy for RCC on 2/13/19 who had presented with malaise, weakness, dyspnea, and a persistent cough starting this morning.  She was taken to [place] ED where a CT scan revealed a 10cm left perisplenic hematoma.  She was also found to be severe anemic with an Hct of 21.5 from 25 and was transfused 2 U PRBCs with persistent hypotension and worsening abdominal pain, distension, and mottling.  At the time of arrival to [facility] ED, the patient was noted to be in hemorrhagic shock and was emergently transfused another unit of PRBCs with a resultant Hct of 24.4 from 21.5.  She was admitted to SICU for resuscitation and further management.  Aside from a persistent cough, the patient denies any straining prior to the onset of her symptoms. (17 Feb 2019 16:45)    Pt admitted to SICU received 8U pRBC and 2U FFPs and IR-guided angioembolization of proximal splenic artery. CTS consulted for lung nodules on CT      PAST MEDICAL & SURGICAL HISTORY:  Emphysema lung  Breast cancer: Left breast cancer - 13 years ago, s/p mastectomy, and chemoptherapy  Anemia  Other specified disorders of kidney and ureter  H/O total adrenalectomy  S/p nephrectomy  H/O left mastectomy  History of hip surgery: in 2007- Right hip surgery      REVIEW OF SYSTEMS      General:No Weight change/ Fatigue/ HA/Dizzy	  Skin/Breast: No Rashes/ Lesions/ Masses  Ophthalmologic: No Blurry vision/ Glaucoma/ Blindness  ENMT: No Hearing loss/ Drainage/ Lesions	  Respiratory and Thorax: No Cough/ Wheezing/ SOB/ Hemoptysis/ Sputum production  Cardiovascular: No Chest pain/ Palpitations/ Diaphoresis	  Gastrointestinal: No Nausea/ Vomiting/ Constipation/ Appetite Change	  Genitourinary: No Heamturia/ Dysuria/ Frequency change/ Impotence	  Musculoskeletal: No Pain/ Weakness/ Claudication	  Neurological: No Seizures/ TIA/CVA/ Parastesias	  Psychiatric: No Dementia/ Depression/ SI/HI	  Hematology/Lymphatics: bleeding stabilized Edema	  Endocrine:	No Hyperglycemia/ Hypoglycemia    Allergic/Immunologic:	 No Anaphylaxis/ Intolerance/ Recent illnesses    MEDICATIONS  (STANDING):  acetaminophen   Tablet .. 650 milliGRAM(s) Oral every 6 hours  ALBUTerol    90 MICROgram(s) HFA Inhaler 1 Puff(s) Inhalation two times a day  docusate sodium 100 milliGRAM(s) Oral two times a day  escitalopram 20 milliGRAM(s) Oral daily  heparin  Injectable 5000 Unit(s) SubCutaneous every 8 hours  letrozole 2.5 milliGRAM(s) Oral daily  metoprolol tartrate 12.5 milliGRAM(s) Oral two times a day  pantoprazole    Tablet 40 milliGRAM(s) Oral before breakfast  senna 2 Tablet(s) Oral at bedtime  tiotropium 18 MICROgram(s) Capsule 1 Capsule(s) Inhalation daily    MEDICATIONS  (PRN):  oxyCODONE    IR 5 milliGRAM(s) Oral every 4 hours PRN mild-mod pain  oxyCODONE    IR 10 milliGRAM(s) Oral every 4 hours PRN Severe Pain (7 - 10)  polyethylene glycol 3350 17 Gram(s) Oral daily PRN Constipation      Allergies    Cipro (Unknown)    Intolerances        SOCIAL HISTORY:  Occupation:  Smoking Hx: denies  Etoh Hx: denies  IVDA Hx: denies    FAMILY HISTORY:  No pertinent family history in first degree relatives    unless noted, no significant family hx with Mother, Father, Siblings    Vital Signs Last 24 Hrs  T(C): 36.7 (27 Feb 2019 10:25), Max: 36.8 (26 Feb 2019 15:20)  T(F): 98 (27 Feb 2019 10:25), Max: 98.3 (26 Feb 2019 15:20)  HR: 71 (27 Feb 2019 10:25) (66 - 76)  BP: 164/64 (27 Feb 2019 10:25) (150/72 - 164/64)  BP(mean): --  RR: 18 (27 Feb 2019 10:25) (17 - 18)  SpO2: 99% (27 Feb 2019 10:25) (96% - 99%)    General: WN/WD NAD  Neurology: Awake, nonfocal, GARCIA x 4  Eyes: Scleras clear, PERRLA/ EOMI, Gross vision intact  ENT:Gross hearing intact, grossly patent pharynx, no stridor  Neck: Neck supple, trachea midline, No JVD,   Respiratory: CTA B/L, No wheezing, rales, rhonchi  CV: RRR, S1S2, no murmurs, rubs or gallops  Abdominal: Soft, NT, ND +BS,   Extremities: No edema, + peripheral pulses  Skin: No Rashes, Hematoma, Ecchymosis  Lymphatic: No Neck, axilla, groin LAD  Psych: Oriented x 3, normal affect    LABS:                        9.0    13.54 )-----------( 609      ( 27 Feb 2019 06:18 )             29.7                 RADIOLOGY & ADDITIONAL STUDIES:      < from: CT Chest w/ IV Cont (11.30.18 @ 09:35) >  3.  Resolution of the previously noted pulmonary nodules, with newly   noted pulmonary nodules, including a 8 mm probable metastasis in the left   lower lobe, as above.    < end of copied text >    < from: CT Chest w/ IV Cont (02.25.19 @ 19:37) >  Increased size of pulmonary nodule in the anterior left upper lobe,   suspicious for metastasis.    < end of copied text >      ASSESSMENT:   63yFemalePAST MEDICAL & SURGICAL HISTORY:  Emphysema lung  Breast cancer: Left breast cancer - 13 years ago, s/p mastectomy, and chemoptherapy  Anemia  Other specified disorders of kidney and ureter  H/O total adrenalectomy  S/p nephrectomy  H/O left mastectomy  History of hip surgery: in 2007- Right hip surgery  HEALTH ISSUES - PROBLEM Dx:  Constipation, unspecified constipation type: Constipation, unspecified constipation type  Oliguria: Oliguria  Hematoma: Hematoma  Prophylactic measure: Prophylactic measure  Depression, unspecified depression type: Depression, unspecified depression type  Malignant neoplasm of left female breast, unspecified estrogen receptor status, unspecified site of breast: Malignant neoplasm of left female breast, unspecified estrogen receptor status, unspecified site of breast  Moderate protein-calorie malnutrition: Moderate protein-calorie malnutrition  Leukocytosis, unspecified type: Leukocytosis, unspecified type  Pulmonary emphysema, unspecified emphysema type: Pulmonary emphysema, unspecified emphysema type  Acute blood loss anemia: Acute blood loss anemia  Postop check: Postop check  Renal mass, left: Renal mass, left  ERASMO (acute kidney injury): ERASMO (acute kidney injury)  Lactic acidosis: Lactic acidosis  Emphysema lung: Emphysema lung  Breast cancer: Breast cancer  Hemorrhagic shock: Hemorrhagic shock      HEALTH ISSUES - R/O PROBLEM Dx:      PLAN:
Chief Complaint:  Patient is a 63y old  Female who presents with a chief complaint of splenic bleed after left nephrectomy (2019 08:42)      HPI:  63F with COPD, depression, h/o left breast cancer, left renal mass s/p left lap. radical nephrectomy with adrenalectomy 19, readmission 19 with hemorrhagic shock in setting of large perisplenic hematoma seen on imaging, s/p IR evaluation with empiric embolization of proximal splenic artery, GI consulted for black stools in setting of anemia. The patient reports one dark BM yesterday morning. Her Hgb this AM was 6.7 and she was given 1 unit PRBC with appropriate response. She denies any history of black BM's or red blood in BM's. She denies any epigastric pain, nausea, vomiting, heartburn or reflux. She is able to tolerate clear liquid diet but notes she has a poor appetite. She takes NSAIDs at home regularly (usually at bedtime every night) but over the past month has cut down. She has never had an upper endoscopy but reports colonoscopy which showed mild diverticulosis about 3-4 years ago.   Hgb obtained 2019 was 8.1, MCV 96.8- no prior data in our system.     Allergies:  Cipro (Unknown)      Home Medications: (as of 18)    Anoro Ellipta 62.5 mcg-25 mcg/inh inhalation powder: 1 puff(s) inhaled once a day in am (2019 08:46)  diphenhydrAMINE 25 mg oral tablet: 1 tab(s) orally once a day (at bedtime), As Needed (2019 08:46)  Femara 2.5 mg oral tablet: 1 tab(s) orally once a day in am (2019 08:46)  Ibrance 100 mg oral capsule: 1 cap(s) orally once a day in am (2019 08:46)  Lexapro 20 mg oral tablet: 1 tab(s) orally once a day in am (2019 08:46)  Milk of Magnesia 8% oral suspension: 15 milliliter(s) orally once a day (at bedtime), As Needed (2019 08:46)  Senna 8.6 mg oral tablet: 2 tab(s) orally once a day (at bedtime), As Needed (2019 08:46)  Vitamin D3 2000 intl units oral tablet: 1 tab(s) orally once a day (2019 08:46)    Hospital Medications:  acetaminophen   Tablet .. 650 milliGRAM(s) Oral every 6 hours  ALBUTerol    90 MICROgram(s) HFA Inhaler 1 Puff(s) Inhalation two times a day  chlorhexidine 4% Liquid 1 Application(s) Topical <User Schedule>  dextrose 5% + sodium chloride 0.45%. 1000 milliLiter(s) IV Continuous <Continuous>  escitalopram 20 milliGRAM(s) Oral daily  heparin  Injectable 5000 Unit(s) SubCutaneous every 8 hours  metoprolol tartrate Injectable 5 milliGRAM(s) IV Push every 6 hours  oxyCODONE    IR 5 milliGRAM(s) Oral every 4 hours PRN  oxyCODONE    IR 10 milliGRAM(s) Oral every 4 hours PRN  pantoprazole    Tablet 40 milliGRAM(s) Oral before breakfast  tiotropium 18 MICROgram(s) Capsule 1 Capsule(s) Inhalation daily      PMHX/PSHX:  Emphysema lung  Breast cancer  Anemia  Other specified disorders of kidney and ureter  H/O total adrenalectomy  S/p nephrectomy  H/O left mastectomy  History of hip surgery      Family history:  No pertinent family history in first degree relatives  Denies family history of colon cancer, colon polyps, gastric cancer      Social History: former tobacco, no ETOH, no illict drug use     ROS:     General:  No wt loss, fevers, chills, night sweats, fatigue,   Eyes:  Good vision, no reported pain  ENT:  No sore throat, pain, runny nose, dysphagia  CV:  No pain, palpitations, hypo/hypertension  Resp:  No dyspnea, cough, tachypnea, wheezing  GI:  No pain, No nausea, No vomiting, No diarrhea, No constipation, No weight loss, No fever, No pruritis, No rectal bleeding, + tarry stools, No dysphagia,  :  No pain, bleeding, incontinence, nocturia  Muscle:  No pain, weakness  Neuro:  No weakness, tingling, memory problems  Psych:  No fatigue, insomnia, mood problems, depression  Endocrine:  No polyuria, polydipsia, cold/heat intolerance  Heme:  No petechiae, ecchymosis, easy bruisability  Skin:  No rash, tattoos, scars, edema      PHYSICAL EXAM:     GENERAL:  Appears stated age, well-groomed, well-nourished, no distress  HEENT:  NC/AT,  conjunctivae clear and pink, no thyromegaly, nodules, adenopathy, no JVD, sclera -anicteric  CHEST:  Full & symmetric excursion, no increased effort, breath sounds clear  HEART:  Regular rhythm, S1, S2, no murmur/rub/S3/S4, no abdominal bruit, no edema  ABDOMEN:  Soft, mild tenderness around surgical sites, staples in place, non-distended, normoactive bowel sounds  EXTEREMITIES:  no cyanosis,clubbing or edema  SKIN:  No rash/erythema, intact   NEURO:  Alert, oriented, no asterixis, no tremor, no encephalopathy    Vital Signs:  Vital Signs Last 24 Hrs  T(C): 37.3 (2019 12:00), Max: 37.3 (2019 12:00)  T(F): 99.1 (2019 12:00), Max: 99.1 (2019 12:00)  HR: 68 (2019 15:00) (61 - 92)  BP: 134/55 (2019 15:00) (124/60 - 156/68)  BP(mean): 75 (2019 15:00) (75 - 95)  RR: 23 (2019 15:00) (18 - 28)  SpO2: 100% (2019 15:00) (91% - 100%)  Daily     Daily Weight in k.4 (2019 06:00)    LABS:                        8.3    16.86 )-----------( 226      ( 2019 11:20 )             26.1     Hemoglobin: 8.3 g/dL (19 @ 11:20)  Hemoglobin: 6.7 g/dL (19 @ 02:20)  Hemoglobin: 7.4 g/dL (19 @ 12:15)  Hemoglobin: 7.5 g/dL (19 @ 08:10)  Hemoglobin: 7.8 g/dL (19 @ 03:00)    PRBC requirements: 2 PRBC ; 2 Plasma , 1 PLT ; 1 PRBC ; 1 PRBC    Mean Cell Volume: 88.5 fL (19 @ 11:20)        140  |  107  |  20  ----------------------------<  117<H>  3.9   |  22  |  1.01    Ca    7.1<L>      2019 02:20  Phos  1.3       Mg     2.4             PT/INR - ( 2019 03:00 )   PT: 11.6 SEC;   INR: 1.04          PTT - ( 2019 03:00 )  PTT:25.2 SEC                            8.3    16.86 )-----------( 226      ( 2019 11:20 )             26.1                         6.7    18.24 )-----------( 210      ( 2019 02:20 )             20.3                         7.4    17.15 )-----------( 207      ( 2019 12:15 )             21.9                         7.5    16.91 )-----------( 214      ( 2019 08:10 )             22.1                         7.8    15.16 )-----------( 203      ( 2019 03:00 )             22.7     Imaging:  < from: CT Abdomen and Pelvis No Cont (19 @ 13:03) >  EXAM:  CT ABDOMEN AND PELVIS      PROCEDURE DATE:  2019        INTERPRETATION:  CT of the abdomen and pelvis without IV or oral contrast    Clinical Indication: weakness. Status post left nephrectomy on 2019.   Decreased hematocrit.    Technique: Axial multidetector CT images of the abdomen and pelvis are   acquired without IV or oral contrast.    Comparison: 2018.    Findings: Limited sections through the lung bases demonstrate small   bilateral pleural effusions. Small anterior pericardial effusion. Mild   hiatal hernia. Mild bilateral atelectasis. There is a 3 mm right lower   lobe lung nodule (image 5 series 2 views); metastasis cannot be excluded.   Bilateral breast implants.    Interval left nephrectomy. Small pneumoperitoneum and air in the   nephrectomy surgical bed, likely postoperative in nature. There is a   large subcapsular hematoma of the spleen measuring approximately 10.0 x   8.3 x 14.2 cm with associated mass effect on the spleen. Mild to moderate   hemoperitoneum is also noted in the bilateral abdomen and pelvis. Small   amount of fluid in the nephrectomy bed measuring approximately 2.3 x 4.8   cm, probably representing postsurgical seroma.    Allowing for the noncontrast technique, the liver, gallbladder, pancreas,   and the right kidney appear grossly unremarkable. Nonspecific mild right   adrenal thickening. The left adrenal is not visualized.    Stable IVC filter.    Soft tissue air in the anterior abdominal wall, likely due to recent   surgery.    The appendix appears normal. Colonic diverticulosis without evidence for   diverticulitis. No bowel obstruction, or grossly thickened bowel wall.    No grossly enlarged lymph node.    Small air in the urinary bladder, probably due to prior Amador catheter   placement. The uterus is not discernible due to adjacent hemoperitoneum.     Extensive sclerotic lesions in the visualized osseous structures,   compatible with osseous metastasis.    Impression: Small bilateral pleural effusions. Smallanterior pericardial   effusion.    Small right lung nodule; metastasis cannot be excluded.    Interval left nephrectomy. Small pneumoperitoneum and air in the   nephrectomy surgical bed, likely postoperative in nature. New large   subcapsular hematoma of the spleen measuring approximately 10.0 x 8.3 x   14.2 cm with associated mass effect on the spleen. Mild to moderate   hemoperitoneum is also noted in the bilateral abdomen and pelvis. Small   amount of fluid in the nephrectomy bed measuring approximately 2.3 x 4.8   cm, probably representing postsurgical seroma.    Small pneumoperitoneum and air in the nephrectomy surgical bed, likely   postoperative in nature.    Extensive sclerotic lesions in the visualized osseous structures,   compatible with osseous metastasis.    PA MsCesario Pushpa is informed.    < end of copied text >
GENERAL SURGERY CONSULT NOTE    Patient is a 63y old  Female who presents with a chief complaint of splenic bleed after left nephrectomy    HPI: This is a 63 year old F s/p left laparoscopic radical nephrectomy for RCC on 19 who had presented with malaise, weakness, dyspnea, and a persistent cough starting this morning.  She was taken to [place] ED where a CT scan revealed a 10cm left perisplenic hematoma.  She was also found to be anemic with an Hct of 21.5 from 25 and was transfused 2 U PRBCs with persistent hypotension and worsening abdominal pain, distension, and mottling.  At the time of arrival to [facility] ED, the patient was noted to be in hemorrhagic shock and was emergently transfused another unit of PRBCs with a resultant Hct of 24.4 from 21.5.  She was admitted to SICU for resuscitation and further management.  Aside from a persistent cough, the patient denies any straining prior to the onset of her symptoms. Left femoral A-line and introducer were placed when patient arrived in SICU, FFP and platelet was also given for balanced transfusion. Patient planned for IR in hybrid room with general surgery standby for possible splenectomy if the embolization is not successful.     10-points review of system performed with pertinent negative and postive findings documented in the HPI     PAST MEDICAL & SURGICAL HISTORY:  Emphysema lung  Breast cancer: Left breast cancer - 13 years ago, s/p mastectomy, and chemoptherapy  Anemia  Other specified disorders of kidney and ureter  H/O total adrenalectomy  S/p nephrectomy  H/O left mastectomy  History of hip surgery: in - Right hip surgery    FAMILY HISTORY: No pertinent family history in first degree relatives    SOCIAL HISTORY: No pertinent social history    Home Medications:  Anoro Ellipta 62.5 mcg-25 mcg/inh inhalation powder: 1 puff(s) inhaled once a day in am (2019 08:46)  diphenhydrAMINE 25 mg oral tablet: 1 tab(s) orally once a day (at bedtime), As Needed (2019 08:46)  Femara 2.5 mg oral tablet: 1 tab(s) orally once a day in am (2019 08:46)  Ibrance 100 mg oral capsule: 1 cap(s) orally once a day in am (2019 08:46)  Lexapro 20 mg oral tablet: 1 tab(s) orally once a day in am (2019 08:46)  Milk of Magnesia 8% oral suspension: 15 milliliter(s) orally once a day (at bedtime), As Needed (2019 08:46)  Senna 8.6 mg oral tablet: 2 tab(s) orally once a day (at bedtime), As Needed (2019 08:46)  Vitamin D3 2000 intl units oral tablet: 1 tab(s) orally once a day (2019 08:46)    Allergies: Cipro (Unknown)    Exam:  Vital Signs Last 24 Hrs  T(C): 36.6 (2019 16:15), Max: 36.7 (2019 15:09)  T(F): 97.8 (2019 16:15), Max: 98 (2019 15:09)  HR: 88 (2019 18:30) (81 - 101)  BP: 84/38 (2019 17:34) (75/36 - 98/59)  BP(mean): 48 (2019 17:34) (47 - 70)  RR: 22 (2019 18:30) (15 - 23)  SpO2: 98% (2019 18:30) (94% - 100%)  Daily Height in cm: 162.56 (2019 16:15)    Daily Weight in k.2 (2019 16:15)    General: patient is pale, fragile looking but mentating well, nontoxic appearing and comfortable  HEENT: no sclera icterus   Respiratory: unlabored breathing on NC   Abd: mildly distended but soft, tender around incision and left lower quadrant                         7.5    17.29 )-----------( 283      ( 2019 15:45 )             24.4     02-17    141  |  106  |  16  ----------------------------<  118<H>  4.0   |  19<L>  |  1.46<H>    Ca    6.7<L>      2019 15:45    TPro  4.6<L>  /  Alb  2.3<L>  /  TBili  0.4  /  DBili  x   /  AST  14  /  ALT  < 5  /  AlkPhos  92  02-17    PT/INR - ( 2019 15:45 )   PT: 15.6 SEC;   INR: 1.36        PTT - ( 2019 15:45 )  PTT:23.2 SEC    IMAGING STUDIES:  EXAM:  CT ABDOMEN AND PELVIS    Findings: Limited sections through the lung bases demonstrate small   bilateral pleural effusions. Small anterior pericardial effusion. Mild   hiatal hernia. Mild bilateral atelectasis. There is a 3 mm right lower   lobe lung nodule (image 5 series 2 views); metastasis cannot be excluded.   Bilateral breast implants.    Interval left nephrectomy. Small pneumoperitoneum and air in the   nephrectomy surgical bed, likely postoperative in nature. There is a   large subcapsular hematoma of the spleen measuring approximately 10.0 x   8.3 x 14.2 cm with associated mass effect on the spleen. Mild to moderate   hemoperitoneum is also noted in the bilateral abdomen and pelvis. Small   amount of fluid in the nephrectomy bed measuring approximately 2.3 x 4.8   cm, probably representing postsurgical seroma.    Allowing for the noncontrast technique, the liver, gallbladder, pancreas,   and the right kidney appear grossly unremarkable. Nonspecific mild right   adrenal thickening. The left adrenal is not visualized.    Stable IVC filter.    Soft tissue air in the anterior abdominal wall, likely due to recent   surgery.    The appendix appears normal. Colonic diverticulosis without evidence for   diverticulitis. No bowel obstruction, or grossly thickened bowel wall.    No grossly enlarged lymph node.    Small air in the urinary bladder, probably due to prior Amador catheter   placement. The uterus is not discernible due to adjacent hemoperitoneum.     Extensive sclerotic lesions in the visualized osseous structures,   compatible with osseous metastasis.    Impression: Small bilateral pleural effusions. Smallanterior pericardial   effusion.    Small right lung nodule; metastasis cannot be excluded.    Interval left nephrectomy. Small pneumoperitoneum and air in the   nephrectomy surgical bed, likely postoperative in nature. New large   subcapsular hematoma of the spleen measuring approximately 10.0 x 8.3 x   14.2 cm with associated mass effect on the spleen. Mild to moderate   hemoperitoneum is also noted in the bilateral abdomen and pelvis. Small   amount of fluid in the nephrectomy bed measuring approximately 2.3 x 4.8   cm, probably representing postsurgical seroma.    Small pneumoperitoneum and air in the nephrectomy surgical bed, likely   postoperative in nature.    Extensive sclerotic lesions in the visualized osseous structures,   compatible with osseous metastasis.
SICU Consultation Note  =====================================================  HPI:   63 year old female with a history of COPD, depression, left breast cancer, large left renal mass, s/p  left lap radical nephrectomy/adrenalectomy on 2/13, discharged yesterday, had postop fever 101.8F, pre-discharge Hgb 7.7, developed weakness, dizziness, found to be in shock, Hgb 6.6, CT showed large perisplenic hematoma [id number] cm. She was transfused 2 units pRBC at [facility] ED and transferred to [facility] for further management. SICU consulted for hemodynamic monitoring. Patient seen and exmained in ED. Patient awake and alert, urology at bedside in ED. Patient hypotensive, receiving an additional 2 prbc. IR consulted for possible intervention.         HISTORY  63y Female  Allergies: Cipro (Unknown)    PAST MEDICAL & SURGICAL HISTORY:  Emphysema lung  Breast cancer: Left breast cancer - 13 years ago, s/p mastectomy, and chemoptherapy  Anemia  Other specified disorders of kidney and ureter  H/O total adrenalectomy  S/p nephrectomy  H/O left mastectomy  History of hip surgery: in 2007- Right hip surgery    FAMILY HISTORY:  No pertinent family history in first degree relatives      SOCIAL HISTORY:  ***    ADVANCE DIRECTIVES: Presumed Full Code  ***    REVIEW OF SYSTEMS:  ***  General: Non-Contributory  Skin/Breast: Non-Contributory  Ophthalmologic: Non-Contributory  ENMT: Non-Contributory  Respiratory and Thorax: Non-Contributory  Cardiovascular: Non-Contributory  Gastrointestinal: Non-Contributory  Genitourinary: Non-Contributory  Musculoskeletal: Non-Contributory  Neurological: Non-Contributory  Psychiatric: Non-Contributory  Hematology/Lymphatics: Non-Contributory  Endocrine: Non-Contributory  Allergic/Immunologic: Non-Contributory    HOME MEDICATIONS:  ***    CURRENT MEDICATIONS:   --------------------------------------------------------------------------------------  Neurologic Medications    Respiratory Medications    Cardiovascular Medications    Gastrointestinal Medications  lactated ringers. 1000 milliLiter(s) IV Continuous <Continuous>    Genitourinary Medications    Hematologic/Oncologic Medications    Antimicrobial/Immunologic Medications    Endocrine/Metabolic Medications    Topical/Other Medications    --------------------------------------------------------------------------------------    VITAL SIGNS, INS/OUTS (last 24 hours):  --------------------------------------------------------------------------------------  Vital Signs Last 24 Hrs  T(C): 36.6 (17 Feb 2019 16:15), Max: 36.7 (17 Feb 2019 15:09)  T(F): 97.8 (17 Feb 2019 16:15), Max: 98 (17 Feb 2019 15:09)  HR: 85 (17 Feb 2019 17:45) (81 - 101)  BP: 84/38 (17 Feb 2019 17:34) (75/36 - 98/59)  BP(mean): 48 (17 Feb 2019 17:34) (47 - 70)  RR: 19 (17 Feb 2019 17:45) (15 - 21)  SpO2: 100% (17 Feb 2019 17:45) (94% - 100%)  --------------------------------------------------------------------------------------    EXAM  NEUROLOGY  RASS:   	GCS:    Exam: Normal, NAD, alert, oriented x 3, no focal deficits. ***    HEENT  Exam: Normocephalic, atraumatic.  EOMI ***    RESPIRATORY  Exam: Lungs clear to auscultation, Normal expansion/effort.  ***  Mechanical Ventilation:     CARDIOVASCULAR  Exam: S1, S2.  Regular rate and rhythm.  Peripheral edema  ***    GI/NUTRITION  Exam: Abdomen soft, non-distended. multiple incision sites with staples, CDI   Current Diet:  NPO ***    VASCULAR  Exam: cold  ***    MUSCULOSKELETAL  Exam: All extremities moving spontaneously without limitations.  ***    SKIN:  Exam: pale .  ***    METABOLIC/FLUIDS/ELECTROLYTES  lactated ringers. 1000 milliLiter(s) IV Continuous <Continuous>      HEMATOLOGIC  [x] DVT Prophylaxis:   Transfusions:	[x] PRBC	[] Platelets		[] FFP	[] Cryoprecipitate    INFECTIOUS DISEASE  Antimicrobials/Immunologic Medications:    Day #1  	of  1    Tubes/Lines/Drains  ***  [x] Peripheral IV  [x] Central Venous Line     	[] R	[x] L	[] IJ	[] Fem	[] SC	Date Placed:   [x] Arterial Line		[] R	[] L	[] Fem	[] Rad	[] Ax	Date Placed:   [] PICC:         	[] Midline		[] Mediport  [] Urinary Catheter		Date Placed:     LABS  --------------------------------------------------------------------------------------                                            7.5                   Neurophils% (auto):   81.8   (02-17 @ 15:45):    17.29)-----------(283          Lymphocytes% (auto):  6.5                                           24.4                   Eosinphils% (auto):   0.1      Manual%: Neutrophils 86.1 ; Lymphocytes 2.6  ; Eosinophils 0.0  ; Bands%: 2.6  ; Blasts 0          02-17    141  |  106  |  16  ----------------------------<  118<H>  4.0   |  19<L>  |  1.46<H>    Ca    6.7<L>      17 Feb 2019 15:45    TPro  4.6<L>  /  Alb  2.3<L>  /  TBili  0.4  /  DBili  x   /  AST  14  /  ALT  < 5  /  AlkPhos  92  02-17    ( 02-17 @ 15:45 )   PT: 15.6 SEC;   INR: 1.36   aPTT: 23.2 SEC    ABG - ( 17 Feb 2019 17:27 )  pH: 7.31  /  pCO2: 36    /  pO2: 189   / HCO3: 18    / Base Excess: -7.9  /  SaO2: 99.6  / Lactate: 1.8      VBG - ( 17 Feb 2019 15:45 )  pH: 7.20  /  pCO2: 51    /  pO2: < 24  / HCO3: 17    / Base Excess: -7.7  /  SvO2: 23.5  / Lactate: 3.9      RECENT CULTURES:  02-16 @ 07:32 URINE MIDSTREAM         02-16 @ 01:42 BLOOD PERIPHERAL         NO ORGANISMS ISOLATED  NO ORGANISMS ISOLATED AT 24 HOURS  02-15 @ 11:49 URINE MIDSTREAM             --------------------------------------------------------------------------------------    OTHER LABS    IMAGING RESULTS  < from: CT Abdomen and Pelvis No Cont (02.17.19 @ 13:03) >  Comparison: 11/30/2018.    Findings: Limited sections through the lung bases demonstrate small   bilateral pleural effusions. Small anterior pericardial effusion. Mild   hiatal hernia. Mild bilateral atelectasis. There is a 3 mm right lower   lobe lung nodule (image 5 series 2 views); metastasis cannot be excluded.   Bilateral breast implants.    Interval left nephrectomy. Small pneumoperitoneum and air in the   nephrectomy surgical bed, likely postoperative in nature. There is a   large subcapsular hematoma of the spleen measuring approximately 10.0 x   8.3 x 14.2 cm with associated mass effect on the spleen. Mild to moderate   hemoperitoneum is also noted in the bilateral abdomen and pelvis. Small   amount of fluid in the nephrectomy bed measuring approximately 2.3 x 4.8   cm, probably representing postsurgical seroma.    Allowing for the noncontrast technique, the liver, gallbladder, pancreas,   and the right kidney appear grossly unremarkable. Nonspecific mild right   adrenal thickening. The left adrenal is not visualized.    Stable IVC filter.    Soft tissue air in the anterior abdominal wall, likely due to recent   surgery.    The appendix appears normal. Colonic diverticulosis without evidence for   diverticulitis. No bowel obstruction, or grossly thickened bowel wall.    No grossly enlarged lymph node.    Small air in the urinary bladder, probably due to prior Amador catheter   placement. The uterus is not discernible due to adjacent hemoperitoneum.     Extensive sclerotic lesions in the visualized osseous structures,   compatible with osseous metastasis.    Impression: Small bilateral pleural effusions. Smallanterior pericardial   effusion.    Small right lung nodule; metastasis cannot be excluded.    Interval left nephrectomy. Small pneumoperitoneum and air in the   nephrectomy surgical bed, likely postoperative in nature. New large   subcapsular hematoma of the spleen measuring approximately 10.0 x 8.3 x   14.2 cm with associated mass effect on the spleen. Mild to moderate   hemoperitoneum is also noted in the bilateral abdomen and pelvis. Small   amount of fluid in the nephrectomy bed measuring approximately 2.3 x 4.8   cm, probably representing postsurgical seroma.    Small pneumoperitoneum and air in the nephrectomy surgical bed, likely   postoperative in nature.    Extensive sclerotic lesions in the visualized osseous structures,   compatible with osseous metastasis.    < end of copied text >
CHIEF COMPLAINT: Patient is a 63y old  Female who presents with a chief complaint of splenic bleed after left nephrectomy (22 Feb 2019 08:44)      HPI: HPI:  This is a 63 year old F s/p left laparoscopic radical nephrectomy for RCC on 2/13/19 who had presented with malaise, weakness, dyspnea, and a persistent cough starting this morning.  She was taken to [facility] ED where a CT scan revealed a 10cm left perisplenic hematoma.  She was also found to be severe anemic with an Hct of 21.5 from 25 and was transfused 2 U PRBCs with persistent hypotension and worsening abdominal pain, distension, and mottling.  At the time of arrival to [facility] ED, the patient was noted to be in hemorrhagic shock and was emergently transfused another unit of PRBCs with a resultant Hct of 24.4 from 21.5.  She was admitted to SICU for resuscitation and further management.  Aside from a persistent cough, the patient denies any straining prior to the onset of her symptoms. (17 Feb 2019 16:45)    Reports feeling better. Denies chest pain, SOB, cough, N/V. Had bowel movement.    Pain Symptoms if applicable:             	                         none	   mild         moderate         severe  	                            0	    1-3	     4-6	         7-10  Pain:  Location:	  Modifying factors:	  Associated symptoms:	    Allergies    Cipro (Unknown)    Intolerances        HOME MEDICATIONS: [x] Reviewed    MEDICATIONS  (STANDING):  acetaminophen   Tablet .. 650 milliGRAM(s) Oral every 6 hours  ALBUTerol    90 MICROgram(s) HFA Inhaler 1 Puff(s) Inhalation two times a day  escitalopram 20 milliGRAM(s) Oral daily  heparin  Injectable 5000 Unit(s) SubCutaneous every 8 hours  letrozole 2.5 milliGRAM(s) Oral daily  metoprolol tartrate 12.5 milliGRAM(s) Oral two times a day  pantoprazole    Tablet 40 milliGRAM(s) Oral before breakfast  tiotropium 18 MICROgram(s) Capsule 1 Capsule(s) Inhalation daily    MEDICATIONS  (PRN):  oxyCODONE    IR 5 milliGRAM(s) Oral every 4 hours PRN Moderate Pain (4 - 6)  oxyCODONE    IR 10 milliGRAM(s) Oral every 4 hours PRN Severe Pain (7 - 10)      PAST MEDICAL & SURGICAL HISTORY:  Emphysema lung  Breast cancer: Left breast cancer - 13 years ago, s/p mastectomy, and chemoptherapy  Anemia  Other specified disorders of kidney and ureter  H/O total adrenalectomy  S/p nephrectomy  H/O left mastectomy  History of hip surgery: in 2007- Right hip surgery  [ ] Reviewed     SOCIAL HISTORY:  [x] Substance abuse: denies  [x] Tobacco: 10 cigs daily for 35 yrs, quit 10 months ago  [x] Alcohol use: denies    FAMILY HISTORY:  No pertinent family history in first degree relatives  [x] No pertinent family history in first degree relatives     REVIEW OF SYSTEMS:    [x] All other ROS negative  [  ] Unable to obtain due to poor mental status    Vital Signs Last 24 Hrs  T(C): 36.8 (22 Feb 2019 09:27), Max: 37.8 (21 Feb 2019 20:00)  T(F): 98.3 (22 Feb 2019 09:27), Max: 100 (21 Feb 2019 20:00)  HR: 68 (22 Feb 2019 09:27) (68 - 96)  BP: 140/74 (22 Feb 2019 09:27) (133/64 - 157/81)  BP(mean): 79 (22 Feb 2019 00:00) (79 - 100)  RR: 17 (22 Feb 2019 09:27) (17 - 27)  SpO2: 100% (22 Feb 2019 09:27) (96% - 100%)    PHYSICAL EXAM:  GENERAL: NAD, on 2 L NC  EYES: EOMI, sclera clear  ENMT: Moist mucous membranes  BREAST: left mastectomy s/p reconstruction, implant of right breast palpable  NECK: Supple, No JVD  RESPIRATORY: Clear to auscultation bilaterally; No crackles or wheezing  CARDIOVASCULAR: s1/s2, Regular rate and rhythm; No murmurs appreciated  GASTROINTESTINAL: Soft, slightly tender, + suprapubic staples C/D/I, Nondistended; Bowel sounds present  GENITOURINARY: no mcgee  EXTREMITIES:  2+ Peripheral Pulses, No clubbing, cyanosis, or edema  NERVOUS SYSTEM:  Alert & Oriented X3; Moving all 4 extremities; No gross sensory deficits  SKIN: No rashes or lesions; Incisions C/D/I    LABS:                        8.8    18.21 )-----------( 326      ( 22 Feb 2019 09:20 )             27.4     02-22    142  |  105  |  12  ----------------------------<  104<H>  3.7   |  26  |  0.81    Ca    7.6<L>      22 Feb 2019 09:20  Phos  2.6     02-21  Mg     1.9     02-22          CAPILLARY BLOOD GLUCOSE          RADIOLOGY & ADDITIONAL STUDIES:    EKG:   Personally Reviewed:  [x] YES     Imaging:   Personally Reviewed:  [x] YES               [ ] Consultant(s) Notes Reviewed  [x] Care Discussed with Consultants/Other Providers: Urology PA - discussed

## 2019-02-28 LAB
HCT VFR BLD CALC: 31.4 % — LOW (ref 34.5–45)
HGB BLD-MCNC: 9.2 G/DL — LOW (ref 11.5–15.5)
MCHC RBC-ENTMCNC: 28.1 PG — SIGNIFICANT CHANGE UP (ref 27–34)
MCHC RBC-ENTMCNC: 29.3 % — LOW (ref 32–36)
MCV RBC AUTO: 96 FL — SIGNIFICANT CHANGE UP (ref 80–100)
NRBC # FLD: 0 K/UL — LOW (ref 25–125)
PLATELET # BLD AUTO: 594 K/UL — HIGH (ref 150–400)
PMV BLD: 10.9 FL — SIGNIFICANT CHANGE UP (ref 7–13)
RBC # BLD: 3.27 M/UL — LOW (ref 3.8–5.2)
RBC # FLD: 17.5 % — HIGH (ref 10.3–14.5)
WBC # BLD: 11.28 K/UL — HIGH (ref 3.8–10.5)
WBC # FLD AUTO: 11.28 K/UL — HIGH (ref 3.8–10.5)

## 2019-02-28 PROCEDURE — 99233 SBSQ HOSP IP/OBS HIGH 50: CPT

## 2019-02-28 RX ADMIN — HEPARIN SODIUM 5000 UNIT(S): 5000 INJECTION INTRAVENOUS; SUBCUTANEOUS at 13:04

## 2019-02-28 RX ADMIN — Medication 650 MILLIGRAM(S): at 05:27

## 2019-02-28 RX ADMIN — OXYCODONE HYDROCHLORIDE 10 MILLIGRAM(S): 5 TABLET ORAL at 05:27

## 2019-02-28 RX ADMIN — LETROZOLE 2.5 MILLIGRAM(S): 2.5 TABLET, FILM COATED ORAL at 13:04

## 2019-02-28 RX ADMIN — HEPARIN SODIUM 5000 UNIT(S): 5000 INJECTION INTRAVENOUS; SUBCUTANEOUS at 05:28

## 2019-02-28 RX ADMIN — Medication 12.5 MILLIGRAM(S): at 05:28

## 2019-02-28 RX ADMIN — HEPARIN SODIUM 5000 UNIT(S): 5000 INJECTION INTRAVENOUS; SUBCUTANEOUS at 22:00

## 2019-02-28 RX ADMIN — ESCITALOPRAM OXALATE 20 MILLIGRAM(S): 10 TABLET, FILM COATED ORAL at 13:02

## 2019-02-28 RX ADMIN — Medication 100 MILLIGRAM(S): at 18:03

## 2019-02-28 RX ADMIN — TIOTROPIUM BROMIDE 1 CAPSULE(S): 18 CAPSULE ORAL; RESPIRATORY (INHALATION) at 09:18

## 2019-02-28 RX ADMIN — Medication 650 MILLIGRAM(S): at 18:04

## 2019-02-28 RX ADMIN — OXYCODONE HYDROCHLORIDE 10 MILLIGRAM(S): 5 TABLET ORAL at 13:03

## 2019-02-28 RX ADMIN — PANTOPRAZOLE SODIUM 40 MILLIGRAM(S): 20 TABLET, DELAYED RELEASE ORAL at 05:28

## 2019-02-28 RX ADMIN — OXYCODONE HYDROCHLORIDE 10 MILLIGRAM(S): 5 TABLET ORAL at 23:24

## 2019-02-28 RX ADMIN — OXYCODONE HYDROCHLORIDE 10 MILLIGRAM(S): 5 TABLET ORAL at 06:25

## 2019-02-28 RX ADMIN — OXYCODONE HYDROCHLORIDE 10 MILLIGRAM(S): 5 TABLET ORAL at 22:00

## 2019-02-28 RX ADMIN — Medication 650 MILLIGRAM(S): at 13:03

## 2019-02-28 RX ADMIN — OXYCODONE HYDROCHLORIDE 10 MILLIGRAM(S): 5 TABLET ORAL at 13:45

## 2019-02-28 RX ADMIN — Medication 12.5 MILLIGRAM(S): at 18:03

## 2019-02-28 RX ADMIN — ALBUTEROL 1 PUFF(S): 90 AEROSOL, METERED ORAL at 09:18

## 2019-02-28 NOTE — PROGRESS NOTE ADULT - SUBJECTIVE AND OBJECTIVE BOX
Patient is a 63y old  Female who presents with a chief complaint of splenic bleed after left nephrectomy (25 Feb 2019 06:37)    SUBJECTIVE / OVERNIGHT EVENTS: No events overnight. This AM, patient states she fells well. No f/c/n/v/d.    MEDICATIONS  (STANDING):  acetaminophen   Tablet .. 650 milliGRAM(s) Oral every 6 hours  ALBUTerol    90 MICROgram(s) HFA Inhaler 1 Puff(s) Inhalation two times a day  docusate sodium 100 milliGRAM(s) Oral two times a day  escitalopram 20 milliGRAM(s) Oral daily  heparin  Injectable 5000 Unit(s) SubCutaneous every 8 hours  letrozole 2.5 milliGRAM(s) Oral daily  metoprolol tartrate 12.5 milliGRAM(s) Oral two times a day  pantoprazole    Tablet 40 milliGRAM(s) Oral before breakfast  senna 2 Tablet(s) Oral at bedtime  tiotropium 18 MICROgram(s) Capsule 1 Capsule(s) Inhalation daily    MEDICATIONS  (PRN):  oxyCODONE    IR 5 milliGRAM(s) Oral every 4 hours PRN mild-mod pain  oxyCODONE    IR 10 milliGRAM(s) Oral every 4 hours PRN Severe Pain (7 - 10)  polyethylene glycol 3350 17 Gram(s) Oral daily PRN Constipation      T(C): 36.6 (02-28-19 @ 09:04), Max: 36.9 (02-27-19 @ 17:35)  HR: 68 (02-28-19 @ 09:04) (68 - 86)  BP: 139/72 (02-28-19 @ 09:04) (139/72 - 158/78)  RR: 17 (02-28-19 @ 09:04) (16 - 18)  SpO2: 96% (02-28-19 @ 09:04) (94% - 98%)      02-27-19 @ 07:01  -  02-28-19 @ 07:00  --------------------------------------------------------  IN: 0 mL / OUT: 800 mL / NET: -800 mL            GENERAL: NAD, well-developed, seated in chair watching TV, pleasant  HEAD:  Atraumatic, Normocephalic, MMM  CHEST/LUNG: Clear to auscultation bilaterally; No wheeze  HEART: Regular rate and rhythm; No murmurs, rubs, or gallops  ABDOMEN: Soft, Nontender, Nondistended; Bowel sounds present; umbilical and suprapubic incisions healing well and without any erythema or discharge  EXTREMITIES:  2+ Peripheral Pulses, No clubbing, cyanosis, or edema  PSYCH: AAOx3  NEUROLOGY: CN II-XII grossly intact, moving all extremities    LABS:  CAPILLARY BLOOD GLUCOSE                                9.2    11.28 )-----------( 594      ( 28 Feb 2019 10:40 )             31.4                       RADIOLOGY & ADDITIONAL TESTS:    Telemetry Personally Reviewed:    ECG Personally Reviewed:    Imaging Personally Reviewed:    Imaging Reviewed:     Consultant(s) Notes Reviewed:      Care Discussed with Consultants/Other Providers:

## 2019-02-28 NOTE — PROGRESS NOTE ADULT - PROBLEM SELECTOR PLAN 9
HSQ for DVT ppx  PT eval.
Clinically euthymic.  - continue lexapro
Clinically euthymic.  - continue lexapro
HSQ for DVT ppx
HSQ for DVT ppx  PT eval.

## 2019-02-28 NOTE — PROGRESS NOTE ADULT - SUBJECTIVE AND OBJECTIVE BOX
Subjective  No acute overnight events  Tolerating regular diet and WBC count downtrending    Objective  Vital signs  T(F): , Max: 98.4 (02-27-19 @ 17:35)  HR: 74 (02-28-19 @ 05:23)  BP: 152/67 (02-28-19 @ 05:23)  SpO2: 96% (02-28-19 @ 05:23)  2 missed voids    Gen: NAD  Abd: incisions c/d/i with staples in place  : bladder non-palpable    Labs  02-27 @ 06:18    WBC 13.54 / Hct 29.7  / SCr --

## 2019-02-28 NOTE — PROGRESS NOTE ADULT - PROBLEM SELECTOR PLAN 1
- Improving  - would saline lock IVF and encourage po intake  - CXR from 2/23 did show some pulmonary vascular congestion - would not give significant amounts of IVF
- s/p 2 x 500 cc fluid boluses followed by lasix overnight to help with decreased urine outpt  - now improved  - would saline lock IVF and encourage po intake  - CXR from 2/23 did show some pulmonary vascular congestion - would not give significant amounts of IVF
LAURIE incidentally noted on pan-scan CT. CT surgery has been consulted and is considering VATS biopsy.    Patient has known stage IV breast cancer. Invasive testing may pose more risk than benefit in this patient with a guarded long-term prognosis. Would involve oncology team in determining whether biopsy of this nodule (to presumably diagnose cancer) would influence her management.
Pain management  Bedrest  Serial CBC  Transfuse PRN
pt with acute blood loss anemia due to subscapsular hematoma - requiring sicu stay for hemorrhagic shock   - s/p  8 U PRBC, 2 U FFP, 1 platelet transfusions and IR embolization of splenic artery on 2/17  - cbc now stable - continue to monitor
- Improving  - would saline lock IVF and encourage po intake  - CXR from 2/23 did show some pulmonary vascular congestion - would not give significant amounts of IVF
LAURIE incidentally noted on pan-scan CT. CT surgery has been consulted and is recommends outpatient f/u.    Patient has known stage IV breast cancer. Invasive testing may pose more risk than benefit in this patient with a guarded long-term prognosis. Would involve oncology team in determining whether biopsy of this nodule (to presumably diagnose cancer) would influence her management.

## 2019-02-28 NOTE — PROGRESS NOTE ADULT - ASSESSMENT
This is a 62 y/o F s/p L laparoscopic radical nephrectomy on 2/14, post-operative course c/b readmission for splenic bleed requiring IR angioembolization of a proximal splenic artery on 2/17, now with stable Hct and EGD notable only for erosions without overt ulceration.  Summary of units received: 8U PRBCs, 2FFP, 1 Plt    - Bowel regimen: senna, colace and PRN miralax ordered  - On standing Tylenol and Toradol, avoid narcotics  - IV locked  - Tolerating regular diet, encourage PO intake, dulcolax this AM for continued distension  - d/c staples today  - Follow up Med, Surgery, GI, Heme/onc recs  - will follow up with Dr. Damon as an outpatient with repeat imaging  - Rehab planning

## 2019-02-28 NOTE — PROGRESS NOTE ADULT - PROBLEM SELECTOR PROBLEM 9
Prophylactic measure
Depression, unspecified depression type
Depression, unspecified depression type
Prophylactic measure
Prophylactic measure

## 2019-03-01 VITALS
OXYGEN SATURATION: 95 % | RESPIRATION RATE: 16 BRPM | HEART RATE: 81 BPM | TEMPERATURE: 98 F | SYSTOLIC BLOOD PRESSURE: 148 MMHG | DIASTOLIC BLOOD PRESSURE: 70 MMHG

## 2019-03-01 RX ORDER — LETROZOLE 2.5 MG/1
1 TABLET, FILM COATED ORAL
Qty: 0 | Refills: 0 | COMMUNITY

## 2019-03-01 RX ORDER — PANTOPRAZOLE SODIUM 20 MG/1
1 TABLET, DELAYED RELEASE ORAL
Qty: 0 | Refills: 0 | DISCHARGE
Start: 2019-03-01

## 2019-03-01 RX ORDER — POLYETHYLENE GLYCOL 3350 17 G/17G
17 POWDER, FOR SOLUTION ORAL
Qty: 0 | Refills: 0 | DISCHARGE
Start: 2019-03-01

## 2019-03-01 RX ORDER — OXYCODONE HYDROCHLORIDE 5 MG/1
1 TABLET ORAL
Qty: 0 | Refills: 0 | COMMUNITY
Start: 2019-03-01

## 2019-03-01 RX ORDER — ESCITALOPRAM OXALATE 10 MG/1
1 TABLET, FILM COATED ORAL
Qty: 0 | Refills: 0 | DISCHARGE
Start: 2019-03-01

## 2019-03-01 RX ORDER — TIOTROPIUM BROMIDE 18 UG/1
1 CAPSULE ORAL; RESPIRATORY (INHALATION)
Qty: 0 | Refills: 0 | DISCHARGE
Start: 2019-03-01

## 2019-03-01 RX ORDER — METOPROLOL TARTRATE 50 MG
12.5 TABLET ORAL
Qty: 0 | Refills: 0 | DISCHARGE
Start: 2019-03-01

## 2019-03-01 RX ORDER — LETROZOLE 2.5 MG/1
1 TABLET, FILM COATED ORAL
Qty: 0 | Refills: 0 | DISCHARGE
Start: 2019-03-01

## 2019-03-01 RX ORDER — SENNA PLUS 8.6 MG/1
2 TABLET ORAL
Qty: 0 | Refills: 0 | DISCHARGE
Start: 2019-03-01

## 2019-03-01 RX ORDER — ESCITALOPRAM OXALATE 10 MG/1
1 TABLET, FILM COATED ORAL
Qty: 0 | Refills: 0 | COMMUNITY

## 2019-03-01 RX ORDER — ACETAMINOPHEN 500 MG
2 TABLET ORAL
Qty: 0 | Refills: 0 | DISCHARGE
Start: 2019-03-01

## 2019-03-01 RX ORDER — SENNA PLUS 8.6 MG/1
2 TABLET ORAL
Qty: 0 | Refills: 0 | COMMUNITY

## 2019-03-01 RX ORDER — ALBUTEROL 90 UG/1
1 AEROSOL, METERED ORAL
Qty: 0 | Refills: 0 | DISCHARGE
Start: 2019-03-01

## 2019-03-01 RX ORDER — DOCUSATE SODIUM 100 MG
1 CAPSULE ORAL
Qty: 0 | Refills: 0 | DISCHARGE
Start: 2019-03-01

## 2019-03-01 RX ORDER — PALBOCICLIB 100 MG/1
1 CAPSULE ORAL
Qty: 0 | Refills: 0 | COMMUNITY

## 2019-03-01 RX ORDER — DIPHENHYDRAMINE HCL 50 MG
1 CAPSULE ORAL
Qty: 0 | Refills: 0 | COMMUNITY

## 2019-03-01 RX ADMIN — HEPARIN SODIUM 5000 UNIT(S): 5000 INJECTION INTRAVENOUS; SUBCUTANEOUS at 12:55

## 2019-03-01 RX ADMIN — HEPARIN SODIUM 5000 UNIT(S): 5000 INJECTION INTRAVENOUS; SUBCUTANEOUS at 05:29

## 2019-03-01 RX ADMIN — Medication 12.5 MILLIGRAM(S): at 05:28

## 2019-03-01 RX ADMIN — LETROZOLE 2.5 MILLIGRAM(S): 2.5 TABLET, FILM COATED ORAL at 12:55

## 2019-03-01 RX ADMIN — ESCITALOPRAM OXALATE 20 MILLIGRAM(S): 10 TABLET, FILM COATED ORAL at 12:55

## 2019-03-01 RX ADMIN — OXYCODONE HYDROCHLORIDE 10 MILLIGRAM(S): 5 TABLET ORAL at 10:24

## 2019-03-01 RX ADMIN — Medication 650 MILLIGRAM(S): at 12:55

## 2019-03-01 RX ADMIN — ALBUTEROL 1 PUFF(S): 90 AEROSOL, METERED ORAL at 10:25

## 2019-03-01 RX ADMIN — PANTOPRAZOLE SODIUM 40 MILLIGRAM(S): 20 TABLET, DELAYED RELEASE ORAL at 05:29

## 2019-03-01 RX ADMIN — TIOTROPIUM BROMIDE 1 CAPSULE(S): 18 CAPSULE ORAL; RESPIRATORY (INHALATION) at 10:24

## 2019-03-01 RX ADMIN — Medication 12.5 MILLIGRAM(S): at 18:16

## 2019-03-01 RX ADMIN — OXYCODONE HYDROCHLORIDE 10 MILLIGRAM(S): 5 TABLET ORAL at 11:00

## 2019-03-01 RX ADMIN — Medication 650 MILLIGRAM(S): at 05:28

## 2019-03-01 NOTE — PROGRESS NOTE ADULT - ATTENDING COMMENTS
Above noted. Feels better than yesterday.  Physical Exam: Vital signs remain stable. Afebrile.  Abdomen: Less firm than yesterday. Also less tender.  Labs: As above received one unit of blood today.  Plan: Continue close monitoring.
Above noted. Feels much better, had a bowel movement, passing flatus. received one unit of blood yesterday morning, none since then.  Physical Exam: Vital signs are stable.  Abdomen: Soft, less tense, less tender.  Labs: Hemoglobin is stable.  Plan: Full liquid diet, ambulate.
Above noted. Feels well. No further transfusions yesterday.  Plan: Upper endoscopy today. Transfer to the floor.
Above noted. She is slightly dyspneic. Upper endoscopy report reviewed.  Labs: Hb stable.  Plan As per Urology Service.
Above noted. feels well. She is having tarry stools (guaiac positive-has a history of diverticulosis)). Received one unit of blood today.  Labs: Hb as above.  Plan: Upper endoscopy if she continues to bleed.
I have seen and evaluated the patient with the GI Fellow and GI Team.  I agree with the findings, formulation and plan of care as documented in the GI fellow's note, except as noted.
PT seen and examined agree with assessment and plan
PT seen and examined agree with assessment and plan by telerounding
Patient seen and examined.  Agree with above evaluation and management plan.
63 year old female with a history of COPD, depression, left breast cancer, large left renal mass, s/p  left lap radical nephrectomy/adrenalectomy on 2/13, discharged yesterday, had postop fever 101.8F, pre-discharge Hgb 7.7, developed weakness, dizziness, found to be in shock, Hgb 6.6, CT showed large subcapsular splenic hematoma measuring [id number] cm, 2 units pRBC at [facility] ED and transferred to [facility] for further management. Now s/p IR angiogram     Plan:  Neurologic: patient AAOx3  - tylenol PRN for pain  - dilaudid PRN    Respiratory: hx of empyhsema,   - on 2L NC  - oxygen at home intermittently     Cardiovascular: + hemorrhagic shock  - hypotensive to 50s on arrival; improving to 80s systolic  -resuscitated with transfusion   - did not require pressors  - lactate normal  - IVF @ 100     Gastrointenstinal: CT w/ subcapsular splenic hematoma, + hemoperitoneum  - s/p IR embolization  - protonix  - NPO; 3 stable h/hs and will feed     Renal/: s/p left lap radical nephrectomy/adrenalectomy on 2/13/19  - mcgee inserted, reduced UOP  - repleted ca   - ERASMO: worsening Cr fx 1.34, baseline .77; downtrending     Heme: acute blood loss anemia  - monitor H/H  - patient requiring 2 u pRBC at ; predischarge Hgb 7.7 = in total 6prb, 2ffp, 1 platelet  - IVC filter in place   - s/p embolization of proximal portion  of splenic artery by IR  - SCDS in place       Infectious Disease:  - flu and RVP (-)   - white count elevated (11)  - take introducer out      Endocrine: VITALIY     Disposition: SICU   Critical Care Diagnoses: acute blood loss anemia, subcapsular splenic hematoma, s/p embolization of splenic artery, hemorrhagic shock, COPD, respiratory abnormality   The patient is a critical care patient with life threatening hemodynamic and metabolic instability in SICU.  I have personally interviewed when possible and examined the patient, reviewed data and laboratory tests/x-rays and all pertinent electronic images.  I was physically present for the key portions of the evaluation and management (E/M) service provided.   The SICU team has a constant risk benefit analyzes discussion with the primary team, all consultants, House Staff and PA's on all decisions.  These diagnoses are unrelated to the surgical procedure noted above.  I meet with family if needed to get further history, discuss the case and make care decisions for this patient who might not be able to participate.  Time involved in performance of separately billable procedures was not counted toward my critical care time. There is no overlap.  I spent 55-75 minutes of critical care time for the diagnoses, assessment, plan and interventions.
63 year old female with a history of COPD, depression, left breast cancer, large left renal mass, s/p  left lap radical nephrectomy/adrenalectomy on 2/13, discharged yesterday, had postop fever 101.8F, pre-discharge Hgb 7.7, developed weakness, dizziness, found to be in shock, Hgb 6.6, CT showed large subcapsular splenic hematoma measuring [id number] cm, 2 units pRBC at [facility] ED and transferred to [facility] for further management. Now s/p IR angiogram     Plan:  Neurologic: patient AAOx3  - Continue tylenol for pain control  - PRN oxycodone   - Continue lexapro     Respiratory: hx of empyhsema,   - on 2L NC  - oxygen at home intermittently   - inhalers  - IS/OOB as tolerated    Cardiovascular: + hemorrhagic shock  - BPs overnight in the 140-160  - Metoprolol added for hypertension   - Maintenance IVF, discontinue when diet advanced     Gastrointenstinal: CT w/ subcapsular splenic hematoma, + hemoperitoneum  - s/p IR embolization  - protonix  - Tolerating CLD, will advance to regular today     Renal/: s/p left lap radical nephrectomy/adrenalectomy on 2/13/19  - Maintain mcgee, UOP borderline though increased from yesterday  - ERASMO on CKD  - Monitor electrolytes, replete PRN  - 1L pre and post CTA    Heme: acute blood loss anemia  - Monitor daily H/H, due at 4pm. if low, will order H&H  - IVC filter in place   - s/p embolization of proximal portion of splenic artery by IR  - SCDS in place   - [facility]    Infectious Disease:  - flu and RVP (-)   - Mild leukocytosis  - Monitor off antibiotics     Endocrine: VITALIY     Disposition: SICU     ----------------------------------------------------------------------------------  Critical Care Diagnoses: acute blood loss anemia, subcapsular splenic hematoma, s/p embolization of splenic artery, hemorrhagic shock, COPD, respiratory abnormality   The patient is a critical care patient with life threatening hemodynamic and metabolic instability in SICU.  I have personally interviewed when possible and examined the patient, reviewed data and laboratory tests/x-rays and all pertinent electronic images.  I was physically present for the key portions of the evaluation and management (E/M) service provided.   The SICU team has a constant risk benefit analyzes discussion with the primary team, all consultants, House Staff and PA's on all decisions.  These diagnoses are unrelated to the surgical procedure noted above.  I meet with family if needed to get further history, discuss the case and make care decisions for this patient who might not be able to participate.  Time involved in performance of separately billable procedures was not counted toward my critical care time. There is no overlap.  I spent 55-75 minutes of critical care time for the diagnoses, assessment, plan and interventions.
63 year old female with a history of COPD, depression, left breast cancer, large left renal mass, s/p  left lap radical nephrectomy/adrenalectomy on 2/13, discharged yesterday, had postop fever 101.8F, pre-discharge Hgb 7.7, developed weakness, dizziness, found to be in shock, Hgb 6.6, CT showed large subcapsular splenic hematoma measuring [id number] cm, 2 units pRBC at [facility] ED and transferred to [facility] for further management. Now s/p IR angiogram     Plan:  Neurologic: patient AAOx3  - tylenol PRN for pain  - dilaudid PRN  - lexapro, home med     Respiratory: hx of empyhsema,   - on 2L NC  - oxygen at home intermittently     Cardiovascular: + hemorrhagic shock  - BPs overnight in the 140-160  - add lopressor   - lactate normal  - maintenance IVF    Gastrointenstinal: CT w/ subcapsular splenic hematoma, + hemoperitoneum  - s/p IR embolization  - protonix  - NPO; 3 stable h/hs and will feed     Renal/: s/p left lap radical nephrectomy/adrenalectomy on 2/13/19  - Nicholas, reduced UOP 25cc/hr   - repleted ca   - ERASMO: worsening Cr fx 1.38, baseline .77; downtrending, BUN increased to 29 from 17  - Urine lytes, continue to monitor AG    Heme: acute blood loss anemia  - monitor H/H  - 1 unit PRBC yesterday 2/19, Hgb 7.5  - patient requiring 2 u pRBC at ; predischarge Hgb 7.7 = in total 6prb, 2ffp, 1 platelet  - IVC filter in place   - s/p embolization of proximal portion of splenic artery by IR  - SCDS in place   - [facility]    Infectious Disease:  - flu and RVP (-)   - white count elevated (11)  - take introducer out      Endocrine: VITALIY     Disposition: SICU     ----------------------------------------------------------------------------------  Critical Care Diagnoses: acute blood loss anemia, subcapsular splenic hematoma, s/p embolization of splenic artery, hemorrhagic shock, COPD, respiratory abnormality   The patient is a critical care patient with life threatening hemodynamic and metabolic instability in SICU.  I have personally interviewed when possible and examined the patient, reviewed data and laboratory tests/x-rays and all pertinent electronic images.  I was physically present for the key portions of the evaluation and management (E/M) service provided.   The SICU team has a constant risk benefit analyzes discussion with the primary team, all consultants, House Staff and PA's on all decisions.  These diagnoses are unrelated to the surgical procedure noted above.  I meet with family if needed to get further history, discuss the case and make care decisions for this patient who might not be able to participate.  Time involved in performance of separately billable procedures was not counted toward my critical care time. There is no overlap.  I spent 55-75 minutes of critical care time for the diagnoses, assessment, plan and interventions.
63 year old female with a history of COPD, depression, left breast cancer, large left renal mass, s/p left lap radical nephrectomy/adrenalectomy on 2/13, discharged, re-presented in hemorrhagic shock with CT showing large subcapsular splenic hematoma measuring [id number] cm, s/p multiple units PRBC given now, transferred to [facility] for further mgmt now s/p IR for embolization of splenic artery     Plan:  Neurologic:  - AAOx3  - Continue tylenol for pain control  - PRN oxycodone   - Continue home lexapro     Respiratory: hx of empyhsema   - on 2L NC  - oxygen at home intermittently   - inhalers  - IS/OOB as tolerated    Cardiovascular: + hemorrhagic shock  - BPs overnight in the 120-140 (cuff pressure), A line pressures higher  - Metoprolol added for hypertension   - Maintenance IVF, discontinue when diet advanced     Gastrointenstinal: CT w/ subcapsular splenic hematoma, + hemoperitoneum  - s/p IR embolization  - protonix  - NPO for endoscopy, will resume diet after scope    Renal/: s/p left lap radical nephrectomy/adrenalectomy on 2/13/19  - Maintain mcgee, monitor UOP   - ERASMO on CKD  - Monitor electrolytes, replete PRN  - Low phos in AM, repleted. After BMP will have scope.     Heme: acute blood loss anemia  - Monitor daily H/H, 8.3/25.6  - IVC filter in place   - s/p embolization of proximal portion of splenic artery by IR  - SCDS in place   - [facility]    Infectious Disease:  - flu and RVP (-)   - Mild leukocytosis  - Monitor off antibiotics     Endocrine: VITALIY     Disposition: Post endoscopy to the floors    Critical Care Diagnoses: acute blood loss anemia, subcapsular splenic hematoma, s/p embolization of splenic artery, hemorrhagic shock, COPD, respiratory abnormality   The patient is not a critical care patient with no life threatening hemodynamic and metabolic instability in SICU.  I have personally interviewed when possible and examined the patient, reviewed data and laboratory tests/x-rays and all pertinent electronic images.  I was physically present for the key portions of the evaluation and management (E/M) service provided.   The SICU team has a constant risk benefit analyzes discussion with the primary team, all consultants, House Staff and PA's on all decisions.  These diagnoses are unrelated to the surgical procedure noted above.  I meet with family if needed to get further history, discuss the case and make care decisions for this patient who might not be able to participate.  Time involved in performance of separately billable procedures was not counted toward my critical care time. There is no overlap.  I spent 55-75 minutes of critical care time for the diagnoses, assessment, plan and interventions.

## 2019-03-01 NOTE — PROGRESS NOTE ADULT - ASSESSMENT
This is a 62 y/o F s/p L laparoscopic radical nephrectomy on 2/14, post-operative course c/b readmission for splenic bleed requiring IR angioembolization of a proximal splenic artery on 2/17, now with stable Hct and EGD notable only for erosions without overt ulceration.  Summary of units received: 8U PRBCs, 2FFP, 1 Plt    - Bowel regimen: senna, colace and PRN miralax ordered  - On standing Tylenol and Toradol, avoid narcotics  - IV locked  - Tolerating Reg diet, encourage PO intake, dulcolax this AM for continued distension  - Follow up Med, Surgery, GI, Heme/onc recs  - will follow up with Dr. Damon as an outpatient with repeat imaging  - Rehab planning

## 2019-03-01 NOTE — PROGRESS NOTE ADULT - REASON FOR ADMISSION
splenic bleed after left nephrectomy

## 2019-03-01 NOTE — PROGRESS NOTE ADULT - PROVIDER SPECIALTY LIST ADULT
Hospitalist
Intervent Radiology
SICU
Surgery
Urology
SICU
Surgery
Gastroenterology
Urology
Hospitalist

## 2019-03-01 NOTE — PROGRESS NOTE ADULT - SUBJECTIVE AND OBJECTIVE BOX
Subjective  No acute overnight events  Pain controlled and staples removed yesterday    Objective  Vital signs  T(F): , Max: 98.8 (02-28-19 @ 17:53)  HR: 83 (03-01-19 @ 05:20)  BP: 139/64 (03-01-19 @ 05:20)  SpO2: 97% (03-01-19 @ 05:20)  Void 400    Gen: NAD  Abd: incisions well-healed and staples removed  : Bladder non-palpable    Labs  02-28 @ 10:40  WBC 11.28 / Hct 31.4  / SCr --

## 2019-03-19 ENCOUNTER — APPOINTMENT (OUTPATIENT)
Dept: UROLOGY | Facility: CLINIC | Age: 64
End: 2019-03-19
Payer: COMMERCIAL

## 2019-03-19 PROCEDURE — 99024 POSTOP FOLLOW-UP VISIT: CPT

## 2019-03-19 NOTE — PHYSICAL EXAM
[General Appearance - Well Nourished] : well nourished [General Appearance - Well Developed] : well developed [Edema] : no peripheral edema [] : no respiratory distress [Exaggerated Use Of Accessory Muscles For Inspiration] : no accessory muscle use [Normal Station and Gait] : the gait and station were normal for the patient's age [FreeTextEntry1] : no suprapubic tenderness, no flank tenderness

## 2019-03-19 NOTE — HISTORY OF PRESENT ILLNESS
[FreeTextEntry1] : 64 y/o F s/p L radical Nx ccRCC pT3a FG3, postop course complicated by splenic bleeding requiring angioembo. Pt has been in rehab and has been getting better. Able to get moving, drinking plenty of fluids, notes she has abdominal pain w/ food and has lower abdominal cramping. She has been taking bowel stimulants such as sennakot in addition to laxatives.\par \par Denies F/C, N/V, CP, SOB, abd pain, dysuria, hematuria.

## 2019-03-19 NOTE — ASSESSMENT
[FreeTextEntry1] : 64 y/o F s/p L radical Nx pT3a disease\par - CT scan and CXR in 6 months given high risk disease\par - ongoing abd cramping not likely to be related to her surgery, advised her to stop all bowel stimulants\par - referred to gastroenterology for further w/u\par - f/u in 6 months

## 2019-03-20 LAB
BASOPHILS # BLD AUTO: 0.03 K/UL
BASOPHILS NFR BLD AUTO: 0.3 %
EOSINOPHIL # BLD AUTO: 0.03 K/UL
EOSINOPHIL NFR BLD AUTO: 0.3 %
HCT VFR BLD CALC: 37.9 %
HGB BLD-MCNC: 11.2 G/DL
IMM GRANULOCYTES NFR BLD AUTO: 0.4 %
LYMPHOCYTES # BLD AUTO: 1.07 K/UL
LYMPHOCYTES NFR BLD AUTO: 9.5 %
MAN DIFF?: NORMAL
MCHC RBC-ENTMCNC: 26.5 PG
MCHC RBC-ENTMCNC: 29.6 GM/DL
MCV RBC AUTO: 89.8 FL
MONOCYTES # BLD AUTO: 0.88 K/UL
MONOCYTES NFR BLD AUTO: 7.8 %
NEUTROPHILS # BLD AUTO: 9.19 K/UL
NEUTROPHILS NFR BLD AUTO: 81.7 %
PLATELET # BLD AUTO: 437 K/UL
RBC # BLD: 4.22 M/UL
RBC # FLD: 16.4 %
WBC # FLD AUTO: 11.25 K/UL

## 2019-03-23 ENCOUNTER — EMERGENCY (EMERGENCY)
Facility: HOSPITAL | Age: 64
LOS: 1 days | Discharge: ANOTHER HOSPITAL | End: 2019-03-23
Attending: EMERGENCY MEDICINE | Admitting: EMERGENCY MEDICINE
Payer: COMMERCIAL

## 2019-03-23 ENCOUNTER — INPATIENT (INPATIENT)
Facility: HOSPITAL | Age: 64
LOS: 7 days | Discharge: HOME | End: 2019-03-31
Attending: UROLOGY | Admitting: UROLOGY
Payer: COMMERCIAL

## 2019-03-23 VITALS
OXYGEN SATURATION: 96 % | WEIGHT: 149.91 LBS | SYSTOLIC BLOOD PRESSURE: 110 MMHG | TEMPERATURE: 97 F | RESPIRATION RATE: 20 BRPM | DIASTOLIC BLOOD PRESSURE: 76 MMHG | HEIGHT: 64 IN | HEART RATE: 84 BPM

## 2019-03-23 VITALS
DIASTOLIC BLOOD PRESSURE: 72 MMHG | OXYGEN SATURATION: 98 % | HEART RATE: 80 BPM | RESPIRATION RATE: 16 BRPM | SYSTOLIC BLOOD PRESSURE: 114 MMHG

## 2019-03-23 VITALS
HEIGHT: 64 IN | RESPIRATION RATE: 18 BRPM | TEMPERATURE: 98 F | SYSTOLIC BLOOD PRESSURE: 114 MMHG | WEIGHT: 149.91 LBS | HEART RATE: 93 BPM | DIASTOLIC BLOOD PRESSURE: 67 MMHG | OXYGEN SATURATION: 92 %

## 2019-03-23 DIAGNOSIS — R10.9 UNSPECIFIED ABDOMINAL PAIN: ICD-10-CM

## 2019-03-23 DIAGNOSIS — Z90.5 ACQUIRED ABSENCE OF KIDNEY: Chronic | ICD-10-CM

## 2019-03-23 DIAGNOSIS — E89.6 POSTPROCEDURAL ADRENOCORTICAL (-MEDULLARY) HYPOFUNCTION: Chronic | ICD-10-CM

## 2019-03-23 DIAGNOSIS — Z98.890 OTHER SPECIFIED POSTPROCEDURAL STATES: Chronic | ICD-10-CM

## 2019-03-23 DIAGNOSIS — Z90.12 ACQUIRED ABSENCE OF LEFT BREAST AND NIPPLE: Chronic | ICD-10-CM

## 2019-03-23 DIAGNOSIS — R10.10 UPPER ABDOMINAL PAIN, UNSPECIFIED: ICD-10-CM

## 2019-03-23 DIAGNOSIS — J90 PLEURAL EFFUSION, NOT ELSEWHERE CLASSIFIED: ICD-10-CM

## 2019-03-23 LAB
ALBUMIN SERPL ELPH-MCNC: 2.2 G/DL — LOW (ref 3.3–5)
ALP SERPL-CCNC: 57 U/L — SIGNIFICANT CHANGE UP (ref 40–120)
ALT FLD-CCNC: <6 U/L DA — LOW (ref 10–45)
ANION GAP SERPL CALC-SCNC: 12 MMOL/L — SIGNIFICANT CHANGE UP (ref 5–17)
APTT BLD: 32.5 SEC — SIGNIFICANT CHANGE UP (ref 27.5–36.3)
AST SERPL-CCNC: 16 U/L — SIGNIFICANT CHANGE UP (ref 10–40)
BASOPHILS # BLD AUTO: 0.1 K/UL — SIGNIFICANT CHANGE UP (ref 0–0.2)
BASOPHILS NFR BLD AUTO: 0.5 % — SIGNIFICANT CHANGE UP (ref 0–2)
BILIRUB SERPL-MCNC: 0.4 MG/DL — SIGNIFICANT CHANGE UP (ref 0.2–1.2)
BUN SERPL-MCNC: 23 MG/DL — SIGNIFICANT CHANGE UP (ref 7–23)
CALCIUM SERPL-MCNC: 8.9 MG/DL — SIGNIFICANT CHANGE UP (ref 8.4–10.5)
CHLORIDE SERPL-SCNC: 97 MMOL/L — SIGNIFICANT CHANGE UP (ref 96–108)
CO2 SERPL-SCNC: 33 MMOL/L — HIGH (ref 22–31)
CREAT SERPL-MCNC: 1.58 MG/DL — HIGH (ref 0.5–1.3)
EOSINOPHIL # BLD AUTO: 0.1 K/UL — SIGNIFICANT CHANGE UP (ref 0–0.5)
EOSINOPHIL NFR BLD AUTO: 0.5 % — SIGNIFICANT CHANGE UP (ref 0–6)
GLUCOSE SERPL-MCNC: 130 MG/DL — HIGH (ref 70–99)
HCT VFR BLD CALC: 41.5 % — SIGNIFICANT CHANGE UP (ref 34.5–45)
HGB BLD-MCNC: 12.5 G/DL — SIGNIFICANT CHANGE UP (ref 11.5–15.5)
INR BLD: 1.93 RATIO — HIGH (ref 0.88–1.16)
LACTATE SERPL-SCNC: 0.8 MMOL/L — SIGNIFICANT CHANGE UP (ref 0.7–2)
LIDOCAIN IGE QN: 408 U/L — HIGH (ref 73–393)
LYMPHOCYTES # BLD AUTO: 1 K/UL — SIGNIFICANT CHANGE UP (ref 1–3.3)
LYMPHOCYTES # BLD AUTO: 7.1 % — LOW (ref 13–44)
MCHC RBC-ENTMCNC: 27.3 PG — SIGNIFICANT CHANGE UP (ref 27–34)
MCHC RBC-ENTMCNC: 30 GM/DL — LOW (ref 32–36)
MCV RBC AUTO: 90.9 FL — SIGNIFICANT CHANGE UP (ref 80–100)
MONOCYTES # BLD AUTO: 1 K/UL — HIGH (ref 0–0.9)
MONOCYTES NFR BLD AUTO: 6.6 % — SIGNIFICANT CHANGE UP (ref 1–9)
NEUTROPHILS # BLD AUTO: 12.4 K/UL — HIGH (ref 1.8–7.4)
NEUTROPHILS NFR BLD AUTO: 85.4 % — HIGH (ref 43–77)
PLATELET # BLD AUTO: 436 K/UL — HIGH (ref 150–400)
POTASSIUM SERPL-MCNC: 3.1 MMOL/L — LOW (ref 3.5–5.3)
POTASSIUM SERPL-SCNC: 3.1 MMOL/L — LOW (ref 3.5–5.3)
PROT SERPL-MCNC: 7.1 G/DL — SIGNIFICANT CHANGE UP (ref 6–8.3)
PROTHROM AB SERPL-ACNC: 22.1 SEC — HIGH (ref 10–12.9)
RBC # BLD: 4.57 M/UL — SIGNIFICANT CHANGE UP (ref 3.8–5.2)
RBC # FLD: 15.6 % — HIGH (ref 10.3–14.5)
SODIUM SERPL-SCNC: 142 MMOL/L — SIGNIFICANT CHANGE UP (ref 135–145)
WBC # BLD: 14.5 K/UL — HIGH (ref 3.8–10.5)
WBC # FLD AUTO: 14.5 K/UL — HIGH (ref 3.8–10.5)

## 2019-03-23 PROCEDURE — 74022 RADEX COMPL AQT ABD SERIES: CPT

## 2019-03-23 PROCEDURE — 99253 IP/OBS CNSLTJ NEW/EST LOW 45: CPT

## 2019-03-23 PROCEDURE — 71250 CT THORAX DX C-: CPT

## 2019-03-23 PROCEDURE — 99285 EMERGENCY DEPT VISIT HI MDM: CPT | Mod: 25

## 2019-03-23 PROCEDURE — 36415 COLL VENOUS BLD VENIPUNCTURE: CPT

## 2019-03-23 PROCEDURE — 74176 CT ABD & PELVIS W/O CONTRAST: CPT | Mod: 26

## 2019-03-23 PROCEDURE — 85610 PROTHROMBIN TIME: CPT

## 2019-03-23 PROCEDURE — 87040 BLOOD CULTURE FOR BACTERIA: CPT

## 2019-03-23 PROCEDURE — 83605 ASSAY OF LACTIC ACID: CPT

## 2019-03-23 PROCEDURE — 85730 THROMBOPLASTIN TIME PARTIAL: CPT

## 2019-03-23 PROCEDURE — 83690 ASSAY OF LIPASE: CPT

## 2019-03-23 PROCEDURE — 71250 CT THORAX DX C-: CPT | Mod: 26

## 2019-03-23 PROCEDURE — 74176 CT ABD & PELVIS W/O CONTRAST: CPT

## 2019-03-23 PROCEDURE — 80053 COMPREHEN METABOLIC PANEL: CPT

## 2019-03-23 PROCEDURE — 70450 CT HEAD/BRAIN W/O DYE: CPT | Mod: 26

## 2019-03-23 PROCEDURE — 74022 RADEX COMPL AQT ABD SERIES: CPT | Mod: 26

## 2019-03-23 PROCEDURE — 93005 ELECTROCARDIOGRAM TRACING: CPT

## 2019-03-23 PROCEDURE — 85027 COMPLETE CBC AUTOMATED: CPT

## 2019-03-23 PROCEDURE — 99285 EMERGENCY DEPT VISIT HI MDM: CPT

## 2019-03-23 PROCEDURE — 96376 TX/PRO/DX INJ SAME DRUG ADON: CPT

## 2019-03-23 PROCEDURE — 96375 TX/PRO/DX INJ NEW DRUG ADDON: CPT | Mod: XU

## 2019-03-23 PROCEDURE — 96365 THER/PROPH/DIAG IV INF INIT: CPT

## 2019-03-23 PROCEDURE — 96361 HYDRATE IV INFUSION ADD-ON: CPT

## 2019-03-23 PROCEDURE — 93010 ELECTROCARDIOGRAM REPORT: CPT

## 2019-03-23 RX ORDER — METOPROLOL TARTRATE 50 MG
12.5 TABLET ORAL
Qty: 0 | Refills: 0 | Status: DISCONTINUED | OUTPATIENT
Start: 2019-03-23 | End: 2019-03-23

## 2019-03-23 RX ORDER — SODIUM CHLORIDE 9 MG/ML
1000 INJECTION, SOLUTION INTRAVENOUS
Qty: 0 | Refills: 0 | Status: DISCONTINUED | OUTPATIENT
Start: 2019-03-23 | End: 2019-03-24

## 2019-03-23 RX ORDER — CHOLECALCIFEROL (VITAMIN D3) 125 MCG
2000 CAPSULE ORAL DAILY
Qty: 0 | Refills: 0 | Status: DISCONTINUED | OUTPATIENT
Start: 2019-03-23 | End: 2019-03-31

## 2019-03-23 RX ORDER — PIPERACILLIN AND TAZOBACTAM 4; .5 G/20ML; G/20ML
3.38 INJECTION, POWDER, LYOPHILIZED, FOR SOLUTION INTRAVENOUS ONCE
Qty: 0 | Refills: 0 | Status: COMPLETED | OUTPATIENT
Start: 2019-03-23 | End: 2019-03-23

## 2019-03-23 RX ORDER — ONDANSETRON 8 MG/1
4 TABLET, FILM COATED ORAL ONCE
Qty: 0 | Refills: 0 | Status: COMPLETED | OUTPATIENT
Start: 2019-03-23 | End: 2019-03-23

## 2019-03-23 RX ORDER — POLYETHYLENE GLYCOL 3350 17 G/17G
17 POWDER, FOR SOLUTION ORAL DAILY
Qty: 0 | Refills: 0 | Status: DISCONTINUED | OUTPATIENT
Start: 2019-03-23 | End: 2019-03-28

## 2019-03-23 RX ORDER — PHYTONADIONE (VIT K1) 5 MG
5 TABLET ORAL ONCE
Qty: 0 | Refills: 0 | Status: COMPLETED | OUTPATIENT
Start: 2019-03-23 | End: 2019-03-23

## 2019-03-23 RX ORDER — LETROZOLE 2.5 MG/1
2.5 TABLET, FILM COATED ORAL DAILY
Qty: 0 | Refills: 0 | Status: DISCONTINUED | OUTPATIENT
Start: 2019-03-23 | End: 2019-03-31

## 2019-03-23 RX ORDER — SODIUM CHLORIDE 9 MG/ML
1000 INJECTION INTRAMUSCULAR; INTRAVENOUS; SUBCUTANEOUS ONCE
Qty: 0 | Refills: 0 | Status: COMPLETED | OUTPATIENT
Start: 2019-03-23 | End: 2019-03-23

## 2019-03-23 RX ORDER — MORPHINE SULFATE 50 MG/1
2 CAPSULE, EXTENDED RELEASE ORAL ONCE
Qty: 0 | Refills: 0 | Status: DISCONTINUED | OUTPATIENT
Start: 2019-03-23 | End: 2019-03-23

## 2019-03-23 RX ORDER — PANTOPRAZOLE SODIUM 20 MG/1
40 TABLET, DELAYED RELEASE ORAL
Qty: 0 | Refills: 0 | Status: DISCONTINUED | OUTPATIENT
Start: 2019-03-23 | End: 2019-03-31

## 2019-03-23 RX ORDER — MORPHINE SULFATE 50 MG/1
4 CAPSULE, EXTENDED RELEASE ORAL ONCE
Qty: 0 | Refills: 0 | Status: DISCONTINUED | OUTPATIENT
Start: 2019-03-23 | End: 2019-03-23

## 2019-03-23 RX ORDER — SENNA PLUS 8.6 MG/1
2 TABLET ORAL AT BEDTIME
Qty: 0 | Refills: 0 | Status: DISCONTINUED | OUTPATIENT
Start: 2019-03-23 | End: 2019-03-31

## 2019-03-23 RX ORDER — ACETAMINOPHEN 500 MG
1000 TABLET ORAL ONCE
Qty: 0 | Refills: 0 | Status: COMPLETED | OUTPATIENT
Start: 2019-03-23 | End: 2019-03-23

## 2019-03-23 RX ORDER — BUDESONIDE AND FORMOTEROL FUMARATE DIHYDRATE 160; 4.5 UG/1; UG/1
2 AEROSOL RESPIRATORY (INHALATION)
Qty: 0 | Refills: 0 | Status: DISCONTINUED | OUTPATIENT
Start: 2019-03-23 | End: 2019-03-31

## 2019-03-23 RX ORDER — ENOXAPARIN SODIUM 100 MG/ML
40 INJECTION SUBCUTANEOUS DAILY
Qty: 0 | Refills: 0 | Status: DISCONTINUED | OUTPATIENT
Start: 2019-03-23 | End: 2019-03-31

## 2019-03-23 RX ORDER — ESCITALOPRAM OXALATE 10 MG/1
20 TABLET, FILM COATED ORAL DAILY
Qty: 0 | Refills: 0 | Status: DISCONTINUED | OUTPATIENT
Start: 2019-03-23 | End: 2019-03-31

## 2019-03-23 RX ORDER — SODIUM CHLORIDE 9 MG/ML
3 INJECTION INTRAMUSCULAR; INTRAVENOUS; SUBCUTANEOUS ONCE
Qty: 0 | Refills: 0 | Status: COMPLETED | OUTPATIENT
Start: 2019-03-23 | End: 2019-03-23

## 2019-03-23 RX ORDER — ACETAMINOPHEN 500 MG
650 TABLET ORAL EVERY 6 HOURS
Qty: 0 | Refills: 0 | Status: DISCONTINUED | OUTPATIENT
Start: 2019-03-23 | End: 2019-03-28

## 2019-03-23 RX ORDER — METOPROLOL TARTRATE 50 MG
12.5 TABLET ORAL
Qty: 0 | Refills: 0 | Status: DISCONTINUED | OUTPATIENT
Start: 2019-03-23 | End: 2019-03-31

## 2019-03-23 RX ORDER — TIOTROPIUM BROMIDE 18 UG/1
1 CAPSULE ORAL; RESPIRATORY (INHALATION) DAILY
Qty: 0 | Refills: 0 | Status: DISCONTINUED | OUTPATIENT
Start: 2019-03-23 | End: 2019-03-31

## 2019-03-23 RX ORDER — ALBUTEROL 90 UG/1
1 AEROSOL, METERED ORAL
Qty: 0 | Refills: 0 | Status: DISCONTINUED | OUTPATIENT
Start: 2019-03-23 | End: 2019-03-28

## 2019-03-23 RX ORDER — SIMETHICONE 80 MG/1
80 TABLET, CHEWABLE ORAL EVERY 8 HOURS
Qty: 0 | Refills: 0 | Status: DISCONTINUED | OUTPATIENT
Start: 2019-03-23 | End: 2019-03-31

## 2019-03-23 RX ADMIN — PIPERACILLIN AND TAZOBACTAM 3.38 GRAM(S): 4; .5 INJECTION, POWDER, LYOPHILIZED, FOR SOLUTION INTRAVENOUS at 13:30

## 2019-03-23 RX ADMIN — SODIUM CHLORIDE 2000 MILLILITER(S): 9 INJECTION INTRAMUSCULAR; INTRAVENOUS; SUBCUTANEOUS at 12:54

## 2019-03-23 RX ADMIN — SODIUM CHLORIDE 1000 MILLILITER(S): 9 INJECTION INTRAMUSCULAR; INTRAVENOUS; SUBCUTANEOUS at 13:45

## 2019-03-23 RX ADMIN — SODIUM CHLORIDE 1000 MILLILITER(S): 9 INJECTION INTRAMUSCULAR; INTRAVENOUS; SUBCUTANEOUS at 10:15

## 2019-03-23 RX ADMIN — MORPHINE SULFATE 4 MILLIGRAM(S): 50 CAPSULE, EXTENDED RELEASE ORAL at 12:09

## 2019-03-23 RX ADMIN — Medication 1000 MILLIGRAM(S): at 18:46

## 2019-03-23 RX ADMIN — ONDANSETRON 4 MILLIGRAM(S): 8 TABLET, FILM COATED ORAL at 09:19

## 2019-03-23 RX ADMIN — Medication 12.5 MILLIGRAM(S): at 21:39

## 2019-03-23 RX ADMIN — ENOXAPARIN SODIUM 40 MILLIGRAM(S): 100 INJECTION SUBCUTANEOUS at 18:31

## 2019-03-23 RX ADMIN — SENNA PLUS 2 TABLET(S): 8.6 TABLET ORAL at 21:39

## 2019-03-23 RX ADMIN — MORPHINE SULFATE 4 MILLIGRAM(S): 50 CAPSULE, EXTENDED RELEASE ORAL at 09:18

## 2019-03-23 RX ADMIN — SODIUM CHLORIDE 1000 MILLILITER(S): 9 INJECTION INTRAMUSCULAR; INTRAVENOUS; SUBCUTANEOUS at 09:15

## 2019-03-23 RX ADMIN — SODIUM CHLORIDE 3 MILLILITER(S): 9 INJECTION INTRAMUSCULAR; INTRAVENOUS; SUBCUTANEOUS at 09:11

## 2019-03-23 RX ADMIN — MORPHINE SULFATE 4 MILLIGRAM(S): 50 CAPSULE, EXTENDED RELEASE ORAL at 09:35

## 2019-03-23 RX ADMIN — Medication 400 MILLIGRAM(S): at 18:31

## 2019-03-23 RX ADMIN — Medication 5 MILLIGRAM(S): at 19:30

## 2019-03-23 RX ADMIN — MORPHINE SULFATE 2 MILLIGRAM(S): 50 CAPSULE, EXTENDED RELEASE ORAL at 15:08

## 2019-03-23 RX ADMIN — PIPERACILLIN AND TAZOBACTAM 200 GRAM(S): 4; .5 INJECTION, POWDER, LYOPHILIZED, FOR SOLUTION INTRAVENOUS at 12:54

## 2019-03-23 RX ADMIN — MORPHINE SULFATE 4 MILLIGRAM(S): 50 CAPSULE, EXTENDED RELEASE ORAL at 12:23

## 2019-03-23 NOTE — H&P ADULT - PROBLEM SELECTOR PLAN 1
- suspect her pain to be related to gaseous distension, based on her imaging findings, her splenic hematoma has improved, she has significant improvement of her hemoperitoneum, there is no evidence of LUQ subdiaphragmatic abscess or collection, there is notable distension of her splenic flexure, highly unlikely to be constipation as she has minimal stool burden, would not favor mesenteric ischemia either given focal exam findings, normal lactate, and unremarkable small bowel on CT scan  - will treat with conservative measures, may have regular diet w/ supplement as her INR is elevated and she has hypoalbuminemia  - silmethicone prn  - no narcotics, Tylenol only for pain control

## 2019-03-23 NOTE — H&P ADULT - PROBLEM SELECTOR PLAN 2
- etiology of L sided effusion is unclear, may be contributing to her pain as she may have referred shoulder pain from diaphragmatic irritation  - consult CT surgery to evaluate for possible thoracocentesis vs thoracostomy tube placement

## 2019-03-23 NOTE — ED PROVIDER NOTE - NSRISKOFTRANSFER_ED_A_ED
Increased Pain/Deterioration of Condition En Route/Death or Disability/Transportation Risk (There is always a risk of traffic delays resulting in deterioration of condition.)

## 2019-03-23 NOTE — ED PROVIDER NOTE - PHYSICAL EXAMINATION
Gen:  alert, awake, no acute distress  Head:  atraumatic, normocephalic  HEENT: PERRLA, EOMI, normal nose, normal oropharynx, no tonsillar edema, erythema, or exudate  CV:  rrr, nl S1, S2, no m/r/g  Pulm:  decreased bs lower 1/2 left lung,   Abd: diffuse abd ttp and abd distended, no rebound, voluntary guarding, normal bs in all quadrants, rectal exam: no stool in rectal vault  MSK:  moving all extremities, no back midline ttp, no stepoffs, no cva TTP  Neuro:  grossly intact, no focal deficits  Skin:  clear, dry, intact  Psych: AOx3, normal affect, no apparent risk to self or others

## 2019-03-23 NOTE — H&P ADULT - NSHPSOCIALHISTORY_GEN_ALL_CORE
Tobacco Usage:  · Tobacco Usage: Former smoker  · Tobacco Type: cigarettes  · Number of Packs per Day: 1  · Number of yrs: 30  · Pack yrs: 30  · Longest Period Tobacco-Free: quit 8 months ago  · Cessation Medication Offered: refused

## 2019-03-23 NOTE — ED PROVIDER NOTE - OBJECTIVE STATEMENT
Pt with hx breast cancer with bone mets and renal mets, has left nephrectomy 1 month ago. Yesterday developed left shoulder pain that went down to chest and then to abdomen. Now only abdominal pain, constant and severe. Also complains of constipation from pain meds, states only small pellet like bm's for weeks. no fever no chills. no cp, no sob. denies cp sob back pain.

## 2019-03-23 NOTE — H&P ADULT - NSHPPHYSICALEXAM_GEN_ALL_CORE
T(C): 36.6 (03-23-19 @ 16:16), Max: 36.6 (03-23-19 @ 16:16)  HR: 93 (03-23-19 @ 16:16)  BP: 114/67 (03-23-19 @ 16:16)  SpO2: 92% (03-23-19 @ 16:16)    Gen: NAD  HEENT: NC/AT, sclera w/o icterus, conjuctiva w/o pallor  CV: no JVD, notable tenderness of the L chest wall, maximal over the 9th and 10th ribs  Pulm: barrel chest, no suprasternal retraction, no subcostal retraction  Abd: soft, moderate distension, tympanic over the LUQ, tender over the LUQ, no rebound tenderness, no gaurding, incisions well healed  : no flank tenderness, no flank ecchymosis, no surapubic tenderness  Ext: no pedal edema

## 2019-03-23 NOTE — CONSULT NOTE ADULT - SUBJECTIVE AND OBJECTIVE BOX
HPI:  63 year old female with a h/o breast CA, COPD, & a L radical Nephrectomy on 2/13/19 that was complicated by a splenic injury causing hemoperitoneum and hemorrhagic shock and requiring angioembolization.  She was transferred from [facility] for further evaluation after presenting for abdominal pain that has since improved.  A CT chest showed that a previously noted left pleural effusion has increased in size.  Additionally bilateral nodules are still noted.  Thoracic surgery had been consulted about them during a previous admission and the pt was recommended to follow up with Dr Damon with a repeat CT scan and PFTs.  She currently denies abdominal pain.  She also denies SOB, NGO, CP.      PAST MEDICAL & SURGICAL HISTORY:  Emphysema lung  Left Breast cancer (13 years ago)-> mastectomy, chemotherapy  Anemia  Left Renal mass-> nephrectomy, adrenalectomy  R hip surgery in 2007    REVIEW OF SYSTEMS    General: No Weight change/ Fatigue/ HA/ Dizziness	  Skin/Breast: No Rashes/ Lesions/ Masses  Ophthalmologic: No Blurry vision/ Glaucoma/ Blindness  ENMT: No Hearing loss/ Drainage/ Lesions	  Respiratory and Thorax: No Cough/ Wheezing/ SOB/ Hemoptysis/ Sputum production  Cardiovascular: No Chest pain/ Palpitations/ Diaphoresis	  Gastrointestinal: No Nausea/ Vomiting/ Constipation/ Appetite Change/ + abdominal pain, now resolved	  Genitourinary: No Hematuria/ Dysuria/ Frequency change  Musculoskeletal: No Pain/ Weakness/ Claudication	  Neurological: No Seizures/ TIA/ CVA/ Paresthesias	  Psychiatric: No Dementia/ Depression/ SI/ HI	  Hematology/Lymphatics: No hx of bleeding/ Edema	  Endocrine: No Hyperglycemia/ Hypoglycemia    Allergic/Immunologic:	 No Anaphylaxis/ Intolerance/ Recent illnesses    MEDICATIONS  (STANDING):  ALBUTerol    90 MICROgram(s) HFA Inhaler 1 Puff(s) Inhalation two times a day  buDESOnide  80 MICROgram(s)/formoterol 4.5 MICROgram(s) Inhaler 2 Puff(s) Inhalation two times a day  cholecalciferol 2000 Unit(s) Oral daily  enoxaparin Injectable 40 milliGRAM(s) SubCutaneous daily  escitalopram 20 milliGRAM(s) Oral daily  lactated ringers. 1000 milliLiter(s) (75 mL/Hr) IV Continuous <Continuous>  letrozole 2.5 milliGRAM(s) Oral daily  metoprolol tartrate 12.5 milliGRAM(s) Oral two times a day  pantoprazole    Tablet 40 milliGRAM(s) Oral before breakfast  senna 2 Tablet(s) Oral at bedtime  tiotropium 18 MICROgram(s) Capsule 1 Capsule(s) Inhalation daily    MEDICATIONS  (PRN):  acetaminophen   Tablet .. 650 milliGRAM(s) Oral every 6 hours PRN Mild Pain (1 - 3)  polyethylene glycol 3350 17 Gram(s) Oral daily PRN Constipation  simethicone 80 milliGRAM(s) Chew every 8 hours PRN Gas      Allergies    Cipro (Unknown)    FAMILY HISTORY:  No pertinent family history in first degree relatives      Vital Signs Last 24 Hrs  T(C): 36.6 (23 Mar 2019 20:45), Max: 37.1 (23 Mar 2019 14:54)  T(F): 97.9 (23 Mar 2019 20:45), Max: 98.7 (23 Mar 2019 14:54)  HR: 89 (23 Mar 2019 20:45) (80 - 100)  BP: 115/59 (23 Mar 2019 20:45) (106/71 - 119/68)  RR: 17 (23 Mar 2019 20:45) (16 - 20)  SpO2: 96% (23 Mar 2019 20:45) (92% - 98%)    General: WN/ WD NAD on O2 via NC  Neurology: Awake, nonfocal, GARCIA x 4  Eyes: Scleras clear, PERRLA/ EOMI, Gross vision intact  ENT: Gross hearing intact, grossly patent pharynx, no stridor  Neck: Neck supple, trachea midline, No JVD,   Respiratory: CTA B/L except for decreased BS at the left base; No wheezing, rales, rhonchi  CV: RRR, S1, S2, no murmurs  Abdominal: Soft, NT, ND +BS,   Extremities: No edema, + peripheral pulses  Skin: No Rashes, Hematoma, Ecchymosis  Lymphatic: No Neck, axilla, groin LAD  Psych: Oriented x 3, normal affect      LABS:                        12.5   14.5  )-----------( 436      ( 23 Mar 2019 09:10 )             41.5     03-23    142  |  97  |  23  ----------------------------<  130<H>  3.1<L>   |  33<H>  |  1.58<H>    Ca    8.9      23 Mar 2019 09:10    TPro  7.1  /  Alb  2.2<L>  /  TBili  0.4  /  DBili  x   /  AST  16  /  ALT  <6<L>  /  AlkPhos  57  03-23    PT/INR - ( 23 Mar 2019 09:10 )   PT: 22.1 sec;   INR: 1.93 ratio    PTT - ( 23 Mar 2019 09:10 )  PTT:32.5 sec      RADIOLOGY & ADDITIONAL STUDIES:    CT C/A/P w/ PO contrast    Moderate to large left pleural effusion, increased since the   	previous examination. The previously noted mild right pleural effusion   	has resolved. Trace pericardial effusion. The heart is not enlarged.   	Aortic and coronary artery calcifications are present. No evidence for   	aortic aneurysm. Small subcentimeter hypodense lesions in the thyroid   	bilaterally. Allowing for the noncontrast technique, there is no grossly   	enlarged mediastinal, hilar, or axillary lymph node. Stable bilateral   	breast implants. The trachea and central bronchi appear grossly unremarkable.    Near-complete compressive atelectasis of the left lower lobe. Mild   	atelectasis in other lung fields bilaterally. Emphysematous changes in   	both lungs, compatible with COPD. There are a few small lung nodules   	bilaterally with the largest measuring up to 7 mm in the right upper lobe     Moderate free fluid in the abdomen and pelvis, improved, suggestive of     improvement of hemoperitoneum.    ASSESSMENT:     Mod to Large Left pleural effusion    PLAN:    Dr Damon to review films and follow up with recommendations; Repeat INR and CXR ordered for AM 3/24

## 2019-03-23 NOTE — H&P ADULT - HISTORY OF PRESENT ILLNESS
62 y/o F h/o breast CA s/p B/L mastectomy, COPD, s/p L radical Nx c/b splenic injury causing hemoperitoneum and hemorrhagic shock requiring angioembolization, transferred from [place] today for further evaluation after presenting for abdominal pain. pt states yesterday evening, she noted sudden onset, sharp, nonradiating, constant, progressively worse L sided shoulder pain, mainly over her L scapula. There were no exacerbating or alleviating factors. This pain then proceed to migrate from her L shoulder region to her L hemiabdomen. During this time, she denies any changes to her physical activity, lifting heavy objects, parasthesias of her left upper extremity, weakness of her left upper extremity, tussis, singultus, or dyspnea. She does note pleurisy however. The abdominal pain was similar in quality to her L sided shoulder pain, also noted to be non-radiating, constant, somewhat worse with food intake. She denies having any changes to her diet, sick contacts, diarrhea, melena, mucus in her stool, hematochezia, constipation, urinary frequency, urinary urgency, dysuria, suprapubic pain, flank pain, hematuria. She does admit to having less flatus.

## 2019-03-23 NOTE — H&P ADULT - ASSESSMENT
64 y/o F w/ chest & abd pain, found to have a large L sided pleural effusion, now w/ ongoing moderate abdominal pain, presently stable

## 2019-03-23 NOTE — ED ADULT NURSE NOTE - OBJECTIVE STATEMENT
Pt arrived from home via EMS c/o lower abdominal pain 10/10. Pt states pain began last night in her left shoulder and radiated down to her breast. Pt states "I took oxycodone last night and went right to bed." Pt states today the pain is mainly on the left side and radiates across the stomach. Pt states 10/10 pain that is constant and worseness with touch, movement, and breathing. Pt states she was sweating and felt weak. Pt denies any fever, nausea, or vomiting. Pt reports being constipated for over a month and reports having one very small bowel movement a day.

## 2019-03-23 NOTE — ED PROVIDER NOTE - CLINICAL SUMMARY MEDICAL DECISION MAKING FREE TEXT BOX
pt with metastatic breast cancer, s/p left nephrectomy 1 month ago, now with abd pain, bloodwork, analgesia, xray, iv fluids, ct abd/pelvis

## 2019-03-23 NOTE — H&P ADULT - NSICDXPASTSURGICALHX_GEN_ALL_CORE_FT
PAST SURGICAL HISTORY:  H/O left mastectomy     H/O total adrenalectomy     History of hip surgery in 2007- Right hip surgery    S/p nephrectomy

## 2019-03-23 NOTE — H&P ADULT - NSICDXPASTMEDICALHX_GEN_ALL_CORE_FT
PAST MEDICAL HISTORY:  Anemia     Breast cancer Left breast cancer - 13 years ago, s/p mastectomy, and chemoptherapy    Emphysema lung     Other specified disorders of kidney and ureter

## 2019-03-23 NOTE — H&P ADULT - NSHPLABSRESULTS_GEN_ALL_CORE
Labs             12.5   14.5  )-----------( 436      ( 23 Mar 2019 09:10 )             41.5   23 Mar 2019 09:10    142    |  97     |  23     ----------------------------<  130    3.1     |  33     |  1.58     Ca    8.9        23 Mar 2019 09:10    PT/INR - ( 23 Mar 2019 09:10 )   PT: 22.1 sec;   INR: 1.93 ratio         PTT - ( 23 Mar 2019 09:10 )  PTT:32.5 sec    Imaging  CT C/A/P w/ PO contrast    Findings: Moderate to large left pleural effusion, increased since the   previous examination. The previously noted mild right pleural effusion   has resolved. Trace pericardial effusion. The heart is not enlarged.   Aortic and coronary artery calcifications are present. No evidence for   aortic aneurysm. Small subcentimeter hypodense lesions in the thyroid   bilaterally. Allowing for the noncontrast technique, there is no grossly   enlarged mediastinal, hilar, or axillary lymph node. Stable bilateral   breast implants.    The trachea and central bronchi appear grossly unremarkable.    Near-complete compressive atelectasis of the left lower lobe. Mild   atelectasis in other lung fields bilaterally. Emphysematous changes in   both lungs, compatible with COPD. There are a few small lung nodules   bilaterally with the largest measuring up to 7 mm in the right upper lobe   (image 63 series 2).    Moderate free fluid in the abdomen and pelvis, improved, suggestive of   improvement of hemoperitoneum.

## 2019-03-23 NOTE — ED PROVIDER NOTE - NS ED ROS FT
Except as otherwise indicated in HPI:  CONSTITUTIONAL: Neg  HEENT: neg  CV: neg  Resp: neg  GI: +abd pain, no nasuea no emesis, +constipation  : Neg  MSK: Neg  SKIN: Neg  NEURO: Neg  PSYCHIATRIC: Neg  Heme/Onc: Neg

## 2019-03-23 NOTE — ED PROVIDER NOTE - PROGRESS NOTE DETAILS
pt with large pleural effusion, will await results ct chest and abd/pelvis d/w dr silva (pt's surgeon) will xfer to [facility] for further evaluation and continuity of care

## 2019-03-24 ENCOUNTER — RESULT REVIEW (OUTPATIENT)
Age: 64
End: 2019-03-24

## 2019-03-24 LAB
ALBUMIN FLD-MCNC: 2.1 G/DL — SIGNIFICANT CHANGE UP
ANION GAP SERPL CALC-SCNC: 16 MMO/L — HIGH (ref 7–14)
APTT BLD: 31.5 SEC — SIGNIFICANT CHANGE UP (ref 27.5–36.3)
BUN SERPL-MCNC: 22 MG/DL — SIGNIFICANT CHANGE UP (ref 7–23)
CALCIUM SERPL-MCNC: 7.9 MG/DL — LOW (ref 8.4–10.5)
CHLORIDE SERPL-SCNC: 98 MMOL/L — SIGNIFICANT CHANGE UP (ref 98–107)
CO2 SERPL-SCNC: 26 MMOL/L — SIGNIFICANT CHANGE UP (ref 22–31)
CREAT SERPL-MCNC: 1.47 MG/DL — HIGH (ref 0.5–1.3)
FERRITIN SERPL-MCNC: 1635 NG/ML — HIGH (ref 15–150)
GLUCOSE SERPL-MCNC: 85 MG/DL — SIGNIFICANT CHANGE UP (ref 70–99)
GRAM STN FLD: SIGNIFICANT CHANGE UP
HCT VFR BLD CALC: 30.5 % — LOW (ref 34.5–45)
HGB BLD-MCNC: 8.7 G/DL — LOW (ref 11.5–15.5)
INR BLD: 1.37 — HIGH (ref 0.88–1.17)
IRON SATN MFR SERPL: 16 UG/DL — LOW (ref 30–160)
IRON SATN MFR SERPL: 85 UG/DL — LOW (ref 140–530)
LDH SERPL L TO P-CCNC: 506 U/L — SIGNIFICANT CHANGE UP
MCHC RBC-ENTMCNC: 27.1 PG — SIGNIFICANT CHANGE UP (ref 27–34)
MCHC RBC-ENTMCNC: 28.5 % — LOW (ref 32–36)
MCV RBC AUTO: 95 FL — SIGNIFICANT CHANGE UP (ref 80–100)
NRBC # FLD: 0 K/UL — LOW (ref 25–125)
PLATELET # BLD AUTO: 322 K/UL — SIGNIFICANT CHANGE UP (ref 150–400)
PMV BLD: 11.7 FL — SIGNIFICANT CHANGE UP (ref 7–13)
POTASSIUM SERPL-MCNC: 3.6 MMOL/L — SIGNIFICANT CHANGE UP (ref 3.5–5.3)
POTASSIUM SERPL-SCNC: 3.6 MMOL/L — SIGNIFICANT CHANGE UP (ref 3.5–5.3)
PROT FLD-MCNC: 4.3 G/DL — SIGNIFICANT CHANGE UP
PROTHROM AB SERPL-ACNC: 15.3 SEC — HIGH (ref 9.8–13.1)
RBC # BLD: 3.21 M/UL — LOW (ref 3.8–5.2)
RBC # FLD: 17.1 % — HIGH (ref 10.3–14.5)
RETICS #: 55 K/UL — SIGNIFICANT CHANGE UP (ref 25–125)
RETICS/RBC NFR: 1.7 % — SIGNIFICANT CHANGE UP (ref 0.5–2.5)
SODIUM SERPL-SCNC: 140 MMOL/L — SIGNIFICANT CHANGE UP (ref 135–145)
SPECIMEN SOURCE: SIGNIFICANT CHANGE UP
UIBC SERPL-MCNC: 69.4 UG/DL — LOW (ref 110–370)
WBC # BLD: 11.77 K/UL — HIGH (ref 3.8–10.5)
WBC # FLD AUTO: 11.77 K/UL — HIGH (ref 3.8–10.5)

## 2019-03-24 PROCEDURE — 71045 X-RAY EXAM CHEST 1 VIEW: CPT | Mod: 26

## 2019-03-24 PROCEDURE — 88305 TISSUE EXAM BY PATHOLOGIST: CPT | Mod: 26

## 2019-03-24 PROCEDURE — 88112 CYTOPATH CELL ENHANCE TECH: CPT | Mod: 26

## 2019-03-24 PROCEDURE — 32557 INSERT CATH PLEURA W/ IMAGE: CPT

## 2019-03-24 RX ORDER — TRAMADOL HYDROCHLORIDE 50 MG/1
25 TABLET ORAL ONCE
Qty: 0 | Refills: 0 | Status: DISCONTINUED | OUTPATIENT
Start: 2019-03-24 | End: 2019-03-24

## 2019-03-24 RX ADMIN — Medication 650 MILLIGRAM(S): at 13:40

## 2019-03-24 RX ADMIN — ENOXAPARIN SODIUM 40 MILLIGRAM(S): 100 INJECTION SUBCUTANEOUS at 13:11

## 2019-03-24 RX ADMIN — TRAMADOL HYDROCHLORIDE 25 MILLIGRAM(S): 50 TABLET ORAL at 18:40

## 2019-03-24 RX ADMIN — LETROZOLE 2.5 MILLIGRAM(S): 2.5 TABLET, FILM COATED ORAL at 05:41

## 2019-03-24 RX ADMIN — Medication 2000 UNIT(S): at 05:41

## 2019-03-24 RX ADMIN — Medication 12.5 MILLIGRAM(S): at 21:01

## 2019-03-24 RX ADMIN — ESCITALOPRAM OXALATE 20 MILLIGRAM(S): 10 TABLET, FILM COATED ORAL at 05:41

## 2019-03-24 RX ADMIN — ALBUTEROL 1 PUFF(S): 90 AEROSOL, METERED ORAL at 09:04

## 2019-03-24 RX ADMIN — Medication 650 MILLIGRAM(S): at 13:12

## 2019-03-24 RX ADMIN — PANTOPRAZOLE SODIUM 40 MILLIGRAM(S): 20 TABLET, DELAYED RELEASE ORAL at 05:42

## 2019-03-24 RX ADMIN — TIOTROPIUM BROMIDE 1 CAPSULE(S): 18 CAPSULE ORAL; RESPIRATORY (INHALATION) at 09:06

## 2019-03-24 RX ADMIN — TRAMADOL HYDROCHLORIDE 25 MILLIGRAM(S): 50 TABLET ORAL at 18:06

## 2019-03-24 RX ADMIN — BUDESONIDE AND FORMOTEROL FUMARATE DIHYDRATE 2 PUFF(S): 160; 4.5 AEROSOL RESPIRATORY (INHALATION) at 08:54

## 2019-03-24 RX ADMIN — Medication 12.5 MILLIGRAM(S): at 05:40

## 2019-03-24 RX ADMIN — Medication 650 MILLIGRAM(S): at 21:01

## 2019-03-24 RX ADMIN — Medication 650 MILLIGRAM(S): at 21:15

## 2019-03-24 NOTE — PROGRESS NOTE ADULT - SUBJECTIVE AND OBJECTIVE BOX
Subjective    After admission yesterday, she was noted to have momentary binocular diploplia which spontaneously resolved, per family, pt's oncologist was expecting to order a CT head. pt states she feels better, still w/ abd pain but improved, able to tolerate PO.    Objective    Vital signs  T(F): , Max: 98.7 (03-23-19 @ 14:54)  HR: 83 (03-24-19 @ 05:34)  BP: 121/68 (03-24-19 @ 05:34)  SpO2: 95% (03-24-19 @ 05:34)  Wt(kg): --    Output         Gen NAD  Abd S/moderate distension/mild LUQ tenderness   no flank tenderness, no suprapubic    Labs      03-24 @ 06:09    WBC 11.77 / Hct 30.5  / SCr 1.47     03-23 @ 09:10    WBC 14.5  / Hct 41.5  / SCr 1.58     PT/INR - ( 24 Mar 2019 06:09 )   PT: 15.3 SEC;   INR: 1.37          PTT - ( 24 Mar 2019 06:09 )  PTT:31.5 SEC

## 2019-03-24 NOTE — PROGRESS NOTE ADULT - SUBJECTIVE AND OBJECTIVE BOX
Thoracic Surgery  CC: bilateral malignant pleural effusions   HPI: 63F h/o breast CA, COPD, L radical Nephrectomy on 17 that was complicated by a splenic injury causing hemoperitoneum and hemorrhagic shock and requiring angioembolization. Known bilateral pleural nodules w/increasing left-sided pleural effusion   24/Overnight events: Patient seen and examined at bedside, doing well. Breathing comfortably on 2L NC. Mildly dyspneic moderate pleuritic chest pain on exam, worse since last admission. CT chows large left-sided pleural effusion, will need to be drained     Objective:  Vital Signs Last 24 Hrs  T(C): 36.6 (24 Mar 2019 13:13), Max: 37.1 (23 Mar 2019 14:54)  T(F): 97.9 (24 Mar 2019 13:13), Max: 98.7 (23 Mar 2019 14:54)  HR: 84 (24 Mar 2019 13:13) (73 - 100)  BP: 107/55 (24 Mar 2019 13:13) (99/62 - 121/68)  BP(mean): --  RR: 17 (24 Mar 2019 13:13) (16 - 18)  SpO2: 95% (24 Mar 2019 13:13) (92% - 98%)    Physical Exam:  General: doing well, sitting in chair comfortably  Respiratory: breathing well on 2L NC, mild pleuritic chest pain     I&O's Detail    23 Mar 2019 07:01  -  24 Mar 2019 07:00  --------------------------------------------------------  IN:    lactated ringers.: 75 mL  Total IN: 75 mL    OUT:  Total OUT: 0 mL    Total NET: 75 mL        Daily Height in cm: 162.56 (23 Mar 2019 16:16)    Daily Weight in k.9 (24 Mar 2019 01:26)    LABS:                        8.7    11.77 )-----------( 322      ( 24 Mar 2019 06:09 )             30.5     03-24    140  |  98  |  22  ----------------------------<  85  3.6   |  26  |  1.47<H>    Ca    7.9<L>      24 Mar 2019 06:09    TPro  7.1  /  Alb  2.2<L>  /  TBili  0.4  /  DBili  x   /  AST  16  /  ALT  <6<L>  /  AlkPhos  57  03-23    PT/INR - ( 24 Mar 2019 06:09 )   PT: 15.3 SEC;   INR: 1.37          PTT - ( 24 Mar 2019 06:09 )  PTT:31.5 SEC

## 2019-03-24 NOTE — PROCEDURE NOTE - NSPROCDETAILS_GEN_ALL_CORE
percutaneous/thoracostomy tube placed percutaneously/Seldinger technique/dressing applied/secured in place/ultrasound assessment of fluid (location)

## 2019-03-24 NOTE — PHYSICAL THERAPY INITIAL EVALUATION ADULT - PERTINENT HX OF CURRENT PROBLEM, REHAB EVAL
admitted with abd. pain, CT of lung + for Pleural Effusion.  Pt. with hx. of Breast Ca, b/l mastectomy, COPD

## 2019-03-24 NOTE — PROGRESS NOTE ADULT - PROBLEM SELECTOR PLAN 2
- await CT surgery input, repeat CXR demonstrates white-out of the L hemithorax  - c/w O2 supplementation  - HOB elevated to 60 degree

## 2019-03-25 DIAGNOSIS — C50.919 MALIGNANT NEOPLASM OF UNSPECIFIED SITE OF UNSPECIFIED FEMALE BREAST: ICD-10-CM

## 2019-03-25 DIAGNOSIS — Z90.5 ACQUIRED ABSENCE OF KIDNEY: ICD-10-CM

## 2019-03-25 DIAGNOSIS — Z29.9 ENCOUNTER FOR PROPHYLACTIC MEASURES, UNSPECIFIED: ICD-10-CM

## 2019-03-25 DIAGNOSIS — J43.9 EMPHYSEMA, UNSPECIFIED: ICD-10-CM

## 2019-03-25 DIAGNOSIS — D64.9 ANEMIA, UNSPECIFIED: ICD-10-CM

## 2019-03-25 LAB
ANION GAP SERPL CALC-SCNC: 10 MMO/L — SIGNIFICANT CHANGE UP (ref 7–14)
BUN SERPL-MCNC: 29 MG/DL — HIGH (ref 7–23)
CALCIUM SERPL-MCNC: 8.6 MG/DL — SIGNIFICANT CHANGE UP (ref 8.4–10.5)
CHLORIDE SERPL-SCNC: 98 MMOL/L — SIGNIFICANT CHANGE UP (ref 98–107)
CO2 SERPL-SCNC: 35 MMOL/L — HIGH (ref 22–31)
CREAT SERPL-MCNC: 1.5 MG/DL — HIGH (ref 0.5–1.3)
GLUCOSE SERPL-MCNC: 104 MG/DL — HIGH (ref 70–99)
HCT VFR BLD CALC: 34.4 % — LOW (ref 34.5–45)
HGB BLD-MCNC: 10.3 G/DL — LOW (ref 11.5–15.5)
MCHC RBC-ENTMCNC: 27.2 PG — SIGNIFICANT CHANGE UP (ref 27–34)
MCHC RBC-ENTMCNC: 29.9 % — LOW (ref 32–36)
MCV RBC AUTO: 91 FL — SIGNIFICANT CHANGE UP (ref 80–100)
NRBC # FLD: 0 K/UL — LOW (ref 25–125)
PLATELET # BLD AUTO: 372 K/UL — SIGNIFICANT CHANGE UP (ref 150–400)
PMV BLD: 12 FL — SIGNIFICANT CHANGE UP (ref 7–13)
POTASSIUM SERPL-MCNC: 3.9 MMOL/L — SIGNIFICANT CHANGE UP (ref 3.5–5.3)
POTASSIUM SERPL-SCNC: 3.9 MMOL/L — SIGNIFICANT CHANGE UP (ref 3.5–5.3)
RBC # BLD: 3.78 M/UL — LOW (ref 3.8–5.2)
RBC # FLD: 17 % — HIGH (ref 10.3–14.5)
SODIUM SERPL-SCNC: 143 MMOL/L — SIGNIFICANT CHANGE UP (ref 135–145)
WBC # BLD: 11.97 K/UL — HIGH (ref 3.8–10.5)
WBC # FLD AUTO: 11.97 K/UL — HIGH (ref 3.8–10.5)

## 2019-03-25 PROCEDURE — 99024 POSTOP FOLLOW-UP VISIT: CPT

## 2019-03-25 PROCEDURE — 99233 SBSQ HOSP IP/OBS HIGH 50: CPT

## 2019-03-25 PROCEDURE — 71045 X-RAY EXAM CHEST 1 VIEW: CPT | Mod: 26

## 2019-03-25 RX ORDER — OXYCODONE HYDROCHLORIDE 5 MG/1
5 TABLET ORAL EVERY 4 HOURS
Qty: 0 | Refills: 0 | Status: DISCONTINUED | OUTPATIENT
Start: 2019-03-25 | End: 2019-03-28

## 2019-03-25 RX ADMIN — Medication 12.5 MILLIGRAM(S): at 17:17

## 2019-03-25 RX ADMIN — ESCITALOPRAM OXALATE 20 MILLIGRAM(S): 10 TABLET, FILM COATED ORAL at 05:44

## 2019-03-25 RX ADMIN — BUDESONIDE AND FORMOTEROL FUMARATE DIHYDRATE 2 PUFF(S): 160; 4.5 AEROSOL RESPIRATORY (INHALATION) at 08:42

## 2019-03-25 RX ADMIN — OXYCODONE HYDROCHLORIDE 5 MILLIGRAM(S): 5 TABLET ORAL at 13:10

## 2019-03-25 RX ADMIN — OXYCODONE HYDROCHLORIDE 5 MILLIGRAM(S): 5 TABLET ORAL at 13:50

## 2019-03-25 RX ADMIN — LETROZOLE 2.5 MILLIGRAM(S): 2.5 TABLET, FILM COATED ORAL at 05:44

## 2019-03-25 RX ADMIN — Medication 2000 UNIT(S): at 05:44

## 2019-03-25 RX ADMIN — Medication 12.5 MILLIGRAM(S): at 05:44

## 2019-03-25 RX ADMIN — ENOXAPARIN SODIUM 40 MILLIGRAM(S): 100 INJECTION SUBCUTANEOUS at 11:34

## 2019-03-25 NOTE — CONSULT NOTE ADULT - PROBLEM SELECTOR RECOMMENDATION 5
-anemia of chronic disease and acute blood loss with serosanguinous pleural drainage  -monitor CBC, transfuse prn to keep Hgb>7

## 2019-03-25 NOTE — CONSULT NOTE ADULT - PROBLEM SELECTOR RECOMMENDATION 3
-h/o metastatic breast cancer s/p mastectomy/chemo 13 years ago, was previously on ibrance and femera, ibrance on hold per oncologist Dr. Addie Shields prior to surgery, hasn't followed up with her since surgery  -has bone mets and possible lung mets  -c/w femera, will speak to her oncologist about resuming ibrance -h/o metastatic breast cancer s/p mastectomy/chemo 13 years ago, was previously on ibrance and femera, ibrance on hold per oncologist Dr. Addie Shields prior to surgery, hasn't followed up with her since surgery  -has bone mets and possible lung mets  -c/w femera  -I discussed case with her oncologist, cont femera until cytology comes back, outpt f/u for restaging (contrast CT or PET scan), may have to change her antineoplastic therapy

## 2019-03-25 NOTE — PROGRESS NOTE ADULT - SUBJECTIVE AND OBJECTIVE BOX
Subjective    She is now s/p L thoracostomy tube placement. Pt notes ongoing LUQ pain, now worse w/ movement. Tolerating diet, passing flatus, OOB. PT recs no skilled needs.    Objective    Vital signs  T(F): , Max: 98.4 (03-24-19 @ 17:11)  HR: 70 (03-25-19 @ 05:40)  BP: 113/61 (03-25-19 @ 05:40)  SpO2: 99% (03-25-19 @ 05:40)  Wt(kg): --    Output     OUT:    Voided: 500 mL  Total OUT: 500 mL    Total NET: -500 mL          Gen NAD  Abd S/mild distension/LUQ tenderness, no RT no gaurding   no flank tenderness, no suprapubic tenderness    Labs      03-24 @ 06:09    WBC 11.77 / Hct 30.5  / SCr 1.47     03-23 @ 09:10    WBC 14.5  / Hct 41.5  / SCr 1.58     Reticulocyte Percent: 1.7 % (03.24.19 @ 06:09)  Absolute Reticulocytes: 55 k/uL (03.24.19 @ 06:09)    Reticulocyte index <2 (after adjustment of anemia)    Ferritin, Serum: 1635 ng/mL (03.24.19 @ 06:09)  Iron with Total Binding Capacity (03.24.19 @ 06:09)    Iron Total, Serum: 16 ug/dL    Unsaturated Iron Binding Capacity: 69.4 ug/dL    Total Iron Binding Capacity.: 85 ug/dL

## 2019-03-25 NOTE — CONSULT NOTE ADULT - PROBLEM SELECTOR RECOMMENDATION 2
-increasing L pleural effusion, s/p chest tube drainage 1.5 L serosanguinous fluid removal  -likely malignant pleural effusion with known h/o metastatic breast CA with bone mets and possible lung mets (known small lung/pleural nodules)  -pleural fluid is exudative, c/w malignant effusion, pleural fluid protein/serum protein = 0.6, f/u cytology/culture  -f/u CT surgery recs

## 2019-03-25 NOTE — CONSULT NOTE ADULT - PROBLEM SELECTOR RECOMMENDATION 6
-h/o L nephrectomy 2/13 c/b splenic injury/splenic hematoma requiring IR angioembolization  -management per

## 2019-03-25 NOTE — CONSULT NOTE ADULT - PROBLEM SELECTOR RECOMMENDATION 9
-likely from pleural/osseous mets, with h/o metastatic breast cancer with bone mets and known pleural nodules   -pain control per primary team, recommend add oxycodone 5 mg q4h prn,  bowel regimen  -consider bone scan -likely from pleural/osseous mets, with h/o metastatic breast cancer with bone mets and known pleural nodules   -pain control per primary team, recommend add oxycodone 5 mg q4h prn,  bowel regimen  -d/w her oncologist Dr. Shields, outpt f/u for restaging contrast CT or PET

## 2019-03-25 NOTE — CONSULT NOTE ADULT - SUBJECTIVE AND OBJECTIVE BOX
Carla Ruiz MD  Pager [phone]    HPI:  62 y/o F h/o metastatic breast CA s/p mastectomy and chemo 13 years ago (bone mets),  COPD, left renal neoplasm, s/p L radical Nephrectomy 2/13/19 c/b splenic injury causing splenic hematoma and hemoperitoneum and hemorrhagic shock requiring angioembolization, known pleural nodules, transferred from [place] today for c/o severe LUQ pain radiating to her L scapula, found to have increasing L pleural effusion, s/p chest tube drainage by CT surgery, drained ~1.5L serosanguinous fluid, pleural fluid sent for cytology/culture/chem. Patient reports pain is improved, but still has significant LUQ pain.  She denies fever/chill/chest pain/dyspnea/cough.  She states her oncologist is Dr. Addie Shields and she was on ibrance and femera for breast cancer and ibrance was held prior to surgery.     PAST MEDICAL & SURGICAL HISTORY:  Emphysema lung  Breast cancer: Left breast cancer - 13 years ago, s/p mastectomy, and chemoptherapy  Anemia  Other specified disorders of kidney and ureter  H/O total adrenalectomy  S/p nephrectomy  H/O left mastectomy  History of hip surgery: in 2007- Right hip surgery      Review of Systems:   CONSTITUTIONAL: No fever, +weight loss, or fatigue  EYES: No eye pain, visual disturbances, or discharge  ENMT:  No difficulty hearing, tinnitus, vertigo; No sinus or throat pain  NECK: No pain or stiffness  BREASTS: No pain, masses, or nipple discharge  RESPIRATORY: No cough, wheezing, chills or hemoptysis; No shortness of breath  CARDIOVASCULAR: No chest pain, palpitations, dizziness, or leg swelling  GASTROINTESTINAL: severe LUQ pain   No nausea, vomiting, or hematemesis; No diarrhea or constipation. No melena or hematochezia.  GENITOURINARY: No dysuria, frequency, hematuria, or incontinence  NEUROLOGICAL: No headaches, memory loss, loss of strength, numbness, or tremors  SKIN: No itching, burning, rashes, or lesions   LYMPH NODES: No enlarged glands  ENDOCRINE: No heat or cold intolerance; No hair loss  MUSCULOSKELETAL: No joint pain or swelling; No muscle, back, or extremity pain  PSYCHIATRIC: No depression, anxiety, mood swings, or difficulty sleeping  HEME/LYMPH: No easy bruising, or bleeding gums  ALLERY AND IMMUNOLOGIC: No hives or eczema    Allergies    Cipro (Unknown)    Intolerances    Social History:  quit smoking 8 months ago, former smoker 1 ppd x30 years, denies etoh    FAMILY HISTORY:  No pertinent family history in first degree relatives      Home Medications:  acetaminophen 325 mg oral tablet: 2 tab(s) orally every 6 hours (01 Mar 2019 13:13)  albuterol 90 mcg/inh inhalation aerosol: 1 puff(s) inhaled 2 times a day (01 Mar 2019 13:13)  Anoro Ellipta 62.5 mcg-25 mcg/inh inhalation powder: 1 puff(s) inhaled once a day in am (13 Feb 2019 08:46)  docusate sodium 100 mg oral capsule: 1 cap(s) orally 2 times a day (01 Mar 2019 13:13)  escitalopram 20 mg oral tablet: 1 tab(s) orally once a day (01 Mar 2019 13:13)  letrozole 2.5 mg oral tablet: 1 tab(s) orally once a day (01 Mar 2019 13:13)  metoprolol: 12.5 milligram(s) orally 2 times a day (01 Mar 2019 13:13)  Milk of Magnesia 8% oral suspension: 15 milliliter(s) orally once a day (at bedtime), As Needed (13 Feb 2019 08:46)  oxyCODONE 10 mg oral tablet: 1 tab(s) orally every 4 hours, As needed, Severe Pain (7 - 10) (01 Mar 2019 13:13)  oxyCODONE 5 mg oral tablet: 1 tab(s) orally every 4 hours, As needed, mild-mod pain (01 Mar 2019 13:13)  pantoprazole 40 mg oral delayed release tablet: 1 tab(s) orally once a day (before a meal) (01 Mar 2019 13:13)  polyethylene glycol 3350 oral powder for reconstitution: 17 gram(s) orally once a day, As needed, Constipation (01 Mar 2019 13:13)  senna oral tablet: 2 tab(s) orally once a day (at bedtime) (01 Mar 2019 13:13)  tiotropium 18 mcg inhalation capsule: 1 cap(s) inhaled once a day (01 Mar 2019 13:13)  Vitamin D3 2000 intl units oral tablet: 1 tab(s) orally once a day (13 Feb 2019 08:46)      MEDICATIONS  (STANDING):  ALBUTerol    90 MICROgram(s) HFA Inhaler 1 Puff(s) Inhalation two times a day  buDESOnide  80 MICROgram(s)/formoterol 4.5 MICROgram(s) Inhaler 2 Puff(s) Inhalation two times a day  cholecalciferol 2000 Unit(s) Oral daily  enoxaparin Injectable 40 milliGRAM(s) SubCutaneous daily  escitalopram 20 milliGRAM(s) Oral daily  letrozole 2.5 milliGRAM(s) Oral daily  metoprolol tartrate 12.5 milliGRAM(s) Oral two times a day  pantoprazole    Tablet 40 milliGRAM(s) Oral before breakfast  senna 2 Tablet(s) Oral at bedtime  tiotropium 18 MICROgram(s) Capsule 1 Capsule(s) Inhalation daily    MEDICATIONS  (PRN):  acetaminophen   Tablet .. 650 milliGRAM(s) Oral every 6 hours PRN Mild Pain (1 - 3)  polyethylene glycol 3350 17 Gram(s) Oral daily PRN Constipation  simethicone 80 milliGRAM(s) Chew every 8 hours PRN Gas      Vital Signs Last 24 Hrs  T(C): 36.7 (25 Mar 2019 05:40), Max: 36.9 (24 Mar 2019 17:11)  T(F): 98 (25 Mar 2019 05:40), Max: 98.4 (24 Mar 2019 17:11)  HR: 70 (25 Mar 2019 05:40) (66 - 84)  BP: 113/61 (25 Mar 2019 05:40) (103/59 - 118/70)  BP(mean): --  RR: 17 (25 Mar 2019 05:40) (17 - 17)  SpO2: 99% (25 Mar 2019 05:40) (95% - 100%)  CAPILLARY BLOOD GLUCOSE        I&O's Summary    24 Mar 2019 07:01  -  25 Mar 2019 07:00  --------------------------------------------------------  IN: 0 mL / OUT: 3430 mL / NET: -3430 mL    25 Mar 2019 07:01  -  25 Mar 2019 10:40  --------------------------------------------------------  IN: 0 mL / OUT: 50 mL / NET: -50 mL        PHYSICAL EXAM:  GENERAL: NAD, well-developed  HEAD:  Atraumatic, Normocephalic  EYES: EOMI, PERRLA, conjunctiva and sclera clear  NECK: Supple, No JVD  CHEST/LUNG: Clear to auscultation bilaterally; No wheeze  HEART: Regular rate and rhythm; No murmurs, rubs, or gallops  ABDOMEN: Soft, LUQ tender to palpation with left chest tube draining serosanguinous fluid; Bowel sounds present  EXTREMITIES:  2+ Peripheral Pulses, No clubbing, cyanosis, or edema  PSYCH: AAOx3  NEUROLOGY: non-focal  SKIN: No rashes or lesions    LABS:                        10.3   11.97 )-----------( 372      ( 25 Mar 2019 06:00 )             34.4     03-25    143  |  98  |  29<H>  ----------------------------<  104<H>  3.9   |  35<H>  |  1.50<H>    Ca    8.6      25 Mar 2019 06:00      PT/INR - ( 24 Mar 2019 06:09 )   PT: 15.3 SEC;   INR: 1.37          PTT - ( 24 Mar 2019 06:09 )  PTT:31.5 SEC        Microbiology Culture Results:   No growth to date. (03-23-19 @ 09:10)  Culture Results:   No growth to date. (03-23-19 @ 09:10)      RADIOLOGY & ADDITIONAL TESTS:    Imaging Personally Reviewed:  < from: Xray Chest 1 View- PORTABLE-Routine (03.25.19 @ 08:00) >  Heart size and the mediastinum cannot be accurately evaluated on this   projection. The thoracic aorta is calcified. Left breast implant is seen.  Right lung is clear.  A left basilar pleural pigtail catheter is again seen.  There is a small residual left pleural effusion is seen which is   decreased in size. There is likely associated passive atelectasis. No   pneumothorax is seen. No right pleural effusion is noted.        IMPRESSION:  Basilar pleural pigtail catheter again seen.    Small residual left pleural effusion, decreased in size. Likely   associated passive atelectasis.      < from: CT Head No Cont (03.23.19 @ 20:02) >  IMPRESSION:    No CT evidence for acute infarct or acute hemorrhage.    Evaluation for the presence or absence of intracranial metastatic disease   is limited and incomplete without intravenous contrast. Consider   follow-up contrast-enhanced MRI if clinically warranted, and if there are   no contrast or MRI contraindications.    Multiple areas of sclerosis involves the calvarium suspicious for bony   metastatic disease given the patient's known history of breast cancer.   Correlation with whole-body bone scan is suggested.     < from: CT Chest No Cont (03.23.19 @ 11:52) >    Impression:Moderate to large left pleural effusion, increased since the   previous examination.    Trace pericardial effusion.     A few small lung nodules bilaterally; metastasis cannot be excluded.    Subcapsular hematoma of the spleen again noted, improved.     Mild layering density in the gallbladder, suggestive of sludge or small   gallstones. Apparent slight dilatation of the common duct at 8 mm in   caliber.     Moderate free fluid in the abdomen and pelvis, improved, suggestive of   improvement of hemoperitoneum.      Consultant(s) Notes Reviewed:      Care Discussed with Consultants/Other Providers: JAVAN Schmidt, f/u cytology Carla Ruiz MD  Pager [phone]    HPI:  64 y/o F h/o metastatic breast CA s/p mastectomy and chemo 13 years ago (bone mets),  COPD, left renal neoplasm, s/p L radical Nephrectomy 2/13/19 c/b splenic injury causing splenic hematoma and hemoperitoneum and hemorrhagic shock requiring angioembolization, known pleural nodules, transferred from [place] today for c/o severe LUQ pain radiating to her L scapula, found to have increasing L pleural effusion, s/p chest tube drainage by CT surgery, drained ~1.5L serosanguinous fluid, pleural fluid sent for cytology/culture/chem. Patient reports pain is improved, but still has significant LUQ pain.  She denies fever/chill/chest pain/dyspnea/cough.  She states her oncologist is Dr. Addie Shields and she was on ibrance and femera for breast cancer and ibrance was held prior to surgery.     PAST MEDICAL & SURGICAL HISTORY:  Emphysema lung  Breast cancer: Left breast cancer - 13 years ago, s/p mastectomy, and chemoptherapy  Anemia  Other specified disorders of kidney and ureter  H/O total adrenalectomy  S/p nephrectomy  H/O left mastectomy  History of hip surgery: in 2007- Right hip surgery      Review of Systems:   CONSTITUTIONAL: No fever, +weight loss, or fatigue  EYES: No eye pain, visual disturbances, or discharge  ENMT:  No difficulty hearing, tinnitus, vertigo; No sinus or throat pain  NECK: No pain or stiffness  BREASTS: No pain, masses, or nipple discharge  RESPIRATORY: No cough, wheezing, chills or hemoptysis; No shortness of breath  CARDIOVASCULAR: No chest pain, palpitations, dizziness, or leg swelling  GASTROINTESTINAL: severe LUQ pain   No nausea, vomiting, or hematemesis; No diarrhea or constipation. No melena or hematochezia.  GENITOURINARY: No dysuria, frequency, hematuria, or incontinence  NEUROLOGICAL: No headaches, memory loss, loss of strength, numbness, or tremors  SKIN: No itching, burning, rashes, or lesions   LYMPH NODES: No enlarged glands  ENDOCRINE: No heat or cold intolerance; No hair loss  MUSCULOSKELETAL: No joint pain or swelling; No muscle, back, or extremity pain  PSYCHIATRIC: No depression, anxiety, mood swings, or difficulty sleeping  HEME/LYMPH: No easy bruising, or bleeding gums  ALLERY AND IMMUNOLOGIC: No hives or eczema    Allergies    Cipro (Unknown)    Intolerances    Social History:  quit smoking 8 months ago, former smoker 1 ppd x30 years, denies etoh    FAMILY HISTORY:  No pertinent family history in first degree relatives      Home Medications:  acetaminophen 325 mg oral tablet: 2 tab(s) orally every 6 hours (01 Mar 2019 13:13)  albuterol 90 mcg/inh inhalation aerosol: 1 puff(s) inhaled 2 times a day (01 Mar 2019 13:13)  Anoro Ellipta 62.5 mcg-25 mcg/inh inhalation powder: 1 puff(s) inhaled once a day in am (13 Feb 2019 08:46)  docusate sodium 100 mg oral capsule: 1 cap(s) orally 2 times a day (01 Mar 2019 13:13)  escitalopram 20 mg oral tablet: 1 tab(s) orally once a day (01 Mar 2019 13:13)  letrozole 2.5 mg oral tablet: 1 tab(s) orally once a day (01 Mar 2019 13:13)  metoprolol: 12.5 milligram(s) orally 2 times a day (01 Mar 2019 13:13)  Milk of Magnesia 8% oral suspension: 15 milliliter(s) orally once a day (at bedtime), As Needed (13 Feb 2019 08:46)  oxyCODONE 10 mg oral tablet: 1 tab(s) orally every 4 hours, As needed, Severe Pain (7 - 10) (01 Mar 2019 13:13)  oxyCODONE 5 mg oral tablet: 1 tab(s) orally every 4 hours, As needed, mild-mod pain (01 Mar 2019 13:13)  pantoprazole 40 mg oral delayed release tablet: 1 tab(s) orally once a day (before a meal) (01 Mar 2019 13:13)  polyethylene glycol 3350 oral powder for reconstitution: 17 gram(s) orally once a day, As needed, Constipation (01 Mar 2019 13:13)  senna oral tablet: 2 tab(s) orally once a day (at bedtime) (01 Mar 2019 13:13)  tiotropium 18 mcg inhalation capsule: 1 cap(s) inhaled once a day (01 Mar 2019 13:13)  Vitamin D3 2000 intl units oral tablet: 1 tab(s) orally once a day (13 Feb 2019 08:46)      MEDICATIONS  (STANDING):  ALBUTerol    90 MICROgram(s) HFA Inhaler 1 Puff(s) Inhalation two times a day  buDESOnide  80 MICROgram(s)/formoterol 4.5 MICROgram(s) Inhaler 2 Puff(s) Inhalation two times a day  cholecalciferol 2000 Unit(s) Oral daily  enoxaparin Injectable 40 milliGRAM(s) SubCutaneous daily  escitalopram 20 milliGRAM(s) Oral daily  letrozole 2.5 milliGRAM(s) Oral daily  metoprolol tartrate 12.5 milliGRAM(s) Oral two times a day  pantoprazole    Tablet 40 milliGRAM(s) Oral before breakfast  senna 2 Tablet(s) Oral at bedtime  tiotropium 18 MICROgram(s) Capsule 1 Capsule(s) Inhalation daily    MEDICATIONS  (PRN):  acetaminophen   Tablet .. 650 milliGRAM(s) Oral every 6 hours PRN Mild Pain (1 - 3)  polyethylene glycol 3350 17 Gram(s) Oral daily PRN Constipation  simethicone 80 milliGRAM(s) Chew every 8 hours PRN Gas      Vital Signs Last 24 Hrs  T(C): 36.7 (25 Mar 2019 05:40), Max: 36.9 (24 Mar 2019 17:11)  T(F): 98 (25 Mar 2019 05:40), Max: 98.4 (24 Mar 2019 17:11)  HR: 70 (25 Mar 2019 05:40) (66 - 84)  BP: 113/61 (25 Mar 2019 05:40) (103/59 - 118/70)  BP(mean): --  RR: 17 (25 Mar 2019 05:40) (17 - 17)  SpO2: 99% (25 Mar 2019 05:40) (95% - 100%)  CAPILLARY BLOOD GLUCOSE        I&O's Summary    24 Mar 2019 07:01  -  25 Mar 2019 07:00  --------------------------------------------------------  IN: 0 mL / OUT: 3430 mL / NET: -3430 mL    25 Mar 2019 07:01  -  25 Mar 2019 10:40  --------------------------------------------------------  IN: 0 mL / OUT: 50 mL / NET: -50 mL        PHYSICAL EXAM:  GENERAL: NAD, well-developed  HEAD:  Atraumatic, Normocephalic  EYES: EOMI, PERRLA, conjunctiva and sclera clear  NECK: Supple, No JVD  CHEST/LUNG: decreased BS left lung base  HEART: Regular rate and rhythm; No murmurs, rubs, or gallops  ABDOMEN: Soft, LUQ tender to palpation with left chest tube draining serosanguinous fluid; Bowel sounds present  EXTREMITIES:  2+ Peripheral Pulses, No clubbing, cyanosis, or edema  PSYCH: AAOx3  NEUROLOGY: non-focal  SKIN: No rashes or lesions    LABS:                        10.3   11.97 )-----------( 372      ( 25 Mar 2019 06:00 )             34.4     03-25    143  |  98  |  29<H>  ----------------------------<  104<H>  3.9   |  35<H>  |  1.50<H>    Ca    8.6      25 Mar 2019 06:00      PT/INR - ( 24 Mar 2019 06:09 )   PT: 15.3 SEC;   INR: 1.37          PTT - ( 24 Mar 2019 06:09 )  PTT:31.5 SEC        Microbiology Culture Results:   No growth to date. (03-23-19 @ 09:10)  Culture Results:   No growth to date. (03-23-19 @ 09:10)      RADIOLOGY & ADDITIONAL TESTS:    Imaging Personally Reviewed:  < from: Xray Chest 1 View- PORTABLE-Routine (03.25.19 @ 08:00) >  Heart size and the mediastinum cannot be accurately evaluated on this   projection. The thoracic aorta is calcified. Left breast implant is seen.  Right lung is clear.  A left basilar pleural pigtail catheter is again seen.  There is a small residual left pleural effusion is seen which is   decreased in size. There is likely associated passive atelectasis. No   pneumothorax is seen. No right pleural effusion is noted.        IMPRESSION:  Basilar pleural pigtail catheter again seen.    Small residual left pleural effusion, decreased in size. Likely   associated passive atelectasis.      < from: CT Head No Cont (03.23.19 @ 20:02) >  IMPRESSION:    No CT evidence for acute infarct or acute hemorrhage.    Evaluation for the presence or absence of intracranial metastatic disease   is limited and incomplete without intravenous contrast. Consider   follow-up contrast-enhanced MRI if clinically warranted, and if there are   no contrast or MRI contraindications.    Multiple areas of sclerosis involves the calvarium suspicious for bony   metastatic disease given the patient's known history of breast cancer.   Correlation with whole-body bone scan is suggested.     < from: CT Chest No Cont (03.23.19 @ 11:52) >    Impression:Moderate to large left pleural effusion, increased since the   previous examination.    Trace pericardial effusion.     A few small lung nodules bilaterally; metastasis cannot be excluded.    Subcapsular hematoma of the spleen again noted, improved.     Mild layering density in the gallbladder, suggestive of sludge or small   gallstones. Apparent slight dilatation of the common duct at 8 mm in   caliber.     Moderate free fluid in the abdomen and pelvis, improved, suggestive of   improvement of hemoperitoneum.      Consultant(s) Notes Reviewed:      Care Discussed with Consultants/Other Providers: JAVAN Schmidt, f/u cytology

## 2019-03-25 NOTE — CONSULT NOTE ADULT - PROBLEM SELECTOR RECOMMENDATION 4
-former smoker, sating well on RA  -c/w bronchodilator, symbicort/albuterol/spiriva  -monitor O2 sat

## 2019-03-26 ENCOUNTER — TRANSCRIPTION ENCOUNTER (OUTPATIENT)
Age: 64
End: 2019-03-26

## 2019-03-26 DIAGNOSIS — N17.9 ACUTE KIDNEY FAILURE, UNSPECIFIED: ICD-10-CM

## 2019-03-26 LAB
APPEARANCE UR: CLEAR — SIGNIFICANT CHANGE UP
BACTERIA # UR AUTO: SIGNIFICANT CHANGE UP
BILIRUB UR-MCNC: NEGATIVE — SIGNIFICANT CHANGE UP
BLD GP AB SCN SERPL QL: NEGATIVE — SIGNIFICANT CHANGE UP
BLOOD UR QL VISUAL: SIGNIFICANT CHANGE UP
COLOR SPEC: YELLOW — SIGNIFICANT CHANGE UP
GLUCOSE UR-MCNC: NEGATIVE — SIGNIFICANT CHANGE UP
HCG UR-SCNC: NEGATIVE — SIGNIFICANT CHANGE UP
HYALINE CASTS # UR AUTO: HIGH
KETONES UR-MCNC: NEGATIVE — SIGNIFICANT CHANGE UP
LEUKOCYTE ESTERASE UR-ACNC: NEGATIVE — SIGNIFICANT CHANGE UP
NITRITE UR-MCNC: NEGATIVE — SIGNIFICANT CHANGE UP
NON-GYNECOLOGICAL CYTOLOGY STUDY: SIGNIFICANT CHANGE UP
PH UR: 6.5 — SIGNIFICANT CHANGE UP (ref 5–8)
PROT UR-MCNC: 100 — HIGH
RBC CASTS # UR COMP ASSIST: SIGNIFICANT CHANGE UP (ref 0–?)
RH IG SCN BLD-IMP: POSITIVE — SIGNIFICANT CHANGE UP
SP GR SPEC: 1.02 — SIGNIFICANT CHANGE UP (ref 1–1.04)
SQUAMOUS # UR AUTO: SIGNIFICANT CHANGE UP
UROBILINOGEN FLD QL: NORMAL — SIGNIFICANT CHANGE UP
WBC UR QL: HIGH (ref 0–?)

## 2019-03-26 PROCEDURE — 71045 X-RAY EXAM CHEST 1 VIEW: CPT | Mod: 26

## 2019-03-26 PROCEDURE — 99233 SBSQ HOSP IP/OBS HIGH 50: CPT

## 2019-03-26 PROCEDURE — 93010 ELECTROCARDIOGRAM REPORT: CPT

## 2019-03-26 RX ORDER — SODIUM CHLORIDE 9 MG/ML
1000 INJECTION, SOLUTION INTRAVENOUS
Qty: 0 | Refills: 0 | Status: DISCONTINUED | OUTPATIENT
Start: 2019-03-26 | End: 2019-03-28

## 2019-03-26 RX ADMIN — OXYCODONE HYDROCHLORIDE 5 MILLIGRAM(S): 5 TABLET ORAL at 23:41

## 2019-03-26 RX ADMIN — BUDESONIDE AND FORMOTEROL FUMARATE DIHYDRATE 2 PUFF(S): 160; 4.5 AEROSOL RESPIRATORY (INHALATION) at 09:56

## 2019-03-26 RX ADMIN — ALBUTEROL 1 PUFF(S): 90 AEROSOL, METERED ORAL at 09:57

## 2019-03-26 RX ADMIN — Medication 12.5 MILLIGRAM(S): at 17:25

## 2019-03-26 RX ADMIN — TIOTROPIUM BROMIDE 1 CAPSULE(S): 18 CAPSULE ORAL; RESPIRATORY (INHALATION) at 11:47

## 2019-03-26 RX ADMIN — ENOXAPARIN SODIUM 40 MILLIGRAM(S): 100 INJECTION SUBCUTANEOUS at 11:47

## 2019-03-26 RX ADMIN — LETROZOLE 2.5 MILLIGRAM(S): 2.5 TABLET, FILM COATED ORAL at 05:16

## 2019-03-26 RX ADMIN — Medication 2000 UNIT(S): at 05:16

## 2019-03-26 RX ADMIN — Medication 12.5 MILLIGRAM(S): at 05:16

## 2019-03-26 RX ADMIN — OXYCODONE HYDROCHLORIDE 5 MILLIGRAM(S): 5 TABLET ORAL at 22:36

## 2019-03-26 RX ADMIN — OXYCODONE HYDROCHLORIDE 5 MILLIGRAM(S): 5 TABLET ORAL at 17:26

## 2019-03-26 RX ADMIN — ESCITALOPRAM OXALATE 20 MILLIGRAM(S): 10 TABLET, FILM COATED ORAL at 05:15

## 2019-03-26 NOTE — PROGRESS NOTE ADULT - PROBLEM SELECTOR PLAN 2
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

## 2019-03-26 NOTE — PROGRESS NOTE ADULT - SUBJECTIVE AND OBJECTIVE BOX
Thoracic Surgery  CC: bilateral pleural effusions   HPI: 63F h/o breast CA, COPD, L radical Nephrectomy on 19 that was complicated by a splenic injury causing hemoperitoneum and hemorrhagic shock and requiring angioembolization. Known bilateral pleural nodules w/increasing left-sided pleural effusion. L PTC placed 3/24/19. L pleural fluid negative for malignancy on path. NGTD.   24/Overnight events: Patient seen and examined at bedside, doing well. Breathing comfortably on 2L NC. L PTC in place on WS. Patient denies pain, and SOB. AM CXR revealed Small loculated left pleural effusion which appears larger. Hazy opacity overlying the lower left chest may be due to a layering component of the effusion.    Preop Dx: Left pleural effusion and B/L lung nodules   Surgeon: Dr. Damon   Procedure: Left VATS, biopsy, pleurodesis    Vital Signs Last 24 Hrs  T(C): 37 (26 Mar 2019 17:13), Max: 37 (25 Mar 2019 20:46)  T(F): 98.6 (26 Mar 2019 17:13), Max: 98.6 (25 Mar 2019 20:46)  HR: 81 (26 Mar 2019 17:13) (67 - 81)  BP: 144/72 (26 Mar 2019 17:13) (119/68 - 144/76)  BP(mean): --  RR: 17 (26 Mar 2019 17:13) (16 - 17)  SpO2: 96% (26 Mar 2019 17:13) (96% - 100%)    Daily Weight in k.3 (26 Mar 2019 01:56)    CBC Full  -  ( 25 Mar 2019 06:00 )  WBC Count : 11.97 K/uL  RBC Count : 3.78 M/uL  Hemoglobin : 10.3 g/dL  Hematocrit : 34.4 %  Platelet Count - Automated : 372 K/uL          143  |  98  |  29<H>  ----------------------------<  104<H>  3.9   |  35<H>  |  1.50<H>    Ca    8.6      25 Mar 2019 06:00      Type and Screen: Pending     UA: Pending     Pregnant: Pending     EKG: In chart    CXR: see above       Plan:  - NPO after midnight except meds  - IVF while NPO  - Stat and AM labs  - Consent pending  - Medical clearance for OR appreciated       Patsy Austin PA-C  Thoracic Surgery   #[phone]

## 2019-03-26 NOTE — PROGRESS NOTE ADULT - PROBLEM SELECTOR PLAN 6
-h/o L nephrectomy 2/13 c/b splenic injury/splenic hematoma requiring IR angioembolization  -management per . creat bump from baseline 0.9 to 1.5, avoid nephrotoxins  adjust meds per renal fxn

## 2019-03-26 NOTE — PROGRESS NOTE ADULT - PROBLEM SELECTOR PLAN 7
Hypercoagulable state due to malignancy  lovenox sc for dvt ppx. -h/o L nephrectomy 2/13 c/b splenic injury/splenic hematoma requiring IR angioembolization  -management per .

## 2019-03-26 NOTE — PROGRESS NOTE ADULT - SUBJECTIVE AND OBJECTIVE BOX
Carla Ruiz MD  Pager [phone]    CHIEF COMPLAINT: Patient is a 63y old  female who presents with a chief complaint of LUQ pain, Left pleural effusion (25 Mar 2019 10:39)      SUBJECTIVE / OVERNIGHT EVENTS:  pt reports luq pain improved, controlled on oxycodone    MEDICATIONS  (STANDING):  ALBUTerol    90 MICROgram(s) HFA Inhaler 1 Puff(s) Inhalation two times a day  buDESOnide  80 MICROgram(s)/formoterol 4.5 MICROgram(s) Inhaler 2 Puff(s) Inhalation two times a day  cholecalciferol 2000 Unit(s) Oral daily  enoxaparin Injectable 40 milliGRAM(s) SubCutaneous daily  escitalopram 20 milliGRAM(s) Oral daily  letrozole 2.5 milliGRAM(s) Oral daily  metoprolol tartrate 12.5 milliGRAM(s) Oral two times a day  pantoprazole    Tablet 40 milliGRAM(s) Oral before breakfast  senna 2 Tablet(s) Oral at bedtime  tiotropium 18 MICROgram(s) Capsule 1 Capsule(s) Inhalation daily    MEDICATIONS  (PRN):  acetaminophen   Tablet .. 650 milliGRAM(s) Oral every 6 hours PRN Mild Pain (1 - 3)  oxyCODONE    IR 5 milliGRAM(s) Oral every 4 hours PRN mod-severe pain  polyethylene glycol 3350 17 Gram(s) Oral daily PRN Constipation  simethicone 80 milliGRAM(s) Chew every 8 hours PRN Gas      VITALS:  T(F): 98 (03-26-19 @ 10:00), Max: 98.6 (03-25-19 @ 20:46)  HR: 76 (03-26-19 @ 10:00) (67 - 77)  BP: 144/76 (03-26-19 @ 10:00) (114/65 - 144/76)  RR: 16 (03-26-19 @ 10:00) (16 - 17)  SpO2: 96% (03-26-19 @ 10:00)      CAPILLARY BLOOD GLUCOSE    Output     I&O's Summary  T(F): 98 (03-26-19 @ 10:00), Max: 98.6 (03-25-19 @ 20:46)  HR: 76 (03-26-19 @ 10:00) (67 - 77)  BP: 144/76 (03-26-19 @ 10:00) (114/65 - 144/76)  RR: 16 (03-26-19 @ 10:00) (16 - 17)  SpO2: 96% (03-26-19 @ 10:00)    PHYSICAL EXAM:  GENERAL: NAD, well-developed  HEAD:  Atraumatic, Normocephalic  EYES: EOMI, PERRLA, conjunctiva and sclera clear  NECK: Supple, No JVD  CHEST/LUNG: decreased BS left lung base  HEART: Regular rate and rhythm; No murmurs, rubs, or gallops  ABDOMEN: Soft, LUQ tender to palpation with left chest tube draining serosanguinous fluid; Bowel sounds present  EXTREMITIES:  2+ Peripheral Pulses, No clubbing, cyanosis, or edema  PSYCH: AAOx3  NEUROLOGY: non-focal  SKIN: No rashes or lesions    LABS:              10.3                 143  | 35   | 29           11.97 >-----------< 372     ------------------------< 104                   34.4                 3.9  | 98   | 1.50                                         Ca 8.6   Mg x     Ph x          MICROBIOLOGY:    Culture - Body Fluid with Gram Stain (collected 24 Mar 2019 14:14)  Source: PLEURAL FLUID  Gram Stain (24 Mar 2019 15:40):    WBC^White Blood Cells    QNTY CELLS IN GRAM STAIN: FEW (2+)    NOS^No Organisms Seen  Preliminary Report (26 Mar 2019 09:17):    NO GROWTH - PRELIMINARY RESULTS    NO ORGANISMS ISOLATED AT 24 HOURS      RADIOLOGY & ADDITIONAL TESTS:    Imaging Personally Reviewed:    [ ] Consultant(s) Notes Reviewed:  [x ] Care Discussed with Consultants/Other Providers: CTS MIGUELINA Garner, plan for VATS/pleural bx/pleurodesis tomorrow

## 2019-03-26 NOTE — PROGRESS NOTE ADULT - SUBJECTIVE AND OBJECTIVE BOX
Subjective    Pt states her pain has improved overall. Able to get OOB. Output from chest tube decreased significant.    Objective    Vital signs  T(F): , Max: 98.9 (03-25-19 @ 09:11)  HR: 75 (03-26-19 @ 05:12)  BP: 127/60 (03-26-19 @ 05:12)  SpO2: 96% (03-26-19 @ 05:12)  Wt(kg): --    Output     OUT:    Voided: 750 mL  Total OUT: 750 mL    Total NET: -750 mL      OUT:    Voided: 1050 mL  Total OUT: 1050 mL    Total NET: -1050 mL          Gen NAD  Abd S/ND/NT   no flank tenderness, no suprapubic tenderness    Labs      03-25 @ 06:00    WBC 11.97 / Hct 34.4  / SCr 1.50

## 2019-03-27 ENCOUNTER — APPOINTMENT (OUTPATIENT)
Age: 64
End: 2019-03-27

## 2019-03-27 ENCOUNTER — RESULT REVIEW (OUTPATIENT)
Age: 64
End: 2019-03-27

## 2019-03-27 LAB
ANION GAP SERPL CALC-SCNC: 10 MMO/L — SIGNIFICANT CHANGE UP (ref 7–14)
APTT BLD: 30.3 SEC — SIGNIFICANT CHANGE UP (ref 27.5–36.3)
BLD GP AB SCN SERPL QL: NEGATIVE — SIGNIFICANT CHANGE UP
BUN SERPL-MCNC: 15 MG/DL — SIGNIFICANT CHANGE UP (ref 7–23)
CALCIUM SERPL-MCNC: 8.2 MG/DL — LOW (ref 8.4–10.5)
CHLORIDE SERPL-SCNC: 97 MMOL/L — LOW (ref 98–107)
CO2 SERPL-SCNC: 35 MMOL/L — HIGH (ref 22–31)
CREAT SERPL-MCNC: 0.85 MG/DL — SIGNIFICANT CHANGE UP (ref 0.5–1.3)
GLUCOSE SERPL-MCNC: 78 MG/DL — SIGNIFICANT CHANGE UP (ref 70–99)
GRAM STN WND: SIGNIFICANT CHANGE UP
GRAM STN WND: SIGNIFICANT CHANGE UP
HCT VFR BLD CALC: 32.4 % — LOW (ref 34.5–45)
HGB BLD-MCNC: 9.9 G/DL — LOW (ref 11.5–15.5)
INR BLD: 1.25 — HIGH (ref 0.88–1.17)
MCHC RBC-ENTMCNC: 26.5 PG — LOW (ref 27–34)
MCHC RBC-ENTMCNC: 30.6 % — LOW (ref 32–36)
MCV RBC AUTO: 86.9 FL — SIGNIFICANT CHANGE UP (ref 80–100)
NRBC # FLD: 0 K/UL — SIGNIFICANT CHANGE UP (ref 0–0)
PLATELET # BLD AUTO: 326 K/UL — SIGNIFICANT CHANGE UP (ref 150–400)
PMV BLD: 11.3 FL — SIGNIFICANT CHANGE UP (ref 7–13)
POTASSIUM SERPL-MCNC: 3.6 MMOL/L — SIGNIFICANT CHANGE UP (ref 3.5–5.3)
POTASSIUM SERPL-SCNC: 3.6 MMOL/L — SIGNIFICANT CHANGE UP (ref 3.5–5.3)
PROTHROM AB SERPL-ACNC: 14 SEC — HIGH (ref 9.8–13.1)
RBC # BLD: 3.73 M/UL — LOW (ref 3.8–5.2)
RBC # FLD: 17.1 % — HIGH (ref 10.3–14.5)
RH IG SCN BLD-IMP: POSITIVE — SIGNIFICANT CHANGE UP
SODIUM SERPL-SCNC: 142 MMOL/L — SIGNIFICANT CHANGE UP (ref 135–145)
SPECIMEN SOURCE: SIGNIFICANT CHANGE UP
SPECIMEN SOURCE: SIGNIFICANT CHANGE UP
WBC # BLD: 8.67 K/UL — SIGNIFICANT CHANGE UP (ref 3.8–10.5)
WBC # FLD AUTO: 8.67 K/UL — SIGNIFICANT CHANGE UP (ref 3.8–10.5)

## 2019-03-27 PROCEDURE — 71045 X-RAY EXAM CHEST 1 VIEW: CPT | Mod: 26

## 2019-03-27 PROCEDURE — 32651 THORACOSCOPY REMOVE CORTEX: CPT

## 2019-03-27 PROCEDURE — 88341 IMHCHEM/IMCYTCHM EA ADD ANTB: CPT | Mod: 26

## 2019-03-27 PROCEDURE — 88304 TISSUE EXAM BY PATHOLOGIST: CPT | Mod: 26

## 2019-03-27 PROCEDURE — 88341 IMHCHEM/IMCYTCHM EA ADD ANTB: CPT | Mod: 26,59

## 2019-03-27 PROCEDURE — 88342 IMHCHEM/IMCYTCHM 1ST ANTB: CPT | Mod: 26

## 2019-03-27 PROCEDURE — 31622 DX BRONCHOSCOPE/WASH: CPT | Mod: 59

## 2019-03-27 PROCEDURE — 88305 TISSUE EXAM BY PATHOLOGIST: CPT | Mod: 26,59

## 2019-03-27 PROCEDURE — 88112 CYTOPATH CELL ENHANCE TECH: CPT | Mod: 26

## 2019-03-27 PROCEDURE — 88342 IMHCHEM/IMCYTCHM 1ST ANTB: CPT | Mod: 26,59

## 2019-03-27 PROCEDURE — 99233 SBSQ HOSP IP/OBS HIGH 50: CPT

## 2019-03-27 PROCEDURE — 88305 TISSUE EXAM BY PATHOLOGIST: CPT | Mod: 26

## 2019-03-27 PROCEDURE — 32653 THORACOSCOPY REMOV FB/FIBRIN: CPT

## 2019-03-27 RX ORDER — HYDROMORPHONE HYDROCHLORIDE 2 MG/ML
30 INJECTION INTRAMUSCULAR; INTRAVENOUS; SUBCUTANEOUS
Qty: 0 | Refills: 0 | Status: DISCONTINUED | OUTPATIENT
Start: 2019-03-27 | End: 2019-03-31

## 2019-03-27 RX ORDER — NALOXONE HYDROCHLORIDE 4 MG/.1ML
0.1 SPRAY NASAL
Qty: 0 | Refills: 0 | Status: DISCONTINUED | OUTPATIENT
Start: 2019-03-27 | End: 2019-03-31

## 2019-03-27 RX ORDER — DIPHENHYDRAMINE HCL 50 MG
25 CAPSULE ORAL EVERY 4 HOURS
Qty: 0 | Refills: 0 | Status: DISCONTINUED | OUTPATIENT
Start: 2019-03-27 | End: 2019-03-31

## 2019-03-27 RX ORDER — HYDROMORPHONE HYDROCHLORIDE 2 MG/ML
0.5 INJECTION INTRAMUSCULAR; INTRAVENOUS; SUBCUTANEOUS
Qty: 0 | Refills: 0 | Status: DISCONTINUED | OUTPATIENT
Start: 2019-03-27 | End: 2019-03-30

## 2019-03-27 RX ORDER — CHLORHEXIDINE GLUCONATE 213 G/1000ML
1 SOLUTION TOPICAL
Qty: 0 | Refills: 0 | Status: DISCONTINUED | OUTPATIENT
Start: 2019-03-27 | End: 2019-03-30

## 2019-03-27 RX ORDER — ONDANSETRON 8 MG/1
4 TABLET, FILM COATED ORAL EVERY 6 HOURS
Qty: 0 | Refills: 0 | Status: DISCONTINUED | OUTPATIENT
Start: 2019-03-27 | End: 2019-03-31

## 2019-03-27 RX ADMIN — BUDESONIDE AND FORMOTEROL FUMARATE DIHYDRATE 2 PUFF(S): 160; 4.5 AEROSOL RESPIRATORY (INHALATION) at 09:53

## 2019-03-27 RX ADMIN — ALBUTEROL 1 PUFF(S): 90 AEROSOL, METERED ORAL at 09:54

## 2019-03-27 RX ADMIN — Medication 30 MILLILITER(S): at 23:28

## 2019-03-27 RX ADMIN — Medication 12.5 MILLIGRAM(S): at 06:52

## 2019-03-27 RX ADMIN — CHLORHEXIDINE GLUCONATE 1 APPLICATION(S): 213 SOLUTION TOPICAL at 11:09

## 2019-03-27 RX ADMIN — TIOTROPIUM BROMIDE 1 CAPSULE(S): 18 CAPSULE ORAL; RESPIRATORY (INHALATION) at 09:53

## 2019-03-27 RX ADMIN — ESCITALOPRAM OXALATE 20 MILLIGRAM(S): 10 TABLET, FILM COATED ORAL at 06:52

## 2019-03-27 RX ADMIN — HYDROMORPHONE HYDROCHLORIDE 30 MILLILITER(S): 2 INJECTION INTRAMUSCULAR; INTRAVENOUS; SUBCUTANEOUS at 17:51

## 2019-03-27 RX ADMIN — HYDROMORPHONE HYDROCHLORIDE 30 MILLILITER(S): 2 INJECTION INTRAMUSCULAR; INTRAVENOUS; SUBCUTANEOUS at 21:58

## 2019-03-27 RX ADMIN — LETROZOLE 2.5 MILLIGRAM(S): 2.5 TABLET, FILM COATED ORAL at 06:52

## 2019-03-27 NOTE — PROGRESS NOTE ADULT - PROBLEM SELECTOR PLAN 7
-h/o L nephrectomy 2/13 c/b splenic injury/splenic hematoma requiring IR angioembolization  -management per .

## 2019-03-27 NOTE — PROGRESS NOTE ADULT - SUBJECTIVE AND OBJECTIVE BOX
Subjective    Thoracic Sx plans noted, OR today for VATS, Bx, and pleurodesis. Pt states she feels well, notes mild abd discomfort, passing flatus, getting OOB, tolerating PO.    Objective    Vital signs  T(F): , Max: 98.6 (03-26-19 @ 17:13)  HR: 74 (03-27-19 @ 02:33)  BP: 136/76 (03-27-19 @ 02:33)  SpO2: 98% (03-27-19 @ 02:33)  Wt(kg): --    Output     OUT:    Voided: 1050 mL  Total OUT: 1050 mL    Total NET: -1050 mL          Gen NAD  Abd S/mild distension/minimal LUQ tenderness, no rebound, no gaurding   no suprapubic tenderness, no flank tendnerness

## 2019-03-27 NOTE — BRIEF OPERATIVE NOTE - NSICDXBRIEFPROCEDURE_GEN_ALL_CORE_FT
PROCEDURES:  Thoracoscopy with drainage of pericardial effusion 27-Mar-2019 15:47:30  Mark Lopez  Flexible bronchoscopy by cardiothoracic surgery 27-Mar-2019 15:44:41  Mark Lopez  Chest tube insertion 24-Mar-2019 13:24:59  Mark Lopez

## 2019-03-27 NOTE — PROGRESS NOTE ADULT - SUBJECTIVE AND OBJECTIVE BOX
Patient s/p flex bronch, left uniportal vats, drainage of effusion, decortication.  Patient resting comfortably, IV PCA in place.  Incision with dressing clean and dry.  Chest tubes connected to suction, no air leak noted.    Vital Signs Last 24 Hrs  T(C): 36.6 (27 Mar 2019 21:51), Max: 36.9 (27 Mar 2019 02:33)  T(F): 97.9 (27 Mar 2019 21:51), Max: 98.5 (27 Mar 2019 02:33)  HR: 85 (27 Mar 2019 21:51) (74 - 99)  BP: 112/58 (27 Mar 2019 21:51) (102/70 - 136/76)  BP(mean): 78 (27 Mar 2019 20:00) (46 - 96)  RR: 16 (27 Mar 2019 21:51) (16 - 28)  SpO2: 98% (27 Mar 2019 21:51) (90% - 99%)    I&O's Detail    26 Mar 2019 07:01  -  27 Mar 2019 07:00  --------------------------------------------------------  IN:  Total IN: 0 mL    OUT:  Total OUT: 0 mL    Total NET: 0 mL      27 Mar 2019 07:01  -  27 Mar 2019 23:57  --------------------------------------------------------  IN:    lactated ringers.: 210 mL    Oral Fluid: 30 mL    Other: 300 mL  Total IN: 540 mL    OUT:    Chest Tube: 70 mL    Chest Tube: 4 mL    Voided: 400 mL  Total OUT: 474 mL    Total NET: 66 mL      MEDICATIONS  (STANDING):  ALBUTerol    90 MICROgram(s) HFA Inhaler 1 Puff(s) Inhalation two times a day  buDESOnide  80 MICROgram(s)/formoterol 4.5 MICROgram(s) Inhaler 2 Puff(s) Inhalation two times a day  chlorhexidine 4% Liquid 1 Application(s) Topical two times a day  cholecalciferol 2000 Unit(s) Oral daily  enoxaparin Injectable 40 milliGRAM(s) SubCutaneous daily  escitalopram 20 milliGRAM(s) Oral daily  HYDROmorphone PCA (1 mG/mL) 30 milliLiter(s) PCA Continuous PCA Continuous  lactated ringers. 1000 milliLiter(s) (30 mL/Hr) IV Continuous <Continuous>  letrozole 2.5 milliGRAM(s) Oral daily  metoprolol tartrate 12.5 milliGRAM(s) Oral two times a day  pantoprazole    Tablet 40 milliGRAM(s) Oral before breakfast  senna 2 Tablet(s) Oral at bedtime  tiotropium 18 MICROgram(s) Capsule 1 Capsule(s) Inhalation daily

## 2019-03-27 NOTE — PROGRESS NOTE ADULT - SUBJECTIVE AND OBJECTIVE BOX
Carla Ruiz MD  Pager [phone]    CHIEF COMPLAINT: Patient is a 63y old  female who presents with a chief complaint of pleural effusion, s/p chest tube (26 Mar 2019 11:31)      SUBJECTIVE / OVERNIGHT EVENTS:  pt's luq pain relatively controlled, waiting for VATS today    MEDICATIONS  (STANDING):  ALBUTerol    90 MICROgram(s) HFA Inhaler 1 Puff(s) Inhalation two times a day  buDESOnide  80 MICROgram(s)/formoterol 4.5 MICROgram(s) Inhaler 2 Puff(s) Inhalation two times a day  chlorhexidine 4% Liquid 1 Application(s) Topical two times a day  cholecalciferol 2000 Unit(s) Oral daily  enoxaparin Injectable 40 milliGRAM(s) SubCutaneous daily  escitalopram 20 milliGRAM(s) Oral daily  lactated ringers. 1000 milliLiter(s) (30 mL/Hr) IV Continuous <Continuous>  letrozole 2.5 milliGRAM(s) Oral daily  metoprolol tartrate 12.5 milliGRAM(s) Oral two times a day  pantoprazole    Tablet 40 milliGRAM(s) Oral before breakfast  senna 2 Tablet(s) Oral at bedtime  tiotropium 18 MICROgram(s) Capsule 1 Capsule(s) Inhalation daily    MEDICATIONS  (PRN):  acetaminophen   Tablet .. 650 milliGRAM(s) Oral every 6 hours PRN Mild Pain (1 - 3)  oxyCODONE    IR 5 milliGRAM(s) Oral every 4 hours PRN mod-severe pain  polyethylene glycol 3350 17 Gram(s) Oral daily PRN Constipation  simethicone 80 milliGRAM(s) Chew every 8 hours PRN Gas      VITALS:  T(F): 97.9 (19 @ 11:06), Max: 98.6 (19 @ 17:13)  HR: 88 (19 @ 11:06) (66 - 90)  BP: 123/77 (19 @ 11:06) (123/77 - 144/72)  RR: 16 (19 @ 11:08) (16 - 17)  SpO2: 97% (19 @ 11:08)  Height (cm): 162.56 (07:54)  Weight (kg): 68 (07:54)  BMI (kg/m2): 25.7 (07:54)    CAPILLARY BLOOD GLUCOSE    Output   Height (cm): 162.56 (07:54)  Weight (kg): 68 (07:54)  BMI (kg/m2): 25.7 (07:54)  I&O's Summary  T(F): 97.9 (19 @ 11:06), Max: 98.6 (19 @ 17:13)  HR: 88 (19 @ 11:06) (66 - 90)  BP: 123/77 (19 @ 11:06) (123/77 - 144/72)  RR: 16 (19 @ 11:08) (16 - 17)  SpO2: 97% (19 @ 11:08)    PHYSICAL EXAM:  GENERAL: NAD, well-developed  HEAD:  Atraumatic, Normocephalic  EYES: EOMI, PERRLA, conjunctiva and sclera clear  NECK: Supple, No JVD  CHEST/LUNG: decreased BS left lung base  HEART: Regular rate and rhythm; No murmurs, rubs, or gallops  ABDOMEN: Soft, LUQ tender to palpation with left chest tube draining serosanguinous fluid; Bowel sounds present  EXTREMITIES:  2+ Peripheral Pulses, No clubbing, cyanosis, or edema  PSYCH: AAOx3  NEUROLOGY: non-focal  SKIN: No rashes or lesions    LABS:              9.9                  142  | 35   | 15           8.67  >-----------< 326     ------------------------< 78                    32.4                 3.6  | 97   | 0.85                                         Ca 8.2   Mg x     Ph x            INR: 1.25<H>;    PT: 14.0 SEC<H>;    PTT: 30.3 SEC        Urinalysis Basic - ( 26 Mar 2019 19:56 )    Color: YELLOW / Appearance: CLEAR / S.025 / pH: 6.5  Gluc: NEGATIVE / Ketone: NEGATIVE  / Bili: NEGATIVE / Urobili: NORMAL   Blood: TRACE / Protein: 100 / Nitrite: NEGATIVE   Leuk Esterase: NEGATIVE / RBC: 3-5 / WBC 6-10   Sq Epi: FEW / Non Sq Epi: x / Bacteria: SMALL        MICROBIOLOGY:    Culture - Body Fluid with Gram Stain (collected 24 Mar 2019 14:14)  Source: PLEURAL FLUID  Gram Stain (24 Mar 2019 15:40):    WBC^White Blood Cells    QNTY CELLS IN GRAM STAIN: FEW (2+)    NOS^No Organisms Seen  Preliminary Report (27 Mar 2019 10:40):    NO GROWTH - PRELIMINARY RESULTS    NO ORGANISMS ISOLATED AT 24 HOURS    NO ORGANISMS ISOLATED AT 48 HRS.    Cytopathology - Non Gyn Report (19 @ 14:54)    Cytopathology - Non Gyn Report:   ACCESSION No:  [id number]    ZEYNEP GOLDMAN                 1    Cytopathology Report    Specimen(s) Submitted  PLEURAL FLUID, LEFT    Clinical History  b/l lung nodules with enlarging. Left sided pleural effusion  Gross Description  Received: 60  ml of bloody fluid received  Prepared: 1 Thin Prep slide and 1 Smear  1 cell block  Final Diagnosis  PLEURAL FLUID, LEFT  NEGATIVE FOR MALIGNANT CELLS.    Cytology slides and cell block show benign and reactive  mesothelial cells with mixed inflammation and  macrophages.      RADIOLOGY & ADDITIONAL TESTS:    Imaging Personally Reviewed:    [ ] Consultant(s) Notes Reviewed:  [x ] Care Discussed with Consultants/Other Providers: urology MIGUELINA Schmidt, plan for VATS today

## 2019-03-27 NOTE — BRIEF OPERATIVE NOTE - OPERATION/FINDINGS
Flexible bronchoscopy; Left uniportal VATS drainage off effusion. Organizing hemothorax without signs of pleural implants c/w retained hemothorax. No indication for pleurodesis.

## 2019-03-27 NOTE — PROGRESS NOTE ADULT - PROBLEM SELECTOR PLAN 2
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  -plan for VATS and possible pleural bx/pleurodesis per CTS  -pt is medically optimized for OR

## 2019-03-28 LAB
CULTURE - ACID FAST SMEAR CONCENTRATED: SIGNIFICANT CHANGE UP
CULTURE - ACID FAST SMEAR CONCENTRATED: SIGNIFICANT CHANGE UP
CULTURE RESULTS: SIGNIFICANT CHANGE UP
CULTURE RESULTS: SIGNIFICANT CHANGE UP
SPECIMEN SOURCE: SIGNIFICANT CHANGE UP

## 2019-03-28 PROCEDURE — 71045 X-RAY EXAM CHEST 1 VIEW: CPT | Mod: 26

## 2019-03-28 PROCEDURE — 99233 SBSQ HOSP IP/OBS HIGH 50: CPT

## 2019-03-28 RX ORDER — GABAPENTIN 400 MG/1
100 CAPSULE ORAL
Qty: 0 | Refills: 0 | Status: DISCONTINUED | OUTPATIENT
Start: 2019-03-28 | End: 2019-03-31

## 2019-03-28 RX ORDER — ACETAMINOPHEN 500 MG
1000 TABLET ORAL ONCE
Qty: 0 | Refills: 0 | Status: COMPLETED | OUTPATIENT
Start: 2019-03-28 | End: 2019-03-28

## 2019-03-28 RX ORDER — DOCUSATE SODIUM 100 MG
100 CAPSULE ORAL
Qty: 0 | Refills: 0 | Status: DISCONTINUED | OUTPATIENT
Start: 2019-03-28 | End: 2019-03-31

## 2019-03-28 RX ORDER — POLYETHYLENE GLYCOL 3350 17 G/17G
17 POWDER, FOR SOLUTION ORAL DAILY
Qty: 0 | Refills: 0 | Status: DISCONTINUED | OUTPATIENT
Start: 2019-03-28 | End: 2019-03-31

## 2019-03-28 RX ORDER — IPRATROPIUM/ALBUTEROL SULFATE 18-103MCG
3 AEROSOL WITH ADAPTER (GRAM) INHALATION EVERY 6 HOURS
Qty: 0 | Refills: 0 | Status: DISCONTINUED | OUTPATIENT
Start: 2019-03-28 | End: 2019-03-31

## 2019-03-28 RX ADMIN — Medication 400 MILLIGRAM(S): at 08:53

## 2019-03-28 RX ADMIN — HYDROMORPHONE HYDROCHLORIDE 30 MILLILITER(S): 2 INJECTION INTRAMUSCULAR; INTRAVENOUS; SUBCUTANEOUS at 20:12

## 2019-03-28 RX ADMIN — ENOXAPARIN SODIUM 40 MILLIGRAM(S): 100 INJECTION SUBCUTANEOUS at 09:03

## 2019-03-28 RX ADMIN — Medication 3 MILLILITER(S): at 15:05

## 2019-03-28 RX ADMIN — GABAPENTIN 100 MILLIGRAM(S): 400 CAPSULE ORAL at 08:53

## 2019-03-28 RX ADMIN — Medication 3 MILLILITER(S): at 22:04

## 2019-03-28 RX ADMIN — ESCITALOPRAM OXALATE 20 MILLIGRAM(S): 10 TABLET, FILM COATED ORAL at 05:17

## 2019-03-28 RX ADMIN — BUDESONIDE AND FORMOTEROL FUMARATE DIHYDRATE 2 PUFF(S): 160; 4.5 AEROSOL RESPIRATORY (INHALATION) at 09:04

## 2019-03-28 RX ADMIN — SENNA PLUS 2 TABLET(S): 8.6 TABLET ORAL at 21:17

## 2019-03-28 RX ADMIN — HYDROMORPHONE HYDROCHLORIDE 30 MILLILITER(S): 2 INJECTION INTRAMUSCULAR; INTRAVENOUS; SUBCUTANEOUS at 08:20

## 2019-03-28 RX ADMIN — TIOTROPIUM BROMIDE 1 CAPSULE(S): 18 CAPSULE ORAL; RESPIRATORY (INHALATION) at 09:04

## 2019-03-28 RX ADMIN — BUDESONIDE AND FORMOTEROL FUMARATE DIHYDRATE 2 PUFF(S): 160; 4.5 AEROSOL RESPIRATORY (INHALATION) at 21:18

## 2019-03-28 RX ADMIN — Medication 12.5 MILLIGRAM(S): at 17:49

## 2019-03-28 RX ADMIN — LETROZOLE 2.5 MILLIGRAM(S): 2.5 TABLET, FILM COATED ORAL at 05:17

## 2019-03-28 RX ADMIN — PANTOPRAZOLE SODIUM 40 MILLIGRAM(S): 20 TABLET, DELAYED RELEASE ORAL at 05:17

## 2019-03-28 RX ADMIN — GABAPENTIN 100 MILLIGRAM(S): 400 CAPSULE ORAL at 21:17

## 2019-03-28 RX ADMIN — Medication 12.5 MILLIGRAM(S): at 05:17

## 2019-03-28 NOTE — PROGRESS NOTE ADULT - PROBLEM SELECTOR PLAN 2
-increasing L pleural effusion, s/p chest tube drainage 1.5 L serosanguinous fluid removal  -r/o malignant effusion with h/o metastatic breast CA with bone mets and possible lung mets (known small lung nodules)  -pleural fluid is exudative, pleural fluid protein/serum protein = 0.6, cytology negative for malignant cells, benign and reactive  mesothelial cells with mixed inflammation and macrophages.  -s/p VATS 3/27 with 2 chest tubes placed; flexible bronchoscopy; Left uniportal VATS drainage off effusion. Organizing hemothorax without signs of pleural implants c/w retained hemothorax. No indication for pleurodesis.  -f/u CTS recs

## 2019-03-28 NOTE — PROGRESS NOTE ADULT - SUBJECTIVE AND OBJECTIVE BOX
Day __2_ of Anesthesia Pain Management Service    SUBJECTIVE:    Therapy:	  [ x] IV PCA	   [ ] Epidural           [ ] s/p Spinal Opoid              [ ] Postpartum infusion	  [ ] Patient controlled regional anesthesia (PCRA)    [ ] prn Analgesics    OBJECTIVE:   x[ ] No new signs     [ ] Other:    Side Effects:  [x ] None			[ ] Other:    Assessment of Catheter Site:		[ ] Intact		[ ] Other:    ASSESSMENT/PLAN  [ x] Continue current therapy    [ ] Therapy changed to:    [ ] IV PCA       [ ] Epidural     [ ] prn Analgesics     Comments:

## 2019-03-28 NOTE — PROVIDER CONTACT NOTE (OTHER) - SITUATION
Pt has not voided since 10 am, pt OOB, denies pain, denies any pain or discomfort or urgency to void.

## 2019-03-28 NOTE — PROGRESS NOTE ADULT - SUBJECTIVE AND OBJECTIVE BOX
Anesthesia Pain Management Service    SUBJECTIVE: the pump really helps     Pain Scale Score	At rest: __7_ 	With Activity: ___ 	[X ] Refer to charted pain scores    THERAPY:    [ ] IV PCA Morphine		[ ] 5 mg/mL	[ ] 1 mg/mL  [X ] IV PCA Hydromorphone	[ ] 5 mg/mL	[X ] 1 mg/mL  [ ] IV PCA Fentanyl		[ ] 50 micrograms/mL    Demand dose __0.2_ lockout __6_ (minutes) Continuous Rate _0__ Total: _1.8__  mg used (in past 24 hours)      MEDICATIONS  (STANDING):  acetaminophen  IVPB .. 1000 milliGRAM(s) IV Intermittent once  ALBUTerol/ipratropium for Nebulization 3 milliLiter(s) Nebulizer every 6 hours  buDESOnide  80 MICROgram(s)/formoterol 4.5 MICROgram(s) Inhaler 2 Puff(s) Inhalation two times a day  chlorhexidine 4% Liquid 1 Application(s) Topical two times a day  cholecalciferol 2000 Unit(s) Oral daily  enoxaparin Injectable 40 milliGRAM(s) SubCutaneous daily  escitalopram 20 milliGRAM(s) Oral daily  gabapentin 100 milliGRAM(s) Oral two times a day  HYDROmorphone PCA (1 mG/mL) 30 milliLiter(s) PCA Continuous PCA Continuous  lactated ringers. 1000 milliLiter(s) (30 mL/Hr) IV Continuous <Continuous>  letrozole 2.5 milliGRAM(s) Oral daily  metoprolol tartrate 12.5 milliGRAM(s) Oral two times a day  pantoprazole    Tablet 40 milliGRAM(s) Oral before breakfast  senna 2 Tablet(s) Oral at bedtime  tiotropium 18 MICROgram(s) Capsule 1 Capsule(s) Inhalation daily    MEDICATIONS  (PRN):  aluminum hydroxide/magnesium hydroxide/simethicone Suspension 30 milliLiter(s) Oral every 4 hours PRN Dyspepsia  diphenhydrAMINE   Injectable 25 milliGRAM(s) IV Push every 4 hours PRN Pruritus  HYDROmorphone PCA (1 mG/mL) Rescue Clinician Bolus 0.5 milliGRAM(s) IV Push every 15 minutes PRN for Pain Scale GREATER THAN 6  naloxone Injectable 0.1 milliGRAM(s) IV Push every 3 minutes PRN For ANY of the following changes in patient status:  A. RR LESS THAN 10 breaths per minute, B. Oxygen saturation LESS THAN 90%, C. Sedation score of 6  ondansetron Injectable 4 milliGRAM(s) IV Push every 6 hours PRN Nausea  polyethylene glycol 3350 17 Gram(s) Oral daily PRN Constipation  simethicone 80 milliGRAM(s) Chew every 8 hours PRN Gas      OBJECTIVE: laying in bed     Sedation Score:	[ X] Alert	[ ] Drowsy 	[ ] Arousable	[ ] Asleep	[ ] Unresponsive    Side Effects:	[X ] None	[ ] Nausea	[ ] Vomiting	[ ] Pruritus  		[ ] Other:    Vital Signs Last 24 Hrs  T(C): 36.5 (28 Mar 2019 05:11), Max: 37 (28 Mar 2019 01:39)  T(F): 97.7 (28 Mar 2019 05:11), Max: 98.6 (28 Mar 2019 01:39)  HR: 80 (28 Mar 2019 05:11) (76 - 99)  BP: 126/58 (28 Mar 2019 05:11) (102/70 - 129/87)  BP(mean): 78 (27 Mar 2019 20:00) (46 - 96)  RR: 18 (28 Mar 2019 05:11) (16 - 28)  SpO2: 99% (28 Mar 2019 05:11) (90% - 99%)    ASSESSMENT/ PLAN    Therapy to  be:	[ X] Continue   [ ] Discontinued   [ ] Change to prn Analgesics    Documentation and Verification of current medications:   [X] Done	[ ] Not done, not elligible    Comments: continue current therapy CTx2 in place. Informed RN to give IV tylenol

## 2019-03-28 NOTE — PROGRESS NOTE ADULT - SUBJECTIVE AND OBJECTIVE BOX
Subjective: "I feel ok but i'ts hard for me to take a deep breath" pt w poor inspiratory effort. C/o pain at CT site. Only using IS to 500. Not OOB yet for today.     Vital Signs:  Vital Signs Last 24 Hrs  T(C): 36.6 (03-28-19 @ 09:17), Max: 37 (03-28-19 @ 01:39)  T(F): 97.9 (03-28-19 @ 09:17), Max: 98.6 (03-28-19 @ 01:39)  HR: 72 (03-28-19 @ 09:17) (72 - 99)  BP: 115/65 (03-28-19 @ 09:17) (102/70 - 126/58)  RR: 17 (03-28-19 @ 09:17) (16 - 28)    SpO2: 96% (03-28-19 @ 09:17) (94% - 99%) on (O2)    Telemetry/Alarms:  General: WN/WD NAD  Neurology: Awake, nonfocal, GARCIA x 4  Eyes: Scleras clear, PERRLA/ EOMI, Gross vision intact  ENT:Gross hearing intact, grossly patent pharynx, no stridor  Neck: Neck supple, trachea midline, No JVD,   Respiratory: decreased BS left LL  CV: RRR, S1S2, no murmurs, rubs or gallops  Abdominal: Soft, NT, ND +BS, + void  Extremities: No edema, + peripheral pulses  Skin: No Rashes, Hematoma, Ecchymosis  Lymphatic: No Neck, axilla, groin LAD  Psych: Oriented x 3, normal affect  Incisions: left VATS c/d/i.   Tubes: Left Ant CT- 5cc/24hrs left Post CT- 120cc/24hrs on suction no air leak.s   Relevant labs, radiology and Medications reviewed           CXR w minimal effusion, no ptx.              9.9    8.67  )-----------( 326      ( 27 Mar 2019 05:26 )             32.4     03-27    142  |  97<L>  |  15  ----------------------------<  78  3.6   |  35<H>  |  0.85    Ca    8.2<L>      27 Mar 2019 05:26      PT/INR - ( 27 Mar 2019 05:26 )   PT: 14.0 SEC;   INR: 1.25          PTT - ( 27 Mar 2019 05:26 )  PTT:30.3 SEC  MEDICATIONS  (STANDING):  ALBUTerol/ipratropium for Nebulization 3 milliLiter(s) Nebulizer every 6 hours  buDESOnide  80 MICROgram(s)/formoterol 4.5 MICROgram(s) Inhaler 2 Puff(s) Inhalation two times a day  chlorhexidine 4% Liquid 1 Application(s) Topical two times a day  cholecalciferol 2000 Unit(s) Oral daily  enoxaparin Injectable 40 milliGRAM(s) SubCutaneous daily  escitalopram 20 milliGRAM(s) Oral daily  gabapentin 100 milliGRAM(s) Oral two times a day  HYDROmorphone PCA (1 mG/mL) 30 milliLiter(s) PCA Continuous PCA Continuous  lactated ringers. 1000 milliLiter(s) (30 mL/Hr) IV Continuous <Continuous>  letrozole 2.5 milliGRAM(s) Oral daily  metoprolol tartrate 12.5 milliGRAM(s) Oral two times a day  pantoprazole    Tablet 40 milliGRAM(s) Oral before breakfast  senna 2 Tablet(s) Oral at bedtime  tiotropium 18 MICROgram(s) Capsule 1 Capsule(s) Inhalation daily    MEDICATIONS  (PRN):  aluminum hydroxide/magnesium hydroxide/simethicone Suspension 30 milliLiter(s) Oral every 4 hours PRN Dyspepsia  diphenhydrAMINE   Injectable 25 milliGRAM(s) IV Push every 4 hours PRN Pruritus  HYDROmorphone PCA (1 mG/mL) Rescue Clinician Bolus 0.5 milliGRAM(s) IV Push every 15 minutes PRN for Pain Scale GREATER THAN 6  naloxone Injectable 0.1 milliGRAM(s) IV Push every 3 minutes PRN For ANY of the following changes in patient status:  A. RR LESS THAN 10 breaths per minute, B. Oxygen saturation LESS THAN 90%, C. Sedation score of 6  ondansetron Injectable 4 milliGRAM(s) IV Push every 6 hours PRN Nausea  polyethylene glycol 3350 17 Gram(s) Oral daily PRN Constipation  simethicone 80 milliGRAM(s) Chew every 8 hours PRN Gas    Pertinent Physical Exam  I&O's Summary    27 Mar 2019 07:01  -  28 Mar 2019 07:00  --------------------------------------------------------  IN: 540 mL / OUT: 722 mL / NET: -182 mL    28 Mar 2019 07:01  -  28 Mar 2019 12:58  --------------------------------------------------------  IN: 0 mL / OUT: 359 mL / NET: -359 mL        Assessment  63y Female  w/ PAST MEDICAL & SURGICAL HISTORY:  Emphysema lung  Breast cancer: Left breast cancer - 13 years ago, s/p mastectomy, and chemoptherapy  Anemia  Other specified disorders of kidney and ureter  H/O total adrenalectomy  S/p nephrectomy  H/O left mastectomy  History of hip surgery: in 2007- Right hip surgery  admitted with complaints of Patient is a 63y old  Female who presents with a chief complaint of pleural effusion s/p surgery (28 Mar 2019 11:14)  On 3/27/19 pt had a Left VATS drainage of effusion w decortication.       PLAN  Neuro: Pain management. cont PCA. Will add neurontin and IV Tylenol.   Pulm: Encourage coughing, deep breathing and use of incentive spirometry. Wean off supplemental oxygen as able. Daily CXR. Will add nebs and chest Pt.   Cardio: Monitor telemetry/alarms  GI: Tolerating diet. Continue stool softeners.  Renal: monitor urine output, supplement electrolytes as needed  Vasc: Heparin SC/SCDs for DVT prophylaxis  Heme: Stable H/H. .   ID: Off antibiotics. Stable.  Therapy: OOB/ambulate. Must walk 4 x per day. Ok to come off suction to walk   Tubes: Monitor Chest tube output, will keep to suction.   Disposition: Aim to D/C to home on  Discussed with Cardiothoracic Team at AM rounds.

## 2019-03-28 NOTE — PROVIDER CONTACT NOTE (OTHER) - ACTION/TREATMENT ORDERED:
MD miradna made aware of bladder scan 160 and no urine output since 10 am. Pt denies any discomfort. No further action required.
MIGUELINA Schmidt made aware ,will watch output throughout shift
Bedside evaluation done by MD Dr Barrientos continue to monitor,

## 2019-03-28 NOTE — PROGRESS NOTE ADULT - SUBJECTIVE AND OBJECTIVE BOX
Subjective    pt states she feels well, mild pain over the L thorax, controlled w/ PCA.    Objective    Vital signs  T(F): , Max: 98.6 (03-28-19 @ 01:39)  HR: 80 (03-28-19 @ 05:11)  BP: 126/58 (03-28-19 @ 05:11)  SpO2: 99% (03-28-19 @ 05:11)  Wt(kg): --    Output     OUT:    Voided: 600 mL  Total OUT: 600 mL    Total NET: -600 mL          Gen NAD  Abd S/ND/NT   no suprapubic tenderness, no flank tenderness    Labs      03-27 @ 05:26    WBC 8.67  / Hct 32.4  / SCr 0.85

## 2019-03-28 NOTE — PROVIDER CONTACT NOTE (OTHER) - ASSESSMENT
Pt has not voided since 10 am, pt OOB, denies pain, denies any pain or discomfort or urgency to void. bladder scanner used 160 in bladder at this time.
Pt only voided once this shift, pt denies any pain or discomfort. Pt eating and drinking
patient states "she has black out", VSS, denies any chest [pain, denies any SOB, on oxygen 2 litres,   FS normal, oriented x 4

## 2019-03-28 NOTE — PROGRESS NOTE ADULT - SUBJECTIVE AND OBJECTIVE BOX
ANESTHESIA POSTOP CHECK    63y Female POSTOP DAY 1 S/P   [x ] General Anesthesia  [ ] Hector Anesthesia  [ ] MAC    Vital Signs Last 24 Hrs  T(C): 36.6 (28 Mar 2019 09:17), Max: 37 (28 Mar 2019 01:39)  T(F): 97.9 (28 Mar 2019 09:17), Max: 98.6 (28 Mar 2019 01:39)  HR: 72 (28 Mar 2019 09:17) (72 - 99)  BP: 115/65 (28 Mar 2019 09:17) (102/70 - 126/58)  BP(mean): 78 (27 Mar 2019 20:00) (46 - 96)  RR: 17 (28 Mar 2019 09:17) (16 - 28)  SpO2: 96% (28 Mar 2019 09:17) (94% - 99%)  I&O's Summary    27 Mar 2019 07:01  -  28 Mar 2019 07:00  --------------------------------------------------------  IN: 540 mL / OUT: 722 mL / NET: -182 mL    28 Mar 2019 07:01  -  28 Mar 2019 11:20  --------------------------------------------------------  IN: 0 mL / OUT: 359 mL / NET: -359 mL        [x ] NO APPARENT ANESTHESIA COMPLICATIONS      Comments:

## 2019-03-28 NOTE — PROGRESS NOTE ADULT - SUBJECTIVE AND OBJECTIVE BOX
Carla Ruiz MD  Pager [phone]    CHIEF COMPLAINT: Patient is a 63y old  female who presents with a chief complaint of pleural effusion, luq pain (27 Mar 2019 11:41)      SUBJECTIVE / OVERNIGHT EVENTS:  pt had 2 left chest tube, s/p OR yesterday no pleural implants found, decortication and 2 chest tubes placed  this morning, no chest pain/sob, luq pain relatively controlled on pca    MEDICATIONS  (STANDING):  ALBUTerol/ipratropium for Nebulization 3 milliLiter(s) Nebulizer every 6 hours  buDESOnide  80 MICROgram(s)/formoterol 4.5 MICROgram(s) Inhaler 2 Puff(s) Inhalation two times a day  chlorhexidine 4% Liquid 1 Application(s) Topical two times a day  cholecalciferol 2000 Unit(s) Oral daily  enoxaparin Injectable 40 milliGRAM(s) SubCutaneous daily  escitalopram 20 milliGRAM(s) Oral daily  gabapentin 100 milliGRAM(s) Oral two times a day  HYDROmorphone PCA (1 mG/mL) 30 milliLiter(s) PCA Continuous PCA Continuous  lactated ringers. 1000 milliLiter(s) (30 mL/Hr) IV Continuous <Continuous>  letrozole 2.5 milliGRAM(s) Oral daily  metoprolol tartrate 12.5 milliGRAM(s) Oral two times a day  pantoprazole    Tablet 40 milliGRAM(s) Oral before breakfast  senna 2 Tablet(s) Oral at bedtime  tiotropium 18 MICROgram(s) Capsule 1 Capsule(s) Inhalation daily    MEDICATIONS  (PRN):  aluminum hydroxide/magnesium hydroxide/simethicone Suspension 30 milliLiter(s) Oral every 4 hours PRN Dyspepsia  diphenhydrAMINE   Injectable 25 milliGRAM(s) IV Push every 4 hours PRN Pruritus  HYDROmorphone PCA (1 mG/mL) Rescue Clinician Bolus 0.5 milliGRAM(s) IV Push every 15 minutes PRN for Pain Scale GREATER THAN 6  naloxone Injectable 0.1 milliGRAM(s) IV Push every 3 minutes PRN For ANY of the following changes in patient status:  A. RR LESS THAN 10 breaths per minute, B. Oxygen saturation LESS THAN 90%, C. Sedation score of 6  ondansetron Injectable 4 milliGRAM(s) IV Push every 6 hours PRN Nausea  polyethylene glycol 3350 17 Gram(s) Oral daily PRN Constipation  simethicone 80 milliGRAM(s) Chew every 8 hours PRN Gas      VITALS:  T(F): 97.9 (19 @ 09:17), Max: 98.6 (19 @ 01:39)  HR: 72 (19 @ 09:17) (72 - 99)  BP: 115/65 (19 @ 09:17) (102/70 - 126/58)  RR: 17 (19 @ 09:17) (16 - 28)  SpO2: 96% (19 @ 09:17)  Height (cm): 160.02 (13:05)    CAPILLARY BLOOD GLUCOSE    Output   Height (cm): 160.02 (13:05)  I&O's Summary  T(F): 97.9 (19 @ 09:17), Max: 98.6 (19 @ 01:39)  HR: 72 (19 @ 09:17) (72 - 99)  BP: 115/65 (19 @ 09:17) (102/70 - 126/58)  RR: 17 (19 @ 09:17) (16 - 28)  SpO2: 96% (19 @ 09:17)    PHYSICAL EXAM:  GENERAL: NAD, well-developed  HEAD:  Atraumatic, Normocephalic  EYES: EOMI, PERRLA, conjunctiva and sclera clear  NECK: Supple, No JVD  CHEST/LUNG: b/l air entry, 2 left chest tubes in place  HEART: Regular rate and rhythm; No murmurs, rubs, or gallops  ABDOMEN: Soft, LUQ tender ,+ bowel sound  EXTREMITIES:  2+ Peripheral Pulses, No clubbing, cyanosis, or edema  PSYCH: AAOx3  NEUROLOGY: non-focal  SKIN: No rashes or lesions      LABS:              9.9                  142  | 35   | 15           8.67  >-----------< 326     ------------------------< 78                    32.4                 3.6  | 97   | 0.85                                         Ca 8.2   Mg x     Ph x            INR: 1.25<H>;    PT: 14.0 SEC<H>;    PTT: 30.3 SEC        Urinalysis Basic - ( 26 Mar 2019 19:56 )    Color: YELLOW / Appearance: CLEAR / S.025 / pH: 6.5  Gluc: NEGATIVE / Ketone: NEGATIVE  / Bili: NEGATIVE / Urobili: NORMAL   Blood: TRACE / Protein: 100 / Nitrite: NEGATIVE   Leuk Esterase: NEGATIVE / RBC: 3-5 / WBC 6-10   Sq Epi: FEW / Non Sq Epi: x / Bacteria: SMALL      MICROBIOLOGY:    Culture - Surg Site Aerob/Anaer w/Gm St (collected 27 Mar 2019 18:20)  Source: SURGERY    Culture - Surg Site Aerob/Anaer w/Gm St (collected 27 Mar 2019 18:17)  Source: SURGERY      RADIOLOGY & ADDITIONAL TESTS:    Imaging Personally Reviewed:  < from: Xray Chest 1 View- PORTABLE-Routine (19 @ 08:17) >  IMPRESSION:  Status post left thoracotomy with chest tube and drainage of   pleural fluid post removal of pigtail catheter.    [ ] Consultant(s) Notes Reviewed:  [x ] Care Discussed with Consultants/Other Providers: urology MIGUELINA Schmidt, 2 left CT's placed

## 2019-03-29 PROCEDURE — 99233 SBSQ HOSP IP/OBS HIGH 50: CPT

## 2019-03-29 PROCEDURE — 71045 X-RAY EXAM CHEST 1 VIEW: CPT | Mod: 26

## 2019-03-29 RX ORDER — ACETAMINOPHEN 500 MG
650 TABLET ORAL EVERY 6 HOURS
Qty: 0 | Refills: 0 | Status: COMPLETED | OUTPATIENT
Start: 2019-03-29 | End: 2019-03-31

## 2019-03-29 RX ADMIN — Medication 100 MILLIGRAM(S): at 18:03

## 2019-03-29 RX ADMIN — Medication 12.5 MILLIGRAM(S): at 06:38

## 2019-03-29 RX ADMIN — HYDROMORPHONE HYDROCHLORIDE 30 MILLILITER(S): 2 INJECTION INTRAMUSCULAR; INTRAVENOUS; SUBCUTANEOUS at 20:01

## 2019-03-29 RX ADMIN — ENOXAPARIN SODIUM 40 MILLIGRAM(S): 100 INJECTION SUBCUTANEOUS at 12:17

## 2019-03-29 RX ADMIN — LETROZOLE 2.5 MILLIGRAM(S): 2.5 TABLET, FILM COATED ORAL at 06:38

## 2019-03-29 RX ADMIN — Medication 3 MILLILITER(S): at 09:39

## 2019-03-29 RX ADMIN — Medication 3 MILLILITER(S): at 21:59

## 2019-03-29 RX ADMIN — Medication 3 MILLILITER(S): at 04:53

## 2019-03-29 RX ADMIN — HYDROMORPHONE HYDROCHLORIDE 30 MILLILITER(S): 2 INJECTION INTRAMUSCULAR; INTRAVENOUS; SUBCUTANEOUS at 08:42

## 2019-03-29 RX ADMIN — BUDESONIDE AND FORMOTEROL FUMARATE DIHYDRATE 2 PUFF(S): 160; 4.5 AEROSOL RESPIRATORY (INHALATION) at 12:16

## 2019-03-29 RX ADMIN — Medication 100 MILLIGRAM(S): at 06:41

## 2019-03-29 RX ADMIN — GABAPENTIN 100 MILLIGRAM(S): 400 CAPSULE ORAL at 21:02

## 2019-03-29 RX ADMIN — BUDESONIDE AND FORMOTEROL FUMARATE DIHYDRATE 2 PUFF(S): 160; 4.5 AEROSOL RESPIRATORY (INHALATION) at 21:01

## 2019-03-29 RX ADMIN — TIOTROPIUM BROMIDE 1 CAPSULE(S): 18 CAPSULE ORAL; RESPIRATORY (INHALATION) at 12:16

## 2019-03-29 RX ADMIN — Medication 12.5 MILLIGRAM(S): at 18:02

## 2019-03-29 RX ADMIN — ESCITALOPRAM OXALATE 20 MILLIGRAM(S): 10 TABLET, FILM COATED ORAL at 06:38

## 2019-03-29 RX ADMIN — POLYETHYLENE GLYCOL 3350 17 GRAM(S): 17 POWDER, FOR SOLUTION ORAL at 12:17

## 2019-03-29 RX ADMIN — Medication 3 MILLILITER(S): at 15:44

## 2019-03-29 RX ADMIN — SENNA PLUS 2 TABLET(S): 8.6 TABLET ORAL at 21:02

## 2019-03-29 NOTE — PROGRESS NOTE ADULT - PROBLEM SELECTOR PLAN 2
-increasing L pleural effusion, s/p chest tube drainage 1.5 L serosanguinous fluid removal  -r/o malignant effusion with h/o metastatic breast CA with bone mets and possible lung mets (known small lung nodules)  -pleural fluid is exudative, pleural fluid protein/serum protein = 0.6, cytology negative for malignant cells, benign and reactive  mesothelial cells with mixed inflammation and macrophages.  -s/p VATS 3/27 with 2 chest tubes placed; flexible bronchoscopy; Left uniportal VATS drainage off effusion. Organizing hemothorax without signs of pleural implants c/w retained hemothorax. No indication for pleurodesis.  --no pleural implants on VATS, AFB neg/culture neg to date  -f/u CTS recs -increasing L pleural effusion, s/p chest tube drainage 1.5 L serosanguinous fluid removal  -r/o malignant effusion with h/o metastatic breast CA with bone mets and possible lung mets (known small lung nodules)  -pleural fluid is exudative, pleural fluid protein/serum protein = 0.6, cytology negative for malignant cells, benign and reactive  mesothelial cells with mixed inflammation and macrophages.  -s/p VATS 3/27 with 2 chest tubes placed; flexible bronchoscopy; Left uniportal VATS drainage off effusion. Organizing hemothorax without signs of pleural implants c/w retained hemothorax. No indication for pleurodesis.  --no pleural implants on VATS, AFB neg/culture neg to date  -1 chest tube removed today, f/u CTS recs

## 2019-03-29 NOTE — PROGRESS NOTE ADULT - SUBJECTIVE AND OBJECTIVE BOX
63y Female found sitting up in bed in NAD. s/p chest tube removal with 1x remaining chest tube in place.    24hr Events: Straight Chest tube removed    Vital Signs:  Vital Signs Last 24 Hrs  T(C): 36.6 (03-29-19 @ 09:48), Max: 36.8 (03-29-19 @ 01:30)  T(F): 97.9 (03-29-19 @ 09:48), Max: 98.2 (03-29-19 @ 01:30)  HR: 68 (03-29-19 @ 09:48) (67 - 78)  BP: 136/69 (03-29-19 @ 09:48) (120/60 - 136/69)  RR: 17 (03-29-19 @ 09:48) (15 - 18)  SpO2: 99% (03-29-19 @ 09:48) (96% - 99%) on (O2)    Telemetry/Alarms:    Chest Tube:     R           Output:    29                Air Leaks:    []Yes    [x]No    Relevant labs, Radiology and Medications reviewed    CXR: < from: Xray Chest 1 View- PORTABLE-Routine (03.29.19 @ 08:36) >  IMPRESSION:    Bilateral chest tubes are unchanged.    Stable small bilateral pleural effusions and trace left apical   pneumothorax.      < end of copied text >      Pertinent Physical Exam  Gen: WN/WD, NAD  Pulm: CTA B/L  Ext: No edema, +peripheral pulses    Assessment  63 year old female with a h/o breast CA, COPD, & a L radical Nephrectomy on 2/13/19 that was complicated by a splenic injury causing hemoperitoneum and hemorrhagic shock and requiring angioembolization.  She was transferred from [place] today for further evaluation after presenting for abdominal pain that has since improved.  A CT chest showed that a previously noted left pleural effusion has increased in size.  Additionally bilateral nodules are still noted.     3/29 POD 2 Flex Bronch, L uniportal VATS drainage of effusion with decortication. Straight chest tube removed today. CXR without ptx.         Plan  Continue care per primary team  Will reassess Angled chest tube, maintain to sx   CXR in AM   Discussed at AM rounds

## 2019-03-29 NOTE — PROGRESS NOTE ADULT - SUBJECTIVE AND OBJECTIVE BOX
Anesthesia Pain Management Service    SUBJECTIVE: Patient is doing well with IV PCA and no significant problems reported. Patient feels that pain pump is helping her, and that she is eating a little more than yesterday.  Denies nausea or vomiting.    Pain Scale Score	At rest: _5/10__ 	With Activity: ___ 	[X ] Refer to charted pain scores    THERAPY:    [ ] IV PCA Morphine		[ ] 5 mg/mL	[ ] 1 mg/mL  [X ] IV PCA Hydromorphone	[ ] 5 mg/mL	[X ] 1 mg/mL  [ ] IV PCA Fentanyl		[ ] 50 micrograms/mL    Demand dose __0.2_ lockout __6_ (minutes) Continuous Rate _0__ Total: _2__  mg used (in past 24 hours)      MEDICATIONS  (STANDING):  acetaminophen   Tablet .. 650 milliGRAM(s) Oral every 6 hours  ALBUTerol/ipratropium for Nebulization 3 milliLiter(s) Nebulizer every 6 hours  buDESOnide  80 MICROgram(s)/formoterol 4.5 MICROgram(s) Inhaler 2 Puff(s) Inhalation two times a day  chlorhexidine 4% Liquid 1 Application(s) Topical two times a day  cholecalciferol 2000 Unit(s) Oral daily  docusate sodium 100 milliGRAM(s) Oral two times a day  enoxaparin Injectable 40 milliGRAM(s) SubCutaneous daily  escitalopram 20 milliGRAM(s) Oral daily  gabapentin 100 milliGRAM(s) Oral two times a day  HYDROmorphone PCA (1 mG/mL) 30 milliLiter(s) PCA Continuous PCA Continuous  letrozole 2.5 milliGRAM(s) Oral daily  metoprolol tartrate 12.5 milliGRAM(s) Oral two times a day  pantoprazole    Tablet 40 milliGRAM(s) Oral before breakfast  polyethylene glycol 3350 17 Gram(s) Oral daily  senna 2 Tablet(s) Oral at bedtime  tiotropium 18 MICROgram(s) Capsule 1 Capsule(s) Inhalation daily    MEDICATIONS  (PRN):  aluminum hydroxide/magnesium hydroxide/simethicone Suspension 30 milliLiter(s) Oral every 4 hours PRN Dyspepsia  diphenhydrAMINE   Injectable 25 milliGRAM(s) IV Push every 4 hours PRN Pruritus  HYDROmorphone PCA (1 mG/mL) Rescue Clinician Bolus 0.5 milliGRAM(s) IV Push every 15 minutes PRN for Pain Scale GREATER THAN 6  naloxone Injectable 0.1 milliGRAM(s) IV Push every 3 minutes PRN For ANY of the following changes in patient status:  A. RR LESS THAN 10 breaths per minute, B. Oxygen saturation LESS THAN 90%, C. Sedation score of 6  ondansetron Injectable 4 milliGRAM(s) IV Push every 6 hours PRN Nausea  simethicone 80 milliGRAM(s) Chew every 8 hours PRN Gas      OBJECTIVE: Patient lying in bed with 2 Chest tubes.    Sedation Score:	[ X] Alert	[ ] Drowsy 	[ ] Arousable	[ ] Asleep	[ ] Unresponsive    Side Effects:	[X ] None	[ ] Nausea	[ ] Vomiting	[ ] Pruritus  		[ ] Other:    Vital Signs Last 24 Hrs  T(C): 36.6 (29 Mar 2019 06:33), Max: 36.8 (29 Mar 2019 01:30)  T(F): 97.8 (29 Mar 2019 06:33), Max: 98.2 (29 Mar 2019 01:30)  HR: 75 (29 Mar 2019 06:33) (67 - 78)  BP: 124/60 (29 Mar 2019 06:33) (120/60 - 128/62)  BP(mean): --  RR: 15 (29 Mar 2019 06:33) (15 - 18)  SpO2: 96% (29 Mar 2019 06:33) (96% - 99%)    ASSESSMENT/ PLAN    Therapy to  be:	[ X] Continue   [ ] Discontinued   [ ] Change to prn Analgesics    Documentation and Verification of current medications:   [X] Done	[ ] Not done, not elligible    Comments:  Continue current pain regimen.  Added po Tylenol. Encourage ambulation.  Will transition to po pain medication, when patient tolerating po diet, ambulating and chest tubes soon to be removed.

## 2019-03-29 NOTE — PROGRESS NOTE ADULT - SUBJECTIVE AND OBJECTIVE BOX
Subjective    Pt states she feels well, been getting OOB, no BMs, passing flatus, tolerating diet. Pain improved.    Objective    Vital signs  T(F): , Max: 98.2 (03-29-19 @ 01:30)  HR: 75 (03-29-19 @ 06:33)  BP: 124/60 (03-29-19 @ 06:33)  SpO2: 96% (03-29-19 @ 06:33)  Wt(kg): --    Output     OUT:    Voided: 600 mL  Total OUT: 600 mL    Total NET: -600 mL      OUT:    Voided: 450 mL  Total OUT: 450 mL    Total NET: -450 mL      Gen NAD  Abd S/ND/NT   no flank tenderness, no suprapubic tenderness Subjective    Pt states she feels well, been getting OOB, no BMs, passing flatus, tolerating diet. Pain improved.    Objective    Vital signs  T(F): , Max: 98.2 (03-29-19 @ 01:30)  HR: 75 (03-29-19 @ 06:33)  BP: 124/60 (03-29-19 @ 06:33)  SpO2: 96% (03-29-19 @ 06:33)  Wt(kg): --    Output     OUT:    Voided: 600 mL  Total OUT: 600 mL    Total NET: -600 mL      OUT:    Voided: 450 mL  Total OUT: 450 mL    Total NET: -450 mL      Gen NAD  Abd S/mild distension/minimal tenderness   no flank tenderness, no suprapubic tenderness

## 2019-03-29 NOTE — PROGRESS NOTE ADULT - SUBJECTIVE AND OBJECTIVE BOX
Carla Ruiz MD  Pager [phone]    CHIEF COMPLAINT: Patient is a 63y old  female who presents with a chief complaint of pleural effusion s/p surgery (28 Mar 2019 11:14)      SUBJECTIVE / OVERNIGHT EVENTS:  patient states pain controlled on pca, denies chest pain/sob, tolerating diet    MEDICATIONS  (STANDING):  acetaminophen   Tablet .. 650 milliGRAM(s) Oral every 6 hours  ALBUTerol/ipratropium for Nebulization 3 milliLiter(s) Nebulizer every 6 hours  buDESOnide  80 MICROgram(s)/formoterol 4.5 MICROgram(s) Inhaler 2 Puff(s) Inhalation two times a day  chlorhexidine 4% Liquid 1 Application(s) Topical two times a day  cholecalciferol 2000 Unit(s) Oral daily  docusate sodium 100 milliGRAM(s) Oral two times a day  enoxaparin Injectable 40 milliGRAM(s) SubCutaneous daily  escitalopram 20 milliGRAM(s) Oral daily  gabapentin 100 milliGRAM(s) Oral two times a day  HYDROmorphone PCA (1 mG/mL) 30 milliLiter(s) PCA Continuous PCA Continuous  letrozole 2.5 milliGRAM(s) Oral daily  metoprolol tartrate 12.5 milliGRAM(s) Oral two times a day  pantoprazole    Tablet 40 milliGRAM(s) Oral before breakfast  polyethylene glycol 3350 17 Gram(s) Oral daily  senna 2 Tablet(s) Oral at bedtime  tiotropium 18 MICROgram(s) Capsule 1 Capsule(s) Inhalation daily    MEDICATIONS  (PRN):  aluminum hydroxide/magnesium hydroxide/simethicone Suspension 30 milliLiter(s) Oral every 4 hours PRN Dyspepsia  diphenhydrAMINE   Injectable 25 milliGRAM(s) IV Push every 4 hours PRN Pruritus  HYDROmorphone PCA (1 mG/mL) Rescue Clinician Bolus 0.5 milliGRAM(s) IV Push every 15 minutes PRN for Pain Scale GREATER THAN 6  naloxone Injectable 0.1 milliGRAM(s) IV Push every 3 minutes PRN For ANY of the following changes in patient status:  A. RR LESS THAN 10 breaths per minute, B. Oxygen saturation LESS THAN 90%, C. Sedation score of 6  ondansetron Injectable 4 milliGRAM(s) IV Push every 6 hours PRN Nausea  simethicone 80 milliGRAM(s) Chew every 8 hours PRN Gas      VITALS:  T(F): 97.9 (03-29-19 @ 09:48), Max: 98.2 (03-29-19 @ 01:30)  HR: 68 (03-29-19 @ 09:48) (67 - 78)  BP: 136/69 (03-29-19 @ 09:48) (120/60 - 136/69)  RR: 17 (03-29-19 @ 09:48) (15 - 18)  SpO2: 99% (03-29-19 @ 09:48)      CAPILLARY BLOOD GLUCOSE    Output     I&O's Summary  T(F): 97.9 (03-29-19 @ 09:48), Max: 98.2 (03-29-19 @ 01:30)  HR: 68 (03-29-19 @ 09:48) (67 - 78)  BP: 136/69 (03-29-19 @ 09:48) (120/60 - 136/69)  RR: 17 (03-29-19 @ 09:48) (15 - 18)  SpO2: 99% (03-29-19 @ 09:48)    PHYSICAL EXAM:  GENERAL: NAD, well-developed  HEAD:  Atraumatic, Normocephalic  EYES: EOMI, PERRLA, conjunctiva and sclera clear  NECK: Supple, No JVD  CHEST/LUNG: b/l air entry, 2 left chest tubes in place  HEART: Regular rate and rhythm; No murmurs, rubs, or gallops  ABDOMEN: Soft, LUQ tender ,+ bowel sound  EXTREMITIES:  2+ Peripheral Pulses, No clubbing, cyanosis, or edema  PSYCH: AAOx3  NEUROLOGY: non-focal  SKIN: No rashes or lesions    LABS:        MICROBIOLOGY:    Culture - Surg Site Aerob/Anaer w/Gm St (collected 27 Mar 2019 18:20)  Source: SURGERY  Preliminary Report (29 Mar 2019 08:49):    NO ORGANISMS ISOLATED AT 24 HOURS    Culture - Acid Fast Smear Concentrated (collected 27 Mar 2019 18:20)  Source: SURGERY  Final Report (28 Mar 2019 15:39):    AFB SMEAR= NO ACID FAST BACILLI SEEN    Culture - Surg Site Aerob/Anaer w/Gm St (collected 27 Mar 2019 18:17)  Source: SURGERY  Preliminary Report (29 Mar 2019 08:50):    NO ORGANISMS ISOLATED AT 24 HOURS    Culture - Acid Fast Smear Concentrated (collected 27 Mar 2019 18:17)  Source: SURGERY  Final Report (28 Mar 2019 15:39):    AFB SMEAR= NO ACID FAST BACILLI SEEN      RADIOLOGY & ADDITIONAL TESTS:    Imaging Personally Reviewed:    [ ] Consultant(s) Notes Reviewed:  [ x] Care Discussed with Consultants/Other Providers: JAVAN Lyons, f/u CTS recs Carla Ruiz MD  Pager [phone]    CHIEF COMPLAINT: Patient is a 63y old  female who presents with a chief complaint of pleural effusion s/p surgery (28 Mar 2019 11:14)      SUBJECTIVE / OVERNIGHT EVENTS:  patient states pain controlled on pca, denies chest pain/sob, tolerating diet    MEDICATIONS  (STANDING):  acetaminophen   Tablet .. 650 milliGRAM(s) Oral every 6 hours  ALBUTerol/ipratropium for Nebulization 3 milliLiter(s) Nebulizer every 6 hours  buDESOnide  80 MICROgram(s)/formoterol 4.5 MICROgram(s) Inhaler 2 Puff(s) Inhalation two times a day  chlorhexidine 4% Liquid 1 Application(s) Topical two times a day  cholecalciferol 2000 Unit(s) Oral daily  docusate sodium 100 milliGRAM(s) Oral two times a day  enoxaparin Injectable 40 milliGRAM(s) SubCutaneous daily  escitalopram 20 milliGRAM(s) Oral daily  gabapentin 100 milliGRAM(s) Oral two times a day  HYDROmorphone PCA (1 mG/mL) 30 milliLiter(s) PCA Continuous PCA Continuous  letrozole 2.5 milliGRAM(s) Oral daily  metoprolol tartrate 12.5 milliGRAM(s) Oral two times a day  pantoprazole    Tablet 40 milliGRAM(s) Oral before breakfast  polyethylene glycol 3350 17 Gram(s) Oral daily  senna 2 Tablet(s) Oral at bedtime  tiotropium 18 MICROgram(s) Capsule 1 Capsule(s) Inhalation daily    MEDICATIONS  (PRN):  aluminum hydroxide/magnesium hydroxide/simethicone Suspension 30 milliLiter(s) Oral every 4 hours PRN Dyspepsia  diphenhydrAMINE   Injectable 25 milliGRAM(s) IV Push every 4 hours PRN Pruritus  HYDROmorphone PCA (1 mG/mL) Rescue Clinician Bolus 0.5 milliGRAM(s) IV Push every 15 minutes PRN for Pain Scale GREATER THAN 6  naloxone Injectable 0.1 milliGRAM(s) IV Push every 3 minutes PRN For ANY of the following changes in patient status:  A. RR LESS THAN 10 breaths per minute, B. Oxygen saturation LESS THAN 90%, C. Sedation score of 6  ondansetron Injectable 4 milliGRAM(s) IV Push every 6 hours PRN Nausea  simethicone 80 milliGRAM(s) Chew every 8 hours PRN Gas      VITALS:  T(F): 97.9 (03-29-19 @ 09:48), Max: 98.2 (03-29-19 @ 01:30)  HR: 68 (03-29-19 @ 09:48) (67 - 78)  BP: 136/69 (03-29-19 @ 09:48) (120/60 - 136/69)  RR: 17 (03-29-19 @ 09:48) (15 - 18)  SpO2: 99% (03-29-19 @ 09:48)      CAPILLARY BLOOD GLUCOSE    Output     I&O's Summary  T(F): 97.9 (03-29-19 @ 09:48), Max: 98.2 (03-29-19 @ 01:30)  HR: 68 (03-29-19 @ 09:48) (67 - 78)  BP: 136/69 (03-29-19 @ 09:48) (120/60 - 136/69)  RR: 17 (03-29-19 @ 09:48) (15 - 18)  SpO2: 99% (03-29-19 @ 09:48)    PHYSICAL EXAM:  GENERAL: NAD, well-developed  HEAD:  Atraumatic, Normocephalic  EYES: EOMI, PERRLA, conjunctiva and sclera clear  NECK: Supple, No JVD  CHEST/LUNG: b/l air entry, 1 left chest tube in place  HEART: Regular rate and rhythm; No murmurs, rubs, or gallops  ABDOMEN: Soft, LUQ tender ,+ bowel sound  EXTREMITIES:  2+ Peripheral Pulses, No clubbing, cyanosis, or edema  PSYCH: AAOx3  NEUROLOGY: non-focal  SKIN: No rashes or lesions    LABS:        MICROBIOLOGY:    Culture - Surg Site Aerob/Anaer w/Gm St (collected 27 Mar 2019 18:20)  Source: SURGERY  Preliminary Report (29 Mar 2019 08:49):    NO ORGANISMS ISOLATED AT 24 HOURS    Culture - Acid Fast Smear Concentrated (collected 27 Mar 2019 18:20)  Source: SURGERY  Final Report (28 Mar 2019 15:39):    AFB SMEAR= NO ACID FAST BACILLI SEEN    Culture - Surg Site Aerob/Anaer w/Gm St (collected 27 Mar 2019 18:17)  Source: SURGERY  Preliminary Report (29 Mar 2019 08:50):    NO ORGANISMS ISOLATED AT 24 HOURS    Culture - Acid Fast Smear Concentrated (collected 27 Mar 2019 18:17)  Source: SURGERY  Final Report (28 Mar 2019 15:39):    AFB SMEAR= NO ACID FAST BACILLI SEEN      RADIOLOGY & ADDITIONAL TESTS:    Imaging Personally Reviewed:    [ ] Consultant(s) Notes Reviewed:  [ x] Care Discussed with Consultants/Other Providers: JAVAN Lyons, f/u CTS recs

## 2019-03-29 NOTE — PROGRESS NOTE ADULT - SUBJECTIVE AND OBJECTIVE BOX
Anesthesia Pain Management Service    SUBJECTIVE: Pt doing well with IV PCA without problems reported.    Therapy:	  [ X] IV PCA	   [ ] Epidural           [ ] s/p Spinal Opoid              [ ] Postpartum infusion	  [ ] Patient controlled regional anesthesia (PCRA)    [ ] prn Analgesics    Allergies    Cipro (Unknown)    Intolerances      MEDICATIONS  (STANDING):  acetaminophen   Tablet .. 650 milliGRAM(s) Oral every 6 hours  ALBUTerol/ipratropium for Nebulization 3 milliLiter(s) Nebulizer every 6 hours  buDESOnide  80 MICROgram(s)/formoterol 4.5 MICROgram(s) Inhaler 2 Puff(s) Inhalation two times a day  chlorhexidine 4% Liquid 1 Application(s) Topical two times a day  cholecalciferol 2000 Unit(s) Oral daily  docusate sodium 100 milliGRAM(s) Oral two times a day  enoxaparin Injectable 40 milliGRAM(s) SubCutaneous daily  escitalopram 20 milliGRAM(s) Oral daily  gabapentin 100 milliGRAM(s) Oral two times a day  HYDROmorphone PCA (1 mG/mL) 30 milliLiter(s) PCA Continuous PCA Continuous  letrozole 2.5 milliGRAM(s) Oral daily  metoprolol tartrate 12.5 milliGRAM(s) Oral two times a day  pantoprazole    Tablet 40 milliGRAM(s) Oral before breakfast  polyethylene glycol 3350 17 Gram(s) Oral daily  senna 2 Tablet(s) Oral at bedtime  tiotropium 18 MICROgram(s) Capsule 1 Capsule(s) Inhalation daily    MEDICATIONS  (PRN):  aluminum hydroxide/magnesium hydroxide/simethicone Suspension 30 milliLiter(s) Oral every 4 hours PRN Dyspepsia  diphenhydrAMINE   Injectable 25 milliGRAM(s) IV Push every 4 hours PRN Pruritus  HYDROmorphone PCA (1 mG/mL) Rescue Clinician Bolus 0.5 milliGRAM(s) IV Push every 15 minutes PRN for Pain Scale GREATER THAN 6  naloxone Injectable 0.1 milliGRAM(s) IV Push every 3 minutes PRN For ANY of the following changes in patient status:  A. RR LESS THAN 10 breaths per minute, B. Oxygen saturation LESS THAN 90%, C. Sedation score of 6  ondansetron Injectable 4 milliGRAM(s) IV Push every 6 hours PRN Nausea  simethicone 80 milliGRAM(s) Chew every 8 hours PRN Gas      OBJECTIVE:   [X] No new signs     [ ] Other:    Side Effects:  [X ] None			[ ] Other:    Assessment of Catheter Site:		[ ] Intact		[ ] Other:    ASSESSMENT/PLAN  [ X] Continue current therapy    [ ] Therapy changed to:    [ ] IV PCA       [ ] Epidural     [ ] prn Analgesics     Comments:

## 2019-03-30 LAB
BACTERIA FLD CULT: SIGNIFICANT CHANGE UP
SPECIMEN SOURCE: SIGNIFICANT CHANGE UP
SPECIMEN SOURCE: SIGNIFICANT CHANGE UP

## 2019-03-30 PROCEDURE — 99233 SBSQ HOSP IP/OBS HIGH 50: CPT

## 2019-03-30 PROCEDURE — 71045 X-RAY EXAM CHEST 1 VIEW: CPT | Mod: 26

## 2019-03-30 RX ORDER — TRAMADOL HYDROCHLORIDE 50 MG/1
25 TABLET ORAL EVERY 4 HOURS
Qty: 0 | Refills: 0 | Status: DISCONTINUED | OUTPATIENT
Start: 2019-03-30 | End: 2019-03-31

## 2019-03-30 RX ADMIN — Medication 3 MILLILITER(S): at 03:42

## 2019-03-30 RX ADMIN — HYDROMORPHONE HYDROCHLORIDE 30 MILLILITER(S): 2 INJECTION INTRAMUSCULAR; INTRAVENOUS; SUBCUTANEOUS at 19:28

## 2019-03-30 RX ADMIN — Medication 2000 UNIT(S): at 05:01

## 2019-03-30 RX ADMIN — Medication 650 MILLIGRAM(S): at 11:56

## 2019-03-30 RX ADMIN — Medication 100 MILLIGRAM(S): at 05:01

## 2019-03-30 RX ADMIN — SENNA PLUS 2 TABLET(S): 8.6 TABLET ORAL at 21:42

## 2019-03-30 RX ADMIN — TIOTROPIUM BROMIDE 1 CAPSULE(S): 18 CAPSULE ORAL; RESPIRATORY (INHALATION) at 09:34

## 2019-03-30 RX ADMIN — GABAPENTIN 100 MILLIGRAM(S): 400 CAPSULE ORAL at 09:29

## 2019-03-30 RX ADMIN — Medication 3 MILLILITER(S): at 09:55

## 2019-03-30 RX ADMIN — Medication 3 MILLILITER(S): at 22:40

## 2019-03-30 RX ADMIN — BUDESONIDE AND FORMOTEROL FUMARATE DIHYDRATE 2 PUFF(S): 160; 4.5 AEROSOL RESPIRATORY (INHALATION) at 21:41

## 2019-03-30 RX ADMIN — LETROZOLE 2.5 MILLIGRAM(S): 2.5 TABLET, FILM COATED ORAL at 05:01

## 2019-03-30 RX ADMIN — GABAPENTIN 100 MILLIGRAM(S): 400 CAPSULE ORAL at 21:41

## 2019-03-30 RX ADMIN — ESCITALOPRAM OXALATE 20 MILLIGRAM(S): 10 TABLET, FILM COATED ORAL at 05:00

## 2019-03-30 RX ADMIN — ENOXAPARIN SODIUM 40 MILLIGRAM(S): 100 INJECTION SUBCUTANEOUS at 11:55

## 2019-03-30 RX ADMIN — BUDESONIDE AND FORMOTEROL FUMARATE DIHYDRATE 2 PUFF(S): 160; 4.5 AEROSOL RESPIRATORY (INHALATION) at 09:29

## 2019-03-30 RX ADMIN — Medication 650 MILLIGRAM(S): at 17:43

## 2019-03-30 RX ADMIN — Medication 3 MILLILITER(S): at 15:38

## 2019-03-30 RX ADMIN — Medication 12.5 MILLIGRAM(S): at 05:00

## 2019-03-30 RX ADMIN — Medication 100 MILLIGRAM(S): at 17:43

## 2019-03-30 RX ADMIN — HYDROMORPHONE HYDROCHLORIDE 30 MILLILITER(S): 2 INJECTION INTRAMUSCULAR; INTRAVENOUS; SUBCUTANEOUS at 08:24

## 2019-03-30 RX ADMIN — Medication 12.5 MILLIGRAM(S): at 17:43

## 2019-03-30 NOTE — PROGRESS NOTE ADULT - SUBJECTIVE AND OBJECTIVE BOX
Pt seen. Resting in bed. Feels well. NO issues overnight  Using IS. Went for walk this am.    Vital Signs Last 24 Hrs  T(C): 36.7 (30 Mar 2019 10:21), Max: 36.7 (29 Mar 2019 17:17)  T(F): 98 (30 Mar 2019 10:21), Max: 98 (29 Mar 2019 17:17)  HR: 76 (30 Mar 2019 10:21) (68 - 97)  BP: 133/66 (30 Mar 2019 10:21) (133/66 - 150/71)  BP(mean): --  RR: 17 (30 Mar 2019 10:21) (15 - 18)  SpO2: 99% (30 Mar 2019 10:21) (96% - 99%)    A+O x 3  Lung dec BS left base. CTA Rt.   RRR s1S2  Left VATS site c/d/i  Remaining CT w minimal output, no air leak  Removed at bedside this am  FU CXR showed no obvious ptx    A/P: 62yo F s/p Left VATS drainage of effusion/decortication POD#3.    -CTs removed  -CXR stable.  -Can transition to oral pain meds  -Cont pulm toilet, IS use, ambulating  No objection to discharge from Thoracic surgery standpoint  Left Chest dressing to remain in place until Monday then can be removed and  pt can begin to shower at that time. Suture will be removed in office.  She should see Dr. Damon in 2week, [phone] with a repeat CXR  prior to that apt.  Above dw pt and Uro team

## 2019-03-30 NOTE — PROGRESS NOTE ADULT - PROBLEM SELECTOR PLAN 4
-former smoker, sating well on RA  -c/w bronchodilator, symbicort/albuterol/spiriva  -monitor O2 sat.

## 2019-03-30 NOTE — PROGRESS NOTE ADULT - PROBLEM SELECTOR PLAN 1
- abd pain likely related to the pleural effusion, she is passing flatus, having BMs, and now he pain has resolved w/o any intervention from a GI standpoint  - c/w nutritional supplements  - dispo planning
- c/w simethicone  - regular diet w/ supplements  - ? musculoskeletal pain now, con't to monitor vs GI consultation
- c/w simethicone  - regular diet w/ supplements  - ongoing elevated PT/INR, suspect poor nutrition  - PT eval b/c of deconditioning  - await CT head read
- c/w supplements  - pain Rx prn, minimize narcotics  - bowel regimen
- improved, no changes to present management  - c/w supplements  - pain Rx prn, minimize narcotics  - bowel regimen
-likely from pleural/osseous mets, with h/o metastatic breast cancer with bone mets  -pain controlled on oxycodone 5 mg q4h prn,  bowel regimen  -d/w her oncologist Dr. Shields, outpt f/u for restaging contrast CT or PET.
-likely from pleural/osseous mets, with h/o metastatic breast cancer with bone mets  -pain controlled on oxycodone 5 mg q4h prn,  bowel regimen  -d/w her oncologist Dr. Shields, outpt f/u for restaging contrast CT or PET.
-s/p VATS with 2 left chest tubes, currently on PCA, f/u pain management  -no pleural implants on VATS
-s/p VATS with 2 left chest tubes, currently on PCA, f/u pain management  -no pleural implants on VATS, AFB neg/culture neg to date
S/p VATS with 2 left chest tubes. Straight CT removed 3/29  -c/w pain management. Currently on PCA. Continue to appreciate pain management recs

## 2019-03-30 NOTE — PROGRESS NOTE ADULT - SUBJECTIVE AND OBJECTIVE BOX
Patient is a 63y old  Female who presents with a chief complaint of s/p VATS for pleural effusion (29 Mar 2019 10:39)      SUBJECTIVE / OVERNIGHT EVENTS:    Review of Systems:     MEDICATIONS  (STANDING):  acetaminophen   Tablet .. 650 milliGRAM(s) Oral every 6 hours  ALBUTerol/ipratropium for Nebulization 3 milliLiter(s) Nebulizer every 6 hours  buDESOnide  80 MICROgram(s)/formoterol 4.5 MICROgram(s) Inhaler 2 Puff(s) Inhalation two times a day  chlorhexidine 4% Liquid 1 Application(s) Topical two times a day  cholecalciferol 2000 Unit(s) Oral daily  docusate sodium 100 milliGRAM(s) Oral two times a day  enoxaparin Injectable 40 milliGRAM(s) SubCutaneous daily  escitalopram 20 milliGRAM(s) Oral daily  gabapentin 100 milliGRAM(s) Oral two times a day  HYDROmorphone PCA (1 mG/mL) 30 milliLiter(s) PCA Continuous PCA Continuous  letrozole 2.5 milliGRAM(s) Oral daily  metoprolol tartrate 12.5 milliGRAM(s) Oral two times a day  pantoprazole    Tablet 40 milliGRAM(s) Oral before breakfast  polyethylene glycol 3350 17 Gram(s) Oral daily  senna 2 Tablet(s) Oral at bedtime  tiotropium 18 MICROgram(s) Capsule 1 Capsule(s) Inhalation daily    MEDICATIONS  (PRN):  aluminum hydroxide/magnesium hydroxide/simethicone Suspension 30 milliLiter(s) Oral every 4 hours PRN Dyspepsia  diphenhydrAMINE   Injectable 25 milliGRAM(s) IV Push every 4 hours PRN Pruritus  HYDROmorphone PCA (1 mG/mL) Rescue Clinician Bolus 0.5 milliGRAM(s) IV Push every 15 minutes PRN for Pain Scale GREATER THAN 6  naloxone Injectable 0.1 milliGRAM(s) IV Push every 3 minutes PRN For ANY of the following changes in patient status:  A. RR LESS THAN 10 breaths per minute, B. Oxygen saturation LESS THAN 90%, C. Sedation score of 6  ondansetron Injectable 4 milliGRAM(s) IV Push every 6 hours PRN Nausea  simethicone 80 milliGRAM(s) Chew every 8 hours PRN Gas      PHYSICAL EXAM:    I&O's Summary    29 Mar 2019 07:01  -  30 Mar 2019 07:00  --------------------------------------------------------  IN: 0 mL / OUT: 380 mL / NET: -380 mL  Vital Signs Last 24 Hrs  T(C): 36.6 (30 Mar 2019 04:57), Max: 36.7 (29 Mar 2019 17:17)  T(F): 97.9 (30 Mar 2019 04:57), Max: 98 (29 Mar 2019 17:17)  HR: 69 (30 Mar 2019 04:57) (67 - 97)  BP: 141/67 (30 Mar 2019 04:57) (135/73 - 150/71)  BP(mean): --  RR: 15 (30 Mar 2019 04:57) (15 - 18)  SpO2: 97% (30 Mar 2019 04:57) (96% - 99%)      LABS:  CAPILLARY BLOOD GLUCOSE    RADIOLOGY & ADDITIONAL TESTS:    Imaging Personally Reviewed:    Consultant(s) Notes Reviewed:      Care Discussed with Consultants/Other Providers: Patient is a 63y old  Female who presents with a chief complaint of s/p VATS for pleural effusion (29 Mar 2019 10:39)      SUBJECTIVE / OVERNIGHT EVENTS: Pt denies n/v, SOB, CP. She reports having ,ild pain at sx site.     Review of Systems:     MEDICATIONS  (STANDING):  acetaminophen   Tablet .. 650 milliGRAM(s) Oral every 6 hours  ALBUTerol/ipratropium for Nebulization 3 milliLiter(s) Nebulizer every 6 hours  buDESOnide  80 MICROgram(s)/formoterol 4.5 MICROgram(s) Inhaler 2 Puff(s) Inhalation two times a day  chlorhexidine 4% Liquid 1 Application(s) Topical two times a day  cholecalciferol 2000 Unit(s) Oral daily  docusate sodium 100 milliGRAM(s) Oral two times a day  enoxaparin Injectable 40 milliGRAM(s) SubCutaneous daily  escitalopram 20 milliGRAM(s) Oral daily  gabapentin 100 milliGRAM(s) Oral two times a day  HYDROmorphone PCA (1 mG/mL) 30 milliLiter(s) PCA Continuous PCA Continuous  letrozole 2.5 milliGRAM(s) Oral daily  metoprolol tartrate 12.5 milliGRAM(s) Oral two times a day  pantoprazole    Tablet 40 milliGRAM(s) Oral before breakfast  polyethylene glycol 3350 17 Gram(s) Oral daily  senna 2 Tablet(s) Oral at bedtime  tiotropium 18 MICROgram(s) Capsule 1 Capsule(s) Inhalation daily    MEDICATIONS  (PRN):  aluminum hydroxide/magnesium hydroxide/simethicone Suspension 30 milliLiter(s) Oral every 4 hours PRN Dyspepsia  diphenhydrAMINE   Injectable 25 milliGRAM(s) IV Push every 4 hours PRN Pruritus  HYDROmorphone PCA (1 mG/mL) Rescue Clinician Bolus 0.5 milliGRAM(s) IV Push every 15 minutes PRN for Pain Scale GREATER THAN 6  naloxone Injectable 0.1 milliGRAM(s) IV Push every 3 minutes PRN For ANY of the following changes in patient status:  A. RR LESS THAN 10 breaths per minute, B. Oxygen saturation LESS THAN 90%, C. Sedation score of 6  ondansetron Injectable 4 milliGRAM(s) IV Push every 6 hours PRN Nausea  simethicone 80 milliGRAM(s) Chew every 8 hours PRN Gas      PHYSICAL EXAM:    I&O's Summary    29 Mar 2019 07:01  -  30 Mar 2019 07:00  --------------------------------------------------------  IN: 0 mL / OUT: 380 mL / NET: -380 mL  Vital Signs Last 24 Hrs  T(C): 36.6 (30 Mar 2019 04:57), Max: 36.7 (29 Mar 2019 17:17)  T(F): 97.9 (30 Mar 2019 04:57), Max: 98 (29 Mar 2019 17:17)  HR: 69 (30 Mar 2019 04:57) (67 - 97)  BP: 141/67 (30 Mar 2019 04:57) (135/73 - 150/71)  BP(mean): --  RR: 15 (30 Mar 2019 04:57) (15 - 18)  SpO2: 97% (30 Mar 2019 04:57) (96% - 99%)  PHYSICAL EXAM:  GENERAL: NAD,   HEAD:  Atraumatic, Normocephalic  EYES: EOMI, conjunctiva and sclera clear  NECK: Supple, No JVD  CHEST/LUNG: b/l air entry, Left chest tube in place  HEART: Regular rate and rhythm; No murmurs, rubs, or gallops  ABDOMEN: Soft, LUQ tender ,+ bowel sound  EXTREMITIES:  2+ Peripheral Pulses, No clubbing, cyanosis, or edema  PSYCH: AAOx3  NEUROLOGY: non-focal  SKIN: No rashes or lesions        LABS:  CAPILLARY BLOOD GLUCOSE    RADIOLOGY & ADDITIONAL TESTS:    Imaging Personally Reviewed:    Consultant(s) Notes Reviewed:      Care Discussed with Consultants/Other Providers:

## 2019-03-30 NOTE — PROGRESS NOTE ADULT - PROBLEM SELECTOR PLAN 7
H/o L nephrectomy 2/13 c/b splenic injury/splenic hematoma requiring IR angioembolization  -management per .

## 2019-03-30 NOTE — PROGRESS NOTE ADULT - SUBJECTIVE AND OBJECTIVE BOX
Subjective    No acute events overnight. pt states she had her first decent BM, passing flatus, OOB.    Objective    Vital signs  T(F): , Max: 98 (03-29-19 @ 17:17)  HR: 69 (03-30-19 @ 04:57)  BP: 141/67 (03-30-19 @ 04:57)  SpO2: 97% (03-30-19 @ 04:57)  Wt(kg): --    Output     OUT:    Voided: 450 mL  Total OUT: 450 mL    Total NET: -450 mL      OUT:    Voided: 350 mL  Total OUT: 350 mL    Total NET: -350 mL          Gen NAD  Abd S/mild distension/NT   No flank tenderness, no suprapubic tenderness    Labs        Urine Cx: ?  Blood Cx: ?    Imaging

## 2019-03-30 NOTE — PROGRESS NOTE ADULT - PROBLEM SELECTOR PLAN 2
Increasing L pleural effusion, S/p 2 chest tube on 3/27. 1 Chest tube dcd 3/29.  -r/o malignant effusion with h/o metastatic breast CA with bone mets and possible lung mets (known small lung nodules)  -pleural fluid is exudative, pleural fluid protein/serum protein = 0.6, cytology negative for malignant cells, benign and reactive w/ mesothelial cells with mixed inflammation and macrophages.  -no pleural implants on VATS, AFB neg/culture neg to date  -f/u Chest tube  output  - f/u CTS recs

## 2019-03-30 NOTE — PROGRESS NOTE ADULT - PROBLEM SELECTOR PLAN 2
- f/u CT surgery  - OOB  - D/C PCA if possible  - no further acute  intervention  - disposition pending CT surgery

## 2019-03-30 NOTE — PROGRESS NOTE ADULT - PROBLEM SELECTOR PLAN 3
-h/o metastatic breast cancer s/p mastectomy/chemo 13 years ago, was previously on ibrance and femera, ibrance on hold per oncologist Dr. Addie Shields prior to surgery, hasn't followed up with her since surgery  -has bone mets and possible lung mets  -c/w femera  -I discussed case with her oncologist, cont femera pending fluid cytology,   outpt f/u for restaging (contrast CT or PET scan), may have to change her antineoplastic therapy.
-h/o metastatic breast cancer s/p mastectomy/chemo 13 years ago, was previously on ibrance and femera, ibrance on hold per oncologist Dr. Addie Shields prior to surgery, hasn't followed up with her since surgery  -has bone mets and possible lung mets  -c/w femera  -d/w pt's oncologist, madelin dennis, outpt f/u for restaging (contrast CT or PET scan), may have to change her antineoplastic therapy.
H/o metastatic breast cancer s/p mastectomy/chemo 13 years ago, was previously on ibrance and femera, ibrance on hold per oncologist Dr. Addie Shields prior to surgery, hasn't followed up with her since surgery  -has bone mets and possible lung mets  -c/w femera  - Case d/w pt's oncologist during course w/ recs to: cont femera, outpt f/u for restaging (contrast CT or PET scan), may have to change her antineoplastic therapy.

## 2019-03-30 NOTE — PROGRESS NOTE ADULT - SUBJECTIVE AND OBJECTIVE BOX
Anesthesia Pain Management Service    SUBJECTIVE: Patient is doing well with IV PCA and no significant problems reported.    Pain Scale Score	At rest: ___ 	With Activity: ___ 	[X ] Refer to charted pain scores    THERAPY:    [ ] IV PCA Morphine		[ ] 5 mg/mL	[ ] 1 mg/mL  [X ] IV PCA Hydromorphone	[ ] 5 mg/mL	[X ] 1 mg/mL  [ ] IV PCA Fentanyl		[ ] 50 micrograms/mL    Demand dose __0.2_ lockout __6_ (minutes) Continuous Rate _0__ Total: ___  Daily      MEDICATIONS  (STANDING):  acetaminophen   Tablet .. 650 milliGRAM(s) Oral every 6 hours  ALBUTerol/ipratropium for Nebulization 3 milliLiter(s) Nebulizer every 6 hours  buDESOnide  80 MICROgram(s)/formoterol 4.5 MICROgram(s) Inhaler 2 Puff(s) Inhalation two times a day  chlorhexidine 4% Liquid 1 Application(s) Topical two times a day  cholecalciferol 2000 Unit(s) Oral daily  docusate sodium 100 milliGRAM(s) Oral two times a day  enoxaparin Injectable 40 milliGRAM(s) SubCutaneous daily  escitalopram 20 milliGRAM(s) Oral daily  gabapentin 100 milliGRAM(s) Oral two times a day  HYDROmorphone PCA (1 mG/mL) 30 milliLiter(s) PCA Continuous PCA Continuous  letrozole 2.5 milliGRAM(s) Oral daily  metoprolol tartrate 12.5 milliGRAM(s) Oral two times a day  pantoprazole    Tablet 40 milliGRAM(s) Oral before breakfast  polyethylene glycol 3350 17 Gram(s) Oral daily  senna 2 Tablet(s) Oral at bedtime  tiotropium 18 MICROgram(s) Capsule 1 Capsule(s) Inhalation daily    MEDICATIONS  (PRN):  aluminum hydroxide/magnesium hydroxide/simethicone Suspension 30 milliLiter(s) Oral every 4 hours PRN Dyspepsia  diphenhydrAMINE   Injectable 25 milliGRAM(s) IV Push every 4 hours PRN Pruritus  HYDROmorphone PCA (1 mG/mL) Rescue Clinician Bolus 0.5 milliGRAM(s) IV Push every 15 minutes PRN for Pain Scale GREATER THAN 6  naloxone Injectable 0.1 milliGRAM(s) IV Push every 3 minutes PRN For ANY of the following changes in patient status:  A. RR LESS THAN 10 breaths per minute, B. Oxygen saturation LESS THAN 90%, C. Sedation score of 6  ondansetron Injectable 4 milliGRAM(s) IV Push every 6 hours PRN Nausea  simethicone 80 milliGRAM(s) Chew every 8 hours PRN Gas      OBJECTIVE:    Sedation Score:	[ X] Alert	[ ] Drowsy 	[ ] Arousable	[ ] Asleep	[ ] Unresponsive    Side Effects:	[X ] None	[ ] Nausea	[ ] Vomiting	[ ] Pruritus  		[ ] Other:    Vital Signs Last 24 Hrs  T(C): 36.6 (30 Mar 2019 04:57), Max: 36.7 (29 Mar 2019 17:17)  T(F): 97.9 (30 Mar 2019 04:57), Max: 98 (29 Mar 2019 17:17)  HR: 69 (30 Mar 2019 04:57) (67 - 97)  BP: 141/67 (30 Mar 2019 04:57) (135/73 - 150/71)  BP(mean): --  RR: 15 (30 Mar 2019 04:57) (15 - 18)  SpO2: 97% (30 Mar 2019 04:57) (96% - 99%)    ASSESSMENT/ PLAN    Therapy to  be:	[ X] Continue   [ ] Discontinued   [ ] Change to prn Analgesics    Documentation and Verification of current medications:   [X] Done	[ ] Not done, not elligible    Comments:

## 2019-03-30 NOTE — PROGRESS NOTE ADULT - PROBLEM SELECTOR PLAN 5
-anemia of chronic disease and acute blood loss with serosanguinous pleural drainage  -monitor CBC, transfuse prn to keep Hgb>7.

## 2019-03-30 NOTE — PROGRESS NOTE ADULT - PROBLEM SELECTOR PLAN 8
Hypercoagulable state due to malignancy  lovenox sc for dvt ppx.

## 2019-03-31 ENCOUNTER — TRANSCRIPTION ENCOUNTER (OUTPATIENT)
Age: 64
End: 2019-03-31

## 2019-03-31 VITALS
HEART RATE: 71 BPM | OXYGEN SATURATION: 98 % | DIASTOLIC BLOOD PRESSURE: 66 MMHG | SYSTOLIC BLOOD PRESSURE: 134 MMHG | TEMPERATURE: 98 F | RESPIRATION RATE: 17 BRPM

## 2019-03-31 DIAGNOSIS — J94.2 HEMOTHORAX: ICD-10-CM

## 2019-03-31 RX ORDER — OXYCODONE HYDROCHLORIDE 5 MG/1
10 TABLET ORAL
Qty: 0 | Refills: 0 | Status: DISCONTINUED | OUTPATIENT
Start: 2019-03-31 | End: 2019-03-31

## 2019-03-31 RX ORDER — ACETAMINOPHEN 500 MG
650 TABLET ORAL EVERY 6 HOURS
Qty: 0 | Refills: 0 | Status: DISCONTINUED | OUTPATIENT
Start: 2019-03-31 | End: 2019-03-31

## 2019-03-31 RX ORDER — SODIUM CHLORIDE 9 MG/ML
1000 INJECTION, SOLUTION INTRAVENOUS
Qty: 0 | Refills: 0 | Status: DISCONTINUED | OUTPATIENT
Start: 2019-03-31 | End: 2019-03-31

## 2019-03-31 RX ORDER — GABAPENTIN 400 MG/1
1 CAPSULE ORAL
Qty: 60 | Refills: 0
Start: 2019-03-31 | End: 2019-04-29

## 2019-03-31 RX ORDER — OXYCODONE HYDROCHLORIDE 5 MG/1
5 TABLET ORAL
Qty: 0 | Refills: 0 | Status: DISCONTINUED | OUTPATIENT
Start: 2019-03-31 | End: 2019-03-31

## 2019-03-31 RX ADMIN — Medication 100 MILLIGRAM(S): at 05:22

## 2019-03-31 RX ADMIN — LETROZOLE 2.5 MILLIGRAM(S): 2.5 TABLET, FILM COATED ORAL at 05:22

## 2019-03-31 RX ADMIN — GABAPENTIN 100 MILLIGRAM(S): 400 CAPSULE ORAL at 10:41

## 2019-03-31 RX ADMIN — ESCITALOPRAM OXALATE 20 MILLIGRAM(S): 10 TABLET, FILM COATED ORAL at 05:22

## 2019-03-31 RX ADMIN — SODIUM CHLORIDE 30 MILLILITER(S): 9 INJECTION, SOLUTION INTRAVENOUS at 06:55

## 2019-03-31 RX ADMIN — Medication 3 MILLILITER(S): at 03:18

## 2019-03-31 RX ADMIN — Medication 3 MILLILITER(S): at 10:55

## 2019-03-31 RX ADMIN — HYDROMORPHONE HYDROCHLORIDE 30 MILLILITER(S): 2 INJECTION INTRAMUSCULAR; INTRAVENOUS; SUBCUTANEOUS at 07:11

## 2019-03-31 RX ADMIN — Medication 650 MILLIGRAM(S): at 10:42

## 2019-03-31 RX ADMIN — POLYETHYLENE GLYCOL 3350 17 GRAM(S): 17 POWDER, FOR SOLUTION ORAL at 10:44

## 2019-03-31 RX ADMIN — BUDESONIDE AND FORMOTEROL FUMARATE DIHYDRATE 2 PUFF(S): 160; 4.5 AEROSOL RESPIRATORY (INHALATION) at 10:40

## 2019-03-31 RX ADMIN — Medication 650 MILLIGRAM(S): at 11:09

## 2019-03-31 RX ADMIN — ENOXAPARIN SODIUM 40 MILLIGRAM(S): 100 INJECTION SUBCUTANEOUS at 10:41

## 2019-03-31 RX ADMIN — TIOTROPIUM BROMIDE 1 CAPSULE(S): 18 CAPSULE ORAL; RESPIRATORY (INHALATION) at 10:40

## 2019-03-31 RX ADMIN — Medication 12.5 MILLIGRAM(S): at 05:23

## 2019-03-31 RX ADMIN — PANTOPRAZOLE SODIUM 40 MILLIGRAM(S): 20 TABLET, DELAYED RELEASE ORAL at 05:22

## 2019-03-31 NOTE — PROGRESS NOTE ADULT - SUBJECTIVE AND OBJECTIVE BOX
Subjective    No acute events overnight. CT removed successfully yesterday, able to get OOB, worked w/ PT yesterday who did not recommend any skilled needs, pain controlled.    Objective    Vital signs  T(F): , Max: 98.5 (03-30-19 @ 22:40)  HR: 74 (03-31-19 @ 05:18)  BP: 143/68 (03-31-19 @ 05:18)  SpO2: 97% (03-31-19 @ 05:18)  Wt(kg): --    Output     OUT:    Voided: 725 mL  Total OUT: 725 mL    Total NET: -725 mL          Gen NAD  Abd S/ND/NT   no flank tenderness, no suprapubic tenderness

## 2019-03-31 NOTE — DISCHARGE NOTE NURSING/CASE MANAGEMENT/SOCIAL WORK - NSDCPEEMAIL_GEN_ALL_CORE
[facility] email [email or web address]

## 2019-03-31 NOTE — DISCHARGE NOTE NURSING/CASE MANAGEMENT/SOCIAL WORK - NSDCPEWEB_GEN_ALL_CORE
[place] website --- [email or web address] [email or web address] website --- [email or web address]

## 2019-03-31 NOTE — PROGRESS NOTE ADULT - SUBJECTIVE AND OBJECTIVE BOX
Anesthesia Pain Management Service    SUBJECTIVE: Patient is doing well with IV PCA and no significant problems reported.    Pain Scale Score	At rest: ___ 	With Activity: ___ 	[X ] Refer to charted pain scores    THERAPY:    [ ] IV PCA Morphine		[ ] 5 mg/mL	[ ] 1 mg/mL  [X ] IV PCA Hydromorphone	[ ] 5 mg/mL	[X ] 1 mg/mL  [ ] IV PCA Fentanyl		[ ] 50 micrograms/mL    Demand dose __0.2_ lockout __6_ (minutes) Continuous Rate _0__ Total: __1.6_  mg used (in past 24 hours)      MEDICATIONS  (STANDING):  ALBUTerol/ipratropium for Nebulization 3 milliLiter(s) Nebulizer every 6 hours  buDESOnide  80 MICROgram(s)/formoterol 4.5 MICROgram(s) Inhaler 2 Puff(s) Inhalation two times a day  cholecalciferol 2000 Unit(s) Oral daily  dextrose 5% + sodium chloride 0.45%. 1000 milliLiter(s) (30 mL/Hr) IV Continuous <Continuous>  docusate sodium 100 milliGRAM(s) Oral two times a day  enoxaparin Injectable 40 milliGRAM(s) SubCutaneous daily  escitalopram 20 milliGRAM(s) Oral daily  gabapentin 100 milliGRAM(s) Oral two times a day  letrozole 2.5 milliGRAM(s) Oral daily  metoprolol tartrate 12.5 milliGRAM(s) Oral two times a day  pantoprazole    Tablet 40 milliGRAM(s) Oral before breakfast  polyethylene glycol 3350 17 Gram(s) Oral daily  senna 2 Tablet(s) Oral at bedtime  tiotropium 18 MICROgram(s) Capsule 1 Capsule(s) Inhalation daily    MEDICATIONS  (PRN):  acetaminophen   Tablet .. 650 milliGRAM(s) Oral every 6 hours PRN Mild Pain (1 - 3)  aluminum hydroxide/magnesium hydroxide/simethicone Suspension 30 milliLiter(s) Oral every 4 hours PRN Dyspepsia  oxyCODONE    IR 5 milliGRAM(s) Oral every 3 hours PRN Moderate Pain (4 - 6)  oxyCODONE    IR 10 milliGRAM(s) Oral every 3 hours PRN Severe Pain (7 - 10)  simethicone 80 milliGRAM(s) Chew every 8 hours PRN Gas  traMADol 25 milliGRAM(s) Oral every 4 hours PRN moderate to severe pain      OBJECTIVE:    Sedation Score:	[ X] Alert	[ ] Drowsy 	[ ] Arousable	[ ] Asleep	[ ] Unresponsive    Side Effects:	[X ] None	[ ] Nausea	[ ] Vomiting	[ ] Pruritus  		[ ] Other:    Vital Signs Last 24 Hrs  T(C): 36.9 (31 Mar 2019 05:18), Max: 36.9 (30 Mar 2019 22:40)  T(F): 98.4 (31 Mar 2019 05:18), Max: 98.5 (30 Mar 2019 22:40)  HR: 74 (31 Mar 2019 05:18) (67 - 97)  BP: 143/68 (31 Mar 2019 05:18) (133/66 - 145/76)  BP(mean): --  RR: 16 (31 Mar 2019 05:18) (16 - 17)  SpO2: 97% (31 Mar 2019 05:18) (94% - 99%)    ASSESSMENT/ PLAN    Therapy to  be:	[ ] Continue   [x ] Discontinued   [ x] Change to prn Analgesics    Documentation and Verification of current medications:   [X] Done	[ ] Not done, not elligible    Comments: IV PCA discontinued switched to oral analgesics as needed.     Progress Note written now but Patient was seen earlier.

## 2019-03-31 NOTE — DISCHARGE NOTE NURSING/CASE MANAGEMENT/SOCIAL WORK - NSDCDPATPORTLINK_GEN_ALL_CORE
You can access the [email or web address], offered by [facility], by registering with the following website: [email or web address]

## 2019-03-31 NOTE — PROGRESS NOTE ADULT - ATTENDING COMMENTS
Pt seen and examined  doing well this am  agree with plan above  BM last night
Pt seen and examined  no sig changes  doing well; ambulating  Agree with plan above
PT seen and examined agree with assessment and plan per thoracic
PT seen and examined agree with assessment and plan

## 2019-03-31 NOTE — DISCHARGE NOTE PROVIDER - NSDCCPTREATMENT_GEN_ALL_CORE_FT
PRINCIPAL PROCEDURE  Procedure: Thoracoscopy with drainage of pleural effusion and poudrage  Findings and Treatment:

## 2019-03-31 NOTE — DISCHARGE NOTE NURSING/CASE MANAGEMENT/SOCIAL WORK - NSDCPNINST_GEN_ALL_CORE
Maintain left chest incision clean dry and intact.  Call MD with any signs of infection ie fever/shaking chills, redness or drainage, or with signs of bleeding or persistent nausea. Continue to drink plenty of fluids and avoid straining and constipation which may be a side effect from taking narcotic pain meds. No heavy lifting greater than 10 pounds (ie a gallon of milk.) Follow-up with MD as well as PMD in office as instructed for continuity of care post-operatively, as per safe Dc plan.

## 2019-03-31 NOTE — PROGRESS NOTE ADULT - PROVIDER SPECIALTY LIST ADULT
Anesthesia
Anesthesia
CT Surgery
CT Surgery
Hospitalist
Pain Medicine
Thoracic Surgery
Urology
CT Surgery
Hospitalist
Pain Medicine
Pain Medicine

## 2019-03-31 NOTE — PROGRESS NOTE ADULT - PROBLEM SELECTOR PROBLEM 2
Pleural effusion

## 2019-03-31 NOTE — DISCHARGE NOTE PROVIDER - NSDCCPCAREPLAN_GEN_ALL_CORE_FT
PRINCIPAL DISCHARGE DIAGNOSIS  Diagnosis: Hemothorax, left  Assessment and Plan of Treatment: Please follow-up with Dr. Mathias at your next scheduled appointment.  Please follow-up with Dr. Damon in 2 weeks to assess resolution of the hemothorax. You need to have a chest x-ray just prior to the appointment.      SECONDARY DISCHARGE DIAGNOSES  Diagnosis: Breast cancer  Assessment and Plan of Treatment: Please followup wth your breast surgeon within 1-2 weeks after dischage for follow-up regarding your breast cancer.    Diagnosis: Hemothorax, left  Assessment and Plan of Treatment: PRINCIPAL DISCHARGE DIAGNOSIS  Diagnosis: Hemothorax, left  Assessment and Plan of Treatment: Please follow-up with Dr. Mathias at your next scheduled appointment.  Please follow-up with Dr. Damon in 2 weeks to assess resolution of the hemothorax. You need to have a chest x-ray just prior to the appointment. Please call [phone] to schedule.      SECONDARY DISCHARGE DIAGNOSES  Diagnosis: Breast cancer  Assessment and Plan of Treatment: Please followup wth your breast surgeon within 1-2 weeks after dischage for follow-up regarding your breast cancer.    Diagnosis: Hemothorax, left  Assessment and Plan of Treatment:

## 2019-03-31 NOTE — DISCHARGE NOTE PROVIDER - HOSPITAL COURSE
Ms Ortega came to the hospital on 3/23 due to shortness of breath and abdominal pain. She was found to have a large left sided pleural effusion. CT surgery was consulted and a pigtail catheter was placed at bedside. Her effusion did not completely resolve after a couple of days and she went to the OR for a VATS procedure. Intraop, she was noted to have a large hemothorax. There was no evidence of a malignant process in the thorax. Two chest tubes were placed at that time.        Over the next couple of days, both chest tubes were removed. She was seen by physical therapy who made the recommendation that she had no skilled needs.        On 3/31 the patient was breathing well and he pain was controlled. She was discharged with instructions for followup and a prescription for outpatient PT.

## 2019-03-31 NOTE — PROGRESS NOTE ADULT - ASSESSMENT
62 y/o F w/ chest & abd pain, found to have a large L sided pleural effusion s/p L pigtail thoracostomy tube, presently stable
62 y/o F w/ chest & abd pain, found to have a large L sided pleural effusion s/p L pigtail thoracostomy tube, s/p L VATS presently stable
62 y/o F w/ chest & abd pain, found to have a large L sided pleural effusion s/p L pigtail thoracostomy tube, s/p L VATS presently stable
62 y/o F w/ chest & abd pain, found to have a large L sided pleural effusion, now w/ ongoing moderate abdominal pain, presently improved
63F h/o breast CA, COPD, L radical Nephrectomy on 2/13/19 that was complicated by a splenic injury causing hemoperitoneum and hemorrhagic shock and requiring angioembolization. Known bilateral pleural nodules w/increasing left-sided pleural effusion       - left-sided 14Fr pigtail catheter placed for enlarging pleural effusion; diagnostic and therapeutic. ~1500cc serosanguinous fluid removed and clamped when patient developed a moderate cough. Will unclamp in one to two hours  - pleural fluid sent for cytology / culture / chemistry  - CXR pending  - CXR in the AM daily for duration of chest tube  - will continue to follow  - above plan d/w Dr. Nelson Flores [facility] PGY3  Thoracic Surgery  [id number]
64 y/o F h/o metastatic breast CA s/p mastectomy and chemo 13 years ago (bone mets),  COPD, left renal neoplasm, s/p L radical Nephrectomy 2/13/19 c/b splenic injury causing splenic hematoma and hemorrhagic shock requiring angioembolization, known pleural nodules, transferred from [place] today for c/o severe LUQ pain radiating to her L scapula, found to have increasing L pleural effusion, s/p chest tube drainage by CT surgery, drained ~1.5L serosanguinous fluid
64 y/o F h/o metastatic breast CA s/p mastectomy and chemo 13 years ago (bone mets),  COPD, left renal neoplasm, s/p L radical Nephrectomy 2/13/19 c/b splenic injury causing splenic hematoma and hemorrhagic shock requiring angioembolization, known pleural nodules, transferred from [place] today for c/o severe LUQ pain radiating to her L scapula, found to have increasing L pleural effusion, s/p chest tube drainage by CT surgery, drained ~1.5L serosanguinous fluid, s/p VATS 3/27
64 y/o F h/o metastatic breast CA s/p mastectomy and chemo 13 years ago (bone mets),  COPD, left renal neoplasm, s/p L radical Nephrectomy 2/13/19 c/b splenic injury causing splenic hematoma and hemorrhagic shock requiring angioembolization, known pleural nodules, transferred from [place] today for c/o severe LUQ pain radiating to her L scapula, found to have increasing L pleural effusion, s/p chest tube drainage by CT surgery, drained ~1.5L serosanguinous fluid, for VATS today
64 y/o F h/o metastatic breast CA s/p mastectomy and chemo 13 years ago (bone mets),  COPD, left renal neoplasm, s/p L radical Nephrectomy 2/13/19 c/b splenic injury causing splenic hematoma and hemorrhagic shock requiring angioembolization, known pleural nodules, transferred from [place] today for c/o severe LUQ pain radiating to her L scapula, found to have increasing L pleural effusion, s/p chest tube drainage by CT surgery, drained ~1.5L serosanguinous fluid, s/p VATS 3/27
64 y/o F w/ chest & abd pain, found to have a large L sided pleural effusion s/p L pigtail thoracostomy tube, s/p L VATS presently stable
64 y/o F w/ chest & abd pain, found to have a large L sided pleural effusion s/p L pigtail thoracostomy tube, s/p L VATS presently stable
64 y/o F w/ chest & abd pain, found to have a large L sided pleural effusion, now w/ ongoing abd pain, presently stable
64 y/o F w/ chest & abd pain, found to have a large L sided pleural effusion, now w/ ongoing moderate abdominal pain, presently improved
Patient s/p flex bronch, left uniportal vats, drainage of effusion and decortication    Plan:  Continue chest tube to suction  Follow up daily chest x-ray while chest tube in   Continue IV PCA for pain management   Chest PT, ambulation and incentive spirometer   Care as per primary team
64 y/o F h/o metastatic breast CA s/p mastectomy and chemo 13 years ago (bone mets),  COPD, left renal neoplasm, s/p L radical Nephrectomy 2/13/19 c/b splenic injury causing splenic hematoma and hemorrhagic shock requiring angioembolization, known pleural nodules, transferred from [place] today for c/o severe LUQ pain radiating to her L scapula, found to have increasing L pleural effusion, s/p chest tube drainage by CT surgery, s/p VATS 3/27

## 2019-04-02 LAB
CULTURE - SURGICAL SITE: SIGNIFICANT CHANGE UP
CULTURE - SURGICAL SITE: SIGNIFICANT CHANGE UP

## 2019-04-03 LAB
NON-GYNECOLOGICAL CYTOLOGY STUDY: SIGNIFICANT CHANGE UP
SPECIMEN SOURCE: SIGNIFICANT CHANGE UP
SPECIMEN SOURCE: SIGNIFICANT CHANGE UP

## 2019-04-12 ENCOUNTER — OUTPATIENT (OUTPATIENT)
Dept: OUTPATIENT SERVICES | Facility: HOSPITAL | Age: 64
LOS: 1 days | End: 2019-04-12
Payer: COMMERCIAL

## 2019-04-12 DIAGNOSIS — Z00.00 ENCOUNTER FOR GENERAL ADULT MEDICAL EXAMINATION WITHOUT ABNORMAL FINDINGS: ICD-10-CM

## 2019-04-12 DIAGNOSIS — Z90.12 ACQUIRED ABSENCE OF LEFT BREAST AND NIPPLE: Chronic | ICD-10-CM

## 2019-04-12 DIAGNOSIS — Z98.890 OTHER SPECIFIED POSTPROCEDURAL STATES: Chronic | ICD-10-CM

## 2019-04-12 DIAGNOSIS — E89.6 POSTPROCEDURAL ADRENOCORTICAL (-MEDULLARY) HYPOFUNCTION: Chronic | ICD-10-CM

## 2019-04-12 DIAGNOSIS — Z90.5 ACQUIRED ABSENCE OF KIDNEY: Chronic | ICD-10-CM

## 2019-04-12 PROCEDURE — 71046 X-RAY EXAM CHEST 2 VIEWS: CPT | Mod: 26

## 2019-04-12 PROCEDURE — 71046 X-RAY EXAM CHEST 2 VIEWS: CPT

## 2019-04-16 LAB — SURGICAL PATHOLOGY STUDY: SIGNIFICANT CHANGE UP

## 2019-04-19 ENCOUNTER — APPOINTMENT (OUTPATIENT)
Dept: UROLOGY | Facility: CLINIC | Age: 64
End: 2019-04-19
Payer: COMMERCIAL

## 2019-04-19 PROCEDURE — 99024 POSTOP FOLLOW-UP VISIT: CPT

## 2019-04-19 NOTE — HISTORY OF PRESENT ILLNESS
[FreeTextEntry1] : Radical left nephrectomy done February 22 for RCC FG 3 with negative lymph nodes . Post op course complicated by spleenic bleed . She also had post op effusion . VAC by Dr Damon  [None] : no symptoms

## 2019-04-19 NOTE — ASSESSMENT
[FreeTextEntry1] : She is not feeling well. She has had some GI issues related to intake of food . She gets severe gas pains when she eats . Recommend GI . In regards to her Lungs she will follow up with Dr Iqbal .

## 2019-04-19 NOTE — PHYSICAL EXAM
[General Appearance - Well Developed] : well developed [General Appearance - Well Nourished] : well nourished [Abdomen Soft] : soft [Skin Color & Pigmentation] : normal skin color and pigmentation [Skin Turgor] : supple [Heart Rate And Rhythm] : Heart rate and rhythm were normal [Respiration, Rhythm And Depth] : normal respiratory rhythm and effort [Oriented To Time, Place, And Person] : oriented to person, place, and time [] : no respiratory distress [Normal Station and Gait] : the gait and station were normal for the patient's age [No Palpable Adenopathy] : no palpable adenopathy [No Focal Deficits] : no focal deficits

## 2019-04-24 LAB
FUNGUS SPEC QL CULT: SIGNIFICANT CHANGE UP
FUNGUS SPEC QL CULT: SIGNIFICANT CHANGE UP

## 2019-05-08 LAB
ACID FAST STN SPEC: SIGNIFICANT CHANGE UP
ACID FAST STN SPEC: SIGNIFICANT CHANGE UP

## 2019-05-19 NOTE — PROGRESS NOTE ADULT - PROBLEM SELECTOR PLAN 6
Stable, patient with chronic hypoxic respiratory failure. No evidence of exacerbation. On long term oxygen therapy as outpatient.  continue nebs PRN  c/w 2 L home O2 via NC at bedtime. no

## 2019-07-08 ENCOUNTER — INBOUND DOCUMENT (OUTPATIENT)
Age: 64
End: 2019-07-08

## 2019-08-19 ENCOUNTER — INBOUND DOCUMENT (OUTPATIENT)
Age: 64
End: 2019-08-19

## 2019-08-27 ENCOUNTER — OTHER (OUTPATIENT)
Age: 64
End: 2019-08-27

## 2019-09-03 ENCOUNTER — OTHER (OUTPATIENT)
Age: 64
End: 2019-09-03

## 2019-09-05 ENCOUNTER — APPOINTMENT (OUTPATIENT)
Dept: RADIOLOGY | Facility: IMAGING CENTER | Age: 64
End: 2019-09-05

## 2019-09-05 ENCOUNTER — APPOINTMENT (OUTPATIENT)
Dept: CT IMAGING | Facility: IMAGING CENTER | Age: 64
End: 2019-09-05

## 2019-09-25 ENCOUNTER — OUTPATIENT (OUTPATIENT)
Dept: OUTPATIENT SERVICES | Facility: HOSPITAL | Age: 64
LOS: 1 days | End: 2019-09-25
Payer: SELF-PAY

## 2019-09-25 DIAGNOSIS — Z90.12 ACQUIRED ABSENCE OF LEFT BREAST AND NIPPLE: Chronic | ICD-10-CM

## 2019-09-25 DIAGNOSIS — Z90.5 ACQUIRED ABSENCE OF KIDNEY: Chronic | ICD-10-CM

## 2019-09-25 DIAGNOSIS — C50.919 MALIGNANT NEOPLASM OF UNSPECIFIED SITE OF UNSPECIFIED FEMALE BREAST: ICD-10-CM

## 2019-09-25 DIAGNOSIS — E89.6 POSTPROCEDURAL ADRENOCORTICAL (-MEDULLARY) HYPOFUNCTION: Chronic | ICD-10-CM

## 2019-09-25 DIAGNOSIS — Z98.890 OTHER SPECIFIED POSTPROCEDURAL STATES: Chronic | ICD-10-CM

## 2019-09-25 PROCEDURE — 71260 CT THORAX DX C+: CPT | Mod: 26

## 2019-09-25 PROCEDURE — 74177 CT ABD & PELVIS W/CONTRAST: CPT

## 2019-09-25 PROCEDURE — 71260 CT THORAX DX C+: CPT

## 2019-09-25 PROCEDURE — 74177 CT ABD & PELVIS W/CONTRAST: CPT | Mod: 26

## 2019-10-02 ENCOUNTER — INBOUND DOCUMENT (OUTPATIENT)
Age: 64
End: 2019-10-02

## 2019-10-22 ENCOUNTER — APPOINTMENT (OUTPATIENT)
Dept: UROLOGY | Facility: CLINIC | Age: 64
End: 2019-10-22

## 2019-12-03 NOTE — PRE-OP CHECKLIST - LAST DOSE WITHIN LAST 24HRS
Yes verbal cues/nonverbal cues (demo/gestures)/1 person assist verbal cues/nonverbal cues (demo/gestures)/1 person assist

## 2020-04-01 NOTE — DISCHARGE NOTE PROVIDER - CARE PROVIDER_API CALL
Cassius Mathias)  Urology  [address] [place] [address], [address]  [place] [address]  Phone: [phone]  Fax: [phone]  Follow Up Time:     Bill Damon)  Surgery; Thoracic Surgery  [address] [place] [address], [place] [address]  Phone: [phone]  Fax: [phone]  Follow Up Time: 2 weeks
complains of pain/discomfort/general body

## 2020-07-10 NOTE — PROCEDURE NOTE - NSNUMOFLUMENS_VASC_A_CORE
How Severe Are Your Spot(S)?: mild Have Your Spot(S) Been Treated In The Past?: has not been treated Hpi Title: Evaluation of Skin Lesions Year Removed: 1900 introducer

## 2020-07-30 ENCOUNTER — OUTPATIENT (OUTPATIENT)
Dept: OUTPATIENT SERVICES | Facility: HOSPITAL | Age: 65
LOS: 1 days | Discharge: HOME | End: 2020-07-30
Payer: MEDICARE

## 2020-07-30 DIAGNOSIS — C50.919 MALIGNANT NEOPLASM OF UNSPECIFIED SITE OF UNSPECIFIED FEMALE BREAST: ICD-10-CM

## 2020-07-30 DIAGNOSIS — E89.6 POSTPROCEDURAL ADRENOCORTICAL (-MEDULLARY) HYPOFUNCTION: Chronic | ICD-10-CM

## 2020-07-30 DIAGNOSIS — Z90.5 ACQUIRED ABSENCE OF KIDNEY: Chronic | ICD-10-CM

## 2020-07-30 DIAGNOSIS — Z90.12 ACQUIRED ABSENCE OF LEFT BREAST AND NIPPLE: Chronic | ICD-10-CM

## 2020-07-30 DIAGNOSIS — Z98.890 OTHER SPECIFIED POSTPROCEDURAL STATES: Chronic | ICD-10-CM

## 2020-07-31 ENCOUNTER — RESULT REVIEW (OUTPATIENT)
Age: 65
End: 2020-07-31

## 2020-07-31 PROCEDURE — 88321 CONSLTJ&REPRT SLD PREP ELSWR: CPT

## 2020-08-07 LAB — SURGICAL PATHOLOGY STUDY: SIGNIFICANT CHANGE UP

## 2020-08-10 DIAGNOSIS — N28.89 OTHER SPECIFIED DISORDERS OF KIDNEY AND URETER: ICD-10-CM

## 2020-08-10 RX ORDER — LETROZOLE 2.5 MG/1
2.5 TABLET, FILM COATED ORAL
Refills: 0 | Status: DISCONTINUED | COMMUNITY
End: 2020-08-10

## 2020-08-10 RX ORDER — PALBOCICLIB 100 MG/1
100 CAPSULE ORAL
Refills: 0 | Status: DISCONTINUED | COMMUNITY
End: 2020-08-10

## 2020-08-13 ENCOUNTER — APPOINTMENT (OUTPATIENT)
Dept: HEMATOLOGY ONCOLOGY | Facility: CLINIC | Age: 65
End: 2020-08-13
Payer: MEDICARE

## 2020-08-13 ENCOUNTER — RESULT REVIEW (OUTPATIENT)
Age: 65
End: 2020-08-13

## 2020-08-13 VITALS
SYSTOLIC BLOOD PRESSURE: 161 MMHG | RESPIRATION RATE: 16 BRPM | OXYGEN SATURATION: 93 % | HEIGHT: 63.03 IN | DIASTOLIC BLOOD PRESSURE: 82 MMHG | TEMPERATURE: 99.2 F | BODY MASS INDEX: 30.55 KG/M2 | WEIGHT: 172.4 LBS | HEART RATE: 95 BPM

## 2020-08-13 DIAGNOSIS — Z92.89 PERSONAL HISTORY OF OTHER MEDICAL TREATMENT: ICD-10-CM

## 2020-08-13 DIAGNOSIS — Z63.5 DISRUPTION OF FAMILY BY SEPARATION AND DIVORCE: ICD-10-CM

## 2020-08-13 DIAGNOSIS — Z80.0 FAMILY HISTORY OF MALIGNANT NEOPLASM OF DIGESTIVE ORGANS: ICD-10-CM

## 2020-08-13 LAB
ALBUMIN SERPL ELPH-MCNC: 4.5 G/DL
ALP BLD-CCNC: 46 U/L
ALT SERPL-CCNC: 5 U/L
ANION GAP SERPL CALC-SCNC: 14 MMOL/L
APTT BLD: 27.1 SEC
AST SERPL-CCNC: 17 U/L
BASOPHILS # BLD AUTO: 0.03 K/UL — SIGNIFICANT CHANGE UP (ref 0–0.2)
BASOPHILS NFR BLD AUTO: 0.5 % — SIGNIFICANT CHANGE UP (ref 0–2)
BILIRUB SERPL-MCNC: 0.4 MG/DL
BUN SERPL-MCNC: 17 MG/DL
CALCIUM SERPL-MCNC: 9.4 MG/DL
CEA SERPL-MCNC: 2.7 NG/ML
CHLORIDE SERPL-SCNC: 108 MMOL/L
CO2 SERPL-SCNC: 23 MMOL/L
CREAT SERPL-MCNC: 1.32 MG/DL
EOSINOPHIL # BLD AUTO: 0.26 K/UL — SIGNIFICANT CHANGE UP (ref 0–0.5)
EOSINOPHIL NFR BLD AUTO: 4.1 % — SIGNIFICANT CHANGE UP (ref 0–6)
GLUCOSE SERPL-MCNC: 139 MG/DL
HCT VFR BLD CALC: 43.1 % — SIGNIFICANT CHANGE UP (ref 34.5–45)
HGB BLD-MCNC: 12.9 G/DL — SIGNIFICANT CHANGE UP (ref 11.5–15.5)
IMM GRANULOCYTES NFR BLD AUTO: 0.3 % — SIGNIFICANT CHANGE UP (ref 0–1.5)
INR PPP: 1.07 RATIO
LYMPHOCYTES # BLD AUTO: 1.19 K/UL — SIGNIFICANT CHANGE UP (ref 1–3.3)
LYMPHOCYTES # BLD AUTO: 18.6 % — SIGNIFICANT CHANGE UP (ref 13–44)
MCHC RBC-ENTMCNC: 27.9 PG — SIGNIFICANT CHANGE UP (ref 27–34)
MCHC RBC-ENTMCNC: 29.9 GM/DL — LOW (ref 32–36)
MCV RBC AUTO: 93.3 FL — SIGNIFICANT CHANGE UP (ref 80–100)
MONOCYTES # BLD AUTO: 0.33 K/UL — SIGNIFICANT CHANGE UP (ref 0–0.9)
MONOCYTES NFR BLD AUTO: 5.2 % — SIGNIFICANT CHANGE UP (ref 2–14)
NEUTROPHILS # BLD AUTO: 4.56 K/UL — SIGNIFICANT CHANGE UP (ref 1.8–7.4)
NEUTROPHILS NFR BLD AUTO: 71.3 % — SIGNIFICANT CHANGE UP (ref 43–77)
NRBC # BLD: 0 /100 WBCS — SIGNIFICANT CHANGE UP (ref 0–0)
PLATELET # BLD AUTO: 184 K/UL — SIGNIFICANT CHANGE UP (ref 150–400)
POTASSIUM SERPL-SCNC: 4.5 MMOL/L
PROT SERPL-MCNC: 6.9 G/DL
PT BLD: 12.7 SEC
RBC # BLD: 4.62 M/UL — SIGNIFICANT CHANGE UP (ref 3.8–5.2)
RBC # FLD: 17.6 % — HIGH (ref 10.3–14.5)
SODIUM SERPL-SCNC: 146 MMOL/L
WBC # BLD: 6.39 K/UL — SIGNIFICANT CHANGE UP (ref 3.8–10.5)
WBC # FLD AUTO: 6.39 K/UL — SIGNIFICANT CHANGE UP (ref 3.8–10.5)

## 2020-08-13 PROCEDURE — 99215 OFFICE O/P EST HI 40 MIN: CPT

## 2020-08-13 RX ORDER — LETROZOLE 100 %
POWDER (GRAM) MISCELLANEOUS
Refills: 0 | Status: DISCONTINUED | COMMUNITY
End: 2020-08-13

## 2020-08-13 SDOH — SOCIAL STABILITY - SOCIAL INSECURITY: DISRUPTION OF FAMILY BY SEPARATION AND DIVORCE: Z63.5

## 2020-08-13 NOTE — PHYSICAL EXAM
[Restricted in physically strenuous activity but ambulatory and able to carry out work of a light or sedentary nature] : Status 1- Restricted in physically strenuous activity but ambulatory and able to carry out work of a light or sedentary nature, e.g., light house work, office work [Normal] : normal spine exam without palpable tenderness, no kyphosis or scoliosis [de-identified] : 1+ edema B/L LE; no calf tenderness [de-identified] : left reconstructed breast without palpable abnormality; no dominant mass either breast [de-identified] : few acne-like lesions on face (patient wants no medication for currently)

## 2020-08-13 NOTE — ASSESSMENT
[FreeTextEntry1] : Patient with diagnosis of Breast Cancer approximately 17 years ago. She received adjuvant treatment (AC-T ) + 5 years of Tamoxifen with Dr. Cash Jack and subsequently transferred care to Dr. ALTAGRACIA Shields at [facility].  In 2018 she was hospitalized for and was found to have, bone metastasis and renal mass .  6/2018 She underwent radical left nephrectomy for renal cell carcinoma, with PALND, and a bx. of the rIght iliac bone revealed metastatic breast cancer .  She was placed on Femara/Ibrance.    4/22/20  Interval CT Chest/Abdomen/Pelvis revealed new left lower lobe nodule with interval decrease in size of small nodules at the right lung apex., extensive osseous metastatic disease slightly increased, stable IVC filter.   She was prescribed Aromasin/Afinitor.\par Discussed patient's diagnosis/natural history of Stage IV disease and palliative treatment intent.  Discussed potential future treatment options pending course.  As she has just begun Afinitor (potential side effects reviewed including but not limited to mucositis, rash, myelosuppression) with the Aromasin, plan to continue this regimen for now with interval follow-up imaging.  If intolerant to this/disease progresses, may consider alternative therapy such as Faslodex.  \par Patient to have dental clearance prior to resumption of Xgeva–potential side effects explained including but not limited to jaw osteonecrosis, hypocalcemia.\par Will ask to have Foundation One testing done on prior bone path.\par H/O renal cancer-expectant surveillance.\par Patient and her sister were given the opportunity to ask questions.  Their questions have been answered to their apparent satisfaction at this time.

## 2020-08-13 NOTE — HISTORY OF PRESENT ILLNESS
[Disease: _____________________] : Disease: [unfilled] [AJCC Stage: ____] : AJCC Stage: [unfilled] [M: ___] : M[unfilled] [de-identified] : 3/2003–Stage IIB left breast cancer, ER positive/[place] positive/HER-2 negative–status post left modified radical mastectomy with reconstruction.  1/15 lymph nodes positive for metastatic carcinoma with focal extranodal extension.  Status post AC-T--> Tamoxifen x 5 years.  \par 6/2018-Presented with general weakness and leg pain.  Diagnosed with clear cell renal carcinoma  s/p Radical Left Nephrectomy, Para Aortic LN dissection,  c/b Splenic Laceration w/ bleeding. Right iliac bone biopsy of bone lesion was consistent with metastatic breast cancer–ER positive/[place] positive/HER-2 unknown.\par Patient was begun on Letrozole/Ibrance/Xgeva.\par 3/2019–Status post left VATS procedure with pleural decortication.  Left pleural fluid cytology and pleural pathology negative for malignancy.\par 4/2020-CT scan of the chest/abdomen/pelvis revealed new left lower lung nodule and decreased right apex nodules, extensive bony metastases slightly increased. Changed to Aromasin, with Afinitor added 7/2020.\par Patient has been under the oncologic care of Dr[facility].\par 8/2020–Patient wishes to transfer her oncologic care to the [facility].\par Patient fatigues but is maintaining her activities of daily living.  No complaints of chest pain, cough, shortness of breath or fevers.  No bleeding.  No nausea/vomiting or abdominal pain at this time. +B/L LE edema in last couple of weeks.\par Patient was accompanied by her Sister Marva who is also her healthcare proxy.\par \par \par \par  [de-identified] : Infiltrating lobular carcinoma [de-identified] : 7/2020 :  CA 15-3=24 U/mL     CEA =2.9 ng/mL

## 2020-08-13 NOTE — REASON FOR VISIT
[Initial Consultation] : an initial consultation [Other: _____] : [unfilled] [FreeTextEntry2] : Metastatic Breast Cancer

## 2020-08-13 NOTE — REVIEW OF SYSTEMS
[Negative] : Allergic/Immunologic [Fatigue] : fatigue [Lower Ext Edema] : lower extremity edema [Patient Intake Form Reviewed] : Patient intake form was reviewed [FreeTextEntry9] : RANJANA hsu [de-identified] : acne-like rash face

## 2020-08-13 NOTE — CONSULT LETTER
[Dear  ___] : Dear  [unfilled], [Consult Letter:] : I had the pleasure of evaluating your patient, [unfilled]. [Please see my note below.] : Please see my note below. [Consult Closing:] : Thank you very much for allowing me to participate in the care of this patient.  If you have any questions, please do not hesitate to contact me. [Sincerely,] : Sincerely, [DrCesario  ___] : Dr. DUKES [FreeTextEntry3] : Leslie Goldstein MD

## 2020-08-14 LAB — CANCER AG27-29 SERPL-ACNC: 29.2 U/ML

## 2020-08-19 NOTE — PROVIDER CONTACT NOTE (OTHER) - SITUATION
patient states "she has black out", VSS, denies any chest [pain, denies any SOB, on oxygen 2 litres,   FS normal, oriented x 4

## 2020-08-27 ENCOUNTER — OUTPATIENT (OUTPATIENT)
Dept: OUTPATIENT SERVICES | Facility: HOSPITAL | Age: 65
LOS: 1 days | End: 2020-08-27

## 2020-08-27 ENCOUNTER — APPOINTMENT (OUTPATIENT)
Dept: ULTRASOUND IMAGING | Facility: HOSPITAL | Age: 65
End: 2020-08-27
Payer: MEDICARE

## 2020-08-27 ENCOUNTER — INPATIENT (INPATIENT)
Facility: HOSPITAL | Age: 65
LOS: 1 days | Discharge: HOME | DRG: 300 | End: 2020-08-29
Attending: INTERNAL MEDICINE | Admitting: INTERNAL MEDICINE
Payer: MEDICARE

## 2020-08-27 VITALS
TEMPERATURE: 97 F | WEIGHT: 160.06 LBS | SYSTOLIC BLOOD PRESSURE: 138 MMHG | HEART RATE: 84 BPM | OXYGEN SATURATION: 94 % | RESPIRATION RATE: 20 BRPM | HEIGHT: 64 IN | DIASTOLIC BLOOD PRESSURE: 78 MMHG

## 2020-08-27 DIAGNOSIS — Z90.5 ACQUIRED ABSENCE OF KIDNEY: Chronic | ICD-10-CM

## 2020-08-27 DIAGNOSIS — Z90.12 ACQUIRED ABSENCE OF LEFT BREAST AND NIPPLE: Chronic | ICD-10-CM

## 2020-08-27 DIAGNOSIS — R06.02 SHORTNESS OF BREATH: ICD-10-CM

## 2020-08-27 DIAGNOSIS — E89.6 POSTPROCEDURAL ADRENOCORTICAL (-MEDULLARY) HYPOFUNCTION: Chronic | ICD-10-CM

## 2020-08-27 DIAGNOSIS — Z98.890 OTHER SPECIFIED POSTPROCEDURAL STATES: Chronic | ICD-10-CM

## 2020-08-27 DIAGNOSIS — C50.919 MALIGNANT NEOPLASM OF UNSPECIFIED SITE OF UNSPECIFIED FEMALE BREAST: ICD-10-CM

## 2020-08-27 LAB
ALBUMIN SERPL ELPH-MCNC: 3.3 G/DL — SIGNIFICANT CHANGE UP (ref 3.3–5)
ALP SERPL-CCNC: 45 U/L — SIGNIFICANT CHANGE UP (ref 40–120)
ALT FLD-CCNC: 15 U/L — SIGNIFICANT CHANGE UP (ref 10–45)
ANION GAP SERPL CALC-SCNC: 11 MMOL/L — SIGNIFICANT CHANGE UP (ref 5–17)
APTT BLD: 32.1 SEC — SIGNIFICANT CHANGE UP (ref 27.5–35.5)
AST SERPL-CCNC: 17 U/L — SIGNIFICANT CHANGE UP (ref 10–40)
BASOPHILS # BLD AUTO: 0.03 K/UL — SIGNIFICANT CHANGE UP (ref 0–0.2)
BASOPHILS NFR BLD AUTO: 0.4 % — SIGNIFICANT CHANGE UP (ref 0–2)
BILIRUB SERPL-MCNC: 0.3 MG/DL — SIGNIFICANT CHANGE UP (ref 0.2–1.2)
BUN SERPL-MCNC: 18 MG/DL — SIGNIFICANT CHANGE UP (ref 7–23)
CALCIUM SERPL-MCNC: 7.7 MG/DL — LOW (ref 8.4–10.5)
CHLORIDE SERPL-SCNC: 107 MMOL/L — SIGNIFICANT CHANGE UP (ref 96–108)
CO2 SERPL-SCNC: 26 MMOL/L — SIGNIFICANT CHANGE UP (ref 22–31)
CREAT SERPL-MCNC: 1.3 MG/DL — SIGNIFICANT CHANGE UP (ref 0.5–1.3)
EOSINOPHIL # BLD AUTO: 0.2 K/UL — SIGNIFICANT CHANGE UP (ref 0–0.5)
EOSINOPHIL NFR BLD AUTO: 2.8 % — SIGNIFICANT CHANGE UP (ref 0–6)
GLUCOSE SERPL-MCNC: 121 MG/DL — HIGH (ref 70–99)
HCT VFR BLD CALC: 40.9 % — SIGNIFICANT CHANGE UP (ref 34.5–45)
HGB BLD-MCNC: 12.5 G/DL — SIGNIFICANT CHANGE UP (ref 11.5–15.5)
IMM GRANULOCYTES NFR BLD AUTO: 0.4 % — SIGNIFICANT CHANGE UP (ref 0–1.5)
INR BLD: 1.02 RATIO — SIGNIFICANT CHANGE UP (ref 0.88–1.16)
LYMPHOCYTES # BLD AUTO: 1.7 K/UL — SIGNIFICANT CHANGE UP (ref 1–3.3)
LYMPHOCYTES # BLD AUTO: 23.5 % — SIGNIFICANT CHANGE UP (ref 13–44)
MAGNESIUM SERPL-MCNC: 1.9 MG/DL — SIGNIFICANT CHANGE UP (ref 1.6–2.6)
MCHC RBC-ENTMCNC: 27.4 PG — SIGNIFICANT CHANGE UP (ref 27–34)
MCHC RBC-ENTMCNC: 30.6 GM/DL — LOW (ref 32–36)
MCV RBC AUTO: 89.7 FL — SIGNIFICANT CHANGE UP (ref 80–100)
MONOCYTES # BLD AUTO: 0.45 K/UL — SIGNIFICANT CHANGE UP (ref 0–0.9)
MONOCYTES NFR BLD AUTO: 6.2 % — SIGNIFICANT CHANGE UP (ref 2–14)
NEUTROPHILS # BLD AUTO: 4.81 K/UL — SIGNIFICANT CHANGE UP (ref 1.8–7.4)
NEUTROPHILS NFR BLD AUTO: 66.7 % — SIGNIFICANT CHANGE UP (ref 43–77)
NRBC # BLD: 0 /100 WBCS — SIGNIFICANT CHANGE UP (ref 0–0)
NT-PROBNP SERPL-SCNC: 257 PG/ML — SIGNIFICANT CHANGE UP (ref 0–300)
PLATELET # BLD AUTO: 179 K/UL — SIGNIFICANT CHANGE UP (ref 150–400)
POTASSIUM SERPL-MCNC: 3.9 MMOL/L — SIGNIFICANT CHANGE UP (ref 3.5–5.3)
POTASSIUM SERPL-SCNC: 3.9 MMOL/L — SIGNIFICANT CHANGE UP (ref 3.5–5.3)
PROT SERPL-MCNC: 7.6 G/DL — SIGNIFICANT CHANGE UP (ref 6–8.3)
PROTHROM AB SERPL-ACNC: 12.3 SEC — SIGNIFICANT CHANGE UP (ref 10.6–13.6)
RBC # BLD: 4.56 M/UL — SIGNIFICANT CHANGE UP (ref 3.8–5.2)
RBC # FLD: 17.6 % — HIGH (ref 10.3–14.5)
SODIUM SERPL-SCNC: 144 MMOL/L — SIGNIFICANT CHANGE UP (ref 135–145)
TROPONIN I SERPL-MCNC: <.017 NG/ML — LOW (ref 0.02–0.06)
TROPONIN I SERPL-MCNC: <.017 NG/ML — LOW (ref 0.02–0.06)
WBC # BLD: 7.22 K/UL — SIGNIFICANT CHANGE UP (ref 3.8–10.5)
WBC # FLD AUTO: 7.22 K/UL — SIGNIFICANT CHANGE UP (ref 3.8–10.5)

## 2020-08-27 PROCEDURE — 71045 X-RAY EXAM CHEST 1 VIEW: CPT | Mod: 26

## 2020-08-27 PROCEDURE — 93010 ELECTROCARDIOGRAM REPORT: CPT

## 2020-08-27 PROCEDURE — 99223 1ST HOSP IP/OBS HIGH 75: CPT | Mod: AI

## 2020-08-27 PROCEDURE — 93970 EXTREMITY STUDY: CPT | Mod: 26

## 2020-08-27 PROCEDURE — 99285 EMERGENCY DEPT VISIT HI MDM: CPT | Mod: CS

## 2020-08-27 RX ORDER — SENNA PLUS 8.6 MG/1
2 TABLET ORAL AT BEDTIME
Refills: 0 | Status: DISCONTINUED | OUTPATIENT
Start: 2020-08-27 | End: 2020-08-29

## 2020-08-27 RX ORDER — ENOXAPARIN SODIUM 100 MG/ML
70 INJECTION SUBCUTANEOUS EVERY 12 HOURS
Refills: 0 | Status: DISCONTINUED | OUTPATIENT
Start: 2020-08-27 | End: 2020-08-29

## 2020-08-27 RX ORDER — ENOXAPARIN SODIUM 100 MG/ML
72 INJECTION SUBCUTANEOUS ONCE
Refills: 0 | Status: DISCONTINUED | OUTPATIENT
Start: 2020-08-27 | End: 2020-08-27

## 2020-08-27 RX ORDER — MAGNESIUM HYDROXIDE 400 MG/1
15 TABLET, CHEWABLE ORAL
Qty: 0 | Refills: 0 | DISCHARGE

## 2020-08-27 RX ORDER — CHOLECALCIFEROL (VITAMIN D3) 125 MCG
2000 CAPSULE ORAL DAILY
Refills: 0 | Status: DISCONTINUED | OUTPATIENT
Start: 2020-08-27 | End: 2020-08-29

## 2020-08-27 RX ORDER — SODIUM CHLORIDE 9 MG/ML
1000 INJECTION, SOLUTION INTRAVENOUS
Refills: 0 | Status: COMPLETED | OUTPATIENT
Start: 2020-08-27 | End: 2020-08-28

## 2020-08-27 RX ORDER — ESCITALOPRAM OXALATE 10 MG/1
20 TABLET, FILM COATED ORAL DAILY
Refills: 0 | Status: DISCONTINUED | OUTPATIENT
Start: 2020-08-27 | End: 2020-08-29

## 2020-08-27 RX ORDER — BUDESONIDE AND FORMOTEROL FUMARATE DIHYDRATE 160; 4.5 UG/1; UG/1
2 AEROSOL RESPIRATORY (INHALATION)
Refills: 0 | Status: DISCONTINUED | OUTPATIENT
Start: 2020-08-27 | End: 2020-08-29

## 2020-08-27 RX ORDER — EXEMESTANE 25 MG/1
25 TABLET, SUGAR COATED ORAL DAILY
Refills: 0 | Status: DISCONTINUED | OUTPATIENT
Start: 2020-08-27 | End: 2020-08-29

## 2020-08-27 RX ORDER — POLYETHYLENE GLYCOL 3350 17 G/17G
17 POWDER, FOR SOLUTION ORAL DAILY
Refills: 0 | Status: DISCONTINUED | OUTPATIENT
Start: 2020-08-27 | End: 2020-08-29

## 2020-08-27 RX ADMIN — BUDESONIDE AND FORMOTEROL FUMARATE DIHYDRATE 2 PUFF(S): 160; 4.5 AEROSOL RESPIRATORY (INHALATION) at 22:20

## 2020-08-27 RX ADMIN — ENOXAPARIN SODIUM 70 MILLIGRAM(S): 100 INJECTION SUBCUTANEOUS at 17:41

## 2020-08-27 NOTE — ED ADULT NURSE NOTE - PRO INTERPRETER NEED 2
English Airway patent, Nasal R nare bleeding site not visualized Rhino plug inserted and bleeding was controlled . Mouth with normal mucosa. Throat has no vesicles, no oropharyngeal exudates and uvula is midline.

## 2020-08-27 NOTE — ED PROVIDER NOTE - OBJECTIVE STATEMENT
64 yr old female with hx of breast cancer, mets to lungs presents with b/l leg swelling for the last 3 weeks. Pt seen by Dr. Ochoa 10 days ago, and was sent for US, pt reports went for US today, radiologist reported + b/l DVT. Pt reports shortness of breath, but has had it for 2 years. No current chest pain, n/v/d, dizziness or any other complaints. Pt takes oral chemo.

## 2020-08-27 NOTE — ED PROVIDER NOTE - ATTENDING CONTRIBUTION TO CARE
I personally evaluated the patient. I reviewed the Resident’s or Physician Assistant’s note (as assigned above), and agree with the findings and plan except as documented in my note.  64 yr old female with hx of breast cancer, mets to lungs presents with b/l leg swelling for the last 3 weeks. Pt seen by Dr. Ochoa 10 days ago, and was sent for US, pt reports went for US today, radiologist reported + b/l DVT, no anticoagulation started. Pt reports shortness of breath, but has had it for 2 years. No current chest pain, n/v/d, dizziness or any other complaints. Pt takes oral chemo.   pt appears tachypneic on exam, b/l leg swelling   Spoke to Dr. Ochoa and agreed with further w/u for PE. ct was initially ordered, pt has only 1 kidney. Pt will be admitted for VQ scan. Pt agrees with plan. d/w Dr. Soares

## 2020-08-27 NOTE — H&P ADULT - NSHPLABSRESULTS_GEN_ALL_CORE
LABS:                        12.5   7.22  )-----------( 179      ( 27 Aug 2020 15:10 )             40.9     08-27    144  |  107  |  18  ----------------------------<  121<H>  3.9   |  26  |  1.30    Ca    7.7<L>      27 Aug 2020 15:10  Mg     1.9     08-27    TPro  7.6  /  Alb  3.3  /  TBili  0.3  /  DBili  x   /  AST  17  /  ALT  15  /  AlkPhos  45  08-27    PT/INR - ( 27 Aug 2020 15:10 )   PT: 12.3 sec;   INR: 1.02 ratio         PTT - ( 27 Aug 2020 15:10 )  PTT:32.1 sec     CAPILLARY BLOOD GLUCOSE                RADIOLOGY & ADDITIONAL TESTS:    US venous doppler: prelim with bilateral LE DVTs      CXR: Left atelectasis versus infiltrate  Consultant(s) Notes Reviewed:  [x ] YES  [ ] NO  Care Discussed with Consultants/Other Providers [ x] YES  [ ] NO discussed with ED Vanesa MCINTYRE and Oncologist Dr. Ochoa  Imaging Personally Reviewed:  [ ] YES  [ ] NO LABS:                        12.5   7.22  )-----------( 179      ( 27 Aug 2020 15:10 )             40.9     08-27    144  |  107  |  18  ----------------------------<  121<H>  3.9   |  26  |  1.30    Ca    7.7<L>      27 Aug 2020 15:10  Mg     1.9     08-27    TPro  7.6  /  Alb  3.3  /  TBili  0.3  /  DBili  x   /  AST  17  /  ALT  15  /  AlkPhos  45  08-27    PT/INR - ( 27 Aug 2020 15:10 )   PT: 12.3 sec;   INR: 1.02 ratio         PTT - ( 27 Aug 2020 15:10 )  PTT:32.1 sec     CAPILLARY BLOOD GLUCOSE          EKG: normal sinus rhythm       RADIOLOGY & ADDITIONAL TESTS:    US venous doppler: prelim with bilateral LE DVTs      CXR: Left atelectasis versus infiltrate  Consultant(s) Notes Reviewed:  [x ] YES  [ ] NO  Care Discussed with Consultants/Other Providers [ x] YES  [ ] NO discussed with ED Vanesa MCINTYRE   Imaging Personally Reviewed:  [ x] YES  [ ] NO

## 2020-08-27 NOTE — ED PROVIDER NOTE - CLINICAL SUMMARY MEDICAL DECISION MAKING FREE TEXT BOX
64 yr old female with hx of breast cancer, mets to lungs presents with b/l leg swelling for the last 3 weeks. Pt seen by Dr. Ochoa 10 days ago, and was sent for US, pt reports went for US today, radiologist reported + b/l DVT, no anticoagulation started. Pt reports shortness of breath, but has had it for 2 years. No current chest pain, n/v/d, dizziness or any other complaints. Pt takes oral chemo.   pt appears tachypneic on exam, b/l leg swelling 64 yr old female with hx of breast cancer, mets to lungs presents with b/l leg swelling for the last 3 weeks. Pt seen by Dr. Ochoa 10 days ago, and was sent for US, pt reports went for US today, radiologist reported + b/l DVT, no anticoagulation started. Pt reports shortness of breath, but has had it for 2 years. No current chest pain, n/v/d, dizziness or any other complaints. Pt takes oral chemo.   pt appears tachypneic on exam, b/l leg swelling   Spoke to Dr. Ochao and agreed with further w/u for PE. ct was initially ordered, pt has only 1 kidney. Pt will be admitted for VQ scan. Pt agrees with plan. d/w Dr. Soares

## 2020-08-27 NOTE — ED ADULT NURSE NOTE - CHIEF COMPLAINT QUOTE
They sent me from [address] [place] [address], I just had an ultrasound of my legs and there are clots

## 2020-08-27 NOTE — H&P ADULT - NSICDXPASTMEDICALHX_GEN_ALL_CORE_FT
PAST MEDICAL HISTORY:  Anemia     Breast cancer Left breast cancer - 13 years ago, s/p mastectomy, and chemoptherapy    Emphysema lung     Other specified disorders of kidney and ureter     Renal cell carcinoma

## 2020-08-27 NOTE — H&P ADULT - NSHPPHYSICALEXAM_GEN_ALL_CORE
T(C): 37.1 (08-27-20 @ 16:25), Max: 37.1 (08-27-20 @ 16:25)  HR: 71 (08-27-20 @ 16:25) (71 - 84)  BP: 112/90 (08-27-20 @ 16:25) (112/90 - 138/78)  RR: 17 (08-27-20 @ 16:25) (17 - 20)  SpO2: 95% (08-27-20 @ 16:25) (94% - 95%)  Wt(kg): --Vital Signs Last 24 Hrs  T(C): 37.1 (27 Aug 2020 16:25), Max: 37.1 (27 Aug 2020 16:25)  T(F): 98.8 (27 Aug 2020 16:25), Max: 98.8 (27 Aug 2020 16:25)  HR: 71 (27 Aug 2020 16:25) (71 - 84)  BP: 112/90 (27 Aug 2020 16:25) (112/90 - 138/78)  BP(mean): 95 (27 Aug 2020 16:25) (95 - 95)  RR: 17 (27 Aug 2020 16:25) (17 - 20)  SpO2: 95% (27 Aug 2020 16:25) (94% - 95%)    PHYSICAL EXAM:  GENERAL: NAD, calm  HEAD:  Atraumatic, Normocephalic  EYES: EOMI, PERRLA, conjunctiva and sclera clear  ENMT: No tonsillar erythema, exudates, or enlargement; Moist mucous membranes, Good dentition, No lesions  NECK: Supple,   NERVOUS SYSTEM:  Alert & Oriented X3, Good concentration; Motor Strength 5/5 B/L upper and lower extremities  CHEST/LUNG: Clear to percussion bilaterally; No rales, rhonchi, wheezing, or rubs  HEART: Regular rate and rhythm; No murmurs, rubs, or gallops  ABDOMEN: Soft, Nontender, Nondistended; Bowel sounds present  EXTREMITIES:  No clubbing, cyanosis. no erythema. + non-pitting edema and b/l calf pain on palpation  SKIN: No rashes

## 2020-08-27 NOTE — H&P ADULT - ASSESSMENT
64 yr old female with hx of breast cancer on chemotherapy s/p L Mastectomy,, mets to bone and found to have lung nodules, h/o clear cell RCC s/p L nephrectomy, COPD presents with b/l leg swelling and SOB for the past three weeks.    # Bilateral LE DVTs in the setting of malignancy   - radiologist reported to ED pt positive for BL LE DVTs. follow up official reports.   - start Lovenox 70mg q12  - vascular consult  - oncology consult    # SOB r/o PE  - CXR: left base atelectasis vs infiltrate   - could not perform CTA due to pt with one kidney only  - check echo  - V/Q scan  - pro- BNP  - monitor respiratory status    # h/o hx of breast cancer on chemotherapy s/p L Mastectomy, mets to bone and found to have lung nodules  # h/o clear cell RCC s/p L nephrectomy  - cont current oral chemotherapy, affinitor and aromasin  - follow up oncology    # chronic COPD - stable  - cont Symbicort as substitute for Ellipta     # DVT ppx: on therapeutic lovenox    # code status:     IMPROVE VTE Individual Risk Assessment    RISK                                                                Points    [  ] Previous VTE                                                  3    [  ] Thrombophilia                                               2    [  ] Lower limb paralysis                                      2        (unable to hold up >15 seconds)      [ x ] Current Cancer                                              2         (within 6 months)    [  ] Immobilization > 24 hrs                                1    [  ] ICU/CCU stay > 24 hours                              1    [ x ] Age > 60                                                      1    IMPROVE VTE Score _____3____    IMPROVE Score 0-1: Low Risk, No VTE prophylaxis required for most patients, encourage ambulation.   IMPROVE Score 2-3: At risk, pharmacologic VTE prophylaxis is indicated for most patients (in the absence of a contraindication)  IMPROVE Score > or = 4: High Risk, pharmacologic VTE prophylaxis is indicated for most patients (in the absence of a contraindication) 64 yr old female with hx of breast cancer on chemotherapy s/p L Mastectomy,, mets to bone and found to have lung nodules, h/o clear cell RCC s/p L nephrectomy, COPD presents with b/l leg swelling and SOB for the past three weeks.    # Bilateral LE DVTs in the setting of malignancy   - radiologist reported to ED pt positive for BL LE DVTs. follow up official reports.   - start Lovenox 70mg q12  - vascular consult  - oncology consult    # SOB r/o PE  - CXR: left base atelectasis vs infiltrate   - could not perform CTA due to pt with one kidney only  - check echo  - V/Q scan  - pro- BNP  - monitor respiratory status    # h/o hx of breast cancer on chemotherapy s/p L Mastectomy, mets to bone and found to have lung nodules  # h/o clear cell RCC s/p L nephrectomy  - cont current oral chemotherapy, affinitor and aromasin  - follow up oncology    # chronic COPD - stable  - cont Symbicort as substitute for Ellipta     # DVT ppx: on therapeutic lovenox    # code status: FULL CODE    IMPROVE VTE Individual Risk Assessment    RISK                                                                Points    [  ] Previous VTE                                                  3    [  ] Thrombophilia                                               2    [  ] Lower limb paralysis                                      2        (unable to hold up >15 seconds)      [ x ] Current Cancer                                              2         (within 6 months)    [  ] Immobilization > 24 hrs                                1    [  ] ICU/CCU stay > 24 hours                              1    [ x ] Age > 60                                                      1    IMPROVE VTE Score _____3____    IMPROVE Score 0-1: Low Risk, No VTE prophylaxis required for most patients, encourage ambulation.   IMPROVE Score 2-3: At risk, pharmacologic VTE prophylaxis is indicated for most patients (in the absence of a contraindication)  IMPROVE Score > or = 4: High Risk, pharmacologic VTE prophylaxis is indicated for most patients (in the absence of a contraindication) 64 yr old female with hx of breast cancer on chemotherapy s/p L Mastectomy,, mets to bone and found to have lung nodules, h/o clear cell RCC s/p L nephrectomy, COPD presents with b/l leg swelling and SOB for the past three weeks.    # Acute bilateral LE DVTs in the setting of malignancy   - radiologist reported to ED pt with positive for BL LE DVTs. follow up official reports.   - start Lovenox 70mg q12  - vascular consult  - oncology consult    # SOB r/o PE  - CXR: left base atelectasis vs infiltrate   - could not perform CTA due to pt with one kidney only  - check echo  - V/Q scan  - pro- BNP  - monitor respiratory status    # ERASMO  - baseline Cr 0.85  - gentle IVF for 8h  - avoid nephrotoxic drugs and monitor bmp    # h/o hx of breast cancer on chemotherapy s/p L Mastectomy, mets to bone and found to have lung nodules  # h/o clear cell RCC s/p L nephrectomy  - cont current oral chemotherapy, affinitor and aromasin  - follow up oncology    # chronic COPD - stable  - cont Symbicort as substitute for Ellipta     # DVT ppx: on therapeutic lovenox    # code status: FULL CODE    IMPROVE VTE Individual Risk Assessment    RISK                                                                Points    [  ] Previous VTE                                                  3    [  ] Thrombophilia                                               2    [  ] Lower limb paralysis                                      2        (unable to hold up >15 seconds)      [ x ] Current Cancer                                              2         (within 6 months)    [  ] Immobilization > 24 hrs                                1    [  ] ICU/CCU stay > 24 hours                              1    [ x ] Age > 60                                                      1    IMPROVE VTE Score _____3____    IMPROVE Score 0-1: Low Risk, No VTE prophylaxis required for most patients, encourage ambulation.   IMPROVE Score 2-3: At risk, pharmacologic VTE prophylaxis is indicated for most patients (in the absence of a contraindication)  IMPROVE Score > or = 4: High Risk, pharmacologic VTE prophylaxis is indicated for most patients (in the absence of a contraindication) 64 yr old female with hx of breast cancer on chemotherapy s/p L Mastectomy,, mets to bone and found to have lung nodules, h/o clear cell RCC s/p L nephrectomy, COPD presents with b/l leg swelling and SOB for the past three weeks.    # Acute bilateral LE DVTs in the setting of malignancy   - radiologist reported to ED pt with positive for BL LE DVTs. follow up official reports.   - start Lovenox 70mg q12  - vascular consult  - oncology consult    # SOB r/o PE r/o CAP but less likely   - CXR: left base atelectasis vs infiltrate   - could not perform CTA due to pt with one kidney only  - check echo  - V/Q scan  - pro- BNP  - monitor respiratory status    # ERASMO  - baseline Cr 0.85  - gentle IVF for 8h  - avoid nephrotoxic drugs and monitor bmp    # h/o hx of breast cancer on chemotherapy s/p L Mastectomy, mets to bone and found to have lung nodules  # h/o clear cell RCC s/p L nephrectomy  - cont current oral chemotherapy, affinitor and aromasin  - follow up oncology    # chronic COPD - stable  - cont Symbicort as substitute for Ellipta     # DVT ppx: on therapeutic lovenox    # code status: FULL CODE    IMPROVE VTE Individual Risk Assessment    RISK                                                                Points    [  ] Previous VTE                                                  3    [  ] Thrombophilia                                               2    [  ] Lower limb paralysis                                      2        (unable to hold up >15 seconds)      [ x ] Current Cancer                                              2         (within 6 months)    [  ] Immobilization > 24 hrs                                1    [  ] ICU/CCU stay > 24 hours                              1    [ x ] Age > 60                                                      1    IMPROVE VTE Score _____3____    IMPROVE Score 0-1: Low Risk, No VTE prophylaxis required for most patients, encourage ambulation.   IMPROVE Score 2-3: At risk, pharmacologic VTE prophylaxis is indicated for most patients (in the absence of a contraindication)  IMPROVE Score > or = 4: High Risk, pharmacologic VTE prophylaxis is indicated for most patients (in the absence of a contraindication) 64 yr old female with hx of breast cancer on chemotherapy s/p L Mastectomy,, mets to bone and found to have lung nodules, h/o clear cell RCC s/p L nephrectomy, COPD presents with b/l leg swelling and SOB for the past three weeks.    # Acute bilateral LE DVTs in the setting of malignancy   - radiologist reported to ED pt with positive for BL LE DVTs. follow up official reports.   - start Lovenox 70mg q12  - vascular consult  - oncology consult    # SOB r/o PE r/o CAP but less likely   - CXR: left base atelectasis vs infiltrate   - could not perform CTA due to pt with one kidney only  - check echo  - V/Q scan  - pro- BNP  - monitor respiratory status    # ERASMO  - baseline Cr 0.85  - gentle IVF for 8h  - avoid nephrotoxic drugs and monitor bmp    # h/o hx of breast cancer on chemotherapy s/p L Mastectomy, mets to bone and found to have lung nodules  # h/o clear cell RCC s/p L nephrectomy  - cont current oral chemotherapy, aromasin and afinitor (asked pt to arrange for bringing Afinitor to hospital)   - follow up oncology    # chronic COPD - stable  - cont Symbicort as substitute for Ellipta     # DVT ppx: on therapeutic lovenox    # code status: FULL CODE    IMPROVE VTE Individual Risk Assessment    RISK                                                                Points    [  ] Previous VTE                                                  3    [  ] Thrombophilia                                               2    [  ] Lower limb paralysis                                      2        (unable to hold up >15 seconds)      [ x ] Current Cancer                                              2         (within 6 months)    [  ] Immobilization > 24 hrs                                1    [  ] ICU/CCU stay > 24 hours                              1    [ x ] Age > 60                                                      1    IMPROVE VTE Score _____3____    IMPROVE Score 0-1: Low Risk, No VTE prophylaxis required for most patients, encourage ambulation.   IMPROVE Score 2-3: At risk, pharmacologic VTE prophylaxis is indicated for most patients (in the absence of a contraindication)  IMPROVE Score > or = 4: High Risk, pharmacologic VTE prophylaxis is indicated for most patients (in the absence of a contraindication) 64 yr old female with hx of breast cancer on chemotherapy s/p L Mastectomy,, mets to bone and found to have lung nodules, h/o clear cell RCC s/p L nephrectomy, COPD presents with b/l leg swelling and SOB for the past three weeks.    # Acute bilateral LE DVTs in the setting of malignancy   - s/p IVC filter in the past  - radiologist reported to ED pt with BL LE DVTs. follow up official reports.   - start Lovenox 70mg q12  - vascular consult    # SOB r/o PE r/o CAP but less likely   - CXR: left base atelectasis vs infiltrate   - could not perform CTA due to pt with one kidney only  - check echo  - V/Q scan  - pro- BNP  - monitor respiratory status    # ERASMO  - baseline Cr 0.85  - gentle IVF for 8h  - avoid nephrotoxic drugs and monitor bmp    # Stage 4 breast cancer on chemotherapy s/p L Mastectomy, mets to bone and found to have lung nodules  # h/o clear cell RCC s/p L nephrectomy  - cont current oral therapy, aromasin and afinitor (asked pt to arrange for bringing Afinitor to hospital)   - follow up oncology    # chronic COPD - stable  - cont Symbicort as substitute for Ellipta     # DVT ppx: on therapeutic lovenox    # code status: FULL CODE    IMPROVE VTE Individual Risk Assessment    RISK                                                                Points    [  ] Previous VTE                                                  3    [  ] Thrombophilia                                               2    [  ] Lower limb paralysis                                      2        (unable to hold up >15 seconds)      [ x ] Current Cancer                                              2         (within 6 months)    [  ] Immobilization > 24 hrs                                1    [  ] ICU/CCU stay > 24 hours                              1    [ x ] Age > 60                                                      1    IMPROVE VTE Score _____3____    IMPROVE Score 0-1: Low Risk, No VTE prophylaxis required for most patients, encourage ambulation.   IMPROVE Score 2-3: At risk, pharmacologic VTE prophylaxis is indicated for most patients (in the absence of a contraindication)  IMPROVE Score > or = 4: High Risk, pharmacologic VTE prophylaxis is indicated for most patients (in the absence of a contraindication)

## 2020-08-27 NOTE — ED PROVIDER NOTE - CARDIOVASCULAR NEGATIVE STATEMENT, MLM
Patient is a 59 y/o M pt with hx of DM, HTN, and esophageal ca (esophageal stent placed 5/28/2020) currently on chemo tx (carboplatin and taxol presents form [facility] with fever and cough. Patient was recently discharged from [facility] on 6/2020 for a similar presentation and was in his usual state of health. Patiet last received chemo on 6/30. PAtient then states he started to devleop a cough, lethargy, reduced appetite and subjective fever and was at the [facility] to receive a chemo port and was told to come to the ed. patient worked up in ed and found to have barbara and a fever. PAtient is being admitted for sepsis. Patient denies any recent travel or sick contacts, no chest pain and no edema.

## 2020-08-27 NOTE — ED ADULT TRIAGE NOTE - CHIEF COMPLAINT QUOTE
They sent me from [address] [place] [address], I just had an ultrasound of my legs and there are clots

## 2020-08-27 NOTE — ED ADULT NURSE NOTE - OBJECTIVE STATEMENT
64 yr old female A&OX3 ambulatory for evaluation of swelling to b/l lower extremities x 3 weeks. Pt denies shortness of breath, pain, fever, chills.

## 2020-08-27 NOTE — H&P ADULT - HISTORY OF PRESENT ILLNESS
64 yr old female with hx of breast cancer s/p L Mastectomy, on chemotherapy, mets to bone and found to have lung nodules, h/o clear cell RCC s/p L nephrectomy, COPD presents with b/l leg swelling and shortness of breath for the last 3 weeks. Pt went for LE venous doppler today and found to have bilateral LE DVTs. Denies pain or redness in legs. Denies cough, CP, palpitations. No fever, chills, headache, dizziness, abd pain, n/v/d,  symptoms.    Not a candidate for CTA chest due to pt only having one kidney.  In ED, afebrile, VS stable, saturating 94-95% on RA. 64 yr old female with hx of breast cancer s/p L Mastectomy, on chemotherapy, mets to bone and found to have lung nodules, h/o clear cell RCC s/p L nephrectomy, COPD presents with b/l leg swelling and shortness of breath for the last 3 weeks. Pt went for LE venous doppler today and found to have bilateral LE DVTs. Denies pain or redness in legs. Denies cough, CP, palpitations. No fever, chills, headache, dizziness, abd pain, n/v/d,  symptoms.    Not a candidate for CTA chest due to pt only has one kidney.  In ED, afebrile, VS stable, saturating 94-95% on RA. 64 yr old female with hx of breast cancer s/p L Mastectomy, on chemotherapy, mets to bone and found to have lung nodules, h/o clear cell RCC s/p L nephrectomy, COPD presents with b/l leg swelling and shortness of breath for the last 3 weeks. Pt went for LE venous doppler today at [address] [place] [address] and found to have bilateral LE DVTs. Denies pain or redness in legs. Denies cough, CP, palpitations. No fever, chills, headache, dizziness, abd pain, n/v/d,  symptoms.    Not a candidate for CTA chest due to pt only has one kidney.  In ED, afebrile, VS stable, saturating 94-95% on RA.

## 2020-08-28 LAB
ALBUMIN SERPL ELPH-MCNC: 3 G/DL — LOW (ref 3.3–5)
ALP SERPL-CCNC: 43 U/L — SIGNIFICANT CHANGE UP (ref 40–120)
ALT FLD-CCNC: 13 U/L — SIGNIFICANT CHANGE UP (ref 10–45)
ANION GAP SERPL CALC-SCNC: 11 MMOL/L — SIGNIFICANT CHANGE UP (ref 5–17)
AST SERPL-CCNC: 15 U/L — SIGNIFICANT CHANGE UP (ref 10–40)
BASOPHILS # BLD AUTO: 0 K/UL — SIGNIFICANT CHANGE UP (ref 0–0.2)
BASOPHILS NFR BLD AUTO: 0 % — SIGNIFICANT CHANGE UP (ref 0–2)
BILIRUB SERPL-MCNC: 0.2 MG/DL — SIGNIFICANT CHANGE UP (ref 0.2–1.2)
BUN SERPL-MCNC: 14 MG/DL — SIGNIFICANT CHANGE UP (ref 7–23)
CALCIUM SERPL-MCNC: 7.5 MG/DL — LOW (ref 8.4–10.5)
CHLORIDE SERPL-SCNC: 109 MMOL/L — HIGH (ref 96–108)
CO2 SERPL-SCNC: 25 MMOL/L — SIGNIFICANT CHANGE UP (ref 22–31)
CREAT SERPL-MCNC: 1.17 MG/DL — SIGNIFICANT CHANGE UP (ref 0.5–1.3)
EOSINOPHIL # BLD AUTO: 0.23 K/UL — SIGNIFICANT CHANGE UP (ref 0–0.5)
EOSINOPHIL NFR BLD AUTO: 4 % — SIGNIFICANT CHANGE UP (ref 0–6)
GLUCOSE SERPL-MCNC: 103 MG/DL — HIGH (ref 70–99)
HCT VFR BLD CALC: 39.2 % — SIGNIFICANT CHANGE UP (ref 34.5–45)
HCV AB S/CO SERPL IA: 0.1 S/CO — SIGNIFICANT CHANGE UP (ref 0–0.99)
HCV AB SERPL-IMP: SIGNIFICANT CHANGE UP
HGB BLD-MCNC: 11.8 G/DL — SIGNIFICANT CHANGE UP (ref 11.5–15.5)
LYMPHOCYTES # BLD AUTO: 2.2 K/UL — SIGNIFICANT CHANGE UP (ref 1–3.3)
LYMPHOCYTES # BLD AUTO: 38 % — SIGNIFICANT CHANGE UP (ref 13–44)
MAGNESIUM SERPL-MCNC: 1.9 MG/DL — SIGNIFICANT CHANGE UP (ref 1.6–2.6)
MCHC RBC-ENTMCNC: 26.5 PG — LOW (ref 27–34)
MCHC RBC-ENTMCNC: 30.1 GM/DL — LOW (ref 32–36)
MCV RBC AUTO: 88.1 FL — SIGNIFICANT CHANGE UP (ref 80–100)
MONOCYTES # BLD AUTO: 0.17 K/UL — SIGNIFICANT CHANGE UP (ref 0–0.9)
MONOCYTES NFR BLD AUTO: 3 % — SIGNIFICANT CHANGE UP (ref 2–14)
NEUTROPHILS # BLD AUTO: 3.18 K/UL — SIGNIFICANT CHANGE UP (ref 1.8–7.4)
NEUTROPHILS NFR BLD AUTO: 55 % — SIGNIFICANT CHANGE UP (ref 43–77)
PLATELET # BLD AUTO: 165 K/UL — SIGNIFICANT CHANGE UP (ref 150–400)
POTASSIUM SERPL-MCNC: 3.6 MMOL/L — SIGNIFICANT CHANGE UP (ref 3.5–5.3)
POTASSIUM SERPL-SCNC: 3.6 MMOL/L — SIGNIFICANT CHANGE UP (ref 3.5–5.3)
PROT SERPL-MCNC: 7 G/DL — SIGNIFICANT CHANGE UP (ref 6–8.3)
RBC # BLD: 4.45 M/UL — SIGNIFICANT CHANGE UP (ref 3.8–5.2)
RBC # FLD: 17.7 % — HIGH (ref 10.3–14.5)
SARS-COV-2 IGG SERPL QL IA: NEGATIVE — SIGNIFICANT CHANGE UP
SARS-COV-2 IGM SERPL IA-ACNC: 0.08 INDEX — SIGNIFICANT CHANGE UP
SARS-COV-2 RNA SPEC QL NAA+PROBE: SIGNIFICANT CHANGE UP
SODIUM SERPL-SCNC: 145 MMOL/L — SIGNIFICANT CHANGE UP (ref 135–145)
WBC # BLD: 5.79 K/UL — SIGNIFICANT CHANGE UP (ref 3.8–10.5)
WBC # FLD AUTO: 5.79 K/UL — SIGNIFICANT CHANGE UP (ref 3.8–10.5)

## 2020-08-28 PROCEDURE — 93306 TTE W/DOPPLER COMPLETE: CPT | Mod: 26

## 2020-08-28 PROCEDURE — 78580 LUNG PERFUSION IMAGING: CPT | Mod: 26

## 2020-08-28 PROCEDURE — 99223 1ST HOSP IP/OBS HIGH 75: CPT

## 2020-08-28 PROCEDURE — 99233 SBSQ HOSP IP/OBS HIGH 50: CPT

## 2020-08-28 RX ORDER — OXYCODONE HYDROCHLORIDE 5 MG/1
5 TABLET ORAL ONCE
Refills: 0 | Status: DISCONTINUED | OUTPATIENT
Start: 2020-08-28 | End: 2020-08-28

## 2020-08-28 RX ORDER — OXYCODONE AND ACETAMINOPHEN 5; 325 MG/1; MG/1
1 TABLET ORAL EVERY 8 HOURS
Refills: 0 | Status: DISCONTINUED | OUTPATIENT
Start: 2020-08-28 | End: 2020-08-29

## 2020-08-28 RX ADMIN — ENOXAPARIN SODIUM 70 MILLIGRAM(S): 100 INJECTION SUBCUTANEOUS at 06:29

## 2020-08-28 RX ADMIN — OXYCODONE AND ACETAMINOPHEN 1 TABLET(S): 5; 325 TABLET ORAL at 11:57

## 2020-08-28 RX ADMIN — BUDESONIDE AND FORMOTEROL FUMARATE DIHYDRATE 2 PUFF(S): 160; 4.5 AEROSOL RESPIRATORY (INHALATION) at 08:00

## 2020-08-28 RX ADMIN — OXYCODONE HYDROCHLORIDE 5 MILLIGRAM(S): 5 TABLET ORAL at 02:38

## 2020-08-28 RX ADMIN — SODIUM CHLORIDE 70 MILLILITER(S): 9 INJECTION, SOLUTION INTRAVENOUS at 06:30

## 2020-08-28 RX ADMIN — BUDESONIDE AND FORMOTEROL FUMARATE DIHYDRATE 2 PUFF(S): 160; 4.5 AEROSOL RESPIRATORY (INHALATION) at 20:49

## 2020-08-28 RX ADMIN — OXYCODONE AND ACETAMINOPHEN 1 TABLET(S): 5; 325 TABLET ORAL at 11:22

## 2020-08-28 RX ADMIN — ESCITALOPRAM OXALATE 20 MILLIGRAM(S): 10 TABLET, FILM COATED ORAL at 11:22

## 2020-08-28 RX ADMIN — EXEMESTANE 25 MILLIGRAM(S): 25 TABLET, SUGAR COATED ORAL at 11:22

## 2020-08-28 RX ADMIN — ENOXAPARIN SODIUM 70 MILLIGRAM(S): 100 INJECTION SUBCUTANEOUS at 18:05

## 2020-08-28 RX ADMIN — OXYCODONE HYDROCHLORIDE 5 MILLIGRAM(S): 5 TABLET ORAL at 23:47

## 2020-08-28 RX ADMIN — OXYCODONE HYDROCHLORIDE 5 MILLIGRAM(S): 5 TABLET ORAL at 01:38

## 2020-08-28 RX ADMIN — Medication 2000 UNIT(S): at 11:23

## 2020-08-28 NOTE — CONSULT NOTE ADULT - SUBJECTIVE AND OBJECTIVE BOX
History of Present Illness:  64y.o Female with a history of breast cancer recently developed edema of both legs. She was sent for a venous doppler by Dr. Ochoa and found to have bilateral DVT. V/Q scan is low probability for PE. She is on therapeutic Lovenox. No prior hx of DVT.    PAST MEDICAL & SURGICAL HISTORY:  Renal cell carcinoma  Emphysema lung  Breast cancer: Left breast cancer - 13 years ago, s/p mastectomy, and chemoptherapy  Anemia  Other specified disorders of kidney and ureter  H/O total adrenalectomy  S/p nephrectomy  H/O left mastectomy  History of hip surgery: in 2007- Right hip surgery      Allergies    Cipro (Unknown)    Intolerances        MEDICATIONS  (STANDING):  budesonide 160 MICROgram(s)/formoterol 4.5 MICROgram(s) Inhaler 2 Puff(s) Inhalation two times a day  cholecalciferol 2000 Unit(s) Oral daily  enoxaparin Injectable 70 milliGRAM(s) SubCutaneous every 12 hours  escitalopram 20 milliGRAM(s) Oral daily  exemestane 25 milliGRAM(s) Oral daily  oxycodone    5 mG/acetaminophen 325 mG 1 Tablet(s) Oral every 8 hours  senna 2 Tablet(s) Oral at bedtime    MEDICATIONS  (PRN):  polyethylene glycol 3350 17 Gram(s) Oral daily PRN Constipation      Social History:  Smoking History: PRIOR HX  Alcohol Use: denies     REVIEW OF SYSTEMS:  CONSTITUTIONAL: No weakness, fevers or chills  EYES/ENT: No visual changes;  No vertigo or throat pain   NECK: No pain or stiffness  RESPIRATORY: No cough, wheezing, hemoptysis; ++ shortness of breath and NGO  CARDIOVASCULAR: No chest pain or palpitations  GASTROINTESTINAL: No abdominal or epigastric pain. No nausea, vomiting, or hematemesis; No diarrhea or constipation. No melena or hematochezia.  GENITOURINARY: No dysuria, frequency or hematuria  NEUROLOGICAL: No numbness or weakness  SKIN: No itching, burning, rashes, or lesions   Vascular:  No lower extremity claudication, pedal rest pain or digital ulcers. ++edema both legs  All other review of systems is negative unless indicated above.    PHYSICAL EXAM:  General:  On exam, the patient is alert nontoxic appearing Female in no acute distress.  Vital Signs Last 24 Hrs  T(C): 36.7 (28 Aug 2020 09:04), Max: 37.1 (27 Aug 2020 16:25)  T(F): 98 (28 Aug 2020 09:04), Max: 98.8 (27 Aug 2020 16:25)  HR: 74 (28 Aug 2020 09:04) (65 - 85)  BP: 135/68 (28 Aug 2020 09:04) (112/90 - 142/68)  BP(mean): 83 (28 Aug 2020 09:04) (83 - 95)  RR: 16 (28 Aug 2020 09:04) (16 - 20)  SpO2: 93% (28 Aug 2020 09:04) (93% - 98%)    Neck:  4+/4+ bilateral carotid pulses; no carotid bruit, no palpable cervical masses.  Heart:  Regular, no murmurs, rubs or gallops.    Lungs:  Clear to auscultation. Decreased BS at both bases    Chest:  No chest wall deformities.  Symmetrical chest expansion.   Abdomen: Soft and nontender.  No palpable masses.  No rebound, guarding or rigidity.  Extremities: there is trace edema of both ankles. Both feet are warm, pink with normal capillary refill times.  There are no digital ulcers or heel decubiti.  The calf and thigh muscles are soft and nontender.  There are no palpable cords or limb cellulitis.  Mildred's sign  is negative bilaterally.  There is no lower extremity  cyanosis, or rubor.  On examination of the peripheral pulses:  Left leg femoral pulse is 4/4   , popliteal pulse is 4/4    ,PT Pulse is 4/4   , DP Pulse is 4/4   Right leg femoral pulse is  4/4  ,popliteal pulse is 4/4   , PT Pulse is 4/4   , DP Pulse is 4/4   Neurological:  There are no motor or sensory deficits in either lower extremity.                          11.8   5.79  )-----------( 165      ( 28 Aug 2020 06:50 )             39.2     08-28    145  |  109<H>  |  14  ----------------------------<  103<H>  3.6   |  25  |  1.17    Ca    7.5<L>      28 Aug 2020 06:50  Mg     1.9     08-28    TPro  7.0  /  Alb  3.0<L>  /  TBili  0.2  /  DBili  x   /  AST  15  /  ALT  13  /  AlkPhos  43  08-28    CARDIAC MARKERS ( 27 Aug 2020 22:00 )  <.017 ng/mL / x     / x     / x     / x      CARDIAC MARKERS ( 27 Aug 2020 15:10 )  <.017 ng/mL / x     / x     / x     / x          PT/INR - ( 27 Aug 2020 15:10 )   PT: 12.3 sec;   INR: 1.02 ratio         PTT - ( 27 Aug 2020 15:10 )  PTT:32.1 sec    Radiology:      [email or web address]!

## 2020-08-28 NOTE — CONSULT NOTE ADULT - SUBJECTIVE AND OBJECTIVE BOX
This is a 65 year old woman with a history of resected breast cancer initially stage IIB ER+MA+Her 2 neg.  s/p AC+T and then tamoxifen for 5 years. Patient later diagnosed with a clear cell carcinoma of the left kidney s/p left radical nephrectomy and aortic LN dissection.  Had bone metastasis at this time which was found to be  metastatic breast cancer.  Patient started on letrozole, ibrance and Xgeva. Patient underwent VATS with pleural decortication due to metastatic effusion.  This past April 2020 patient was found to have new left lower lobe lung nodules. Patient most recently on Aromasin and Afinitor.  Patient has been having increasing LE edema for the past month. Was instructed by her oncologist at [facility] to elevate the legs. Did not respond to this. Was at [facility] for 2nd opinion 2 weeks ago.  Given the LE edema, LE doppler was ordered, and this demonstrated bilateral LE DVT. Patient sent to the ER.  Here at , patient had a VQ scan demonstrating low probability PE. Patient was started on lovenox 70mg SQ BID, after 2 doses LE edema starting to come down legs feel much more comfortable.      PAST MEDICAL & SURGICAL HISTORY:  Renal cell carcinoma  Emphysema lung  Breast cancer: Left breast cancer - 13 years ago, s/p mastectomy, and chemoptherapy  Anemia  Other specified disorders of kidney and ureter  H/O total adrenalectomy  S/p nephrectomy  H/O left mastectomy  History of hip surgery: in 2007- Right hip surgery    MEDICATIONS  (STANDING):  budesonide 160 MICROgram(s)/formoterol 4.5 MICROgram(s) Inhaler 2 Puff(s) Inhalation two times a day  cholecalciferol 2000 Unit(s) Oral daily  enoxaparin Injectable 70 milliGRAM(s) SubCutaneous every 12 hours  escitalopram 20 milliGRAM(s) Oral daily  exemestane 25 milliGRAM(s) Oral daily  oxycodone    5 mG/acetaminophen 325 mG 1 Tablet(s) Oral every 8 hours  senna 2 Tablet(s) Oral at bedtime    MEDICATIONS  (PRN):  polyethylene glycol 3350 17 Gram(s) Oral daily PRN Constipation                          11.8   5.79  )-----------( 165      ( 28 Aug 2020 06:50 )             39.2     08-28    145  |  109<H>  |  14  ----------------------------<  103<H>  3.6   |  25  |  1.17    Ca    7.5<L>      28 Aug 2020 06:50  Mg     1.9     08-28    TPro  7.0  /  Alb  3.0<L>  /  TBili  0.2  /  DBili  x   /  AST  15  /  ALT  13  /  AlkPhos  43  08-28      Vital Signs Last 24 Hrs  T(C): 36.9 (28 Aug 2020 16:20), Max: 37.1 (27 Aug 2020 20:00)  T(F): 98.5 (28 Aug 2020 16:20), Max: 98.8 (27 Aug 2020 20:00)  HR: 70 (28 Aug 2020 16:20) (65 - 85)  BP: 136/67 (28 Aug 2020 16:20) (117/80 - 142/68)  BP(mean): 83 (28 Aug 2020 09:04) (83 - 89)  RR: 16 (28 Aug 2020 16:20) (16 - 18)  SpO2: 93% (28 Aug 2020 16:20) (93% - 98%)

## 2020-08-28 NOTE — PROGRESS NOTE ADULT - ASSESSMENT
64 yr old female with hx of breast cancer on chemotherapy s/p L Mastectomy,, mets to bone and found to have lung nodules, h/o clear cell RCC s/p L nephrectomy, COPD presents with b/l leg swelling and SOB for the past three weeks.    # Acute bilateral LE DVTs in the setting of malignancy   - s/p IVC filter in the past  - seen by vascular today , recommended repeat US in 6 months and AC for now  - on therapeutic lovenox  - hematology eval pending    # SOB   -V/q scan low probability for PE  -on therapeutic AC  -echo findings as above  -clinically euvolemic  -will monitor    # ERASMO baseline Cr 0.85  cr trending down  d/c ivf , encourage po hydration  avoid nephrotoxic drugs and monitor bmp    # Stage 4 breast cancer on chemotherapy s/p L Mastectomy, mets to bone and found to have lung nodules  # h/o clear cell RCC s/p L nephrectomy  - cont current oral therapy, aromasin and afinitor (asked pt to arrange for bringing Afinitor to hospital)   - follow up oncology    # chronic COPD - stable  - cont Symbicort as substitute for Ellipta     # DVT ppx: on therapeutic lovenox    # code status: FULL CODE 64 yr old female with hx of breast cancer on chemotherapy s/p L Mastectomy,, mets to bone and found to have lung nodules, h/o clear cell RCC s/p L nephrectomy, COPD presents with b/l leg swelling and SOB for the past three weeks.    # Acute bilateral LE DVTs in the setting of malignancy   - s/p IVC filter in the past  - seen by vascular today , recommended repeat US in 6 months and AC for now  - on therapeutic lovenox  - hematology eval pending    # SOB   -V/q scan low probability for PE  -on therapeutic AC  -echo findings as above  -clinically euvolemic  -will monitor    # ERASMO baseline Cr 0.85  cr trending down  d/c ivf , encourage po hydration  avoid nephrotoxic drugs and monitor bmp    # Stage 4 breast cancer on chemotherapy s/p L Mastectomy, mets to bone and found to have lung nodules  # h/o clear cell RCC s/p L nephrectomy  - cont current oral therapy, aromasin and afinitor (asked pt to arrange for bringing Afinitor to hospital)   - follow up oncology    # chronic COPD - stable  - cont Symbicort as substitute for Ellipta     # DVT ppx: on therapeutic lovenox    # code status: FULL CODE  #Dispo : likely home, PT eval . anticipate in AM

## 2020-08-28 NOTE — CONSULT NOTE ADULT - ASSESSMENT
64 y.o. female with breast cancer has bilateral DVT. She has been sedentary recently.  She will need AC therapy for at least 6 months. I would recommend a follow up venous doppler in 6 months. I would prefer Lovenox over oral AC therapy but the patient does not want injections. The alternative would be therapeutic Eliquis or Xarelto. The patient was given informed consent.
This is a 65 year old woman with a history of resected breast cancer initially stage IIB ER+NH+Her 2 neg.  s/p AC+T and then tamoxifen for 5 years. Patient later diagnosed with a clear cell carcinoma of the left kidney s/p left radical nephrectomy and aortic LN dissection.  Had bone metastasis at this time which was found to be  metastatic breast cancer.  Patient started on letrozole, Ibrance and Xgeva. Patient underwent VATS with pleural decortication due to metastatic effusion.  This past April 2020 patient was found to have new left lower lobe lung nodules. Patient most recently on Aromasin and Afinitor.  Patient has been having increasing LE edema for the past month. Was instructed by her oncologist at [facility] to elevate the legs. Did not respond to this. Was at [facility] for 2nd opinion 2 weeks ago.  Given the LE edema, LE doppler was ordered, and this demonstrated bilateral LE DVT. Patient sent to the ER.  Here at , patient had a VQ scan demonstrating low probability PE. Patient was started on lovenox 70mg SQ BID, after 2 doses LE edema starting to come down legs feel much more comfortable.      Patient has concerns over being able to inject herself with lovenox at home. Explained to her that with the history of metastatic breast cancer, this lovenox is the preferred agent, however if she is opposed to administering this herself, Eliquis is a reasonable alternative.  Start at typical loading dose of 10mg BID, then 5mg BID after first week of therapy.  No contraindications to being discharged tomorrow.

## 2020-08-28 NOTE — PROGRESS NOTE ADULT - SUBJECTIVE AND OBJECTIVE BOX
Patient is a 64y old  Female who presents with a chief complaint of bilateral LE DVT (28 Aug 2020 11:08)    Interval Hx  Patient seen and examined at bedside. No overnight events reported.     ALLERGIES:  Cipro (Unknown)    MEDICATIONS  (STANDING):  budesonide 160 MICROgram(s)/formoterol 4.5 MICROgram(s) Inhaler 2 Puff(s) Inhalation two times a day  cholecalciferol 2000 Unit(s) Oral daily  enoxaparin Injectable 70 milliGRAM(s) SubCutaneous every 12 hours  escitalopram 20 milliGRAM(s) Oral daily  exemestane 25 milliGRAM(s) Oral daily  oxycodone    5 mG/acetaminophen 325 mG 1 Tablet(s) Oral every 8 hours  senna 2 Tablet(s) Oral at bedtime    MEDICATIONS  (PRN):  polyethylene glycol 3350 17 Gram(s) Oral daily PRN Constipation    Vital Signs Last 24 Hrs  T(F): 97.6 (28 Aug 2020 13:14), Max: 98.8 (27 Aug 2020 16:25)  HR: 76 (28 Aug 2020 13:14) (65 - 85)  BP: 139/69 (28 Aug 2020 13:14) (112/90 - 142/68)  RR: 16 (28 Aug 2020 13:14) (16 - 20)  SpO2: 93% (28 Aug 2020 13:14) (93% - 98%)  I&O's Summary    PHYSICAL EXAM:  General: NAD, A/O x 3  ENT: MMM, no thrush  Neck: Supple, No JVD  Lungs: Clear to auscultation bilaterally, good air entry, non-labored breathing  Cardio: RRR, S1/S2, No murmur  Abdomen: Soft, Nontender, Nondistended; Bowel sounds present  Extremities: B/L LE tenderness    LABS:                        11.8   5.79  )-----------( 165      ( 28 Aug 2020 06:50 )             39.2     08-28    145  |  109  |  14  ----------------------------<  103  3.6   |  25  |  1.17    Ca    7.5      28 Aug 2020 06:50  Mg     1.9     08-28    TPro  7.0  /  Alb  3.0  /  TBili  0.2  /  DBili  x   /  AST  15  /  ALT  13  /  AlkPhos  43  08-28        eGFR if Non African American: 49 mL/min/1.73M2 (08-28-20 @ 06:50)  eGFR if African American: 56 mL/min/1.73M2 (08-28-20 @ 06:50)    PT/INR - ( 27 Aug 2020 15:10 )   PT: 12.3 sec;   INR: 1.02 ratio         PTT - ( 27 Aug 2020 15:10 )  PTT:32.1 sec      CARDIAC MARKERS ( 27 Aug 2020 22:00 )  <.017 ng/mL / x     / x     / x     / x      CARDIAC MARKERS ( 27 Aug 2020 15:10 )  <.017 ng/mL / x     / x     / x     / x              RADIOLOGY & ADDITIONAL TESTS:    < from: US Duplex Venous Lower Ext Complete, Bilateral (08.27.20 @ 14:16) >  Bilateral lower extremity deep vein thrombosis.  Acute deep venous thrombosis: above the knee.      < end of copied text >      Care Discussed with Consultants/Other Providers: Dr. Hodgson

## 2020-08-29 ENCOUNTER — TRANSCRIPTION ENCOUNTER (OUTPATIENT)
Age: 65
End: 2020-08-29

## 2020-08-29 VITALS — OXYGEN SATURATION: 98 %

## 2020-08-29 DIAGNOSIS — C50.919 MALIGNANT NEOPLASM OF UNSPECIFIED SITE OF UNSPECIFIED FEMALE BREAST: ICD-10-CM

## 2020-08-29 LAB
ANION GAP SERPL CALC-SCNC: 12 MMOL/L — SIGNIFICANT CHANGE UP (ref 5–17)
BUN SERPL-MCNC: 13 MG/DL — SIGNIFICANT CHANGE UP (ref 7–23)
CALCIUM SERPL-MCNC: 7.7 MG/DL — LOW (ref 8.4–10.5)
CHLORIDE SERPL-SCNC: 110 MMOL/L — HIGH (ref 96–108)
CO2 SERPL-SCNC: 24 MMOL/L — SIGNIFICANT CHANGE UP (ref 22–31)
CREAT SERPL-MCNC: 1 MG/DL — SIGNIFICANT CHANGE UP (ref 0.5–1.3)
GLUCOSE SERPL-MCNC: 101 MG/DL — HIGH (ref 70–99)
HCT VFR BLD CALC: 39.3 % — SIGNIFICANT CHANGE UP (ref 34.5–45)
HGB BLD-MCNC: 12.1 G/DL — SIGNIFICANT CHANGE UP (ref 11.5–15.5)
MCHC RBC-ENTMCNC: 26.9 PG — LOW (ref 27–34)
MCHC RBC-ENTMCNC: 30.8 GM/DL — LOW (ref 32–36)
MCV RBC AUTO: 87.3 FL — SIGNIFICANT CHANGE UP (ref 80–100)
NRBC # BLD: 0 /100 WBCS — SIGNIFICANT CHANGE UP (ref 0–0)
PLATELET # BLD AUTO: 154 K/UL — SIGNIFICANT CHANGE UP (ref 150–400)
POTASSIUM SERPL-MCNC: 3.6 MMOL/L — SIGNIFICANT CHANGE UP (ref 3.5–5.3)
POTASSIUM SERPL-SCNC: 3.6 MMOL/L — SIGNIFICANT CHANGE UP (ref 3.5–5.3)
RBC # BLD: 4.5 M/UL — SIGNIFICANT CHANGE UP (ref 3.8–5.2)
RBC # FLD: 17.8 % — HIGH (ref 10.3–14.5)
SODIUM SERPL-SCNC: 146 MMOL/L — HIGH (ref 135–145)
WBC # BLD: 5.93 K/UL — SIGNIFICANT CHANGE UP (ref 3.8–10.5)
WBC # FLD AUTO: 5.93 K/UL — SIGNIFICANT CHANGE UP (ref 3.8–10.5)

## 2020-08-29 PROCEDURE — 83735 ASSAY OF MAGNESIUM: CPT

## 2020-08-29 PROCEDURE — 85027 COMPLETE CBC AUTOMATED: CPT

## 2020-08-29 PROCEDURE — 85610 PROTHROMBIN TIME: CPT

## 2020-08-29 PROCEDURE — 94640 AIRWAY INHALATION TREATMENT: CPT

## 2020-08-29 PROCEDURE — A9540: CPT

## 2020-08-29 PROCEDURE — 80053 COMPREHEN METABOLIC PANEL: CPT

## 2020-08-29 PROCEDURE — 86803 HEPATITIS C AB TEST: CPT

## 2020-08-29 PROCEDURE — 84484 ASSAY OF TROPONIN QUANT: CPT

## 2020-08-29 PROCEDURE — 85730 THROMBOPLASTIN TIME PARTIAL: CPT

## 2020-08-29 PROCEDURE — 93970 EXTREMITY STUDY: CPT

## 2020-08-29 PROCEDURE — 36415 COLL VENOUS BLD VENIPUNCTURE: CPT

## 2020-08-29 PROCEDURE — 99285 EMERGENCY DEPT VISIT HI MDM: CPT

## 2020-08-29 PROCEDURE — 36000 PLACE NEEDLE IN VEIN: CPT

## 2020-08-29 PROCEDURE — 78582 LUNG VENTILAT&PERFUS IMAGING: CPT

## 2020-08-29 PROCEDURE — 71045 X-RAY EXAM CHEST 1 VIEW: CPT

## 2020-08-29 PROCEDURE — 93005 ELECTROCARDIOGRAM TRACING: CPT

## 2020-08-29 PROCEDURE — U0003: CPT

## 2020-08-29 PROCEDURE — 93306 TTE W/DOPPLER COMPLETE: CPT

## 2020-08-29 PROCEDURE — 83880 ASSAY OF NATRIURETIC PEPTIDE: CPT

## 2020-08-29 PROCEDURE — 78580 LUNG PERFUSION IMAGING: CPT

## 2020-08-29 PROCEDURE — 80048 BASIC METABOLIC PNL TOTAL CA: CPT

## 2020-08-29 PROCEDURE — 86769 SARS-COV-2 COVID-19 ANTIBODY: CPT

## 2020-08-29 PROCEDURE — 99239 HOSP IP/OBS DSCHRG MGMT >30: CPT

## 2020-08-29 RX ORDER — EXEMESTANE 25 MG/1
1 TABLET, SUGAR COATED ORAL
Qty: 0 | Refills: 0 | DISCHARGE
Start: 2020-08-29

## 2020-08-29 RX ORDER — APIXABAN 2.5 MG/1
2 TABLET, FILM COATED ORAL
Qty: 28 | Refills: 0
Start: 2020-08-29 | End: 2020-09-04

## 2020-08-29 RX ORDER — RIVAROXABAN 15 MG-20MG
1 KIT ORAL
Qty: 30 | Refills: 0
Start: 2020-08-29 | End: 2020-09-27

## 2020-08-29 RX ORDER — APIXABAN 2.5 MG/1
1 TABLET, FILM COATED ORAL
Qty: 60 | Refills: 0
Start: 2020-08-29 | End: 2020-09-27

## 2020-08-29 RX ADMIN — OXYCODONE AND ACETAMINOPHEN 1 TABLET(S): 5; 325 TABLET ORAL at 11:11

## 2020-08-29 RX ADMIN — BUDESONIDE AND FORMOTEROL FUMARATE DIHYDRATE 2 PUFF(S): 160; 4.5 AEROSOL RESPIRATORY (INHALATION) at 09:41

## 2020-08-29 RX ADMIN — ESCITALOPRAM OXALATE 20 MILLIGRAM(S): 10 TABLET, FILM COATED ORAL at 11:14

## 2020-08-29 RX ADMIN — OXYCODONE HYDROCHLORIDE 5 MILLIGRAM(S): 5 TABLET ORAL at 00:47

## 2020-08-29 RX ADMIN — Medication 2000 UNIT(S): at 11:14

## 2020-08-29 RX ADMIN — OXYCODONE AND ACETAMINOPHEN 1 TABLET(S): 5; 325 TABLET ORAL at 10:11

## 2020-08-29 NOTE — DISCHARGE NOTE PROVIDER - PROVIDER TOKENS
FREE:[LAST:[Lyons],FIRST:[Swahti],PHONE:[(   )    -],FAX:[(   )    -],ADDRESS:[[address]]],PROVIDER:[[id number]]]

## 2020-08-29 NOTE — DISCHARGE NOTE PROVIDER - HOSPITAL COURSE
64 yr old female with hx of breast cancer on chemotherapy s/p L Mastectomy,, mets to bone and found to have lung nodules, h/o clear cell RCC s/p L nephrectomy, COPD presents with b/l leg swelling and SOB for the past three weeks. Found to have acute bilateral LE DVTs in the setting of malignancy . s/p IVC filter in the past.         Evaluated by vascular and recommended repeat US in 6 mos.  Heme onc also evaluated patient and recc Lovenox but patient unwilling to self inject, Eliquis is an acceptable alternative per heme.         Additionally, V/q scan low probability for PE. Pt also with  ERASMO baseline Cr 0.85, improved with IVF        Hx of Stage 4 breast cancer on chemotherapy s/p L Mastectomy, mets to bone and found to have lung nodules and h/o clear cell RCC s/p L nephrectomy. Pt to cont current oral therapy, aromasin. 64 yr old female with hx of breast cancer on chemotherapy s/p L Mastectomy,, mets to bone and found to have lung nodules, h/o clear cell RCC s/p L nephrectomy, COPD presents with b/l leg swelling and SOB for the past three weeks. Found to have acute bilateral LE DVTs in the setting of malignancy . s/p IVC filter in the past.         Evaluated by vascular and recommended repeat US in 6 mos.  Heme onc also evaluated patient and recc Lovenox but patient unwilling to self inject, xarelto is an acceptable alternative per heme.         Additionally, V/q scan low probability for PE. Pt also with  ERASMO baseline Cr 0.85, improved with IVF        Hx of Stage 4 breast cancer on chemotherapy s/p L Mastectomy, mets to bone and found to have lung nodules and h/o clear cell RCC s/p L nephrectomy. Pt to cont current oral therapy, aromasin.        As per discussion with hematology pt is discharged on xarelto and asked to follow up closely with her own oncologist.        Pt remains medically stable for discharge to home today .        signed out to Oncologist upon discharge .        Time spent on d/c >32 minutes.

## 2020-08-29 NOTE — DISCHARGE NOTE PROVIDER - NSDCMRMEDTOKEN_GEN_ALL_CORE_FT
Anoro Ellipta 62.5 mcg-25 mcg/inh inhalation powder: 1 puff(s) inhaled once a day in am  Eliquis 5 mg oral tablet: 1 tab(s) orally 2 times a day   To start after completetion of initial 7 day treatment with 10 mg BID  Eliquis Starter Pack for Treatment of DVT and PE 5 mg oral tablet: 2 tab(s) orally 2 times a day   escitalopram 20 mg oral tablet: 1 tab(s) orally once a day  exemestane 25 mg oral tablet: 1 tab(s) orally once a day  polyethylene glycol 3350 oral powder for reconstitution: 17 gram(s) orally once a day, As needed, Constipation  senna oral tablet: 2 tab(s) orally once a day (at bedtime)  Vitamin D3 2000 intl units oral tablet: 1 tab(s) orally once a day Anoro Ellipta 62.5 mcg-25 mcg/inh inhalation powder: 1 puff(s) inhaled once a day in am  escitalopram 20 mg oral tablet: 1 tab(s) orally once a day  exemestane 25 mg oral tablet: 1 tab(s) orally once a day  polyethylene glycol 3350 oral powder for reconstitution: 17 gram(s) orally once a day, As needed, Constipation  rivaroxaban 15 mg-20 mg oral kit: 15 mg oral twice daily x 3 weeks then 20 mg oral daily.  senna oral tablet: 2 tab(s) orally once a day (at bedtime)  Vitamin D3 2000 intl units oral tablet: 1 tab(s) orally once a day

## 2020-08-29 NOTE — PROGRESS NOTE ADULT - SUBJECTIVE AND OBJECTIVE BOX
Patient is a 64y old  Female who presents with a chief complaint of bilateral LE DVT (29 Aug 2020 09:47)      Patient seen and examined at bedside. No overnight events reported.     ALLERGIES:  Cipro (Unknown)    MEDICATIONS  (STANDING):  budesonide 160 MICROgram(s)/formoterol 4.5 MICROgram(s) Inhaler 2 Puff(s) Inhalation two times a day  cholecalciferol 2000 Unit(s) Oral daily  enoxaparin Injectable 70 milliGRAM(s) SubCutaneous every 12 hours  escitalopram 20 milliGRAM(s) Oral daily  exemestane 25 milliGRAM(s) Oral daily  oxycodone    5 mG/acetaminophen 325 mG 1 Tablet(s) Oral every 8 hours  senna 2 Tablet(s) Oral at bedtime    MEDICATIONS  (PRN):  polyethylene glycol 3350 17 Gram(s) Oral daily PRN Constipation    Vital Signs Last 24 Hrs  T(F): 98.2 (29 Aug 2020 07:52), Max: 99 (29 Aug 2020 00:19)  HR: 76 (29 Aug 2020 07:52) (69 - 86)  BP: 143/79 (29 Aug 2020 07:52) (136/67 - 143/79)  RR: 16 (29 Aug 2020 07:52) (16 - 16)  SpO2: 98% (29 Aug 2020 09:42) (90% - 98%)  I&O's Summary    28 Aug 2020 07:01  -  29 Aug 2020 07:00  --------------------------------------------------------  IN: 800 mL / OUT: 0 mL / NET: 800 mL      PHYSICAL EXAM:  General: NAD, A/O x 3  ENT: MMM, no thrush  Neck: Supple, No JVD  Lungs: Clear to auscultation bilaterally, good air entry, non-labored breathing  Cardio: RRR, S1/S2, No murmur  Abdomen: Soft, Nontender, Nondistended; Bowel sounds present  Extremities: No calf tenderness, No pitting edema. Less swelling    LABS:                        12.1   5.93  )-----------( 154      ( 29 Aug 2020 05:38 )             39.3     08-29    146  |  110  |  13  ----------------------------<  101  3.6   |  24  |  1.00    Ca    7.7      29 Aug 2020 05:38  Mg     1.9     08-28    TPro  7.0  /  Alb  3.0  /  TBili  0.2  /  DBili  x   /  AST  15  /  ALT  13  /  AlkPhos  43  08-28        eGFR if Non African American: 59 mL/min/1.73M2 (08-29-20 @ 05:38)  eGFR if : 68 mL/min/1.73M2 (08-29-20 @ 05:38)    PT/INR - ( 27 Aug 2020 15:10 )   PT: 12.3 sec;   INR: 1.02 ratio         PTT - ( 27 Aug 2020 15:10 )  PTT:32.1 sec      CARDIAC MARKERS ( 27 Aug 2020 22:00 )  <.017 ng/mL / x     / x     / x     / x      CARDIAC MARKERS ( 27 Aug 2020 15:10 )  <.017 ng/mL / x     / x     / x     / x                                RADIOLOGY & ADDITIONAL TESTS:    Care Discussed with Consultants/Other Providers: Patient is a 64y old  Female who presents with a chief complaint of bilateral LE DVT (29 Aug 2020 09:47)      Patient seen and examined at bedside. No overnight events reported.     ALLERGIES:  Cipro (Unknown)    MEDICATIONS  (STANDING):  budesonide 160 MICROgram(s)/formoterol 4.5 MICROgram(s) Inhaler 2 Puff(s) Inhalation two times a day  cholecalciferol 2000 Unit(s) Oral daily  enoxaparin Injectable 70 milliGRAM(s) SubCutaneous every 12 hours  escitalopram 20 milliGRAM(s) Oral daily  exemestane 25 milliGRAM(s) Oral daily  oxycodone    5 mG/acetaminophen 325 mG 1 Tablet(s) Oral every 8 hours  senna 2 Tablet(s) Oral at bedtime    MEDICATIONS  (PRN):  polyethylene glycol 3350 17 Gram(s) Oral daily PRN Constipation    Vital Signs Last 24 Hrs  T(F): 98.2 (29 Aug 2020 07:52), Max: 99 (29 Aug 2020 00:19)  HR: 76 (29 Aug 2020 07:52) (69 - 86)  BP: 143/79 (29 Aug 2020 07:52) (136/67 - 143/79)  RR: 16 (29 Aug 2020 07:52) (16 - 16)  SpO2: 98% (29 Aug 2020 09:42) (90% - 98%)  I&O's Summary    28 Aug 2020 07:01  -  29 Aug 2020 07:00  --------------------------------------------------------  IN: 800 mL / OUT: 0 mL / NET: 800 mL      PHYSICAL EXAM:  General: NAD, A/O x 3  ENT: MMM, no thrush  Neck: Supple, No JVD  Lungs: Clear to auscultation bilaterally, good air entry, non-labored breathing  Cardio: RRR, S1/S2, No murmur  Abdomen: Soft, Nontender, Nondistended; Bowel sounds present  Extremities: No calf tenderness, No pitting edema. Less swelling    LABS:                        12.1   5.93  )-----------( 154      ( 29 Aug 2020 05:38 )             39.3     08-29    146  |  110  |  13  ----------------------------<  101  3.6   |  24  |  1.00    Ca    7.7      29 Aug 2020 05:38  Mg     1.9     08-28    TPro  7.0  /  Alb  3.0  /  TBili  0.2  /  DBili  x   /  AST  15  /  ALT  13  /  AlkPhos  43  08-28        eGFR if Non African American: 59 mL/min/1.73M2 (08-29-20 @ 05:38)  eGFR if : 68 mL/min/1.73M2 (08-29-20 @ 05:38)    PT/INR - ( 27 Aug 2020 15:10 )   PT: 12.3 sec;   INR: 1.02 ratio         PTT - ( 27 Aug 2020 15:10 )  PTT:32.1 sec      CARDIAC MARKERS ( 27 Aug 2020 22:00 )  <.017 ng/mL / x     / x     / x     / x      CARDIAC MARKERS ( 27 Aug 2020 15:10 )  <.017 ng/mL / x     / x     / x     / x              RADIOLOGY & ADDITIONAL TESTS:    < from: US Duplex Venous Lower Ext Complete, Bilateral (08.27.20 @ 14:16) >  Bilateral lower extremity deep vein thrombosis.  Acute deep venous thrombosis: above the knee.    < end of copied text >      Care Discussed with Consultants/Other Providers: Dr. George brooks

## 2020-08-29 NOTE — DISCHARGE NOTE NURSING/CASE MANAGEMENT/SOCIAL WORK - NSDCPEXARELTO_GEN_ALL_CORE
Rivaroxaban/Xarelto - Potential for adverse drug reactions and interactions/Rivaroxaban/Xarelto - Dietary Advice/Rivaroxaban/Xarelto - Follow up monitoring/Rivaroxaban/Xarelto - Compliance

## 2020-08-29 NOTE — DISCHARGE NOTE PROVIDER - NSDCFUADDINST_GEN_ALL_CORE_FT
Please take Eliquis 10 mg twice daily for 7 days then 5 mg twice daily.     Return to ED for any new or worsening symptoms.

## 2020-08-29 NOTE — PROGRESS NOTE ADULT - ATTENDING COMMENTS
I have personally seen and examined patient on the above date.  I discussed the case with MIGUELINA Benson and I agree with findings and plan as detailed per note above, which I have amended where appropriate.    plan to switch to xarelto as per todays discussion with hematologist dr. Vazquez  pt advised to follow up with repeat US duplex LE within 6 months and own oncologist as outpt for continued care.  stable for d/c home today.

## 2020-08-29 NOTE — PROGRESS NOTE ADULT - ASSESSMENT
64 yr old female with hx of breast cancer on chemotherapy s/p L Mastectomy,, mets to bone and found to have lung nodules, h/o clear cell RCC s/p L nephrectomy, COPD presents with b/l leg swelling and SOB for the past three weeks.    # Acute bilateral LE DVTs in the setting of malignancy   - s/p IVC filter in the past  - seen by vascular today , recommended repeat US in 6 months and AC for now  - on therapeutic lovenox  - hematology eval appreciated- pt hesitant to self inject Lovenox. Eliquis is acceptable however not covered. Awaiting to speak with heme re: alternative option    # SOB   -V/q scan low probability for PE  -on therapeutic AC  -echo findings as above  -clinically euvolemic  -will monitor    # ERASMO baseline Cr 0.85- resolved  cr trending down  d/c ivf , encourage po hydration  avoid nephrotoxic drugs and monitor bmp    # Stage 4 breast cancer on chemotherapy s/p L Mastectomy, mets to bone and found to have lung nodules  # h/o clear cell RCC s/p L nephrectomy  - cont current oral therapy, aromasin and afinitor (asked pt to arrange for bringing Afinitor to hospital)   - follow up oncology    # chronic COPD - stable  - cont Symbicort as substitute for Ellipta     # DVT ppx: on therapeutic lovenox    # code status: FULL CODE  #Dispo : likely home, PT eval . anticipate today pending decision re: AC 64 yr old female with hx of breast cancer on chemotherapy s/p L Mastectomy,, mets to bone and found to have lung nodules, h/o clear cell RCC s/p L nephrectomy, COPD presents with b/l leg swelling and SOB for the past three weeks.    # Acute bilateral LE DVTs in the setting of malignancy   - s/p IVC filter in the past  - seen by vascular today , recommended repeat US in 6 months and AC for now  - on therapeutic lovenox  - hematology eval appreciated- pt hesitant to self inject Lovenox. Eliquis is acceptable however not covered. Awaiting to speak with heme re: alternative option  addendum 11:25: Spoke with Dr. Vazquez, Xarelto is an acceptable alternative. Will send rx to pharmacy    # SOB   -V/q scan low probability for PE  -on therapeutic AC  -echo findings as above  -clinically euvolemic  -will monitor    # ERASMO baseline Cr 0.85- resolved  cr trending down  d/c ivf , encourage po hydration  avoid nephrotoxic drugs and monitor bmp    # Stage 4 breast cancer on chemotherapy s/p L Mastectomy, mets to bone and found to have lung nodules  # h/o clear cell RCC s/p L nephrectomy  - cont current oral therapy, aromasin and afinitor (asked pt to arrange for bringing Afinitor to hospital)   - follow up oncology    # chronic COPD - stable  - cont Symbicort as substitute for Ellipta     # DVT ppx: on therapeutic lovenox    # code status: FULL CODE  #Dispo : likely home, PT eval . anticipate today pending decision re: AC 64 yr old female with hx of breast cancer on chemotherapy s/p L Mastectomy,, mets to bone and found to have lung nodules, h/o clear cell RCC s/p L nephrectomy, COPD presents with b/l leg swelling and SOB for the past three weeks.    # Acute bilateral LE DVTs in the setting of malignancy   - s/p IVC filter in the past  - seen by vascular , recommended repeat US in 6 months and AC for now  - on therapeutic lovenox  - hematology eval appreciated- pt hesitant to self inject Lovenox. Eliquis is acceptable however not covered. 11:25: Spoke with Dr. Vazquez, Xarelto is an acceptable alternative. Will send rx to pharmacy    # SOB   -V/q scan low probability for PE  -on therapeutic AC  -echo findings as above  -clinically euvolemic  -will monitor    # ERASMO baseline Cr 0.85- resolved  cr trending down  d/c ivf , encourage po hydration  avoid nephrotoxic drugs and monitor bmp    # Stage 4 breast cancer on chemotherapy s/p L Mastectomy, mets to bone and found to have lung nodules  # h/o clear cell RCC s/p L nephrectomy  - cont current oral therapy, aromasin and afinitor (asked pt to arrange for bringing Afinitor to hospital)   - follow up oncology    # chronic COPD - stable  - cont Symbicort as substitute for Ellipta     # DVT ppx: on therapeutic lovenox    # code status: FULL CODE  #Dispo : Home today

## 2020-08-29 NOTE — DISCHARGE NOTE PROVIDER - CARE PROVIDER_API CALL
Swathi Ochoa  [facility]  [address]  Phone: (   )    -  Fax: (   )    -  Follow Up Time:     Mk Hodgson  SURGERY  [address] [facility], [address]  [place], [place] [id number]  Phone: [phone]  Fax: [phone]  Follow Up Time:

## 2020-08-29 NOTE — DISCHARGE NOTE PROVIDER - NSDCCPCAREPLAN_GEN_ALL_CORE_FT
PRINCIPAL DISCHARGE DIAGNOSIS  Diagnosis: Deep vein thrombosis (DVT) of other vein of both lower extremities  Assessment and Plan of Treatment: Continue Eliquis. Follow up with your oncologist and Vascular doctor.      SECONDARY DISCHARGE DIAGNOSES  Diagnosis: ERASMO (acute kidney injury)  Assessment and Plan of Treatment: Resolved    Diagnosis: Deep vein thrombosis (DVT) of other vein of both lower extremities  Assessment and Plan of Treatment: PRINCIPAL DISCHARGE DIAGNOSIS  Diagnosis: Deep vein thrombosis (DVT) of other vein of both lower extremities  Assessment and Plan of Treatment: Continue xarelto. Follow up with your oncologist and Vascular doctor.      SECONDARY DISCHARGE DIAGNOSES  Diagnosis: ERASMO (acute kidney injury)  Assessment and Plan of Treatment: Resolved    Diagnosis: Deep vein thrombosis (DVT) of other vein of both lower extremities  Assessment and Plan of Treatment:

## 2020-09-12 ENCOUNTER — OUTPATIENT (OUTPATIENT)
Dept: OUTPATIENT SERVICES | Facility: HOSPITAL | Age: 65
LOS: 1 days | Discharge: HOME | End: 2020-09-12

## 2020-09-12 DIAGNOSIS — E89.6 POSTPROCEDURAL ADRENOCORTICAL (-MEDULLARY) HYPOFUNCTION: Chronic | ICD-10-CM

## 2020-09-12 DIAGNOSIS — Z98.890 OTHER SPECIFIED POSTPROCEDURAL STATES: Chronic | ICD-10-CM

## 2020-09-12 DIAGNOSIS — Z90.12 ACQUIRED ABSENCE OF LEFT BREAST AND NIPPLE: Chronic | ICD-10-CM

## 2020-09-12 DIAGNOSIS — C50.919 MALIGNANT NEOPLASM OF UNSPECIFIED SITE OF UNSPECIFIED FEMALE BREAST: ICD-10-CM

## 2020-09-12 DIAGNOSIS — Z90.5 ACQUIRED ABSENCE OF KIDNEY: Chronic | ICD-10-CM

## 2020-09-16 ENCOUNTER — EMERGENCY (EMERGENCY)
Facility: HOSPITAL | Age: 65
LOS: 1 days | Discharge: HOME | End: 2020-09-16
Attending: EMERGENCY MEDICINE | Admitting: EMERGENCY MEDICINE
Payer: MEDICARE

## 2020-09-16 ENCOUNTER — APPOINTMENT (OUTPATIENT)
Dept: INFUSION THERAPY | Facility: HOSPITAL | Age: 65
End: 2020-09-16

## 2020-09-16 ENCOUNTER — APPOINTMENT (OUTPATIENT)
Dept: HEMATOLOGY ONCOLOGY | Facility: CLINIC | Age: 65
End: 2020-09-16
Payer: MEDICARE

## 2020-09-16 ENCOUNTER — APPOINTMENT (OUTPATIENT)
Dept: HEMATOLOGY ONCOLOGY | Facility: CLINIC | Age: 65
End: 2020-09-16

## 2020-09-16 VITALS
SYSTOLIC BLOOD PRESSURE: 158 MMHG | HEIGHT: 63 IN | OXYGEN SATURATION: 94 % | DIASTOLIC BLOOD PRESSURE: 88 MMHG | WEIGHT: 160.06 LBS | TEMPERATURE: 98 F | HEART RATE: 93 BPM | RESPIRATION RATE: 18 BRPM

## 2020-09-16 DIAGNOSIS — E89.6 POSTPROCEDURAL ADRENOCORTICAL (-MEDULLARY) HYPOFUNCTION: Chronic | ICD-10-CM

## 2020-09-16 DIAGNOSIS — R31.9 HEMATURIA, UNSPECIFIED: ICD-10-CM

## 2020-09-16 DIAGNOSIS — Z90.12 ACQUIRED ABSENCE OF LEFT BREAST AND NIPPLE: Chronic | ICD-10-CM

## 2020-09-16 DIAGNOSIS — Z90.5 ACQUIRED ABSENCE OF KIDNEY: Chronic | ICD-10-CM

## 2020-09-16 DIAGNOSIS — Z98.890 OTHER SPECIFIED POSTPROCEDURAL STATES: Chronic | ICD-10-CM

## 2020-09-16 LAB
ALBUMIN SERPL ELPH-MCNC: 3 G/DL — LOW (ref 3.3–5)
ALP SERPL-CCNC: 42 U/L — SIGNIFICANT CHANGE UP (ref 40–120)
ALT FLD-CCNC: 12 U/L — SIGNIFICANT CHANGE UP (ref 10–45)
ANION GAP SERPL CALC-SCNC: 12 MMOL/L — SIGNIFICANT CHANGE UP (ref 5–17)
APPEARANCE UR: ABNORMAL
AST SERPL-CCNC: 24 U/L — SIGNIFICANT CHANGE UP (ref 10–40)
BASOPHILS # BLD AUTO: 0.03 K/UL — SIGNIFICANT CHANGE UP (ref 0–0.2)
BASOPHILS NFR BLD AUTO: 0.4 % — SIGNIFICANT CHANGE UP (ref 0–2)
BILIRUB SERPL-MCNC: 0.7 MG/DL — SIGNIFICANT CHANGE UP (ref 0.2–1.2)
BILIRUB UR-MCNC: NEGATIVE — SIGNIFICANT CHANGE UP
BUN SERPL-MCNC: 21 MG/DL — SIGNIFICANT CHANGE UP (ref 7–23)
CALCIUM SERPL-MCNC: 9.4 MG/DL — SIGNIFICANT CHANGE UP (ref 8.4–10.5)
CHLORIDE SERPL-SCNC: 104 MMOL/L — SIGNIFICANT CHANGE UP (ref 96–108)
CO2 SERPL-SCNC: 24 MMOL/L — SIGNIFICANT CHANGE UP (ref 22–31)
COLOR SPEC: YELLOW — SIGNIFICANT CHANGE UP
CREAT SERPL-MCNC: 1.64 MG/DL — HIGH (ref 0.5–1.3)
DIFF PNL FLD: ABNORMAL
EOSINOPHIL # BLD AUTO: 0.22 K/UL — SIGNIFICANT CHANGE UP (ref 0–0.5)
EOSINOPHIL NFR BLD AUTO: 2.7 % — SIGNIFICANT CHANGE UP (ref 0–6)
GLUCOSE SERPL-MCNC: 105 MG/DL — HIGH (ref 70–99)
GLUCOSE UR QL: NEGATIVE — SIGNIFICANT CHANGE UP
HCT VFR BLD CALC: 39.7 % — SIGNIFICANT CHANGE UP (ref 34.5–45)
HGB BLD-MCNC: 12.3 G/DL — SIGNIFICANT CHANGE UP (ref 11.5–15.5)
IMM GRANULOCYTES NFR BLD AUTO: 0.4 % — SIGNIFICANT CHANGE UP (ref 0–1.5)
KETONES UR-MCNC: ABNORMAL
LEUKOCYTE ESTERASE UR-ACNC: ABNORMAL
LIDOCAIN IGE QN: 79 U/L — SIGNIFICANT CHANGE UP (ref 73–393)
LYMPHOCYTES # BLD AUTO: 1.13 K/UL — SIGNIFICANT CHANGE UP (ref 1–3.3)
LYMPHOCYTES # BLD AUTO: 13.6 % — SIGNIFICANT CHANGE UP (ref 13–44)
MCHC RBC-ENTMCNC: 26.7 PG — LOW (ref 27–34)
MCHC RBC-ENTMCNC: 31 GM/DL — LOW (ref 32–36)
MCV RBC AUTO: 86.3 FL — SIGNIFICANT CHANGE UP (ref 80–100)
MONOCYTES # BLD AUTO: 0.42 K/UL — SIGNIFICANT CHANGE UP (ref 0–0.9)
MONOCYTES NFR BLD AUTO: 5.1 % — SIGNIFICANT CHANGE UP (ref 2–14)
NEUTROPHILS # BLD AUTO: 6.45 K/UL — SIGNIFICANT CHANGE UP (ref 1.8–7.4)
NEUTROPHILS NFR BLD AUTO: 77.8 % — HIGH (ref 43–77)
NITRITE UR-MCNC: NEGATIVE — SIGNIFICANT CHANGE UP
NRBC # BLD: 0 /100 WBCS — SIGNIFICANT CHANGE UP (ref 0–0)
PH UR: 5 — SIGNIFICANT CHANGE UP (ref 5–8)
PLATELET # BLD AUTO: 200 K/UL — SIGNIFICANT CHANGE UP (ref 150–400)
POTASSIUM SERPL-MCNC: 5.3 MMOL/L — SIGNIFICANT CHANGE UP (ref 3.5–5.3)
POTASSIUM SERPL-SCNC: 5.3 MMOL/L — SIGNIFICANT CHANGE UP (ref 3.5–5.3)
PROT SERPL-MCNC: 7.9 G/DL — SIGNIFICANT CHANGE UP (ref 6–8.3)
PROT UR-MCNC: 100
RBC # BLD: 4.6 M/UL — SIGNIFICANT CHANGE UP (ref 3.8–5.2)
RBC # FLD: 17.6 % — HIGH (ref 10.3–14.5)
SODIUM SERPL-SCNC: 140 MMOL/L — SIGNIFICANT CHANGE UP (ref 135–145)
SP GR SPEC: 1.02 — SIGNIFICANT CHANGE UP (ref 1.01–1.02)
UROBILINOGEN FLD QL: NEGATIVE — SIGNIFICANT CHANGE UP
WBC # BLD: 8.28 K/UL — SIGNIFICANT CHANGE UP (ref 3.8–10.5)
WBC # FLD AUTO: 8.28 K/UL — SIGNIFICANT CHANGE UP (ref 3.8–10.5)

## 2020-09-16 PROCEDURE — 74176 CT ABD & PELVIS W/O CONTRAST: CPT | Mod: 26

## 2020-09-16 PROCEDURE — 93005 ELECTROCARDIOGRAM TRACING: CPT

## 2020-09-16 PROCEDURE — 86850 RBC ANTIBODY SCREEN: CPT

## 2020-09-16 PROCEDURE — 99284 EMERGENCY DEPT VISIT MOD MDM: CPT

## 2020-09-16 PROCEDURE — 96360 HYDRATION IV INFUSION INIT: CPT

## 2020-09-16 PROCEDURE — 87086 URINE CULTURE/COLONY COUNT: CPT

## 2020-09-16 PROCEDURE — 81001 URINALYSIS AUTO W/SCOPE: CPT

## 2020-09-16 PROCEDURE — 93010 ELECTROCARDIOGRAM REPORT: CPT

## 2020-09-16 PROCEDURE — 86900 BLOOD TYPING SEROLOGIC ABO: CPT

## 2020-09-16 PROCEDURE — 71045 X-RAY EXAM CHEST 1 VIEW: CPT

## 2020-09-16 PROCEDURE — 86901 BLOOD TYPING SEROLOGIC RH(D): CPT

## 2020-09-16 PROCEDURE — 83690 ASSAY OF LIPASE: CPT

## 2020-09-16 PROCEDURE — 71045 X-RAY EXAM CHEST 1 VIEW: CPT | Mod: 26

## 2020-09-16 PROCEDURE — 85025 COMPLETE CBC W/AUTO DIFF WBC: CPT

## 2020-09-16 PROCEDURE — 74176 CT ABD & PELVIS W/O CONTRAST: CPT

## 2020-09-16 PROCEDURE — 99284 EMERGENCY DEPT VISIT MOD MDM: CPT | Mod: 25

## 2020-09-16 PROCEDURE — 99442: CPT | Mod: 95

## 2020-09-16 PROCEDURE — 36415 COLL VENOUS BLD VENIPUNCTURE: CPT

## 2020-09-16 PROCEDURE — 82550 ASSAY OF CK (CPK): CPT

## 2020-09-16 PROCEDURE — 80053 COMPREHEN METABOLIC PANEL: CPT

## 2020-09-16 RX ORDER — DOCUSATE SODIUM 100 MG
1 CAPSULE ORAL
Qty: 14 | Refills: 0
Start: 2020-09-16 | End: 2020-09-22

## 2020-09-16 RX ORDER — DOCUSATE SODIUM 100 MG
1 CAPSULE ORAL
Qty: 1 | Refills: 0
Start: 2020-09-16 | End: 2020-09-16

## 2020-09-16 RX ORDER — IOHEXOL 300 MG/ML
30 INJECTION, SOLUTION INTRAVENOUS ONCE
Refills: 0 | Status: DISCONTINUED | OUTPATIENT
Start: 2020-09-16 | End: 2020-09-16

## 2020-09-16 RX ORDER — SODIUM CHLORIDE 9 MG/ML
2300 INJECTION INTRAMUSCULAR; INTRAVENOUS; SUBCUTANEOUS ONCE
Refills: 0 | Status: COMPLETED | OUTPATIENT
Start: 2020-09-16 | End: 2020-09-16

## 2020-09-16 RX ADMIN — SODIUM CHLORIDE 2300 MILLILITER(S): 9 INJECTION INTRAMUSCULAR; INTRAVENOUS; SUBCUTANEOUS at 13:50

## 2020-09-16 RX ADMIN — SODIUM CHLORIDE 2300 MILLILITER(S): 9 INJECTION INTRAMUSCULAR; INTRAVENOUS; SUBCUTANEOUS at 13:08

## 2020-09-16 NOTE — ED PROVIDER NOTE - MUSCULOSKELETAL, MLM
Spine appears normal, range of motion is not limited, no muscle or joint tenderness Spine appears normal, range of motion is not limited, no muscle or joint tenderness. 2+ pitting edema BLE

## 2020-09-16 NOTE — ED PROVIDER NOTE - PMH
Anemia    Breast cancer  Left breast cancer - 13 years ago, s/p mastectomy, and chemoptherapy  Emphysema lung    Other specified disorders of kidney and ureter    Renal cell carcinoma

## 2020-09-16 NOTE — ED PROVIDER NOTE - ATTENDING CONTRIBUTION TO CARE
Dr. Banuelos: I performed a face to face bedside interview with patient regarding history of present illness, review of symptoms and past medical history. I completed an independent physical exam.  I have discussed patient's plan of care with PA.   I agree with note as stated above, having amended the EMR as needed to reflect my findings.   This includes HISTORY OF PRESENT ILLNESS, HIV, PAST MEDICAL/SURGICAL/FAMILY/SOCIAL HISTORY, ALLERGIES AND HOME MEDICATIONS, REVIEW OF SYSTEMS, PHYSICAL EXAM, and any PROGRESS NOTES during the time I functioned as the attending physician for this patient.    Dr. Banuelos: This H&P has been written by myself in its entirety

## 2020-09-16 NOTE — ED PROVIDER NOTE - CARE PLAN
Principal Discharge DX:	Hematuria, unspecified type  Secondary Diagnosis:	Constipation, unspecified constipation type

## 2020-09-16 NOTE — ED PROVIDER NOTE - PATIENT PORTAL LINK FT
You can access the FollowMyHealth Patient Portal offered by [facility] by registering at the following website: [email or web address]. By joining [employer]’s FollowMyHealth portal, you will also be able to view your health information using other applications (apps) compatible with our system.

## 2020-09-16 NOTE — ED PROVIDER NOTE - OBJECTIVE STATEMENT
Dr. Banuelos: 64F  h/o metastatic breast cancer on oral chemo s/p mastectomy with mets to bone and lungs, h/o RCC s/p left nephrectomy, COPD not on home O2 p/w 10 days of constipation and obstipation, and 1 day of hematuria. No nausea, vomiting, fevers, chills, dysuria, flank pain. No exposure to COVID.    Onc: Gabriela Dr. Banuelos: 64F  h/o metastatic breast cancer on oral chemo s/p mastectomy with mets to bone and lungs, h/o RCC s/p left nephrectomy, COPD not on home O2 p/w 10 days of constipation and obstipation, and 1 day of hematuria. No nausea, vomiting, fevers, chills, dysuria, flank pain. No exposure to COVID.  2 weeks ago pt had BLE DVT on Xarelto now.    Onc: Gabriela

## 2020-09-16 NOTE — ED PROVIDER NOTE - NSFOLLOWUPINSTRUCTIONS_ED_ALL_ED_FT
PLEASE MAKE SURE THAT YOU FOLLOW UP WITH YOUR ONCOLOGIST, YOU WILL NEED TO GET YOUR KIDNEY FUNCTION TEST RECHECKED AS IT WAS ELEVATED. DRINK PLENTY OF WATER AND TAKE COLACE, STOOL SOFTENER. RETURN TO ER FOR WORSENING SYMPTOMS;     Hematuria    WHAT YOU NEED TO KNOW:    Hematuria is blood in your urine. Your urine may be bright red to dark brown.    DISCHARGE INSTRUCTIONS:    Return to the emergency department if:   •You have blood in your urine after a new injury, such as a fall.      •You have severe back or side pain that does not go away with treatment.      Call your doctor if:   •You are urinating very small amounts or not at all.      •You feel like you cannot empty your bladder.      •You have a fever that gets worse or does not go away with treatment.      •You cannot keep liquids or medicines down.      •Your urine gets darker, even after you drink extra liquids.      •You have questions or concerns about your condition, treatment, or care.      Drink liquids as directed: You may need to drink extra liquids to help flush the blood from your body through your urine. Water is the best liquid to drink. Ask how much liquid to drink each day and which liquids are best for you.    Follow up with your doctor as directed: Write down your questions so you remember to ask them during your visits.

## 2020-09-17 LAB
CULTURE RESULTS: SIGNIFICANT CHANGE UP
SPECIMEN SOURCE: SIGNIFICANT CHANGE UP

## 2020-09-29 ENCOUNTER — RESULT REVIEW (OUTPATIENT)
Age: 65
End: 2020-09-29

## 2020-09-29 ENCOUNTER — APPOINTMENT (OUTPATIENT)
Dept: HEMATOLOGY ONCOLOGY | Facility: CLINIC | Age: 65
End: 2020-09-29
Payer: MEDICARE

## 2020-09-29 VITALS
TEMPERATURE: 96.6 F | BODY MASS INDEX: 30.08 KG/M2 | RESPIRATION RATE: 16 BRPM | SYSTOLIC BLOOD PRESSURE: 152 MMHG | HEART RATE: 99 BPM | DIASTOLIC BLOOD PRESSURE: 79 MMHG | WEIGHT: 169.95 LBS | OXYGEN SATURATION: 93 %

## 2020-09-29 DIAGNOSIS — Z86.2 PERSONAL HISTORY OF DISEASES OF THE BLOOD AND BLOOD-FORMING ORGANS AND CERTAIN DISORDERS INVOLVING THE IMMUNE MECHANISM: ICD-10-CM

## 2020-09-29 DIAGNOSIS — K13.79 OTHER LESIONS OF ORAL MUCOSA: ICD-10-CM

## 2020-09-29 LAB
ALBUMIN SERPL ELPH-MCNC: 4.4 G/DL
ALP BLD-CCNC: 48 U/L
ALT SERPL-CCNC: 9 U/L
ANION GAP SERPL CALC-SCNC: 17 MMOL/L
AST SERPL-CCNC: 16 U/L
BASOPHILS # BLD AUTO: 0 K/UL — SIGNIFICANT CHANGE UP (ref 0–0.2)
BASOPHILS NFR BLD AUTO: 0 % — SIGNIFICANT CHANGE UP (ref 0–2)
BILIRUB SERPL-MCNC: 0.3 MG/DL
BUN SERPL-MCNC: 22 MG/DL
CALCIUM SERPL-MCNC: 8.8 MG/DL
CEA SERPL-MCNC: 2.5 NG/ML
CHLORIDE SERPL-SCNC: 102 MMOL/L
CO2 SERPL-SCNC: 23 MMOL/L
CREAT SERPL-MCNC: 1.26 MG/DL
EOSINOPHIL # BLD AUTO: 0.15 K/UL — SIGNIFICANT CHANGE UP (ref 0–0.5)
EOSINOPHIL NFR BLD AUTO: 2 % — SIGNIFICANT CHANGE UP (ref 0–6)
GLUCOSE SERPL-MCNC: 142 MG/DL
HCT VFR BLD CALC: 39.3 % — SIGNIFICANT CHANGE UP (ref 34.5–45)
HGB BLD-MCNC: 12.1 G/DL — SIGNIFICANT CHANGE UP (ref 11.5–15.5)
LYMPHOCYTES # BLD AUTO: 0.9 K/UL — LOW (ref 1–3.3)
LYMPHOCYTES # BLD AUTO: 12 % — LOW (ref 13–44)
MCHC RBC-ENTMCNC: 26 PG — LOW (ref 27–34)
MCHC RBC-ENTMCNC: 30.8 G/DL — LOW (ref 32–36)
MCV RBC AUTO: 84.4 FL — SIGNIFICANT CHANGE UP (ref 80–100)
MONOCYTES # BLD AUTO: 0.52 K/UL — SIGNIFICANT CHANGE UP (ref 0–0.9)
MONOCYTES NFR BLD AUTO: 7 % — SIGNIFICANT CHANGE UP (ref 2–14)
NEUTROPHILS # BLD AUTO: 5.92 K/UL — SIGNIFICANT CHANGE UP (ref 1.8–7.4)
NEUTROPHILS NFR BLD AUTO: 79 % — HIGH (ref 43–77)
NRBC # BLD: 0 /100 — SIGNIFICANT CHANGE UP (ref 0–0)
NRBC # BLD: SIGNIFICANT CHANGE UP /100 WBCS (ref 0–0)
PLAT MORPH BLD: NORMAL — SIGNIFICANT CHANGE UP
PLATELET # BLD AUTO: 230 K/UL — SIGNIFICANT CHANGE UP (ref 150–400)
POTASSIUM SERPL-SCNC: 4.6 MMOL/L
PROT SERPL-MCNC: 7 G/DL
RBC # BLD: 4.66 M/UL — SIGNIFICANT CHANGE UP (ref 3.8–5.2)
RBC # FLD: 17.6 % — HIGH (ref 10.3–14.5)
RBC BLD AUTO: SIGNIFICANT CHANGE UP
SODIUM SERPL-SCNC: 142 MMOL/L
WBC # BLD: 7.49 K/UL — SIGNIFICANT CHANGE UP (ref 3.8–10.5)
WBC # FLD AUTO: 7.49 K/UL — SIGNIFICANT CHANGE UP (ref 3.8–10.5)

## 2020-09-29 PROCEDURE — 99214 OFFICE O/P EST MOD 30 MIN: CPT

## 2020-09-29 NOTE — HISTORY OF PRESENT ILLNESS
[Disease: _____________________] : Disease: [unfilled] [M: ___] : M[unfilled] [AJCC Stage: ____] : AJCC Stage: [unfilled] [de-identified] : 3/2003–Stage IIB left breast cancer, ER positive/HI positive/HER-2 negative–status post left modified radical mastectomy with reconstruction.  1/15 lymph nodes positive for metastatic carcinoma with focal extranodal extension.  Status post AC-T--> Tamoxifen x 5 years.  \par 6/2018-Presented with general weakness and leg pain.  Diagnosed with clear cell renal carcinoma  s/p Radical Left Nephrectomy, Para Aortic LN dissection,  c/b Splenic Laceration w/ bleeding. Right iliac bone biopsy of bone lesion was consistent with metastatic breast cancer–ER positive/HI positive/HER-2 unknown.\par Patient was begun on Letrozole/Ibrance/Xgeva.\par 3/2019–Status post left VATS procedure with pleural decortication.  Left pleural fluid cytology and pleural pathology negative for malignancy.\par 4/2020-CT scan of the chest/abdomen/pelvis revealed new left lower lung nodule and decreased right apex nodules, extensive bony metastases slightly increased. Changed to Aromasin, with Afinitor added 7/2020.\par Patient had been under the oncologic care of Dr. Shields at [facility].\par 8/2020–Patient wished to transfer her oncologic care to the [facility] [place]. LE venous duplex study-B/L DVT. Begun on Xarelto.\par \par \par \par \par  [de-identified] : Infiltrating lobular carcinoma [de-identified] : 7/2020 :  CA 15-3=24 U/mL     CEA =2.9 ng/mL         [de-identified] : S/P hospitalization [facility]-had B/L LE DVT's-treated with Xarelto.\par Had transient hematuria in the hospital.  No further hematuria. No dysuria, fevers.\par No complaints of chest pain, cough, shortness of breath.  No current c/o bone pain. No paresthesias.\par Obtained dental clearance for Xgeva.\par

## 2020-09-29 NOTE — ASSESSMENT
[Palliative] : Goals of care discussed with patient: Palliative [FreeTextEntry1] : Patient with diagnosis of Breast Cancer approximately 17 years ago. She received adjuvant treatment (AC-T ) + 5 years of Tamoxifen with Dr. Cash Jack and subsequently transferred [place]TAGRACIA [place] [facility]  In 2018 she was hospitalized for and was found to have, bone metastasis and renal mass .  6/2018 She underwent radical left nephrectomy for renal cell carcinoma, with PALND, and a bx. of the rIght iliac bone revealed metastatic breast cancer .  She was placed on Femara/Ibrance.    4/22/20  Interval CT Chest/Abdomen/Pelvis revealed new left lower lobe nodule with interval decrease in size of small nodules at the right lung apex., extensive osseous metastatic disease slightly increased, stable IVC filter.   She was prescribed Aromasin/Afinitor on  8/10/20 .  She was subsequently sent to  ED after Bilateral U/S of the LE revealed bilateral DVT'S.  VQ Scan completed  8/28/20 with low probability for pulmonary embolus. Begun on Xarelto.\par \par 09/16/20 Pt contacted office with reports of hematuria and obstipation X 10 days .  She proceeded to  Emergency Department with CT A/P w/o contrast negative for bowel obstruction and extensive bone sclerotic metastatic disease.  No right sided hydronephrosis solid or cystic abnormalities identified. She did not follow with nephrologist.\par H/O anemia-hemoglobin WNL today. Continue to monitor CBC.\par \par 1. Follow up Nephrology. (Hx left renal cell carcinoma, s/p nephrectomy and hospitalization for hematuria X 1 day) \par 2.  Plan to resume Xgeva. Awaiting formalized DDS note.  \par 3. Continue Afinitor/Aromasin \par 4. Continue Xarelto \par 5. Follow up 2 weeks longterm Xgeva + Flu Vaccine \par 6. Follow up with surgeon for left sided stitch removal. \par 7. Labs/Urinalysis today.\par 8. Thus far, unable to perform [facility] on prior [facility] [place]-company looking into-to see if another specimen available.\par Patient was given the opportunity to ask questions. Her questions have been answered to her apparent satisfaction at this time.\par

## 2020-09-29 NOTE — PHYSICAL EXAM
[Normal] : affect appropriate [Restricted in physically strenuous activity but ambulatory and able to carry out work of a light or sedentary nature] : Status 1- Restricted in physically strenuous activity but ambulatory and able to carry out work of a light or sedentary nature, e.g., light house work, office work [de-identified] : decreased edema B/L LE; no calf tenderness [de-identified] : left reconstructed breast without palpable abnormality; no dominant mass either breast [de-identified] : acne-like lesion left chin; no purulence

## 2020-09-30 LAB
APPEARANCE: CLEAR
BACTERIA: NEGATIVE
BILIRUBIN URINE: NEGATIVE
BLOOD URINE: NEGATIVE
CANCER AG27-29 SERPL-ACNC: 25 U/ML
COLOR: YELLOW
GLUCOSE QUALITATIVE U: NEGATIVE
HYALINE CASTS: 3 /LPF
KETONES URINE: NORMAL
LEUKOCYTE ESTERASE URINE: NEGATIVE
MICROSCOPIC-UA: NORMAL
NITRITE URINE: NEGATIVE
PH URINE: 6
PROTEIN URINE: ABNORMAL
RED BLOOD CELLS URINE: 3 /HPF
SPECIFIC GRAVITY URINE: 1.03
SQUAMOUS EPITHELIAL CELLS: 3 /HPF
UROBILINOGEN URINE: NORMAL
WHITE BLOOD CELLS URINE: 4 /HPF

## 2020-10-05 LAB — BACTERIA UR CULT: NORMAL

## 2020-10-09 ENCOUNTER — OUTPATIENT (OUTPATIENT)
Dept: OUTPATIENT SERVICES | Facility: HOSPITAL | Age: 65
LOS: 1 days | Discharge: HOME | End: 2020-10-09

## 2020-10-09 DIAGNOSIS — E89.6 POSTPROCEDURAL ADRENOCORTICAL (-MEDULLARY) HYPOFUNCTION: Chronic | ICD-10-CM

## 2020-10-09 DIAGNOSIS — Z90.5 ACQUIRED ABSENCE OF KIDNEY: Chronic | ICD-10-CM

## 2020-10-09 DIAGNOSIS — C50.919 MALIGNANT NEOPLASM OF UNSPECIFIED SITE OF UNSPECIFIED FEMALE BREAST: ICD-10-CM

## 2020-10-09 DIAGNOSIS — Z90.12 ACQUIRED ABSENCE OF LEFT BREAST AND NIPPLE: Chronic | ICD-10-CM

## 2020-10-09 DIAGNOSIS — Z98.890 OTHER SPECIFIED POSTPROCEDURAL STATES: Chronic | ICD-10-CM

## 2020-10-13 ENCOUNTER — APPOINTMENT (OUTPATIENT)
Dept: INFUSION THERAPY | Facility: HOSPITAL | Age: 65
End: 2020-10-13
Payer: MEDICARE

## 2020-10-13 ENCOUNTER — APPOINTMENT (OUTPATIENT)
Dept: HEMATOLOGY ONCOLOGY | Facility: CLINIC | Age: 65
End: 2020-10-13
Payer: MEDICARE

## 2020-10-13 ENCOUNTER — RESULT REVIEW (OUTPATIENT)
Age: 65
End: 2020-10-13

## 2020-10-13 VITALS
RESPIRATION RATE: 16 BRPM | HEART RATE: 76 BPM | WEIGHT: 167.55 LBS | TEMPERATURE: 97.2 F | OXYGEN SATURATION: 93 % | BODY MASS INDEX: 29.65 KG/M2 | SYSTOLIC BLOOD PRESSURE: 106 MMHG | DIASTOLIC BLOOD PRESSURE: 66 MMHG

## 2020-10-13 DIAGNOSIS — K59.00 CONSTIPATION, UNSPECIFIED: ICD-10-CM

## 2020-10-13 LAB
BASOPHILS # BLD AUTO: 0.02 K/UL — SIGNIFICANT CHANGE UP (ref 0–0.2)
BASOPHILS NFR BLD AUTO: 0.3 % — SIGNIFICANT CHANGE UP (ref 0–2)
EOSINOPHIL # BLD AUTO: 0.22 K/UL — SIGNIFICANT CHANGE UP (ref 0–0.5)
EOSINOPHIL NFR BLD AUTO: 3.4 % — SIGNIFICANT CHANGE UP (ref 0–6)
HCT VFR BLD CALC: 39.9 % — SIGNIFICANT CHANGE UP (ref 34.5–45)
HGB BLD-MCNC: 12.3 G/DL — SIGNIFICANT CHANGE UP (ref 11.5–15.5)
IMM GRANULOCYTES NFR BLD AUTO: 0.3 % — SIGNIFICANT CHANGE UP (ref 0–1.5)
LYMPHOCYTES # BLD AUTO: 1.16 K/UL — SIGNIFICANT CHANGE UP (ref 1–3.3)
LYMPHOCYTES # BLD AUTO: 17.8 % — SIGNIFICANT CHANGE UP (ref 13–44)
MCHC RBC-ENTMCNC: 26.1 PG — LOW (ref 27–34)
MCHC RBC-ENTMCNC: 30.8 G/DL — LOW (ref 32–36)
MCV RBC AUTO: 84.5 FL — SIGNIFICANT CHANGE UP (ref 80–100)
MONOCYTES # BLD AUTO: 0.31 K/UL — SIGNIFICANT CHANGE UP (ref 0–0.9)
MONOCYTES NFR BLD AUTO: 4.8 % — SIGNIFICANT CHANGE UP (ref 2–14)
NEUTROPHILS # BLD AUTO: 4.77 K/UL — SIGNIFICANT CHANGE UP (ref 1.8–7.4)
NEUTROPHILS NFR BLD AUTO: 73.4 % — SIGNIFICANT CHANGE UP (ref 43–77)
NRBC # BLD: 0 /100 WBCS — SIGNIFICANT CHANGE UP (ref 0–0)
PLATELET # BLD AUTO: 192 K/UL — SIGNIFICANT CHANGE UP (ref 150–400)
RBC # BLD: 4.72 M/UL — SIGNIFICANT CHANGE UP (ref 3.8–5.2)
RBC # FLD: 17.2 % — HIGH (ref 10.3–14.5)
WBC # BLD: 6.5 K/UL — SIGNIFICANT CHANGE UP (ref 3.8–10.5)
WBC # FLD AUTO: 6.5 K/UL — SIGNIFICANT CHANGE UP (ref 3.8–10.5)

## 2020-10-13 PROCEDURE — 99214 OFFICE O/P EST MOD 30 MIN: CPT

## 2020-10-13 PROCEDURE — G0008: CPT

## 2020-10-13 RX ORDER — FENTANYL 25 UG/H
25 PATCH, EXTENDED RELEASE TRANSDERMAL
Refills: 0 | Status: DISCONTINUED | COMMUNITY
Start: 2020-08-10 | End: 2020-10-13

## 2020-10-13 NOTE — HISTORY OF PRESENT ILLNESS
[Disease: _____________________] : Disease: [unfilled] [M: ___] : M[unfilled] [AJCC Stage: ____] : AJCC Stage: [unfilled] [de-identified] : 3/2003–Stage IIB left breast cancer, ER positive/CT positive/HER-2 negative–status post left modified radical mastectomy with reconstruction.  1/15 lymph nodes positive for metastatic carcinoma with focal extranodal extension.  Status post AC-T--> Tamoxifen x 5 years.  \par 6/2018-Presented with general weakness and leg pain.  Diagnosed with clear cell renal carcinoma  s/p Radical Left Nephrectomy, Para Aortic LN dissection,  c/b Splenic Laceration w/ bleeding. Right iliac bone biopsy of bone lesion was consistent with metastatic breast cancer–ER positive/CT positive/HER-2 unknown.\par Patient was begun on Letrozole/Ibrance/Xgeva.\par 3/2019–Status post left VATS procedure with pleural decortication.  Left pleural fluid cytology and pleural pathology negative for malignancy.\par 4/2020-CT scan of the chest/abdomen/pelvis revealed new left lower lung nodule and decreased right apex nodules, extensive bony metastases slightly increased. Changed to Aromasin, with Afinitor added 7/2020.\par Patient had been under the oncologic care of Dr. [facility].\par 8/2020–Patient wished to [place] [facility] venous duplex study-B/L DVT. Begun on Xarelto.\par \par \par \par \par  [de-identified] : Infiltrating lobular carcinoma [de-identified] : 7/2020 :  CA 15-3=24 U/mL     CEA =2.9 ng/mL         [FreeTextEntry1] : Aromasin/Afinitor  [de-identified] : She reports no new episodes of hematuria. \par She did not follow up with an appt with Dr. Mathias as previously requested \par Pt is napping with some increased fatigue.  Sleeps well at night with melatonin  5mg \par Continue with Xarelto for bilateral lower extremity DVT.  SHe no longer has pain in her legs. \par Has not used Oxycodone in 2 weeks \par Denies any bony pain, hip pain- Some times headache. \par No  nausea/emesis/diarrhea or constipation . No melena, nose bleeds. \par Notes decreased appetite and eats half her meals at times. \par Continues to take Aromasin/Afinitor \par Has not followed up with dentist for needed additional x-rays \par Denies cough, chills, fever, night sweats, mouth sores\par

## 2020-10-13 NOTE — PHYSICAL EXAM
[Restricted in physically strenuous activity but ambulatory and able to carry out work of a light or sedentary nature] : Status 1- Restricted in physically strenuous activity but ambulatory and able to carry out work of a light or sedentary nature, e.g., light house work, office work [Normal] : affect appropriate [de-identified] : decreased edema B/L LE; no calf tenderness/pain  [de-identified] : left reconstructed breast without palpable abnormality; no dominant mass either breast/axilla  [de-identified] : acne-like lesion left chin; no purulence - clindamcyin use helpful

## 2020-10-13 NOTE — ASSESSMENT
[Palliative] : Goals of care discussed with patient: Palliative [FreeTextEntry1] : Patient with diagnosis of Breast Cancer approximately 17 years ago. She received adjuvant treatment (AC-T ) + 5 years of Tamoxifen with Dr. Cash Bojorquez and subsequently transferred care to Dr. ALTAGRACIA Shields at [facility] [place].  In 2018 she was hospitalized for and was found to have, bone metastasis and renal mass .  6/2018 She underwent radical left nephrectomy for renal cell carcinoma, with PALND, and a bx. of the rIght iliac bone revealed metastatic breast cancer .  She was placed on Femara/Ibrance.    4/22/20  Interval CT Chest/Abdomen/Pelvis revealed new left lower lobe nodule with interval decrease in size of small nodules at the right lung apex., extensive osseous metastatic disease slightly increased, stable IVC filter.   She was prescribed Aromasin/Afinitor on  8/10/20 .  She was subsequently sent to  ED after Bilateral U/S of the LE revealed bilateral DVT'S.  VQ Scan completed  8/28/20 with low probability for pulmonary embolus. Begun on Xarelto but had ED visit for one episode of hematuria/obstipation -September 2020.   CT: A/P w/o contrast negative for bowel obstruction and extensive bone sclerotic metastatic disease.  No right sided hydronephrosis solid or cystic abnormalities identified. She did not follow with nephrologist.   Continues with maintenance dosing xarelto  with no new episode of hematuria\par \par  H/O anemia-hemoglobin WNL  12.3 g/DL today. Continue to monitor CBC. \par \par 1. Follow up Nephrology. (Hx left renal cell carcinoma, s/p nephrectomy and hospitalization for hematuria X 1 day) \par 2. Plan to resume Xgeva. Awaiting patient to return to dentist for additional Xrays. \par 3. Flu shot/TDAP today \par 4. Continue Afinitor/Aromasin \par 5.. Continue Xarelto \par 6.. Follow up November 2020  \par 7.  Follow up with surgeon for left sided stitch removal. \par \par Patient was given the opportunity to ask questions. Her questions have been answered to her apparent satisfaction at this time.\par

## 2020-10-14 LAB
ALBUMIN SERPL ELPH-MCNC: 4.6 G/DL
ALP BLD-CCNC: 55 U/L
ALT SERPL-CCNC: 6 U/L
ANION GAP SERPL CALC-SCNC: 22 MMOL/L
AST SERPL-CCNC: 16 U/L
BILIRUB SERPL-MCNC: 0.3 MG/DL
BUN SERPL-MCNC: 23 MG/DL
CALCIUM SERPL-MCNC: 9.4 MG/DL
CHLORIDE SERPL-SCNC: 105 MMOL/L
CO2 SERPL-SCNC: 18 MMOL/L
CREAT SERPL-MCNC: 1.46 MG/DL
GLUCOSE SERPL-MCNC: 95 MG/DL
POTASSIUM SERPL-SCNC: 4.3 MMOL/L
PROT SERPL-MCNC: 7.4 G/DL
SODIUM SERPL-SCNC: 145 MMOL/L

## 2020-10-15 LAB — CEA SERPL-MCNC: 2.9 NG/ML

## 2020-10-18 LAB — CANCER AG27-29 SERPL-ACNC: 27.5 U/ML

## 2020-10-21 ENCOUNTER — NON-APPOINTMENT (OUTPATIENT)
Age: 65
End: 2020-10-21

## 2020-10-26 ENCOUNTER — APPOINTMENT (OUTPATIENT)
Dept: HEMATOLOGY ONCOLOGY | Facility: CLINIC | Age: 65
End: 2020-10-26

## 2020-11-05 ENCOUNTER — OUTPATIENT (OUTPATIENT)
Dept: OUTPATIENT SERVICES | Facility: HOSPITAL | Age: 65
LOS: 1 days | Discharge: HOME | End: 2020-11-05

## 2020-11-05 DIAGNOSIS — E89.6 POSTPROCEDURAL ADRENOCORTICAL (-MEDULLARY) HYPOFUNCTION: Chronic | ICD-10-CM

## 2020-11-05 DIAGNOSIS — C50.919 MALIGNANT NEOPLASM OF UNSPECIFIED SITE OF UNSPECIFIED FEMALE BREAST: ICD-10-CM

## 2020-11-05 DIAGNOSIS — Z90.5 ACQUIRED ABSENCE OF KIDNEY: Chronic | ICD-10-CM

## 2020-11-05 DIAGNOSIS — Z90.12 ACQUIRED ABSENCE OF LEFT BREAST AND NIPPLE: Chronic | ICD-10-CM

## 2020-11-05 DIAGNOSIS — Z98.890 OTHER SPECIFIED POSTPROCEDURAL STATES: Chronic | ICD-10-CM

## 2020-11-08 NOTE — PHYSICAL THERAPY INITIAL EVALUATION ADULT - DISCHARGE DISPOSITION, PT EVAL
to be determined after further functional assessment; please follow PT notes carefully
PROVIDER:[TOKEN:[[id number]],FOLLOWUP:[1-3 Days],ESTABLISHEDPATIENT:[T]]

## 2020-11-10 ENCOUNTER — RESULT REVIEW (OUTPATIENT)
Age: 65
End: 2020-11-10

## 2020-11-10 ENCOUNTER — APPOINTMENT (OUTPATIENT)
Dept: HEMATOLOGY ONCOLOGY | Facility: CLINIC | Age: 65
End: 2020-11-10
Payer: MEDICARE

## 2020-11-10 ENCOUNTER — APPOINTMENT (OUTPATIENT)
Dept: INFUSION THERAPY | Facility: HOSPITAL | Age: 65
End: 2020-11-10

## 2020-11-10 VITALS
WEIGHT: 166.45 LBS | OXYGEN SATURATION: 93 % | RESPIRATION RATE: 17 BRPM | HEART RATE: 92 BPM | DIASTOLIC BLOOD PRESSURE: 84 MMHG | HEIGHT: 63.39 IN | BODY MASS INDEX: 29.13 KG/M2 | SYSTOLIC BLOOD PRESSURE: 134 MMHG | TEMPERATURE: 96.4 F

## 2020-11-10 LAB
BASOPHILS # BLD AUTO: 0 K/UL — SIGNIFICANT CHANGE UP (ref 0–0.2)
BASOPHILS NFR BLD AUTO: 0 % — SIGNIFICANT CHANGE UP (ref 0–2)
EOSINOPHIL # BLD AUTO: 0.52 K/UL — HIGH (ref 0–0.5)
EOSINOPHIL NFR BLD AUTO: 7 % — HIGH (ref 0–6)
HCT VFR BLD CALC: 40.1 % — SIGNIFICANT CHANGE UP (ref 34.5–45)
HGB BLD-MCNC: 12.4 G/DL — SIGNIFICANT CHANGE UP (ref 11.5–15.5)
LYMPHOCYTES # BLD AUTO: 1.55 K/UL — SIGNIFICANT CHANGE UP (ref 1–3.3)
LYMPHOCYTES # BLD AUTO: 21 % — SIGNIFICANT CHANGE UP (ref 13–44)
MCHC RBC-ENTMCNC: 25.1 PG — LOW (ref 27–34)
MCHC RBC-ENTMCNC: 30.9 G/DL — LOW (ref 32–36)
MCV RBC AUTO: 81.2 FL — SIGNIFICANT CHANGE UP (ref 80–100)
MONOCYTES # BLD AUTO: 0.37 K/UL — SIGNIFICANT CHANGE UP (ref 0–0.9)
MONOCYTES NFR BLD AUTO: 5 % — SIGNIFICANT CHANGE UP (ref 2–14)
NEUTROPHILS # BLD AUTO: 4.94 K/UL — SIGNIFICANT CHANGE UP (ref 1.8–7.4)
NEUTROPHILS NFR BLD AUTO: 67 % — SIGNIFICANT CHANGE UP (ref 43–77)
NRBC # BLD: 0 /100 — SIGNIFICANT CHANGE UP (ref 0–0)
NRBC # BLD: SIGNIFICANT CHANGE UP /100 WBCS (ref 0–0)
PLAT MORPH BLD: NORMAL — SIGNIFICANT CHANGE UP
PLATELET # BLD AUTO: 183 K/UL — SIGNIFICANT CHANGE UP (ref 150–400)
RBC # BLD: 4.94 M/UL — SIGNIFICANT CHANGE UP (ref 3.8–5.2)
RBC # FLD: 16.8 % — HIGH (ref 10.3–14.5)
RBC BLD AUTO: SIGNIFICANT CHANGE UP
WBC # BLD: 7.37 K/UL — SIGNIFICANT CHANGE UP (ref 3.8–10.5)
WBC # FLD AUTO: 7.37 K/UL — SIGNIFICANT CHANGE UP (ref 3.8–10.5)

## 2020-11-10 PROCEDURE — 99214 OFFICE O/P EST MOD 30 MIN: CPT

## 2020-11-10 NOTE — HISTORY OF PRESENT ILLNESS
[Disease: _____________________] : Disease: [unfilled] [M: ___] : M[unfilled] [AJCC Stage: ____] : AJCC Stage: [unfilled] [de-identified] : 3/2003–Stage IIB left breast cancer, ER positive/UT positive/HER-2 negative–status post left modified radical mastectomy with reconstruction.  1/15 lymph nodes positive for metastatic carcinoma with focal extranodal extension.  Status post AC-T--> Tamoxifen x 5 years.  \par 6/2018-Presented with general weakness and leg pain.  Diagnosed with clear cell renal carcinoma  s/p Radical Left Nephrectomy, Para Aortic LN dissection,  c/b Splenic Laceration w/ bleeding. Right iliac bone biopsy of bone lesion was consistent with metastatic breast cancer–ER positive/UT positive/HER-2 unknown.\par Patient was begun on Letrozole/Ibrance/Xgeva.\par 3/2019–Status post left VATS procedure with pleural decortication.  Left pleural fluid cytology and pleural pathology negative for malignancy.\par 4/2020-CT scan of the chest/abdomen/pelvis revealed new left lower lung nodule and decreased right apex nodules, extensive bony metastases slightly increased. Changed to Aromasin, with Afinitor added 7/2020.\par Patient had been under the oncologic care of Dr[facility].\par 8/2020–Patient wished to transfer her oncologic care to the [facility] [place]. LE venous duplex study-B/L DVT. Begun on Xarelto.\par \par \par \par \par  [de-identified] : Infiltrating lobular carcinoma [de-identified] : 7/2020 :  CA 15-3=24 U/mL     CEA =2.9 ng/mL         [de-identified] : Takes oxycodone for bony/joint aches 4 times a day-helps with the pain.\par She did not follow up with an appt with Dr. Mathias as previously requested. No current c/o hematuria.\par Saw dentist Dr. Burciaga for clearance for Xgeva-told evaluation was fine.\par No  nausea/emesis/diarrhea or constipation . No melena, nose bleeds. \par Continues to take Aromasin/Afinitor \par Denies cough, chills, fever, night sweats, mouth sores\par

## 2020-11-10 NOTE — ADDENDUM
[FreeTextEntry1] : ZEYNEP GOLDMAN \par 1955\par Received e-mail message:\par Serafin Ost called in regards to above patient – states she is cleared for Chemo. You can reach him at: [phone]\par Thank you,\par Maggie\par \par \par Telephone call to dentist office-I was informed patient is cleared for cancer treatment including Xgeva.

## 2020-11-10 NOTE — ASSESSMENT
[FreeTextEntry1] : Sister Sydney on speakerphone for discussion.\par Patient with diagnosis of Breast Cancer approximately 17 years ago. She received adjuvant treatment (AC-T ) + 5 years of Tamoxifen with Dr. Cash oBjorquez and subsequently transferred care to Dr. ALTAGRACIA Shields at [facility] [place].  In 2018 she was hospitalized for and was found to have, bone metastasis and renal mass .  6/2018 She underwent radical left nephrectomy for renal cell carcinoma, with PALND, and a bx. of the rIght iliac bone revealed metastatic breast cancer .  She was placed on Femara/Ibrance.    4/22/20  Interval CT Chest/Abdomen/Pelvis revealed new left lower lobe nodule with interval decrease in size of small nodules at the right lung apex., extensive osseous metastatic disease slightly increased, stable IVC filter.   She was prescribed Aromasin/Afinitor on  8/10/20 .  She was subsequently sent to  ED after Bilateral U/S of the LE revealed bilateral DVT'S.  VQ Scan completed  8/28/20 with low probability for pulmonary embolus. Begun on Xarelto. CT: A/P w/o contrast negative for bowel obstruction and +extensive bone sclerotic metastatic disease.  No right sided hydronephrosis solid or cystic abnormalities identified. She did not follow with urologist/nephrology. Continues with maintenance dosing xarelto  with no new episode of hematuria\par \par  H/O anemia-no overt bleeding noted clinically at present. Continue to monitor CBC. \par \par 1. PO fluid hydration as tolerated. Follow up Nephrology. (Hx left renal cell carcinoma, s/p nephrectomy and hospitalization for hematuria X 1 day) \par 2. Plan to resume Xgeva. I have LM for dentist Dr. Burciaga to call me so I may d/w him.\par 3. Flu shot/TDAP today (did not show for vaccines as scheduled before).\par 4. Continue Afinitor/Aromasin \par 5. Continue Xarelto \par Patient was given the opportunity to ask questions. Her questions have been answered to her apparent satisfaction at this time.\par

## 2020-11-10 NOTE — PHYSICAL EXAM
[Normal] : affect appropriate [de-identified] : decreased edema B/L LE; no calf tenderness [de-identified] : left reconstructed breast without palpable abnormality; no dominant mass either breast [de-identified] : no vesicles

## 2020-11-11 LAB
ALBUMIN SERPL ELPH-MCNC: 4.4 G/DL
ALP BLD-CCNC: 58 U/L
ALT SERPL-CCNC: 5 U/L
ANION GAP SERPL CALC-SCNC: 13 MMOL/L
AST SERPL-CCNC: 13 U/L
BILIRUB SERPL-MCNC: 0.3 MG/DL
BUN SERPL-MCNC: 28 MG/DL
CALCIUM SERPL-MCNC: 9.3 MG/DL
CANCER AG27-29 SERPL-ACNC: 32.2 U/ML
CEA SERPL-MCNC: 2.8 NG/ML
CHLORIDE SERPL-SCNC: 105 MMOL/L
CO2 SERPL-SCNC: 24 MMOL/L
CREAT SERPL-MCNC: 1.59 MG/DL
GLUCOSE SERPL-MCNC: 123 MG/DL
POTASSIUM SERPL-SCNC: 4.5 MMOL/L
PROT SERPL-MCNC: 7.1 G/DL
SODIUM SERPL-SCNC: 143 MMOL/L

## 2020-11-12 ENCOUNTER — NON-APPOINTMENT (OUTPATIENT)
Age: 65
End: 2020-11-12

## 2020-11-16 ENCOUNTER — NON-APPOINTMENT (OUTPATIENT)
Age: 65
End: 2020-11-16

## 2020-12-10 ENCOUNTER — OUTPATIENT (OUTPATIENT)
Dept: OUTPATIENT SERVICES | Facility: HOSPITAL | Age: 65
LOS: 1 days | Discharge: HOME | End: 2020-12-10

## 2020-12-10 DIAGNOSIS — Z98.890 OTHER SPECIFIED POSTPROCEDURAL STATES: Chronic | ICD-10-CM

## 2020-12-10 DIAGNOSIS — E89.6 POSTPROCEDURAL ADRENOCORTICAL (-MEDULLARY) HYPOFUNCTION: Chronic | ICD-10-CM

## 2020-12-10 DIAGNOSIS — Z90.5 ACQUIRED ABSENCE OF KIDNEY: Chronic | ICD-10-CM

## 2020-12-10 DIAGNOSIS — Z90.12 ACQUIRED ABSENCE OF LEFT BREAST AND NIPPLE: Chronic | ICD-10-CM

## 2020-12-10 DIAGNOSIS — C50.919 MALIGNANT NEOPLASM OF UNSPECIFIED SITE OF UNSPECIFIED FEMALE BREAST: ICD-10-CM

## 2020-12-11 ENCOUNTER — EMERGENCY (EMERGENCY)
Facility: HOSPITAL | Age: 65
LOS: 1 days | Discharge: HOME | End: 2020-12-11
Attending: EMERGENCY MEDICINE | Admitting: EMERGENCY MEDICINE
Payer: MEDICARE

## 2020-12-11 VITALS
OXYGEN SATURATION: 97 % | RESPIRATION RATE: 16 BRPM | SYSTOLIC BLOOD PRESSURE: 145 MMHG | HEIGHT: 63 IN | TEMPERATURE: 99 F | DIASTOLIC BLOOD PRESSURE: 85 MMHG | WEIGHT: 164.91 LBS | HEART RATE: 87 BPM

## 2020-12-11 DIAGNOSIS — Z98.890 OTHER SPECIFIED POSTPROCEDURAL STATES: Chronic | ICD-10-CM

## 2020-12-11 DIAGNOSIS — Z90.5 ACQUIRED ABSENCE OF KIDNEY: Chronic | ICD-10-CM

## 2020-12-11 DIAGNOSIS — K92.1 MELENA: ICD-10-CM

## 2020-12-11 DIAGNOSIS — E89.6 POSTPROCEDURAL ADRENOCORTICAL (-MEDULLARY) HYPOFUNCTION: Chronic | ICD-10-CM

## 2020-12-11 DIAGNOSIS — Z90.12 ACQUIRED ABSENCE OF LEFT BREAST AND NIPPLE: Chronic | ICD-10-CM

## 2020-12-11 LAB
ALBUMIN SERPL ELPH-MCNC: 3.4 G/DL — SIGNIFICANT CHANGE UP (ref 3.3–5)
ALP SERPL-CCNC: 52 U/L — SIGNIFICANT CHANGE UP (ref 40–120)
ALT FLD-CCNC: 19 U/L — SIGNIFICANT CHANGE UP (ref 10–45)
ANION GAP SERPL CALC-SCNC: 9 MMOL/L — SIGNIFICANT CHANGE UP (ref 5–17)
APPEARANCE UR: CLEAR — SIGNIFICANT CHANGE UP
APTT BLD: 42.7 SEC — HIGH (ref 27.5–35.5)
AST SERPL-CCNC: 21 U/L — SIGNIFICANT CHANGE UP (ref 10–40)
BACTERIA # UR AUTO: NEGATIVE /HPF — SIGNIFICANT CHANGE UP
BASOPHILS # BLD AUTO: 0.02 K/UL — SIGNIFICANT CHANGE UP (ref 0–0.2)
BASOPHILS NFR BLD AUTO: 0.2 % — SIGNIFICANT CHANGE UP (ref 0–2)
BILIRUB SERPL-MCNC: 0.4 MG/DL — SIGNIFICANT CHANGE UP (ref 0.2–1.2)
BILIRUB UR-MCNC: NEGATIVE — SIGNIFICANT CHANGE UP
BLD GP AB SCN SERPL QL: SIGNIFICANT CHANGE UP
BUN SERPL-MCNC: 26 MG/DL — HIGH (ref 7–23)
CALCIUM SERPL-MCNC: 9.2 MG/DL — SIGNIFICANT CHANGE UP (ref 8.4–10.5)
CHLORIDE SERPL-SCNC: 109 MMOL/L — HIGH (ref 96–108)
CO2 SERPL-SCNC: 25 MMOL/L — SIGNIFICANT CHANGE UP (ref 22–31)
COLOR SPEC: YELLOW — SIGNIFICANT CHANGE UP
CREAT SERPL-MCNC: 1.54 MG/DL — HIGH (ref 0.5–1.3)
DIFF PNL FLD: ABNORMAL
EOSINOPHIL # BLD AUTO: 0.19 K/UL — SIGNIFICANT CHANGE UP (ref 0–0.5)
EOSINOPHIL NFR BLD AUTO: 2.3 % — SIGNIFICANT CHANGE UP (ref 0–6)
EPI CELLS # UR: SIGNIFICANT CHANGE UP
GLUCOSE SERPL-MCNC: 114 MG/DL — HIGH (ref 70–99)
GLUCOSE UR QL: NEGATIVE — SIGNIFICANT CHANGE UP
HCT VFR BLD CALC: 34.9 % — SIGNIFICANT CHANGE UP (ref 34.5–45)
HGB BLD-MCNC: 10.7 G/DL — LOW (ref 11.5–15.5)
IMM GRANULOCYTES NFR BLD AUTO: 0.4 % — SIGNIFICANT CHANGE UP (ref 0–1.5)
INR BLD: 1.87 RATIO — HIGH (ref 0.88–1.16)
KETONES UR-MCNC: NEGATIVE — SIGNIFICANT CHANGE UP
LEUKOCYTE ESTERASE UR-ACNC: ABNORMAL
LYMPHOCYTES # BLD AUTO: 1.41 K/UL — SIGNIFICANT CHANGE UP (ref 1–3.3)
LYMPHOCYTES # BLD AUTO: 16.7 % — SIGNIFICANT CHANGE UP (ref 13–44)
MCHC RBC-ENTMCNC: 24.7 PG — LOW (ref 27–34)
MCHC RBC-ENTMCNC: 30.7 GM/DL — LOW (ref 32–36)
MCV RBC AUTO: 80.4 FL — SIGNIFICANT CHANGE UP (ref 80–100)
MONOCYTES # BLD AUTO: 0.44 K/UL — SIGNIFICANT CHANGE UP (ref 0–0.9)
MONOCYTES NFR BLD AUTO: 5.2 % — SIGNIFICANT CHANGE UP (ref 2–14)
NEUTROPHILS # BLD AUTO: 6.35 K/UL — SIGNIFICANT CHANGE UP (ref 1.8–7.4)
NEUTROPHILS NFR BLD AUTO: 75.2 % — SIGNIFICANT CHANGE UP (ref 43–77)
NITRITE UR-MCNC: POSITIVE
OB PNL STL: POSITIVE
PH UR: 7 — SIGNIFICANT CHANGE UP (ref 5–8)
PLATELET # BLD AUTO: 203 K/UL — SIGNIFICANT CHANGE UP (ref 150–400)
POTASSIUM SERPL-MCNC: 3.6 MMOL/L — SIGNIFICANT CHANGE UP (ref 3.5–5.3)
POTASSIUM SERPL-SCNC: 3.6 MMOL/L — SIGNIFICANT CHANGE UP (ref 3.5–5.3)
PROT SERPL-MCNC: 7.5 G/DL — SIGNIFICANT CHANGE UP (ref 6–8.3)
PROT UR-MCNC: 30 MG/DL
PROTHROM AB SERPL-ACNC: 22 SEC — HIGH (ref 10.6–13.6)
RBC # BLD: 4.34 M/UL — SIGNIFICANT CHANGE UP (ref 3.8–5.2)
RBC # FLD: 19.1 % — HIGH (ref 10.3–14.5)
RBC CASTS # UR COMP ASSIST: SIGNIFICANT CHANGE UP /HPF (ref 0–4)
SARS-COV-2 RNA SPEC QL NAA+PROBE: SIGNIFICANT CHANGE UP
SODIUM SERPL-SCNC: 143 MMOL/L — SIGNIFICANT CHANGE UP (ref 135–145)
SP GR SPEC: 1.01 — SIGNIFICANT CHANGE UP (ref 1.01–1.02)
UROBILINOGEN FLD QL: NEGATIVE — SIGNIFICANT CHANGE UP
WBC # BLD: 8.44 K/UL — SIGNIFICANT CHANGE UP (ref 3.8–10.5)
WBC # FLD AUTO: 8.44 K/UL — SIGNIFICANT CHANGE UP (ref 3.8–10.5)
WBC UR QL: SIGNIFICANT CHANGE UP /HPF (ref 0–5)

## 2020-12-11 PROCEDURE — 99284 EMERGENCY DEPT VISIT MOD MDM: CPT | Mod: 25

## 2020-12-11 PROCEDURE — 86900 BLOOD TYPING SEROLOGIC ABO: CPT

## 2020-12-11 PROCEDURE — 85025 COMPLETE CBC W/AUTO DIFF WBC: CPT

## 2020-12-11 PROCEDURE — 87635 SARS-COV-2 COVID-19 AMP PRB: CPT

## 2020-12-11 PROCEDURE — 74176 CT ABD & PELVIS W/O CONTRAST: CPT | Mod: 26

## 2020-12-11 PROCEDURE — 86901 BLOOD TYPING SEROLOGIC RH(D): CPT

## 2020-12-11 PROCEDURE — 80053 COMPREHEN METABOLIC PANEL: CPT

## 2020-12-11 PROCEDURE — 96360 HYDRATION IV INFUSION INIT: CPT

## 2020-12-11 PROCEDURE — 87086 URINE CULTURE/COLONY COUNT: CPT

## 2020-12-11 PROCEDURE — 93005 ELECTROCARDIOGRAM TRACING: CPT

## 2020-12-11 PROCEDURE — 86850 RBC ANTIBODY SCREEN: CPT

## 2020-12-11 PROCEDURE — 93010 ELECTROCARDIOGRAM REPORT: CPT

## 2020-12-11 PROCEDURE — 99284 EMERGENCY DEPT VISIT MOD MDM: CPT | Mod: CS

## 2020-12-11 PROCEDURE — 85730 THROMBOPLASTIN TIME PARTIAL: CPT

## 2020-12-11 PROCEDURE — 36415 COLL VENOUS BLD VENIPUNCTURE: CPT

## 2020-12-11 PROCEDURE — 81001 URINALYSIS AUTO W/SCOPE: CPT

## 2020-12-11 PROCEDURE — 74176 CT ABD & PELVIS W/O CONTRAST: CPT

## 2020-12-11 PROCEDURE — 82272 OCCULT BLD FECES 1-3 TESTS: CPT

## 2020-12-11 PROCEDURE — 85610 PROTHROMBIN TIME: CPT

## 2020-12-11 RX ORDER — CEFUROXIME AXETIL 250 MG
1 TABLET ORAL
Qty: 14 | Refills: 0
Start: 2020-12-11 | End: 2020-12-17

## 2020-12-11 RX ORDER — SODIUM CHLORIDE 9 MG/ML
1000 INJECTION INTRAMUSCULAR; INTRAVENOUS; SUBCUTANEOUS ONCE
Refills: 0 | Status: COMPLETED | OUTPATIENT
Start: 2020-12-11 | End: 2020-12-11

## 2020-12-11 RX ADMIN — SODIUM CHLORIDE 1000 MILLILITER(S): 9 INJECTION INTRAMUSCULAR; INTRAVENOUS; SUBCUTANEOUS at 19:06

## 2020-12-11 RX ADMIN — SODIUM CHLORIDE 1000 MILLILITER(S): 9 INJECTION INTRAMUSCULAR; INTRAVENOUS; SUBCUTANEOUS at 18:06

## 2020-12-11 NOTE — ED PROVIDER NOTE - CLINICAL SUMMARY MEDICAL DECISION MAKING FREE TEXT BOX
1) BRBPR, non-bleeding external hemorrhoids (multiple), non-thrombosed, not painful as per pt - will encourage increase of fluid intake as well as fiber in diet  2)hematurai, hx of ca, concern for mass/neoplasm - will get labs, CT abd pelvis; 1) BRBPR, non-bleeding external hemorrhoids (multiple), non-thrombosed, not painful as per pt - will encourage increase of fluid intake as well as fiber in diet  2)hematuria, hx of ca, concern for mass/neoplasm - will get labs, CT abd pelvis;  Patient signed out to incoming physician.  All decisions regarding the progression of care will be made at their discretion.

## 2020-12-11 NOTE — ED PROVIDER NOTE - OBJECTIVE STATEMENT
Pt is 66 y/o female with PMhx of breast ca with mets to bones nard Rt kidney, s/p Rt sided nephrectomy in 2018, copd here for eval of hematuria and BRBPR x 3 days. Pt states that she has had multiple episodes of hematuria, co clots, also 3 BMs that were covered in blood. There is no dysuria, urinary urgency or frequency reported, pt denies fever, chills , abd pain, n/v/d. on Xarelto for b/l LE DVT; Pt is 64 y/o female with PMhx of breast ca with mets to bones nard Rt kidney, s/p Rt sided nephrectomy in 2018, copd here for eval of hematuria and BRBPR x 3 days. Pt states that she has had multiple episodes of hematuria, c/o clots, also 3 BMs that were covered in blood. There is no dysuria, urinary urgency or frequency reported, pt denies fever, chills , abd pain, n/v/d. on Xarelto for b/l LE DVT;

## 2020-12-11 NOTE — ED ADULT NURSE NOTE - OBJECTIVE STATEMENT
Pt a&ox3 ambulatory to ED c/o bright red blood in urine and stool x3 days. Pt denies having any abdominal pain, flank pain, denies dysuria, denies having n/v, diarrhea, constipation. Denies fever/chills.  Pt states BM and urinary pattern is "normal."

## 2020-12-11 NOTE — ED PROVIDER NOTE - ATTENDING CONTRIBUTION TO CARE
Ryan with [facility] Laws. 1) BRBPR, non-bleeding external hemorrhoids (multiple), non-thrombosed, not painful as per pt - will encourage increase of fluid intake as well as fiber in diet  2)hematuria, hx of ca, concern for mass/neoplasm - will get labs, CT abd pelvis;  Patient signed out to incoming physician.  All decisions regarding the progression of care will be made at their discretion.   I performed a face to face bedside interview with patient regarding history of present illness, review of symptoms and past medical history. I completed an independent physical exam.  I have discussed the patient's plan of care with Physician Assistant (PA). I agree with note as stated above, having amended the EMR as needed to reflect my findings.   This includes History of Present Illness, HIV, Past Medical/Surgical/Family/Social History, Allergies and Home Medications, Review of Systems, Physical Exam, and any Progress Notes during the time I functioned as the attending physician for this patient.

## 2020-12-11 NOTE — ED ADULT TRIAGE NOTE - CHIEF COMPLAINT QUOTE
Pt reports bright red blood in stool and urine x 3 days Pt reports bright red blood in stool and urine x 3 days. Pt also reports sob.

## 2020-12-11 NOTE — ED ADULT NURSE NOTE - PMH
Cardiology History and Physical  Sofi Degroot MRN: [id number]  Age: 62 year old, : 1956  Primary care provider: Noemy Olivera            Assessment and Plan:     Ms. Sofi Degroot is a pleasant 61yo woman with a PMH notable for HTN, hypothyroidism, PFO s/p percutaneous  closure (10/12/2018), TIA (2015),  Retinal artery occlusion, Hepatitis C who presented to the ED with chief complaint of palpitations, found to be in atrial fibrillation. She was initially mildly hypotensive in the ED, improved with fluid administration.     # Paroxysmal Atrial Fibrillation             CHADSVASC = 4 (HTN, female, TIA)   Episodes of palpitations started after recent PFO closure, never had this prior. Euthyroid on medication, no h/o coronary disease or angina, no known valve disease. Does have 1-2 EtOH drinks/day and h/o HTN.   - continue mIVF  - trial small dose dilt this evening given improved BP on floor   - NPO at MN for possible DCCV in AM  - EP consult placed  - continue rivaroxaban   - patient already took metoprolol today; readdress in AM     # PFO s/p Closure 10/2018 Continue plavix   # HLD continue statin   # HTN - holding PTA lisinopril and metoprolol for now. Day team to add back as appropriate.   # Hypothyroidism - continue pta levothyroxine         FEN:  - mIVF at 125cc/h  - NPO at MN  - replete lytes PRN   PPX: on rivaroxaban   Code Status: Full Code     Admit to Cards 1. To be formally staffed in AM.        Lenora Root MD   Internal Medicine PGY3   p [id number]                     Chief Complaint:     Palpitations           History of Present Illness:     History is obtained from the patient.    Ms. Sofi Degroot is a pleasant 61yo woman with a PMH notable for HTN, hypothyroidism, PFO s/p percutaneous  closure (10/12/2018), TIA (2015),  Retinal artery occlusion, Hepatitis C who presented to the ED with chief complaint of palpitations.    She reports onset of palpitations at 1pm this afternoon.  Started when she was at rest, sitting at her desk. No associated shortness of breath, chest pain, nausea, vomiting, numbness/tingling in hands/arms. At present she has a mild frontal headache that started while in the ED - no vision changes, nausea. She has had some light headedness when standing up since being in the ED as well.      Last time it happened was three days ago, and she presented to the ED for it at that time. Prior to that,  she's never had significant palpitations. She spontaneously converted after a few doses of diltiazem, was started on xarelto (which she has been taking) and her metoprolol was increased. Plan was to follow up in EP clinic.     In the ED, afebrile, HR in the 130s-150s, initially vitals 120/80s. Saturating well on room air. Labs notable for troponin 0.047, Cr 0.091. EKG consistent with Afib. She developed some softer blood pressures (80s systolic) and felt dizzy with standing so she was given fluids and admitted for further management.     ----  Seen by EP when she was her a few days ago. It was noted that Sofi had two episodes of tachycardia after her PFO closure. The first time,  improved with vagal maneuvers. The second time, she did not feel well, did not improve with vagal maneuvers so she came to the ED.  She did spontaneously convert after receiving diltiazem. EP evaluated patient, she was prescribed rivaroxaban and her home metoprolol was increased.                Past Medical History:     Past Medical History:   Diagnosis Date     Cervical high risk HPV (human papillomavirus) test positive 09/16/2016, 10/11/17, 10/17/18    (not 16 or 18). 10/11/17: LSIL pap with + HR HPV (not 16 or 18) result.10/17/18: See problem list.      Low risk HPV infection 2009, 2010    NIL pap, + HPV 53     Papanicolaou smear of cervix with low grade squamous intraepithelial lesion (LGSIL) 10/11/2017    10/11/17: LSIL pap with + HR HPV (not 16 or 18) result.     Unspecified hypothyroidism 1999      Unspecified viral hepatitis C without hepatic coma     Tx'd w/ Interferon x 1 year-virus free at 5 years, blood transfusion  related              Past Surgical History:      Past Surgical History:   Procedure Laterality Date     C LIGATE FALLOPIAN TUBE       SURGICAL HISTORY OF -  Right 2008    Right 1st MTPJ, bone spur resection              Social History:     Social History   Substance Use Topics     Smoking status: Former Smoker     Smokeless tobacco: Never Used      Comment: quit 30 years ago     Alcohol use Yes      Comment: 4-5 Drinks Per Week      Reports 1-2 glasses wine/ day          Family History:     Family History   Problem Relation Age of Onset     HEART DISEASE Maternal Grandfather      Breast Cancer Maternal Grandmother      Dx'd age 70     Arthritis Father      Lipids Mother      On meds     Cancer Mother      BCC     HEART DISEASE Brother       of dilated of cardiomyopathy at age 54     Alcohol/Drug Sister      Alcohol/Drug Brother      Depression Sister        Family history reviewed  No known history of coronary disease in parents  Mother had Afib  Brother  from EtoH Cardiomyopathy           Allergies:     Allergies   Allergen Reactions     Latex Swelling     Dental visit--burning sensation in lips and throat, lips felt puffy, eye watery from latex gloves     Pineapple Other (See Comments)              Medications:     Acetaminophen 1000mg PRN pain  Atorvastatin 20mg every day  Clopidogrel 75mg every day  Levothyroxine 100mcg every day  Lisinopril 20mg every day  Magnesium 250mg every day  Metoprolol XL 100mg every day (prior was 50mg every day)   rivaroxaban 20mg every day  (new)          Physical Exam:     Temp:  [98.5  F (36.9  C)] 98.5  F (36.9  C)  Pulse:  [150] 150  Heart Rate:  [122-151] 134  Resp:  [8-20] 16  BP: ()/(61-87) 101/83  SpO2:  [92 %-100 %] 94 %    Gen: Resting comfortably in bed, NAD   HEENT:AT/ NC, PERRL b/l, EOM grossly intact, MMM   PULM/THORAX:  Normal effort on room air. Clear to auscultation bilaterally, no rales/rhonchi/wheezes  CV:  Tachycardic, irregularly irregular. No r/m/g noted   ABD: soft, nontender, nondistended. Normoactive bowel sounds.   EXT: Warm, well perfused. No LE edema noted.  NEURO: AOx4. Speech fluent and articulate. CN II- XII intact. No facial asymmetry. Moves all four extremities equally and purposefully.     Lines PIV           Data:     Labs Reviewed on Admission  Notable for   TSH 1.12  Troponin 0.047 (0.019 on 10/22/2018)      Na 142   Cl 109    BUN 14                    WBC 10.5 // Hgb 15.7 // Plt 198   K 3.7      Co2 22     Cr 0.91  Calcium 9.7         Most Recent Imaging:       Echo:   10/12/2018 DANTE   Interpretation Summary  DANTE done for guidance of PFO closure.  A well seated gore PFO occluder was demonstrated in the inter atrial  septum.There was no residual shunt assessed by color doppler. No pericardial  effusion at the end of the procedure.  The Ejection Fraction is normal estimated at 55-60%.  The right ventricle is normal in size and function.  No significant valvular disease.                        Anemia    Breast cancer  Left breast cancer - 13 years ago, s/p mastectomy, and chemoptherapy  Emphysema lung    Other specified disorders of kidney and ureter    Renal cell carcinoma

## 2020-12-11 NOTE — ED PROVIDER NOTE - NSFOLLOWUPINSTRUCTIONS_ED_ALL_ED_FT
Urinary Tract Infection    A urinary tract infection (UTI) is an infection of any part of the urinary tract, which includes the kidneys, ureters, bladder, and urethra. Risk factors include ignoring your need to urinate, wiping back to front if female, being an uncircumcised male, and having diabetes or a weak immune system. Symptoms include frequent urination, pain or burning with urination, foul smelling urine, cloudy urine, pain in the lower abdomen, blood in the urine, and fever. If you were prescribed an antibiotic medicine, take it as told by your health care provider. Do not stop taking the antibiotic even if you start to feel better.    SEEK IMMEDIATE MEDICAL CARE IF YOU HAVE ANY OF THE FOLLOWING SYMPTOMS: severe back or abdominal pain, fever, inability to keep fluids or medicine down, dizziness/lightheadedness, or a change in mental status.       Follow up with your primary care provider within 48-72 hours.    Take antibiotics as prescribed  Increase fluids.   Worsening pain, new fever, chills, nausea, vomiting return to ER Follow up with the a gastroenterologist for the bloody stool  Follow up with the urologist for the UTI  Follow up with your primary care provider within 48-72 hours.    Take antibiotics as prescribed  Increase fluids.   Worsening pain, new fever, chills, nausea, vomiting return to ER    Urinary Tract Infection    A urinary tract infection (UTI) is an infection of any part of the urinary tract, which includes the kidneys, ureters, bladder, and urethra. Risk factors include ignoring your need to urinate, wiping back to front if female, being an uncircumcised male, and having diabetes or a weak immune system. Symptoms include frequent urination, pain or burning with urination, foul smelling urine, cloudy urine, pain in the lower abdomen, blood in the urine, and fever. If you were prescribed an antibiotic medicine, take it as told by your health care provider. Do not stop taking the antibiotic even if you start to feel better.    SEEK IMMEDIATE MEDICAL CARE IF YOU HAVE ANY OF THE FOLLOWING SYMPTOMS: severe back or abdominal pain, fever, inability to keep fluids or medicine down, dizziness/lightheadedness, or a change in mental status.

## 2020-12-11 NOTE — ED PROVIDER NOTE - CARE PROVIDER_API CALL
None known CARLOS DIAMOND  GASTROENTEROLOGY  [address], [place] [address]  Phone: [phone]  Fax: [phone]  Follow Up Time:

## 2020-12-11 NOTE — ED PROVIDER NOTE - PATIENT PORTAL LINK FT
You can access the FollowMyHealth Patient Portal offered by [facility] by registering at the following website: [email or web address]. By joining [facility]’s FollowMyHealth portal, you will also be able to view your health information using other applications (apps) compatible with our system.

## 2020-12-12 LAB — NRBC # BLD: 0 /100 WBCS — SIGNIFICANT CHANGE UP (ref 0–0)

## 2020-12-13 LAB
CULTURE RESULTS: SIGNIFICANT CHANGE UP
SPECIMEN SOURCE: SIGNIFICANT CHANGE UP

## 2020-12-15 NOTE — ED ADULT NURSE REASSESSMENT NOTE - CONDITION
improved Rhomboid Transposition Flap Text: The defect edges were debeveled with a #15 scalpel blade.  Given the location of the defect and the proximity to free margins a rhomboid transposition flap was deemed most appropriate.  Using a sterile surgical marker, an appropriate rhomboid flap was drawn incorporating the defect.    The area thus outlined was incised deep to adipose tissue with a #15 scalpel blade.  The skin margins were undermined to an appropriate distance in all directions utilizing iris scissors.

## 2020-12-17 ENCOUNTER — APPOINTMENT (OUTPATIENT)
Dept: HEMATOLOGY ONCOLOGY | Facility: CLINIC | Age: 65
End: 2020-12-17

## 2020-12-18 ENCOUNTER — NON-APPOINTMENT (OUTPATIENT)
Age: 65
End: 2020-12-18

## 2020-12-22 ENCOUNTER — NON-APPOINTMENT (OUTPATIENT)
Age: 65
End: 2020-12-22

## 2020-12-22 ENCOUNTER — APPOINTMENT (OUTPATIENT)
Dept: NEPHROLOGY | Facility: CLINIC | Age: 65
End: 2020-12-22
Payer: MEDICARE

## 2020-12-22 DIAGNOSIS — J44.9 CHRONIC OBSTRUCTIVE PULMONARY DISEASE, UNSPECIFIED: ICD-10-CM

## 2020-12-22 DIAGNOSIS — N18.32 CHRONIC KIDNEY DISEASE, STAGE 3B: ICD-10-CM

## 2020-12-22 DIAGNOSIS — R31.0 GROSS HEMATURIA: ICD-10-CM

## 2020-12-22 PROCEDURE — 99442: CPT | Mod: 95

## 2020-12-22 NOTE — HISTORY OF PRESENT ILLNESS
[Home] : at home, [unfilled] , at the time of the visit. [Medical Office: ([facility])___] : at the medical office located in  [Verbal consent obtained from patient] : the patient, [unfilled] [FreeTextEntry1] : The patient was originally scheduled for a tele visit but she could not log on to the web site with her phone.  The visit was changed to a telephone visit. Prior to the telephone visit I have reviewed the patient's records in [facility] and the record of her recent visit to the ED at [facility] for blood per rectum.  \par Background: the patient is currently on treatment w/ Everolimus (Afinitor) and an aromatase inhibitor for metastatic breast cancer.  Noted is  a recent biopsy of the right iliac bone which showed ER positive/NY positive adenocarcinoma. \par The patient is aware that the reason for the referral to nephrology is a creatinine of 1.59 and a BUN of 28 on labs done on November 10.  Of note, the patient creatinine has fluctuated from 1.26 to 1.46 since August.  Review of her labs dating back to the time of her nephrectomy for renal cell carcinoma (February of 2019), showed a creatinine of around 1.5. The patient has no history of proteinuria or hypertension.

## 2020-12-22 NOTE — REASON FOR VISIT
[Consultation] : a consultation visit [FreeTextEntry1] : Referred for evaluatio of an elevated creatinine.

## 2020-12-22 NOTE — ASSESSMENT
[FreeTextEntry1] : 1. Elevated creatinine.  The current creatinine is consisted w/ unilateral nephrectomy done when the patient was 65 yo.   The patient is not taking OTC NSAIDs (she is on oxycodone for pain).  Her current regimen include Everolimus which may cause proteinuria and renal dysfunction.  Past urinalysis have no shown proteinuria and the dose of Everolimus is relative low. She had a recent CT of the abdomen which did not show hydronephrosis of the remnant right kidney.  Extensive bone metastasis but no retroperitoneal adenopathy were noted.  \par In summary, although I cannot exclude Everolimus nephrotoxicity, it is more likely that her stage 3bA1 CKD is related to loss of nephron mass from the nephrectomy.  This should not result in progressive renal dysfunction.  However, serial creatinine and urine for protein should be followed in the future.  \par 2. Question of gross hematuria.  Review of ED records showed that only small blood was present in the urine at the time the patient presented w/ what looked likely hemorrhoidal bleeding.  She is on Xarelto for DVT.  The patient has history of smoking.  If future UA show hematuria, urine cytology or/and cystocopy are indicated to r/o transitional cell carcinoma. \par 3. DVT, history of IVC filter.  May consider switching from Xarelto (which is renally eliminated) to Eliquis 2.5 mg twice per day. \par 4. Breast cancer metastatic to bone. Continue w/ current therapy.\par 5. S/P radical left nephrectomy in February of 2019.  Recent CT scan in December shows no lesion in the right kidney. \par PLAN: will send note to referring oncologist, Dr. Db Ochoa about need for follow up creatinine and urine as well about changing from Xarelto to Eliquis.  Will also send a note to PCP, Dr. Lebron.\par Follow up as needed. \par The patient showed a good understanding of the above.

## 2020-12-22 NOTE — REVIEW OF SYSTEMS
[Feeling Tired] : feeling tired [SOB on Exertion] : shortness of breath during exertion [As Noted in HPI] : as noted in HPI [Limb Pain] : limb pain [Negative] : Psychiatric [FreeTextEntry6] : History of COPD

## 2020-12-23 ENCOUNTER — APPOINTMENT (OUTPATIENT)
Dept: INFUSION THERAPY | Facility: HOSPITAL | Age: 65
End: 2020-12-23

## 2021-01-04 ENCOUNTER — RX RENEWAL (OUTPATIENT)
Age: 66
End: 2021-01-04

## 2021-01-14 ENCOUNTER — OUTPATIENT (OUTPATIENT)
Dept: OUTPATIENT SERVICES | Facility: HOSPITAL | Age: 66
LOS: 1 days | Discharge: HOME | End: 2021-01-14

## 2021-01-14 DIAGNOSIS — Z90.12 ACQUIRED ABSENCE OF LEFT BREAST AND NIPPLE: Chronic | ICD-10-CM

## 2021-01-14 DIAGNOSIS — Z90.5 ACQUIRED ABSENCE OF KIDNEY: Chronic | ICD-10-CM

## 2021-01-14 DIAGNOSIS — E89.6 POSTPROCEDURAL ADRENOCORTICAL (-MEDULLARY) HYPOFUNCTION: Chronic | ICD-10-CM

## 2021-01-14 DIAGNOSIS — C50.919 MALIGNANT NEOPLASM OF UNSPECIFIED SITE OF UNSPECIFIED FEMALE BREAST: ICD-10-CM

## 2021-01-14 DIAGNOSIS — Z98.890 OTHER SPECIFIED POSTPROCEDURAL STATES: Chronic | ICD-10-CM

## 2021-01-20 ENCOUNTER — APPOINTMENT (OUTPATIENT)
Dept: INFUSION THERAPY | Facility: HOSPITAL | Age: 66
End: 2021-01-20

## 2021-01-20 ENCOUNTER — RESULT REVIEW (OUTPATIENT)
Age: 66
End: 2021-01-20

## 2021-01-20 ENCOUNTER — APPOINTMENT (OUTPATIENT)
Dept: HEMATOLOGY ONCOLOGY | Facility: CLINIC | Age: 66
End: 2021-01-20
Payer: MEDICARE

## 2021-01-20 VITALS
BODY MASS INDEX: 28.28 KG/M2 | TEMPERATURE: 96.6 F | SYSTOLIC BLOOD PRESSURE: 122 MMHG | HEART RATE: 92 BPM | HEIGHT: 63.39 IN | RESPIRATION RATE: 17 BRPM | DIASTOLIC BLOOD PRESSURE: 84 MMHG | OXYGEN SATURATION: 98 % | WEIGHT: 161.6 LBS

## 2021-01-20 DIAGNOSIS — C64.2 MALIGNANT NEOPLASM OF LEFT KIDNEY, EXCEPT RENAL PELVIS: ICD-10-CM

## 2021-01-20 DIAGNOSIS — C79.51 SECONDARY MALIGNANT NEOPLASM OF BONE: ICD-10-CM

## 2021-01-20 DIAGNOSIS — Z95.828 PRESENCE OF OTHER VASCULAR IMPLANTS AND GRAFTS: ICD-10-CM

## 2021-01-20 LAB
BASOPHILS # BLD AUTO: 0.02 K/UL — SIGNIFICANT CHANGE UP (ref 0–0.2)
BASOPHILS NFR BLD AUTO: 0.3 % — SIGNIFICANT CHANGE UP (ref 0–2)
EOSINOPHIL # BLD AUTO: 0.17 K/UL — SIGNIFICANT CHANGE UP (ref 0–0.5)
EOSINOPHIL NFR BLD AUTO: 2.2 % — SIGNIFICANT CHANGE UP (ref 0–6)
HCT VFR BLD CALC: 39 % — SIGNIFICANT CHANGE UP (ref 34.5–45)
HGB BLD-MCNC: 11.8 G/DL — SIGNIFICANT CHANGE UP (ref 11.5–15.5)
IMM GRANULOCYTES NFR BLD AUTO: 0.4 % — SIGNIFICANT CHANGE UP (ref 0–1.5)
LYMPHOCYTES # BLD AUTO: 1.25 K/UL — SIGNIFICANT CHANGE UP (ref 1–3.3)
LYMPHOCYTES # BLD AUTO: 16.1 % — SIGNIFICANT CHANGE UP (ref 13–44)
MCHC RBC-ENTMCNC: 24.6 PG — LOW (ref 27–34)
MCHC RBC-ENTMCNC: 30.3 G/DL — LOW (ref 32–36)
MCV RBC AUTO: 81.4 FL — SIGNIFICANT CHANGE UP (ref 80–100)
MONOCYTES # BLD AUTO: 0.4 K/UL — SIGNIFICANT CHANGE UP (ref 0–0.9)
MONOCYTES NFR BLD AUTO: 5.2 % — SIGNIFICANT CHANGE UP (ref 2–14)
NEUTROPHILS # BLD AUTO: 5.88 K/UL — SIGNIFICANT CHANGE UP (ref 1.8–7.4)
NEUTROPHILS NFR BLD AUTO: 75.8 % — SIGNIFICANT CHANGE UP (ref 43–77)
NRBC # BLD: 0 /100 WBCS — SIGNIFICANT CHANGE UP (ref 0–0)
PLATELET # BLD AUTO: 204 K/UL — SIGNIFICANT CHANGE UP (ref 150–400)
RBC # BLD: 4.79 M/UL — SIGNIFICANT CHANGE UP (ref 3.8–5.2)
RBC # FLD: 17.6 % — HIGH (ref 10.3–14.5)
WBC # BLD: 7.75 K/UL — SIGNIFICANT CHANGE UP (ref 3.8–10.5)
WBC # FLD AUTO: 7.75 K/UL — SIGNIFICANT CHANGE UP (ref 3.8–10.5)

## 2021-01-20 PROCEDURE — 99214 OFFICE O/P EST MOD 30 MIN: CPT

## 2021-01-20 NOTE — HISTORY OF PRESENT ILLNESS
[Disease: _____________________] : Disease: [unfilled] [M: ___] : M[unfilled] [AJCC Stage: ____] : AJCC Stage: [unfilled] [de-identified] : 3/2003–Stage IIB left breast cancer, ER positive/AR positive/HER-2 negative–status post left modified radical mastectomy with reconstruction.  1/15 lymph nodes positive for metastatic carcinoma with focal extranodal extension.  Status post AC-T--> Tamoxifen x 5 years.  \par 6/2018-Presented with general weakness and leg pain.  Diagnosed with clear cell renal carcinoma  s/p Radical Left Nephrectomy, Para Aortic LN dissection,  c/b Splenic Laceration w/ bleeding. Right iliac bone biopsy of bone lesion was consistent with metastatic breast cancer–ER positive/AR positive/HER-2 unknown.\par Patient was begun on Letrozole/Ibrance/Xgeva.\par 3/2019–Status post left VATS procedure with pleural decortication.  Left pleural fluid cytology and pleural pathology negative for malignancy.\par 4/2020-CT scan of the chest/abdomen/pelvis revealed new left lower lung nodule and decreased right apex nodules, extensive bony metastases slightly increased. Changed to Aromasin, with Afinitor added 7/2020.\par Patient had been under the oncologic care of Dr[facility].\par 8/2020–Patient wished to transfer [place]ologic [place] [facility]. LE venous duplex study-B/L DVT. Begun on Xarelto.\par 12/2020–CT abdomen/pelvis–extensive osseous metastases.\par \par \par \par \par  [de-identified] : Infiltrating lobular carcinoma [de-identified] : 7/2020 :  CA 15-3=24 U/mL     CEA =2.9 ng/mL         [de-identified] : Saw nephrologist.\par Takes oxycodone for bony/joint aches only once a day now at night-helps with the pain.\par Stopped the Lexapro-feeling well.\par She did not follow up with an appt with Dr. Mathias as previously requested. No current c/o hematuria.\par Saw dentist Dr. Burciaga for clearance for Xgeva-told evaluation was fine.\par No  nausea/emesis/diarrhea or constipation . No melena, nose bleeds. \par Continues to take Aromasin/Afinitor.\par No cough, chills, fever, night sweats, mouth sores. No dental/gum/jaw complaints.\par

## 2021-01-20 NOTE — ASSESSMENT
[FreeTextEntry1] : #1) Patient had a remote h/o breast cancer. She received adjuvant treatment (AC-T ) + 5 years of Tamoxifen with Dr. Cash Bojorquez and subsequently transferred care to Dr. ALTAGRACIA Shields at [facility].  In 2018 she was hospitalized for and was found to have, bone metastasis and renal mass .  6/2018 She underwent radical left nephrectomy for renal cell carcinoma, with PALND, and a bx. of the rIght iliac bone revealed metastatic breast cancer .  She was placed on Femara/Ibrance.    4/22/20  Interval CT Chest/Abdomen/Pelvis revealed new left lower lobe nodule with interval decrease in size of small nodules at the right lung apex, extensive osseous metastatic disease slightly increased, stable IVC filter.   She was prescribed Aromasin/Afinitor on  8/10/20, which she consents to continue.  Follow-up chest imaging ordered to assess status of lung nodules.\par Reviewed potential benefits/side effects of Xgeva–patient consents to treatment.\par #2) history of bilateral DVTs, with history of IVC filter–patient remains on anticoagulation.  No overt bleeding noted clinically at present.\par Patient was given the opportunity to ask questions. Her questions have been answered to her apparent satisfaction at this time.\par \par Time was spent reviewing lab work, CT scan results and discussing treatment plans with potential benefits/side effects.\par \par

## 2021-01-20 NOTE — PHYSICAL EXAM
[Normal] : affect appropriate [de-identified] : decreased edema B/L LE; no calf tenderness [de-identified] : left reconstructed breast without palpable abnormality; no dominant mass either breast [de-identified] : no vesicles

## 2021-01-20 NOTE — RESULTS/DATA
[FreeTextEntry1] : \par EXAM: CT ABDOMEN AND PELVIS\par \par PROCEDURE DATE: 12/11/2020\par \par \par INTERPRETATION: CLINICAL INFORMATION: Hematuria\par \par COMPARISON: 09/16/2020.\par \par PROCEDURE:\par CT of the Abdomen and Pelvis was performed without intravenous contrast.\par Intravenous contrast: None.\par Oral contrast: None.\par Sagittal and coronal reformats were performed.\par \par FINDINGS:\par LOWER CHEST: Trace scarlike opacities left lung base. Bilateral breast implants.\par \par LIVER: Within normal limits.\par BILE DUCTS: Normal caliber.\par GALLBLADDER: Cholelithiasis.\par SPLEEN: Stable residual findings related to prior splenic trauma including a subcapsular hematoma and small sized spleen.\par PANCREAS: Within normal limits.\par ADRENALS: Within normal limits.\par KIDNEYS/URETERS: Prior left nephrectomy.\par \par BLADDER: Limited evaluation due to artifacts from fixation hardware.\par REPRODUCTIVE ORGANS: Uterus and adnexa within normal limits.\par \par BOWEL: No bowel obstruction. Appendix is normal. Uncomplicated predominantly sigmoid diverticulosis.\par PERITONEUM: No ascites.\par VESSELS: Atherosclerotic changes. IVC filter.\par RETROPERITONEUM/LYMPH NODES: No lymphadenopathy.\par ABDOMINAL WALL: Within normal limits.\par BONES: Extensive sclerotic metastases. Surgical fixation of the right pelvic bones.\par \par IMPRESSION:\par No CT finding to explain hematuria on this noncontrast enhanced study.\par \par Extensive osseous metastases.\par \par Additional findings as above.\par \par \par \par \par \par \par

## 2021-01-21 ENCOUNTER — NON-APPOINTMENT (OUTPATIENT)
Age: 66
End: 2021-01-21

## 2021-01-21 LAB — CANCER AG27-29 SERPL-ACNC: 32.8 U/ML

## 2021-01-22 ENCOUNTER — APPOINTMENT (OUTPATIENT)
Dept: UROLOGY | Facility: CLINIC | Age: 66
End: 2021-01-22

## 2021-01-22 LAB
ALBUMIN SERPL ELPH-MCNC: 4.4 G/DL
ALP BLD-CCNC: 57 U/L
ALT SERPL-CCNC: 5 U/L
ANION GAP SERPL CALC-SCNC: 24 MMOL/L
AST SERPL-CCNC: 17 U/L
BILIRUB SERPL-MCNC: 0.2 MG/DL
BUN SERPL-MCNC: 30 MG/DL
CALCIUM SERPL-MCNC: 10.3 MG/DL
CEA SERPL-MCNC: 3.6 NG/ML
CHLORIDE SERPL-SCNC: 104 MMOL/L
CO2 SERPL-SCNC: 19 MMOL/L
CREAT SERPL-MCNC: 1.59 MG/DL
GLUCOSE SERPL-MCNC: 119 MG/DL
POTASSIUM SERPL-SCNC: 4.6 MMOL/L
PROT SERPL-MCNC: 7.2 G/DL
SODIUM SERPL-SCNC: 147 MMOL/L

## 2021-01-25 ENCOUNTER — RX RENEWAL (OUTPATIENT)
Age: 66
End: 2021-01-25

## 2021-02-04 ENCOUNTER — RX RENEWAL (OUTPATIENT)
Age: 66
End: 2021-02-04

## 2021-02-10 NOTE — ASU PREOP CHECKLIST - IV STARTED
Subjective  Answers for HPI/ROS submitted by the patient on 2/3/2021   Diabetes problem  What is the primary reason for your visit?: Diabetes      Patient ID: Vaughn Huynh is a 53 y.o. male. Patient is here for management of multiple medical problems.     Chief Complaint   Patient presents with   • Diabetes     History of Present Illness     DM   bs up since christmass.    Bs today 153 today.    In house study for antony neg.  Pt concidering in home study to further eval.   Early am bs elevated.  antony vs low bs at early am.   Pt sleeping in reclined and sleeps better.  Snores.        The following portions of the patient's history were reviewed and updated as appropriate: allergies, current medications, past family history, past medical history, past social history, past surgical history and problem list.    Review of Systems   Constitutional: Negative for fatigue.   Neurological: Positive for weakness.   All other systems reviewed and are negative.      Current Outpatient Medications:   •  allopurinol (Zyloprim) 100 MG tablet, Take 1 tablet by mouth Daily., Disp: 90 tablet, Rfl: 3  •  apixaban (ELIQUIS) 5 MG tablet tablet, Take 1 tablet by mouth Every 12 (Twelve) Hours. Indications: Atrial Fibrillation, Disp: 180 tablet, Rfl: 3  •  aspirin 81 MG tablet, Take  by mouth daily., Disp: , Rfl:   •  esomeprazole (nexIUM) 40 MG capsule, Take 40 mg by mouth Every Morning Before Breakfast., Disp: , Rfl:   •  glucose blood test strip, E11.9 Use what is covered by insurance, Disp: 100 each, Rfl: 12  •  lisinopril-hydrochlorothiazide (PRINZIDE,ZESTORETIC) 20-12.5 MG per tablet, Take 1 tablet by mouth 2 (Two) Times a Day., Disp: 180 tablet, Rfl: 3  •  ONE TOUCH LANCETS misc, E11.9 use what is covered by insurance., Disp: 100 each, Rfl: 12  •  rosuvastatin (CRESTOR) 10 MG tablet, Take 1 tablet by mouth Every Night., Disp: 90 tablet, Rfl: 3  •  metFORMIN (Glucophage) 500 MG tablet, Take 1 tablet by mouth 2 (Two) Times a Day With  "Meals., Disp: 180 tablet, Rfl: 3  •  Semaglutide (Rybelsus) 3 MG tablet, Take 3 mg by mouth Daily., Disp: 30 tablet, Rfl: 11    Objective      Blood pressure 122/80, pulse 90, temperature 97.5 °F (36.4 °C), resp. rate 16, height 188 cm (74\"), weight 93.4 kg (206 lb), SpO2 98 %.    Physical Exam     General Appearance:    Alert, cooperative, no distress, appears stated age   Head:    Normocephalic, without obvious abnormality, atraumatic   Eyes:    PERRL, conjunctiva/corneas clear, EOM's intact   Ears:    Normal TM's and external ear canals, both ears   Nose:   Nares normal, septum midline, mucosa normal, no drainage   or sinus tenderness   Throat:   Lips, mucosa, and tongue normal; teeth and gums normal   Neck:   Supple, symmetrical, trachea midline, no adenopathy;        thyroid:  No enlargement/tenderness/nodules; no carotid    bruit or JVD   Back:     Symmetric, no curvature, ROM normal, no CVA tenderness   Lungs:     Clear to auscultation bilaterally, respirations unlabored   Chest wall:    No tenderness or deformity   Heart:    Regular rate and rhythm, S1 and S2 normal, no murmur,        rub or gallop   Abdomen:     Soft, non-tender, bowel sounds active all four quadrants,     no masses, no organomegaly   Extremities:   Extremities normal, atraumatic, no cyanosis or edema   Pulses:   2+ and symmetric all extremities   Skin:   Skin color, texture, turgor normal, no rashes or lesions   Lymph nodes:   Cervical, supraclavicular, and axillary nodes normal   Neurologic:   CNII-XII intact. Normal strength, sensation and reflexes       throughout      Results for orders placed or performed in visit on 10/07/20   Uric acid    Specimen: Blood   Result Value Ref Range    Uric Acid 5.9 3.4 - 7.0 mg/dL         Assessment/Plan   Increase exercise.    Hx of back surgery. Unable to extend the left foot now. No pain.    CVA 1 year ago. No etiology.            Diagnoses and all orders for this visit:    1. Type 2 diabetes " mellitus with hyperglycemia, without long-term current use of insulin (CMS/[facility]) (Primary)  -     Vitamin B12  -     CBC & Differential  -     Lipid Panel  -     Comprehensive Metabolic Panel  -     TSH  -     Hemoglobin A1c  -     T4, Free  -     MicroAlbumin, Urine, Random - Urine, Clean Catch  -     PSA Screen  -     Uric Acid    2. Prostate cancer screening  -     PSA Screen    3. Hyperuricemia  -     Uric Acid    Other orders  -     metFORMIN (Glucophage) 500 MG tablet; Take 1 tablet by mouth 2 (Two) Times a Day With Meals.  Dispense: 180 tablet; Refill: 3  -     Semaglutide (Rybelsus) 3 MG tablet; Take 3 mg by mouth Daily.  Dispense: 30 tablet; Refill: 11      Return in about 3 months (around 5/10/2021).          There are no Patient Instructions on file for this visit.     Sen Nichole MD    Assessment/Plan        yes

## 2021-02-22 ENCOUNTER — OUTPATIENT (OUTPATIENT)
Dept: OUTPATIENT SERVICES | Facility: HOSPITAL | Age: 66
LOS: 1 days | Discharge: HOME | End: 2021-02-22

## 2021-02-22 DIAGNOSIS — Z98.890 OTHER SPECIFIED POSTPROCEDURAL STATES: Chronic | ICD-10-CM

## 2021-02-22 DIAGNOSIS — Z90.12 ACQUIRED ABSENCE OF LEFT BREAST AND NIPPLE: Chronic | ICD-10-CM

## 2021-02-22 DIAGNOSIS — E89.6 POSTPROCEDURAL ADRENOCORTICAL (-MEDULLARY) HYPOFUNCTION: Chronic | ICD-10-CM

## 2021-02-22 DIAGNOSIS — Z90.5 ACQUIRED ABSENCE OF KIDNEY: Chronic | ICD-10-CM

## 2021-02-22 DIAGNOSIS — C50.919 MALIGNANT NEOPLASM OF UNSPECIFIED SITE OF UNSPECIFIED FEMALE BREAST: ICD-10-CM

## 2021-03-02 ENCOUNTER — OUTPATIENT (OUTPATIENT)
Dept: OUTPATIENT SERVICES | Facility: HOSPITAL | Age: 66
LOS: 1 days | End: 2021-03-02
Payer: MEDICARE

## 2021-03-02 ENCOUNTER — RESULT REVIEW (OUTPATIENT)
Age: 66
End: 2021-03-02

## 2021-03-02 ENCOUNTER — APPOINTMENT (OUTPATIENT)
Dept: CT IMAGING | Facility: HOSPITAL | Age: 66
End: 2021-03-02
Payer: MEDICARE

## 2021-03-02 DIAGNOSIS — Z90.5 ACQUIRED ABSENCE OF KIDNEY: Chronic | ICD-10-CM

## 2021-03-02 DIAGNOSIS — C50.919 MALIGNANT NEOPLASM OF UNSPECIFIED SITE OF UNSPECIFIED FEMALE BREAST: ICD-10-CM

## 2021-03-02 DIAGNOSIS — Z98.890 OTHER SPECIFIED POSTPROCEDURAL STATES: Chronic | ICD-10-CM

## 2021-03-02 DIAGNOSIS — E89.6 POSTPROCEDURAL ADRENOCORTICAL (-MEDULLARY) HYPOFUNCTION: Chronic | ICD-10-CM

## 2021-03-02 DIAGNOSIS — Z90.12 ACQUIRED ABSENCE OF LEFT BREAST AND NIPPLE: Chronic | ICD-10-CM

## 2021-03-02 LAB
BASOPHILS # BLD AUTO: 0.03 K/UL
BASOPHILS NFR BLD AUTO: 0.4 %
EOSINOPHIL # BLD AUTO: 0.13 K/UL
EOSINOPHIL NFR BLD AUTO: 1.7 %
HCT VFR BLD CALC: 43.3 %
HGB BLD-MCNC: 13.2 G/DL
IMM GRANULOCYTES NFR BLD AUTO: 0.4 %
LYMPHOCYTES # BLD AUTO: 1.35 K/UL
LYMPHOCYTES NFR BLD AUTO: 17.9 %
MAN DIFF?: NORMAL
MCHC RBC-ENTMCNC: 25.1 PG
MCHC RBC-ENTMCNC: 30.5 GM/DL
MCV RBC AUTO: 82.5 FL
MONOCYTES # BLD AUTO: 0.46 K/UL
MONOCYTES NFR BLD AUTO: 6.1 %
NEUTROPHILS # BLD AUTO: 5.54 K/UL
NEUTROPHILS NFR BLD AUTO: 73.5 %
PLATELET # BLD AUTO: 247 K/UL
RBC # BLD: 5.25 M/UL
RBC # FLD: 17.8 %
WBC # FLD AUTO: 7.54 K/UL

## 2021-03-02 PROCEDURE — G1004: CPT

## 2021-03-02 PROCEDURE — 71250 CT THORAX DX C-: CPT

## 2021-03-02 PROCEDURE — 71250 CT THORAX DX C-: CPT | Mod: 26,MG

## 2021-03-03 LAB
ALBUMIN SERPL ELPH-MCNC: 4.3 G/DL
ALP BLD-CCNC: 58 U/L
ALT SERPL-CCNC: 7 U/L
ANION GAP SERPL CALC-SCNC: 14 MMOL/L
AST SERPL-CCNC: 19 U/L
BILIRUB SERPL-MCNC: 0.3 MG/DL
BUN SERPL-MCNC: 23 MG/DL
CALCIUM SERPL-MCNC: 8.4 MG/DL
CHLORIDE SERPL-SCNC: 110 MMOL/L
CO2 SERPL-SCNC: 22 MMOL/L
CREAT SERPL-MCNC: 1.29 MG/DL
GLUCOSE SERPL-MCNC: 157 MG/DL
POTASSIUM SERPL-SCNC: 4.5 MMOL/L
PROT SERPL-MCNC: 6.9 G/DL
SODIUM SERPL-SCNC: 146 MMOL/L

## 2021-03-03 NOTE — PROGRESS NOTE ADULT - PROBLEM SELECTOR PLAN 4
Diagnosis Orders   1. Loose stools  Blood Occult Stool Diagnostic    Clostridium Difficile Toxin/Antigen    Giardia antigen    Comprehensive Metabolic Panel    TSH without Reflex    CBC Auto Differential    Gastrointestinal Panel by DNA   2. Type 2 diabetes mellitus without complication, without long-term current use of insulin ([facility])  POCT glycosylated hemoglobin (Hb A1C)     Return for MAW exam due, also 3 month diabetic appt due . Patient Instructions   Hold off on treatment until we get some answers regarding the loose stools. Especially since he is tolerating this so well and they are decreasing in frequency. Diabetic control seems good with hemoglobin A1c 5.6 today. Remainder of laboratories etc. will be done at his routine visit      Subjective:      Patient ID: Anne Marie Zacarias is a 77 y.o. male who presents for:  Chief Complaint   Patient presents with    Diarrhea     x 3 days (pt has chrones as well.) but water stool  3times today    [address] [facility]     Please schedule for awv, and also diabetic check up        Started Monday night with brown soft stool and water. Now only water. Not frequent, 3-4 times yesterday, but liquid. No nausea, vomit, fever, bloating. He ate out over the weekend. No relationship to eating in timing. No problems after feb colonoscopy. Sometimes it seems like their might be flecks of blood    He has not started stelara for crohns yet. Current Outpatient Medications on File Prior to Visit   Medication Sig Dispense Refill    ciclopirox (PENLAC) 8 % solution Apply topically nightly. 1 Bottle 2    lisinopril (PRINIVIL;ZESTRIL) 20 MG tablet Take 1 tablet by mouth daily 90 tablet 3    metoprolol tartrate (LOPRESSOR) 50 MG tablet Take 1.5 po bid 15 tablet 0    azithromycin (ZITHROMAX) 250 MG tablet 2 tabs by mouth first day. 1 tab daily thereafter until gone.  6 tablet 0    pravastatin (PRAVACHOL) 40 MG tablet TAKE 1 TABLET NIGHTLY 90 tablet 3    terbinafine (LAMISIL) 250 MG tablet Take 1 tablet by mouth daily 84 tablet 0    nystatin-triamcinolone (MYCOLOG II) 423082-4.1 UNIT/GM-% cream Apply topically 2 times daily. 15 g 1    SPIRIVA HANDIHALER 18 MCG inhalation capsule INHALE THE CONTENTS OF 1 CAPSULE DAILY 90 capsule 3    furosemide (LASIX) 20 MG tablet Take 1 tablet by mouth daily 30 tablet 2    cetirizine (ZYRTEC) 5 MG tablet TAKE ONE TABLET BY MOUTH  PRN      inFLIXimab (REMICADE) 100 MG injection Infuse 5 mg/kg intravenously See Admin Instructions      montelukast (SINGULAIR) 10 MG tablet Take 1 tablet by mouth nightly TAKE 1 TABLET NIGHTLY 90 tablet 3    gabapentin (NEURONTIN) 300 MG capsule Take 1 capsule by mouth daily for 90 days. 90 capsule 1    albuterol (PROVENTIL) (2.5 MG/3ML) 0.083% nebulizer solution Take 3 mLs by nebulization every 6 hours as needed for Wheezing 120 each 3    budesonide-formoterol (SYMBICORT) 160-4.5 MCG/ACT AERO Inhale 2 puffs into the lungs 2 times daily 2 each LOT [id number] 3 Inhaler 1    albuterol sulfate  (90 Base) MCG/ACT inhaler Inhale 2 puffs into the lungs every 6 hours as needed for Wheezing or Shortness of Breath 3 Inhaler 1    doxazosin (CARDURA) 4 MG tablet TAKE ONE-HALF (1/2) TABLET TWICE A DAY 90 tablet 4    nitroGLYCERIN (NITROSTAT) 0.4 MG SL tablet DISSOLVE 1 TABLET UNDER THE TONGUE EVERY 5 MINUTES AS NEEDED FOR CHEST PAIN. MAXIMUM OF 3 TABLETS 25 tablet 2    testosterone (ANDROGEL; TESTIM) 50 MG/5GM (1%) GEL 1% gel Alternate one packet daily with 2 packets daily every other day 135 Package 0    fluticasone (FLONASE) 50 MCG/ACT nasal spray 1 spray by Each Nare route daily 1 Spray in each nostril 3 Bottle 1    CPAP Machine MISC New cpap supplies mask hose filters etc 1 each 0    Saccharomyces boulardii (PROBIOTIC) 250 MG CAPS Take 1 tablet by mouth daily      esomeprazole Magnesium (NEXIUM) 20 MG PACK Take 20 mg by mouth daily      aspirin 81 MG tablet Take 81 mg by mouth daily. Abdomen is soft, non-distended. No clinical e/o obstruction.  - c/w Miralax, senna, docusate  mesalamine (PENTASA) 250 MG CR capsule Take 500 mg by mouth 4 times daily. No current facility-administered medications on file prior to visit. Past Medical History:   Diagnosis Date    A-fib [facility])     Abnormal EMG 12/09/2015    Actinic keratoses     Actinic keratoses     Acute diastolic congestive heart failure (HCC) 1/6/2021    Allergic rhinitis     Anemia 11/18/2014    Arthritis     Chronic back pain     COPD [facility]chronic obstructive pulmonary disease) [facility] 08/09/2012    COPD (chronic obstructive pulmonary disease) ([facility])     Crohns disease     Degenerative disc disease, lumbar     Dermatitis     Diabetes ([facility] [address] 03/19/2015    diet controlled    Gastrointestinal problem     GERD (gastroesophageal reflux disease)     History of atrial fibrillation 2006    Dr. Az Matthews History of throat cancer 2004     cleared for reoccurence years ago    Hyperlipidemia 1/21/2014    Hypertension     Hypogonadism male     Lung disease     Mononeuritis multiplex     Mononeuritis multiplex     Nondependent alcohol abuse, in remission 7/22/2020    Obesity (BMI 30-39. 9) 7/24/2019    Osteoarthritis of lumbar spine     Pain of right heel     Paresthesia of foot, bilateral     Prediabetes 12/3/2014    Restless leg syndrome     Seborrheic dermatitis     Seborrheic keratoses, inflamed     Throat cancer ([facility])     throat, had surgery, sees Dr Brandin Castro yearly    Tinea cruris     Tinea pedis     Tinea unguium      Past Surgical History:   Procedure Laterality Date    CARDIAC CATHETERIZATION      CARPAL TUNNEL RELEASE      CATARACT REMOVAL WITH IMPLANT Right 09/09/2019    CATARACT REMOVAL WITH IMPLANT Left 07/22/2019    COLON SURGERY      COLONOSCOPY  04/15/2015    KNEE ARTHROSCOPY      LARYNGECTOMY  2004    UPPER GASTROINTESTINAL ENDOSCOPY  03/24/13    Dr. Angelo Cuello W/NAPOLEON RODRIGUEZ  2002     Social History     Socioeconomic History    Marital status:  Spouse name: Not on file    Number of children: 3    Years of education: Not on file    Highest education level: Not on file   Occupational History    Not on file   Social Needs    Financial resource strain: Not on file    Food insecurity     Worry: Not on file     Inability: Not on file    Transportation needs     Medical: Not on file     Non-medical: Not on file   Tobacco Use    Smoking status: Former Smoker     Packs/day: 1.00     Years: 25.00     Pack years: 25.00     Types: Cigarettes     Quit date: 10/21/1990     Years since quittin.3    Smokeless tobacco: Never Used   Substance and Sexual Activity    Alcohol use: No     Comment: Attends [employer] years    Drug use: No    Sexual activity: Not on file   Lifestyle    Physical activity     Days per week: Not on file     Minutes per session: Not on file    Stress: Not on file   Relationships    Social connections     Talks on phone: Not on file     Gets together: Not on file     Attends Islam service: Not on file     Active member of club or organization: Not on file     Attends meetings of clubs or organizations: Not on file     Relationship status: Not on file    Intimate partner violence     Fear of current or ex partner: Not on file     Emotionally abused: Not on file     Physically abused: Not on file     Forced sexual activity: Not on file   Other Topics Concern    Not on file   Social History Narrative    Lives alone. Family History   Problem Relation Age of Onset    Heart Disease Mother     Liver Disease Mother     High Blood Pressure Mother     Heart Disease Father     Arthritis Father     Cancer Sister         lung     Allergies:  Alemtuzumab, Glycopyrrolate-formoterol, Mercaptopurine, and Moxifloxacin    Review of Systems   Constitutional: Negative for chills, diaphoresis, fatigue and fever. HENT: Negative for congestion and sore throat. Respiratory: Negative for cough and shortness of breath. Gastrointestinal: Positive for diarrhea. Negative for abdominal distention, abdominal pain, blood in stool, constipation, nausea and vomiting. Psychiatric/Behavioral: Negative for sleep disturbance. Objective:   /68   Pulse 77   Temp 98.5 °F (36.9 °C)   Ht 5' 9\" (1.753 m)   Wt 258 lb (117 kg)   SpO2 98%   BMI 38.10 kg/m²     Physical Exam  Vitals signs reviewed. Constitutional:       General: He is not in acute distress. Appearance: He is well-developed. HENT:      Head: Normocephalic and atraumatic. Right Ear: External ear normal.      Left Ear: External ear normal.      Nose: Nose normal.   Eyes:      General:         Right eye: No discharge. Left eye: No discharge. Conjunctiva/sclera: Conjunctivae normal.      Pupils: Pupils are equal, round, and reactive to light. Neck:      Musculoskeletal: Neck supple. Thyroid: No thyromegaly. Cardiovascular:      Rate and Rhythm: Normal rate and regular rhythm. Pulmonary:      Effort: Pulmonary effort is normal. No respiratory distress. Abdominal:      General: There is no distension. Skin:     General: Skin is warm and dry. Neurological:      Mental Status: He is alert and oriented to person, place, and time. Coordination: Coordination normal.   Psychiatric:         Thought Content: Thought content normal.         Judgment: Judgment normal.         Results for POC orders placed in visit on 03/03/21   POCT glycosylated hemoglobin (Hb A1C)   Result Value Ref Range    Hemoglobin A1C 5.6 %       Recent Results (from the past 2016 hour(s))   POCT glycosylated hemoglobin (Hb A1C)    Collection Time: 03/03/21  4:01 PM   Result Value Ref Range    Hemoglobin A1C 5.6 %           Assessment:       Diagnosis Orders   1.  Loose stools  Blood Occult Stool Diagnostic    Clostridium Difficile Toxin/Antigen    Giardia antigen    Comprehensive Metabolic Panel    TSH without Reflex    CBC Auto Differential    Gastrointestinal Panel by DNA   2. Type 2 diabetes mellitus without complication, without long-term current use of insulin (HCC)  POCT glycosylated hemoglobin (Hb A1C)         Orders Placed This Encounter   Procedures    Clostridium Difficile Toxin/Antigen     Standing Status:   Future     Standing Expiration Date:   3/3/2022    Giardia antigen     Standing Status:   Future     Standing Expiration Date:   3/3/2022    Gastrointestinal Panel by DNA     Standing Status:   Future     Standing Expiration Date:   3/3/2022    Blood Occult Stool Diagnostic     Standing Status:   Future     Standing Expiration Date:   3/3/2022    Comprehensive Metabolic Panel     Standing Status:   Future     Number of Occurrences:   1     Standing Expiration Date:   3/3/2022    TSH without Reflex     Standing Status:   Future     Number of Occurrences:   1     Standing Expiration Date:   3/3/2022    CBC Auto Differential     Standing Status:   Future     Number of Occurrences:   1     Standing Expiration Date:   3/3/2022    POCT glycosylated hemoglobin (Hb A1C)         Plan:   Return for MAW exam due, also 3 month diabetic appt due . Patient Instructions   Hold off on treatment until we get some answers regarding the loose stools. Especially since he is tolerating this so well and they are decreasing in frequency. Diabetic control seems good with hemoglobin A1c 5.6 today. Remainder of laboratories etc. will be done at his routine visit      This note was partially created with the assistance of dictation. This may lead to grammatical or spelling errors. Sher Avila M.D.

## 2021-03-12 ENCOUNTER — EMERGENCY (EMERGENCY)
Facility: HOSPITAL | Age: 66
LOS: 1 days | Discharge: HOME | End: 2021-03-12
Attending: INTERNAL MEDICINE | Admitting: INTERNAL MEDICINE
Payer: MEDICARE

## 2021-03-12 VITALS
OXYGEN SATURATION: 90 % | RESPIRATION RATE: 26 BRPM | SYSTOLIC BLOOD PRESSURE: 159 MMHG | HEIGHT: 64 IN | HEART RATE: 101 BPM | DIASTOLIC BLOOD PRESSURE: 84 MMHG | WEIGHT: 149.91 LBS | TEMPERATURE: 97 F

## 2021-03-12 VITALS
DIASTOLIC BLOOD PRESSURE: 71 MMHG | HEART RATE: 81 BPM | OXYGEN SATURATION: 97 % | TEMPERATURE: 98 F | RESPIRATION RATE: 16 BRPM | SYSTOLIC BLOOD PRESSURE: 120 MMHG

## 2021-03-12 DIAGNOSIS — Z90.5 ACQUIRED ABSENCE OF KIDNEY: Chronic | ICD-10-CM

## 2021-03-12 DIAGNOSIS — Z98.890 OTHER SPECIFIED POSTPROCEDURAL STATES: Chronic | ICD-10-CM

## 2021-03-12 DIAGNOSIS — E89.6 POSTPROCEDURAL ADRENOCORTICAL (-MEDULLARY) HYPOFUNCTION: Chronic | ICD-10-CM

## 2021-03-12 DIAGNOSIS — Z90.12 ACQUIRED ABSENCE OF LEFT BREAST AND NIPPLE: Chronic | ICD-10-CM

## 2021-03-12 LAB
ALBUMIN SERPL ELPH-MCNC: 3.4 G/DL — SIGNIFICANT CHANGE UP (ref 3.3–5)
ALP SERPL-CCNC: 47 U/L — SIGNIFICANT CHANGE UP (ref 40–120)
ALT FLD-CCNC: 17 U/L — SIGNIFICANT CHANGE UP (ref 10–45)
ANION GAP SERPL CALC-SCNC: 8 MMOL/L — SIGNIFICANT CHANGE UP (ref 5–17)
AST SERPL-CCNC: 22 U/L — SIGNIFICANT CHANGE UP (ref 10–40)
BASOPHILS # BLD AUTO: 0.02 K/UL — SIGNIFICANT CHANGE UP (ref 0–0.2)
BASOPHILS NFR BLD AUTO: 0.3 % — SIGNIFICANT CHANGE UP (ref 0–2)
BILIRUB SERPL-MCNC: 0.6 MG/DL — SIGNIFICANT CHANGE UP (ref 0.2–1.2)
BUN SERPL-MCNC: 22 MG/DL — SIGNIFICANT CHANGE UP (ref 7–23)
CALCIUM SERPL-MCNC: 8.7 MG/DL — SIGNIFICANT CHANGE UP (ref 8.4–10.5)
CHLORIDE SERPL-SCNC: 107 MMOL/L — SIGNIFICANT CHANGE UP (ref 96–108)
CO2 SERPL-SCNC: 27 MMOL/L — SIGNIFICANT CHANGE UP (ref 22–31)
CREAT SERPL-MCNC: 1.26 MG/DL — SIGNIFICANT CHANGE UP (ref 0.5–1.3)
CRP SERPL-MCNC: 40 MG/L — HIGH
D DIMER BLD IA.RAPID-MCNC: <150 NG/ML DDU — SIGNIFICANT CHANGE UP
EOSINOPHIL # BLD AUTO: 0.26 K/UL — SIGNIFICANT CHANGE UP (ref 0–0.5)
EOSINOPHIL NFR BLD AUTO: 3.6 % — SIGNIFICANT CHANGE UP (ref 0–6)
GLUCOSE SERPL-MCNC: 119 MG/DL — HIGH (ref 70–99)
HCT VFR BLD CALC: 39.9 % — SIGNIFICANT CHANGE UP (ref 34.5–45)
HGB BLD-MCNC: 12.4 G/DL — SIGNIFICANT CHANGE UP (ref 11.5–15.5)
IMM GRANULOCYTES NFR BLD AUTO: 0.3 % — SIGNIFICANT CHANGE UP (ref 0–1.5)
LYMPHOCYTES # BLD AUTO: 1.16 K/UL — SIGNIFICANT CHANGE UP (ref 1–3.3)
LYMPHOCYTES # BLD AUTO: 16.1 % — SIGNIFICANT CHANGE UP (ref 13–44)
MCHC RBC-ENTMCNC: 25.6 PG — LOW (ref 27–34)
MCHC RBC-ENTMCNC: 31.1 GM/DL — LOW (ref 32–36)
MCV RBC AUTO: 82.3 FL — SIGNIFICANT CHANGE UP (ref 80–100)
MONOCYTES # BLD AUTO: 0.48 K/UL — SIGNIFICANT CHANGE UP (ref 0–0.9)
MONOCYTES NFR BLD AUTO: 6.7 % — SIGNIFICANT CHANGE UP (ref 2–14)
NEUTROPHILS # BLD AUTO: 5.25 K/UL — SIGNIFICANT CHANGE UP (ref 1.8–7.4)
NEUTROPHILS NFR BLD AUTO: 73 % — SIGNIFICANT CHANGE UP (ref 43–77)
NRBC # BLD: 0 /100 WBCS — SIGNIFICANT CHANGE UP (ref 0–0)
NT-PROBNP SERPL-SCNC: 226 PG/ML — SIGNIFICANT CHANGE UP (ref 0–300)
PLATELET # BLD AUTO: 190 K/UL — SIGNIFICANT CHANGE UP (ref 150–400)
POTASSIUM SERPL-MCNC: 4.1 MMOL/L — SIGNIFICANT CHANGE UP (ref 3.5–5.3)
POTASSIUM SERPL-SCNC: 4.1 MMOL/L — SIGNIFICANT CHANGE UP (ref 3.5–5.3)
PROCALCITONIN SERPL-MCNC: 0.09 NG/ML — HIGH
PROT SERPL-MCNC: 7.7 G/DL — SIGNIFICANT CHANGE UP (ref 6–8.3)
RBC # BLD: 4.85 M/UL — SIGNIFICANT CHANGE UP (ref 3.8–5.2)
RBC # FLD: 17.5 % — HIGH (ref 10.3–14.5)
SARS-COV-2 RNA SPEC QL NAA+PROBE: SIGNIFICANT CHANGE UP
SODIUM SERPL-SCNC: 142 MMOL/L — SIGNIFICANT CHANGE UP (ref 135–145)
TROPONIN I SERPL-MCNC: <.017 NG/ML — LOW (ref 0.02–0.06)
WBC # BLD: 7.19 K/UL — SIGNIFICANT CHANGE UP (ref 3.8–10.5)
WBC # FLD AUTO: 7.19 K/UL — SIGNIFICANT CHANGE UP (ref 3.8–10.5)

## 2021-03-12 PROCEDURE — 99284 EMERGENCY DEPT VISIT MOD MDM: CPT | Mod: 25

## 2021-03-12 PROCEDURE — 86140 C-REACTIVE PROTEIN: CPT

## 2021-03-12 PROCEDURE — 71045 X-RAY EXAM CHEST 1 VIEW: CPT

## 2021-03-12 PROCEDURE — 71045 X-RAY EXAM CHEST 1 VIEW: CPT | Mod: 26

## 2021-03-12 PROCEDURE — 80053 COMPREHEN METABOLIC PANEL: CPT

## 2021-03-12 PROCEDURE — 93005 ELECTROCARDIOGRAM TRACING: CPT

## 2021-03-12 PROCEDURE — 36415 COLL VENOUS BLD VENIPUNCTURE: CPT

## 2021-03-12 PROCEDURE — 99285 EMERGENCY DEPT VISIT HI MDM: CPT | Mod: CS

## 2021-03-12 PROCEDURE — 85025 COMPLETE CBC W/AUTO DIFF WBC: CPT

## 2021-03-12 PROCEDURE — U0003: CPT

## 2021-03-12 PROCEDURE — 84484 ASSAY OF TROPONIN QUANT: CPT

## 2021-03-12 PROCEDURE — 84145 PROCALCITONIN (PCT): CPT

## 2021-03-12 PROCEDURE — 83880 ASSAY OF NATRIURETIC PEPTIDE: CPT

## 2021-03-12 PROCEDURE — 96374 THER/PROPH/DIAG INJ IV PUSH: CPT

## 2021-03-12 PROCEDURE — 93010 ELECTROCARDIOGRAM REPORT: CPT

## 2021-03-12 PROCEDURE — 94640 AIRWAY INHALATION TREATMENT: CPT

## 2021-03-12 PROCEDURE — 85379 FIBRIN DEGRADATION QUANT: CPT

## 2021-03-12 PROCEDURE — U0005: CPT

## 2021-03-12 PROCEDURE — 82728 ASSAY OF FERRITIN: CPT

## 2021-03-12 RX ORDER — ALBUTEROL 90 UG/1
8 AEROSOL, METERED ORAL ONCE
Refills: 0 | Status: COMPLETED | OUTPATIENT
Start: 2021-03-12 | End: 2021-03-12

## 2021-03-12 RX ORDER — UMECLIDINIUM BROMIDE AND VILANTEROL TRIFENATATE 62.5; 25 UG/1; UG/1
1 POWDER RESPIRATORY (INHALATION)
Qty: 30 | Refills: 0
Start: 2021-03-12 | End: 2021-04-10

## 2021-03-12 RX ADMIN — ALBUTEROL 8 PUFF(S): 90 AEROSOL, METERED ORAL at 13:37

## 2021-03-12 RX ADMIN — Medication 125 MILLIGRAM(S): at 13:37

## 2021-03-12 NOTE — ED PROVIDER NOTE - NSFOLLOWUPINSTRUCTIONS_ED_ALL_ED_FT
Follow up with your primary care doctor within 1-2 days or you can follow up with our family practice clinic at: [phone].   Follow up with a pulmonologist within the week- referral above or you can call: Find a Physician helpline ([phone]) for assistance   Take all of your medications as previously prescribed- resume your Anoro inhaler  Take Prednisone 50mg daily for 4 more days (start tomorrow)  Worsening, continued or ANY new concerning symptoms return to the Emergency Department.       COPD (Chronic Obstructive Pulmonary Disease)    WHAT YOU NEED TO KNOW:    COPD (chronic obstructive pulmonary disease) can get worse quickly. Your healthcare providers will help you create a care plan to use at home. The plan will give directions on how to prevent or manage shortness of breath. Your family members or anyone who cares for you will also get directions to help you.    Inspiration and Expiration         DISCHARGE INSTRUCTIONS:    Call your local emergency number (911 in the [place]) if:   •You feel lightheaded, short of breath, and have chest pain.          Seek care immediately if:   •You cough up blood.      •You are confused, dizzy, or feel faint.      •Your arm or leg feels warm, tender, and painful. It may look swollen and red.      Call your doctor if:   •You have increased shortness of breath.      •You need more medicine than usual to control your symptoms.      •You are coughing or wheezing more than usual.      •You are coughing up more mucus, or it has a new color or odor.      •You gain more than 3 pounds in a week.      •You have a fever, a runny or stuffy nose, and a sore throat, or other cold or flu symptoms.      •Your skin, lips, or nails start to turn blue.      •You have swelling in your legs or ankles.      •You are very tired or weak for more than a day.      •You notice changes in your mood, or changes in your ability to think or concentrate.      •You have questions or concerns about your condition or care.      Medicines:   •Short-acting bronchodilators may be called rescue inhalers or relievers. They relieve sudden, severe symptoms and start to work right away.      •Long-acting bronchodilators may be called controllers or maintenance medicine. This medicine helps open the airway over time, and is used to decrease and prevent breathing problems. Long-acting bronchodilators should not be used to treat sudden, severe symptoms, such as trouble breathing.      •Take your medicine as directed. Contact your healthcare provider if you think your medicine is not helping or if you have side effects. Tell him or her if you are allergic to any medicine. Keep a list of the medicines, vitamins, and herbs you take. Include the amounts, and when and why you take them. Bring the list or the pill bottles to follow-up visits. Carry your medicine list with you in case of an emergency.      Help make breathing easier:   •Use pursed-lip breathing any time you feel short of breath. Take a deep breath in through your nose. Slowly breathe out through your mouth with your lips pursed. Try to take 2 times as long to breathe out as to breathe in. This helps you get rid of as much air from your lungs as possible. You can also practice this breathing pattern while you bend, lift, climb stairs, or exercise. It slows down your breathing and helps move more air in and out of your lungs.  Breathe in Breathe out           •Avoid anything that makes your symptoms worse. Stay out of high altitudes and places with high humidity. Stay inside, or cover your mouth and nose with a scarf when you are outside in cold weather. Stay inside on days when air pollution or pollen counts are high. Do not use aerosol sprays such as deodorant, bug spray, and hairspray.      •Exercise daily. Exercise for at least 20 minutes each day to help increase your energy and decrease shortness of breath. Talk to your healthcare provider about the best exercise plan for you.  Walking for Exercise           Manage COPD and help prevent exacerbations: COPD is a serious condition that gets worse over time. A COPD exacerbation means your symptoms suddenly get worse. It is important to prevent exacerbations. An exacerbation can cause more lung damage. COPD cannot be cured, but you can take action to feel better and prevent exacerbations:   •Do not smoke. Nicotine and other chemicals in cigarettes and cigars can cause lung damage and make your COPD worse. Ask your healthcare provider for information if you currently smoke and need help to quit. E-cigarettes or smokeless tobacco still contain nicotine. Talk to your healthcare provider before you use these products.       •Avoid secondhand smoke. This is smoke another person exhales. Even if you have never smoked or have quit, it is important to avoid secondhand smoke. This smoke can also cause lung damage or trigger an exacerbation.      •Go to pulmonary rehabilitation (rehab) if directed. Rehab is a program run by specialists who help you learn to manage COPD. Examples include a pulmonologist (lung specialist), dietitian, or exercise therapist. The specialists will help you make a plan to avoid triggers that cause an exacerbation.      •Take your medicines as directed. Refill your medicines before you are out so that you do not miss a dose. Ask your healthcare provider if you have any questions on how to take your medicines.      •Protect yourself from germs. Germs can get into your lungs and cause an infection. An infection in your lungs can create more mucus and make it harder to breathe. An infection can also create swelling in your airway and prevent air from getting in. You can decrease your risk for infection by doing the following:        ?Wash your hands often with soap and water. Carry germ-killing gel with you. You can use the gel to clean your hands when soap and water are not available.      ?Do not touch your eyes, nose, or mouth unless you have washed your hands first.      ?Always cover your mouth when you cough. Cough into a tissue or your shirtsleeve so you do not spread germs from your hands.      ?Try to avoid people who have a cold or the flu. If you are sick, stay away from others as much as possible.      ?Ask about vaccines. Influenza (the flu) and pneumonia can become life-threatening for a person who has COPD. Get a flu vaccine each year as soon as it becomes available. The pneumonia vaccine may be given every 5 years, or as directed. Ask about other vaccines you may need and when to get them.      •Drink liquids as directed. You may need to drink more liquid than usual. Liquid will help to keep your air passages moist and help you cough up mucus. Ask how much liquid to drink each day and which liquids are best for you.      Follow up with your doctor as directed: You may need more tests. Your doctor may refer you to a specialist, depending on your needs. Some specialist services may be available through your pulmonary rehab program. Write down your questions so you remember to ask them during your visits.

## 2021-03-12 NOTE — ED ADULT NURSE NOTE - BREATHING, MLM
Marciano system.     SURGEON:  Gavin Ramirez M.D.     ASSISTANT:  Heena Ramirez Spontaneous, unlabored and symmetrical

## 2021-03-12 NOTE — ED PROVIDER NOTE - ATTENDING CONTRIBUTION TO CARE
64 y/o F with h/o COPD, active Breast CA with metastasis to the bones (on chemo) pw chronic SOB significantly worsened since yesterday accompanied with chest tightness, cough and runny nose. Denies fever, chills, abdominal pain, nausea, vomiting, diarrhea, weakness, numbness. States use of home O2 routinely at night but now having to use during the day as well. States her PCP retired and she has not been able to refill her daily inhaler (Anoro). (+) former smoker. Likely a COPD exacerbation but will rule out ACS, COVID, PE.   Plan: Will obtain cardiac labs with Trop, D-dimer, and pro-BNP, check CXR, EKG, covid swab, give Albuterol, Solu-medrol and reassess  **update: trop and d-dimer neg. Patient feeling significantly better s/p meds. Will DC on Prednisone, Albuterol, Anoro inhaler  Dr. Crump:  I have reviewed and discussed with the PA/ resident the case specifics, including the history, physical assessment, evaluation, conclusion, laboratory results, and medical plan. I agree with the contents, and conclusions. I have personally examined, and interviewed the patient.

## 2021-03-12 NOTE — ED ADULT NURSE NOTE - OBJECTIVE STATEMENT
65F A&Ox3, c/o dyspnea that began last night, pt in no current obvious distress. 65F A&Ox3, c/o dyspnea that began last night, pt in no current obvious distress.   h/o COPD, active Breast CA with metastasis to the bones (on chemo) pw chronic SOB significantly worsened since yesterday accompanied with chest tightness, cough and runny nose. Denies fever, chills, abdominal pain, nausea, vomiting, diarrhea, weakness, numbness. States use of home O2 routinely at night but now having to use during the day as well. States her PCP retired and she has not been able to refill her daily inhaler. (+) former smoker. Pt uses cane to ambulate

## 2021-03-12 NOTE — ED PROVIDER NOTE - CLINICAL SUMMARY MEDICAL DECISION MAKING FREE TEXT BOX
64 y/o F with h/o COPD, active Breast CA with metastasis to the bones (on chemo) pw chronic SOB significantly worsened since yesterday accompanied with chest tightness, cough and runny nose. Denies fever, chills, abdominal pain, nausea, vomiting, diarrhea, weakness, numbness. States use of home O2 routinely at night but now having to use during the day as well. States her PCP retired and she has not been able to refill her daily inhaler. (+) former smoker. Likely a COPD exacerbation but will rule out ACS, COVID, PE. Plan: Will obtain cardiac labs with Trop and pro-BNP, check CXR, EKG, CTA chest r/o PE, check covid swab, give Albuterol, Solu-medrol and reassess 66 y/o F with h/o COPD, active Breast CA with metastasis to the bones (on chemo) pw chronic SOB significantly worsened since yesterday accompanied with chest tightness, cough and runny nose. Denies fever, chills, abdominal pain, nausea, vomiting, diarrhea, weakness, numbness. States use of home O2 routinely at night but now having to use during the day as well. States her PCP retired and she has not been able to refill her daily inhaler (Anoro). (+) former smoker. Likely a COPD exacerbation but will rule out ACS, COVID, PE.   Plan: Will obtain cardiac labs with Trop, D-dimer, and pro-BNP, check CXR, EKG, covid swab, give Albuterol, Solu-medrol and reassess  **update: trop and d-dimer neg. Patient feeling significantly better s/p meds. Will DC on Prednisone, Albuterol, Anoro inhaler

## 2021-03-12 NOTE — ED PROVIDER NOTE - PATIENT PORTAL LINK FT
You can access the FollowMyHealth Patient Portal offered by [facility] by registering at the following website: [email or web address]. By joining [employer]’s FollowMyHealth portal, you will also be able to view your health information using other applications (apps) compatible with our system.

## 2021-03-12 NOTE — ED ADULT TRIAGE NOTE - NSWEIGHTCALCTOOLDRUG_GEN_A_CORE
(1) no aspiration, contrast does not enter airway  used (2) contrast enters airway, remains above the vocal cords, no residue remains (penetration)/8=successive cup sips

## 2021-03-12 NOTE — ED PROVIDER NOTE - CARE PROVIDER_API CALL
Kalyan Kent)  Critical Care Medicine  [address] [place] [address], [address]  [place] [address]  Phone: [phone]  Fax: [phone]  Follow Up Time:

## 2021-03-12 NOTE — ED PROVIDER NOTE - CARE PLAN
Principal Discharge DX:	Shortness of breath   Principal Discharge DX:	COPD exacerbation  Secondary Diagnosis:	Shortness of breath

## 2021-03-12 NOTE — ED PROVIDER NOTE - OBJECTIVE STATEMENT
66 y/o F with h/o COPD, active Breast CA with metastasis to the bones (on chemo) pw chronic SOB significantly worsened since yesterday accompanied with chest tightness, cough and runny nose. Denies fever, chills, abdominal pain, nausea, vomiting, diarrhea, weakness, numbness. States use of home O2 routinely at night but now having to use during the day as well. States her PCP retired and she has not been able to refill her daily inhaler. (+) former smoker.   PMD- Dr. Lester Lebron  Onc: Dr. Ochoa 64 y/o F with h/o COPD, active Breast CA with metastasis to the bones (on chemo) pw chronic SOB significantly worsened since yesterday accompanied with chest tightness, cough and runny nose. Denies fever, chills, abdominal pain, nausea, vomiting, diarrhea, weakness, numbness. States use of home O2 routinely at night but now having to use during the day as well. States her PCP retired and she has not been able to refill her daily inhaler (Anoro). (+) former smoker.   PMD- Dr. Lester Lebron  Onc: Dr. Ochoa

## 2021-03-13 LAB — FERRITIN SERPL-MCNC: 280 NG/ML — HIGH (ref 15–150)

## 2021-03-17 ENCOUNTER — TRANSCRIPTION ENCOUNTER (OUTPATIENT)
Age: 66
End: 2021-03-17

## 2021-03-18 ENCOUNTER — RX RENEWAL (OUTPATIENT)
Age: 66
End: 2021-03-18

## 2021-03-22 ENCOUNTER — APPOINTMENT (OUTPATIENT)
Dept: INFUSION THERAPY | Facility: HOSPITAL | Age: 66
End: 2021-03-22

## 2021-03-22 ENCOUNTER — APPOINTMENT (OUTPATIENT)
Dept: HEMATOLOGY ONCOLOGY | Facility: CLINIC | Age: 66
End: 2021-03-22
Payer: MEDICARE

## 2021-03-22 VITALS
BODY MASS INDEX: 28.7 KG/M2 | DIASTOLIC BLOOD PRESSURE: 75 MMHG | TEMPERATURE: 97 F | OXYGEN SATURATION: 98 % | RESPIRATION RATE: 17 BRPM | HEART RATE: 74 BPM | SYSTOLIC BLOOD PRESSURE: 108 MMHG | HEIGHT: 63 IN | WEIGHT: 162 LBS

## 2021-03-22 DIAGNOSIS — C79.51 SECONDARY MALIGNANT NEOPLASM OF BONE: ICD-10-CM

## 2021-03-22 PROCEDURE — 99214 OFFICE O/P EST MOD 30 MIN: CPT

## 2021-03-22 NOTE — PHYSICAL EXAM
[Normal] : affect appropriate [de-identified] : decreased edema B/L LE; no calf tenderness [de-identified] : left reconstructed breast without palpable abnormality; no dominant mass either breast [de-identified] : no vesicles

## 2021-03-22 NOTE — ASSESSMENT
[FreeTextEntry1] : Lab work, CT chest results reviewed, along with reports from recent ED visit.\par #1) Patient had a remote h/o breast cancer. She received adjuvant treatment (AC-T ) + 5 years of Tamoxifen with Dr. Cash Bojorquez and subsequently transferred care to Dr. ALTAGRACIA Shields at [facility].  In 2018 she was hospitalized and was found to have bone metastasis and renal mass.  6/2018 She underwent radical left nephrectomy for renal cell carcinoma, with PALND, and a bx. of the rIght iliac bone revealed metastatic breast cancer .  She was placed on Femara/Ibrance.    4/22/20  Interval CT Chest/Abdomen/Pelvis revealed new left lower lobe nodule with interval decrease in size of small nodules at the right lung apex, extensive osseous metastatic disease slightly increased, stable IVC filter.   She was prescribed Aromasin/Afinitor on  8/10/20, which she has continued.  Follow-up chest imaging was ordered to assess status of lung nodules-showed increase, however, it was being compared to a CT chest from 2019. Patient had last CT chest done at [facility] in 2020-we are attempting to get this CD so that it can be compared to recent CT chest done at [facility]. Considering f/u chest imaging next visit in any event, which can then be compared to most recent one done here at [facility]. If POD, need t/c change in systemic therapy.\par \par #2) history of bilateral DVTs, with history of IVC filter–patient remains on anticoagulation.  No overt bleeding noted clinically at present.\par \par Patient was given the opportunity to ask questions. Her questions have been answered to her apparent satisfaction at this time.\par \par \par

## 2021-03-22 NOTE — HISTORY OF PRESENT ILLNESS
[Disease: _____________________] : Disease: [unfilled] [M: ___] : M[unfilled] [AJCC Stage: ____] : AJCC Stage: [unfilled] [de-identified] : 3/2003–Stage IIB left breast cancer, ER positive/VA positive/HER-2 negative–status post left modified radical mastectomy with reconstruction.  1/15 lymph nodes positive for metastatic carcinoma with focal extranodal extension.  Status post AC-T--> Tamoxifen x 5 years.  \par 6/2018-Presented with general weakness and leg pain.  Diagnosed with clear cell renal carcinoma  s/p Radical Left Nephrectomy, Para Aortic LN dissection,  c/b Splenic Laceration w/ bleeding. Right iliac bone biopsy of bone lesion was consistent with metastatic breast cancer–ER positive/VA positive/HER-2 unknown.\par Patient was begun on Letrozole/Ibrance/Xgeva.\par 3/2019–Status post left VATS procedure with pleural decortication.  Left pleural fluid cytology and pleural pathology negative for malignancy.\par 4/2020-CT scan of the chest/abdomen/pelvis revealed new left lower lung nodule and decreased right apex nodules, extensive bony metastases slightly increased. Changed to Aromasin, with Afinitor added 7/2020.\par Patient had been under the oncologic care of Dr. Shields at [facility].\par 8/2020–Patient wished to transfer her oncologic care to the [facility]. LE venous duplex study-B/L DVT. Begun on Xarelto.\par 12/2020–CT abdomen/pelvis–extensive osseous metastases.\par \par \par \par \par  [de-identified] : Infiltrating lobular carcinoma [de-identified] : 7/2020 :  CA 15-3=24 U/mL     CEA =2.9 ng/mL         [de-identified] : Went to  ED 10 days ago for SOB. Was given a nebulizer treatment and steroids which helped. +NGO. Has O2 at home.\par Takes oxycodone for bony/joint aches only once a day now at night-helps with the pain.\par No current c/o hematuria.\par No dental/jaw pain.\par No  nausea/emesis/diarrhea or constipation . No melena, nose bleeds. \par Continues to take Aromasin/Afinitor.\par No cough, chills, fever, night sweats, mouth sores. \par Had first COVID vaccine.\par

## 2021-03-22 NOTE — CONSULT LETTER
[Dear  ___] : Dear  [unfilled], [Courtesy Letter:] : I had the pleasure of seeing your patient, [unfilled], in my office today. [Please see my note below.] : Please see my note below. [Consult Closing:] : Thank you very much for allowing me to participate in the care of this patient.  If you have any questions, please do not hesitate to contact me. [Sincerely,] : Sincerely, [DrCesario  ___] : Dr. DUKES [FreeTextEntry3] : Leslie Goldstein MD

## 2021-04-15 ENCOUNTER — APPOINTMENT (OUTPATIENT)
Dept: PULMONOLOGY | Facility: CLINIC | Age: 66
End: 2021-04-15
Payer: MEDICARE

## 2021-04-15 VITALS
WEIGHT: 161 LBS | RESPIRATION RATE: 18 BRPM | TEMPERATURE: 97.3 F | SYSTOLIC BLOOD PRESSURE: 136 MMHG | HEART RATE: 89 BPM | DIASTOLIC BLOOD PRESSURE: 84 MMHG | BODY MASS INDEX: 28.53 KG/M2 | OXYGEN SATURATION: 96 % | HEIGHT: 63 IN

## 2021-04-15 DIAGNOSIS — Z80.1 FAMILY HISTORY OF MALIGNANT NEOPLASM OF TRACHEA, BRONCHUS AND LUNG: ICD-10-CM

## 2021-04-15 DIAGNOSIS — Z87.891 PERSONAL HISTORY OF NICOTINE DEPENDENCE: ICD-10-CM

## 2021-04-15 PROCEDURE — 99205 OFFICE O/P NEW HI 60 MIN: CPT

## 2021-04-15 NOTE — REVIEW OF SYSTEMS
[Fatigue] : fatigue [Recent Wt Gain (___ Lbs)] : ~T no recent weight gain [Recent Wt Loss (___ Lbs)] : ~T no recent weight loss [Cough] : no cough [Hemoptysis] : no hemoptysis [Sputum] : no sputum [Dyspnea] : dyspnea [SOB on Exertion] : sob on exertion [Negative] : Endocrine

## 2021-04-16 ENCOUNTER — OUTPATIENT (OUTPATIENT)
Dept: OUTPATIENT SERVICES | Facility: HOSPITAL | Age: 66
LOS: 1 days | Discharge: HOME | End: 2021-04-16

## 2021-04-16 DIAGNOSIS — Z90.12 ACQUIRED ABSENCE OF LEFT BREAST AND NIPPLE: Chronic | ICD-10-CM

## 2021-04-16 DIAGNOSIS — C50.919 MALIGNANT NEOPLASM OF UNSPECIFIED SITE OF UNSPECIFIED FEMALE BREAST: ICD-10-CM

## 2021-04-16 DIAGNOSIS — Z98.890 OTHER SPECIFIED POSTPROCEDURAL STATES: Chronic | ICD-10-CM

## 2021-04-16 DIAGNOSIS — E89.6 POSTPROCEDURAL ADRENOCORTICAL (-MEDULLARY) HYPOFUNCTION: Chronic | ICD-10-CM

## 2021-04-16 DIAGNOSIS — Z90.5 ACQUIRED ABSENCE OF KIDNEY: Chronic | ICD-10-CM

## 2021-04-20 ENCOUNTER — APPOINTMENT (OUTPATIENT)
Dept: INFUSION THERAPY | Facility: HOSPITAL | Age: 66
End: 2021-04-20

## 2021-04-20 ENCOUNTER — APPOINTMENT (OUTPATIENT)
Dept: HEMATOLOGY ONCOLOGY | Facility: CLINIC | Age: 66
End: 2021-04-20
Payer: MEDICARE

## 2021-04-20 VITALS
TEMPERATURE: 97.2 F | HEART RATE: 74 BPM | WEIGHT: 169.73 LBS | RESPIRATION RATE: 16 BRPM | HEIGHT: 63.78 IN | BODY MASS INDEX: 29.34 KG/M2 | OXYGEN SATURATION: 96 % | SYSTOLIC BLOOD PRESSURE: 113 MMHG | DIASTOLIC BLOOD PRESSURE: 71 MMHG

## 2021-04-20 DIAGNOSIS — C79.51 SECONDARY MALIGNANT NEOPLASM OF BONE: ICD-10-CM

## 2021-04-20 PROCEDURE — 99214 OFFICE O/P EST MOD 30 MIN: CPT

## 2021-04-20 NOTE — HISTORY OF PRESENT ILLNESS
[Disease: _____________________] : Disease: [unfilled] [M: ___] : M[unfilled] [AJCC Stage: ____] : AJCC Stage: [unfilled] [de-identified] : 3/2003–Stage IIB left breast cancer, ER positive/DC positive/HER-2 negative–status post left modified radical mastectomy with reconstruction.  1/15 lymph nodes positive for metastatic carcinoma with focal extranodal extension.  Status post AC-T--> Tamoxifen x 5 years.  \par 6/2018-Presented with general weakness and leg pain.  Diagnosed with clear cell renal carcinoma  s/p Radical Left Nephrectomy, Para Aortic LN dissection,  c/b Splenic Laceration w/ bleeding. Right iliac bone biopsy of bone lesion was consistent with metastatic breast cancer–ER positive/DC positive/HER-2 unknown.\par Patient was begun on Letrozole/Ibrance/Xgeva.\par 3/2019–Status post left VATS procedure with pleural decortication.  Left pleural fluid cytology and pleural pathology negative for malignancy.\par 4/2020-CT scan of the chest/abdomen/pelvis revealed new left lower lung nodule and decreased right apex nodules, extensive bony metastases slightly increased. Changed to Aromasin, with Afinitor added 7/2020.\par Patient had been under the oncologic care of Dr. Shields at [facility].\par 8/2020–Patient wished to transfer her oncologic care to the [facility]. LE venous duplex study-B/L DVT. Begun on Xarelto.\par 12/2020–CT abdomen/pelvis–extensive osseous metastases.\par \par \par \par \par  [de-identified] : Infiltrating lobular carcinoma [de-identified] : 7/2020 :  CA 15-3=24 U/mL     CEA =2.9 ng/mL         [de-identified] : Saw Dr. Kent in pulmonary consultation-ordered inhaler.\par +NGO without acute change. Has O2 at home.\par Takes oxycodone for bony/joint aches usually only once a day now at night-helps with the pain.\par No dental/jaw pain.\par No  nausea/emesis/diarrhea or constipation . No melena, nose bleeds. \par Continues to take Aromasin/Afinitor.\par No cough, chills, fever, night sweats, mouth sores. \par Had COVID vaccine.\par

## 2021-04-20 NOTE — PHYSICAL EXAM
[Normal] : affect appropriate [de-identified] : decreased edema B/L LE; no calf tenderness [de-identified] : left reconstructed breast without palpable abnormality; no dominant mass either breast [de-identified] : no vesicles

## 2021-04-20 NOTE — ASSESSMENT
[FreeTextEntry1] : Lab work, and Dr. Kent's 4/15/21 note reviewed.\par #1) Patient had a remote h/o breast cancer. She received adjuvant treatment (AC-T ) + 5 years of Tamoxifen with Dr. Cash Bojorquez and subsequently transferred care to [place]. [place]TAGRACIA Shields [place] [facility].  In 2018 she was hospitalized and was found to have bone metastasis and renal mass.  6/2018 She underwent radical left nephrectomy for renal cell carcinoma, with PALND, and a bx. of the rIght iliac bone revealed metastatic breast cancer .  She was placed on Femara/Ibrance.    4/22/20  Interval CT Chest/Abdomen/Pelvis revealed new left lower lobe nodule with interval decrease in size of small nodules at the right lung apex, extensive osseous metastatic disease slightly increased, stable IVC filter.   She was prescribed Aromasin/Afinitor on  8/10/20, which she has continued.  Follow-up chest imaging was ordered to assess status of lung nodules-showed increase, however, it was being compared to a CT chest from 2019. Tentatively plan f/u chest imaging next visit, which can then be compared to most recent one done here at [facility]. If POD, need to reconsider change in systemic therapy.\par \par #2) history of bilateral DVTs, with history of IVC filter–patient to remain on anticoagulation.  No overt bleeding noted clinically at present.\par \par #3) cancer related pain-oxycodone renewed-pain under good control currently.\par \par Patient was given the opportunity to ask questions. Her questions have been answered to her apparent satisfaction at this time.\par \par \par

## 2021-05-17 NOTE — PHYSICAL THERAPY INITIAL EVALUATION ADULT - LIVES WITH, PROFILE
[FreeTextEntry1] : Mr. Frey is a 65 yo M with history of NIDCM s/p BiV ICD (2/5/20), paroxysmal AFib on Eliquis who presents for routine cardiac follow up visit. \par \par # NIDCM\par - BiV ICD with interrogation as listed above. Normal functioning device. 8 beats of nonsustained VT. \par - Remote monitor is set up and patient is transmitting. Will transfer to me. \par - Stable Optivol. Currently euvolemic\par - Cont GDMT\par \par # Paroxysmal AFib\par - Cont Eliquis 5mg PO BID for CHADS VASc of at least 7 (CHF, HTN, Age DM, CAD). No signs/symptoms of bleeding.\par - No recurrence of AFib since device implant. \par - Patient refusing pulm referral despite COPD history\par \par # Dyspnea on Exertion \par - Advised pt to follow up with cardiologist radha given 8 beats of NSVT to see if he would benefit from ischemic work up. Last cath 2 years ago with 3 PCIs.\par - Strongly advised pt on tobacco cessation but he is not interested in quitting. \par \par # HTN\par - BP well controlled\par - 2g Na diet enforced\par \par \par I have also advised the patient to go to the nearest emergency room if he experiences any chest pain, dyspnea, syncope, or has any other compelling symptoms.\par \par Follow up in 9 months. \par 
in house, + steps to manage/alone

## 2021-05-18 ENCOUNTER — APPOINTMENT (OUTPATIENT)
Dept: PULMONOLOGY | Facility: CLINIC | Age: 66
End: 2021-05-18

## 2021-05-26 ENCOUNTER — OUTPATIENT (OUTPATIENT)
Dept: OUTPATIENT SERVICES | Facility: HOSPITAL | Age: 66
LOS: 1 days | Discharge: HOME | End: 2021-05-26

## 2021-05-26 DIAGNOSIS — C50.919 MALIGNANT NEOPLASM OF UNSPECIFIED SITE OF UNSPECIFIED FEMALE BREAST: ICD-10-CM

## 2021-05-26 DIAGNOSIS — E89.6 POSTPROCEDURAL ADRENOCORTICAL (-MEDULLARY) HYPOFUNCTION: Chronic | ICD-10-CM

## 2021-05-26 DIAGNOSIS — Z90.5 ACQUIRED ABSENCE OF KIDNEY: Chronic | ICD-10-CM

## 2021-05-26 DIAGNOSIS — Z90.12 ACQUIRED ABSENCE OF LEFT BREAST AND NIPPLE: Chronic | ICD-10-CM

## 2021-05-26 DIAGNOSIS — Z98.890 OTHER SPECIFIED POSTPROCEDURAL STATES: Chronic | ICD-10-CM

## 2021-05-27 ENCOUNTER — APPOINTMENT (OUTPATIENT)
Dept: INFUSION THERAPY | Facility: HOSPITAL | Age: 66
End: 2021-05-27

## 2021-06-03 ENCOUNTER — RESULT REVIEW (OUTPATIENT)
Age: 66
End: 2021-06-03

## 2021-06-03 ENCOUNTER — APPOINTMENT (OUTPATIENT)
Dept: INFUSION THERAPY | Facility: HOSPITAL | Age: 66
End: 2021-06-03

## 2021-06-03 ENCOUNTER — APPOINTMENT (OUTPATIENT)
Dept: HEMATOLOGY ONCOLOGY | Facility: CLINIC | Age: 66
End: 2021-06-03
Payer: MEDICARE

## 2021-06-03 VITALS
RESPIRATION RATE: 16 BRPM | HEIGHT: 61 IN | OXYGEN SATURATION: 95 % | WEIGHT: 169 LBS | DIASTOLIC BLOOD PRESSURE: 73 MMHG | SYSTOLIC BLOOD PRESSURE: 108 MMHG | TEMPERATURE: 96.8 F | HEART RATE: 82 BPM | BODY MASS INDEX: 31.91 KG/M2

## 2021-06-03 LAB
ACANTHOCYTES BLD QL SMEAR: SLIGHT — SIGNIFICANT CHANGE UP
ALBUMIN SERPL ELPH-MCNC: 4.5 G/DL
ALP BLD-CCNC: 49 U/L
ALT SERPL-CCNC: 10 U/L
ANION GAP SERPL CALC-SCNC: 15 MMOL/L
ANISOCYTOSIS BLD QL: SLIGHT — SIGNIFICANT CHANGE UP
AST SERPL-CCNC: 17 U/L
BASOPHILS # BLD AUTO: 0 K/UL — SIGNIFICANT CHANGE UP (ref 0–0.2)
BASOPHILS NFR BLD AUTO: 0 % — SIGNIFICANT CHANGE UP (ref 0–2)
BILIRUB SERPL-MCNC: 0.3 MG/DL
BUN SERPL-MCNC: 24 MG/DL
CALCIUM SERPL-MCNC: 9 MG/DL
CHLORIDE SERPL-SCNC: 104 MMOL/L
CO2 SERPL-SCNC: 23 MMOL/L
CREAT SERPL-MCNC: 1.34 MG/DL
ELLIPTOCYTES BLD QL SMEAR: SLIGHT — SIGNIFICANT CHANGE UP
EOSINOPHIL # BLD AUTO: 0.08 K/UL — SIGNIFICANT CHANGE UP (ref 0–0.5)
EOSINOPHIL NFR BLD AUTO: 1 % — SIGNIFICANT CHANGE UP (ref 0–6)
GLUCOSE SERPL-MCNC: 154 MG/DL
HCT VFR BLD CALC: 42.7 % — SIGNIFICANT CHANGE UP (ref 34.5–45)
HGB BLD-MCNC: 13.2 G/DL — SIGNIFICANT CHANGE UP (ref 11.5–15.5)
LYMPHOCYTES # BLD AUTO: 0.97 K/UL — LOW (ref 1–3.3)
LYMPHOCYTES # BLD AUTO: 12 % — LOW (ref 13–44)
MCHC RBC-ENTMCNC: 25 PG — LOW (ref 27–34)
MCHC RBC-ENTMCNC: 30.9 G/DL — LOW (ref 32–36)
MCV RBC AUTO: 80.7 FL — SIGNIFICANT CHANGE UP (ref 80–100)
MONOCYTES # BLD AUTO: 0.08 K/UL — SIGNIFICANT CHANGE UP (ref 0–0.9)
MONOCYTES NFR BLD AUTO: 1 % — LOW (ref 2–14)
NEUTROPHILS # BLD AUTO: 6.98 K/UL — SIGNIFICANT CHANGE UP (ref 1.8–7.4)
NEUTROPHILS NFR BLD AUTO: 86 % — HIGH (ref 43–77)
NRBC # BLD: 0 /100 — SIGNIFICANT CHANGE UP (ref 0–0)
NRBC # BLD: SIGNIFICANT CHANGE UP /100 WBCS (ref 0–0)
PLAT MORPH BLD: NORMAL — SIGNIFICANT CHANGE UP
PLATELET # BLD AUTO: 226 K/UL — SIGNIFICANT CHANGE UP (ref 150–400)
POIKILOCYTOSIS BLD QL AUTO: SLIGHT — SIGNIFICANT CHANGE UP
POTASSIUM SERPL-SCNC: 4.1 MMOL/L
PROT SERPL-MCNC: 7 G/DL
RBC # BLD: 5.29 M/UL — HIGH (ref 3.8–5.2)
RBC # FLD: 17.7 % — HIGH (ref 10.3–14.5)
RBC BLD AUTO: ABNORMAL
SODIUM SERPL-SCNC: 142 MMOL/L
WBC # BLD: 8.12 K/UL — SIGNIFICANT CHANGE UP (ref 3.8–10.5)
WBC # FLD AUTO: 8.12 K/UL — SIGNIFICANT CHANGE UP (ref 3.8–10.5)

## 2021-06-03 PROCEDURE — 99214 OFFICE O/P EST MOD 30 MIN: CPT

## 2021-06-03 NOTE — PHYSICAL EXAM
[Normal] : affect appropriate [de-identified] : decreased edema B/L LE; no calf tenderness [de-identified] : left reconstructed breast without palpable abnormality; no dominant mass either breast [de-identified] : no vesicles

## 2021-06-03 NOTE — ASSESSMENT
[FreeTextEntry1] : Lab work reviewed.\par #1) Patient had a remote h/o breast cancer. She received adjuvant treatment (AC-T ) + 5 years of Tamoxifen with Dr. Cash Bojorquez and subsequently transferred care to Dr. ALTAGRACIA Shields at [facility].  In 2018 she was hospitalized and was found to have bone metastasis and renal mass.  6/2018 She underwent radical left nephrectomy for renal cell carcinoma, with PALND, and a bx. of the rIght iliac bone revealed metastatic breast cancer .  She was placed on Femara/Ibrance.    4/22/20  Interval CT Chest/Abdomen/Pelvis revealed new left lower lobe nodule with interval decrease in size of small nodules at the right lung apex, extensive osseous metastatic disease slightly increased, stable IVC filter.   She was prescribed Aromasin/Afinitor on  8/10/20, which she has continued.  Follow-up chest imaging was done 3/2021 to assess status of lung nodules-showed increase, however, it was being compared to a CT chest from 2019. F/U imaging has again been ordered, which can then be compared to most recent one done here at [facility]. If POD, need to reconsider change in systemic therapy.\par \par #2) history of bilateral DVTs, with history of IVC filter–patient to remain on anticoagulation.  No overt bleeding noted clinically at present.\par \par \par Patient was given the opportunity to ask questions. Her questions have been answered to her apparent satisfaction at this time.\par \par \par

## 2021-06-03 NOTE — HISTORY OF PRESENT ILLNESS
[Disease: _____________________] : Disease: [unfilled] [M: ___] : M[unfilled] [AJCC Stage: ____] : AJCC Stage: [unfilled] [de-identified] : 3/2003–Stage IIB left breast cancer, ER positive/ME positive/HER-2 negative–status post left modified radical mastectomy with reconstruction.  1/15 lymph nodes positive for metastatic carcinoma with focal extranodal extension.  Status post AC-T--> Tamoxifen x 5 years.  \par 6/2018-Presented with general weakness and leg pain.  Diagnosed with clear cell renal carcinoma  s/p Radical Left Nephrectomy, Para Aortic LN dissection,  c/b Splenic Laceration w/ bleeding. Right iliac bone biopsy of bone lesion was consistent with metastatic breast cancer–ER positive/ME positive/HER-2 unknown.\par Patient was begun on Letrozole/Ibrance/Xgeva.\par 3/2019–Status post left VATS procedure with pleural decortication.  Left pleural fluid cytology and pleural pathology negative for malignancy.\par 4/2020-CT scan of the chest/abdomen/pelvis revealed new left lower lung nodule and decreased right apex nodules, extensive bony metastases slightly increased. Changed to Aromasin, with Afinitor added 7/2020.\par Patient had been under the oncologic care of Dr. Shield[facility] at [facility].\par 8/2020–Patient wished to transfer her oncologic care to the [facility]. LE venous duplex study-B/L DVT. Begun on Xarelto.\par 12/2020–CT abdomen/pelvis–extensive osseous metastases.\par \par \par \par \par  [de-identified] : Infiltrating lobular carcinoma [de-identified] : 7/2020 :  CA 15-3=24 U/mL     CEA =2.9 ng/mL         [de-identified] : +NGO without acute change. Has O2 at home, but not using it recently.\par Takes oxycodone for intermittent bony/joint aches usually twice a day now- morning and at night-helps with the pain.\par No dental/jaw pain.\par No  nausea/emesis/diarrhea or constipation . No melena, nose bleeds. \par Continues to take Aromasin/Afinitor.\par No cough, chills, fever, night sweats, mouth sores. \par Had COVID vaccines(Moderna).\par

## 2021-06-04 ENCOUNTER — RESULT REVIEW (OUTPATIENT)
Age: 66
End: 2021-06-04

## 2021-06-04 LAB — CANCER AG27-29 SERPL-ACNC: 30.9 U/ML

## 2021-06-07 ENCOUNTER — NON-APPOINTMENT (OUTPATIENT)
Age: 66
End: 2021-06-07

## 2021-06-08 ENCOUNTER — APPOINTMENT (OUTPATIENT)
Dept: CT IMAGING | Facility: HOSPITAL | Age: 66
End: 2021-06-08
Payer: MEDICARE

## 2021-06-08 ENCOUNTER — OUTPATIENT (OUTPATIENT)
Dept: OUTPATIENT SERVICES | Facility: HOSPITAL | Age: 66
LOS: 1 days | End: 2021-06-08
Payer: MEDICARE

## 2021-06-08 DIAGNOSIS — E89.6 POSTPROCEDURAL ADRENOCORTICAL (-MEDULLARY) HYPOFUNCTION: Chronic | ICD-10-CM

## 2021-06-08 DIAGNOSIS — Z98.890 OTHER SPECIFIED POSTPROCEDURAL STATES: Chronic | ICD-10-CM

## 2021-06-08 DIAGNOSIS — Z90.5 ACQUIRED ABSENCE OF KIDNEY: Chronic | ICD-10-CM

## 2021-06-08 DIAGNOSIS — Z90.12 ACQUIRED ABSENCE OF LEFT BREAST AND NIPPLE: Chronic | ICD-10-CM

## 2021-06-08 DIAGNOSIS — Z00.8 ENCOUNTER FOR OTHER GENERAL EXAMINATION: ICD-10-CM

## 2021-06-08 PROCEDURE — 70490 CT SOFT TISSUE NECK W/O DYE: CPT | Mod: MG

## 2021-06-08 PROCEDURE — 71250 CT THORAX DX C-: CPT | Mod: 26,MG

## 2021-06-08 PROCEDURE — G1004: CPT

## 2021-06-08 PROCEDURE — 74176 CT ABD & PELVIS W/O CONTRAST: CPT | Mod: 26,MH

## 2021-06-08 PROCEDURE — 71250 CT THORAX DX C-: CPT | Mod: MG

## 2021-06-08 PROCEDURE — 70490 CT SOFT TISSUE NECK W/O DYE: CPT | Mod: 26,MG

## 2021-06-08 PROCEDURE — 74176 CT ABD & PELVIS W/O CONTRAST: CPT

## 2021-06-09 DIAGNOSIS — C79.51 SECONDARY MALIGNANT NEOPLASM OF BONE: ICD-10-CM

## 2021-06-17 DIAGNOSIS — F41.9 ANXIETY DISORDER, UNSPECIFIED: ICD-10-CM

## 2021-06-21 NOTE — DISCHARGE NOTE ADULT - LAUNCH MEDICATION RECONCILIATION
Letter by Adeline Rodriguez at      Author: Adeline Rodriguez Service: -- Author Type: --    Filed:  Encounter Date: 3/15/2021 Status: (Other)         Abiola Hallman  [address] [place] [address]      March 16, 2021      Dear Ms. Hallman,    RE: Remote Results    We are writing to you regarding your recent Remote Pacemaker check from home. Your transmission was received successfully. Battery status is satisfactory at this time.     Your results are within normal limits.    Your next device appointment will be a remote check on June 16, 2021; this will occur automatically.    To schedule or reschedule, please call [phone] and press 1.    NOTE: If you would like to do an extra transmission, please call [phone] and press 3 to speak to a nurse BEFORE transmitting. This ensures that the Device Clinic staff is aware of the reason you are sending a transmission, and can follow-up with you after it has been reviewed.    We will be checking your implanted device from home (remotely) every three months unless otherwise instructed. We will need to see you in the clinic at least once a year. You may need to be seen in the clinic sooner depending on the results of your check.    Please be aware:    The follow-up schedule is like a Physician prescription.    Your remote monitor is paired to your specific implanted device.      Sincerely,    [facility]       
<<-----Click here for Discharge Medication Review

## 2021-06-25 ENCOUNTER — OUTPATIENT (OUTPATIENT)
Dept: OUTPATIENT SERVICES | Facility: HOSPITAL | Age: 66
LOS: 1 days | Discharge: HOME | End: 2021-06-25

## 2021-06-25 DIAGNOSIS — Z90.12 ACQUIRED ABSENCE OF LEFT BREAST AND NIPPLE: Chronic | ICD-10-CM

## 2021-06-25 DIAGNOSIS — E89.6 POSTPROCEDURAL ADRENOCORTICAL (-MEDULLARY) HYPOFUNCTION: Chronic | ICD-10-CM

## 2021-06-25 DIAGNOSIS — Z90.5 ACQUIRED ABSENCE OF KIDNEY: Chronic | ICD-10-CM

## 2021-06-25 DIAGNOSIS — C50.919 MALIGNANT NEOPLASM OF UNSPECIFIED SITE OF UNSPECIFIED FEMALE BREAST: ICD-10-CM

## 2021-06-25 DIAGNOSIS — Z98.890 OTHER SPECIFIED POSTPROCEDURAL STATES: Chronic | ICD-10-CM

## 2021-06-30 ENCOUNTER — RESULT REVIEW (OUTPATIENT)
Age: 66
End: 2021-06-30

## 2021-06-30 ENCOUNTER — APPOINTMENT (OUTPATIENT)
Dept: HEMATOLOGY ONCOLOGY | Facility: CLINIC | Age: 66
End: 2021-06-30
Payer: MEDICARE

## 2021-06-30 ENCOUNTER — APPOINTMENT (OUTPATIENT)
Dept: INFUSION THERAPY | Facility: HOSPITAL | Age: 66
End: 2021-06-30

## 2021-06-30 VITALS
DIASTOLIC BLOOD PRESSURE: 84 MMHG | HEART RATE: 72 BPM | HEIGHT: 61 IN | RESPIRATION RATE: 17 BRPM | TEMPERATURE: 97.1 F | SYSTOLIC BLOOD PRESSURE: 137 MMHG | WEIGHT: 162 LBS | OXYGEN SATURATION: 96 % | BODY MASS INDEX: 30.58 KG/M2

## 2021-06-30 DIAGNOSIS — C79.51 SECONDARY MALIGNANT NEOPLASM OF BONE: ICD-10-CM

## 2021-06-30 LAB
ANISOCYTOSIS BLD QL: SLIGHT — SIGNIFICANT CHANGE UP
BASOPHILS # BLD AUTO: 0 K/UL — SIGNIFICANT CHANGE UP (ref 0–0.2)
BASOPHILS NFR BLD AUTO: 0 % — SIGNIFICANT CHANGE UP (ref 0–2)
ELLIPTOCYTES BLD QL SMEAR: SLIGHT — SIGNIFICANT CHANGE UP
EOSINOPHIL # BLD AUTO: 0.21 K/UL — SIGNIFICANT CHANGE UP (ref 0–0.5)
EOSINOPHIL NFR BLD AUTO: 2 % — SIGNIFICANT CHANGE UP (ref 0–6)
HCT VFR BLD CALC: 33.1 % — LOW (ref 34.5–45)
HGB BLD-MCNC: 10 G/DL — LOW (ref 11.5–15.5)
LYMPHOCYTES # BLD AUTO: 1.83 K/UL — SIGNIFICANT CHANGE UP (ref 1–3.3)
LYMPHOCYTES # BLD AUTO: 17 % — SIGNIFICANT CHANGE UP (ref 13–44)
MCHC RBC-ENTMCNC: 24.7 PG — LOW (ref 27–34)
MCHC RBC-ENTMCNC: 30.2 G/DL — LOW (ref 32–36)
MCV RBC AUTO: 81.7 FL — SIGNIFICANT CHANGE UP (ref 80–100)
MONOCYTES # BLD AUTO: 0.32 K/UL — SIGNIFICANT CHANGE UP (ref 0–0.9)
MONOCYTES NFR BLD AUTO: 3 % — SIGNIFICANT CHANGE UP (ref 2–14)
MYELOCYTES NFR BLD: 1 % — HIGH (ref 0–0)
NEUTROPHILS # BLD AUTO: 8.27 K/UL — HIGH (ref 1.8–7.4)
NEUTROPHILS NFR BLD AUTO: 77 % — SIGNIFICANT CHANGE UP (ref 43–77)
NRBC # BLD: 0 /100 — SIGNIFICANT CHANGE UP (ref 0–0)
NRBC # BLD: SIGNIFICANT CHANGE UP /100 WBCS (ref 0–0)
OVALOCYTES BLD QL SMEAR: SLIGHT — SIGNIFICANT CHANGE UP
PLAT MORPH BLD: NORMAL — SIGNIFICANT CHANGE UP
PLATELET # BLD AUTO: 237 K/UL — SIGNIFICANT CHANGE UP (ref 150–400)
POIKILOCYTOSIS BLD QL AUTO: SLIGHT — SIGNIFICANT CHANGE UP
RBC # BLD: 4.05 M/UL — SIGNIFICANT CHANGE UP (ref 3.8–5.2)
RBC # FLD: 17.1 % — HIGH (ref 10.3–14.5)
RBC BLD AUTO: ABNORMAL
SCHISTOCYTES BLD QL AUTO: SLIGHT — SIGNIFICANT CHANGE UP
WBC # BLD: 10.74 K/UL — HIGH (ref 3.8–10.5)
WBC # FLD AUTO: 10.74 K/UL — HIGH (ref 3.8–10.5)

## 2021-06-30 PROCEDURE — 99214 OFFICE O/P EST MOD 30 MIN: CPT

## 2021-06-30 NOTE — ASSESSMENT
[FreeTextEntry1] : Lab work, CT C/A/P results reviewed with patient.\par #1) Patient had a remote h/o breast cancer. She received adjuvant treatment (AC-T ) + 5 years of Tamoxifen with Dr. Cash Bojorquez and subsequently transferred care to Dr. ALTAGRACIA Shields [place] [facility] [place]  In 2018 she was hospitalized and was found to have bone metastasis and renal mass.  6/2018 She underwent radical left nephrectomy for renal cell carcinoma, with PALND, and a bx. of the rIght iliac bone revealed metastatic breast cancer .  She was placed on Femara/Ibrance.    4/22/20  Interval CT Chest/Abdomen/Pelvis revealed new left lower lobe nodule with interval decrease in size of small nodules at the right lung apex, extensive osseous metastatic disease slightly increased, stable IVC filter.   She was prescribed Aromasin/Afinitor on  8/10/20, which she has continued.  Follow-up chest imaging was done 3/2021 to assess status of lung nodules-showed increase, however, it was being compared to a CT chest from 2019. Most recent F/U imaging c/w stable disease overall.\par \par #2) history of bilateral DVTs, with history of IVC filter–patient to remain on anticoagulation.  No overt bleeding noted clinically at present.\par \par Patient was given the opportunity to ask questions. Her questions have been answered to her apparent satisfaction at this time.\par \par \par

## 2021-06-30 NOTE — HISTORY OF PRESENT ILLNESS
[Disease: _____________________] : Disease: [unfilled] [M: ___] : M[unfilled] [AJCC Stage: ____] : AJCC Stage: [unfilled] [de-identified] : 3/2003–Stage IIB left breast cancer, ER positive/[place] positive/HER-2 negative–status post left modified radical mastectomy with reconstruction.  1/15 lymph nodes positive for metastatic carcinoma with focal extranodal extension.  Status post AC-T--> Tamoxifen x 5 years.  \par 6/2018-Presented with general weakness and leg pain.  Diagnosed with clear cell renal carcinoma  s/p Radical Left Nephrectomy, Para Aortic LN dissection,  c/b Splenic Laceration w/ bleeding. Right iliac bone biopsy of bone lesion was consistent with metastatic breast cancer–ER positive/GA positive/HER-2 unknown.\par Patient was begun on Letrozole/Ibrance/Xgeva.\par \par 3/2019–Status post left VATS procedure with pleural decortication.  Left pleural fluid cytology and pleural pathology negative for malignancy.\par 4/2020-CT scan of the chest/abdomen/pelvis revealed new left lower lung nodule and decreased right apex nodules, extensive bony metastases slightly increased. Changed to Aromasin, with Afinitor added 7/2020.\par Patient had been under the oncologic care of [facility].\par \par 8/2020–Patient wished to [place] [facility] [place] venous duplex study-B/L DVT. Begun on Xarelto.\par 12/2020–CT abdomen/pelvis–extensive osseous metastases.\par 6/2021-CT C/A/P-pulmonary metastases, a distal paraesophageal node and extensive osseous metastatic disease without significant change. No acute pathology. Aromasin/Afinitor continued.\par \par \par \par \par \par  [de-identified] : Infiltrating lobular carcinoma [de-identified] : 7/2020 :  CA 15-3=24 U/mL     CEA =2.9 ng/mL         [de-identified] : Saw Dr. Conley (GI) for dysphagia-states CT neck unremarkable. Referred to Dr. Dennis (ENT)-head and neck eval. negative. Referred for FEEST-told this was ok also. Has further endoscopic eval. planned. Xanax didn't help with the fear of swallowing liquids. Able to swallow solids without difficulty, and swallows sips of liquids.\par +NGO without acute change. Has O2 at home.\par Takes oxycodone for intermittent bony/joint aches.\par No dental/jaw pain.\par No  nausea/emesis/diarrhea or constipation . No melena, nose bleeds. \par Continues to take Aromasin/Afinitor.\par No cough, chills, fever, night sweats, mouth sores. \par \par Had COVID vaccines(Moderna).\par

## 2021-06-30 NOTE — PHYSICAL EXAM
[Normal] : affect appropriate [de-identified] : decreased edema B/L LE; no calf tenderness [de-identified] : no vesicles [de-identified] : left reconstructed breast without palpable abnormality; no dominant mass either breast

## 2021-06-30 NOTE — RESULTS/DATA
[FreeTextEntry1] : PROCEDURE DATE: 06/08/2021\par \par \par \par INTERPRETATION: CLINICAL INFORMATION: Breast cancer. Abdominal pain. Difficulty swallowing.\par \par COMPARISON: CT chest 3/2/2021, CT 9/25/20196\par \par CONTRAST/COMPLICATIONS:\par IV Contrast: NONE\par Oral Contrast: Smoothie Readi-Cat 2\par Complications: None reported at time of study completion\par \par PROCEDURE:\par CT of the Chest, Abdomen and Pelvis was performed.\par Sagittal and coronal reformats were performed.\par \par FINDINGS:\par CHEST:\par LUNGS AND LARGE AIRWAYS: Patent central airways. Centrilobular emphysema. Bilateral pulmonary nodules with the largest measuring 1.7 cm in the left lower lobe (4:58) and 1.3 cm in the right upper lobe (4:46) without significant change from prior imaging 3/2/2021. No new nodules. No parenchymal consolidation.\par PLEURA: No pleural effusion.\par VESSELS: Atherosclerotic changes of the aorta.\par HEART: Heart size is normal. No pericardial effusion.\par MEDIASTINUM AND RADHA: A 1.6 x 1.3 cm distal paraesophageal node (4:66) without significant change from prior imaging 3/2/2021.\par CHEST WALL AND LOWER NECK: Bilateral breast implants. Subcentimeter right thyroid nodule.\par \par ABDOMEN AND PELVIS:\par LIVER: Within normal limits.\par BILE DUCTS: Normal caliber.\par GALLBLADDER: Within normal limits.\par SPLEEN: Atrophic spleen with a 5.1 cm subcapsular collection, unchanged from numerous prior studies dating to 2019.\par PANCREAS: Within normal limits.\par ADRENALS: A 9 mm indeterminate right adrenal nodule without change.\par KIDNEYS/URETERS: Left nephrectomy.\par \par BLADDER: Within normal limits.\par REPRODUCTIVE ORGANS: A 1.5 cm left adnexal cyst.\par \par BOWEL: Small hiatal hernia. No bowel obstruction. Colonic diverticulosis. Normal appendix.\par PERITONEUM: No ascites.\par VESSELS: Atherosclerotic changes.\par RETROPERITONEUM/LYMPH NODES: No lymphadenopathy.\par ABDOMINAL WALL: Within normal limits.\par BONES: Extensive sclerotic osseous metastatic disease without significant change. No destructive lesion. Status post fixation of the right hemipelvis.\par \par IMPRESSION:\par Pulmonary metastases, a distal paraesophageal node and extensive osseous metastatic disease without significant change.\par \par No acute pathology.

## 2021-07-01 LAB — CANCER AG27-29 SERPL-ACNC: 30.1 U/ML

## 2021-07-02 ENCOUNTER — NON-APPOINTMENT (OUTPATIENT)
Age: 66
End: 2021-07-02

## 2021-07-02 LAB
ALBUMIN SERPL ELPH-MCNC: 4.3 G/DL
ALP BLD-CCNC: 47 U/L
ALT SERPL-CCNC: 8 U/L
ANION GAP SERPL CALC-SCNC: 25 MMOL/L
AST SERPL-CCNC: 21 U/L
BILIRUB SERPL-MCNC: 0.4 MG/DL
BUN SERPL-MCNC: 22 MG/DL
CALCIUM SERPL-MCNC: 9.1 MG/DL
CHLORIDE SERPL-SCNC: 110 MMOL/L
CO2 SERPL-SCNC: 15 MMOL/L
CREAT SERPL-MCNC: 1.36 MG/DL
GLUCOSE SERPL-MCNC: 124 MG/DL
POTASSIUM SERPL-SCNC: 4.6 MMOL/L
PROT SERPL-MCNC: 7 G/DL
SODIUM SERPL-SCNC: 150 MMOL/L

## 2021-07-18 ENCOUNTER — RX RENEWAL (OUTPATIENT)
Age: 66
End: 2021-07-18

## 2021-07-21 ENCOUNTER — RX RENEWAL (OUTPATIENT)
Age: 66
End: 2021-07-21

## 2021-07-27 ENCOUNTER — OUTPATIENT (OUTPATIENT)
Dept: OUTPATIENT SERVICES | Facility: HOSPITAL | Age: 66
LOS: 1 days | Discharge: HOME | End: 2021-07-27

## 2021-07-27 DIAGNOSIS — Z90.5 ACQUIRED ABSENCE OF KIDNEY: Chronic | ICD-10-CM

## 2021-07-27 DIAGNOSIS — E89.6 POSTPROCEDURAL ADRENOCORTICAL (-MEDULLARY) HYPOFUNCTION: Chronic | ICD-10-CM

## 2021-07-27 DIAGNOSIS — Z90.12 ACQUIRED ABSENCE OF LEFT BREAST AND NIPPLE: Chronic | ICD-10-CM

## 2021-07-27 DIAGNOSIS — C50.919 MALIGNANT NEOPLASM OF UNSPECIFIED SITE OF UNSPECIFIED FEMALE BREAST: ICD-10-CM

## 2021-07-27 DIAGNOSIS — Z98.890 OTHER SPECIFIED POSTPROCEDURAL STATES: Chronic | ICD-10-CM

## 2021-07-28 ENCOUNTER — APPOINTMENT (OUTPATIENT)
Dept: INFUSION THERAPY | Facility: HOSPITAL | Age: 66
End: 2021-07-28

## 2021-07-28 NOTE — PROGRESS NOTE ADULT - REASON FOR ADMISSION
pleural effusion, s/p chest tube
pleural effusion s/p surgery
pleural effusion, luq pain
s/p VATS for pleural effusion
s/p VATS for pleural effusion
negative...

## 2021-07-30 ENCOUNTER — APPOINTMENT (OUTPATIENT)
Dept: HEMATOLOGY ONCOLOGY | Facility: CLINIC | Age: 66
End: 2021-07-30
Payer: MEDICARE

## 2021-07-30 PROCEDURE — 99443: CPT | Mod: 95

## 2021-07-30 NOTE — REASON FOR VISIT
[Follow-Up Visit] : a follow-up [Home] : at home, [unfilled] , at the time of the visit. [Medical Office: ([facility])___] : at the medical office located in  [Verbal consent obtained from patient] : the patient, [unfilled] [FreeTextEntry2] : breast cancer

## 2021-07-30 NOTE — ASSESSMENT
[FreeTextEntry1] : Lab work reviewed.\par #1) Patient had a remote h/o breast cancer. She received adjuvant treatment (AC-T ) + 5 years of Tamoxifen with Dr. Cash Bojorquez and subsequently transferred care to Dr. ALTAGRACIA Shields at [facility] [place].  In 2018 she was hospitalized and was found to have bone metastasis and renal mass.  6/2018 She underwent radical left nephrectomy for renal cell carcinoma, with PALND, and a bx. of the rIght iliac bone revealed metastatic breast cancer .  She was placed on Femara/Ibrance.    4/22/20  Interval CT Chest/Abdomen/Pelvis revealed new left lower lobe nodule with interval decrease in size of small nodules at the right lung apex, extensive osseous metastatic disease slightly increased, stable IVC filter.   She was prescribed Aromasin/Afinitor on  8/10/20, which she has continued.  Follow-up chest imaging was done 3/2021 to assess status of lung nodules-showed increase, however, it was being compared to a CT chest from 2019. Most recent F/U imaging c/w stable disease overall.\par Patient to continue Aromasin. however, will give short trial of holding the Afinitor to see if dysphagia improves. Dysphagia is limiting QOL right now, and cancer treatment intent is palliative.\par \par #2) history of bilateral DVTs, with history of IVC filter–patient to remain on anticoagulation.  No overt bleeding reported at present.\par \par #3)  Renal insufficiency/hypernatremia on most recent lab work available.  P.o. fluid/water hydration as tolerated.  Advised follow- up with nephrology, not yet done.\par \par Patient was given the opportunity to ask questions. Her questions have been answered to her apparent satisfaction at this time.\par \par \par

## 2021-07-30 NOTE — HISTORY OF PRESENT ILLNESS
[Disease: _____________________] : Disease: [unfilled] [M: ___] : M[unfilled] [AJCC Stage: ____] : AJCC Stage: [unfilled] [de-identified] : 3/2003–Stage IIB left breast cancer, ER positive/[place] positive/HER-2 negative–status post left modified radical mastectomy with reconstruction.  1/15 lymph nodes positive for metastatic carcinoma with focal extranodal extension.  Status post AC-T--> Tamoxifen x 5 years.  \par 6/2018-Presented with general weakness and leg pain.  Diagnosed with clear cell renal carcinoma  s/p Radical Left Nephrectomy, Para Aortic LN dissection,  c/b Splenic Laceration w/ bleeding. Right iliac bone biopsy of bone lesion was consistent with metastatic breast cancer–ER positive/[place] positive/HER-2 unknown.\par Patient was begun on Letrozole/Ibrance/Xgeva.\par \par 3/2019–Status post left VATS procedure with pleural decortication.  Left pleural fluid cytology and pleural pathology negative for malignancy.\par 4/2020-CT scan of the chest/abdomen/pelvis revealed new left lower lung nodule and decreased right apex nodules, extensive bony metastases slightly increased. Changed to Aromasin, with Afinitor added 7/2020.\par Patient had been under the [facility]ologic care of [facility].\par \par 8/2020–Patient wished to transfer her oncologic care to the [facility] [place]. LE venous duplex study-B/L DVT. Begun on Xarelto.\par 12/2020–CT abdomen/pelvis–extensive osseous metastases.\par 6/2021-CT C/A/P-pulmonary metastases, a distal paraesophageal node and extensive osseous metastatic disease without significant change. No acute pathology. Aromasin/Afinitor continued.\par \par \par \par \par \par  [de-identified] : Infiltrating lobular carcinoma [de-identified] : 7/2020 :  CA 15-3=24 U/mL     CEA =2.9 ng/mL         [de-identified] : Patient unable to do video visit, so did visit by phone.\par +Fatigue over the last few days. Is trying to eat more. Still difficulty drinking liquids. States GI/ENT evaluations unremarkable. Denies odynophagia.\par +NGO without acute change. Has O2 at home.\par Takes oxycodone for intermittent bony/joint aches-usually takes HS.\par No dental/jaw pain.\par No  nausea/emesis/diarrhea or constipation . No melena, nose bleeds. \par Continues to take Aromasin/Afinitor.\par No cough, chills, fever, night sweats, mouth sores. \par \par Had COVID vaccines(Moderna).\par

## 2021-08-24 ENCOUNTER — APPOINTMENT (OUTPATIENT)
Dept: HEMATOLOGY ONCOLOGY | Facility: CLINIC | Age: 66
End: 2021-08-24
Payer: MEDICARE

## 2021-08-24 ENCOUNTER — RESULT REVIEW (OUTPATIENT)
Age: 66
End: 2021-08-24

## 2021-08-24 ENCOUNTER — APPOINTMENT (OUTPATIENT)
Dept: INFUSION THERAPY | Facility: HOSPITAL | Age: 66
End: 2021-08-24

## 2021-08-24 VITALS
RESPIRATION RATE: 17 BRPM | OXYGEN SATURATION: 97 % | TEMPERATURE: 96.8 F | HEART RATE: 74 BPM | SYSTOLIC BLOOD PRESSURE: 118 MMHG | DIASTOLIC BLOOD PRESSURE: 77 MMHG

## 2021-08-24 DIAGNOSIS — C79.51 SECONDARY MALIGNANT NEOPLASM OF BONE: ICD-10-CM

## 2021-08-24 LAB
ANISOCYTOSIS BLD QL: SLIGHT — SIGNIFICANT CHANGE UP
BASOPHILS # BLD AUTO: 0 K/UL — SIGNIFICANT CHANGE UP (ref 0–0.2)
BASOPHILS NFR BLD AUTO: 0 % — SIGNIFICANT CHANGE UP (ref 0–2)
ELLIPTOCYTES BLD QL SMEAR: SLIGHT — SIGNIFICANT CHANGE UP
EOSINOPHIL # BLD AUTO: 0.42 K/UL — SIGNIFICANT CHANGE UP (ref 0–0.5)
EOSINOPHIL NFR BLD AUTO: 5 % — SIGNIFICANT CHANGE UP (ref 0–6)
HCT VFR BLD CALC: 42.6 % — SIGNIFICANT CHANGE UP (ref 34.5–45)
HGB BLD-MCNC: 13.1 G/DL — SIGNIFICANT CHANGE UP (ref 11.5–15.5)
LYMPHOCYTES # BLD AUTO: 1.27 K/UL — SIGNIFICANT CHANGE UP (ref 1–3.3)
LYMPHOCYTES # BLD AUTO: 15 % — SIGNIFICANT CHANGE UP (ref 13–44)
MCHC RBC-ENTMCNC: 25.7 PG — LOW (ref 27–34)
MCHC RBC-ENTMCNC: 30.8 G/DL — LOW (ref 32–36)
MCV RBC AUTO: 83.5 FL — SIGNIFICANT CHANGE UP (ref 80–100)
MONOCYTES # BLD AUTO: 0.42 K/UL — SIGNIFICANT CHANGE UP (ref 0–0.9)
MONOCYTES NFR BLD AUTO: 5 % — SIGNIFICANT CHANGE UP (ref 2–14)
NEUTROPHILS # BLD AUTO: 6.36 K/UL — SIGNIFICANT CHANGE UP (ref 1.8–7.4)
NEUTROPHILS NFR BLD AUTO: 75 % — SIGNIFICANT CHANGE UP (ref 43–77)
NRBC # BLD: 0 /100 — SIGNIFICANT CHANGE UP (ref 0–0)
NRBC # BLD: SIGNIFICANT CHANGE UP /100 WBCS (ref 0–0)
OVALOCYTES BLD QL SMEAR: SLIGHT — SIGNIFICANT CHANGE UP
PLAT MORPH BLD: NORMAL — SIGNIFICANT CHANGE UP
PLATELET # BLD AUTO: 253 K/UL — SIGNIFICANT CHANGE UP (ref 150–400)
POIKILOCYTOSIS BLD QL AUTO: SLIGHT — SIGNIFICANT CHANGE UP
RBC # BLD: 5.1 M/UL — SIGNIFICANT CHANGE UP (ref 3.8–5.2)
RBC # FLD: 19.9 % — HIGH (ref 10.3–14.5)
RBC BLD AUTO: ABNORMAL
SCHISTOCYTES BLD QL AUTO: SLIGHT — SIGNIFICANT CHANGE UP
WBC # BLD: 8.48 K/UL — SIGNIFICANT CHANGE UP (ref 3.8–10.5)
WBC # FLD AUTO: 8.48 K/UL — SIGNIFICANT CHANGE UP (ref 3.8–10.5)

## 2021-08-24 PROCEDURE — 99214 OFFICE O/P EST MOD 30 MIN: CPT

## 2021-08-24 NOTE — HISTORY OF PRESENT ILLNESS
[Disease: _____________________] : Disease: [unfilled] [M: ___] : M[unfilled] [AJCC Stage: ____] : AJCC Stage: [unfilled] [de-identified] : 3/2003–Stage IIB left breast cancer, ER positive/[place] positive/HER-2 negative–status post left modified radical mastectomy with reconstruction.  1/15 lymph nodes positive for metastatic carcinoma with focal extranodal extension.  Status post AC-T--> Tamoxifen x 5 years.  \par 6/2018-Presented with general weakness and leg pain.  Diagnosed with clear cell renal carcinoma  s/p Radical Left Nephrectomy, Para Aortic LN dissection,  c/b Splenic Laceration w/ bleeding. Right iliac bone biopsy of bone lesion was consistent with metastatic breast cancer–ER positive/[place] positive/HER-2 unknown.\par Patient was begun on Letrozole/Ibrance/Xgeva.\par \par 3/2019–Status post left VATS procedure with pleural decortication.  Left pleural fluid cytology and pleural pathology negative for malignancy.\par 4/2020-CT scan of the chest/abdomen/pelvis revealed new left lower lung nodule and decreased right apex nodules, extensive bony metastases slightly increased. Changed to Aromasin, with Afinitor added 7/2020.\par Patient had been under the oncologic [facility] of [facility].\par \par 8/2020–Patient wished to [place] [facility] venous duplex study-B/L DVT. Begun on Xarelto.\par 12/2020–CT abdomen/pelvis–extensive osseous metastases.\par 6/2021-CT C/A/P-pulmonary metastases, a distal paraesophageal node and extensive osseous metastatic disease without significant change. No acute pathology. Aromasin/Afinitor continued.\par \par \par \par \par \par  [de-identified] : Infiltrating lobular carcinoma [de-identified] : 7/2020 :  CA 15-3=24 U/mL     CEA =2.9 ng/mL         [de-identified] : Still dysphagia to liquids only-no change with holding Afinitor-patient thinking it may be mentally related now, as it started after a choking episode. No pain with swallowing.\par Has O2 at home-as needed HS for ~ 1/2 hour. No current c/o SOB.\par Takes oxycodone for intermittent bony/joint aches-usually takes HS.\par No dental/jaw pain.\par No  nausea/emesis/diarrhea or constipation . No melena, nose bleeds. \par Continues to take Aromasin.\par No cough, chills, fever, night sweats, mouth sores. \par \par Had COVID vaccines(Moderna).\par

## 2021-08-24 NOTE — PHYSICAL EXAM
[Normal] : affect appropriate [de-identified] : left reconstructed breast without palpable abnormality; no dominant mass either breast

## 2021-08-24 NOTE — ASSESSMENT
[FreeTextEntry1] : Lab work reviewed.\par #1) Patient had a remote h/o breast cancer. She received adjuvant treatment (AC-T ) + 5 years of Tamoxifen with Dr. Cash Bojorquez and subsequently [place]RAC[place] [facility] [place]  In [place] she was hospitalized and was found to have bone metastasis and renal mass.  6/2018 She underwent radical left nephrectomy for renal cell carcinoma, with PALND, and a bx. of the rIght iliac bone revealed metastatic breast cancer .  She was placed on Femara/Ibrance.    4/22/20  Interval CT Chest/Abdomen/Pelvis revealed new left lower lobe nodule with interval decrease in size of small nodules at the right lung apex, extensive osseous metastatic disease slightly increased, stable IVC filter.   She was prescribed Aromasin/Afinitor on  8/10/20.  Follow-up chest imaging was done 3/2021 to assess status of lung nodules-showed increase, however, it was being compared to a CT chest from 2019. Most recent F/U imaging c/w stable disease overall.\par Patient to continue Aromasin and will resume the Afinitor as holding it did not affect dysphagia.\par \par #2) history of bilateral DVTs, with history of IVC filter–patient to remain on anticoagulation.  No overt bleeding reported at present.\par \par #3)  H/O Renal insufficiency/hypernatremia.  P.o. fluid/water hydration as tolerated. Follow- up with nephrology, not yet done.\par \par Patient was given the opportunity to ask questions. Her questions have been answered to her apparent satisfaction at this time.\par \par \par

## 2021-08-25 LAB
ALBUMIN SERPL ELPH-MCNC: 4.3 G/DL
ALP BLD-CCNC: 47 U/L
ALT SERPL-CCNC: 6 U/L
ANION GAP SERPL CALC-SCNC: 12 MMOL/L
AST SERPL-CCNC: 14 U/L
BILIRUB SERPL-MCNC: 0.5 MG/DL
BUN SERPL-MCNC: 22 MG/DL
CALCIUM SERPL-MCNC: 8.9 MG/DL
CHLORIDE SERPL-SCNC: 106 MMOL/L
CO2 SERPL-SCNC: 24 MMOL/L
CREAT SERPL-MCNC: 1.16 MG/DL
GLUCOSE SERPL-MCNC: 86 MG/DL
POTASSIUM SERPL-SCNC: 4.3 MMOL/L
PROT SERPL-MCNC: 6.9 G/DL
SODIUM SERPL-SCNC: 142 MMOL/L

## 2021-09-21 ENCOUNTER — OUTPATIENT (OUTPATIENT)
Dept: OUTPATIENT SERVICES | Facility: HOSPITAL | Age: 66
LOS: 1 days | Discharge: HOME | End: 2021-09-21

## 2021-09-21 DIAGNOSIS — C50.919 MALIGNANT NEOPLASM OF UNSPECIFIED SITE OF UNSPECIFIED FEMALE BREAST: ICD-10-CM

## 2021-09-21 DIAGNOSIS — Z90.5 ACQUIRED ABSENCE OF KIDNEY: Chronic | ICD-10-CM

## 2021-09-21 DIAGNOSIS — Z90.12 ACQUIRED ABSENCE OF LEFT BREAST AND NIPPLE: Chronic | ICD-10-CM

## 2021-09-21 DIAGNOSIS — Z98.890 OTHER SPECIFIED POSTPROCEDURAL STATES: Chronic | ICD-10-CM

## 2021-09-21 DIAGNOSIS — E89.6 POSTPROCEDURAL ADRENOCORTICAL (-MEDULLARY) HYPOFUNCTION: Chronic | ICD-10-CM

## 2021-09-22 ENCOUNTER — APPOINTMENT (OUTPATIENT)
Dept: INFUSION THERAPY | Facility: HOSPITAL | Age: 66
End: 2021-09-22

## 2021-09-22 ENCOUNTER — APPOINTMENT (OUTPATIENT)
Dept: HEMATOLOGY ONCOLOGY | Facility: CLINIC | Age: 66
End: 2021-09-22
Payer: MEDICARE

## 2021-09-22 ENCOUNTER — RESULT REVIEW (OUTPATIENT)
Age: 66
End: 2021-09-22

## 2021-09-22 VITALS
SYSTOLIC BLOOD PRESSURE: 126 MMHG | TEMPERATURE: 97.2 F | OXYGEN SATURATION: 96 % | DIASTOLIC BLOOD PRESSURE: 77 MMHG | HEART RATE: 81 BPM | RESPIRATION RATE: 16 BRPM

## 2021-09-22 DIAGNOSIS — C79.51 SECONDARY MALIGNANT NEOPLASM OF BONE: ICD-10-CM

## 2021-09-22 LAB
BASOPHILS # BLD AUTO: 0.02 K/UL — SIGNIFICANT CHANGE UP (ref 0–0.2)
BASOPHILS NFR BLD AUTO: 0.3 % — SIGNIFICANT CHANGE UP (ref 0–2)
EOSINOPHIL # BLD AUTO: 0.11 K/UL — SIGNIFICANT CHANGE UP (ref 0–0.5)
EOSINOPHIL NFR BLD AUTO: 1.6 % — SIGNIFICANT CHANGE UP (ref 0–6)
HCT VFR BLD CALC: 42.8 % — SIGNIFICANT CHANGE UP (ref 34.5–45)
HGB BLD-MCNC: 13.1 G/DL — SIGNIFICANT CHANGE UP (ref 11.5–15.5)
IMM GRANULOCYTES NFR BLD AUTO: 0.4 % — SIGNIFICANT CHANGE UP (ref 0–1.5)
LYMPHOCYTES # BLD AUTO: 1.53 K/UL — SIGNIFICANT CHANGE UP (ref 1–3.3)
LYMPHOCYTES # BLD AUTO: 21.7 % — SIGNIFICANT CHANGE UP (ref 13–44)
MCHC RBC-ENTMCNC: 25.6 PG — LOW (ref 27–34)
MCHC RBC-ENTMCNC: 30.6 G/DL — LOW (ref 32–36)
MCV RBC AUTO: 83.8 FL — SIGNIFICANT CHANGE UP (ref 80–100)
MONOCYTES # BLD AUTO: 0.39 K/UL — SIGNIFICANT CHANGE UP (ref 0–0.9)
MONOCYTES NFR BLD AUTO: 5.5 % — SIGNIFICANT CHANGE UP (ref 2–14)
NEUTROPHILS # BLD AUTO: 4.96 K/UL — SIGNIFICANT CHANGE UP (ref 1.8–7.4)
NEUTROPHILS NFR BLD AUTO: 70.5 % — SIGNIFICANT CHANGE UP (ref 43–77)
NRBC # BLD: 0 /100 WBCS — SIGNIFICANT CHANGE UP (ref 0–0)
PLATELET # BLD AUTO: 241 K/UL — SIGNIFICANT CHANGE UP (ref 150–400)
RBC # BLD: 5.11 M/UL — SIGNIFICANT CHANGE UP (ref 3.8–5.2)
RBC # FLD: 18.9 % — HIGH (ref 10.3–14.5)
WBC # BLD: 7.04 K/UL — SIGNIFICANT CHANGE UP (ref 3.8–10.5)
WBC # FLD AUTO: 7.04 K/UL — SIGNIFICANT CHANGE UP (ref 3.8–10.5)

## 2021-09-22 PROCEDURE — 99214 OFFICE O/P EST MOD 30 MIN: CPT

## 2021-09-22 NOTE — ASSESSMENT
[FreeTextEntry1] : Lab work reviewed.\par #1) Patient had a remote h/o breast cancer. She received adjuvant treatment (AC-T ) + 5 years of Tamoxifen with Dr. Cash Bojorquez and subsequently transferred [place]RAC[place] [facility]  In 2018 she was hospitalized and was found to have bone metastasis and renal mass.  6/2018 She underwent radical left nephrectomy for renal cell carcinoma, with PALND, and a bx. of the rIght iliac bone revealed metastatic breast cancer .  She was placed on Femara/Ibrance.    4/22/20  Interval CT Chest/Abdomen/Pelvis revealed new left lower lobe nodule with interval decrease in size of small nodules at the right lung apex, extensive osseous metastatic disease slightly increased, stable IVC filter.   She was prescribed Aromasin/Afinitor on  8/10/20.  Follow-up chest imaging was done 3/2021 to assess status of lung nodules-showed increase, however, it was being compared to a CT chest from 2019. Most recent F/U imaging c/w stable disease overall.\par Patient to continue Aromasin/Afinitor, to which she consents.\par \par #2) history of bilateral DVTs, with history of IVC filter–patient to remain on anticoagulation.  No overt bleeding noted clinically at present.\par \par #3)  H/O Renal insufficiency/hypernatremia.  P.o. fluid/water hydration as tolerated. Follow- up with nephrology advised.\par \par Patient was given the opportunity to ask questions. Her questions have been answered to her apparent satisfaction at this time.\par \par \par

## 2021-09-22 NOTE — REVIEW OF SYSTEMS
[Negative] : Allergic/Immunologic [Shortness Of Breath] : no shortness of breath [Fainting] : no fainting [Proptosis] : no proptosis

## 2021-09-22 NOTE — HISTORY OF PRESENT ILLNESS
[Disease: _____________________] : Disease: [unfilled] [M: ___] : M[unfilled] [AJCC Stage: ____] : AJCC Stage: [unfilled] [de-identified] : 3/2003–Stage IIB left breast cancer, ER positive/[place] positive/HER-2 negative–status post left modified radical mastectomy with reconstruction.  1/15 lymph nodes positive for metastatic carcinoma with focal extranodal extension.  Status post AC-T--> Tamoxifen x 5 years.  \par 6/2018-Presented with general weakness and leg pain.  Diagnosed with clear cell renal carcinoma  s/p Radical Left Nephrectomy, Para Aortic LN dissection,  c/b Splenic Laceration w/ bleeding. Right iliac bone biopsy of bone lesion was consistent with metastatic breast cancer–ER positive/PA positive/HER-2 unknown.\par Patient was begun on Letrozole/Ibrance/Xgeva.\par \par 3/2019–Status post left VATS procedure with pleural decortication.  Left pleural fluid cytology and pleural pathology negative for malignancy.\par 4/2020-CT scan of the chest/abdomen/pelvis revealed new left lower lung nodule and decreased right apex nodules, extensive bony metastases slightly increased. Changed to Aromasin, with Afinitor added 7/2020.\par Patient had been under the oncologic care of [facility].\par \par 8/2020–Patient wished to transfer her oncologic care to the [facility] [place]. LE venous duplex study-B/L DVT. Begun on Xarelto.\par 12/2020–CT abdomen/pelvis–extensive osseous metastases.\par 6/2021-CT C/A/P-pulmonary metastases, a distal paraesophageal node and extensive osseous metastatic disease without significant change. No acute pathology. Aromasin/Afinitor continued.\par \par \par \par \par \par  [de-identified] : Infiltrating lobular carcinoma [de-identified] : 7/2020 :  CA 15-3=24 U/mL     CEA =2.9 ng/mL         [de-identified] : Takes oxycodone for intermittent bony/joint aches-usually takes HS-helps.\par No dental/jaw pain.\par Still dysphagia to liquids only. No pain with swallowing. Has appt. for EGD 9/29/21-Dr. Conley.\par Has O2 at home-as needed HS for ~ 1/2 hour. No current c/o SOB.\par No  nausea/emesis/diarrhea or constipation . No melena, nose bleeds. \par No cough, chills, fever, night sweats, mouth sores. \par \par Son Troy choi-he is in the [employer], currently living in [facility].\par \par Had COVID vaccines(Moderna).\par

## 2021-09-22 NOTE — PHYSICAL EXAM
[Normal] : affect appropriate [de-identified] : left reconstructed breast without palpable abnormality; no dominant mass either breast

## 2021-09-24 LAB
ALBUMIN SERPL ELPH-MCNC: 4.4 G/DL
ALP BLD-CCNC: 55 U/L
ALT SERPL-CCNC: 6 U/L
ANION GAP SERPL CALC-SCNC: 24 MMOL/L
AST SERPL-CCNC: 20 U/L
BILIRUB SERPL-MCNC: 0.2 MG/DL
BUN SERPL-MCNC: 19 MG/DL
CALCIUM SERPL-MCNC: 9.5 MG/DL
CANCER AG27-29 SERPL-ACNC: 27.1 U/ML
CHLORIDE SERPL-SCNC: 107 MMOL/L
CO2 SERPL-SCNC: 18 MMOL/L
CREAT SERPL-MCNC: 1.31 MG/DL
GLUCOSE SERPL-MCNC: 121 MG/DL
POTASSIUM SERPL-SCNC: 4.2 MMOL/L
PROT SERPL-MCNC: 7.1 G/DL
SODIUM SERPL-SCNC: 149 MMOL/L

## 2021-10-11 ENCOUNTER — RX RENEWAL (OUTPATIENT)
Age: 66
End: 2021-10-11

## 2021-10-21 ENCOUNTER — APPOINTMENT (OUTPATIENT)
Dept: HEMATOLOGY ONCOLOGY | Facility: CLINIC | Age: 66
End: 2021-10-21

## 2021-10-21 ENCOUNTER — NON-APPOINTMENT (OUTPATIENT)
Age: 66
End: 2021-10-21

## 2021-10-21 ENCOUNTER — APPOINTMENT (OUTPATIENT)
Dept: INFUSION THERAPY | Facility: HOSPITAL | Age: 66
End: 2021-10-21

## 2021-11-03 ENCOUNTER — APPOINTMENT (OUTPATIENT)
Dept: RADIOLOGY | Facility: HOSPITAL | Age: 66
End: 2021-11-03

## 2021-11-15 ENCOUNTER — OUTPATIENT (OUTPATIENT)
Dept: OUTPATIENT SERVICES | Facility: HOSPITAL | Age: 66
LOS: 1 days | Discharge: HOME | End: 2021-11-15

## 2021-11-15 DIAGNOSIS — Z90.12 ACQUIRED ABSENCE OF LEFT BREAST AND NIPPLE: Chronic | ICD-10-CM

## 2021-11-15 DIAGNOSIS — E89.6 POSTPROCEDURAL ADRENOCORTICAL (-MEDULLARY) HYPOFUNCTION: Chronic | ICD-10-CM

## 2021-11-15 DIAGNOSIS — Z98.890 OTHER SPECIFIED POSTPROCEDURAL STATES: Chronic | ICD-10-CM

## 2021-11-15 DIAGNOSIS — Z90.5 ACQUIRED ABSENCE OF KIDNEY: Chronic | ICD-10-CM

## 2021-11-15 DIAGNOSIS — C50.919 MALIGNANT NEOPLASM OF UNSPECIFIED SITE OF UNSPECIFIED FEMALE BREAST: ICD-10-CM

## 2021-11-18 ENCOUNTER — RESULT REVIEW (OUTPATIENT)
Age: 66
End: 2021-11-18

## 2021-11-18 ENCOUNTER — APPOINTMENT (OUTPATIENT)
Dept: HEMATOLOGY ONCOLOGY | Facility: CLINIC | Age: 66
End: 2021-11-18
Payer: MEDICARE

## 2021-11-18 ENCOUNTER — APPOINTMENT (OUTPATIENT)
Dept: INFUSION THERAPY | Facility: HOSPITAL | Age: 66
End: 2021-11-18

## 2021-11-18 VITALS
DIASTOLIC BLOOD PRESSURE: 72 MMHG | OXYGEN SATURATION: 98 % | SYSTOLIC BLOOD PRESSURE: 113 MMHG | RESPIRATION RATE: 17 BRPM | TEMPERATURE: 97.2 F | HEART RATE: 76 BPM

## 2021-11-18 DIAGNOSIS — C79.51 SECONDARY MALIGNANT NEOPLASM OF BONE: ICD-10-CM

## 2021-11-18 LAB
BASOPHILS # BLD AUTO: 0.02 K/UL — SIGNIFICANT CHANGE UP (ref 0–0.2)
BASOPHILS NFR BLD AUTO: 0.3 % — SIGNIFICANT CHANGE UP (ref 0–2)
EOSINOPHIL # BLD AUTO: 0.12 K/UL — SIGNIFICANT CHANGE UP (ref 0–0.5)
EOSINOPHIL NFR BLD AUTO: 1.6 % — SIGNIFICANT CHANGE UP (ref 0–6)
HCT VFR BLD CALC: 37.5 % — SIGNIFICANT CHANGE UP (ref 34.5–45)
HGB BLD-MCNC: 11.4 G/DL — LOW (ref 11.5–15.5)
IMM GRANULOCYTES NFR BLD AUTO: 0.4 % — SIGNIFICANT CHANGE UP (ref 0–1.5)
LYMPHOCYTES # BLD AUTO: 1.11 K/UL — SIGNIFICANT CHANGE UP (ref 1–3.3)
LYMPHOCYTES # BLD AUTO: 14.9 % — SIGNIFICANT CHANGE UP (ref 13–44)
MCHC RBC-ENTMCNC: 23.9 PG — LOW (ref 27–34)
MCHC RBC-ENTMCNC: 30.4 G/DL — LOW (ref 32–36)
MCV RBC AUTO: 78.8 FL — LOW (ref 80–100)
MONOCYTES # BLD AUTO: 0.38 K/UL — SIGNIFICANT CHANGE UP (ref 0–0.9)
MONOCYTES NFR BLD AUTO: 5.1 % — SIGNIFICANT CHANGE UP (ref 2–14)
NEUTROPHILS # BLD AUTO: 5.79 K/UL — SIGNIFICANT CHANGE UP (ref 1.8–7.4)
NEUTROPHILS NFR BLD AUTO: 77.7 % — HIGH (ref 43–77)
NRBC # BLD: 0 /100 WBCS — SIGNIFICANT CHANGE UP (ref 0–0)
PLATELET # BLD AUTO: 275 K/UL — SIGNIFICANT CHANGE UP (ref 150–400)
RBC # BLD: 4.76 M/UL — SIGNIFICANT CHANGE UP (ref 3.8–5.2)
RBC # FLD: 17 % — HIGH (ref 10.3–14.5)
WBC # BLD: 7.45 K/UL — SIGNIFICANT CHANGE UP (ref 3.8–10.5)
WBC # FLD AUTO: 7.45 K/UL — SIGNIFICANT CHANGE UP (ref 3.8–10.5)

## 2021-11-18 PROCEDURE — 99214 OFFICE O/P EST MOD 30 MIN: CPT

## 2021-11-18 NOTE — ASSESSMENT
[FreeTextEntry1] : Lab work reviewed.\par #1) Patient had a remote h/o breast cancer. She received adjuvant treatment (AC-T ) + 5 years of Tamoxifen with Dr. Cash Bojorquez and subsequently transferred care to Dr. ALTAGRACIA Shields at [facility].  In 2018 she was hospitalized and was found to have bone metastasis and renal mass.  6/2018 She underwent radical left nephrectomy for renal cell carcinoma, with PALND, and a bx. of the rIght iliac bone revealed metastatic breast cancer .  She was placed on Femara/Ibrance.    4/22/20  Interval CT Chest/Abdomen/Pelvis revealed new left lower lobe nodule with interval decrease in size of small nodules at the right lung apex, extensive osseous metastatic disease slightly increased, stable IVC filter.   She was prescribed Aromasin/Afinitor on  8/10/20.  Follow-up chest imaging was done 3/2021 to assess status of lung nodules-showed increase, however, it was being compared to a CT chest from 2019. Most recent F/U imaging c/w stable disease overall.\par Patient to continue Aromasin/Afinitor, to which she consents. Tentatively plan f/u scans next visit.\par \par #2) history of bilateral DVTs, with history of IVC filter–patient to remain on anticoagulation.  No overt bleeding noted clinically at present.\par \par #3)  H/O Renal insufficiency/hypernatremia.  P.O. fluid/water hydration as tolerated-encouraged. Follow- up with PCP.\par \par Patient was given the opportunity to ask questions. Her questions have been answered to her apparent satisfaction at this time.\par \par \par

## 2021-11-18 NOTE — HISTORY OF PRESENT ILLNESS
[Disease: _____________________] : Disease: [unfilled] [M: ___] : M[unfilled] [AJCC Stage: ____] : AJCC Stage: [unfilled] [de-identified] : 3/2003–Stage IIB left breast cancer, ER positive/[place] positive/HER-2 negative–status post left modified radical mastectomy with reconstruction.  1/15 lymph nodes positive for metastatic carcinoma with focal extranodal extension.  Status post AC-T--> Tamoxifen x 5 years.  \par 6/2018-Presented with general weakness and leg pain.  Diagnosed with clear cell renal carcinoma  s/p Radical Left Nephrectomy, Para Aortic LN dissection,  c/b Splenic Laceration w/ bleeding. Right iliac bone biopsy of bone lesion was consistent with metastatic breast cancer–ER positive/[place] positive/HER-2 unknown.\par Patient was begun on Letrozole/Ibrance/Xgeva.\par \par 3/2019–Status post left VATS procedure with pleural decortication.  Left pleural fluid cytology and pleural pathology negative for malignancy.\par 4/2020-CT scan of the chest/abdomen/pelvis revealed new left lower lung nodule and decreased right apex nodules, extensive bony metastases slightly increased. Changed to Aromasin, with Afinitor added 7/2020.\par Patient had been under the [facility]ologic care of [facility].\par \par 8/2020–Patient wished to transfer her oncologic care to the [facility]. LE venous duplex study-B/L DVT. Begun on Xarelto.\par 12/2020–CT abdomen/pelvis–extensive osseous metastases.\par 6/2021-CT C/A/P-pulmonary metastases, a distal paraesophageal node and extensive osseous metastatic disease without significant change. No acute pathology. Aromasin/Afinitor continued.\par \par \par \par \par \par  [de-identified] : Infiltrating lobular carcinoma [de-identified] : 7/2020 :  CA 15-3=24 U/mL     CEA =2.9 ng/mL         [de-identified] : Got flu shot today.\par Takes oxycodone for intermittent bony/joint aches-usually takes HS-helps.\par No dental/jaw pain. States EGD unremarkable. Esophagram/barium swallow and speech therapy consultation recommended which patient has elected not to do. Patient has decided to meditate.\par Has O2 at home-as needed HS for ~ 1/2 hour. No current c/o SOB.\par No  nausea/vomiting/diarrhea or constipation . No melena, nose bleeds. \par No cough, chills, fever, night sweats, mouth sores. \par \par Had COVID vaccines(Moderna).\par

## 2021-11-18 NOTE — PHYSICAL EXAM
[Normal] : affect appropriate [Ambulatory and capable of all self care but unable to carry out any work activities] : Status 2- Ambulatory and capable of all self care but unable to carry out any work activities. Up and about more than 50% of waking hours [de-identified] : left reconstructed breast without palpable abnormality; no dominant mass either breast

## 2021-11-19 ENCOUNTER — NON-APPOINTMENT (OUTPATIENT)
Age: 66
End: 2021-11-19

## 2021-11-19 DIAGNOSIS — E87.6 HYPOKALEMIA: ICD-10-CM

## 2021-11-19 LAB
ALBUMIN SERPL ELPH-MCNC: 3.9 G/DL
ALP BLD-CCNC: 45 U/L
ALT SERPL-CCNC: 8 U/L
ANION GAP SERPL CALC-SCNC: 16 MMOL/L
AST SERPL-CCNC: 18 U/L
BILIRUB SERPL-MCNC: 0.4 MG/DL
BUN SERPL-MCNC: 17 MG/DL
CALCIUM SERPL-MCNC: 8.9 MG/DL
CHLORIDE SERPL-SCNC: 104 MMOL/L
CO2 SERPL-SCNC: 25 MMOL/L
CREAT SERPL-MCNC: 1.3 MG/DL
GLUCOSE SERPL-MCNC: 160 MG/DL
POTASSIUM SERPL-SCNC: 3.3 MMOL/L
PROT SERPL-MCNC: 6.7 G/DL
SODIUM SERPL-SCNC: 145 MMOL/L

## 2021-11-22 ENCOUNTER — NON-APPOINTMENT (OUTPATIENT)
Age: 66
End: 2021-11-22

## 2021-12-13 ENCOUNTER — RX RENEWAL (OUTPATIENT)
Age: 66
End: 2021-12-13

## 2021-12-13 ENCOUNTER — OUTPATIENT (OUTPATIENT)
Dept: OUTPATIENT SERVICES | Facility: HOSPITAL | Age: 66
LOS: 1 days | Discharge: HOME | End: 2021-12-13

## 2021-12-13 DIAGNOSIS — Z90.5 ACQUIRED ABSENCE OF KIDNEY: Chronic | ICD-10-CM

## 2021-12-13 DIAGNOSIS — C50.919 MALIGNANT NEOPLASM OF UNSPECIFIED SITE OF UNSPECIFIED FEMALE BREAST: ICD-10-CM

## 2021-12-13 DIAGNOSIS — Z90.12 ACQUIRED ABSENCE OF LEFT BREAST AND NIPPLE: Chronic | ICD-10-CM

## 2021-12-13 DIAGNOSIS — E89.6 POSTPROCEDURAL ADRENOCORTICAL (-MEDULLARY) HYPOFUNCTION: Chronic | ICD-10-CM

## 2021-12-13 DIAGNOSIS — Z98.890 OTHER SPECIFIED POSTPROCEDURAL STATES: Chronic | ICD-10-CM

## 2021-12-16 ENCOUNTER — APPOINTMENT (OUTPATIENT)
Dept: HEMATOLOGY ONCOLOGY | Facility: CLINIC | Age: 66
End: 2021-12-16
Payer: MEDICARE

## 2021-12-16 ENCOUNTER — RESULT REVIEW (OUTPATIENT)
Age: 66
End: 2021-12-16

## 2021-12-16 ENCOUNTER — APPOINTMENT (OUTPATIENT)
Dept: INFUSION THERAPY | Facility: HOSPITAL | Age: 66
End: 2021-12-16

## 2021-12-16 VITALS
DIASTOLIC BLOOD PRESSURE: 77 MMHG | SYSTOLIC BLOOD PRESSURE: 130 MMHG | OXYGEN SATURATION: 93 % | TEMPERATURE: 96.7 F | BODY MASS INDEX: 30.55 KG/M2 | HEIGHT: 60.98 IN | RESPIRATION RATE: 18 BRPM | HEART RATE: 73 BPM | WEIGHT: 161.82 LBS

## 2021-12-16 DIAGNOSIS — C79.51 SECONDARY MALIGNANT NEOPLASM OF BONE: ICD-10-CM

## 2021-12-16 LAB
ANISOCYTOSIS BLD QL: SLIGHT — SIGNIFICANT CHANGE UP
BASOPHILS # BLD AUTO: 0.03 K/UL — SIGNIFICANT CHANGE UP (ref 0–0.2)
BASOPHILS NFR BLD AUTO: 0.3 % — SIGNIFICANT CHANGE UP (ref 0–2)
DACRYOCYTES BLD QL SMEAR: SLIGHT — SIGNIFICANT CHANGE UP
ELLIPTOCYTES BLD QL SMEAR: SLIGHT — SIGNIFICANT CHANGE UP
EOSINOPHIL # BLD AUTO: 0.17 K/UL — SIGNIFICANT CHANGE UP (ref 0–0.5)
EOSINOPHIL NFR BLD AUTO: 2 % — SIGNIFICANT CHANGE UP (ref 0–6)
HCT VFR BLD CALC: 36.7 % — SIGNIFICANT CHANGE UP (ref 34.5–45)
HGB BLD-MCNC: 11.1 G/DL — LOW (ref 11.5–15.5)
IMM GRANULOCYTES NFR BLD AUTO: 0.5 % — SIGNIFICANT CHANGE UP (ref 0–1.5)
LYMPHOCYTES # BLD AUTO: 1.46 K/UL — SIGNIFICANT CHANGE UP (ref 1–3.3)
LYMPHOCYTES # BLD AUTO: 16.9 % — SIGNIFICANT CHANGE UP (ref 13–44)
MCHC RBC-ENTMCNC: 23.8 PG — LOW (ref 27–34)
MCHC RBC-ENTMCNC: 30.2 G/DL — LOW (ref 32–36)
MCV RBC AUTO: 78.8 FL — LOW (ref 80–100)
MONOCYTES # BLD AUTO: 0.32 K/UL — SIGNIFICANT CHANGE UP (ref 0–0.9)
MONOCYTES NFR BLD AUTO: 3.7 % — SIGNIFICANT CHANGE UP (ref 2–14)
NEUTROPHILS # BLD AUTO: 6.6 K/UL — SIGNIFICANT CHANGE UP (ref 1.8–7.4)
NEUTROPHILS NFR BLD AUTO: 76.6 % — SIGNIFICANT CHANGE UP (ref 43–77)
NRBC # BLD: 0 /100 WBCS — SIGNIFICANT CHANGE UP (ref 0–0)
PLAT MORPH BLD: NORMAL — SIGNIFICANT CHANGE UP
PLATELET # BLD AUTO: 256 K/UL — SIGNIFICANT CHANGE UP (ref 150–400)
POIKILOCYTOSIS BLD QL AUTO: SLIGHT — SIGNIFICANT CHANGE UP
RBC # BLD: 4.66 M/UL — SIGNIFICANT CHANGE UP (ref 3.8–5.2)
RBC # FLD: 17.8 % — HIGH (ref 10.3–14.5)
RBC BLD AUTO: ABNORMAL
WBC # BLD: 8.62 K/UL — SIGNIFICANT CHANGE UP (ref 3.8–10.5)
WBC # FLD AUTO: 8.62 K/UL — SIGNIFICANT CHANGE UP (ref 3.8–10.5)

## 2021-12-16 PROCEDURE — 99214 OFFICE O/P EST MOD 30 MIN: CPT

## 2021-12-16 NOTE — PHYSICAL EXAM
[Ambulatory and capable of all self care but unable to carry out any work activities] : Status 2- Ambulatory and capable of all self care but unable to carry out any work activities. Up and about more than 50% of waking hours [Normal] : affect appropriate [de-identified] : left reconstructed breast without palpable abnormality; no dominant mass either breast

## 2021-12-16 NOTE — ASSESSMENT
[FreeTextEntry1] : Lab work reviewed.\par #1) Patient had a remote h/o breast cancer. She received adjuvant treatment (AC-T ) + 5 years of Tamoxifen with Dr. Cash Bojorquez and subsequently transferred care to Dr. ALTAGRACIA Shields at [facility] [place].  In 2018 she was hospitalized and was found to have bone metastasis and renal mass.  6/2018 She underwent radical left nephrectomy for renal cell carcinoma, with PALND, and a bx. of the rIght iliac bone revealed metastatic breast cancer .  She was placed on Femara/Ibrance.    4/22/20  Interval CT Chest/Abdomen/Pelvis revealed new left lower lobe nodule with interval decrease in size of small nodules at the right lung apex, extensive osseous metastatic disease slightly increased, stable IVC filter.   She was prescribed Aromasin/Afinitor on  8/10/20.  Follow-up chest imaging was done 3/2021 to assess status of lung nodules-showed increase, however, it was being compared to a CT chest from 2019. Most recent F/U imaging c/w stable disease overall.\par Patient to continue Aromasin/Afinitor, to which she consents. F/U scans ordered.\par \par #2) history of bilateral DVTs, with history of IVC filter–patient to remain on anticoagulation.  No overt bleeding noted clinically at present.\par \par #3)  H/O Renal insufficiency/hypernatremia/hypokalemia.  P.O. fluid/water hydration as tolerated, and potassium supplementation as needed. Check cortisol level with next lab work. Follow- up with PCP.\par \par Patient was given the opportunity to ask questions. Her questions have been answered to her apparent satisfaction at this time.\par \par \par

## 2021-12-16 NOTE — HISTORY OF PRESENT ILLNESS
[Disease: _____________________] : Disease: [unfilled] [M: ___] : M[unfilled] [AJCC Stage: ____] : AJCC Stage: [unfilled] [de-identified] : 3/2003–Stage IIB left breast cancer, ER positive/[place] positive/HER-2 negative–status post left modified radical mastectomy with reconstruction.  1/15 lymph nodes positive for metastatic carcinoma with focal extranodal extension.  Status post AC-T--> Tamoxifen x 5 years.  \par 6/2018-Presented with general weakness and leg pain.  Diagnosed with clear cell renal carcinoma  s/p Radical Left Nephrectomy, Para Aortic LN dissection,  c/b Splenic Laceration w/ bleeding. Right iliac bone biopsy of bone lesion was consistent with metastatic breast cancer–ER positive/VA positive/HER-2 unknown.\par Patient was begun on Letrozole/Ibrance/Xgeva.\par \par 3/2019–Status post left VATS procedure with pleural decortication.  Left pleural fluid cytology and pleural pathology negative for malignancy.\par 4/2020-CT scan of the chest/abdomen/pelvis revealed new left lower lung nodule and decreased right apex nodules, extensive bony metastases slightly increased. Changed to Aromasin, with Afinitor added 7/2020.\par Patient had [facility].\par \par 8/2020–Patient wished to transfer her oncologic care to the [facility] [place]. LE venous duplex study-B/L DVT. Begun on Xarelto.\par 12/2020–CT abdomen/pelvis–extensive osseous metastases.\par 6/2021-CT C/A/P-pulmonary metastases, a distal paraesophageal node and extensive osseous metastatic disease without significant change. No acute pathology. Aromasin/Afinitor continued.\par \par \par \par \par \par  [de-identified] : Infiltrating lobular carcinoma [de-identified] : 7/2020 :  CA 15-3=24 U/mL     CEA =2.9 ng/mL         [de-identified] : No new complaints.\par Takes oxycodone for intermittent bony/joint aches-usually takes HS-helps.\par No dental/jaw pain. Is drinking fluids better.\par Has O2 at home-as needed HS for ~ 1/2 hour. No current c/o SOB.\par No  nausea/vomiting/diarrhea or constipation . No melena, nose bleeds. \par No cough, chills, fever, night sweats, mouth sores. \par \par Had COVID vaccines(Moderna). Booster scheduled.\par

## 2021-12-17 LAB
ALBUMIN SERPL ELPH-MCNC: 3.9 G/DL
ALP BLD-CCNC: 51 U/L
ALT SERPL-CCNC: 6 U/L
ANION GAP SERPL CALC-SCNC: 16 MMOL/L
AST SERPL-CCNC: 17 U/L
BILIRUB SERPL-MCNC: 0.3 MG/DL
BUN SERPL-MCNC: 15 MG/DL
CALCIUM SERPL-MCNC: 8.2 MG/DL
CANCER AG27-29 SERPL-ACNC: 29.9 U/ML
CHLORIDE SERPL-SCNC: 103 MMOL/L
CO2 SERPL-SCNC: 26 MMOL/L
CREAT SERPL-MCNC: 1.38 MG/DL
GLUCOSE SERPL-MCNC: 117 MG/DL
POTASSIUM SERPL-SCNC: 3.7 MMOL/L
PROT SERPL-MCNC: 6.6 G/DL
SODIUM SERPL-SCNC: 145 MMOL/L

## 2021-12-22 ENCOUNTER — NON-APPOINTMENT (OUTPATIENT)
Age: 66
End: 2021-12-22

## 2021-12-22 ENCOUNTER — APPOINTMENT (OUTPATIENT)
Dept: SPEECH THERAPY | Facility: CLINIC | Age: 66
End: 2021-12-22

## 2022-01-01 NOTE — DIETITIAN INITIAL EVALUATION ADULT. - NUTRITIONGOAL OUTCOME1
What Type Of Note Output Would You Prefer (Optional)?: Standard Output Is The Patient Presenting As Previously Scheduled?: Yes How Severe Is Your Rash?: severe Is This A New Presentation, Or A Follow-Up?: Rash Additional History: Patient was prescribed oral antibiotics one week ago for three days with no improvement pt to meet nutritional needs via oral vs alternate route.

## 2022-01-14 NOTE — H&P ADULT - NSHPPHYSICALEXAM_GEN_ALL_CORE
Last appt:  10/19/21    Las refill:  12/17/21 Vital Signs Last 24 Hrs  T(C): 36.6 (17 Feb 2019 16:15), Max: 36.7 (17 Feb 2019 15:09)  T(F): 97.8 (17 Feb 2019 16:15), Max: 98 (17 Feb 2019 15:09)  HR: 87 (17 Feb 2019 16:50) (81 - 101)  BP: 94/62 (17 Feb 2019 16:50) (75/36 - 98/59)  BP(mean): 70 (17 Feb 2019 16:50) (64 - 70)  RR: 20 (17 Feb 2019 16:50) (16 - 21)  SpO2: 100% (17 Feb 2019 16:50) (94% - 100%)    Amador 75 cc -clear yellow    Gen: Mild distress  CV: RRR  Resp: Inc work of breathing  GI: Distended, + TTP in the LUQ  : Amador secure and draining as above  MSK: mottling of RLE

## 2022-01-17 ENCOUNTER — OUTPATIENT (OUTPATIENT)
Dept: OUTPATIENT SERVICES | Facility: HOSPITAL | Age: 67
LOS: 1 days | Discharge: HOME | End: 2022-01-17

## 2022-01-17 DIAGNOSIS — Z98.890 OTHER SPECIFIED POSTPROCEDURAL STATES: Chronic | ICD-10-CM

## 2022-01-17 DIAGNOSIS — C50.919 MALIGNANT NEOPLASM OF UNSPECIFIED SITE OF UNSPECIFIED FEMALE BREAST: ICD-10-CM

## 2022-01-17 DIAGNOSIS — Z90.12 ACQUIRED ABSENCE OF LEFT BREAST AND NIPPLE: Chronic | ICD-10-CM

## 2022-01-17 DIAGNOSIS — Z90.5 ACQUIRED ABSENCE OF KIDNEY: Chronic | ICD-10-CM

## 2022-01-17 DIAGNOSIS — E89.6 POSTPROCEDURAL ADRENOCORTICAL (-MEDULLARY) HYPOFUNCTION: Chronic | ICD-10-CM

## 2022-01-19 ENCOUNTER — APPOINTMENT (OUTPATIENT)
Dept: INFUSION THERAPY | Facility: HOSPITAL | Age: 67
End: 2022-01-19

## 2022-01-25 ENCOUNTER — RESULT REVIEW (OUTPATIENT)
Age: 67
End: 2022-01-25

## 2022-02-16 ENCOUNTER — APPOINTMENT (OUTPATIENT)
Dept: INFUSION THERAPY | Facility: HOSPITAL | Age: 67
End: 2022-02-16

## 2022-02-16 ENCOUNTER — RESULT REVIEW (OUTPATIENT)
Age: 67
End: 2022-02-16

## 2022-02-16 ENCOUNTER — APPOINTMENT (OUTPATIENT)
Dept: HEMATOLOGY ONCOLOGY | Facility: CLINIC | Age: 67
End: 2022-02-16
Payer: MEDICARE

## 2022-02-16 VITALS
RESPIRATION RATE: 18 BRPM | SYSTOLIC BLOOD PRESSURE: 117 MMHG | BODY MASS INDEX: 31.15 KG/M2 | TEMPERATURE: 97.2 F | DIASTOLIC BLOOD PRESSURE: 76 MMHG | WEIGHT: 164.99 LBS | OXYGEN SATURATION: 95 % | HEART RATE: 80 BPM | HEIGHT: 61 IN

## 2022-02-16 LAB
BASOPHILS # BLD AUTO: 0.03 K/UL — SIGNIFICANT CHANGE UP (ref 0–0.2)
BASOPHILS NFR BLD AUTO: 0.4 % — SIGNIFICANT CHANGE UP (ref 0–2)
EOSINOPHIL # BLD AUTO: 0.1 K/UL — SIGNIFICANT CHANGE UP (ref 0–0.5)
EOSINOPHIL NFR BLD AUTO: 1.4 % — SIGNIFICANT CHANGE UP (ref 0–6)
HCT VFR BLD CALC: 39.6 % — SIGNIFICANT CHANGE UP (ref 34.5–45)
HGB BLD-MCNC: 11.8 G/DL — SIGNIFICANT CHANGE UP (ref 11.5–15.5)
IMM GRANULOCYTES NFR BLD AUTO: 0.3 % — SIGNIFICANT CHANGE UP (ref 0–1.5)
LYMPHOCYTES # BLD AUTO: 1.2 K/UL — SIGNIFICANT CHANGE UP (ref 1–3.3)
LYMPHOCYTES # BLD AUTO: 16.7 % — SIGNIFICANT CHANGE UP (ref 13–44)
MCHC RBC-ENTMCNC: 23.8 PG — LOW (ref 27–34)
MCHC RBC-ENTMCNC: 29.8 G/DL — LOW (ref 32–36)
MCV RBC AUTO: 79.8 FL — LOW (ref 80–100)
MONOCYTES # BLD AUTO: 0.37 K/UL — SIGNIFICANT CHANGE UP (ref 0–0.9)
MONOCYTES NFR BLD AUTO: 5.2 % — SIGNIFICANT CHANGE UP (ref 2–14)
NEUTROPHILS # BLD AUTO: 5.45 K/UL — SIGNIFICANT CHANGE UP (ref 1.8–7.4)
NEUTROPHILS NFR BLD AUTO: 76 % — SIGNIFICANT CHANGE UP (ref 43–77)
NRBC # BLD: 0 /100 WBCS — SIGNIFICANT CHANGE UP (ref 0–0)
PLATELET # BLD AUTO: 220 K/UL — SIGNIFICANT CHANGE UP (ref 150–400)
RBC # BLD: 4.96 M/UL — SIGNIFICANT CHANGE UP (ref 3.8–5.2)
RBC # FLD: 20.9 % — HIGH (ref 10.3–14.5)
WBC # BLD: 7.17 K/UL — SIGNIFICANT CHANGE UP (ref 3.8–10.5)
WBC # FLD AUTO: 7.17 K/UL — SIGNIFICANT CHANGE UP (ref 3.8–10.5)

## 2022-02-16 PROCEDURE — 99214 OFFICE O/P EST MOD 30 MIN: CPT

## 2022-02-16 NOTE — HISTORY OF PRESENT ILLNESS
[Disease: _____________________] : Disease: [unfilled] [M: ___] : M[unfilled] [AJCC Stage: ____] : AJCC Stage: [unfilled] [de-identified] : 3/2003–Stage IIB left breast cancer, ER positive/[place] positive/HER-2 negative–status post left modified radical mastectomy with reconstruction.  1/15 lymph nodes positive for metastatic carcinoma with focal extranodal extension.  Status post AC-T--> Tamoxifen x 5 years.  \par 6/2018-Presented with general weakness and leg pain.  Diagnosed with clear cell renal carcinoma  s/p Radical Left Nephrectomy, Para Aortic LN dissection,  c/b Splenic Laceration w/ bleeding. Right iliac bone biopsy of bone lesion was consistent with metastatic breast cancer–ER positive/[place] positive/HER-2 unknown.\par Patient was begun on Letrozole/Ibrance/Xgeva.\par \par 3/2019–Status post left VATS procedure with pleural decortication.  Left pleural fluid cytology and pleural pathology negative for malignancy.\par 4/2020-CT scan of the chest/abdomen/pelvis revealed new left lower lung nodule and decreased right apex nodules, extensive bony metastases slightly increased. Changed to Aromasin, with Afinitor added 7/2020.\par Patient had been under the [facility]ologic care [facility].\par \par 8/2020–Patient wished to transfer her [place]ologic [place] [facility] LE venous duplex study-B/L DVT. Begun on Xarelto.\par 12/2020–CT abdomen/pelvis–extensive osseous metastases.\par 6/2021-CT C/A/P-pulmonary metastases, a distal paraesophageal node and extensive osseous metastatic disease without significant change. No acute pathology. Aromasin/Afinitor continued.\par \par \par \par \par \par  [de-identified] : Infiltrating lobular carcinoma [de-identified] : 7/2020 :  CA 15-3=24 U/mL     CEA =2.9 ng/mL         [de-identified] : More fatigued than usual this past 6 days.\par Takes oxycodone for intermittent bony/joint aches-usually takes HS-helps.\par No dental/jaw pain. \par Has O2 at home-as needed HS for ~ 1/2 hour. No current c/o SOB.\par No  nausea/vomiting/diarrhea or constipation . No melena, nose bleeds. \par No cough, chills, fever, night sweats, mouth sores. \par CT scans rescheduled for next week.\par Started taking calcium supplement 2400 mg PO daily 6 days ago, along with vitamin D 1000 iu daily.\par Had COVID vaccines(Moderna). Booster has to be re-scheduled.\par

## 2022-02-16 NOTE — ASSESSMENT
[FreeTextEntry1] : Lab work reviewed.\par #1) Patient had a remote h/o breast cancer. She received adjuvant treatment (AC-T ) + 5 years of Tamoxifen with Dr. Cash Bojorquez and subsequently transferred care to [place]TAGRACIA [place] [facility]  In 2018 she was hospitalized and was found to have bone metastasis and renal mass.  6/2018 She underwent radical left nephrectomy for renal cell carcinoma, with PALND, and a bx. of the rIght iliac bone revealed metastatic breast cancer .  She was placed on Femara/Ibrance.    4/22/20  Interval CT Chest/Abdomen/Pelvis revealed new left lower lobe nodule with interval decrease in size of small nodules at the right lung apex, extensive osseous metastatic disease slightly increased, stable IVC filter.   She was prescribed Aromasin/Afinitor on  8/10/20.  Follow-up chest imaging was done 3/2021 to assess status of lung nodules-showed increase, however, it was being compared to a CT chest from 2019. Most recent F/U imaging c/w stable disease overall.\par Patient to continue Aromasin/Afinitor, to which she consents. F/U scans as scheduled.\par She remains on Xgeva as well-calcium supplementation.\par \par #2) history of bilateral DVTs, with history of IVC filter–patient to remain on anticoagulation.  No overt bleeding noted clinically at present.\par \par #3)  H/O Renal insufficiency/hypokalemia.  P.O. fluid/water hydration as tolerated, and potassium supplementation as needed. Follow- up with PCP.\par \par Patient was given the opportunity to ask questions. Her questions have been answered to her apparent satisfaction at this time.\par \par \par

## 2022-02-16 NOTE — PHYSICAL EXAM
[Ambulatory and capable of all self care but unable to carry out any work activities] : Status 2- Ambulatory and capable of all self care but unable to carry out any work activities. Up and about more than 50% of waking hours [Normal] : affect appropriate [de-identified] : left reconstructed breast without palpable abnormality; no dominant mass either breast

## 2022-02-17 DIAGNOSIS — C79.51 SECONDARY MALIGNANT NEOPLASM OF BONE: ICD-10-CM

## 2022-02-17 LAB
ALBUMIN SERPL ELPH-MCNC: 4.1 G/DL
ALP BLD-CCNC: 50 U/L
ALT SERPL-CCNC: 5 U/L
ANION GAP SERPL CALC-SCNC: 14 MMOL/L
AST SERPL-CCNC: 15 U/L
BILIRUB SERPL-MCNC: 0.2 MG/DL
BUN SERPL-MCNC: 18 MG/DL
CALCIUM SERPL-MCNC: 8.7 MG/DL
CHLORIDE SERPL-SCNC: 107 MMOL/L
CO2 SERPL-SCNC: 24 MMOL/L
CREAT SERPL-MCNC: 1.31 MG/DL
GLUCOSE SERPL-MCNC: 134 MG/DL
POTASSIUM SERPL-SCNC: 4.8 MMOL/L
PROT SERPL-MCNC: 6.8 G/DL
SODIUM SERPL-SCNC: 145 MMOL/L

## 2022-02-23 ENCOUNTER — OUTPATIENT (OUTPATIENT)
Dept: OUTPATIENT SERVICES | Facility: HOSPITAL | Age: 67
LOS: 1 days | End: 2022-02-23
Payer: MEDICARE

## 2022-02-23 ENCOUNTER — APPOINTMENT (OUTPATIENT)
Dept: CT IMAGING | Facility: HOSPITAL | Age: 67
End: 2022-02-23
Payer: MEDICARE

## 2022-02-23 DIAGNOSIS — Z98.890 OTHER SPECIFIED POSTPROCEDURAL STATES: Chronic | ICD-10-CM

## 2022-02-23 DIAGNOSIS — E89.6 POSTPROCEDURAL ADRENOCORTICAL (-MEDULLARY) HYPOFUNCTION: Chronic | ICD-10-CM

## 2022-02-23 DIAGNOSIS — Z90.12 ACQUIRED ABSENCE OF LEFT BREAST AND NIPPLE: Chronic | ICD-10-CM

## 2022-02-23 DIAGNOSIS — C50.919 MALIGNANT NEOPLASM OF UNSPECIFIED SITE OF UNSPECIFIED FEMALE BREAST: ICD-10-CM

## 2022-02-23 DIAGNOSIS — Z90.5 ACQUIRED ABSENCE OF KIDNEY: Chronic | ICD-10-CM

## 2022-02-23 DIAGNOSIS — R91.8 OTHER NONSPECIFIC ABNORMAL FINDING OF LUNG FIELD: ICD-10-CM

## 2022-02-23 PROCEDURE — 71250 CT THORAX DX C-: CPT | Mod: MG

## 2022-02-23 PROCEDURE — 74176 CT ABD & PELVIS W/O CONTRAST: CPT

## 2022-02-23 PROCEDURE — 74176 CT ABD & PELVIS W/O CONTRAST: CPT | Mod: 26,MH

## 2022-02-23 PROCEDURE — G1004: CPT

## 2022-02-23 PROCEDURE — 71250 CT THORAX DX C-: CPT | Mod: 26,MG

## 2022-02-28 ENCOUNTER — NON-APPOINTMENT (OUTPATIENT)
Age: 67
End: 2022-02-28

## 2022-03-07 ENCOUNTER — RX RENEWAL (OUTPATIENT)
Age: 67
End: 2022-03-07

## 2022-03-07 RX ORDER — ALBUTEROL SULFATE 90 UG/1
108 (90 BASE) INHALANT RESPIRATORY (INHALATION) EVERY 4 HOURS
Qty: 1 | Refills: 3 | Status: ACTIVE | COMMUNITY
Start: 2021-04-15 | End: 1900-01-01

## 2022-03-15 ENCOUNTER — OUTPATIENT (OUTPATIENT)
Dept: OUTPATIENT SERVICES | Facility: HOSPITAL | Age: 67
LOS: 1 days | Discharge: HOME | End: 2022-03-15

## 2022-03-15 DIAGNOSIS — C50.919 MALIGNANT NEOPLASM OF UNSPECIFIED SITE OF UNSPECIFIED FEMALE BREAST: ICD-10-CM

## 2022-03-15 DIAGNOSIS — E89.6 POSTPROCEDURAL ADRENOCORTICAL (-MEDULLARY) HYPOFUNCTION: Chronic | ICD-10-CM

## 2022-03-15 DIAGNOSIS — Z90.12 ACQUIRED ABSENCE OF LEFT BREAST AND NIPPLE: Chronic | ICD-10-CM

## 2022-03-15 DIAGNOSIS — Z90.5 ACQUIRED ABSENCE OF KIDNEY: Chronic | ICD-10-CM

## 2022-03-15 DIAGNOSIS — Z98.890 OTHER SPECIFIED POSTPROCEDURAL STATES: Chronic | ICD-10-CM

## 2022-03-17 ENCOUNTER — RESULT REVIEW (OUTPATIENT)
Age: 67
End: 2022-03-17

## 2022-03-17 ENCOUNTER — APPOINTMENT (OUTPATIENT)
Dept: HEMATOLOGY ONCOLOGY | Facility: CLINIC | Age: 67
End: 2022-03-17
Payer: MEDICARE

## 2022-03-17 ENCOUNTER — APPOINTMENT (OUTPATIENT)
Dept: INFUSION THERAPY | Facility: HOSPITAL | Age: 67
End: 2022-03-17

## 2022-03-17 VITALS
HEIGHT: 60.98 IN | SYSTOLIC BLOOD PRESSURE: 121 MMHG | WEIGHT: 164.99 LBS | DIASTOLIC BLOOD PRESSURE: 76 MMHG | BODY MASS INDEX: 31.15 KG/M2 | TEMPERATURE: 97.1 F | RESPIRATION RATE: 16 BRPM | HEART RATE: 82 BPM | OXYGEN SATURATION: 97 %

## 2022-03-17 DIAGNOSIS — C79.51 SECONDARY MALIGNANT NEOPLASM OF BONE: ICD-10-CM

## 2022-03-17 LAB
ANISOCYTOSIS BLD QL: SLIGHT — SIGNIFICANT CHANGE UP
BASOPHILS # BLD AUTO: 0.03 K/UL — SIGNIFICANT CHANGE UP (ref 0–0.2)
BASOPHILS NFR BLD AUTO: 0.3 % — SIGNIFICANT CHANGE UP (ref 0–2)
DACRYOCYTES BLD QL SMEAR: SLIGHT — SIGNIFICANT CHANGE UP
ELLIPTOCYTES BLD QL SMEAR: SLIGHT — SIGNIFICANT CHANGE UP
EOSINOPHIL # BLD AUTO: 0.22 K/UL — SIGNIFICANT CHANGE UP (ref 0–0.5)
EOSINOPHIL NFR BLD AUTO: 2 % — SIGNIFICANT CHANGE UP (ref 0–6)
HCT VFR BLD CALC: 41.8 % — SIGNIFICANT CHANGE UP (ref 34.5–45)
HGB BLD-MCNC: 13.2 G/DL — SIGNIFICANT CHANGE UP (ref 11.5–15.5)
IMM GRANULOCYTES NFR BLD AUTO: 0.6 % — SIGNIFICANT CHANGE UP (ref 0–1.5)
LYMPHOCYTES # BLD AUTO: 1.68 K/UL — SIGNIFICANT CHANGE UP (ref 1–3.3)
LYMPHOCYTES # BLD AUTO: 15 % — SIGNIFICANT CHANGE UP (ref 13–44)
MCHC RBC-ENTMCNC: 24.8 PG — LOW (ref 27–34)
MCHC RBC-ENTMCNC: 31.6 G/DL — LOW (ref 32–36)
MCV RBC AUTO: 78.6 FL — LOW (ref 80–100)
MONOCYTES # BLD AUTO: 0.66 K/UL — SIGNIFICANT CHANGE UP (ref 0–0.9)
MONOCYTES NFR BLD AUTO: 5.9 % — SIGNIFICANT CHANGE UP (ref 2–14)
NEUTROPHILS # BLD AUTO: 8.57 K/UL — HIGH (ref 1.8–7.4)
NEUTROPHILS NFR BLD AUTO: 76.2 % — SIGNIFICANT CHANGE UP (ref 43–77)
NRBC # BLD: 0 /100 WBCS — SIGNIFICANT CHANGE UP (ref 0–0)
PLAT MORPH BLD: NORMAL — SIGNIFICANT CHANGE UP
PLATELET # BLD AUTO: 256 K/UL — SIGNIFICANT CHANGE UP (ref 150–400)
POIKILOCYTOSIS BLD QL AUTO: SLIGHT — SIGNIFICANT CHANGE UP
RBC # BLD: 5.32 M/UL — HIGH (ref 3.8–5.2)
RBC # FLD: 20.1 % — HIGH (ref 10.3–14.5)
RBC BLD AUTO: ABNORMAL
WBC # BLD: 11.23 K/UL — HIGH (ref 3.8–10.5)
WBC # FLD AUTO: 11.23 K/UL — HIGH (ref 3.8–10.5)

## 2022-03-17 PROCEDURE — 99214 OFFICE O/P EST MOD 30 MIN: CPT

## 2022-03-17 NOTE — PHYSICAL EXAM
[Ambulatory and capable of all self care but unable to carry out any work activities] : Status 2- Ambulatory and capable of all self care but unable to carry out any work activities. Up and about more than 50% of waking hours [Normal] : affect appropriate [de-identified] : left reconstructed breast without palpable abnormality; right breast nodular area UOQ (has implant there which was placed for symmetry)

## 2022-03-17 NOTE — HISTORY OF PRESENT ILLNESS
[Disease: _____________________] : Disease: [unfilled] [M: ___] : M[unfilled] [AJCC Stage: ____] : AJCC Stage: [unfilled] [de-identified] : 3/2003–Stage IIB left breast cancer, ER positive/[place] positive/HER-2 negative–status post left modified radical mastectomy with reconstruction.  1/15 lymph nodes positive for metastatic carcinoma with focal extranodal extension.  Status post AC-T--> Tamoxifen x 5 years.  \par 6/2018-Presented with general weakness and leg pain.  Diagnosed with clear cell renal carcinoma  s/p Radical Left Nephrectomy, Para Aortic LN dissection,  c/b Splenic Laceration w/ bleeding. Right iliac bone biopsy of bone lesion was consistent with metastatic breast cancer–ER positive/[place] positive/HER-2 unknown.\par Patient was begun on Letrozole/Ibrance/Xgeva.\par \par 3/2019–Status post left VATS procedure with pleural decortication.  Left pleural fluid cytology and pleural pathology negative for malignancy.\par 4/2020-CT scan of the chest/abdomen/pelvis revealed new left lower lung nodule and decreased right apex nodules, extensive bony metastases slightly increased. Changed to Aromasin, with Afinitor added 7/2020.\par Patient had been under the oncologic care of Dr. Shields at [facility].\par \par 8/2020–Patient wished to transfer her oncologic care to the [facility] [place]. LE venous duplex study-B/L DVT. Begun on Xarelto.\par 12/2020–CT abdomen/pelvis–extensive osseous metastases.\par 6/2021-CT C/A/P-pulmonary metastases, a distal paraesophageal node and extensive osseous metastatic disease without significant change. No acute pathology. Aromasin/Afinitor continued.\par \par 2/28/22-CT C/A/P-enlarging pulmonary nodules and a new right pulmonary nodule. Enlarging distal paraesophageal lymph node and a new left internal mammary lymph node. Stable bone metastases.\par \par 3/17/2022-Began Faslodex.\par \par \par \par \par  [de-identified] : Infiltrating lobular carcinoma [de-identified] : 7/2020 :  CA 15-3=24 U/mL     CEA =2.9 ng/mL         [de-identified] : Felt right breast nodule ~ 2 weeks ago.\par Takes oxycodone for intermittent bony/joint aches-usually takes HS-helps.\par No dental/jaw pain. \par Has O2 at home-as needed HS for ~ 1/2 hour. No current c/o SOB.\par No  nausea/vomiting/diarrhea or constipation . No melena, nose bleeds. \par No cough, chills, fever, night sweats, mouth sores. \par Taking calcium and vitamin D supplements.\par \par Had COVID vaccines(Moderna). Booster has to be re-scheduled.\par

## 2022-03-17 NOTE — ASSESSMENT
[FreeTextEntry1] : Lab work, CT C/A/P report reviewed.\par #1) Patient had a remote h/o breast cancer. She received adjuvant treatment (AC-T ) + 5 years of Tamoxifen with Dr. Cash Bojorquez and subsequently transferred care to Dr. ALTAGRACIA Shields at [facility] [place].  In 2018 she was hospitalized and was found to have bone metastasis and renal mass.  6/2018 She underwent radical left nephrectomy for renal cell carcinoma, with PALND, and a bx. of the rIght iliac bone revealed metastatic breast cancer .  She was placed on Femara/Ibrance.    4/22/20  Interval CT Chest/Abdomen/Pelvis revealed new left lower lobe nodule with interval decrease in size of small nodules at the right lung apex, extensive osseous metastatic disease slightly increased, stable IVC filter.   She was prescribed Aromasin/Afinitor on  8/10/20.  Follow-up chest imaging was done 3/2021 to assess status of lung nodules-showed increase, however, it was being compared to a CT chest from 2019. Most recent F/U imaging 2/2022 c/w POD.\par Treatment alternatives discussed with respective pro's/con's-patient gave written/verbal consent for Faslodex-potential side effects reviewed including but not limited to nausea/diarrhea/constipation, hot flashes, cytopenias, elevated LFTs, pain at injection site, arthralgias, fluid retention, rash\par She remains on Xgeva as well-calcium supplementation.\par \par Right breast US ordered to further evaluate ?nodular density UOQ.\par \par #2) history of bilateral DVTs, with history of IVC filter–patient to remain on anticoagulation.  No overt bleeding noted clinically at present.\par \par #3)  H/O Renal insufficiency/hypokalemia.  P.O. fluid/water hydration as tolerated, and potassium supplementation as needed. Follow- up with PCP.\par \par Patient was given the opportunity to ask questions. Her questions have been answered to her apparent satisfaction at this time.\par \par \par

## 2022-03-31 ENCOUNTER — APPOINTMENT (OUTPATIENT)
Dept: INFUSION THERAPY | Facility: HOSPITAL | Age: 67
End: 2022-03-31

## 2022-04-03 ENCOUNTER — EMERGENCY (EMERGENCY)
Facility: HOSPITAL | Age: 67
LOS: 1 days | Discharge: HOME | End: 2022-04-03
Attending: EMERGENCY MEDICINE | Admitting: EMERGENCY MEDICINE
Payer: MEDICARE

## 2022-04-03 VITALS
RESPIRATION RATE: 16 BRPM | WEIGHT: 154.98 LBS | HEIGHT: 64 IN | TEMPERATURE: 99 F | HEART RATE: 83 BPM | OXYGEN SATURATION: 93 % | DIASTOLIC BLOOD PRESSURE: 81 MMHG | SYSTOLIC BLOOD PRESSURE: 168 MMHG

## 2022-04-03 DIAGNOSIS — Z98.890 OTHER SPECIFIED POSTPROCEDURAL STATES: Chronic | ICD-10-CM

## 2022-04-03 DIAGNOSIS — E89.6 POSTPROCEDURAL ADRENOCORTICAL (-MEDULLARY) HYPOFUNCTION: Chronic | ICD-10-CM

## 2022-04-03 DIAGNOSIS — Z90.12 ACQUIRED ABSENCE OF LEFT BREAST AND NIPPLE: Chronic | ICD-10-CM

## 2022-04-03 DIAGNOSIS — Z90.5 ACQUIRED ABSENCE OF KIDNEY: Chronic | ICD-10-CM

## 2022-04-03 PROCEDURE — 99283 EMERGENCY DEPT VISIT LOW MDM: CPT

## 2022-04-03 PROCEDURE — 99283 EMERGENCY DEPT VISIT LOW MDM: CPT | Mod: FS

## 2022-04-03 NOTE — ED PROVIDER NOTE - PATIENT PORTAL LINK FT
You can access the FollowMyHealth Patient Portal offered by [facility] by registering at the following website: [email or web address]. By joining [facility]’s FollowMyHealth portal, you will also be able to view your health information using other applications (apps) compatible with our system.

## 2022-04-03 NOTE — ED ADULT NURSE NOTE - OBJECTIVE STATEMENT
Pt presents to ED requesting wound check.  Pt states noticed scab on scalp on Thursday that she scratched and has continued to bleed.  Currently on blood thinners.  Pt hair noted to be matted to scalp with thick blood.  Hair irrigated with sterile water, peroxide, and cleaned with shampoo.  Small scab noted to posterior scalp, with no active bleeding.

## 2022-04-03 NOTE — ED PROVIDER NOTE - PHYSICAL EXAMINATION
head: +right occipital scalp active bleeding. hair is knotted from the blood head: +right occipital scalp abrasion, no active bleeding noted.

## 2022-04-03 NOTE — ED ADULT TRIAGE NOTE - CHIEF COMPLAINT QUOTE
Pt reports that she pulled a scab off of her scalp on Thursday and has had continuous bleeding since then - pt on xarelto, recently started chemotherapy injection 2 weeks. Pt denies any head trauma

## 2022-04-03 NOTE — ED PROVIDER NOTE - ATTENDING CONTRIBUTION TO CARE
I, Jamia Bailon MD, performed the initial face to face bedside interview with this patient regarding history of present illness, review of symptoms and relevant past medical, social and family history.  I completed an independent physical examination.  I was the initial provider who evaluated this patient. I have signed out the follow up of any pending tests (i.e. labs, radiological studies) to the ACP.  I have communicated the patient’s plan of care and disposition with the ACP.  The history, relevant review of systems, past medical and surgical history, medical decision making, and physical examination was documented by the scribe in my presence and I attest to the accuracy of the documentation.

## 2022-04-03 NOTE — ED ADULT NURSE NOTE - NSIMPLEMENTINTERV_GEN_ALL_ED
Implemented All Fall with Harm Risk Interventions:  [place] to call system. Call bell, personal items and telephone within reach. Instruct patient to call for assistance. Room bathroom lighting operational. Non-slip footwear when patient is off stretcher. Physically safe environment: no spills, clutter or unnecessary equipment. Stretcher in lowest position, wheels locked, appropriate side rails in place. Provide visual cue, wrist band, yellow gown, etc. Monitor gait and stability. Monitor for mental status changes and reorient to person, place, and time. Review medications for side effects contributing to fall risk. Reinforce activity limits and safety measures with patient and family. Provide visual clues: red socks.

## 2022-04-03 NOTE — ED PROVIDER NOTE - NS ED ATTENDING STATEMENT MOD
This was a shared visit with the YING. I reviewed and verified the documentation and independently performed the documented:

## 2022-04-03 NOTE — ED PROVIDER NOTE - NSFOLLOWUPINSTRUCTIONS_ED_ALL_ED_FT
Follow up with your primary care physician within 1 week.     Do not scratch your wound.     Stay hydrated    Return to the ER if your symptoms worsen, rebleeding, dizziness or for any other medical emergencies

## 2022-04-03 NOTE — ED PROVIDER NOTE - OBJECTIVE STATEMENT
67 y/o F with PMH of DVT on xarelto, breast cancer with meds on chemo injections (last one was 4 days ago), presents to the ED with c/o bleeding scab on the scalp x 4 days. pt reports she scratched her head 4 days ago and removed a scab, since then she has bleeding from the wound. She denies dizziness, CP, SOB, palpitations, n/v, head injury or all other complaints 65 y/o F with PMH of DVT on Xarelto, breast cancer with meds on chemo injections (last one was 4 days ago), presents to the ED with c/o bleeding scab on the scalp x 4 days. pt reports she scratched her head 4 days ago and removed a scab, since then she has bleeding from the wound. She denies dizziness, CP, SOB, palpitations, n/v, head injury or all other complaints

## 2022-04-03 NOTE — ED PROVIDER NOTE - CLINICAL SUMMARY MEDICAL DECISION MAKING FREE TEXT BOX
67 y/o F with PMH of DVT on xarelto, breast cancer with meds on chemo injections (last one was 4 days ago), presents to the ED with c/o bleeding scab on the scalp x 4 days. pt reports she scratched her head 4 days ago and removed a scab, since then she has bleeding from the wound. She denies dizziness, CP, SOB, palpitations, n/v, head injury or all other complaints. PE as noted above. 65 y/o F with PMH of DVT on xarelto, breast cancer with meds on chemo injections (last one was 4 days ago), presents to the ED with c/o bleeding scab on the scalp x 4 days. pt reports she scratched her head 4 days ago and removed a scab, since then she has bleeding from the wound. She denies dizziness, CP, SOB, palpitations, n/v, head injury or all other complaints. PE as noted above. no active bleeding noted. pt given return precautions, will dc

## 2022-04-06 NOTE — CHART NOTE - NSCHARTNOTEFT_GEN_A_CORE
MARIPOSA placed call to patient to discuss and assist with follow up care.  Patient presented to ED on 4/3/22 with wound check.  MARIPOSA introduced self and stated the purpose for calling.  Patient reported doing better.  Wound is in the process of healing.  Patient stated that she will follow up with her primary care physician.

## 2022-04-14 ENCOUNTER — APPOINTMENT (OUTPATIENT)
Dept: INFUSION THERAPY | Facility: HOSPITAL | Age: 67
End: 2022-04-14

## 2022-04-14 ENCOUNTER — RESULT REVIEW (OUTPATIENT)
Age: 67
End: 2022-04-14

## 2022-04-14 ENCOUNTER — APPOINTMENT (OUTPATIENT)
Dept: HEMATOLOGY ONCOLOGY | Facility: CLINIC | Age: 67
End: 2022-04-14
Payer: MEDICARE

## 2022-04-14 VITALS
OXYGEN SATURATION: 97 % | RESPIRATION RATE: 16 BRPM | SYSTOLIC BLOOD PRESSURE: 127 MMHG | HEART RATE: 76 BPM | TEMPERATURE: 96.6 F | DIASTOLIC BLOOD PRESSURE: 78 MMHG

## 2022-04-14 LAB
ANISOCYTOSIS BLD QL: SLIGHT — SIGNIFICANT CHANGE UP
BASOPHILS # BLD AUTO: 0.02 K/UL — SIGNIFICANT CHANGE UP (ref 0–0.2)
BASOPHILS NFR BLD AUTO: 0.3 % — SIGNIFICANT CHANGE UP (ref 0–2)
ELLIPTOCYTES BLD QL SMEAR: SLIGHT — SIGNIFICANT CHANGE UP
EOSINOPHIL # BLD AUTO: 0.22 K/UL — SIGNIFICANT CHANGE UP (ref 0–0.5)
EOSINOPHIL NFR BLD AUTO: 2.9 % — SIGNIFICANT CHANGE UP (ref 0–6)
HCT VFR BLD CALC: 40.4 % — SIGNIFICANT CHANGE UP (ref 34.5–45)
HGB BLD-MCNC: 12.4 G/DL — SIGNIFICANT CHANGE UP (ref 11.5–15.5)
IMM GRANULOCYTES NFR BLD AUTO: 0.4 % — SIGNIFICANT CHANGE UP (ref 0–1.5)
LYMPHOCYTES # BLD AUTO: 2.04 K/UL — SIGNIFICANT CHANGE UP (ref 1–3.3)
LYMPHOCYTES # BLD AUTO: 26.5 % — SIGNIFICANT CHANGE UP (ref 13–44)
MCHC RBC-ENTMCNC: 25.8 PG — LOW (ref 27–34)
MCHC RBC-ENTMCNC: 30.7 G/DL — LOW (ref 32–36)
MCV RBC AUTO: 84.2 FL — SIGNIFICANT CHANGE UP (ref 80–100)
MONOCYTES # BLD AUTO: 0.46 K/UL — SIGNIFICANT CHANGE UP (ref 0–0.9)
MONOCYTES NFR BLD AUTO: 6 % — SIGNIFICANT CHANGE UP (ref 2–14)
NEUTROPHILS # BLD AUTO: 4.92 K/UL — SIGNIFICANT CHANGE UP (ref 1.8–7.4)
NEUTROPHILS NFR BLD AUTO: 63.9 % — SIGNIFICANT CHANGE UP (ref 43–77)
NRBC # BLD: 0 /100 WBCS — SIGNIFICANT CHANGE UP (ref 0–0)
PLAT MORPH BLD: NORMAL — SIGNIFICANT CHANGE UP
PLATELET # BLD AUTO: 296 K/UL — SIGNIFICANT CHANGE UP (ref 150–400)
POIKILOCYTOSIS BLD QL AUTO: SLIGHT — SIGNIFICANT CHANGE UP
RBC # BLD: 4.8 M/UL — SIGNIFICANT CHANGE UP (ref 3.8–5.2)
RBC # FLD: 21.2 % — HIGH (ref 10.3–14.5)
RBC BLD AUTO: ABNORMAL
SCHISTOCYTES BLD QL AUTO: SLIGHT — SIGNIFICANT CHANGE UP
WBC # BLD: 7.69 K/UL — SIGNIFICANT CHANGE UP (ref 3.8–10.5)
WBC # FLD AUTO: 7.69 K/UL — SIGNIFICANT CHANGE UP (ref 3.8–10.5)

## 2022-04-14 PROCEDURE — 99214 OFFICE O/P EST MOD 30 MIN: CPT

## 2022-04-14 NOTE — PHYSICAL EXAM
[Ambulatory and capable of all self care but unable to carry out any work activities] : Status 2- Ambulatory and capable of all self care but unable to carry out any work activities. Up and about more than 50% of waking hours [Normal] : affect appropriate [de-identified] : non-toxic appearing [de-identified] : left reconstructed breast without palpable abnormality; right breast nodular area UOQ (has implant there which was placed for symmetry)

## 2022-04-14 NOTE — ASSESSMENT
[FreeTextEntry1] : Lab work reviewed.\par #1) Patient had a remote h/o breast cancer. She received adjuvant treatment (AC-T ) + 5 years of Tamoxifen with Dr. Cash Bojorquez and subsequently transferred care to Dr. ALTAGRACIA Shields at [facility] [place].  In 2018 she was hospitalized and was found to have bone metastasis and renal mass.  6/2018 She underwent radical left nephrectomy for renal cell carcinoma, with PALND, and a bx. of the rIght iliac bone revealed metastatic breast cancer .  She was placed on Femara/Ibrance.    4/22/20  Interval CT Chest/Abdomen/Pelvis revealed new left lower lobe nodule with interval decrease in size of small nodules at the right lung apex, extensive osseous metastatic disease slightly increased, stable IVC filter.   She was prescribed Aromasin/Afinitor on  8/10/20.  Follow-up chest imaging was done 3/2021 to assess status of lung nodules-showed increase, however, it was being compared to a CT chest from 2019. Most recent F/U imaging 2/2022 c/w POD.\par Treatment alternatives were discussed with respective pro's/con's-patient gave written/verbal consent for Faslodex.\par She remains on Xgeva as well-calcium supplementation.\par \par Right breast US ordered to further evaluate ?nodular density UOQ-pending.\par \par #2) history of bilateral DVTs, with history of IVC filter–patient to remain on anticoagulation.  No overt bleeding noted clinically at present.\par \par #3)  H/O Renal insufficiency/hypokalemia.  P.O. fluid/water hydration as tolerated, and potassium supplementation as needed. Follow- up with PCP.\par \par Patient was given the opportunity to ask questions. Her questions have been answered to her apparent satisfaction at this time.\par \par \par

## 2022-04-14 NOTE — HISTORY OF PRESENT ILLNESS
[Disease: _____________________] : Disease: [unfilled] [M: ___] : M[unfilled] [AJCC Stage: ____] : AJCC Stage: [unfilled] [de-identified] : 3/2003–Stage IIB left breast cancer, ER positive/VA positive/HER-2 negative–status post left modified radical mastectomy with reconstruction.  1/15 lymph nodes positive for metastatic carcinoma with focal extranodal extension.  Status post AC-T--> Tamoxifen x 5 years.  \par 6/2018-Presented with general weakness and leg pain.  Diagnosed with clear cell renal carcinoma  s/p Radical Left Nephrectomy, Para Aortic LN dissection,  c/b Splenic Laceration w/ bleeding. Right iliac bone biopsy of bone lesion was consistent with metastatic breast cancer–ER positive/VA positive/HER-2 unknown.\par Patient was begun on Letrozole/Ibrance/Xgeva.\par \par 3/2019–Status post left VATS procedure with pleural decortication.  Left pleural fluid cytology and pleural pathology negative for malignancy.\par 4/2020-CT scan of the chest/abdomen/pelvis revealed new left lower lung nodule and decreased right apex nodules, extensive bony metastases slightly increased. Changed to Aromasin, with Afinitor added 7/2020.\par Patient had been under the oncologic care of Dr. Shields at [facility].\par \par 8/2020–[place] [facility] [place] venous duplex study-B/L DVT. Begun on Xarelto.\par 12/2020–CT abdomen/pelvis–extensive osseous metastases.\par 6/2021-CT C/A/P-pulmonary metastases, a distal paraesophageal node and extensive osseous metastatic disease without significant change. No acute pathology. Aromasin/Afinitor continued.\par \par 2/28/22-CT C/A/P-enlarging pulmonary nodules and a new right pulmonary nodule. Enlarging distal paraesophageal lymph node and a new left internal mammary lymph node. Stable bone metastases.\par \par 3/17/2022-Began Faslodex.\par \par \par \par \par  [de-identified] : Infiltrating lobular carcinoma [de-identified] : 7/2020 :  CA 15-3=24 U/mL     CEA =2.9 ng/mL         [de-identified] : Has right breast nodule-has US appt. 5/4/22.\par Takes oxycodone for intermittent bony/joint aches-usually takes in am's and HS now-helps.\par No dental/jaw pain. \par Has O2 at home-as needed HS for ~ 1/2 hour. No current c/o SOB.\par No  nausea/vomiting/diarrhea or constipation . No melena, nose bleeds. \par No cough, chills, fever, night sweats, mouth sores. \par Taking calcium 1500 mg and vitamin D supplements daily.\par \par Had COVID vaccines(Moderna). \par

## 2022-04-15 LAB — CANCER AG27-29 SERPL-ACNC: 30.3 U/ML

## 2022-04-17 LAB
ALBUMIN SERPL ELPH-MCNC: 4.3 G/DL
ALP BLD-CCNC: 49 U/L
ALT SERPL-CCNC: 8 U/L
ANION GAP SERPL CALC-SCNC: 17 MMOL/L
AST SERPL-CCNC: 14 U/L
BILIRUB SERPL-MCNC: 0.2 MG/DL
BUN SERPL-MCNC: 24 MG/DL
CALCIUM SERPL-MCNC: 9.1 MG/DL
CHLORIDE SERPL-SCNC: 105 MMOL/L
CO2 SERPL-SCNC: 21 MMOL/L
CREAT SERPL-MCNC: 1.4 MG/DL
EGFR: 41 ML/MIN/1.73M2
GLUCOSE SERPL-MCNC: 91 MG/DL
POTASSIUM SERPL-SCNC: 4.5 MMOL/L
PROT SERPL-MCNC: 6.8 G/DL
SODIUM SERPL-SCNC: 143 MMOL/L

## 2022-05-04 ENCOUNTER — RESULT REVIEW (OUTPATIENT)
Age: 67
End: 2022-05-04

## 2022-05-04 ENCOUNTER — APPOINTMENT (OUTPATIENT)
Dept: ULTRASOUND IMAGING | Facility: HOSPITAL | Age: 67
End: 2022-05-04
Payer: MEDICARE

## 2022-05-04 ENCOUNTER — OUTPATIENT (OUTPATIENT)
Dept: OUTPATIENT SERVICES | Facility: HOSPITAL | Age: 67
LOS: 1 days | End: 2022-05-04
Payer: MEDICARE

## 2022-05-04 ENCOUNTER — APPOINTMENT (OUTPATIENT)
Dept: MAMMOGRAPHY | Facility: HOSPITAL | Age: 67
End: 2022-05-04
Payer: MEDICARE

## 2022-05-04 DIAGNOSIS — E89.6 POSTPROCEDURAL ADRENOCORTICAL (-MEDULLARY) HYPOFUNCTION: Chronic | ICD-10-CM

## 2022-05-04 DIAGNOSIS — Z90.12 ACQUIRED ABSENCE OF LEFT BREAST AND NIPPLE: Chronic | ICD-10-CM

## 2022-05-04 DIAGNOSIS — Z98.890 OTHER SPECIFIED POSTPROCEDURAL STATES: Chronic | ICD-10-CM

## 2022-05-04 DIAGNOSIS — Z90.5 ACQUIRED ABSENCE OF KIDNEY: Chronic | ICD-10-CM

## 2022-05-04 DIAGNOSIS — C50.919 MALIGNANT NEOPLASM OF UNSPECIFIED SITE OF UNSPECIFIED FEMALE BREAST: ICD-10-CM

## 2022-05-04 PROCEDURE — 76641 ULTRASOUND BREAST COMPLETE: CPT | Mod: 26

## 2022-05-04 PROCEDURE — 76641 ULTRASOUND BREAST COMPLETE: CPT

## 2022-05-04 PROCEDURE — 77065 DX MAMMO INCL CAD UNI: CPT | Mod: 26,RT

## 2022-05-04 PROCEDURE — G0279: CPT | Mod: 26

## 2022-05-04 PROCEDURE — 77065 DX MAMMO INCL CAD UNI: CPT

## 2022-05-04 PROCEDURE — G0279: CPT

## 2022-05-09 ENCOUNTER — OUTPATIENT (OUTPATIENT)
Dept: OUTPATIENT SERVICES | Facility: HOSPITAL | Age: 67
LOS: 1 days | Discharge: HOME | End: 2022-05-09

## 2022-05-09 DIAGNOSIS — Z90.5 ACQUIRED ABSENCE OF KIDNEY: Chronic | ICD-10-CM

## 2022-05-09 DIAGNOSIS — Z90.12 ACQUIRED ABSENCE OF LEFT BREAST AND NIPPLE: Chronic | ICD-10-CM

## 2022-05-09 DIAGNOSIS — C50.919 MALIGNANT NEOPLASM OF UNSPECIFIED SITE OF UNSPECIFIED FEMALE BREAST: ICD-10-CM

## 2022-05-09 DIAGNOSIS — E89.6 POSTPROCEDURAL ADRENOCORTICAL (-MEDULLARY) HYPOFUNCTION: Chronic | ICD-10-CM

## 2022-05-09 DIAGNOSIS — Z98.890 OTHER SPECIFIED POSTPROCEDURAL STATES: Chronic | ICD-10-CM

## 2022-05-11 ENCOUNTER — NON-APPOINTMENT (OUTPATIENT)
Age: 67
End: 2022-05-11

## 2022-05-12 ENCOUNTER — RESULT REVIEW (OUTPATIENT)
Age: 67
End: 2022-05-12

## 2022-05-12 ENCOUNTER — APPOINTMENT (OUTPATIENT)
Dept: HEMATOLOGY ONCOLOGY | Facility: CLINIC | Age: 67
End: 2022-05-12
Payer: MEDICARE

## 2022-05-12 ENCOUNTER — APPOINTMENT (OUTPATIENT)
Dept: INFUSION THERAPY | Facility: HOSPITAL | Age: 67
End: 2022-05-12

## 2022-05-12 VITALS
OXYGEN SATURATION: 97 % | RESPIRATION RATE: 16 BRPM | HEART RATE: 74 BPM | BODY MASS INDEX: 31.19 KG/M2 | SYSTOLIC BLOOD PRESSURE: 106 MMHG | WEIGHT: 164.99 LBS | DIASTOLIC BLOOD PRESSURE: 74 MMHG | TEMPERATURE: 97.5 F

## 2022-05-12 DIAGNOSIS — C79.51 SECONDARY MALIGNANT NEOPLASM OF BONE: ICD-10-CM

## 2022-05-12 LAB
ACANTHOCYTES BLD QL SMEAR: SLIGHT — SIGNIFICANT CHANGE UP
ANISOCYTOSIS BLD QL: SLIGHT — SIGNIFICANT CHANGE UP
BASOPHILS # BLD AUTO: 0 K/UL — SIGNIFICANT CHANGE UP (ref 0–0.2)
BASOPHILS NFR BLD AUTO: 0 % — SIGNIFICANT CHANGE UP (ref 0–2)
BURR CELLS BLD QL SMEAR: PRESENT — SIGNIFICANT CHANGE UP
ELLIPTOCYTES BLD QL SMEAR: SLIGHT — SIGNIFICANT CHANGE UP
EOSINOPHIL # BLD AUTO: 0.33 K/UL — SIGNIFICANT CHANGE UP (ref 0–0.5)
EOSINOPHIL NFR BLD AUTO: 4 % — SIGNIFICANT CHANGE UP (ref 0–6)
HCT VFR BLD CALC: 44.7 % — SIGNIFICANT CHANGE UP (ref 34.5–45)
HGB BLD-MCNC: 13.6 G/DL — SIGNIFICANT CHANGE UP (ref 11.5–15.5)
LYMPHOCYTES # BLD AUTO: 1.33 K/UL — SIGNIFICANT CHANGE UP (ref 1–3.3)
LYMPHOCYTES # BLD AUTO: 16 % — SIGNIFICANT CHANGE UP (ref 13–44)
MCHC RBC-ENTMCNC: 26.3 PG — LOW (ref 27–34)
MCHC RBC-ENTMCNC: 30.4 G/DL — LOW (ref 32–36)
MCV RBC AUTO: 86.3 FL — SIGNIFICANT CHANGE UP (ref 80–100)
MONOCYTES # BLD AUTO: 0.41 K/UL — SIGNIFICANT CHANGE UP (ref 0–0.9)
MONOCYTES NFR BLD AUTO: 5 % — SIGNIFICANT CHANGE UP (ref 2–14)
NEUTROPHILS # BLD AUTO: 6.22 K/UL — SIGNIFICANT CHANGE UP (ref 1.8–7.4)
NEUTROPHILS NFR BLD AUTO: 75 % — SIGNIFICANT CHANGE UP (ref 43–77)
NRBC # BLD: 0 /100 — SIGNIFICANT CHANGE UP (ref 0–0)
NRBC # BLD: SIGNIFICANT CHANGE UP /100 WBCS (ref 0–0)
PLAT MORPH BLD: NORMAL — SIGNIFICANT CHANGE UP
PLATELET # BLD AUTO: 325 K/UL — SIGNIFICANT CHANGE UP (ref 150–400)
POIKILOCYTOSIS BLD QL AUTO: SLIGHT — SIGNIFICANT CHANGE UP
RBC # BLD: 5.18 M/UL — SIGNIFICANT CHANGE UP (ref 3.8–5.2)
RBC # FLD: 21.1 % — HIGH (ref 10.3–14.5)
RBC BLD AUTO: ABNORMAL
WBC # BLD: 8.29 K/UL — SIGNIFICANT CHANGE UP (ref 3.8–10.5)
WBC # FLD AUTO: 8.29 K/UL — SIGNIFICANT CHANGE UP (ref 3.8–10.5)

## 2022-05-12 PROCEDURE — 99214 OFFICE O/P EST MOD 30 MIN: CPT

## 2022-05-12 RX ORDER — EXEMESTANE 25 MG/1
25 TABLET, FILM COATED ORAL
Qty: 30 | Refills: 0 | Status: DISCONTINUED | COMMUNITY
Start: 2020-08-10 | End: 2022-05-12

## 2022-05-12 RX ORDER — EVEROLIMUS 5 MG/1
5 TABLET ORAL
Qty: 30 | Refills: 2 | Status: DISCONTINUED | COMMUNITY
Start: 2020-08-10 | End: 2022-05-12

## 2022-05-12 RX ORDER — EVEROLIMUS 5 MG/1
5 TABLET ORAL
Qty: 90 | Refills: 0 | Status: DISCONTINUED | COMMUNITY
Start: 2021-03-18 | End: 2022-05-12

## 2022-05-12 NOTE — ASSESSMENT
[FreeTextEntry1] : Lab work, mammogram/breast US results reviewed.\par #1) Patient had a remote h/o breast cancer. She received adjuvant treatment (AC-T ) + 5 years of Tamoxifen with Dr. Cash Bojorquez and subsequently transferred care to Dr. ALTAGRACIA Shields at [facility] [place].  In 2018 she was hospitalized and was found to have bone metastasis and renal mass.  6/2018 She underwent radical left nephrectomy for renal cell carcinoma, with PALND, and a bx. of the rIght iliac bone revealed metastatic breast cancer .  She was placed on Femara/Ibrance.    4/22/20  Interval CT Chest/Abdomen/Pelvis revealed new left lower lobe nodule with interval decrease in size of small nodules at the right lung apex, extensive osseous metastatic disease slightly increased, stable IVC filter.   She was prescribed Aromasin/Afinitor on  8/10/20.  Follow-up chest imaging was done 3/2021 to assess status of lung nodules-showed increase, however, it was being compared to a CT chest from 2019. Most recent F/U imaging 2/2022 c/w POD.\par Treatment alternatives were discussed with respective pro's/con's-patient gave written/verbal consent for Faslodex.\par She remains on Xgeva-calcium supplementation.\par \par #2) history of bilateral DVTs, with history of IVC filter–patient remains on anticoagulation.  No overt bleeding noted clinically at present.\par \par #3)  H/O Renal insufficiency/hypokalemia.  P.O. fluid/water hydration as tolerated, and potassium supplementation as needed. Follow- up with PCP; and have recommended she see nephrologist-has kidney disease, and 1 remaining kidney.\par \par Patient was given the opportunity to ask questions. Her questions have been answered to her apparent satisfaction at this time.\par \par \par

## 2022-05-12 NOTE — HISTORY OF PRESENT ILLNESS
[Disease: _____________________] : Disease: [unfilled] [M: ___] : M[unfilled] [AJCC Stage: ____] : AJCC Stage: [unfilled] [de-identified] : 3/2003–Stage IIB left breast cancer, ER positive/MN positive/HER-2 negative–status post left modified radical mastectomy with reconstruction.  1/15 lymph nodes positive for metastatic carcinoma with focal extranodal extension.  Status post AC-T--> Tamoxifen x 5 years.  \par 6/2018-Presented with general weakness and leg pain.  Diagnosed with clear cell renal carcinoma  s/p Radical Left Nephrectomy, Para Aortic LN dissection,  c/b Splenic Laceration w/ bleeding. Right iliac bone biopsy of bone lesion was consistent with metastatic breast cancer–ER positive/MN positive/HER-2 unknown.\par Patient was begun on Letrozole/Ibrance/Xgeva.\par \par 3/2019–Status post left VATS procedure with pleural decortication.  Left pleural fluid cytology and pleural pathology negative for malignancy.\par 4/2020-CT scan of the chest/abdomen/pelvis revealed new left lower lung nodule and decreased right apex nodules, extensive bony metastases slightly increased. Changed to Aromasin, with Afinitor added 7/2020.\par Patient had been under the oncologic care of Dr. Shields at [facility].\par \par 8/2020–Patient wished to transfer her oncologic care to the [facility] [place]. LE venous duplex study-B/L DVT. Begun on Xarelto.\par 12/2020–CT abdomen/pelvis–extensive osseous metastases.\par 6/2021-CT C/A/P-pulmonary metastases, a distal paraesophageal node and extensive osseous metastatic disease without significant change. No acute pathology. Aromasin/Afinitor continued.\par \par 2/28/22-CT C/A/P-enlarging pulmonary nodules and a new right pulmonary nodule. Enlarging distal paraesophageal lymph node and a new left internal mammary lymph node. Stable bone metastases.\par \par 3/17/2022-Began Faslodex.\par \par \par \par \par  [de-identified] : Infiltrating lobular carcinoma [de-identified] : 7/2020 :  CA 15-3=24 U/mL     CEA =2.9 ng/mL         [de-identified] : 10/2020–[facility]–genomic findings–PIK3CA N6011V, CDH1 Y523fs*13, TBX3 R471fs*161; OTTO; TMB 1Muts/Mb.\par 3 disease relevant genes with no reportable alterations–BRCA1, BRCA2, ERBB 2.\par \par 6/2018-PD-L1 ()=0%. [de-identified] : Managing ADL's.\par Takes oxycodone for intermittent bony/joint aches-usually takes in am's and HS now-helps.\par No dental/jaw pain. \par Has O2 at home-as needed HS for ~ 1/2 hour. No current c/o SOB.\par No  nausea/vomiting/diarrhea or constipation . No melena, nose bleeds. \par No cough, chills, fever, night sweats, mouth sores. \par Taking calcium 1500 mg and vitamin D supplements daily.\par \par Had COVID vaccines(Moderna), along with booster x 1 (5/8/22).\par

## 2022-05-12 NOTE — PHYSICAL EXAM
[Ambulatory and capable of all self care but unable to carry out any work activities] : Status 2- Ambulatory and capable of all self care but unable to carry out any work activities. Up and about more than 50% of waking hours [Normal] : affect appropriate [de-identified] : non-toxic appearing [de-identified] : left reconstructed breast without palpable abnormality; right breast without new dominant nodular area (has implant there which was placed for symmetry) [de-identified] : no gross focal motor extremity deficits

## 2022-05-13 ENCOUNTER — NON-APPOINTMENT (OUTPATIENT)
Age: 67
End: 2022-05-13

## 2022-05-13 LAB
ALBUMIN SERPL ELPH-MCNC: 4 G/DL
ALP BLD-CCNC: 48 U/L
ALT SERPL-CCNC: 8 U/L
ANION GAP SERPL CALC-SCNC: 13 MMOL/L
AST SERPL-CCNC: 18 U/L
BILIRUB SERPL-MCNC: 0.2 MG/DL
BUN SERPL-MCNC: 19 MG/DL
CALCIUM SERPL-MCNC: 9.5 MG/DL
CHLORIDE SERPL-SCNC: 106 MMOL/L
CO2 SERPL-SCNC: 25 MMOL/L
CREAT SERPL-MCNC: 1.32 MG/DL
EGFR: 44 ML/MIN/1.73M2
GLUCOSE SERPL-MCNC: 79 MG/DL
POTASSIUM SERPL-SCNC: 4.6 MMOL/L
PROT SERPL-MCNC: 6.9 G/DL
SODIUM SERPL-SCNC: 144 MMOL/L

## 2022-05-18 ENCOUNTER — RX RENEWAL (OUTPATIENT)
Age: 67
End: 2022-05-18

## 2022-06-09 ENCOUNTER — OUTPATIENT (OUTPATIENT)
Dept: OUTPATIENT SERVICES | Facility: HOSPITAL | Age: 67
LOS: 1 days | Discharge: HOME | End: 2022-06-09

## 2022-06-09 DIAGNOSIS — Z98.890 OTHER SPECIFIED POSTPROCEDURAL STATES: Chronic | ICD-10-CM

## 2022-06-09 DIAGNOSIS — E89.6 POSTPROCEDURAL ADRENOCORTICAL (-MEDULLARY) HYPOFUNCTION: Chronic | ICD-10-CM

## 2022-06-09 DIAGNOSIS — C50.919 MALIGNANT NEOPLASM OF UNSPECIFIED SITE OF UNSPECIFIED FEMALE BREAST: ICD-10-CM

## 2022-06-09 DIAGNOSIS — Z90.12 ACQUIRED ABSENCE OF LEFT BREAST AND NIPPLE: Chronic | ICD-10-CM

## 2022-06-09 DIAGNOSIS — Z90.5 ACQUIRED ABSENCE OF KIDNEY: Chronic | ICD-10-CM

## 2022-06-16 ENCOUNTER — RESULT REVIEW (OUTPATIENT)
Age: 67
End: 2022-06-16

## 2022-06-16 ENCOUNTER — APPOINTMENT (OUTPATIENT)
Dept: INFUSION THERAPY | Facility: HOSPITAL | Age: 67
End: 2022-06-16

## 2022-06-16 ENCOUNTER — APPOINTMENT (OUTPATIENT)
Dept: HEMATOLOGY ONCOLOGY | Facility: CLINIC | Age: 67
End: 2022-06-16
Payer: MEDICARE

## 2022-06-16 VITALS
DIASTOLIC BLOOD PRESSURE: 80 MMHG | TEMPERATURE: 97.7 F | HEIGHT: 61 IN | BODY MASS INDEX: 30.21 KG/M2 | HEART RATE: 75 BPM | SYSTOLIC BLOOD PRESSURE: 120 MMHG | WEIGHT: 159.99 LBS | RESPIRATION RATE: 16 BRPM | OXYGEN SATURATION: 98 %

## 2022-06-16 DIAGNOSIS — C79.51 SECONDARY MALIGNANT NEOPLASM OF BONE: ICD-10-CM

## 2022-06-16 LAB
BASOPHILS # BLD AUTO: 0.03 K/UL — SIGNIFICANT CHANGE UP (ref 0–0.2)
BASOPHILS NFR BLD AUTO: 0.4 % — SIGNIFICANT CHANGE UP (ref 0–2)
EOSINOPHIL # BLD AUTO: 0.16 K/UL — SIGNIFICANT CHANGE UP (ref 0–0.5)
EOSINOPHIL NFR BLD AUTO: 2 % — SIGNIFICANT CHANGE UP (ref 0–6)
HCT VFR BLD CALC: 45.7 % — HIGH (ref 34.5–45)
HGB BLD-MCNC: 14.4 G/DL — SIGNIFICANT CHANGE UP (ref 11.5–15.5)
IMM GRANULOCYTES NFR BLD AUTO: 0.3 % — SIGNIFICANT CHANGE UP (ref 0–1.5)
LYMPHOCYTES # BLD AUTO: 2.04 K/UL — SIGNIFICANT CHANGE UP (ref 1–3.3)
LYMPHOCYTES # BLD AUTO: 25.6 % — SIGNIFICANT CHANGE UP (ref 13–44)
MCHC RBC-ENTMCNC: 27.4 PG — SIGNIFICANT CHANGE UP (ref 27–34)
MCHC RBC-ENTMCNC: 31.5 G/DL — LOW (ref 32–36)
MCV RBC AUTO: 86.9 FL — SIGNIFICANT CHANGE UP (ref 80–100)
MONOCYTES # BLD AUTO: 0.53 K/UL — SIGNIFICANT CHANGE UP (ref 0–0.9)
MONOCYTES NFR BLD AUTO: 6.7 % — SIGNIFICANT CHANGE UP (ref 2–14)
NEUTROPHILS # BLD AUTO: 5.18 K/UL — SIGNIFICANT CHANGE UP (ref 1.8–7.4)
NEUTROPHILS NFR BLD AUTO: 65 % — SIGNIFICANT CHANGE UP (ref 43–77)
NRBC # BLD: 0 /100 WBCS — SIGNIFICANT CHANGE UP (ref 0–0)
PLATELET # BLD AUTO: 262 K/UL — SIGNIFICANT CHANGE UP (ref 150–400)
RBC # BLD: 5.26 M/UL — HIGH (ref 3.8–5.2)
RBC # FLD: 18.4 % — HIGH (ref 10.3–14.5)
WBC # BLD: 7.96 K/UL — SIGNIFICANT CHANGE UP (ref 3.8–10.5)
WBC # FLD AUTO: 7.96 K/UL — SIGNIFICANT CHANGE UP (ref 3.8–10.5)

## 2022-06-16 PROCEDURE — 99214 OFFICE O/P EST MOD 30 MIN: CPT

## 2022-06-16 NOTE — HISTORY OF PRESENT ILLNESS
[Disease: _____________________] : Disease: [unfilled] [M: ___] : M[unfilled] [AJCC Stage: ____] : AJCC Stage: [unfilled] [de-identified] : 3/2003–Stage IIB left breast cancer, ER positive/OH positive/HER-2 negative–status post left modified radical mastectomy with reconstruction.  1/15 lymph nodes positive for metastatic carcinoma with focal extranodal extension.  Status post AC-T--> Tamoxifen x 5 years.  \par \par 6/2018-Presented with general weakness and leg pain.  Diagnosed with clear cell renal carcinoma  s/p Radical Left Nephrectomy, Para Aortic LN dissection,  c/b Splenic Laceration w/ bleeding. Right iliac bone biopsy of bone lesion was consistent with metastatic breast cancer–ER positive/OH positive/HER-2 unknown.\par Patient was begun on Letrozole/Ibrance/Xgeva.\par \par 3/2019–Status post left VATS procedure with pleural decortication.  Left pleural fluid cytology and pleural pathology negative for malignancy.\par 4/2020-CT scan of the chest/abdomen/pelvis revealed new left lower lung nodule and decreased right apex nodules, extensive bony metastases slightly increased. Changed to Aromasin, with Afinitor added 7/2020.\par Patient had been under the oncologic care of [facility].\par \par 8/2020–Patient wished to transfer her oncologic care to the [facility] [place]. LE venous duplex study-B/L DVT. Begun on Xarelto.\par 12/2020–CT abdomen/pelvis–extensive osseous metastases.\par 6/2021-CT C/A/P-pulmonary metastases, a distal paraesophageal node and extensive osseous metastatic disease without significant change. No acute pathology. Aromasin/Afinitor continued.\par \par 2/28/22-CT C/A/P-enlarging pulmonary nodules and a new right pulmonary nodule. Enlarging distal paraesophageal lymph node and a new left internal mammary lymph node. Stable bone metastases.\par \par 3/17/2022-Began Faslodex.\par \par \par \par \par  [de-identified] : Infiltrating lobular carcinoma [de-identified] : 7/2020 :  CA 15-3=24 U/mL     CEA =2.9 ng/mL         [de-identified] : 10/2020–[facility]–genomic findings–PIK3CA [id number], CDH1 Y523fs*13, TBX3 R471fs*161; OTTO; TMB 1Muts/Mb.\par 3 disease relevant genes with no reportable alterations–BRCA1, BRCA2, ERBB 2.\par \par 6/2018-PD-L1 ()=0%. [de-identified] : Managing ADL's. Independent, lives alone. Has family available for help if needed.\par Takes oxycodone for intermittent bony/joint aches-helps.\par No c/o dental/jaw pain. \par Has O2 at home-as needed HS for ~ 1/2 hour. No current c/o SOB.\par No  nausea/vomiting/diarrhea or constipation . No melena, nose bleeds. \par No cough, chills, fever, night sweats, mouth sores. \par Taking calcium 1500 mg and vitamin D supplements daily.\par +Hot flashes-transient.\par \par Had COVID vaccines(Moderna), along with booster x 1 (5/8/22).\par

## 2022-06-16 NOTE — ASSESSMENT
[FreeTextEntry1] : Lab work reviewed.\par #1) Patient had a remote h/o breast cancer. She received adjuvant treatment (AC-T ) + 5 years of Tamoxifen with Dr. Cash Bojorquez and subsequently transferred care to Dr. ALTAGRACIA Shields at [facility].  In 2018 she was hospitalized and was found to have bone metastasis and renal mass.  6/2018 She underwent radical left nephrectomy for renal cell carcinoma, with PALND, and a bx. of the rIght iliac bone revealed metastatic breast cancer .  She was placed on Femara/Ibrance.    4/22/20  Interval CT Chest/Abdomen/Pelvis revealed new left lower lobe nodule with interval decrease in size of small nodules at the right lung apex, extensive osseous metastatic disease slightly increased, stable IVC filter.   She was prescribed Aromasin/Afinitor on  8/10/20.  Follow-up chest imaging was done 3/2021 to assess status of lung nodules-showed increase, however, it was being compared to a CT chest from 2019. Most recent F/U imaging 2/2022 c/w POD. Patient began Faslodex.\par F/U CT C/A/P ordered.\par Patient remains on Xgeva, along with calcium supplementation.\par \par #2) history of bilateral DVTs, with history of IVC filter–patient remains on anticoagulation.  No overt bleeding noted clinically at present.\par \par #3)  H/O Renal insufficiency/hypokalemia.  P.O. fluid/water hydration as tolerated, and potassium supplementation as needed. Follow- up with PCP; and have again recommended she see nephrologist-has kidney disease, and 1 remaining kidney-referral made.\par \par Patient was given the opportunity to ask questions. Her questions have been answered to her apparent satisfaction at this time.\par \par \par

## 2022-06-16 NOTE — PHYSICAL EXAM
[Ambulatory and capable of all self care but unable to carry out any work activities] : Status 2- Ambulatory and capable of all self care but unable to carry out any work activities. Up and about more than 50% of waking hours [Normal] : affect appropriate [de-identified] : non-toxic appearing [de-identified] : left reconstructed breast without palpable abnormality; right breast without new dominant nodular area (has implant there which was placed for symmetry) [de-identified] : no gross focal motor extremity deficits

## 2022-06-17 LAB
ALBUMIN SERPL ELPH-MCNC: 4.6 G/DL
ALP BLD-CCNC: 51 U/L
ALT SERPL-CCNC: 7 U/L
ANION GAP SERPL CALC-SCNC: 14 MMOL/L
AST SERPL-CCNC: 16 U/L
BILIRUB SERPL-MCNC: 0.3 MG/DL
BUN SERPL-MCNC: 28 MG/DL
CALCIUM SERPL-MCNC: 9.9 MG/DL
CANCER AG27-29 SERPL-ACNC: 33.1 U/ML
CHLORIDE SERPL-SCNC: 102 MMOL/L
CO2 SERPL-SCNC: 25 MMOL/L
CREAT SERPL-MCNC: 1.23 MG/DL
EGFR: 48 ML/MIN/1.73M2
GLUCOSE SERPL-MCNC: 86 MG/DL
POTASSIUM SERPL-SCNC: 4.6 MMOL/L
PROT SERPL-MCNC: 7.4 G/DL
SODIUM SERPL-SCNC: 141 MMOL/L

## 2022-07-07 ENCOUNTER — APPOINTMENT (OUTPATIENT)
Dept: CT IMAGING | Facility: HOSPITAL | Age: 67
End: 2022-07-07

## 2022-07-07 ENCOUNTER — RX CHANGE (OUTPATIENT)
Age: 67
End: 2022-07-07

## 2022-07-12 ENCOUNTER — OUTPATIENT (OUTPATIENT)
Dept: OUTPATIENT SERVICES | Facility: HOSPITAL | Age: 67
LOS: 1 days | End: 2022-07-12
Payer: MEDICARE

## 2022-07-12 ENCOUNTER — RESULT REVIEW (OUTPATIENT)
Age: 67
End: 2022-07-12

## 2022-07-12 ENCOUNTER — APPOINTMENT (OUTPATIENT)
Dept: CT IMAGING | Facility: HOSPITAL | Age: 67
End: 2022-07-12

## 2022-07-12 DIAGNOSIS — Z98.890 OTHER SPECIFIED POSTPROCEDURAL STATES: Chronic | ICD-10-CM

## 2022-07-12 DIAGNOSIS — Z90.12 ACQUIRED ABSENCE OF LEFT BREAST AND NIPPLE: Chronic | ICD-10-CM

## 2022-07-12 DIAGNOSIS — E89.6 POSTPROCEDURAL ADRENOCORTICAL (-MEDULLARY) HYPOFUNCTION: Chronic | ICD-10-CM

## 2022-07-12 DIAGNOSIS — Z90.5 ACQUIRED ABSENCE OF KIDNEY: Chronic | ICD-10-CM

## 2022-07-12 DIAGNOSIS — C50.919 MALIGNANT NEOPLASM OF UNSPECIFIED SITE OF UNSPECIFIED FEMALE BREAST: ICD-10-CM

## 2022-07-12 PROCEDURE — 71250 CT THORAX DX C-: CPT | Mod: 26,MH

## 2022-07-12 PROCEDURE — 74176 CT ABD & PELVIS W/O CONTRAST: CPT | Mod: MG

## 2022-07-12 PROCEDURE — G1004: CPT

## 2022-07-12 PROCEDURE — 71250 CT THORAX DX C-: CPT

## 2022-07-12 PROCEDURE — 74176 CT ABD & PELVIS W/O CONTRAST: CPT | Mod: 26,MG

## 2022-07-14 ENCOUNTER — RESULT REVIEW (OUTPATIENT)
Age: 67
End: 2022-07-14

## 2022-07-14 ENCOUNTER — APPOINTMENT (OUTPATIENT)
Dept: HEMATOLOGY ONCOLOGY | Facility: CLINIC | Age: 67
End: 2022-07-14

## 2022-07-14 ENCOUNTER — APPOINTMENT (OUTPATIENT)
Dept: INFUSION THERAPY | Facility: HOSPITAL | Age: 67
End: 2022-07-14

## 2022-07-14 VITALS
HEART RATE: 76 BPM | RESPIRATION RATE: 16 BRPM | OXYGEN SATURATION: 89 % | DIASTOLIC BLOOD PRESSURE: 79 MMHG | SYSTOLIC BLOOD PRESSURE: 115 MMHG | TEMPERATURE: 96.5 F

## 2022-07-14 LAB
BASOPHILS # BLD AUTO: 0.04 K/UL — SIGNIFICANT CHANGE UP (ref 0–0.2)
BASOPHILS NFR BLD AUTO: 0.5 % — SIGNIFICANT CHANGE UP (ref 0–2)
EOSINOPHIL # BLD AUTO: 0.17 K/UL — SIGNIFICANT CHANGE UP (ref 0–0.5)
EOSINOPHIL NFR BLD AUTO: 2 % — SIGNIFICANT CHANGE UP (ref 0–6)
HCT VFR BLD CALC: 45.8 % — HIGH (ref 34.5–45)
HGB BLD-MCNC: 14.4 G/DL — SIGNIFICANT CHANGE UP (ref 11.5–15.5)
IMM GRANULOCYTES NFR BLD AUTO: 0.5 % — SIGNIFICANT CHANGE UP (ref 0–1.5)
INR PPP: 2.19 RATIO
LYMPHOCYTES # BLD AUTO: 2.31 K/UL — SIGNIFICANT CHANGE UP (ref 1–3.3)
LYMPHOCYTES # BLD AUTO: 26.9 % — SIGNIFICANT CHANGE UP (ref 13–44)
MCHC RBC-ENTMCNC: 27.7 PG — SIGNIFICANT CHANGE UP (ref 27–34)
MCHC RBC-ENTMCNC: 31.4 G/DL — LOW (ref 32–36)
MCV RBC AUTO: 88.2 FL — SIGNIFICANT CHANGE UP (ref 80–100)
MONOCYTES # BLD AUTO: 0.67 K/UL — SIGNIFICANT CHANGE UP (ref 0–0.9)
MONOCYTES NFR BLD AUTO: 7.8 % — SIGNIFICANT CHANGE UP (ref 2–14)
NEUTROPHILS # BLD AUTO: 5.36 K/UL — SIGNIFICANT CHANGE UP (ref 1.8–7.4)
NEUTROPHILS NFR BLD AUTO: 62.3 % — SIGNIFICANT CHANGE UP (ref 43–77)
NRBC # BLD: 0 /100 WBCS — SIGNIFICANT CHANGE UP (ref 0–0)
PLATELET # BLD AUTO: 226 K/UL — SIGNIFICANT CHANGE UP (ref 150–400)
PT BLD: 25.8 SEC
RBC # BLD: 5.19 M/UL — SIGNIFICANT CHANGE UP (ref 3.8–5.2)
RBC # FLD: 16.6 % — HIGH (ref 10.3–14.5)
WBC # BLD: 8.59 K/UL — SIGNIFICANT CHANGE UP (ref 3.8–10.5)
WBC # FLD AUTO: 8.59 K/UL — SIGNIFICANT CHANGE UP (ref 3.8–10.5)

## 2022-07-14 PROCEDURE — 99214 OFFICE O/P EST MOD 30 MIN: CPT

## 2022-07-14 NOTE — PHYSICAL EXAM
[Ambulatory and capable of all self care but unable to carry out any work activities] : Status 2- Ambulatory and capable of all self care but unable to carry out any work activities. Up and about more than 50% of waking hours [Normal] : affect appropriate [de-identified] : non-toxic appearing [de-identified] : left reconstructed breast without palpable abnormality; right breast without new dominant nodular area (has implant there which was placed for symmetry) [de-identified] : no gross focal motor extremity deficits

## 2022-07-14 NOTE — HISTORY OF PRESENT ILLNESS
[Disease: _____________________] : Disease: [unfilled] [M: ___] : M[unfilled] [AJCC Stage: ____] : AJCC Stage: [unfilled] [de-identified] : 3/2003–Stage IIB left breast cancer, ER positive/MN positive/HER-2 negative–status post left modified radical mastectomy with reconstruction.  1/15 lymph nodes positive for metastatic carcinoma with focal extranodal extension.  Status post AC-T--> Tamoxifen x 5 years.  \par \par 6/2018-Presented with general weakness and leg pain.  Diagnosed with clear cell renal carcinoma  s/p Radical Left Nephrectomy, Para Aortic LN dissection,  c/b Splenic Laceration w/ bleeding. Right iliac bone biopsy of bone lesion was consistent with metastatic breast cancer–ER positive/MN positive/HER-2 unknown.\par Patient was begun on Letrozole/Ibrance/Xgeva.\par \par 3/2019–Status post left VATS procedure with pleural decortication.  Left pleural fluid cytology and pleural pathology negative for malignancy.\par 4/2020-CT scan of the chest/abdomen/pelvis revealed new left lower lung nodule and decreased right apex nodules, extensive bony metastases slightly increased. Changed to Aromasin, with Afinitor added 7/2020.\par Patient had been under the oncologic care of Dr. [facility]s at [facility].\par \par 8/2020–Patient wished to transfer her oncologic care to the [facility] [place]. LE venous duplex study-B/L DVT. Begun on Xarelto.\par 12/2020–CT abdomen/pelvis–extensive osseous metastases.\par 6/2021-CT C/A/P-pulmonary metastases, a distal paraesophageal node and extensive osseous metastatic disease without significant change. No acute pathology. Aromasin/Afinitor continued.\par \par 2/28/22-CT C/A/P-enlarging pulmonary nodules and a new right pulmonary nodule. Enlarging distal paraesophageal lymph node and a new left internal mammary lymph node. Stable bone metastases.\par \par 3/17/2022-Began Faslodex.\par \par \par \par \par  [de-identified] : Infiltrating lobular carcinoma [de-identified] : 7/2020 :  CA 15-3=24 U/mL     CEA =2.9 ng/mL         [de-identified] : 10/2020–[facility]–genomic findings–PIK3CA W5222K, CDH1 Y523fs*13, TBX3 R471fs*161; OTTO; TMB 1Muts/Mb.\par 3 disease relevant genes with no reportable alterations–BRCA1, BRCA2, ERBB 2.\par \par 6/2018-PD-L1 ()=0%. [de-identified] : Managing ADL's. Independent, lives alone. Has family available for help if needed.\par Takes oxycodone for intermittent bony/joint aches-no acute change in needs.\par No c/o dental/jaw pain. \par Has O2 at home-as needed HS for ~ 1/2 hour. No current c/o SOB. Not wearing her O2 currently. Can check her O2 sat. at home.\par No  nausea/vomiting/diarrhea or constipation . No melena, nose bleeds.  +Intermittent blood in sputum (when was without her inhaler a few days).\par No chills, fever, night sweats, mouth sores. \par Taking calcium 1500 mg and vitamin D supplements daily.\par \lesli Has had COVID vaccines(Moderna), along with booster x 1 (5/8/22).\par

## 2022-07-14 NOTE — ASSESSMENT
[FreeTextEntry1] : Lab work reviewed.\par #1) Patient had a remote h/o breast cancer. She received adjuvant treatment (AC-T ) + 5 years of Tamoxifen with Dr. Cash Bojorquez and subsequently transferred care to Dr. ALTAGRACIA Shields at [facility].  In 2018 she was hospitalized and was found to have bone metastasis and renal mass.  6/2018 She underwent radical left nephrectomy for renal cell carcinoma, with PALND, and a bx. of the rIght iliac bone revealed metastatic breast cancer .  She was placed on Femara/Ibrance.    4/22/20  Interval CT Chest/Abdomen/Pelvis revealed new left lower lobe nodule with interval decrease in size of small nodules at the right lung apex, extensive osseous metastatic disease slightly increased, stable IVC filter.   She was prescribed Aromasin/Afinitor on  8/10/20.  Follow-up chest imaging was done 3/2021 to assess status of lung nodules-showed increase, however, it was being compared to a CT chest from 2019. Most recent F/U imaging 2/2022 c/w POD. Patient began Faslodex.\par F/U CT C/A/P results pending.\par Patient consents to continue Xgeva, along with calcium supplementation.\par \par #2) history of bilateral DVTs, with history of IVC filter–patient remains on anticoagulation.  No overt bleeding noted clinically at present.\par \par #3)  H/O Renal insufficiency/hypokalemia.  P.O. fluid/water hydration as tolerated, and potassium supplementation as needed. Follow- up with PCP; and have recommended she see nephrologist-has kidney disease, and 1 remaining kidney.\par \par Patient was given the opportunity to ask questions. Her questions have been answered to her apparent satisfaction at this time.\par \par \par

## 2022-07-15 LAB
ALBUMIN SERPL ELPH-MCNC: 4.2 G/DL
ALP BLD-CCNC: 51 U/L
ALT SERPL-CCNC: 6 U/L
ANION GAP SERPL CALC-SCNC: 15 MMOL/L
AST SERPL-CCNC: 18 U/L
BILIRUB SERPL-MCNC: 0.3 MG/DL
BUN SERPL-MCNC: 27 MG/DL
CALCIUM SERPL-MCNC: 9.5 MG/DL
CANCER AG27-29 SERPL-ACNC: 31.2 U/ML
CHLORIDE SERPL-SCNC: 109 MMOL/L
CO2 SERPL-SCNC: 23 MMOL/L
CREAT SERPL-MCNC: 1.28 MG/DL
EGFR: 46 ML/MIN/1.73M2
GLUCOSE SERPL-MCNC: 83 MG/DL
POTASSIUM SERPL-SCNC: 4.4 MMOL/L
PROT SERPL-MCNC: 7.2 G/DL
SODIUM SERPL-SCNC: 146 MMOL/L

## 2022-07-18 ENCOUNTER — NON-APPOINTMENT (OUTPATIENT)
Age: 67
End: 2022-07-18

## 2022-07-19 ENCOUNTER — NON-APPOINTMENT (OUTPATIENT)
Age: 67
End: 2022-07-19

## 2022-07-21 ENCOUNTER — NON-APPOINTMENT (OUTPATIENT)
Age: 67
End: 2022-07-21

## 2022-07-21 ENCOUNTER — APPOINTMENT (OUTPATIENT)
Dept: RADIATION ONCOLOGY | Facility: CLINIC | Age: 67
End: 2022-07-21

## 2022-07-21 PROCEDURE — 99203 OFFICE O/P NEW LOW 30 MIN: CPT | Mod: 95

## 2022-07-28 ENCOUNTER — NON-APPOINTMENT (OUTPATIENT)
Age: 67
End: 2022-07-28

## 2022-07-29 ENCOUNTER — NON-APPOINTMENT (OUTPATIENT)
Age: 67
End: 2022-07-29

## 2022-08-01 NOTE — HISTORY OF PRESENT ILLNESS
[Home] : at home, [unfilled] , at the time of the visit. [Medical Office: ([facility])___] : at the medical office located in  [Verbal consent obtained from patient] : the patient, [unfilled] [FreeTextEntry1] : Ms. Ortega is a 65 yo female with remote history of breast cancer in 2003 and renal cell carcinoma in 2018, then found to have metastatic breast cancer to the lung and bone, now with further progression on recent CT CAP in 7/2022 with marked interval enlargement of a right perihilar mass with new endobronchial extension into the right mainstem bronchus. She presents for consideration of palliative RT.\par \par She was diagnosed with Stage IIB left breast cancer in March 2003. She underwent left modified radical mastectomy with reconstruction for ER positive/MA positive/HER-2 negative primary. ALND showed 1/15 lymph nodes positive for metastatic carcinoma with focal extranodal extension. She received adjuvant AC-T chemotherapy, followed by Tamoxifen x 5 years. \par \par In June 2018, she presented with generalized weakness and leg pain. CT scans showed a large left renal mass in addition multiple osseous lesions consistent with metastatic disease and small pulmonary nodules. Right iliac bone biopsy showed metastatic breast carcinoma, HR positive. She underwent radical left nephrectomy showing clear cell renal carcinoma and para aortic LN dissection showed 4 negative nodes. The surgery was complicated by splenic laceration with bleeding.  \par \par She was started on Letrozole/Ibrance/Xgeva. In 3/2019 she underwent left VATS procedure with pleural decortication. Left pleural fluid cytology and pleural pathology negative for malignancy.  CT CAP in April 2020 revealed new left lower lung nodule and decreased right apex nodules, extensive bony metastases slightly increased. Her treatment regimen changed to Aromasin, and Afinitor added in July 2020.  Patient had been under the oncologic care of Dr. Shields at [facility].\par \par Interval CT CAP 4/22/2020 showed new left lower lobe nodule with interval decrease in size of small nodules at the right lung apex, extensive osseous metastatic disease slightly increased, stable IVC filter.  Treatment changed to Aromasin, with Afinitor added in 7/2020. Patient then transferred her oncologic care to [facility] with Dr. Goldstein in August 2020.\par \par Interval CT CAP on 2/28/22 showed enlarging pulmonary nodules and a new right pulmonary nodule. Enlarging distal paraesophageal lymph node and a new left internal mammary lymph node. Stable bone metastases. She began Faslodex on 3/17/22. Interval CT CAP on 7/12/2022 shows enlarging bilateral pulmonary nodules. Marked interval enlargement of a right perihilar mass with new endobronchial extension in to the right mainstem bronchus. Enlarging right hilar and distal paraesophageal LNs. 3.6 cm indeterminate mass in the mid to lower pole of the right kidney measuring slightly higher than simple fluid attenuation, increased in size. Extensive sclerotic osseous mets, without significant interval change.\par \par Interval Hx: She lives alone, but maintains independence. She continues oxycodone for intermittent bony/joint aches. Over the past week, she reports and acute increase in difficulty breathing, requiring home oxygen. For the past 3 years, she was able to get by with infrequent use of the O2; however, how she is unable to walk 5 steps without requiring the oxygen.  It does help the SOB. She has tightness in chest with coughing, but otherwise without chest or upper back pain. She is working with social work to obtain portable oxygen system. She continues to cough up blood, less often, but persists. Appetite is down, but maintains caloric intake, denies noticeable weight loss.\par \par Karin [phone] contact at [employer]\par

## 2022-08-01 NOTE — LETTER CLOSING
[Consult Closing:] : Thank you for allowing me to participate in the care of this patient.  If you have any questions, please do not hesitate to contact me. [Sincerely yours,] : Sincerely yours, [FreeTextEntry3] : Alejandrina Vazquez MD\par

## 2022-08-01 NOTE — REVIEW OF SYSTEMS
[Fatigue] : fatigue [Lower Ext Edema] : lower extremity edema [Shortness Of Breath] : shortness of breath [Cough] : cough [SOB on Exertion] : shortness of breath during exertion [Joint Pain] : joint pain [Muscle Weakness] : muscle weakness [Negative] : Heme/Lymph [Fever] : no fever [Chest Pain] : no chest pain [Palpitations] : no palpitations [FreeTextEntry6] : hemoptysis [de-identified] : anxious

## 2022-08-02 ENCOUNTER — NON-APPOINTMENT (OUTPATIENT)
Age: 67
End: 2022-08-02

## 2022-08-03 ENCOUNTER — NON-APPOINTMENT (OUTPATIENT)
Age: 67
End: 2022-08-03

## 2022-08-03 ENCOUNTER — OUTPATIENT (OUTPATIENT)
Dept: OUTPATIENT SERVICES | Facility: HOSPITAL | Age: 67
LOS: 1 days | Discharge: HOME | End: 2022-08-03

## 2022-08-03 DIAGNOSIS — Z90.5 ACQUIRED ABSENCE OF KIDNEY: Chronic | ICD-10-CM

## 2022-08-03 DIAGNOSIS — Z90.12 ACQUIRED ABSENCE OF LEFT BREAST AND NIPPLE: Chronic | ICD-10-CM

## 2022-08-03 DIAGNOSIS — Z98.890 OTHER SPECIFIED POSTPROCEDURAL STATES: Chronic | ICD-10-CM

## 2022-08-03 DIAGNOSIS — E89.6 POSTPROCEDURAL ADRENOCORTICAL (-MEDULLARY) HYPOFUNCTION: Chronic | ICD-10-CM

## 2022-08-03 PROCEDURE — 77262 THER RADIOLOGY TX PLNG INTRM: CPT

## 2022-08-03 RX ORDER — NALOXONE HYDROCHLORIDE 4 MG/.1ML
4 SPRAY NASAL
Qty: 1 | Refills: 0 | Status: ACTIVE | COMMUNITY
Start: 2022-08-03 | End: 1900-01-01

## 2022-08-08 ENCOUNTER — APPOINTMENT (OUTPATIENT)
Dept: HEMATOLOGY ONCOLOGY | Facility: CLINIC | Age: 67
End: 2022-08-08

## 2022-08-08 ENCOUNTER — APPOINTMENT (OUTPATIENT)
Dept: INFUSION THERAPY | Facility: HOSPITAL | Age: 67
End: 2022-08-08

## 2022-08-08 VITALS
TEMPERATURE: 96.6 F | OXYGEN SATURATION: 92 % | SYSTOLIC BLOOD PRESSURE: 133 MMHG | HEART RATE: 90 BPM | DIASTOLIC BLOOD PRESSURE: 85 MMHG | RESPIRATION RATE: 16 BRPM | BODY MASS INDEX: 31.72 KG/M2 | WEIGHT: 167.99 LBS | HEIGHT: 61 IN

## 2022-08-08 PROCEDURE — 99214 OFFICE O/P EST MOD 30 MIN: CPT

## 2022-08-08 NOTE — ASSESSMENT
[FreeTextEntry1] : Lab work and CT C/A/P reports reviewed.\par #1) Patient had a remote h/o breast cancer. She received adjuvant treatment (AC-T ) + 5 years of Tamoxifen with Dr. Cash Bojorquez and subsequently transferred care to Dr. ALTAGRACIA Shields at [facility].  In 2018 she was hospitalized and was found to have bone metastasis and renal mass.  6/2018 She underwent radical left nephrectomy for renal cell carcinoma, with PALND, and a bx. of the rIght iliac bone revealed metastatic breast cancer .  She was placed on Femara/Ibrance.    4/22/20  Interval CT Chest/Abdomen/Pelvis revealed new left lower lobe nodule with interval decrease in size of small nodules at the right lung apex, extensive osseous metastatic disease slightly increased, stable IVC filter.   She was prescribed Aromasin/Afinitor on  8/10/20.  Follow-up chest imaging was done 3/2021 to assess status of lung nodules-showed increase, however, it was being compared to a CT chest from 2019. Most recent F/U imaging 2/2022 c/w POD. Patient began Faslodex.\par 7/18/2022-CT C/A/P-Enlarging bilateral pulmonary nodules with reference nodules described above. Marked interval enlargement of a right perihilar mass with new endobronchial extension into the right mainstem bronchus. Enlarging right hilar and distal paraesophageal lymph nodes. 3.6 cm indeterminate mass in the mid to lower pole of the right kidney measuring slightly higher than simple fluid attenuation, increased in size. Further evaluation with renal ultrasound is recommended. Extensive sclerotic osseous metastases, without significant interval change.\par With patient management options at this point.  Progression of disease clinically–advise palliative radiation as planned.  Would consider follow-up biopsy to reassess marker studies, however, patient's comorbidities limit further invasive procedures at this time (patient agrees).  Could consider adding Piqray (+PIK 3 mutation) to her Faslodex.  Or, in light of renal issues (with concern for increasing creatinine with Piqray in this patient with one kidney), could consider modified dose of Xeloda–potential side effects explained including but not limited to rash, nausea/vomiting/diarrhea, myelosuppression.  Patient stated she will consider chemotherapy post RT.\par Patient consents to continue Xgeva-to reschedule, along with calcium supplementation.\par \par #2) history of bilateral DVTs, with history of IVC filter–patient remains on anticoagulation.  No overt bleeding noted clinically at present.\par \par #3)  H/O Renal insufficiency/hypokalemia.  P.O. fluid/water hydration as tolerated, and potassium supplementation as needed. Follow- up with PCP; and have recommended she see nephrologist-has kidney disease, and 1 remaining kidney. Renal US ordered as well.\par \par *Discussed systemic disease and palliative treatment intent.  Quality of life issues to be considered in decision making.  Should patient continue to decline, need to consider best supportive care.  At this time, patient wishes to continue with cancer directed therapy.  Patient was given the opportunity to ask questions. Her questions have been answered to her apparent satisfaction at this time.\par \par \par

## 2022-08-08 NOTE — PHYSICAL EXAM
[de-identified] : non-toxic appearing [de-identified] : decreased BS with few scattered expiratory wheezes [de-identified] : left reconstructed breast without palpable abnormality; right breast without new dominant nodular area (has implant there which was placed for symmetry) [de-identified] : no gross focal motor extremity deficits

## 2022-08-09 ENCOUNTER — NON-APPOINTMENT (OUTPATIENT)
Age: 67
End: 2022-08-09

## 2022-08-10 ENCOUNTER — APPOINTMENT (OUTPATIENT)
Dept: RADIATION ONCOLOGY | Facility: CLINIC | Age: 67
End: 2022-08-10

## 2022-08-10 ENCOUNTER — NON-APPOINTMENT (OUTPATIENT)
Age: 67
End: 2022-08-10

## 2022-08-10 VITALS
OXYGEN SATURATION: 90 % | WEIGHT: 164.99 LBS | SYSTOLIC BLOOD PRESSURE: 143 MMHG | DIASTOLIC BLOOD PRESSURE: 87 MMHG | TEMPERATURE: 97.88 F | HEART RATE: 76 BPM | RESPIRATION RATE: 16 BRPM | BODY MASS INDEX: 31.18 KG/M2

## 2022-08-10 PROCEDURE — 99213 OFFICE O/P EST LOW 20 MIN: CPT | Mod: 25

## 2022-08-10 PROCEDURE — 77290 THER RAD SIMULAJ FIELD CPLX: CPT | Mod: 26

## 2022-08-10 PROCEDURE — 77333 RADIATION TREATMENT AID(S): CPT | Mod: 26

## 2022-08-11 PROCEDURE — 77427 RADIATION TX MANAGEMENT X5: CPT

## 2022-08-11 PROCEDURE — 77334 RADIATION TREATMENT AID(S): CPT | Mod: 26

## 2022-08-11 PROCEDURE — 77307 TELETHX ISODOSE PLAN CPLX: CPT | Mod: 26

## 2022-08-11 PROCEDURE — 77280 THER RAD SIMULAJ FIELD SMPL: CPT | Mod: 26

## 2022-08-12 PROCEDURE — 77387B: CUSTOM | Mod: 26

## 2022-08-15 PROCEDURE — 77387B: CUSTOM | Mod: 26

## 2022-08-16 PROCEDURE — 77387B: CUSTOM | Mod: 26

## 2022-08-16 NOTE — PHYSICAL EXAM
[Sclera] : the sclera and conjunctiva were normal [Outer Ear] : the ears and nose were normal in appearance [] : no respiratory distress [Exaggerated Use Of Accessory Muscles For Inspiration] : no accessory muscle use [Heart Rate And Rhythm] : heart rate and rhythm were normal [Abdomen Soft] : soft [Nondistended] : nondistended [Normal] : oriented to person, place and time, the affect was normal, the mood was normal and not anxious [de-identified] : O2 nasal canula in place

## 2022-08-16 NOTE — REASON FOR VISIT
[Breast Cancer] : breast cancer [Bone Metastasis] : bone metastasis [Other: _____] : [unfilled] [Re-evaluation] : re-evaluation for

## 2022-08-16 NOTE — REASON FOR VISIT
[Routine On-Treatment] : a routine on-treatment visit for [Breast Cancer] : breast cancer [Bone Metastasis] : bone metastasis

## 2022-08-16 NOTE — REVIEW OF SYSTEMS
[Cough: Grade 2 - Moderate symptoms, medical intervention indicated; limiting instrumental ADL] : Cough: Grade 2 - Moderate symptoms, medical intervention indicated; limiting instrumental ADL [Dyspnea: Grade 2 - Shortness of breath with minimal exertion; limiting instrumental ADL] : Dyspnea: Grade 2 - Shortness of breath with minimal exertion; limiting instrumental ADL [Hypoxia: Grade 2 - Decreased oxygen saturation with exercise (e.g., pulse oximeter <88%); intermittent supplemental oxygen] : Hypoxia: Grade 2 - Decreased oxygen saturation with exercise (e.g., pulse oximeter <88%); intermittent supplemental oxygen

## 2022-08-16 NOTE — HISTORY OF PRESENT ILLNESS
[FreeTextEntry1] : Ms. Ortega is a 67 yo female with metastatic breast cancer to the lung and bone, now with further progression on recent CT CAP in 7/2022 with marked interval enlargement of a right perihilar mass with new endobronchial extension into the right mainstem bronchus, with increasing pulmonary symptoms. She presents today for CT simulation for radiation therapy planning.\par \par She was diagnosed with Stage IIB left breast cancer in March 2003. She underwent left modified radical mastectomy with reconstruction for ER positive/ND positive/HER-2 negative primary. ALND showed 1/15 lymph nodes positive for metastatic carcinoma with focal extranodal extension. She received adjuvant AC-T chemotherapy, followed by Tamoxifen x 5 years. \par \par In June 2018, she presented with generalized weakness and leg pain. CT scans showed a large left renal mass in addition multiple osseous lesions consistent with metastatic disease and small pulmonary nodules. Right iliac bone biopsy showed metastatic breast carcinoma, HR positive. She underwent radical left nephrectomy showing clear cell renal carcinoma and para aortic LN dissection showed 4 negative nodes. The surgery was complicated by splenic laceration with bleeding. \par \par She was started on Letrozole/Ibrance/Xgeva. In 3/2019 she underwent left VATS procedure with pleural decortication. Left pleural fluid cytology and pleural pathology negative for malignancy. CT CAP in April 2020 revealed new left lower lung nodule and decreased right apex nodules, extensive bony metastases slightly increased. Her treatment regimen changed to Aromasin, and Afinitor added in July 2020. Patient had been under the oncologic care of Dr. Shields at [facility].\par \par Interval CT CAP 4/22/2020 showed new left lower lobe nodule with interval decrease in size of small nodules at the right lung apex, extensive osseous metastatic disease slightly increased, stable IVC filter. Treatment changed to Aromasin, with Afinitor added in 7/2020. Patient then transferred her oncologic care to [facility] with Dr. Goldstein in August 2020.\par \par Interval CT CAP on 2/28/22 showed enlarging pulmonary nodules and a new right pulmonary nodule. Enlarging distal paraesophageal lymph node and a new left internal mammary lymph node. Stable bone metastases. She began Faslodex on 3/17/22. Interval CT CAP on 7/12/2022 shows enlarging bilateral pulmonary nodules. Marked interval enlargement of a right perihilar mass with new endobronchial extension in to the right mainstem bronchus. Enlarging right hilar and distal paraesophageal LNs. 3.6 cm indeterminate mass in the mid to lower pole of the right kidney measuring slightly higher than simple fluid attenuation, increased in size. Extensive sclerotic osseous mets, without significant interval change.\par \par She lives alone, but maintains independence. She is accompanied by her son, Darrel today. She continues oxycodone for intermittent bony/joint aches. Over the past month, she developed increased difficulty breathing, requiring home oxygen. For the past 3 years, she was able to get by with infrequent use of the O2; however, how she is oxygen dependent. She has tightness in chest with coughing, but otherwise without chest or upper back pain. She was able to obtain a portable oxygen system, which delayed her coming in for evalution today. She continues to cough up blood, now daily. Appetite is down, but maintains caloric intake, denies noticeable weight loss.

## 2022-08-17 ENCOUNTER — NON-APPOINTMENT (OUTPATIENT)
Age: 67
End: 2022-08-17

## 2022-08-17 VITALS
SYSTOLIC BLOOD PRESSURE: 125 MMHG | RESPIRATION RATE: 18 BRPM | HEART RATE: 85 BPM | DIASTOLIC BLOOD PRESSURE: 79 MMHG | HEIGHT: 61 IN | OXYGEN SATURATION: 92 % | TEMPERATURE: 97.16 F

## 2022-08-17 PROCEDURE — 77387B: CUSTOM | Mod: 26

## 2022-08-19 NOTE — REVIEW OF SYSTEMS
[Cough: Grade 2 - Moderate symptoms, medical intervention indicated; limiting instrumental ADL] : Cough: Grade 2 - Moderate symptoms, medical intervention indicated; limiting instrumental ADL [Dyspnea: Grade 1 - Shortness of breath with moderate exertion] : Dyspnea: Grade 1 - Shortness of breath with moderate exertion

## 2022-08-19 NOTE — HISTORY OF PRESENT ILLNESS
[FreeTextEntry1] : Ms. Ortega is a 65 yo female with metastatic breast cancer to the lung and bone, now with further progression on recent CT CAP in 7/2022 with marked interval enlargement of a right perihilar mass with new endobronchial extension into the right mainstem bronchus, associated with increasing pulmonary symptoms and hemoptysis. She is now receiving palliative radiation therapy to the right hilum.\par \par Today is 5/5 fractions. She reports no significant side effects and no new complaints.  She continues to cough up blood, but this has been improving. She denies chest pain. SOB stable on O2.

## 2022-08-19 NOTE — DISEASE MANAGEMENT
[Clinical] : TNM Stage: c [IV] : IV [TTNM] : x [NTNM] : x [MTNM] : 1 [de-identified] : 2000 cGy [de-identified] : 2000 cGy [de-identified] : Right hilum

## 2022-09-10 NOTE — ED ADULT NURSE NOTE - BOWEL SOUNDS LUQ
gastrointestinal endoscopy (N/A, 12/4/2018); Upper gastrointestinal endoscopy (2/19/2019); Upper gastrointestinal endoscopy (N/A, 6/11/2019); Upper gastrointestinal endoscopy (N/A, 8/13/2019); and Abdomen surgery. Social History:   reports that he has never smoked. He has never used smokeless tobacco. He reports that he does not drink alcohol and does not use drugs. Family History:  family history includes Cancer in his brother and paternal grandfather; Cancer (age of onset: 76) in his father; Kidney Disease in his mother. Home Medications:  Were reviewed and are listed in nursing record and/or below  Prior to Admission medications    Medication Sig Start Date End Date Taking? Authorizing Provider   pramipexole (MIRAPEX) 0.5 MG tablet Take 1 tablet by mouth nightly 9/1/22 11/30/22  EJNNIFER Ceballos   insulin glargine (LANTUS SOLOSTAR) 100 UNIT/ML injection pen Inject 12 Units into the skin nightly 7/19/22 10/17/22  JENNIFER Cebalols   insulin aspart (NOVOLOG FLEXPEN) 100 UNIT/ML injection pen Inject 8 Units into the skin in the morning and 8 Units at noon and 8 Units in the evening. Inject before meals. 7/19/22 10/17/22  JENNIFER Ceballos   Insulin Pen Needle 31G X 8 MM MISC 1 each by Does not apply route 4 times daily 7/19/22   JENNIFER Ceballos   lisinopril (PRINIVIL;ZESTRIL) 10 MG tablet Take 1 tablet by mouth in the morning. 7/19/22   JENNIFER Ceballos   atorvastatin (LIPITOR) 20 MG tablet Take 1 tablet by mouth in the morning. 7/19/22   JENNIFER Ceballos   glipiZIDE (GLUCOTROL XL) 5 MG extended release tablet Take 1 tablet by mouth in the morning.  7/19/22   JENNIFER Ceballos   Continuous Blood Gluc  (FREESTYLE JUSTIN 14 DAY READER) OLI 1 Units by Does not apply route as needed (as needed) 7/5/22   JENNIFER Ceballos   Continuous Blood Gluc Sensor (FREESTYLE JUSTIN 14 DAY SENSOR) MISC 1 Units by Does not apply route every 14 days 7/5/22   JENNIFER Ceballos   dicyclomine 271 lb 12.8 oz (123.3 kg)         General Appearance:  Alert, cooperative, no distress, appears stated age   Head:  Normocephalic, without obvious abnormality, atraumatic   Eyes:  PERRL, conjunctiva/corneas clear   Nose: Nares normal, no drainage or sinus tenderness   Throat: Lips, mucosa, and tongue normal   Neck: Supple, symmetrical, trachea midline, no adenopathy, thyroid: not enlarged, symmetric, no tenderness/mass/nodules, no carotid bruit or JVD   Lungs:   Clear to auscultation bilaterally, respirations unlabored   Chest Wall:  No deformity or tenderness   Heart:  irregular rate and rhythm, S1, S2 normal, distant heart sounds   Abdomen:   Soft, non-tender, bowel sounds active all four quadrants,  no masses, no organomegaly   Extremities: Extremities normal, atraumatic, no cyanosis or edema   Pulses: 2+ and symmetric   Skin: Skin color, texture, turgor normal, no rashes or lesions   Pysch: Normal mood and affect   Neurologic: Normal gross motor and sensory exam.         Labs   CBC:   Lab Results   Component Value Date/Time    WBC 7.1 09/09/2022 04:39 PM    RBC 4.82 09/09/2022 04:39 PM    HGB 14.1 09/09/2022 04:39 PM    HCT 42.7 09/09/2022 04:39 PM    MCV 88.6 09/09/2022 04:39 PM    RDW 14.2 09/09/2022 04:39 PM     09/09/2022 04:39 PM     CMP:  Lab Results   Component Value Date/Time     09/09/2022 04:39 PM    K 4.2 09/09/2022 04:39 PM     09/09/2022 04:39 PM    CO2 24 09/09/2022 04:39 PM    BUN 28 09/09/2022 04:39 PM    CREATININE 1.3 09/09/2022 04:39 PM    GFRAA >60 09/09/2022 04:39 PM    GFRAA >60 12/25/2010 02:50 PM    AGRATIO 1.9 09/09/2022 04:39 PM    LABGLOM 54 09/09/2022 04:39 PM    GLUCOSE 212 09/09/2022 04:39 PM    PROT 7.0 09/09/2022 04:39 PM    PROT 6.6 12/25/2010 02:50 PM    CALCIUM 10.6 09/09/2022 04:39 PM    BILITOT 0.4 09/09/2022 04:39 PM    ALKPHOS 91 09/09/2022 04:39 PM    AST 16 09/09/2022 04:39 PM    ALT 17 09/09/2022 04:39 PM     PT/INR:  No results found for: PTINR  HgBA1c:  Lab Results   Component Value Date    LABA1C 9.1 2022     Lab Results   Component Value Date    TROPONINI <0.01 09/10/2022         Cardiac Data     Last EKG: Atrial fibrillation    Echo:        Summary   Normal left ventricular systolic function with ejection fraction of 55-60%. No regional wall motion abnormalites are seen. Left ventricular diastolic filling pressure is normal.   Normal RV size and systolic function. Mild left atrial enlargement. Trivial tricuspid regurgitation. Stress Test:            Summary    There is normal isotope uptake at stress and rest. There is no evidence of    myocardial ischemia or scar. LV function is normal with ejection fraction of    58%. Low risk study. Cath:        CARDIAC CATHETERIZATION REPORT      Procedure Date:  2021  Patient Name: Fannie Starks  MRN: [id number]      : 1952        INDICATION      Dyspnea, possible anginal equivalent  Unstable angina     PROCEDURES PERFORMED      Rare catheterization  Left heart catheterization  LVgram  Coronary angiogam  Coronary cath  Monitoring of moderate conscious sedation              PROCEDURE DESCRIPTION   Risks/benefits/alternatives/outcomes were discussed with patient and/or family and informed consent was obtained. Using the [facility] scale, the patient's right radial artery was found to be a level B. Patient was prepped draped in the usual sterile fashion. Local anaesthetic was applied over puncture sites. Prior to the procedure, right peripheral and acute IV was placed and this was exchanged the micropuncture kit for a 5 [place] Nani sheath. 5 [place]ani PA catheter was used for right heart catheterization (0.025 inch [place] wire was used to facilitate passage of this catheter) this was then removed at the end of the procedure.   Venous sheath was secured in place for later removal.  Using a back wall technique, a 6 [place] Island[place] Terumo sheath was inserted into right radial artery. Verapamil, nitroglycerin, nicardipine were administered through the sheath. Heparin was administered. Diagnostic 5 Citizen of [place] pigtail, ultra catheters were used for diagnostic angiograms. At the conclusion of the procedure, a TR band was placed over the puncture site and hemostasis was obtained. There were no immediate complications. I supervised sedation from 10:29 AM to 10:56 AM with versed 1 mg/fentanyl 25 mcg during the procedure. An independent trained observer pushed meds at my direction. We monitored the patient's level of consciousness and vital signs/physiologic status throughout the procedure duration (see times listed previously). 105 cc contrast was utilized. <20cc EBL. During procedure, patient did have hypotension and bradycardia, likely felt to be related to a vagal response, this responded favorably to fluid resuscitation. At end of procedure, hemodynamics returned to baseline. FINDINGS      HEMODYNAMICS     CHAMBER PRESSURE SATURATION   RA  1  59%   RV  23/1     PA  22/6  52%   PW  7     AORTA  96/45  85%      KARY CARDIAC OUTPUT  5.9 L/min   SVR  829    PVR  67             LVGRAM     LVEDP  8   GRADIENT ACROSS AORTIC VALVE  none   LV FUNCTION EF 60%   WALL MOTION  normal   MITRAL REGURGITATION  mild         CORONARY ARTERIES     CORONARY ARTERIES     LM <10% dlyvgtub-ahi-yljtgb stenosis. LAD Prox <10% stenosis. Mid 40-50% stenosis. Distal 30-40% stenosis. LAD wraps around apex. D2 has ostial 90% stenosis and mid 40 to 50% stenosis. LCX 10% prox-mid stenosis. OM1 bifurcates in prox segment and there is prox-mid OM1 40-50% stenosis. RI  Mid 60% stenosis. (could also be described as a high D1)         RCA Dominant. Slight anterior takeoff, Prox-mid 20-30% stenoses. Distal 20% stenosis. Tortuous vessel.                    CONCLUSIONS:      Moderate CAD/ASHD and major epicardial vessels  Overall findings are similar to prior angiography from 2019  Continue medical management  Discussed with patient family, would recommend follow-up with PCP and possibly pulmonary in the outpatient setting to investigate for noncardiac causes for his symptoms of chest pain shortness of breath. No beta-blocker due to bradycardia. No further inpatient cardiac work-up or treatment is planned, will sign off, please call with questions. Studies:     Cxr       Impression   No acute process. I have reviewed labs and imaging/xray/diagnostic testing in this note. Assessment and Plan        Patient Active Problem List   Diagnosis    Pulmonary nodules    Lung granuloma ([facility] [address]    Essential hypertension    Hyperlipidemia    Severe obstructive sleep apnea    Morbid obesity ([facility]    Coronary artery disease involving native coronary artery of native heart with angina pectoris ([facility] [address]    Peripheral polyneuropathy    Atrial tachycardia (HCC)    Type 2 diabetes mellitus with microalbuminuria (HCC)    Grade II diastolic dysfunction    New onset a-fib (HCC)       Chest pain, likely related to atrial fibrillation, evaluate further with echocardiogram and stress test.  Continue aspirin    Atrial fibrillation, continue Eliquis, get follow-up EKG. Echocardiogram as above, add diltiazem. Hypertension, DC lisinopril as diltiazem is being given    Hypercholesterolemia, continue statin    CAD/ASHD, aspirin as above. Diabetes, as per primary service    Our service will follow up pending completion of those tests next week. Please call with any questions or concerns in interim. If patient prefers to do testing as an outpatient, that would also be reasonable, as troponins are negative. Thank you for allowing us to participate in the care of Aleshia Workman. Please call me with any questions [phone].     Dario Aviles MD, [facility]   Interventional Cardiologist  [facility]  [phone] [facility]  [phone] [address] [place] [address]  9/10/2022 10:15 AM present

## 2022-09-16 ENCOUNTER — OUTPATIENT (OUTPATIENT)
Dept: OUTPATIENT SERVICES | Facility: HOSPITAL | Age: 67
LOS: 1 days | Discharge: HOME | End: 2022-09-16

## 2022-09-16 DIAGNOSIS — E89.6 POSTPROCEDURAL ADRENOCORTICAL (-MEDULLARY) HYPOFUNCTION: Chronic | ICD-10-CM

## 2022-09-16 DIAGNOSIS — C50.919 MALIGNANT NEOPLASM OF UNSPECIFIED SITE OF UNSPECIFIED FEMALE BREAST: ICD-10-CM

## 2022-09-16 DIAGNOSIS — Z90.5 ACQUIRED ABSENCE OF KIDNEY: Chronic | ICD-10-CM

## 2022-09-16 DIAGNOSIS — Z90.12 ACQUIRED ABSENCE OF LEFT BREAST AND NIPPLE: Chronic | ICD-10-CM

## 2022-09-16 DIAGNOSIS — Z98.890 OTHER SPECIFIED POSTPROCEDURAL STATES: Chronic | ICD-10-CM

## 2022-09-20 ENCOUNTER — APPOINTMENT (OUTPATIENT)
Dept: INFUSION THERAPY | Facility: HOSPITAL | Age: 67
End: 2022-09-20

## 2022-09-20 ENCOUNTER — RESULT REVIEW (OUTPATIENT)
Age: 67
End: 2022-09-20

## 2022-09-20 ENCOUNTER — APPOINTMENT (OUTPATIENT)
Dept: HEMATOLOGY ONCOLOGY | Facility: CLINIC | Age: 67
End: 2022-09-20

## 2022-09-20 VITALS
HEIGHT: 61 IN | BODY MASS INDEX: 29.45 KG/M2 | DIASTOLIC BLOOD PRESSURE: 71 MMHG | RESPIRATION RATE: 16 BRPM | TEMPERATURE: 97.7 F | WEIGHT: 156 LBS | SYSTOLIC BLOOD PRESSURE: 103 MMHG | HEART RATE: 77 BPM | OXYGEN SATURATION: 92 %

## 2022-09-20 DIAGNOSIS — C79.51 SECONDARY MALIGNANT NEOPLASM OF BONE: ICD-10-CM

## 2022-09-20 LAB
BASOPHILS # BLD AUTO: 0.02 K/UL — SIGNIFICANT CHANGE UP (ref 0–0.2)
BASOPHILS NFR BLD AUTO: 0.3 % — SIGNIFICANT CHANGE UP (ref 0–2)
EOSINOPHIL # BLD AUTO: 0.13 K/UL — SIGNIFICANT CHANGE UP (ref 0–0.5)
EOSINOPHIL NFR BLD AUTO: 1.7 % — SIGNIFICANT CHANGE UP (ref 0–6)
HCT VFR BLD CALC: 44.5 % — SIGNIFICANT CHANGE UP (ref 34.5–45)
HGB BLD-MCNC: 13.9 G/DL — SIGNIFICANT CHANGE UP (ref 11.5–15.5)
IMM GRANULOCYTES NFR BLD AUTO: 0.3 % — SIGNIFICANT CHANGE UP (ref 0–0.9)
LYMPHOCYTES # BLD AUTO: 1.39 K/UL — SIGNIFICANT CHANGE UP (ref 1–3.3)
LYMPHOCYTES # BLD AUTO: 18.2 % — SIGNIFICANT CHANGE UP (ref 13–44)
MCHC RBC-ENTMCNC: 28.1 PG — SIGNIFICANT CHANGE UP (ref 27–34)
MCHC RBC-ENTMCNC: 31.2 G/DL — LOW (ref 32–36)
MCV RBC AUTO: 89.9 FL — SIGNIFICANT CHANGE UP (ref 80–100)
MONOCYTES # BLD AUTO: 0.47 K/UL — SIGNIFICANT CHANGE UP (ref 0–0.9)
MONOCYTES NFR BLD AUTO: 6.2 % — SIGNIFICANT CHANGE UP (ref 2–14)
NEUTROPHILS # BLD AUTO: 5.61 K/UL — SIGNIFICANT CHANGE UP (ref 1.8–7.4)
NEUTROPHILS NFR BLD AUTO: 73.3 % — SIGNIFICANT CHANGE UP (ref 43–77)
NRBC # BLD: 0 /100 WBCS — SIGNIFICANT CHANGE UP (ref 0–0)
PLATELET # BLD AUTO: 235 K/UL — SIGNIFICANT CHANGE UP (ref 150–400)
RBC # BLD: 4.95 M/UL — SIGNIFICANT CHANGE UP (ref 3.8–5.2)
RBC # FLD: 15.9 % — HIGH (ref 10.3–14.5)
WBC # BLD: 7.64 K/UL — SIGNIFICANT CHANGE UP (ref 3.8–10.5)
WBC # FLD AUTO: 7.64 K/UL — SIGNIFICANT CHANGE UP (ref 3.8–10.5)

## 2022-09-20 PROCEDURE — 99215 OFFICE O/P EST HI 40 MIN: CPT

## 2022-09-20 NOTE — CONSULT LETTER
[Dear  ___] : Dear  [unfilled], [Courtesy Letter:] : I had the pleasure of seeing your patient, [unfilled], in my office today. [Please see my note below.] : Please see my note below. [Consult Closing:] : Thank you very much for allowing me to participate in the care of this patient.  If you have any questions, please do not hesitate to contact me. [Sincerely,] : Sincerely, [FreeTextEntry3] : Leslie Goldstein MD

## 2022-09-20 NOTE — ASSESSMENT
[FreeTextEntry1] : CBC results, Dr. Vazquez's 8/17/22 radiation oncology note reviewed.\par #1) Patient had a remote h/o breast cancer. She received adjuvant treatment (AC-T ) + 5 years of Tamoxifen with Dr. Cash Bojorquez and subsequently transferred care to Dr. ALTAGRACIA Shields at [facility].  In 2018 she was hospitalized and was found to have bone metastasis and renal mass.  6/2018 She underwent radical left nephrectomy for renal cell carcinoma, with PALND, and a bx. of the rIght iliac bone revealed metastatic breast cancer .  She was placed on Femara/Ibrance.    4/22/20  Interval CT Chest/Abdomen/Pelvis revealed new left lower lobe nodule with interval decrease in size of small nodules at the right lung apex, extensive osseous metastatic disease slightly increased, stable IVC filter.   She was prescribed Aromasin/Afinitor on  8/10/20.  Follow-up chest imaging was done 3/2021 to assess status of lung nodules-showed increase, however, it was being compared to a CT chest from 2019. Most recent F/U imaging 2/2022 c/w POD. Patient began Faslodex.\par 7/18/2022-CT C/A/P-Enlarging bilateral pulmonary nodules with reference nodules described above. Marked interval enlargement of a right perihilar mass with new endobronchial extension into the right mainstem bronchus. Enlarging right hilar and distal paraesophageal lymph nodes. 3.6 cm indeterminate mass in the mid to lower pole of the right kidney measuring slightly higher than simple fluid attenuation, increased in size. Further evaluation with renal ultrasound is recommended. Extensive sclerotic osseous metastases, without significant interval change.\par With patient management options at this point.  Progression of disease clinically-8/17/2022-completed radiation (5/5 fractions)-2000 cGy. Planned Dose: 2000 cGy. Treatment Site: Right hilum. \par Would consider follow-up biopsy to reassess marker studies, however, patient's comorbidities limit further invasive procedures at this time.  Could consider adding Piqray (+PIK 3 mutation) to her Faslodex.  Or, in light of renal issues (with concern for increasing creatinine with Piqray in this patient with one kidney), could consider modified dose of Xeloda–potential side effects explained including but not limited to rash, nausea/vomiting/diarrhea, myelosuppression.  Patient gave verbal and written consent for xeloda (modified dosing due to renal insufficiency-decreased ~25%).\par Patient consents to continue Xgeva, along with calcium supplementation.\par \par #2) history of bilateral DVTs, with history of IVC filter–patient remains on anticoagulation for now.  If increased hemoptysis, advised to hold her anticoagulation.\par \par #3)  H/O Renal insufficiency/hypokalemia.  P.O. fluid/water hydration as tolerated, and potassium supplementation as needed. Follow- up with PCP; and have recommended she see nephrologist-has kidney disease, and 1 remaining kidney. Renal US ordered as well-pending-scheduled 9/29/22\par \par *Had discussed systemic disease and palliative treatment intent.  Quality of life issues to be considered in decision making.  Should patient decline, need to consider best supportive care.  At this time, patient wishes to continue with cancer directed therapy.  Patient was given the opportunity to ask questions. Her questions have been answered to her apparent satisfaction at this time.\par \par \par

## 2022-09-20 NOTE — PHYSICAL EXAM
[Ambulatory and capable of all self care but unable to carry out any work activities] : Status 2- Ambulatory and capable of all self care but unable to carry out any work activities. Up and about more than 50% of waking hours [Normal] : affect appropriate [de-identified] : non-toxic appearing [de-identified] : decreased BS with few scattered expiratory wheezes [de-identified] : left reconstructed breast without palpable abnormality; right breast without new dominant nodular area (has implant there which was placed for symmetry) [de-identified] : no gross focal motor extremity deficits

## 2022-09-20 NOTE — HISTORY OF PRESENT ILLNESS
[Disease: _____________________] : Disease: [unfilled] [M: ___] : M[unfilled] [AJCC Stage: ____] : AJCC Stage: [unfilled] [de-identified] : 3/2003–Stage IIB left breast cancer, ER positive/PA positive/HER-2 negative–status post left modified radical mastectomy with reconstruction.  1/15 lymph nodes positive for metastatic carcinoma with focal extranodal extension.  Status post AC-T--> Tamoxifen x 5 years.  \par \par 6/2018-Presented with general weakness and leg pain.  Diagnosed with clear cell renal carcinoma  s/p Radical Left Nephrectomy, Para Aortic LN dissection,  c/b Splenic Laceration w/ bleeding. Right iliac bone biopsy of bone lesion was consistent with metastatic breast cancer–ER positive/PA positive/HER-2 unknown.\par Patient was begun on Letrozole/Ibrance/Xgeva.\par \par \par 3/2019–Status post left VATS procedure with pleural decortication.  Left pleural fluid cytology and pleural pathology negative for malignancy.\par 4/2020-CT scan of the chest/abdomen/pelvis revealed new left lower lung nodule and decreased right apex nodules, extensive bony metastases slightly increased. Changed to Aromasin, with Afinitor added 7/2020.\par Patient had been under the oncologic care of Dr. Shields at [facility].\par Hello,\par 8/2020–Patient wished to transfer her oncologic care to the [facility]. LE venous duplex study-B/L DVT. Begun on Xarelto.\par 12/2020–CT abdomen/pelvis–extensive osseous metastases.\par 6/2021-CT C/A/P-pulmonary metastases, a distal paraesophageal node and extensive osseous metastatic disease without significant change. No acute pathology. Aromasin/Afinitor continued.\par \par 2/28/22-CT C/A/P-enlarging pulmonary nodules and a new right pulmonary nodule. Enlarging distal paraesophageal lymph node and a new left internal mammary lymph node. Stable bone metastases.\par \par 3/17/2022-Began Faslodex.\par \par 7/18/2022-CT C/A/P-Enlarging bilateral pulmonary nodules with reference nodules described above. Marked interval enlargement of a right perihilar mass with new endobronchial extension into the right mainstem bronchus. Enlarging right hilar and distal paraesophageal lymph nodes. 3.6 cm indeterminate mass in the mid to lower pole of the right kidney measuring slightly higher than simple fluid attenuation, increased in size. Further evaluation with renal ultrasound is recommended. Extensive sclerotic osseous metastases, without significant interval change.\par \par 8/17/2022-Completed radiation (5/5 fractions)-2000 cGy. Planned Dose: 2000 cGy. Treatment Site: Right hilum. \par \par \par  [de-identified] : Infiltrating lobular carcinoma [de-identified] : 7/2020 :  CA 15-3=24 U/mL     CEA =2.9 ng/mL         [de-identified] : 10/2020–[facility]–genomic findings–PIK3CA J6651B, CDH1 Y523fs*13, TBX3 R471fs*161; OTTO; TMB 1Muts/Mb.\par 3 disease relevant genes with no reportable alterations–BRCA1, BRCA2, ERBB 2.\par \par 6/2018-PD-L1 ()=0%. [de-identified] : Completed RT to [facility] 8/17/22. Still with hemoptysis, though slightly decreased-phlegm mixed with blood-notices clots.\par Managing ADL's. Independent, lives alone. Has family available for help if needed.\par Takes oxycodone for intermittent bony/joint aches.\par No c/o dental/jaw pain. \par Has O2 at home; has portable tank now as well.\par No  nausea/vomiting/diarrhea or constipation . No melena.  \par No chills, fever, night sweats, mouth sores. \par Taking calcium 1500 mg and vitamin D supplements daily.\par \par Has had COVID vaccines(Moderna), along with booster x 1 (5/8/22).\par

## 2022-09-21 LAB
ALBUMIN SERPL ELPH-MCNC: 4.2 G/DL
ALP BLD-CCNC: 40 U/L
ALT SERPL-CCNC: 5 U/L
ANION GAP SERPL CALC-SCNC: 12 MMOL/L
AST SERPL-CCNC: 14 U/L
BILIRUB SERPL-MCNC: 0.3 MG/DL
BUN SERPL-MCNC: 17 MG/DL
CALCIUM SERPL-MCNC: 9.5 MG/DL
CANCER AG27-29 SERPL-ACNC: 23.1 U/ML
CHLORIDE SERPL-SCNC: 106 MMOL/L
CO2 SERPL-SCNC: 28 MMOL/L
CREAT SERPL-MCNC: 1.13 MG/DL
EGFR: 53 ML/MIN/1.73M2
GLUCOSE SERPL-MCNC: 103 MG/DL
HAV IGM SER QL: NONREACTIVE
HBV CORE IGG+IGM SER QL: NONREACTIVE
HBV CORE IGM SER QL: NONREACTIVE
HBV SURFACE AG SER QL: NONREACTIVE
HCV AB SER QL: NONREACTIVE
HCV S/CO RATIO: 0.13 S/CO
POTASSIUM SERPL-SCNC: 3.5 MMOL/L
PROT SERPL-MCNC: 6.7 G/DL
SODIUM SERPL-SCNC: 147 MMOL/L

## 2022-09-26 ENCOUNTER — RX RENEWAL (OUTPATIENT)
Age: 67
End: 2022-09-26

## 2022-09-26 RX ORDER — FLUTICASONE FUROATE AND VILANTEROL TRIFENATATE 100; 25 UG/1; UG/1
100-25 POWDER RESPIRATORY (INHALATION)
Qty: 60 | Refills: 3 | Status: ACTIVE | COMMUNITY
Start: 2021-04-28 | End: 1900-01-01

## 2022-09-29 ENCOUNTER — APPOINTMENT (OUTPATIENT)
Dept: RADIATION ONCOLOGY | Facility: CLINIC | Age: 67
End: 2022-09-29

## 2022-09-29 ENCOUNTER — APPOINTMENT (OUTPATIENT)
Dept: ULTRASOUND IMAGING | Facility: HOSPITAL | Age: 67
End: 2022-09-29

## 2022-09-29 PROCEDURE — 99024 POSTOP FOLLOW-UP VISIT: CPT | Mod: GC

## 2022-09-29 RX ORDER — ESCITALOPRAM OXALATE 20 MG/1
20 TABLET, FILM COATED ORAL
Refills: 0 | Status: DISCONTINUED | COMMUNITY
Start: 2020-08-10 | End: 2022-09-29

## 2022-09-29 RX ORDER — FLUTICASONE FUROATE, UMECLIDINIUM BROMIDE AND VILANTEROL TRIFENATATE 100; 62.5; 25 UG/1; UG/1; UG/1
100-62.5-25 POWDER RESPIRATORY (INHALATION) DAILY
Qty: 1 | Refills: 6 | Status: DISCONTINUED | COMMUNITY
Start: 2021-04-15 | End: 2022-09-29

## 2022-09-29 RX ORDER — MAGNESIUM HYDROXIDE 2400 MG/10ML
SUSPENSION ORAL
Refills: 0 | Status: DISCONTINUED | COMMUNITY
End: 2022-09-29

## 2022-09-29 RX ORDER — UMECLIDINIUM BROMIDE AND VILANTEROL TRIFENATATE 62.5; 25 UG/1; UG/1
62.5-25 POWDER RESPIRATORY (INHALATION)
Refills: 0 | Status: DISCONTINUED | COMMUNITY
End: 2022-09-29

## 2022-09-29 NOTE — VITALS
[60: Requires occasional assistance, but is able to care for most of his/her needs] : 60: Requires occasional assistance, but is able to care for most of his/her needs [Maximal Pain Intensity: 6/10] : 6/10 [Least Pain Intensity: 0/10] : 0/10 [Pain Location: ___] : Pain Location: [unfilled] [Opioid] : opioid [ECOG Performance Status: 1 - Restricted in physically strenuous activity but ambulatory and able to carry out work of a light or sedentary nature] : Performance Status: 1 - Restricted in physically strenuous activity but ambulatory and able to carry out work of a light or sedentary nature, e.g., light house work, office work

## 2022-09-29 NOTE — REASON FOR VISIT
[Post-Treatment Evaluation] : post-treatment evaluation for [Breast Cancer] : breast cancer [Bone Metastasis] : bone metastasis [Home] : at home, [unfilled] , at the time of the visit. [Medical Office: ([facility])___] : at the medical office located in  [Verbal consent obtained from patient] : the patient, [unfilled]

## 2022-10-04 NOTE — HISTORY OF PRESENT ILLNESS
[FreeTextEntry1] : Ms. Ortega is a 67 yo female with metastatic breast cancer to the lung and bone with marked interval enlargement of a right perihilar mass with endobronchial extension into the right mainstem bronchus, associated with increasing pulmonary symptoms and hemoptysis. She completed a course of palliative radiation to the right hilum, a dose of 2,000 cGy from 8/11/22 - 8/17/22. She presents today via telehealth for a PTE. \par \par She tolerated RT and pulmonary symptoms remained stable. Feeling generally improved, denies pain, N/V. Continues to have cough with hemoptysis, a small clot or streaks, and feels this is improving.   Notes some dyspnea (worse at night, propping with pillows PRN), remains with portable O2 and home oxygen. Feels appetite has worsened since completion of therapy. Tolerating Xeloda well.

## 2022-10-04 NOTE — REVIEW OF SYSTEMS
[Shortness Of Breath] : shortness of breath [Cough] : cough [SOB on Exertion] : shortness of breath during exertion [Negative] : Psychiatric [Cough: Grade 1 - Mild symptoms; nonprescription intervention indicated] : Cough: Grade 1 - Mild symptoms; nonprescription intervention indicated [Dyspnea: Grade 1 - Shortness of breath with moderate exertion] : Dyspnea: Grade 1 - Shortness of breath with moderate exertion [Hoarseness: Grade 0] : Hoarseness: Grade 0 [Voice Alteration: Grade 0] : Voice Alteration: Grade 0 [FreeTextEntry6] : improving hemoptysis

## 2022-10-06 ENCOUNTER — NON-APPOINTMENT (OUTPATIENT)
Age: 67
End: 2022-10-06

## 2022-10-06 ENCOUNTER — APPOINTMENT (OUTPATIENT)
Dept: ULTRASOUND IMAGING | Facility: CLINIC | Age: 67
End: 2022-10-06

## 2022-10-06 ENCOUNTER — OUTPATIENT (OUTPATIENT)
Dept: OUTPATIENT SERVICES | Facility: HOSPITAL | Age: 67
LOS: 1 days | End: 2022-10-06
Payer: MEDICARE

## 2022-10-06 DIAGNOSIS — Z98.890 OTHER SPECIFIED POSTPROCEDURAL STATES: Chronic | ICD-10-CM

## 2022-10-06 DIAGNOSIS — C50.919 MALIGNANT NEOPLASM OF UNSPECIFIED SITE OF UNSPECIFIED FEMALE BREAST: ICD-10-CM

## 2022-10-06 DIAGNOSIS — E89.6 POSTPROCEDURAL ADRENOCORTICAL (-MEDULLARY) HYPOFUNCTION: Chronic | ICD-10-CM

## 2022-10-06 DIAGNOSIS — Z90.12 ACQUIRED ABSENCE OF LEFT BREAST AND NIPPLE: Chronic | ICD-10-CM

## 2022-10-06 DIAGNOSIS — Z90.5 ACQUIRED ABSENCE OF KIDNEY: Chronic | ICD-10-CM

## 2022-10-06 PROCEDURE — 76775 US EXAM ABDO BACK WALL LIM: CPT

## 2022-10-06 PROCEDURE — 76775 US EXAM ABDO BACK WALL LIM: CPT | Mod: 26

## 2022-10-18 ENCOUNTER — APPOINTMENT (OUTPATIENT)
Dept: INFUSION THERAPY | Facility: HOSPITAL | Age: 67
End: 2022-10-18

## 2022-10-18 ENCOUNTER — RESULT REVIEW (OUTPATIENT)
Age: 67
End: 2022-10-18

## 2022-10-18 ENCOUNTER — APPOINTMENT (OUTPATIENT)
Dept: HEMATOLOGY ONCOLOGY | Facility: CLINIC | Age: 67
End: 2022-10-18

## 2022-10-18 VITALS
DIASTOLIC BLOOD PRESSURE: 80 MMHG | OXYGEN SATURATION: 98 % | SYSTOLIC BLOOD PRESSURE: 113 MMHG | RESPIRATION RATE: 16 BRPM | HEART RATE: 93 BPM | TEMPERATURE: 96.4 F | BODY MASS INDEX: 30.61 KG/M2 | WEIGHT: 162 LBS

## 2022-10-18 LAB
BASOPHILS # BLD AUTO: 0.03 K/UL — SIGNIFICANT CHANGE UP (ref 0–0.2)
BASOPHILS NFR BLD AUTO: 0.4 % — SIGNIFICANT CHANGE UP (ref 0–2)
EOSINOPHIL # BLD AUTO: 0.15 K/UL — SIGNIFICANT CHANGE UP (ref 0–0.5)
EOSINOPHIL NFR BLD AUTO: 2.1 % — SIGNIFICANT CHANGE UP (ref 0–6)
HCT VFR BLD CALC: 46.8 % — HIGH (ref 34.5–45)
HGB BLD-MCNC: 15 G/DL — SIGNIFICANT CHANGE UP (ref 11.5–15.5)
IMM GRANULOCYTES NFR BLD AUTO: 0.7 % — SIGNIFICANT CHANGE UP (ref 0–0.9)
LYMPHOCYTES # BLD AUTO: 1.43 K/UL — SIGNIFICANT CHANGE UP (ref 1–3.3)
LYMPHOCYTES # BLD AUTO: 20 % — SIGNIFICANT CHANGE UP (ref 13–44)
MCHC RBC-ENTMCNC: 29 PG — SIGNIFICANT CHANGE UP (ref 27–34)
MCHC RBC-ENTMCNC: 32.1 G/DL — SIGNIFICANT CHANGE UP (ref 32–36)
MCV RBC AUTO: 90.3 FL — SIGNIFICANT CHANGE UP (ref 80–100)
MONOCYTES # BLD AUTO: 0.57 K/UL — SIGNIFICANT CHANGE UP (ref 0–0.9)
MONOCYTES NFR BLD AUTO: 8 % — SIGNIFICANT CHANGE UP (ref 2–14)
NEUTROPHILS # BLD AUTO: 4.91 K/UL — SIGNIFICANT CHANGE UP (ref 1.8–7.4)
NEUTROPHILS NFR BLD AUTO: 68.8 % — SIGNIFICANT CHANGE UP (ref 43–77)
NRBC # BLD: 0 /100 WBCS — SIGNIFICANT CHANGE UP (ref 0–0)
PLATELET # BLD AUTO: SIGNIFICANT CHANGE UP K/UL (ref 150–400)
RBC # BLD: 5.18 M/UL — SIGNIFICANT CHANGE UP (ref 3.8–5.2)
RBC # FLD: 18.4 % — HIGH (ref 10.3–14.5)
WBC # BLD: 7.14 K/UL — SIGNIFICANT CHANGE UP (ref 3.8–10.5)
WBC # FLD AUTO: 7.14 K/UL — SIGNIFICANT CHANGE UP (ref 3.8–10.5)

## 2022-10-18 PROCEDURE — 99214 OFFICE O/P EST MOD 30 MIN: CPT

## 2022-10-18 NOTE — ASSESSMENT
[FreeTextEntry1] : Lab work, Dr. Vazquez's 9/29/22 radiation oncology note, renal US report reviewed.\par #1) Patient had a remote h/o breast cancer. She received adjuvant treatment (AC-T ) + 5 years of Tamoxifen with Dr. Cash Bojorquez and subsequently transferred care to Dr. ALTAGRACIA Shields at [facility].  In 2018 she was hospitalized and was found to have bone metastasis and renal mass.  6/2018 She underwent radical left nephrectomy for renal cell carcinoma, with PALND, and a bx. of the rIght iliac bone revealed metastatic breast cancer .  She was placed on Femara/Ibrance.    4/22/20  Interval CT Chest/Abdomen/Pelvis revealed new left lower lobe nodule with interval decrease in size of small nodules at the right lung apex, extensive osseous metastatic disease slightly increased, stable IVC filter.   She was prescribed Aromasin/Afinitor on  8/10/20.  Follow-up chest imaging was done 3/2021 to assess status of lung nodules-showed increase, however, it was being compared to a CT chest from 2019. Most recent F/U imaging 2/2022 c/w POD. Patient began Faslodex.\par 7/18/2022-CT C/A/P-Enlarging bilateral pulmonary nodules with reference nodules described above. Marked interval enlargement of a right perihilar mass with new endobronchial extension into the right mainstem bronchus. Enlarging right hilar and distal paraesophageal lymph nodes. 3.6 cm indeterminate mass in the mid to lower pole of the right kidney measuring slightly higher than simple fluid attenuation, increased in size (f/u renal US 10/6/22 c/w 3.4 cm simple cyst) .Extensive sclerotic osseous metastases, without significant interval change.\par Progression of disease clinically-8/17/2022-completed radiation (5/5 fractions)-2000 cGy. Planned Dose: 2000 cGy. Treatment Site: Right hilum. \par Considered follow-up biopsy to reassess marker studies, however, patient's comorbidities limited further invasive procedures.  Considered adding Piqray (+PIK 3 mutation) to her Faslodex.  However, in light of renal issues (with concern for increasing creatinine with Piqray in this patient with one kidney), considered modified dose of Xeloda, to which patient consented (modified dosing due to renal insufficiency-decreased ~25%).\par \par #2) history of bilateral DVTs, with history of IVC filter–patient remains on anticoagulation for now. \par \par #3)  H/O Renal insufficiency/hypokalemia.  P.O. fluid/water hydration as tolerated, and potassium supplementation as needed. Follow- up with PCP; and had recommended she see nephrologist-has kidney disease, and 1 remaining kidney-did not pursue yet.\par \par *Had discussed systemic disease and palliative treatment intent.  Quality of life issues to be considered in decision making.  Should patient decline, need to consider best supportive care.  At this time, patient wishes to continue with cancer directed therapy.  Patient was given the opportunity to ask questions. Her questions have been answered to her apparent satisfaction.\par \par \par

## 2022-10-18 NOTE — ED ADULT NURSE NOTE - CHIEF COMPLAINT QUOTE
Pt here for abdominal pain radiating to left shoulder per EMS. PMHX: Breast CA Consent Type: Consent 1 (Standard)

## 2022-10-18 NOTE — HISTORY OF PRESENT ILLNESS
[Disease: _____________________] : Disease: [unfilled] [M: ___] : M[unfilled] [AJCC Stage: ____] : AJCC Stage: [unfilled] [de-identified] : 3/2003–Stage IIB left breast cancer, ER positive/KY positive/HER-2 negative–status post left modified radical mastectomy with reconstruction.  1/15 lymph nodes positive for metastatic carcinoma with focal extranodal extension.  Status post AC-T--> Tamoxifen x 5 years.  \par \par 6/2018-Presented with general weakness and leg pain.  Diagnosed with clear cell renal carcinoma  s/p Radical Left Nephrectomy, Para Aortic LN dissection,  c/b Splenic Laceration w/ bleeding. Right iliac bone biopsy of bone lesion was consistent with metastatic breast cancer–ER positive/KY positive/HER-2 unknown.\par Patient was begun on Letrozole/Ibrance/Xgeva.\par \par \par 3/2019–Status post left VATS procedure with pleural decortication.  Left pleural fluid cytology and pleural pathology negative for malignancy.\par 4/2020-CT scan of the chest/abdomen/pelvis revealed new left lower lung nodule and decreased right apex nodules, extensive bony metastases slightly increased. Changed to Aromasin, with Afinitor added 7/2020.\par Patient had been under the oncologic care of Dr. Shields at [facility].\par Hello,\par 8/2020–Patient wished to transfer her [place]ologic [place] [facility] LE venous duplex study-B/L DVT. Begun on Xarelto.\par 12/2020–CT abdomen/pelvis–extensive osseous metastases.\par 6/2021-CT C/A/P-pulmonary metastases, a distal paraesophageal node and extensive osseous metastatic disease without significant change. No acute pathology. Aromasin/Afinitor continued.\par \par 2/28/22-CT C/A/P-enlarging pulmonary nodules and a new right pulmonary nodule. Enlarging distal paraesophageal lymph node and a new left internal mammary lymph node. Stable bone metastases.\par \par 3/17/2022-Began Faslodex.\par \par 7/18/2022-CT C/A/P-Enlarging bilateral pulmonary nodules with reference nodules described above. Marked interval enlargement of a right perihilar mass with new endobronchial extension into the right mainstem bronchus. Enlarging right hilar and distal paraesophageal lymph nodes. 3.6 cm indeterminate mass in the mid to lower pole of the right kidney measuring slightly higher than simple fluid attenuation, increased in size. Further evaluation with renal ultrasound is recommended. Extensive sclerotic osseous metastases, without significant interval change.\par \par 8/17/2022-Completed radiation (5/5 fractions)-2000 cGy. Planned Dose: 2000 cGy. Treatment Site: Right hilum. \par \par 9/2022-Began Xeloda.\par \par  [de-identified] : Infiltrating lobular carcinoma [de-identified] : 7/2020 :  CA 15-3=24 U/mL     CEA =2.9 ng/mL         [de-identified] : 10/2020–[facility]–genomic findings–PIK3CA R1277W, CDH1 Y523fs*13, TBX3 R471fs*161; OTTO; TMB 1Muts/Mb.\par 3 disease relevant genes with no reportable alterations–BRCA1, BRCA2, ERBB 2.\par \par 6/2018-PD-L1 ()=0%. [de-identified] : Completed RT to [facility] 8/17/22. No further hemoptysis, though overall decreased.\par Started cycle #2 xeloda 10/14/22.\par Managing ADL's. Independent, lives alone. Has family available for help if needed.\par Takes oxycodone for intermittent bony/joint aches.\par No c/o dental/jaw pain. \par Has O2 at home; has portable tank now as well.\par No current c/o nausea/vomiting/diarrhea or constipation . No melena.  \par No chills, fever, night sweats, mouth sores. \par Taking calcium 1500 mg and vitamin D supplements daily.\par \par Has had COVID vaccines(Moderna), along with booster x 1 (5/8/22).\par

## 2022-10-18 NOTE — REVIEW OF SYSTEMS
[Negative] : Allergic/Immunologic [Fatigue] : fatigue [Chest Pain] : no chest pain [Fainting] : no fainting

## 2022-10-18 NOTE — PHYSICAL EXAM
[Ambulatory and capable of all self care but unable to carry out any work activities] : Status 2- Ambulatory and capable of all self care but unable to carry out any work activities. Up and about more than 50% of waking hours [Normal] : affect appropriate [de-identified] : non-toxic appearing [de-identified] : decreased BS with few scattered expiratory wheezes [de-identified] : left reconstructed breast without palpable abnormality; right breast without new dominant nodular area (has implant there which was placed for symmetry) [de-identified] : no gross focal motor extremity deficits

## 2022-10-19 LAB
ALBUMIN SERPL ELPH-MCNC: 4.7 G/DL
ALP BLD-CCNC: 52 U/L
ALT SERPL-CCNC: <5 U/L
ANION GAP SERPL CALC-SCNC: 13 MMOL/L
AST SERPL-CCNC: 16 U/L
BILIRUB SERPL-MCNC: 0.7 MG/DL
BUN SERPL-MCNC: 30 MG/DL
CALCIUM SERPL-MCNC: 9.8 MG/DL
CHLORIDE SERPL-SCNC: 103 MMOL/L
CO2 SERPL-SCNC: 26 MMOL/L
CREAT SERPL-MCNC: 1.28 MG/DL
EGFR: 46 ML/MIN/1.73M2
GLUCOSE SERPL-MCNC: 99 MG/DL
POTASSIUM SERPL-SCNC: 5 MMOL/L
PROT SERPL-MCNC: 7.4 G/DL
SODIUM SERPL-SCNC: 142 MMOL/L

## 2022-11-02 NOTE — PROGRESS NOTE ADULT - PROBLEM SELECTOR PROBLEM 1
[FreeTextEntry1] : 65 year old man with T2DM, uncontrolled, \par  - CGm downloaded and reviewed.  Pt 19% TIR<, 3% hypoglycemia.  Variable glucose.  Discussed with patient role of basal vs bolus insulin.  Will resume lantus, 16 units daily (or alternate covered basal insulin that can be administered once daily).  Decrease humalog to 8 units bevore meals\par - Will re-prescribe GLP1\par  - download CGm in one week \par - PT with low c-peptide so will avoid sglt2.  \par   -  retinopathy care up to date\par  - encouraged pt  to increase exercise\par  - had  covid vaccine\par  - flu shot administered today\par \par HTN: blood pressure elevated today, monitors at home and it has been controlled, will not change at this time.  He has regular cardiology follow up.  On ARB.\par  - urine microalbumin +\par \par Hyperlipidemia: encouraged pt to take   atorvastatin  daily, reviewed barriers to adherence\par \par vit d def'y - continue vitamin D supplementation, \par \par \par \par f/u with np in 1 month and md in 4 months
Pain of upper abdomen

## 2022-11-15 ENCOUNTER — APPOINTMENT (OUTPATIENT)
Dept: HEMATOLOGY ONCOLOGY | Facility: CLINIC | Age: 67
End: 2022-11-15

## 2022-11-15 ENCOUNTER — RESULT REVIEW (OUTPATIENT)
Age: 67
End: 2022-11-15

## 2022-11-15 ENCOUNTER — APPOINTMENT (OUTPATIENT)
Dept: INFUSION THERAPY | Facility: HOSPITAL | Age: 67
End: 2022-11-15

## 2022-11-15 VITALS
BODY MASS INDEX: 30.61 KG/M2 | TEMPERATURE: 96.6 F | SYSTOLIC BLOOD PRESSURE: 106 MMHG | DIASTOLIC BLOOD PRESSURE: 71 MMHG | OXYGEN SATURATION: 99 % | RESPIRATION RATE: 16 BRPM | WEIGHT: 162 LBS | HEART RATE: 70 BPM

## 2022-11-15 LAB
BASOPHILS # BLD AUTO: 0.03 K/UL — SIGNIFICANT CHANGE UP (ref 0–0.2)
BASOPHILS NFR BLD AUTO: 0.4 % — SIGNIFICANT CHANGE UP (ref 0–2)
EOSINOPHIL # BLD AUTO: 0.11 K/UL — SIGNIFICANT CHANGE UP (ref 0–0.5)
EOSINOPHIL NFR BLD AUTO: 1.4 % — SIGNIFICANT CHANGE UP (ref 0–6)
HCT VFR BLD CALC: 45.2 % — HIGH (ref 34.5–45)
HGB BLD-MCNC: 14.6 G/DL — SIGNIFICANT CHANGE UP (ref 11.5–15.5)
IMM GRANULOCYTES NFR BLD AUTO: 0.5 % — SIGNIFICANT CHANGE UP (ref 0–0.9)
LYMPHOCYTES # BLD AUTO: 1.71 K/UL — SIGNIFICANT CHANGE UP (ref 1–3.3)
LYMPHOCYTES # BLD AUTO: 22.2 % — SIGNIFICANT CHANGE UP (ref 13–44)
MCHC RBC-ENTMCNC: 29.9 PG — SIGNIFICANT CHANGE UP (ref 27–34)
MCHC RBC-ENTMCNC: 32.3 G/DL — SIGNIFICANT CHANGE UP (ref 32–36)
MCV RBC AUTO: 92.6 FL — SIGNIFICANT CHANGE UP (ref 80–100)
MONOCYTES # BLD AUTO: 0.56 K/UL — SIGNIFICANT CHANGE UP (ref 0–0.9)
MONOCYTES NFR BLD AUTO: 7.3 % — SIGNIFICANT CHANGE UP (ref 2–14)
NEUTROPHILS # BLD AUTO: 5.24 K/UL — SIGNIFICANT CHANGE UP (ref 1.8–7.4)
NEUTROPHILS NFR BLD AUTO: 68.2 % — SIGNIFICANT CHANGE UP (ref 43–77)
NRBC # BLD: 0 /100 WBCS — SIGNIFICANT CHANGE UP (ref 0–0)
PLATELET # BLD AUTO: 200 K/UL — SIGNIFICANT CHANGE UP (ref 150–400)
RBC # BLD: 4.88 M/UL — SIGNIFICANT CHANGE UP (ref 3.8–5.2)
RBC # FLD: 21.3 % — HIGH (ref 10.3–14.5)
WBC # BLD: 7.69 K/UL — SIGNIFICANT CHANGE UP (ref 3.8–10.5)
WBC # FLD AUTO: 7.69 K/UL — SIGNIFICANT CHANGE UP (ref 3.8–10.5)

## 2022-11-15 PROCEDURE — 99214 OFFICE O/P EST MOD 30 MIN: CPT

## 2022-11-15 NOTE — PHYSICAL EXAM
[Ambulatory and capable of all self care but unable to carry out any work activities] : Status 2- Ambulatory and capable of all self care but unable to carry out any work activities. Up and about more than 50% of waking hours [Normal] : affect appropriate [de-identified] : non-toxic appearing [de-identified] : decreased BS  [de-identified] : left reconstructed breast without palpable abnormality; right breast without new dominant nodular area (has implant there which was placed for symmetry) [de-identified] : lump posterior scalp-unable to fully visualize as hair is matted firmly over it [de-identified] : no gross focal motor extremity deficits

## 2022-11-15 NOTE — REVIEW OF SYSTEMS
[Fatigue] : fatigue [Negative] : Allergic/Immunologic [Chest Pain] : no chest pain [Fainting] : no fainting

## 2022-11-15 NOTE — HISTORY OF PRESENT ILLNESS
[Disease: _____________________] : Disease: [unfilled] [M: ___] : M[unfilled] [AJCC Stage: ____] : AJCC Stage: [unfilled] [de-identified] : 3/2003–Stage IIB left breast cancer, ER positive/LA positive/HER-2 negative–status post left modified radical mastectomy with reconstruction.  1/15 lymph nodes positive for metastatic carcinoma with focal extranodal extension.  Status post AC-T--> Tamoxifen x 5 years.  \par \par 6/2018-Presented with general weakness and leg pain.  Diagnosed with clear cell renal carcinoma  s/p Radical Left Nephrectomy, Para Aortic LN dissection,  c/b Splenic Laceration w/ bleeding. Right iliac bone biopsy of bone lesion was consistent with metastatic breast cancer–ER positive/LA positive/HER-2 unknown.\par Patient was begun on Letrozole/Ibrance/Xgeva.\par \par \par 3/2019–Status post left VATS procedure with pleural decortication.  Left pleural fluid cytology and pleural pathology negative for malignancy.\par 4/2020-CT scan of the chest/abdomen/pelvis revealed new left lower lung nodule and decreased right apex nodules, extensive bony metastases slightly increased. Changed to Aromasin, with Afinitor added 7/2020.\par Patient had been under the oncologic care of Dr. Shields at [facility].\par Hello,\par 8/2020–Patient wished to transfer her oncologic care to the [facility]. LE venous duplex study-B/L DVT. Begun on Xarelto.\par 12/2020–CT abdomen/pelvis–extensive osseous metastases.\par 6/2021-CT C/A/P-pulmonary metastases, a distal paraesophageal node and extensive osseous metastatic disease without significant change. No acute pathology. Aromasin/Afinitor continued.\par \par 2/28/22-CT C/A/P-enlarging pulmonary nodules and a new right pulmonary nodule. Enlarging distal paraesophageal lymph node and a new left internal mammary lymph node. Stable bone metastases.\par \par 3/17/2022-Began Faslodex.\par \par 7/18/2022-CT C/A/P-Enlarging bilateral pulmonary nodules with reference nodules described above. Marked interval enlargement of a right perihilar mass with new endobronchial extension into the right mainstem bronchus. Enlarging right hilar and distal paraesophageal lymph nodes. 3.6 cm indeterminate mass in the mid to lower pole of the right kidney measuring slightly higher than simple fluid attenuation, increased in size. Further evaluation with renal ultrasound is recommended. Extensive sclerotic osseous metastases, without significant interval change.\par \par 8/17/2022-Completed radiation (5/5 fractions)-2000 cGy. Planned Dose: 2000 cGy. Treatment Site: Right hilum. \par \par 9/2022-Began Xeloda.\par \par  [de-identified] : Infiltrating lobular carcinoma [de-identified] : 7/2020 :  CA 15-3=24 U/mL     CEA =2.9 ng/mL         [de-identified] : 10/2020–[facility]–genomic findings–PIK3CA S7139L, CDH1 Y523fs*13, TBX3 R471fs*161; OTTO; TMB 1Muts/Mb.\par 3 disease relevant genes with no reportable alterations–BRCA1, BRCA2, ERBB 2.\par \par 6/2018-PD-L1 ()=0%. [de-identified] : +H/A x 17 days. No change in vision.\par +Blood tinged sputum-geronimo. in am's-clears post am cough.\par In second week of this Xeloda cycle.\par Managing ADL's. Independent, lives alone. Has family available for help if needed.\par Takes oxycodone for intermittent bony/joint aches.\par No c/o dental/jaw pain. \par Has O2 at home; has portable tank now as well.\par No current c/o nausea/vomiting/diarrhea or constipation . No melena.  \par No chills, fever, night sweats, mouth sores. \par Taking calcium 1500 mg and vitamin D supplements daily.\par \par Has had COVID vaccines(Moderna), along with booster x 1 (5/8/22).\par

## 2022-11-16 LAB
ALBUMIN SERPL ELPH-MCNC: 4.5 G/DL
ALP BLD-CCNC: 59 U/L
ALT SERPL-CCNC: 5 U/L
ANION GAP SERPL CALC-SCNC: 12 MMOL/L
AST SERPL-CCNC: 15 U/L
BILIRUB SERPL-MCNC: 0.5 MG/DL
BUN SERPL-MCNC: 20 MG/DL
CALCIUM SERPL-MCNC: 9.9 MG/DL
CANCER AG27-29 SERPL-ACNC: 37.2 U/ML
CHLORIDE SERPL-SCNC: 103 MMOL/L
CO2 SERPL-SCNC: 24 MMOL/L
CREAT SERPL-MCNC: 1.2 MG/DL
EGFR: 49 ML/MIN/1.73M2
GLUCOSE SERPL-MCNC: 98 MG/DL
POTASSIUM SERPL-SCNC: 4.8 MMOL/L
PROT SERPL-MCNC: 7.2 G/DL
SODIUM SERPL-SCNC: 139 MMOL/L

## 2022-11-28 ENCOUNTER — APPOINTMENT (OUTPATIENT)
Dept: MRI IMAGING | Facility: CLINIC | Age: 67
End: 2022-11-28

## 2022-11-28 ENCOUNTER — OUTPATIENT (OUTPATIENT)
Dept: OUTPATIENT SERVICES | Facility: HOSPITAL | Age: 67
LOS: 1 days | End: 2022-11-28

## 2022-11-28 ENCOUNTER — INPATIENT (INPATIENT)
Facility: HOSPITAL | Age: 67
LOS: 4 days | Discharge: HOME HEALTH | DRG: 54 | End: 2022-12-03
Attending: STUDENT IN AN ORGANIZED HEALTH CARE EDUCATION/TRAINING PROGRAM | Admitting: HOSPITALIST
Payer: MEDICARE

## 2022-11-28 VITALS
DIASTOLIC BLOOD PRESSURE: 77 MMHG | TEMPERATURE: 98 F | OXYGEN SATURATION: 97 % | RESPIRATION RATE: 16 BRPM | SYSTOLIC BLOOD PRESSURE: 122 MMHG | WEIGHT: 160.06 LBS | HEART RATE: 70 BPM | HEIGHT: 64 IN

## 2022-11-28 DIAGNOSIS — G93.89 OTHER SPECIFIED DISORDERS OF BRAIN: ICD-10-CM

## 2022-11-28 DIAGNOSIS — Z90.5 ACQUIRED ABSENCE OF KIDNEY: Chronic | ICD-10-CM

## 2022-11-28 DIAGNOSIS — Z29.9 ENCOUNTER FOR PROPHYLACTIC MEASURES, UNSPECIFIED: ICD-10-CM

## 2022-11-28 DIAGNOSIS — Z98.890 OTHER SPECIFIED POSTPROCEDURAL STATES: Chronic | ICD-10-CM

## 2022-11-28 DIAGNOSIS — Z90.5 ACQUIRED ABSENCE OF KIDNEY: ICD-10-CM

## 2022-11-28 DIAGNOSIS — C50.919 MALIGNANT NEOPLASM OF UNSPECIFIED SITE OF UNSPECIFIED FEMALE BREAST: ICD-10-CM

## 2022-11-28 DIAGNOSIS — E16.2 HYPOGLYCEMIA, UNSPECIFIED: ICD-10-CM

## 2022-11-28 DIAGNOSIS — Z90.12 ACQUIRED ABSENCE OF LEFT BREAST AND NIPPLE: Chronic | ICD-10-CM

## 2022-11-28 DIAGNOSIS — Z86.718 PERSONAL HISTORY OF OTHER VENOUS THROMBOSIS AND EMBOLISM: ICD-10-CM

## 2022-11-28 DIAGNOSIS — C64.2 MALIGNANT NEOPLASM OF LEFT KIDNEY, EXCEPT RENAL PELVIS: ICD-10-CM

## 2022-11-28 DIAGNOSIS — E89.6 POSTPROCEDURAL ADRENOCORTICAL (-MEDULLARY) HYPOFUNCTION: Chronic | ICD-10-CM

## 2022-11-28 DIAGNOSIS — J44.9 CHRONIC OBSTRUCTIVE PULMONARY DISEASE, UNSPECIFIED: ICD-10-CM

## 2022-11-28 DIAGNOSIS — L98.9 DISORDER OF THE SKIN AND SUBCUTANEOUS TISSUE, UNSPECIFIED: ICD-10-CM

## 2022-11-28 LAB
ALBUMIN SERPL ELPH-MCNC: 3.8 G/DL — SIGNIFICANT CHANGE UP (ref 3.3–5)
ALP SERPL-CCNC: 52 U/L — SIGNIFICANT CHANGE UP (ref 40–120)
ALT FLD-CCNC: 8 U/L — LOW (ref 10–45)
ANION GAP SERPL CALC-SCNC: 16 MMOL/L — SIGNIFICANT CHANGE UP (ref 5–17)
ANISOCYTOSIS BLD QL: SLIGHT — SIGNIFICANT CHANGE UP
APTT BLD: 42.8 SEC — HIGH (ref 27.5–35.5)
AST SERPL-CCNC: 30 U/L — SIGNIFICANT CHANGE UP (ref 10–40)
BASOPHILS # BLD AUTO: 0 K/UL — SIGNIFICANT CHANGE UP (ref 0–0.2)
BASOPHILS NFR BLD AUTO: 0 % — SIGNIFICANT CHANGE UP (ref 0–2)
BILIRUB SERPL-MCNC: 0.6 MG/DL — SIGNIFICANT CHANGE UP (ref 0.2–1.2)
BLD GP AB SCN SERPL QL: NEGATIVE — SIGNIFICANT CHANGE UP
BUN SERPL-MCNC: 18 MG/DL — SIGNIFICANT CHANGE UP (ref 7–23)
CALCIUM SERPL-MCNC: 9.1 MG/DL — SIGNIFICANT CHANGE UP (ref 8.4–10.5)
CHLORIDE SERPL-SCNC: 106 MMOL/L — SIGNIFICANT CHANGE UP (ref 96–108)
CO2 SERPL-SCNC: 19 MMOL/L — LOW (ref 22–31)
CREAT SERPL-MCNC: 1.06 MG/DL — SIGNIFICANT CHANGE UP (ref 0.5–1.3)
DACRYOCYTES BLD QL SMEAR: SLIGHT — SIGNIFICANT CHANGE UP
EGFR: 57 ML/MIN/1.73M2 — LOW
ELLIPTOCYTES BLD QL SMEAR: SLIGHT — SIGNIFICANT CHANGE UP
EOSINOPHIL # BLD AUTO: 0.15 K/UL — SIGNIFICANT CHANGE UP (ref 0–0.5)
EOSINOPHIL NFR BLD AUTO: 1.8 % — SIGNIFICANT CHANGE UP (ref 0–6)
FLUAV AG NPH QL: SIGNIFICANT CHANGE UP
FLUBV AG NPH QL: SIGNIFICANT CHANGE UP
GLUCOSE SERPL-MCNC: 67 MG/DL — LOW (ref 70–99)
HCT VFR BLD CALC: 44.3 % — SIGNIFICANT CHANGE UP (ref 34.5–45)
HGB BLD-MCNC: 14.3 G/DL — SIGNIFICANT CHANGE UP (ref 11.5–15.5)
INR BLD: 2.43 RATIO — HIGH (ref 0.88–1.16)
LYMPHOCYTES # BLD AUTO: 1.34 K/UL — SIGNIFICANT CHANGE UP (ref 1–3.3)
LYMPHOCYTES # BLD AUTO: 15.8 % — SIGNIFICANT CHANGE UP (ref 13–44)
MACROCYTES BLD QL: SLIGHT — SIGNIFICANT CHANGE UP
MANUAL SMEAR VERIFICATION: SIGNIFICANT CHANGE UP
MCHC RBC-ENTMCNC: 30.8 PG — SIGNIFICANT CHANGE UP (ref 27–34)
MCHC RBC-ENTMCNC: 32.3 GM/DL — SIGNIFICANT CHANGE UP (ref 32–36)
MCV RBC AUTO: 95.3 FL — SIGNIFICANT CHANGE UP (ref 80–100)
MICROCYTES BLD QL: SLIGHT — SIGNIFICANT CHANGE UP
MONOCYTES # BLD AUTO: 0.89 K/UL — SIGNIFICANT CHANGE UP (ref 0–0.9)
MONOCYTES NFR BLD AUTO: 10.5 % — SIGNIFICANT CHANGE UP (ref 2–14)
NEUTROPHILS # BLD AUTO: 6.08 K/UL — SIGNIFICANT CHANGE UP (ref 1.8–7.4)
NEUTROPHILS NFR BLD AUTO: 71.9 % — SIGNIFICANT CHANGE UP (ref 43–77)
OVALOCYTES BLD QL SMEAR: SLIGHT — SIGNIFICANT CHANGE UP
PLAT MORPH BLD: NORMAL — SIGNIFICANT CHANGE UP
PLATELET # BLD AUTO: 240 K/UL — SIGNIFICANT CHANGE UP (ref 150–400)
POIKILOCYTOSIS BLD QL AUTO: SLIGHT — SIGNIFICANT CHANGE UP
POTASSIUM SERPL-MCNC: 5.2 MMOL/L — SIGNIFICANT CHANGE UP (ref 3.5–5.3)
POTASSIUM SERPL-SCNC: 5.2 MMOL/L — SIGNIFICANT CHANGE UP (ref 3.5–5.3)
PROT SERPL-MCNC: 7.5 G/DL — SIGNIFICANT CHANGE UP (ref 6–8.3)
PROTHROM AB SERPL-ACNC: 28.2 SEC — HIGH (ref 10.5–13.4)
RBC # BLD: 4.65 M/UL — SIGNIFICANT CHANGE UP (ref 3.8–5.2)
RBC # FLD: 22.8 % — HIGH (ref 10.3–14.5)
RBC BLD AUTO: ABNORMAL
RH IG SCN BLD-IMP: POSITIVE — SIGNIFICANT CHANGE UP
RSV RNA NPH QL NAA+NON-PROBE: SIGNIFICANT CHANGE UP
SARS-COV-2 RNA SPEC QL NAA+PROBE: SIGNIFICANT CHANGE UP
SODIUM SERPL-SCNC: 141 MMOL/L — SIGNIFICANT CHANGE UP (ref 135–145)
WBC # BLD: 8.46 K/UL — SIGNIFICANT CHANGE UP (ref 3.8–10.5)
WBC # FLD AUTO: 8.46 K/UL — SIGNIFICANT CHANGE UP (ref 3.8–10.5)

## 2022-11-28 PROCEDURE — 99232 SBSQ HOSP IP/OBS MODERATE 35: CPT | Mod: GC

## 2022-11-28 PROCEDURE — 93010 ELECTROCARDIOGRAM REPORT: CPT

## 2022-11-28 PROCEDURE — 70553 MRI BRAIN STEM W/O & W/DYE: CPT | Mod: 26,MH

## 2022-11-28 PROCEDURE — 99285 EMERGENCY DEPT VISIT HI MDM: CPT | Mod: GC

## 2022-11-28 PROCEDURE — 71046 X-RAY EXAM CHEST 2 VIEWS: CPT | Mod: 26

## 2022-11-28 PROCEDURE — 99223 1ST HOSP IP/OBS HIGH 75: CPT

## 2022-11-28 RX ORDER — IPRATROPIUM/ALBUTEROL SULFATE 18-103MCG
3 AEROSOL WITH ADAPTER (GRAM) INHALATION EVERY 6 HOURS
Refills: 0 | Status: DISCONTINUED | OUTPATIENT
Start: 2022-11-28 | End: 2022-12-03

## 2022-11-28 RX ORDER — BUDESONIDE AND FORMOTEROL FUMARATE DIHYDRATE 160; 4.5 UG/1; UG/1
2 AEROSOL RESPIRATORY (INHALATION)
Refills: 0 | Status: DISCONTINUED | OUTPATIENT
Start: 2022-11-28 | End: 2022-12-03

## 2022-11-28 RX ORDER — DEXAMETHASONE 0.5 MG/5ML
10 ELIXIR ORAL ONCE
Refills: 0 | Status: COMPLETED | OUTPATIENT
Start: 2022-11-28 | End: 2022-11-28

## 2022-11-28 RX ORDER — ALPRAZOLAM 0.25 MG
0.25 TABLET ORAL EVERY 8 HOURS
Refills: 0 | Status: DISCONTINUED | OUTPATIENT
Start: 2022-11-28 | End: 2022-12-03

## 2022-11-28 RX ORDER — DEXAMETHASONE 0.5 MG/5ML
4 ELIXIR ORAL EVERY 6 HOURS
Refills: 0 | Status: DISCONTINUED | OUTPATIENT
Start: 2022-11-29 | End: 2022-12-03

## 2022-11-28 RX ORDER — CHOLECALCIFEROL (VITAMIN D3) 125 MCG
1 CAPSULE ORAL
Qty: 0 | Refills: 0 | DISCHARGE

## 2022-11-28 RX ORDER — UMECLIDINIUM BROMIDE AND VILANTEROL TRIFENATATE 62.5; 25 UG/1; UG/1
1 POWDER RESPIRATORY (INHALATION)
Qty: 0 | Refills: 0 | DISCHARGE

## 2022-11-28 RX ORDER — SODIUM CHLORIDE 9 MG/ML
1000 INJECTION, SOLUTION INTRAVENOUS
Refills: 0 | Status: DISCONTINUED | OUTPATIENT
Start: 2022-11-28 | End: 2022-11-29

## 2022-11-28 RX ORDER — ACETAMINOPHEN 500 MG
650 TABLET ORAL EVERY 6 HOURS
Refills: 0 | Status: DISCONTINUED | OUTPATIENT
Start: 2022-11-28 | End: 2022-12-03

## 2022-11-28 RX ADMIN — SODIUM CHLORIDE 100 MILLILITER(S): 9 INJECTION, SOLUTION INTRAVENOUS at 21:34

## 2022-11-28 RX ADMIN — Medication 10 MILLIGRAM(S): at 21:32

## 2022-11-28 NOTE — H&P ADULT - PROBLEM SELECTOR PLAN 4
Hx of clear cell RCC (s/p L nephrectomy) and CKD3 with one kidney.  - on admission, SCr 1.06 (baseline SCr ~1.0-1.3 in [id number])  - strict I/Os, renally dose meds, avoid nephrotoxins as able  - monitor SCr and electrolytes

## 2022-11-28 NOTE — H&P ADULT - NSICDXPASTMEDICALHX_GEN_ALL_CORE_FT
PAST MEDICAL HISTORY:  Anemia     Breast cancer Left breast cancer - 13 years ago, s/p mastectomy, and chemoptherapy    Emphysema lung     Other specified disorders of kidney and ureter     Renal cell carcinoma      PAST MEDICAL HISTORY:  Anemia     Breast cancer Left breast cancer - 13 years ago, s/p mastectomy, s/p RT for lung mets, and on oral chemotherapy    Emphysema lung     Other specified disorders of kidney and ureter     Renal cell carcinoma s/p L nephrectomy    Stage 3 chronic kidney disease

## 2022-11-28 NOTE — H&P ADULT - PROBLEM SELECTOR PLAN 8
- DVT ppx: SCDs for now given hemorrhagic brain mass and INR 2.43 on presentation  - Diet: regular  - Dispo: pending further eval by med onc, rad onc, and plastic surgery

## 2022-11-28 NOTE — ED ADULT NURSE NOTE - OBJECTIVE STATEMENT
68 y/o female ambulatory with cane presents to ED Via EMS. Brought from outpatient MRI test where they found a 2.8 CM hemorraghic mass on the left anterior frontal lobe. Pt has a history of Breast CA, last chemo was 4 days ago, DVT on Xarelto. Pt complains of headache x1 month and has an abrasion which is bleeding as well on the right side of her scalp on her head. Pt is A&O4 and uses home O2 at 2L nasal cannula. Pt denies SOB, chest pain, dizziness, NVD, fever, chills, and all four extremities are strong denies numbness and tingeing in all four extremities.

## 2022-11-28 NOTE — ED PROVIDER NOTE - OBJECTIVE STATEMENT
67-year-old female with history of DVT on Xarelto, breast cancer with meds on chemo injections (last one was 4 days ago) presenting with abnormal MRI.  Patient had headache for 1 month so outpatient MRI per oncology, was found to have a 2.8 cm hemorrhagic mass in the left anterior medial frontal lobe with surrounding edema consistent with hemorrhagic metastasis.  Also has a fungating posterior scalp lesion. Patient denies fever, chest pain, shortness of breath, abdominal pain, nausea, vomiting. Oncologist Dr. Leslie Goldstein at [facility].

## 2022-11-28 NOTE — H&P ADULT - HISTORY OF PRESENT ILLNESS
67F w/ hx of DVT on Xarelto, breast cancer w/ mets (on chemo, most recently 4 days ago), presenting with headaches x~1 month and found to have an abnormal outpatient MRI head earlier in the day. MRI head showed 2.8cm x 2.1cm hemorrhagic mass in L anterior medial frontal lobe of brain. Pt also with a lump on the back of head that is occasionally bleeding but not tender for the past ~7 months. Denied f/c/n/v, CP, SOB, abdominal pain, dysuria, hematuria, hematochezia, melena, diarrhea, constipation, extremity numbness/tingling or weakness.    In ED: Afebrile, HR 60s-70s. SBP 110s-120s, RR 16-18, sating 97% on RA. Labs notable for INR 2.43, BG 67. Given dexamethasone 10mg IV x1. Admitted to Medicine for further management. 67F w/ hx of Stage IV breast cancer w/ mets (s/p L mastectomy, on chemo, most recently 4 days ago), clear cell RCC s/p L nephrectomy, b/l LE DVTs (s/p IVC filter in the past, now on Xarelto), COPD, presenting with headaches x~1 month and found to have an abnormal outpatient MRI head earlier in the day. MRI head showed 2.8cm x 2.1cm hemorrhagic mass in L anterior medial frontal lobe of brain. Pt also with a lump on the back of head that is occasionally bleeding but not tender for the past ~7 months. Denied f/c/n/v, CP, SOB, abdominal pain, dysuria, hematuria, hematochezia, melena, diarrhea, constipation, extremity numbness/tingling or weakness.    In ED: Afebrile, HR 60s-70s. SBP 110s-120s, RR 16-18, sating 97% on RA. Labs notable for INR 2.43, BG 67. Given dexamethasone 10mg IV x1. Admitted to Medicine for further management. 67F w/ hx of Stage IV breast cancer w/ mets (s/p L mastectomy, s/p RT for lung mets, now on oral chemo, most recently 4 days ago), clear cell RCC (s/p L nephrectomy), b/l LE DVTs (on Xarelto), COPD, presenting with constant headache x~1 month and found to have an abnormal outpatient MRI head earlier in the day. MRI head showed 2.8cm x 2.1cm hemorrhagic mass in L anterior medial frontal lobe of brain. Pt also with a lump on the back of head for the past ~7 months that is occasionally bleeding and with pain when laying on it. Stated that she had tried oxy q5h at home without any relief of her headache, which is when her oncologist instructed her get an MRI. Denied f/c/n/v, CP, SOB, abdominal pain, dysuria, hematuria, hematochezia, melena, diarrhea, constipation, extremity numbness/tingling or weakness, vision/hearing changes.    In ED: Afebrile, HR 60s-70s. SBP 110s-120s, RR 16-18, sating 97% on RA. Labs notable for INR 2.43, BG 67. Given dexamethasone 10mg IV x1. Admitted to Medicine for further management. 67F w/ hx of Stage IV breast cancer w/ mets (s/p L mastectomy, s/p RT for lung mets, now on oral chemo, most recently 4 days ago), clear cell RCC (s/p L nephrectomy), b/l LE DVTs (on Xarelto), COPD, presenting with constant headache x~1 month and found to have an abnormal outpatient MRI head earlier in the day. MRI head showed 2.8cm x 2.1cm hemorrhagic mass in L anterior medial frontal lobe of brain. Pt also with a lump on the back of head for the past ~7 months that is occasionally bleeding and with pain when laying on it. Stated that she had tried oxy q5h at home without any relief of her headache (actually made it worse per pt), which is when her oncologist instructed her get an MRI. Denied f/c/n/v, CP, SOB, abdominal pain, dysuria, hematuria, hematochezia, melena, diarrhea, constipation, extremity numbness/tingling or weakness, vision/hearing changes.    In ED: Afebrile, HR 60s-70s. SBP 110s-120s, RR 16-18, sating 97% on RA. Labs notable for INR 2.43, BG 67. Given dexamethasone 10mg IV x1. Admitted to Medicine for further management.

## 2022-11-28 NOTE — H&P ADULT - PROBLEM SELECTOR PLAN 5
Hx of b/l LE DVTs. Chart review revealed hx of IVC filter, but pt does not recall getting one. On Xarelto at home. On admission, presented with INR 2.43.  - holding Xarelto given hemorrhagic brain lesion found on MRI  - SCDs for DVT ppx for now

## 2022-11-28 NOTE — ED PROVIDER NOTE - ATTENDING CONTRIBUTION TO CARE
I, Aggie Rivers, performed a history and physical exam of the patient and discussed their management with the resident, student, and/or fellow. I reviewed the note and agree with the documented findings and plan of care. I was present and available for all procedures.    ---   Patient 67-year-old female with PMHx of DVT on Xarelto, breast cancer on the right with mets to kidney, lung presents for abnormal MRI seen today from [place].  Patient reports having a headache for the past 5 months which was why she had the MRI done.  She denies any numbness, tingling, weakness, dizziness, chest pain, shortness of breath, abdominal pain, nausea, vomiting.  MRI showing hemorrhagic lesion. Discussed case with NSGY and ordering labs.

## 2022-11-28 NOTE — ED PROVIDER NOTE - PHYSICAL EXAMINATION
General appearance: NAD, conversant, afebrile    Eyes: anicteric sclerae, BERNIE, EOMI   HENT: Atraumatic; oropharynx clear, MMM and no ulcerations, no pharyngeal erythema or exudate   Neck: Trachea midline; Full range of motion, supple   Pulm: CTA bl, normal respiratory effort and no intercostal retractions, normal work of breathing   CV: RRR, No murmurs, rubs, or gallops. 2+ peripheral pulses.   Abdomen: Soft, non-tender, non-distended; no guarding or rebound   Extremities: No peripheral edema or extremity lymphadenopathy.    Neuro: CN intact, 5/5 strength in all four extremities.   Skin: large mass on occipital scalp, bleeding, difficult to assess due to matted hair.   Psych: Appropriate affect, cooperative; alert and oriented to person, place and time

## 2022-11-28 NOTE — H&P ADULT - PROBLEM SELECTOR PLAN 3
Presented with fungating occipital scalp lesion x7 months.  - local wound care for now  - Plastic Surgery consult in AM

## 2022-11-28 NOTE — H&P ADULT - PROBLEM SELECTOR PLAN 2
Stage IV breast cancer w/ mets (s/p L mastectomy, s/p RT for lung mets, now on oral chemo aromasin/afinitor/capecitabine at home). Now with new brain mass finding, likely 2/2 progression of malignancy.  - Follows with Oncologist Dr. Leslie Goldstein ([facility])  - CT C/A/P w/ IV contrast ordered as recommended by NSGY, however patient hesitant to receive contrast due to having one kidney. Will need further discussion with Onc/Renal. Started on IVF hydration for now to prepare for possible contrast infusion  - consider Nephrology consult in AM given pt with CKD, was previously recommended to see a nephrologist, and now hesitant to receive contrast for mets workup due to having only one kidney  - f/u med onc recs in AM (consult emailed)

## 2022-11-28 NOTE — ED PROVIDER NOTE - CLINICAL SUMMARY MEDICAL DECISION MAKING FREE TEXT BOX
67-year-old female with history of DVT on Xarelto, breast cancer with meds on chemo injections (last one was 4 days ago) presenting with abnormal MRI.  Patient had headache for 1 month so outpatient MRI per oncology, was found to have a 2.8 cm hemorrhagic mass in the left anterior medial frontal lobe with surrounding edema consistent with hemorrhagic metastasis.  Also has a fungating posterior scalp lesion. Exam shows no focal neuro deficits, +mass on occipital scalp, bleeding, not ttp. Likely mets. Will get labs, nsgy consult.

## 2022-11-28 NOTE — ED CLERICAL - NS ED CLERK NOTE PRE-ARRIVAL INFORMATION; ADDITIONAL PRE-ARRIVAL INFORMATION
CC/Reason For referral: Neuro eval, patient has persistent headaches, Hx of cancer with mets, Bleed on back of head- patient is on blood thinners  Preferred Consultant(if applicable): N/A  Who admits for you (if needed): N/A  Do you have documents you would like to fax over? No  Would you still like to speak to an ED attending? No  Called in by Mavis from [facility]

## 2022-11-28 NOTE — H&P ADULT - NSHPPHYSICALEXAM_GEN_ALL_CORE
Vital Signs Last 24 Hrs  T(C): 36.7 (28 Nov 2022 18:22), Max: 36.7 (28 Nov 2022 18:22)  T(F): 98.1 (28 Nov 2022 18:22), Max: 98.1 (28 Nov 2022 18:22)  HR: 68 (28 Nov 2022 18:22) (68 - 70)  BP: 111/72 (28 Nov 2022 18:22) (111/72 - 122/77)  BP(mean): --  RR: 18 (28 Nov 2022 18:22) (16 - 18)  SpO2: 99% (28 Nov 2022 18:22) (97% - 99%)    Parameters below as of 28 Nov 2022 18:22  Patient On (Oxygen Delivery Method): nasal cannula  O2 Flow (L/min): 2    CONSTITUTIONAL: NAD, well-developed, well-groomed  HEAD: large mass/lesion on occipital region of scalp with mild bleeding, non-tender to light palpation  EYES: PERRL; conjunctiva and sclera clear  ENMT: Moist oral mucosa, no pharyngeal injection or exudates  NECK: Supple, no palpable masses  RESPIRATORY: Normal respiratory effort; lungs are clear to auscultation bilaterally; very mild wheezes, no rales  CARDIOVASCULAR: Regular rate and rhythm; Normal S1 and S2; No murmurs, rubs, or gallops; No lower extremity edema; Peripheral pulses are 2+ bilaterally  ABDOMEN: Soft, Nontender, Nondistended; Bowel sounds present  MUSCULOSKELETAL: No clubbing or cyanosis of digits; No joint swelling or tenderness to palpation  PSYCH: AAOx3 (oriented to person, place, and time); affect appropriate  NEUROLOGY: CN II-XII are intact and symmetric; no gross sensory deficits  SKIN: Aside from scalp lesion, no rashes; skin on legs appear dry Vital Signs Last 24 Hrs  T(C): 36.7 (28 Nov 2022 18:22), Max: 36.7 (28 Nov 2022 18:22)  T(F): 98.1 (28 Nov 2022 18:22), Max: 98.1 (28 Nov 2022 18:22)  HR: 68 (28 Nov 2022 18:22) (68 - 70)  BP: 111/72 (28 Nov 2022 18:22) (111/72 - 122/77)  BP(mean): --  RR: 18 (28 Nov 2022 18:22) (16 - 18)  SpO2: 99% (28 Nov 2022 18:22) (97% - 99%)    Parameters below as of 28 Nov 2022 18:22  Patient On (Oxygen Delivery Method): nasal cannula  O2 Flow (L/min): 2    CONSTITUTIONAL: NAD, well-developed, well-groomed  HEAD: large mass/lesion on occipital region of scalp with mild bleeding, non-tender to light palpation  EYES: PERRL; conjunctiva and sclera clear  ENMT: Moist oral mucosa, no pharyngeal injection or exudates  NECK: Supple, no palpable masses  RESPIRATORY: Normal respiratory effort; lungs are clear to auscultation bilaterally; very mild wheezes, no rales  CARDIOVASCULAR: Regular rate and rhythm; Normal S1 and S2; No murmurs, rubs, or gallops; No lower extremity edema; Peripheral pulses are 2+ bilaterally  ABDOMEN: Soft, Nontender, Nondistended; Bowel sounds present  MUSCULOSKELETAL: No clubbing or cyanosis of digits; No joint swelling or tenderness to palpation  PSYCH: AAOx3 (oriented to person, place, and time); affect appropriate  NEUROLOGY: CN II-XII are intact and symmetric; no gross sensory deficits; strength testing LUE ?4+/5 (unclear if due to positioning), otherwise 5/5 throughout   SKIN: Aside from scalp lesion, no appreciable rashes; skin on legs appear dry

## 2022-11-28 NOTE — CONSULT NOTE ADULT - SUBJECTIVE AND OBJECTIVE BOX
p ([id number])     HPI: 67F Hx DVT on xarelto, BreastCa (on chemo last injection 4days ago), has had lump on back of head for ~7m and 1m HA, she told her oncologist about this who sent her for MR which showed a 2.1 x 2.8 cm L frontal enhancing lesion c/f mets  Exam: IAOx3, PERRL, EOMI, no facial, no drift, GARCIA 5/5, SILT      --Anticoagulation: Xarelto    =====================  PAST MEDICAL HISTORY   Other specified disorders of kidney and ureter    Anemia    Breast cancer    Emphysema lung    Renal cell carcinoma      PAST SURGICAL HISTORY   History of hip surgery    H/O left mastectomy    S/p nephrectomy    H/O total adrenalectomy      Cipro (Unknown)      MEDICATIONS:  Antibiotics:    Neuro:    Other:  dexAMETHasone  Injectable 10 milliGRAM(s) IV Push Once      SOCIAL HISTORY:   Occupation:   Marital Status:     FAMILY HISTORY:  No pertinent family history in first degree relatives    FH: throat cancer    FH: emphysema        ROS: Negative except per HPI    LABS:  PT/INR - ( 28 Nov 2022 18:40 )   PT: 28.2 sec;   INR: 2.43 ratio         PTT - ( 28 Nov 2022 18:40 )  PTT:42.8 sec                        14.3   8.46  )-----------( 240      ( 28 Nov 2022 18:40 )             44.3

## 2022-11-28 NOTE — ED ADULT NURSE NOTE - NSIMPLEMENTINTERV_GEN_ALL_ED
Implemented All Fall with Harm Risk Interventions:  [place] to call system. Call bell, personal items and telephone within reach. Instruct patient to call for assistance. Room bathroom lighting operational. Non-slip footwear when patient is off stretcher. Physically safe environment: no spills, clutter or unnecessary equipment. Stretcher in lowest position, wheels locked, appropriate side rails in place. Provide visual cue, wrist band, yellow gown, etc. Monitor gait and stability. Monitor for mental status changes and reorient to person, place, and time. Review medications for side effects contributing to fall risk. Reinforce activity limits and safety measures with patient and family. Provide visual clues: red socks.

## 2022-11-28 NOTE — H&P ADULT - NSHPSOCIALHISTORY_GEN_ALL_CORE
Former smoker (30 pk-yrs, quit 2018). Social EtOH drinker (but has not had in >1 yr). Denied illicit/recreational drug use. Has cane, but normally ambulates without it (holds onto people, wall, or surroundings).

## 2022-11-28 NOTE — H&P ADULT - NSICDXPASTSURGICALHX_GEN_ALL_CORE_FT
PAST SURGICAL HISTORY:  H/O left mastectomy     H/O total adrenalectomy     History of hip surgery in 2007- Right hip surgery    S/p nephrectomy      PAST SURGICAL HISTORY:  H/O left mastectomy     H/O total adrenalectomy     History of hip surgery in 2007- Right hip surgery    S/p nephrectomy left

## 2022-11-28 NOTE — H&P ADULT - ASSESSMENT
67F w/ hx of Stage IV breast cancer w/ mets (s/p L mastectomy, s/p RT for lung mets, now on oral chemo, most recently 4 days ago), clear cell RCC (s/p L nephrectomy), b/l LE DVTs (on Xarelto), COPD, presenting with constant headache x~1 month and found to have an abnormal outpatient MRI head showing 2.8cm x 2.1cm hemorrhagic mass in L anterior medial frontal lobe of brain as well as fungating occipital scalp lesion. 67F w/ hx of Stage IV breast cancer w/ mets (s/p L mastectomy, s/p RT for lung mets, now on oral chemo, most recently 4 days ago), clear cell RCC (s/p L nephrectomy), b/l LE DVTs (on Xarelto), COPD, presenting with constant headache x~1 month and found to have an abnormal outpatient MRI head showing 2.8cm x 2.1cm hemorrhagic mass in L anterior medial frontal lobe of brain; also with fungating occipital scalp lesion. Likely progression of malignancy.

## 2022-11-28 NOTE — H&P ADULT - PROBLEM SELECTOR PLAN 6
Presented with blood glucose 67 on CMP. Likely in setting of not eating and active malignancy  - given juice on encounter and subsequently received dexamethasone, which should improve glucose level  - encourage PO diet  - monitor glucose on BMP

## 2022-11-28 NOTE — CONSULT NOTE ADULT - ATTENDING COMMENTS
Pt seen on 11/29/22.  Pt with son. Agree with above resident evaluation and assessment.  Pt with h/o breast CA undergoing treatment, also s/p nephrectomy for RCC in 2018.  Pt has know metastases. PT with several days of headaches.  MRI revealed left hemorrhagic frontal 2 cm lesion with surrounding edema.  There is punctate right precentral gyrus lesion and a sellar mass, all consistent with metastases.  Recommend MRI sella and CT head to evaluate bony metastases.  PT also with subcutaneous/cutaneous mass in parietooccipital region that has been bleeding.  Discussed the likelihood of SRS but sella region may need to be treated differently.  Will continue to obtain further information at this time.

## 2022-11-28 NOTE — H&P ADULT - PROBLEM SELECTOR PLAN 1
Presented with constant headache x1 month. MRI head showed 2.8cm x 2.1cm hemorrhagic mass in L anterior medial frontal lobe of brain. Exam on admission appeared grossly intact, though possible slight weakening 4+/5 in LUE (unclear if due to positioning). INR on admission was 2.43.   - Appreciate NSGY recs: no acute NSGY intervention, good candidate for SRS  - s/p dex 10mg IV x1 in ED; c/w dex 4mg IV q6h with 1 week taper to off per NSGY  - hold AC/AP   - f/u mets workup  - rad onc consult in AM  - f/u med onc recs in AM (consult emailed)  - neurochecks q8h for now Presented with constant headache x1 month. MRI head showed 2.8cm x 2.1cm hemorrhagic mass in L anterior medial frontal lobe of brain. Exam on admission appeared grossly intact, though possible slight weakening 4+/5 in LUE (unclear if due to positioning). INR on admission was 2.43.   - Appreciate NSGY recs: no acute NSGY intervention, good candidate for SRS  - s/p dex 10mg IV x1 in ED; c/w dex 4mg IV q6h with 1 week taper to off per NSGY  - hold AC/AP   - f/u mets workup  - rad onc consult in AM  - f/u med onc recs in AM (consult emailed)  - neurochecks q8h for now  - f/u CT head non-con for interval assessment of hemorrhagic brain mass

## 2022-11-28 NOTE — H&P ADULT - NSHPLABSRESULTS_GEN_ALL_CORE
LABS:                        14.3   8.46  )-----------( 240      ( 28 Nov 2022 18:40 )             44.3     11-28    141  |  106  |  18  ----------------------------<  67<L>  5.2   |  19<L>  |  1.06    Ca    9.1      28 Nov 2022 18:40    TPro  7.5  /  Alb  3.8  /  TBili  0.6  /  DBili  x   /  AST  30  /  ALT  8<L>  /  AlkPhos  52  11-28    PT/INR - ( 28 Nov 2022 18:40 )   PT: 28.2 sec;   INR: 2.43 ratio         PTT - ( 28 Nov 2022 18:40 )  PTT:42.8 sec              IMAGING/ADDITIONAL TESTS:    EKG (11/28/22) personally reviewed: NSR, left axis deviation, HR 62, QTc 424, no overt acute ischemic changes    CXR (11/28/22) personally reviewed: nodules/opacities seen consistent with metastatic disease; no focal consolidation appreciated

## 2022-11-28 NOTE — H&P ADULT - NSHPREVIEWOFSYSTEMS_GEN_ALL_CORE
REVIEW OF SYSTEMS:    CONSTITUTIONAL: No fevers or chills  EYES:  No visual changes or eye pain  ENMT: No hearing loss or sore throat  RESPIRATORY: No cough, +minimal wheezing/occasional specks of hemoptysis (since receiving RT of her lungs); No shortness of breath  CARDIOVASCULAR: No chest pain or palpitations  GASTROINTESTINAL: No abdominal or epigastric pain; No nausea, vomiting, or hematemesis; No diarrhea or constipation; No melena or hematochezia  GENITOURINARY: No dysuria or hematuria  MUSCULOSKELETAL: No joint pain or swelling; No muscle, back, or extremity pain  NEUROLOGICAL: +constant headache; No numbness or loss of sensation; No loss of strength in extremities  PSYCHIATRIC: No anxiety or depression  SKIN: No itching, burning, rashes, or lesions

## 2022-11-28 NOTE — H&P ADULT - PROBLEM SELECTOR PLAN 7
Hx of former smoker, COPD, and mets to lungs. Has supplemental home O2 PRN, but stated that she has not really needed to use it. Has had mild wheezing and occasional specks of hemoptysis since receiving RT for lung mets.  - does not appear to be in active exacerbation  - c/w home breo ellipta equivalent

## 2022-11-29 LAB
ALBUMIN SERPL ELPH-MCNC: 3.7 G/DL — SIGNIFICANT CHANGE UP (ref 3.3–5)
ALP SERPL-CCNC: 51 U/L — SIGNIFICANT CHANGE UP (ref 40–120)
ALT FLD-CCNC: 7 U/L — LOW (ref 10–45)
ANION GAP SERPL CALC-SCNC: 14 MMOL/L — SIGNIFICANT CHANGE UP (ref 5–17)
APTT BLD: 28.6 SEC — SIGNIFICANT CHANGE UP (ref 27.5–35.5)
AST SERPL-CCNC: 15 U/L — SIGNIFICANT CHANGE UP (ref 10–40)
BASOPHILS # BLD AUTO: 0.01 K/UL — SIGNIFICANT CHANGE UP (ref 0–0.2)
BASOPHILS NFR BLD AUTO: 0.2 % — SIGNIFICANT CHANGE UP (ref 0–2)
BILIRUB SERPL-MCNC: 0.5 MG/DL — SIGNIFICANT CHANGE UP (ref 0.2–1.2)
BUN SERPL-MCNC: 20 MG/DL — SIGNIFICANT CHANGE UP (ref 7–23)
CALCIUM SERPL-MCNC: 8.9 MG/DL — SIGNIFICANT CHANGE UP (ref 8.4–10.5)
CHLORIDE SERPL-SCNC: 107 MMOL/L — SIGNIFICANT CHANGE UP (ref 96–108)
CO2 SERPL-SCNC: 20 MMOL/L — LOW (ref 22–31)
CREAT SERPL-MCNC: 1.02 MG/DL — SIGNIFICANT CHANGE UP (ref 0.5–1.3)
EGFR: 60 ML/MIN/1.73M2 — SIGNIFICANT CHANGE UP
EOSINOPHIL # BLD AUTO: 0 K/UL — SIGNIFICANT CHANGE UP (ref 0–0.5)
EOSINOPHIL NFR BLD AUTO: 0 % — SIGNIFICANT CHANGE UP (ref 0–6)
GLUCOSE SERPL-MCNC: 161 MG/DL — HIGH (ref 70–99)
HCT VFR BLD CALC: 41.2 % — SIGNIFICANT CHANGE UP (ref 34.5–45)
HGB BLD-MCNC: 13.1 G/DL — SIGNIFICANT CHANGE UP (ref 11.5–15.5)
IMM GRANULOCYTES NFR BLD AUTO: 0.7 % — SIGNIFICANT CHANGE UP (ref 0–0.9)
INR BLD: 1.31 RATIO — HIGH (ref 0.88–1.16)
LYMPHOCYTES # BLD AUTO: 0.45 K/UL — LOW (ref 1–3.3)
LYMPHOCYTES # BLD AUTO: 7.7 % — LOW (ref 13–44)
MAGNESIUM SERPL-MCNC: 2.2 MG/DL — SIGNIFICANT CHANGE UP (ref 1.6–2.6)
MANUAL SMEAR VERIFICATION: SIGNIFICANT CHANGE UP
MCHC RBC-ENTMCNC: 30 PG — SIGNIFICANT CHANGE UP (ref 27–34)
MCHC RBC-ENTMCNC: 31.8 GM/DL — LOW (ref 32–36)
MCV RBC AUTO: 94.3 FL — SIGNIFICANT CHANGE UP (ref 80–100)
MONOCYTES # BLD AUTO: 0.05 K/UL — SIGNIFICANT CHANGE UP (ref 0–0.9)
MONOCYTES NFR BLD AUTO: 0.9 % — LOW (ref 2–14)
NEUTROPHILS # BLD AUTO: 5.28 K/UL — SIGNIFICANT CHANGE UP (ref 1.8–7.4)
NEUTROPHILS NFR BLD AUTO: 90.5 % — HIGH (ref 43–77)
NRBC # BLD: 0 /100 WBCS — SIGNIFICANT CHANGE UP (ref 0–0)
PHOSPHATE SERPL-MCNC: 2.2 MG/DL — LOW (ref 2.5–4.5)
PLAT MORPH BLD: SIGNIFICANT CHANGE UP
PLATELET # BLD AUTO: SIGNIFICANT CHANGE UP K/UL (ref 150–400)
POTASSIUM SERPL-MCNC: 4.6 MMOL/L — SIGNIFICANT CHANGE UP (ref 3.5–5.3)
POTASSIUM SERPL-SCNC: 4.6 MMOL/L — SIGNIFICANT CHANGE UP (ref 3.5–5.3)
PROT SERPL-MCNC: 7 G/DL — SIGNIFICANT CHANGE UP (ref 6–8.3)
PROTHROM AB SERPL-ACNC: 15.1 SEC — HIGH (ref 10.5–13.4)
RBC # BLD: 4.37 M/UL — SIGNIFICANT CHANGE UP (ref 3.8–5.2)
RBC # FLD: 22.5 % — HIGH (ref 10.3–14.5)
RBC BLD AUTO: SIGNIFICANT CHANGE UP
SODIUM SERPL-SCNC: 141 MMOL/L — SIGNIFICANT CHANGE UP (ref 135–145)
WBC # BLD: 5.83 K/UL — SIGNIFICANT CHANGE UP (ref 3.8–10.5)
WBC # FLD AUTO: 5.83 K/UL — SIGNIFICANT CHANGE UP (ref 3.8–10.5)

## 2022-11-29 PROCEDURE — 99232 SBSQ HOSP IP/OBS MODERATE 35: CPT

## 2022-11-29 PROCEDURE — 93970 EXTREMITY STUDY: CPT | Mod: 26

## 2022-11-29 PROCEDURE — 99223 1ST HOSP IP/OBS HIGH 75: CPT | Mod: GC

## 2022-11-29 RX ORDER — BNT162B2 ORIGINAL AND OMICRON BA.4/BA.5 .1125; .1125 MG/2.25ML; MG/2.25ML
0.3 INJECTION, SUSPENSION INTRAMUSCULAR ONCE
Refills: 0 | Status: DISCONTINUED | OUTPATIENT
Start: 2022-11-29 | End: 2022-12-03

## 2022-11-29 RX ORDER — INFLUENZA VIRUS VACCINE 15; 15; 15; 15 UG/.5ML; UG/.5ML; UG/.5ML; UG/.5ML
0.7 SUSPENSION INTRAMUSCULAR ONCE
Refills: 0 | Status: DISCONTINUED | OUTPATIENT
Start: 2022-11-29 | End: 2022-12-03

## 2022-11-29 RX ORDER — SODIUM CHLORIDE 9 MG/ML
1000 INJECTION, SOLUTION INTRAVENOUS
Refills: 0 | Status: DISCONTINUED | OUTPATIENT
Start: 2022-11-29 | End: 2022-12-02

## 2022-11-29 RX ORDER — OXYCODONE HYDROCHLORIDE 5 MG/1
2.5 TABLET ORAL EVERY 6 HOURS
Refills: 0 | Status: DISCONTINUED | OUTPATIENT
Start: 2022-11-29 | End: 2022-12-01

## 2022-11-29 RX ADMIN — Medication 650 MILLIGRAM(S): at 03:54

## 2022-11-29 RX ADMIN — Medication 650 MILLIGRAM(S): at 12:15

## 2022-11-29 RX ADMIN — SODIUM CHLORIDE 100 MILLILITER(S): 9 INJECTION, SOLUTION INTRAVENOUS at 03:55

## 2022-11-29 RX ADMIN — BUDESONIDE AND FORMOTEROL FUMARATE DIHYDRATE 2 PUFF(S): 160; 4.5 AEROSOL RESPIRATORY (INHALATION) at 06:10

## 2022-11-29 RX ADMIN — Medication 0.25 MILLIGRAM(S): at 03:54

## 2022-11-29 RX ADMIN — OXYCODONE HYDROCHLORIDE 2.5 MILLIGRAM(S): 5 TABLET ORAL at 23:15

## 2022-11-29 RX ADMIN — Medication 4 MILLIGRAM(S): at 02:36

## 2022-11-29 RX ADMIN — Medication 0.25 MILLIGRAM(S): at 10:31

## 2022-11-29 RX ADMIN — SODIUM CHLORIDE 100 MILLILITER(S): 9 INJECTION, SOLUTION INTRAVENOUS at 18:02

## 2022-11-29 RX ADMIN — Medication 4 MILLIGRAM(S): at 10:31

## 2022-11-29 RX ADMIN — BUDESONIDE AND FORMOTEROL FUMARATE DIHYDRATE 2 PUFF(S): 160; 4.5 AEROSOL RESPIRATORY (INHALATION) at 18:02

## 2022-11-29 RX ADMIN — Medication 3 MILLILITER(S): at 06:09

## 2022-11-29 RX ADMIN — SODIUM CHLORIDE 100 MILLILITER(S): 9 INJECTION, SOLUTION INTRAVENOUS at 22:02

## 2022-11-29 RX ADMIN — SODIUM CHLORIDE 100 MILLILITER(S): 9 INJECTION, SOLUTION INTRAVENOUS at 10:30

## 2022-11-29 RX ADMIN — Medication 650 MILLIGRAM(S): at 10:30

## 2022-11-29 RX ADMIN — Medication 650 MILLIGRAM(S): at 07:31

## 2022-11-29 RX ADMIN — Medication 4 MILLIGRAM(S): at 16:21

## 2022-11-29 RX ADMIN — OXYCODONE HYDROCHLORIDE 2.5 MILLIGRAM(S): 5 TABLET ORAL at 22:20

## 2022-11-29 RX ADMIN — Medication 4 MILLIGRAM(S): at 22:02

## 2022-11-29 NOTE — CONSULT NOTE ADULT - ATTENDING COMMENTS
history of breast cancer   p/w headache and was found to have a solitary left frontal hemorrhagic lesion suspicious of brain metastasis with therapeutic INR on coumadin  on physical exam: no focal deficit  please consult neurosurg and rad onc for local treatment  agree to be on steroids  hold coumadin

## 2022-11-29 NOTE — PROGRESS NOTE ADULT - PROBLEM SELECTOR PLAN 3
Presented with fungating occipital scalp lesion x7 months.  - local wound care for now  - Plastic Surgery consult

## 2022-11-29 NOTE — ED ADULT NURSE REASSESSMENT NOTE - NS ED NURSE REASSESS COMMENT FT1
Pt given a lunch tray and tolerated well
First encounter with the pt, pt received from the previous shift with complaints of headaches and MRI change.  Pt is a/ox4 and verbal. Breathing is even and unlabored on nasal cannula on 2 liters. Skin is warm dry. Pt has strong pulses palpated bilaterally. Full rom. Pt denies chest pain, n/v, dizziness, and sob. No acute distress noted. Breakfast tray given to the pt and tolerated well

## 2022-11-29 NOTE — PROGRESS NOTE ADULT - PROBLEM SELECTOR PLAN 2
Stage IV breast cancer w/ mets (s/p L mastectomy, s/p RT for lung mets, now on oral chemo aromasin/afinitor/capecitabine at home). Now with new brain mass finding, likely 2/2 progression of malignancy.  - Follows with Oncologist Dr. Leslie Goldstein ([facility])  - CT C/A/P w/ IV contrast ordered as recommended by NSGY, started on IVF hydration to prepare for contrast as patient only has one kidney  - consider Nephrology if CKD worsens  - f/u heme onc recs

## 2022-11-29 NOTE — PROGRESS NOTE ADULT - ASSESSMENT
67F w/ hx of Stage IV breast cancer w/ mets (s/p L mastectomy, s/p RT for lung mets, now on oral chemo, most recently 4 days ago), clear cell RCC (s/p L nephrectomy), b/l LE DVTs (on Xarelto), COPD, presenting with constant headache x~1 month and found to have an abnormal outpatient MRI head showing 2.8cm x 2.1cm hemorrhagic mass in L anterior medial frontal lobe of brain; also with fungating occipital scalp lesion. Likely progression of malignancy.

## 2022-11-29 NOTE — PROGRESS NOTE ADULT - PROBLEM SELECTOR PLAN 8
- DVT ppx: SCDs for now given hemorrhagic brain mass and INR 2.43 on presentation  - Diet: regular  - Dispo: pending further eval by med onc, rad onc

## 2022-11-29 NOTE — PATIENT PROFILE ADULT - FALL HARM RISK - RISK INTERVENTIONS

## 2022-11-29 NOTE — PROGRESS NOTE ADULT - PROBLEM SELECTOR PLAN 4
Hx of clear cell RCC (s/p L nephrectomy) and CKD3 with one kidney.  - on admission, SCr 1.06 (baseline SCr ~1.0-1.3 in [id number])  - strict I/Os, renally dose meds, avoid nephrotoxins as able  - monitor SCr and electrolytes

## 2022-11-29 NOTE — PROGRESS NOTE ADULT - SUBJECTIVE AND OBJECTIVE BOX
PROGRESS NOTE:   Authoted by Dr. Kristen Ying MD, Available on MS Teams    Patient is a 67y old  Female who presents with a chief complaint of Hemorrhagic brain mass (28 Nov 2022 20:45)      SUBJECTIVE / OVERNIGHT EVENTS:    ADDITIONAL REVIEW OF SYSTEMS:    MEDICATIONS  (STANDING):  budesonide  80 MICROgram(s)/formoterol 4.5 MICROgram(s) Inhaler 2 Puff(s) Inhalation two times a day  dexAMETHasone  Injectable 4 milliGRAM(s) IV Push every 6 hours  lactated ringers. 1000 milliLiter(s) (100 mL/Hr) IV Continuous <Continuous>    MEDICATIONS  (PRN):  acetaminophen     Tablet .. 650 milliGRAM(s) Oral every 6 hours PRN Temp greater or equal to 38C (100.4F), Mild Pain (1 - 3)  albuterol/ipratropium for Nebulization 3 milliLiter(s) Nebulizer every 6 hours PRN Shortness of Breath and/or Wheezing  ALPRAZolam 0.25 milliGRAM(s) Oral every 8 hours PRN Anxiety      CAPILLARY BLOOD GLUCOSE        I&O's Summary      PHYSICAL EXAM:  Vital Signs Last 24 Hrs  T(C): 36.7 (29 Nov 2022 02:47), Max: 36.7 (28 Nov 2022 18:22)  T(F): 98 (29 Nov 2022 02:47), Max: 98.1 (28 Nov 2022 18:22)  HR: 85 (29 Nov 2022 07:45) (68 - 85)  BP: 122/80 (29 Nov 2022 07:45) (111/72 - 124/81)  BP(mean): --  RR: 17 (29 Nov 2022 07:45) (16 - 18)  SpO2: 100% (29 Nov 2022 07:45) (96% - 100%)    Parameters below as of 29 Nov 2022 07:45  Patient On (Oxygen Delivery Method): nasal cannula  O2 Flow (L/min): 2      CONSTITUTIONAL: NAD, well-developed  RESPIRATORY: Normal respiratory effort; lungs are clear to auscultation bilaterally  CARDIOVASCULAR: Regular rate and rhythm, normal S1 and S2, no murmur/rub/gallop; No lower extremity edema  ABDOMEN: Nontender to palpation, normoactive bowel sounds, no rebound/guarding  MUSCLOSKELETAL: no clubbing or cyanosis of digits; no joint swelling or tenderness to palpation  PSYCH: A+O to person, place, and time; affect appropriate    LABS:                        13.1   5.83  )-----------( Clumped    ( 29 Nov 2022 06:43 )             41.2     11-29    141  |  107  |  20  ----------------------------<  161<H>  4.6   |  20<L>  |  1.02    Ca    8.9      29 Nov 2022 06:43  Phos  2.2     11-29  Mg     2.2     11-29    TPro  7.0  /  Alb  3.7  /  TBili  0.5  /  DBili  x   /  AST  15  /  ALT  7<L>  /  AlkPhos  51  11-29    PT/INR - ( 29 Nov 2022 06:43 )   PT: 15.1 sec;   INR: 1.31 ratio         PTT - ( 29 Nov 2022 06:43 )  PTT:28.6 sec            RADIOLOGY & ADDITIONAL TESTS:  Results Reviewed:   Imaging Personally Reviewed:  Electrocardiogram Personally Reviewed:    COORDINATION OF CARE:  Care Discussed with Consultants/Other Providers [Y/N]:  Prior or Outpatient Records Reviewed [Y/N]:   PROGRESS NOTE:   Authoted by Dr. Kristen Ying MD, Available on MS Teams    Patient is a 67y old  Female who presents with a chief complaint of Hemorrhagic brain mass (28 Nov 2022 20:45)      SUBJECTIVE / OVERNIGHT EVENTS: Patient continues to have a headache, improved with pain medications. No nausea, vomiting, abdominal pain, diarrhea, dysuria.     ADDITIONAL REVIEW OF SYSTEMS:    MEDICATIONS  (STANDING):  budesonide  80 MICROgram(s)/formoterol 4.5 MICROgram(s) Inhaler 2 Puff(s) Inhalation two times a day  dexAMETHasone  Injectable 4 milliGRAM(s) IV Push every 6 hours  lactated ringers. 1000 milliLiter(s) (100 mL/Hr) IV Continuous <Continuous>    MEDICATIONS  (PRN):  acetaminophen     Tablet .. 650 milliGRAM(s) Oral every 6 hours PRN Temp greater or equal to 38C (100.4F), Mild Pain (1 - 3)  albuterol/ipratropium for Nebulization 3 milliLiter(s) Nebulizer every 6 hours PRN Shortness of Breath and/or Wheezing  ALPRAZolam 0.25 milliGRAM(s) Oral every 8 hours PRN Anxiety      CAPILLARY BLOOD GLUCOSE        I&O's Summary      PHYSICAL EXAM:  Vital Signs Last 24 Hrs  T(C): 36.7 (29 Nov 2022 02:47), Max: 36.7 (28 Nov 2022 18:22)  T(F): 98 (29 Nov 2022 02:47), Max: 98.1 (28 Nov 2022 18:22)  HR: 85 (29 Nov 2022 07:45) (68 - 85)  BP: 122/80 (29 Nov 2022 07:45) (111/72 - 124/81)  BP(mean): --  RR: 17 (29 Nov 2022 07:45) (16 - 18)  SpO2: 100% (29 Nov 2022 07:45) (96% - 100%)    Parameters below as of 29 Nov 2022 07:45  Patient On (Oxygen Delivery Method): nasal cannula  O2 Flow (L/min): 2      CONSTITUTIONAL: NAD, well-developed  HEENT: mass on the R posterior side of the scalp  RESPIRATORY: Normal respiratory effort; lungs are clear to auscultation bilaterally  CARDIOVASCULAR: Regular rate and rhythm, normal S1 and S2, no murmur/rub/gallop; No lower extremity edema  ABDOMEN: Nontender to palpation, normoactive bowel sounds, no rebound/guarding  MUSCLOSKELETAL: no clubbing or cyanosis of digits; no joint swelling or tenderness to palpation  PSYCH: A+O to person, place, and time; affect appropriate  NEURO: 5/5 strength in all extremities     LABS:                        13.1   5.83  )-----------( Clumped    ( 29 Nov 2022 06:43 )             41.2     11-29    141  |  107  |  20  ----------------------------<  161<H>  4.6   |  20<L>  |  1.02    Ca    8.9      29 Nov 2022 06:43  Phos  2.2     11-29  Mg     2.2     11-29    TPro  7.0  /  Alb  3.7  /  TBili  0.5  /  DBili  x   /  AST  15  /  ALT  7<L>  /  AlkPhos  51  11-29    PT/INR - ( 29 Nov 2022 06:43 )   PT: 15.1 sec;   INR: 1.31 ratio         PTT - ( 29 Nov 2022 06:43 )  PTT:28.6 sec

## 2022-11-29 NOTE — CONSULT NOTE ADULT - SUBJECTIVE AND OBJECTIVE BOX
Hematology Oncology Consult Note    HPI:  67F w/ hx of Stage IV breast cancer w/ lungs and bone mets (s/p L mastectomy, s/p RT for lung mets, now on Xeloda, most recently 4 days ago), clear cell RCC (s/p L nephrectomy), b/l LE DVTs (on Xarelto), COPD, presenting with constant headache x~1 month and found to have an abnormal outpatient MRI head earlier in the day. MRI head showed 2.8cm x 2.1cm hemorrhagic mass in L anterior medial frontal lobe of brain. Pt also with a lump on the back of head for the past ~7 months that is occasionally bleeding and with pain when laying on it. Stated that she had tried oxy q5h at home without any relief of her headache (actually made it worse per pt), which is when her oncologist instructed her get an MRI. Denied f/c/n/v, CP, SOB, abdominal pain, dysuria, hematuria, hematochezia, melena, diarrhea, constipation, extremity numbness/tingling or weakness, vision/hearing changes.    In ED: Afebrile, HR 60s-70s. SBP 110s-120s, RR 16-18, sating 97% on RA. Labs notable for INR 2.43, BG 67. Given dexamethasone 10mg IV x1. Admitted to Medicine for further management. (28 Nov 2022 20:45)      PAST MEDICAL & SURGICAL HISTORY:  Other specified disorders of kidney and ureter      Anemia      Breast cancer  Left breast cancer - 13 years ago, s/p mastectomy, s/p RT for lung mets, and on oral chemotherapy      Emphysema lung      Renal cell carcinoma  s/p L nephrectomy      Stage 3 chronic kidney disease      History of hip surgery  in 2007- Right hip surgery      H/O left mastectomy      S/p nephrectomy  left      H/O total adrenalectomy          FAMILY HISTORY:  FH: throat cancer  mother    FH: emphysema  father        MEDICATIONS  (STANDING):  budesonide  80 MICROgram(s)/formoterol 4.5 MICROgram(s) Inhaler 2 Puff(s) Inhalation two times a day  dexAMETHasone  Injectable 4 milliGRAM(s) IV Push every 6 hours  lactated ringers. 1000 milliLiter(s) (100 mL/Hr) IV Continuous <Continuous>    MEDICATIONS  (PRN):  acetaminophen     Tablet .. 650 milliGRAM(s) Oral every 6 hours PRN Temp greater or equal to 38C (100.4F), Mild Pain (1 - 3)  albuterol/ipratropium for Nebulization 3 milliLiter(s) Nebulizer every 6 hours PRN Shortness of Breath and/or Wheezing  ALPRAZolam 0.25 milliGRAM(s) Oral every 8 hours PRN Anxiety      Allergies    Cipro (Rash)    Intolerances        SOCIAL HISTORY: No EtOH, no tobacco    Review of Systems:  General: denies fevers/chills  Respiratory: denies cough, shortness of breath  Cardiovascular: denies chest pain, palpitations  Gastrointestinal: denies nausea, vomiting, abdominal pain, constipation, diarrhea, melena, hematochezia  MSK: denies joint pain or muscle pain  Neuro: denies headache, weakness, or parasthesias  Skin: denies rash, petichiae, echymoses  Psych: denies anxiety or sleep disturbances    Height (cm): 162.6 (11-28 @ 16:11)  Weight (kg): 72.6 (11-28 @ 16:11)  BMI (kg/m2): 27.5 (11-28 @ 16:11)  BSA (m2): 1.78 (11-28 @ 16:11)    T(F): 98.4 (11-29-22 @ 12:45), Max: 98.4 (11-29-22 @ 12:45)  HR: 81 (11-29-22 @ 12:45)  BP: 118/68 (11-29-22 @ 12:45)  RR: 16 (11-29-22 @ 12:45)  SpO2: 96% (11-29-22 @ 12:45)  Wt(kg): --    PHYSICAL EXAM:    Constitutional: NAD  Respiratory: CTA b/l, symmetric chest rise, with normal respiratory effort  Cardiovascular: RRR  Gastrointestinal: soft, NTND  Extremities:  no edema  MSK: no obvious abnormalities  Neurological: Grossly intact  Skin: no rash, no echymoses, no petichiae  Psych: normal affect                          13.1   5.83  )-----------( Clumped    ( 29 Nov 2022 06:43 )             41.2       11-29    141  |  107  |  20  ----------------------------<  161<H>  4.6   |  20<L>  |  1.02    Ca    8.9      29 Nov 2022 06:43  Phos  2.2     11-29  Mg     2.2     11-29    TPro  7.0  /  Alb  3.7  /  TBili  0.5  /  DBili  x   /  AST  15  /  ALT  7<L>  /  AlkPhos  51  11-29      Magnesium, Serum: 2.2 mg/dL (11-29 @ 06:43)  Phosphorus Level, Serum: 2.2 mg/dL (11-29 @ 06:43)       Hematology Oncology Consult Note    HPI:  67F w/ hx of Stage IV breast cancer w/ lungs and bone mets (s/p L mastectomy, s/p RT for lung mets, now on Xeloda, most recently 4 days ago), clear cell RCC (s/p L nephrectomy), b/l LE DVTs (on Xarelto), COPD, presenting with constant headache x~1 month and found to have an abnormal outpatient MRI head earlier in the day. MRI head showed 2.8cm x 2.1cm hemorrhagic mass in L anterior medial frontal lobe of brain. Pt also with a lump on the back of head for the past ~7 months that is occasionally bleeding and with pain when laying on it. Stated that she had tried oxy q5h at home without any relief of her headache (actually made it worse per pt), which is when her oncologist instructed her get an MRI. Denied f/c/n/v, CP, SOB, abdominal pain, dysuria, hematuria, hematochezia, melena, diarrhea, constipation, extremity numbness/tingling or weakness, vision/hearing changes.    In ED: Afebrile, HR 60s-70s. SBP 110s-120s, RR 16-18, sating 97% on RA. Labs notable for INR 2.43, BG 67. Given dexamethasone 10mg IV x1. Admitted to Medicine for further management. (28 Nov 2022 20:45)    Oncology is consulted for breast cancer with POD with  likely brain mets.   #Stage IV breast cancer w/ lungs and bone mets  -Follows with Dr. Ochoa   -2003: Initial diagnosis of Stage IIB left breast cancer, ER positive/MI positive/HER-2 negative–status post left modified radical mastectomy with reconstruction. 1/15 lymph nodes positive for metastatic carcinoma with focal extranodal extension. Status post AC-T--> Tamoxifen x 5 years.     -6/2018-Presented with general weakness and leg pain. Diagnosed with clear cell renal carcinoma s/p Radical Left Nephrectomy, Para Aortic LN dissection, c/b Splenic Laceration w/ bleeding.   -Right iliac bone biopsy of bone lesion was consistent with metastatic breast cancer–ER positive/MI positive/HER-2 unknown. PD-L1 ()=0%.    Patient was begun on Letrozole/Ibrance/Xgeva.   3/2019–Status post left VATS procedure with pleural decortication. Left pleural fluid cytology and pleural pathology negative for malignancy.   4/2020-CT scan of the chest/abdomen/pelvis revealed new left lower lung nodule and decreased right apex nodules, extensive bony metastases slightly increased. Changed to Aromasin, with Afinitor added 7/2020.   8/2020–[place] [facility] [place] of Dr. Shields at [facility]).   -LE venous duplex study-B/L DVT. Begun on Xarelto  -12/2020–CT abdomen/pelvis–extensive osseous metastases.   -6/2021-CT C/A/P-pulmonary metastases, a distal paraesophageal node and extensive osseous metastatic disease without significant change. No acute pathology. Aromasin/Afinitor continued.   10/2020–[facility]–genomic findings–PIK3CA Q1534V, CDH1 Y523fs*13, TBX3 R471fs*161; OTTO; TMB 1Muts/Mb. 3 disease relevant genes with no reportable alterations–BRCA1, BRCA2, ERBB 2.   -2/28/22-CT C/A/P-enlarging pulmonary nodules and a new right pulmonary nodule. Enlarging distal paraesophageal lymph node and a new left internal mammary lymph node. Stable bone metastases.   -3/17/2022-Began Faslodex.   -7/18/2022-CT C/A/P-Enlarging bilateral pulmonary nodules with reference nodules described above. Marked interval enlargement of a right perihilar mass with new endobronchial extension into the right mainstem bronchus. Enlarging right hilar and distal paraesophageal lymph nodes. 3.6 cm indeterminate mass in the mid to lower pole of the right kidney measuring slightly higher than simple fluid attenuation, increased in size. Further evaluation with renal ultrasound is recommended. Extensive sclerotic osseous metastases, without significant interval change.   -8/17/2022-Completed radiation (5/5 fractions)-2000 cGy. Planned Dose: 2000 cGy. Treatment Site: Right hilum.    -9/2022-On Xeloda.     Pathology: Infiltrating lobular carcinoma    TNM stage: M1    AJCC Stage: IV        PAST MEDICAL & SURGICAL HISTORY:  Other specified disorders of kidney and ureter      Anemia      Breast cancer  Left breast cancer - 13 years ago, s/p mastectomy, s/p RT for lung mets, and on oral chemotherapy      Emphysema lung      Renal cell carcinoma  s/p L nephrectomy      Stage 3 chronic kidney disease      History of hip surgery  in 2007- Right hip surgery      H/O left mastectomy      S/p nephrectomy  left      H/O total adrenalectomy          FAMILY HISTORY:  FH: throat cancer  mother    FH: emphysema  father        MEDICATIONS  (STANDING):  budesonide  80 MICROgram(s)/formoterol 4.5 MICROgram(s) Inhaler 2 Puff(s) Inhalation two times a day  dexAMETHasone  Injectable 4 milliGRAM(s) IV Push every 6 hours  lactated ringers. 1000 milliLiter(s) (100 mL/Hr) IV Continuous <Continuous>    MEDICATIONS  (PRN):  acetaminophen     Tablet .. 650 milliGRAM(s) Oral every 6 hours PRN Temp greater or equal to 38C (100.4F), Mild Pain (1 - 3)  albuterol/ipratropium for Nebulization 3 milliLiter(s) Nebulizer every 6 hours PRN Shortness of Breath and/or Wheezing  ALPRAZolam 0.25 milliGRAM(s) Oral every 8 hours PRN Anxiety      Allergies    Cipro (Rash)    Intolerances        SOCIAL HISTORY: No EtOH, no tobacco        Height (cm): 162.6 (11-28 @ 16:11)  Weight (kg): 72.6 (11-28 @ 16:11)  BMI (kg/m2): 27.5 (11-28 @ 16:11)  BSA (m2): 1.78 (11-28 @ 16:11)    T(F): 98.4 (11-29-22 @ 12:45), Max: 98.4 (11-29-22 @ 12:45)  HR: 81 (11-29-22 @ 12:45)  BP: 118/68 (11-29-22 @ 12:45)  RR: 16 (11-29-22 @ 12:45)  SpO2: 96% (11-29-22 @ 12:45)  Wt(kg): --    PHYSICAL EXAM:    Constitutional: NAD  Respiratory: CTA b/l, symmetric chest rise, with normal respiratory effort  Cardiovascular: RRR  Gastrointestinal: soft, NTND  Extremities:  no edema  MSK: no obvious abnormalities  Neurological: Grossly intact. No focal weakness.   Skin: Bulge to the posterior scalp with dry blood   Psych: normal affect                          13.1   5.83  )-----------( Clumped    ( 29 Nov 2022 06:43 )             41.2       11-29    141  |  107  |  20  ----------------------------<  161<H>  4.6   |  20<L>  |  1.02    Ca    8.9      29 Nov 2022 06:43  Phos  2.2     11-29  Mg     2.2     11-29    TPro  7.0  /  Alb  3.7  /  TBili  0.5  /  DBili  x   /  AST  15  /  ALT  7<L>  /  AlkPhos  51  11-29      Magnesium, Serum: 2.2 mg/dL (11-29 @ 06:43)  Phosphorus Level, Serum: 2.2 mg/dL (11-29 @ 06:43)

## 2022-11-29 NOTE — PROGRESS NOTE ADULT - PROBLEM SELECTOR PLAN 1
Presented with constant headache x1 month. MRI head showed 2.8cm x 2.1cm hemorrhagic mass in L anterior medial frontal lobe of brain. Exam on admission appeared grossly intact, though possible slight weakening 4+/5 in LUE (unclear if due to positioning). INR on admission was 2.43.   - Appreciate NSGY recs: no acute NSGY intervention, good candidate for SRS  - s/p dex 10mg IV x1 in ED; c/w dex 4mg IV q6h with 1 week taper to off per NSGY  - hold AC/AP   - f/u mets workup  - rad onc consult emailed, f/u recs  - f/u med onc recs  - neurochecks q8h for now  - f/u CT head non-con for interval assessment of hemorrhagic brain mass

## 2022-11-30 ENCOUNTER — NON-APPOINTMENT (OUTPATIENT)
Age: 67
End: 2022-11-30

## 2022-11-30 LAB
FSH SERPL-MCNC: 9.9 IU/L — SIGNIFICANT CHANGE UP
GH SERPL-MCNC: 0.08 NG/ML — LOW (ref 0.12–9.88)
PROLACTIN SERPL-MCNC: 69.8 NG/ML — HIGH (ref 3.4–24.1)
PROLACTIN SERPL-MCNC: 75.9 NG/ML — HIGH (ref 3.4–24.1)
TSH SERPL-MCNC: 0.16 UIU/ML — LOW (ref 0.27–4.2)

## 2022-11-30 PROCEDURE — 99233 SBSQ HOSP IP/OBS HIGH 50: CPT | Mod: GC

## 2022-11-30 RX ADMIN — OXYCODONE HYDROCHLORIDE 2.5 MILLIGRAM(S): 5 TABLET ORAL at 15:58

## 2022-11-30 RX ADMIN — Medication 4 MILLIGRAM(S): at 05:30

## 2022-11-30 RX ADMIN — Medication 4 MILLIGRAM(S): at 15:20

## 2022-11-30 RX ADMIN — OXYCODONE HYDROCHLORIDE 2.5 MILLIGRAM(S): 5 TABLET ORAL at 15:28

## 2022-11-30 RX ADMIN — OXYCODONE HYDROCHLORIDE 2.5 MILLIGRAM(S): 5 TABLET ORAL at 08:59

## 2022-11-30 RX ADMIN — BUDESONIDE AND FORMOTEROL FUMARATE DIHYDRATE 2 PUFF(S): 160; 4.5 AEROSOL RESPIRATORY (INHALATION) at 05:30

## 2022-11-30 RX ADMIN — Medication 4 MILLIGRAM(S): at 08:59

## 2022-11-30 RX ADMIN — OXYCODONE HYDROCHLORIDE 2.5 MILLIGRAM(S): 5 TABLET ORAL at 21:08

## 2022-11-30 RX ADMIN — BUDESONIDE AND FORMOTEROL FUMARATE DIHYDRATE 2 PUFF(S): 160; 4.5 AEROSOL RESPIRATORY (INHALATION) at 18:33

## 2022-11-30 RX ADMIN — Medication 4 MILLIGRAM(S): at 21:09

## 2022-11-30 RX ADMIN — OXYCODONE HYDROCHLORIDE 2.5 MILLIGRAM(S): 5 TABLET ORAL at 21:41

## 2022-11-30 NOTE — CONSULT NOTE ADULT - SUBJECTIVE AND OBJECTIVE BOX
HPI:  67F w/ hx of Stage IV breast cancer w/ lungs and bone mets (s/p L mastectomy, s/p RT for lung mets, now on Xeloda, most recently 4 days ago), clear cell RCC (s/p L nephrectomy), b/l LE DVTs (on Xarelto), COPD, presenting with constant headache x~1 month and found to have an abnormal outpatient MRI head earlier in the day. MRI head showed 2.8cm x 2.1cm hemorrhagic mass in L anterior medial frontal lobe of brain. Pt was found to have a mass in the back of scalp, that she first noticed when it was 1 cm, and has been growing for the past ~7 months, is occasionally bleeding and causes pain when laying on it. Stated that she had tried oxy q5h at home without any relief of her headache (actually made it worse per pt), which is when her oncologist instructed her get an MRI. Denied f/c/n/v, CP, SOB, abdominal pain, dysuria, hematuria, hematochezia, melena, diarrhea, constipation, extremity numbness/tingling or weakness, vision/hearing changes.    In ED: Afebrile, HR 60s-70s. SBP 110s-120s, RR 16-18, sating 97% on RA. Labs notable for INR 2.43, BG 67. Given dexamethasone 10mg IV x1. Admitted to Medicine for further management.    Surgical Oncology consulted for management of fungating, intermittently oozing, scalp mass. CT scan showing a a 2.4 x 5.7 x 4.0 cm fungating lesion arising from the right posterior scalp, with a focus of infiltration into the subgaleal space, but no evidence for calvarial infiltration.    Vital Signs Last 24 Hrs  T(C): 37 (30 Nov 2022 12:25), Max: 37 (30 Nov 2022 12:25)  T(F): 98.6 (30 Nov 2022 12:25), Max: 98.6 (30 Nov 2022 12:25)  HR: 67 (30 Nov 2022 12:25) (67 - 97)  BP: 137/74 (30 Nov 2022 12:25) (121/78 - 137/74)  BP(mean): --  RR: 18 (30 Nov 2022 12:25) (17 - 18)  SpO2: 93% (30 Nov 2022 12:25) (93% - 96%)    Parameters below as of 30 Nov 2022 12:25  Patient On (Oxygen Delivery Method): room air      LABS:  cret                        13.1   5.83  )-----------( Clumped    ( 29 Nov 2022 06:43 )             41.2     11-29    141  |  107  |  20  ----------------------------<  161<H>  4.6   |  20<L>  |  1.02    Ca    8.9      29 Nov 2022 06:43  Phos  2.2     11-29  Mg     2.2     11-29    TPro  7.0  /  Alb  3.7  /  TBili  0.5  /  DBili  x   /  AST  15  /  ALT  7<L>  /  AlkPhos  51  11-29    PT/INR - ( 29 Nov 2022 06:43 )   PT: 15.1 sec;   INR: 1.31 ratio         PTT - ( 29 Nov 2022 06:43 )  PTT:28.6 sec    Physical exam  General: NAD  HEENT: Palpable occipital nonbleeding mass of approximately 5cm max diameter  Cardiac: Regular rate  Respiratory: Breath sounds clear and equal bilaterally.  Abdominal Exam: soft, nontender, nondistended  Muskuloskeletal: Moves all 4 extremities spontaneously  Neurological: Alert and oriented  Psych: AOX3       I&O's Detail    29 Nov 2022 07:01  -  30 Nov 2022 07:00  --------------------------------------------------------  IN:    Lactated Ringers: 900 mL    Oral Fluid: 50 mL  Total IN: 950 mL    OUT:  Total OUT: 0 mL    Total NET: 950 mL      30 Nov 2022 07:01  -  30 Nov 2022 19:46  --------------------------------------------------------  IN:    Oral Fluid: 600 mL  Total IN: 600 mL    OUT:  Total OUT: 0 mL    Total NET: 600 mL            EXAM: [id number] - MR BRAIN WAW IC - ORDERED BY: ANTONIO WALLIS      PROCEDURE DATE: 11/28/2022        INTERPRETATION: CLINICAL INDICATION: metastatic breast cancer-+new persistent headaches, r/o mets.    TECHNIQUE: Multi-planar multi-sequential MR imaging of the brain was performed before and after the intravenous administration of contrast. 7.5 ml of Gadavist IV contrast was administered.    COMPARISON: CT head 3/23/2019.    FINDINGS:  Interval development of mass measuring 2.8 x 2.2 x 2.2 cm (AP by transverse by craniocaudal) in the anteromedial left frontal lobe with associated intrinsic T1 hyperintense signal, susceptibility related signal loss, and surrounding vasogenic edema with focal 2 mm rightward midline shift.    There is a punctate enhancing lesion in the right postcentral gyrus with slight surrounding edema ().    There is a new sellar enhancing mass measuring 1.4 x 1.2 cm, with mild suprasellar extension, but no gross mass effect upon the optic chiasm.    In addition, there is a 2.4 x 5.7 x 4.0 cm fungating lesion arising from the right posterior scalp, with a focus of infiltration into the subgaleal space, but no evidence for calvarial infiltration.    Extensive poorly enhancing calvarial lesions are seen, corresponding with sclerotic lesions on comparison CT.    The IACs and cerebellopontine angle cisterns are unremarkable. The vestibulocochlear structures are unremarkable.    No hydrocephalus. No extra-axial fluid collections. The skull base flow voids are present.    The visualized intraorbital contents are normal. Mild mucosal thickening in the left maxillary sinus. The mastoid air cells are clear.    IMPRESSION:    -Interval development of a 2.8 cm hemorrhagic mass in the left anteromedial frontal lobe with surrounding edema, compatible with hemorrhagic metastasis.    -Additional punctate enhancing lesion in the right post central gyrus and new enhancing sellar lesion are also compatible with metastases.    -Fungating posterior scalp lesion measuring up to 5.7 cm, also suspicious for metastasis.    Findings discussed with the physician assistant from referring physician's office at 3:08 PM 11/28/2022 with recommendation for patient to present to the emergency room. This was subsequently discussed with the patient who will be offered ambulance transportation.    --- End of Report ---              HOWARD MESA MD; Attending Radiologist  This document has been electronically signed. Nov 28 2022 3:19PM

## 2022-11-30 NOTE — PROGRESS NOTE ADULT - PROBLEM SELECTOR PLAN 4
Hx of clear cell RCC (s/p L nephrectomy) and CKD3 with one kidney.  - on admission, SCr 1.06 (baseline SCr ~1.0-1.3 in [id number])  - strict I/Os, renally dose meds, avoid nephrotoxins as able  - monitor SCr and electrolytes

## 2022-11-30 NOTE — PROGRESS NOTE ADULT - PROBLEM SELECTOR PLAN 7
Hx of former smoker, COPD, and mets to lungs. Has supplemental home O2 PRN, but stated that she has not really needed to use it. Has had mild wheezing and occasional specks of hemoptysis since receiving RT for lung mets.  - does not appear to be in active exacerbation  - c/w home breo ellipta equivalent Hx of former smoker, COPD, and mets to lungs. Has supplemental home O2 PRN, but stated that she has not really needed to use it. Has had mild wheezing and occasional specks of hemoptysis since receiving RT for lung mets.  - does not appear to be in active exacerbation  - c/w home breo ellipta equivalent, Symbicort

## 2022-11-30 NOTE — PROGRESS NOTE ADULT - PROBLEM SELECTOR PLAN 3
Presented with fungating occipital scalp lesion x7 months.  - local wound care for now  - Plastic Surgery consult Presented with fungating occipital scalp lesion x7 months.  - Surg Onc consult

## 2022-11-30 NOTE — PROGRESS NOTE ADULT - ATTENDING COMMENTS
67F w/ hx of Stage IV breast cancer w/ mets (s/p L mastectomy, s/p RT for lung mets, now on oral chemo), clear cell RCC (s/p L nephrectomy), b/l LE DVTs (on Xarelto), COPD, presenting with constant headache x~1 month and found to have an abnormal outpatient MRI head showing 2.8cm x 2.1cm hemorrhagic mass in L anterior medial frontal lobe of brain; also with fungating occipital scalp lesion. Likely progression of malignancy.      Headache unchanged compared to admission. Oxycodone occasionally makes pain worse. No blurry vision, slurred speech or ataxia. Per rad onc- outpatient stereotactic radiosurgery    #Hemorrhagic Brain Mass  #Occipital Scalp Lesion  #Stage4 breast cancer    -f/u neurosurgery recs  -continue with decadron with taper  -pending CT chest and Ab/P   -oncology recs appreciated   -neuro checks q8hr    d/w HS7    Cathy Espinoza MD  Division of Hospital Medicine  Available on Microsoft Teams

## 2022-11-30 NOTE — PROGRESS NOTE ADULT - SUBJECTIVE AND OBJECTIVE BOX
ZEYNEP GOLDMAN  67y  Female      Patient is a 67y old  Female who presents with a chief complaint of Hemorrhagic brain mass (29 Nov 2022 15:12)      INTERVAL HPI/OVERNIGHT EVENTS:  No acute events overnight.    REVIEW OF SYSTEMS:  Please see HPI. All other systems negative.    T(C): 36.6 (11-30-22 @ 04:41), Max: 36.9 (11-29-22 @ 12:45)  HR: 80 (11-30-22 @ 04:41) (75 - 97)  BP: 132/77 (11-30-22 @ 04:41) (118/68 - 132/77)  RR: 18 (11-30-22 @ 04:41) (16 - 18)  SpO2: 96% (11-30-22 @ 04:41) (94% - 96%)  Wt(kg): --Vital Signs Last 24 Hrs  T(C): 36.6 (30 Nov 2022 04:41), Max: 36.9 (29 Nov 2022 12:45)  T(F): 97.9 (30 Nov 2022 04:41), Max: 98.4 (29 Nov 2022 12:45)  HR: 80 (30 Nov 2022 04:41) (75 - 97)  BP: 132/77 (30 Nov 2022 04:41) (118/68 - 132/77)  BP(mean): --  RR: 18 (30 Nov 2022 04:41) (16 - 18)  SpO2: 96% (30 Nov 2022 04:41) (94% - 96%)    Parameters below as of 30 Nov 2022 04:41  Patient On (Oxygen Delivery Method): room air        PHYSICAL EXAM:  GENERAL: NAD, well-groomed, well-developed  HEAD:  Atraumatic, Normocephalic  EYES: EOMI, conjunctiva and sclera clear  ENMT: Moist mucous membranes, Good dentition, No lesions  NECK: Supple, No JVD  NERVOUS SYSTEM:  Alert & Oriented X3; Motor Strength full and symmetric in upper and lower extremities;  CHEST/LUNG: Clear to auscultation bilaterally; No rales, rhonchi, wheezing, or rubs  HEART: Regular rate and rhythm; Normal S1, S2  ABDOMEN: Soft, Nontender, Nondistended; Bowel sounds present  EXTREMITIES:  2+ Peripheral Pulses; No clubbing, cyanosis, or edema  SKIN: No rashes or lesions    Consultant(s) Notes Reviewed:  [x ] YES  [ ] NO  Care Discussed with Consultants/Other Providers [ x] YES  [ ] NO    LABS:                        13.1   5.83  )-----------( Clumped    ( 29 Nov 2022 06:43 )             41.2     11-29    141  |  107  |  20  ----------------------------<  161<H>  4.6   |  20<L>  |  1.02    Ca    8.9      29 Nov 2022 06:43  Phos  2.2     11-29  Mg     2.2     11-29    TPro  7.0  /  Alb  3.7  /  TBili  0.5  /  DBili  x   /  AST  15  /  ALT  7<L>  /  AlkPhos  51  11-29    LIVER FUNCTIONS - ( 29 Nov 2022 06:43 )  Alb: 3.7 g/dL / Pro: 7.0 g/dL / ALK PHOS: 51 U/L / ALT: 7 U/L / AST: 15 U/L / GGT: x           PT/INR - ( 29 Nov 2022 06:43 )   PT: 15.1 sec;   INR: 1.31 ratio         PTT - ( 29 Nov 2022 06:43 )  PTT:28.6 sec        RADIOLOGY & ADDITIONAL TESTS:  Reviewed.    ALLERGIES:  Cipro (Rash)      MEDS:  acetaminophen     Tablet .. 650 milliGRAM(s) Oral every 6 hours PRN  albuterol/ipratropium for Nebulization 3 milliLiter(s) Nebulizer every 6 hours PRN  ALPRAZolam 0.25 milliGRAM(s) Oral every 8 hours PRN  budesonide  80 MICROgram(s)/formoterol 4.5 MICROgram(s) Inhaler 2 Puff(s) Inhalation two times a day  coronavirus bivalent (EUA) Booster Vaccine (PFIZER) 0.3 milliLiter(s) IntraMuscular once  dexAMETHasone  Injectable 4 milliGRAM(s) IV Push every 6 hours  influenza  Vaccine (HIGH DOSE) 0.7 milliLiter(s) IntraMuscular once  lactated ringers. 1000 milliLiter(s) IV Continuous <Continuous>  oxyCODONE    IR 2.5 milliGRAM(s) Oral every 6 hours PRN         ZEYNEP GOLDMAN  67y  Female      Patient is a 67y old  Female who presents with a chief complaint of Hemorrhagic brain mass (29 Nov 2022 15:12)      INTERVAL HPI/OVERNIGHT EVENTS:  No acute events overnight. Patient reports that her HA has improved with fluids, pain meds, but still is severe. She says that massaging her temples can sometimes help with the pain. The bump on the back of her head has been gradually growing for the past 10 months. Tolerating normal diet. No fevers, CP, SOB, N/V/D. Denies weakness, dizziness, vision changes, hearing changes.    REVIEW OF SYSTEMS:  Please see HPI. All other systems negative.    T(C): 36.6 (11-30-22 @ 04:41), Max: 36.9 (11-29-22 @ 12:45)  HR: 80 (11-30-22 @ 04:41) (75 - 97)  BP: 132/77 (11-30-22 @ 04:41) (118/68 - 132/77)  RR: 18 (11-30-22 @ 04:41) (16 - 18)  SpO2: 96% (11-30-22 @ 04:41) (94% - 96%)  Wt(kg): --Vital Signs Last 24 Hrs  T(C): 36.6 (30 Nov 2022 04:41), Max: 36.9 (29 Nov 2022 12:45)  T(F): 97.9 (30 Nov 2022 04:41), Max: 98.4 (29 Nov 2022 12:45)  HR: 80 (30 Nov 2022 04:41) (75 - 97)  BP: 132/77 (30 Nov 2022 04:41) (118/68 - 132/77)  BP(mean): --  RR: 18 (30 Nov 2022 04:41) (16 - 18)  SpO2: 96% (30 Nov 2022 04:41) (94% - 96%)    Parameters below as of 30 Nov 2022 04:41  Patient On (Oxygen Delivery Method): room air        PHYSICAL EXAM:  GENERAL: NAD, well-groomed, well-developed  HEAD:  occipital mass, mildly tender, dry blood present  EYES: EOMI, conjunctiva and sclera clear  ENMT: Moist mucous membranes, Good dentition, No lesions  NECK: Supple, No JVD  NERVOUS SYSTEM:  Alert & Oriented X3; Motor Strength full and symmetric in upper and lower extremities;  CHEST/LUNG: Clear to auscultation bilaterally; No rales, rhonchi, wheezing, or rubs  HEART: Regular rate and rhythm; Normal S1, S2  ABDOMEN: Soft, Nontender, Nondistended; Bowel sounds present  EXTREMITIES:  2+ Peripheral Pulses; No clubbing, cyanosis, or edema  SKIN: No rashes or lesions    Consultant(s) Notes Reviewed:  [x ] YES  [ ] NO  Care Discussed with Consultants/Other Providers [ x] YES  [ ] NO    LABS:                        13.1   5.83  )-----------( Clumped    ( 29 Nov 2022 06:43 )             41.2     11-29    141  |  107  |  20  ----------------------------<  161<H>  4.6   |  20<L>  |  1.02    Ca    8.9      29 Nov 2022 06:43  Phos  2.2     11-29  Mg     2.2     11-29    TPro  7.0  /  Alb  3.7  /  TBili  0.5  /  DBili  x   /  AST  15  /  ALT  7<L>  /  AlkPhos  51  11-29    LIVER FUNCTIONS - ( 29 Nov 2022 06:43 )  Alb: 3.7 g/dL / Pro: 7.0 g/dL / ALK PHOS: 51 U/L / ALT: 7 U/L / AST: 15 U/L / GGT: x           PT/INR - ( 29 Nov 2022 06:43 )   PT: 15.1 sec;   INR: 1.31 ratio         PTT - ( 29 Nov 2022 06:43 )  PTT:28.6 sec        RADIOLOGY & ADDITIONAL TESTS:  Reviewed.    ALLERGIES:  Cipro (Rash)      MEDS:  acetaminophen     Tablet .. 650 milliGRAM(s) Oral every 6 hours PRN  albuterol/ipratropium for Nebulization 3 milliLiter(s) Nebulizer every 6 hours PRN  ALPRAZolam 0.25 milliGRAM(s) Oral every 8 hours PRN  budesonide  80 MICROgram(s)/formoterol 4.5 MICROgram(s) Inhaler 2 Puff(s) Inhalation two times a day  coronavirus bivalent (EUA) Booster Vaccine (PFIZER) 0.3 milliLiter(s) IntraMuscular once  dexAMETHasone  Injectable 4 milliGRAM(s) IV Push every 6 hours  influenza  Vaccine (HIGH DOSE) 0.7 milliLiter(s) IntraMuscular once  lactated ringers. 1000 milliLiter(s) IV Continuous <Continuous>  oxyCODONE    IR 2.5 milliGRAM(s) Oral every 6 hours PRN

## 2022-11-30 NOTE — PROGRESS NOTE ADULT - PROBLEM SELECTOR PLAN 2
Stage IV breast cancer w/ mets (s/p L mastectomy, s/p RT for lung mets, now on oral chemo aromasin/afinitor/capecitabine at home). Now with new brain mass finding, likely 2/2 progression of malignancy.  - Follows with Oncologist Dr. Leslie Goldstein ([facility])  - CT C/A/P w/ IV contrast ordered as recommended by NSGY, started on IVF hydration to prepare for contrast as patient only has one kidney  - consider Nephrology if CKD worsens  - f/u heme onc recs

## 2022-12-01 LAB
ALBUMIN SERPL ELPH-MCNC: 3.5 G/DL — SIGNIFICANT CHANGE UP (ref 3.3–5)
ALP SERPL-CCNC: 46 U/L — SIGNIFICANT CHANGE UP (ref 40–120)
ALT FLD-CCNC: 5 U/L — LOW (ref 10–45)
ANION GAP SERPL CALC-SCNC: 13 MMOL/L — SIGNIFICANT CHANGE UP (ref 5–17)
APTT BLD: 26.1 SEC — LOW (ref 27.5–35.5)
AST SERPL-CCNC: 13 U/L — SIGNIFICANT CHANGE UP (ref 10–40)
BILIRUB SERPL-MCNC: 0.3 MG/DL — SIGNIFICANT CHANGE UP (ref 0.2–1.2)
BLD GP AB SCN SERPL QL: NEGATIVE — SIGNIFICANT CHANGE UP
BUN SERPL-MCNC: 25 MG/DL — HIGH (ref 7–23)
CALCIUM SERPL-MCNC: 9.1 MG/DL — SIGNIFICANT CHANGE UP (ref 8.4–10.5)
CHLORIDE SERPL-SCNC: 106 MMOL/L — SIGNIFICANT CHANGE UP (ref 96–108)
CO2 SERPL-SCNC: 21 MMOL/L — LOW (ref 22–31)
CREAT SERPL-MCNC: 1.01 MG/DL — SIGNIFICANT CHANGE UP (ref 0.5–1.3)
EGFR: 60 ML/MIN/1.73M2 — SIGNIFICANT CHANGE UP
GLUCOSE SERPL-MCNC: 186 MG/DL — HIGH (ref 70–99)
HCT VFR BLD CALC: 38.7 % — SIGNIFICANT CHANGE UP (ref 34.5–45)
HGB BLD-MCNC: 12.3 G/DL — SIGNIFICANT CHANGE UP (ref 11.5–15.5)
INR BLD: 1.05 RATIO — SIGNIFICANT CHANGE UP (ref 0.88–1.16)
INSULIN-LIKE GROWTH FACTOR 1 INTERPRETATION: SIGNIFICANT CHANGE UP
INSULIN-LIKE GROWTH FACTOR 1: 77 NG/ML — SIGNIFICANT CHANGE UP (ref 26–228)
MAGNESIUM SERPL-MCNC: 2.2 MG/DL — SIGNIFICANT CHANGE UP (ref 1.6–2.6)
MCHC RBC-ENTMCNC: 30.4 PG — SIGNIFICANT CHANGE UP (ref 27–34)
MCHC RBC-ENTMCNC: 31.8 GM/DL — LOW (ref 32–36)
MCV RBC AUTO: 95.6 FL — SIGNIFICANT CHANGE UP (ref 80–100)
NRBC # BLD: 0 /100 WBCS — SIGNIFICANT CHANGE UP (ref 0–0)
PHOSPHATE SERPL-MCNC: 2.1 MG/DL — LOW (ref 2.5–4.5)
PLATELET # BLD AUTO: 160 K/UL — SIGNIFICANT CHANGE UP (ref 150–400)
POTASSIUM SERPL-MCNC: 4.3 MMOL/L — SIGNIFICANT CHANGE UP (ref 3.5–5.3)
POTASSIUM SERPL-SCNC: 4.3 MMOL/L — SIGNIFICANT CHANGE UP (ref 3.5–5.3)
PROT SERPL-MCNC: 6.1 G/DL — SIGNIFICANT CHANGE UP (ref 6–8.3)
PROTHROM AB SERPL-ACNC: 12.2 SEC — SIGNIFICANT CHANGE UP (ref 10.5–13.4)
RBC # BLD: 4.05 M/UL — SIGNIFICANT CHANGE UP (ref 3.8–5.2)
RBC # FLD: 22.8 % — HIGH (ref 10.3–14.5)
RH IG SCN BLD-IMP: POSITIVE — SIGNIFICANT CHANGE UP
SODIUM SERPL-SCNC: 140 MMOL/L — SIGNIFICANT CHANGE UP (ref 135–145)
T4 FREE SERPL-MCNC: 1.2 NG/DL — SIGNIFICANT CHANGE UP (ref 0.9–1.8)
TSH SERPL-MCNC: 0.1 UIU/ML — LOW (ref 0.27–4.2)
WBC # BLD: 16.36 K/UL — HIGH (ref 3.8–10.5)
WBC # FLD AUTO: 16.36 K/UL — HIGH (ref 3.8–10.5)

## 2022-12-01 PROCEDURE — 70553 MRI BRAIN STEM W/O & W/DYE: CPT | Mod: 26

## 2022-12-01 PROCEDURE — 71260 CT THORAX DX C+: CPT | Mod: 26

## 2022-12-01 PROCEDURE — 70450 CT HEAD/BRAIN W/O DYE: CPT | Mod: 26

## 2022-12-01 PROCEDURE — 99233 SBSQ HOSP IP/OBS HIGH 50: CPT

## 2022-12-01 PROCEDURE — 74177 CT ABD & PELVIS W/CONTRAST: CPT | Mod: 26

## 2022-12-01 RX ORDER — SODIUM CHLORIDE 9 MG/ML
500 INJECTION, SOLUTION INTRAVENOUS ONCE
Refills: 0 | Status: COMPLETED | OUTPATIENT
Start: 2022-12-01 | End: 2022-12-01

## 2022-12-01 RX ORDER — OXYCODONE HYDROCHLORIDE 5 MG/1
5 TABLET ORAL EVERY 6 HOURS
Refills: 0 | Status: DISCONTINUED | OUTPATIENT
Start: 2022-12-01 | End: 2022-12-02

## 2022-12-01 RX ADMIN — BUDESONIDE AND FORMOTEROL FUMARATE DIHYDRATE 2 PUFF(S): 160; 4.5 AEROSOL RESPIRATORY (INHALATION) at 17:19

## 2022-12-01 RX ADMIN — OXYCODONE HYDROCHLORIDE 5 MILLIGRAM(S): 5 TABLET ORAL at 21:04

## 2022-12-01 RX ADMIN — OXYCODONE HYDROCHLORIDE 2.5 MILLIGRAM(S): 5 TABLET ORAL at 07:46

## 2022-12-01 RX ADMIN — BUDESONIDE AND FORMOTEROL FUMARATE DIHYDRATE 2 PUFF(S): 160; 4.5 AEROSOL RESPIRATORY (INHALATION) at 05:52

## 2022-12-01 RX ADMIN — Medication 3 MILLILITER(S): at 05:33

## 2022-12-01 RX ADMIN — Medication 4 MILLIGRAM(S): at 08:57

## 2022-12-01 RX ADMIN — Medication 4 MILLIGRAM(S): at 21:04

## 2022-12-01 RX ADMIN — OXYCODONE HYDROCHLORIDE 5 MILLIGRAM(S): 5 TABLET ORAL at 21:36

## 2022-12-01 RX ADMIN — OXYCODONE HYDROCHLORIDE 2.5 MILLIGRAM(S): 5 TABLET ORAL at 07:09

## 2022-12-01 RX ADMIN — SODIUM CHLORIDE 500 MILLILITER(S): 9 INJECTION, SOLUTION INTRAVENOUS at 14:51

## 2022-12-01 RX ADMIN — Medication 4 MILLIGRAM(S): at 14:48

## 2022-12-01 RX ADMIN — Medication 4 MILLIGRAM(S): at 03:07

## 2022-12-01 NOTE — PROGRESS NOTE ADULT - SUBJECTIVE AND OBJECTIVE BOX
ZEYNEP GOLDMAN  67y  Female      Patient is a 67y old  Female who presents with a chief complaint of Hemorrhagic brain mass (30 Nov 2022 19:42)      INTERVAL HPI/OVERNIGHT EVENTS:  No acute events overnight.    REVIEW OF SYSTEMS:  Please see HPI. All other systems negative.    T(C): 36.7 (12-01-22 @ 03:51), Max: 37 (11-30-22 @ 12:25)  HR: 67 (12-01-22 @ 03:51) (67 - 83)  BP: 134/64 (12-01-22 @ 03:51) (134/64 - 137/74)  RR: 18 (12-01-22 @ 03:51) (18 - 18)  SpO2: 94% (12-01-22 @ 03:51) (93% - 94%)  Wt(kg): --Vital Signs Last 24 Hrs  T(C): 36.7 (01 Dec 2022 03:51), Max: 37 (30 Nov 2022 12:25)  T(F): 98.1 (01 Dec 2022 03:51), Max: 98.6 (30 Nov 2022 12:25)  HR: 67 (01 Dec 2022 03:51) (67 - 83)  BP: 134/64 (01 Dec 2022 03:51) (134/64 - 137/74)  BP(mean): --  RR: 18 (01 Dec 2022 03:51) (18 - 18)  SpO2: 94% (01 Dec 2022 03:51) (93% - 94%)    Parameters below as of 30 Nov 2022 21:20  Patient On (Oxygen Delivery Method): room air        PHYSICAL EXAM:  GENERAL: NAD, well-groomed, well-developed  HEAD:  Atraumatic, Normocephalic  EYES: EOMI, conjunctiva and sclera clear  ENMT: Moist mucous membranes, Good dentition, No lesions  NECK: Supple, No JVD  NERVOUS SYSTEM:  Alert & Oriented X3; Motor Strength full and symmetric in upper and lower extremities;  CHEST/LUNG: Clear to auscultation bilaterally; No rales, rhonchi, wheezing, or rubs  HEART: Regular rate and rhythm; Normal S1, S2  ABDOMEN: Soft, Nontender, Nondistended; Bowel sounds present  EXTREMITIES:  2+ Peripheral Pulses; No clubbing, cyanosis, or edema  SKIN: No rashes or lesions    Consultant(s) Notes Reviewed:  [x ] YES  [ ] NO  Care Discussed with Consultants/Other Providers [ x] YES  [ ] NO    LABS:            PT/INR - ( 01 Dec 2022 06:59 )   PT: 12.2 sec;   INR: 1.05 ratio         PTT - ( 01 Dec 2022 06:59 )  PTT:26.1 sec        RADIOLOGY & ADDITIONAL TESTS:  Reviewed.    ALLERGIES:  Cipro (Rash)      MEDS:  acetaminophen     Tablet .. 650 milliGRAM(s) Oral every 6 hours PRN  albuterol/ipratropium for Nebulization 3 milliLiter(s) Nebulizer every 6 hours PRN  ALPRAZolam 0.25 milliGRAM(s) Oral every 8 hours PRN  budesonide  80 MICROgram(s)/formoterol 4.5 MICROgram(s) Inhaler 2 Puff(s) Inhalation two times a day  coronavirus bivalent (EUA) Booster Vaccine (PFIZER) 0.3 milliLiter(s) IntraMuscular once  dexAMETHasone  Injectable 4 milliGRAM(s) IV Push every 6 hours  influenza  Vaccine (HIGH DOSE) 0.7 milliLiter(s) IntraMuscular once  lactated ringers. 1000 milliLiter(s) IV Continuous <Continuous>  oxyCODONE    IR 2.5 milliGRAM(s) Oral every 6 hours PRN         ZEYNEP GOLDMAN  67y  Female      Patient is a 67y old  Female who presents with a chief complaint of Hemorrhagic brain mass (30 Nov 2022 19:42)      INTERVAL HPI/OVERNIGHT EVENTS:  No acute events overnight. Patient appears comfortable but is reporting 9/10 pain. Oxy has been helping somewhat. No dizziness, blurry vision, weakness, drowsiness.    REVIEW OF SYSTEMS:  Please see HPI. All other systems negative.    T(C): 36.7 (12-01-22 @ 03:51), Max: 37 (11-30-22 @ 12:25)  HR: 67 (12-01-22 @ 03:51) (67 - 83)  BP: 134/64 (12-01-22 @ 03:51) (134/64 - 137/74)  RR: 18 (12-01-22 @ 03:51) (18 - 18)  SpO2: 94% (12-01-22 @ 03:51) (93% - 94%)  Wt(kg): --Vital Signs Last 24 Hrs  T(C): 36.7 (01 Dec 2022 03:51), Max: 37 (30 Nov 2022 12:25)  T(F): 98.1 (01 Dec 2022 03:51), Max: 98.6 (30 Nov 2022 12:25)  HR: 67 (01 Dec 2022 03:51) (67 - 83)  BP: 134/64 (01 Dec 2022 03:51) (134/64 - 137/74)  BP(mean): --  RR: 18 (01 Dec 2022 03:51) (18 - 18)  SpO2: 94% (01 Dec 2022 03:51) (93% - 94%)    Parameters below as of 30 Nov 2022 21:20  Patient On (Oxygen Delivery Method): room air        PHYSICAL EXAM:  GENERAL: NAD, well-groomed, well-developed  HEAD:  Atraumatic, Normocephalic, 4-cm posterior scalp mass with dried blood, mild TTP  EYES: EOMI, conjunctiva and sclera clear  ENMT: Moist mucous membranes, Good dentition, No lesions  NECK: Supple, No JVD  NERVOUS SYSTEM:  Alert & Oriented X3; Motor Strength full and symmetric in upper and lower extremities;  CHEST/LUNG: Clear to auscultation bilaterally; No rales, rhonchi, wheezing, or rubs  HEART: Regular rate and rhythm; Normal S1, S2  ABDOMEN: Soft, Nontender, Nondistended; Bowel sounds present  EXTREMITIES:  2+ Peripheral Pulses; No clubbing, cyanosis, or edema  SKIN: No rashes or lesions    Consultant(s) Notes Reviewed:  [x ] YES  [ ] NO  Care Discussed with Consultants/Other Providers [ x] YES  [ ] NO    LABS:            PT/INR - ( 01 Dec 2022 06:59 )   PT: 12.2 sec;   INR: 1.05 ratio         PTT - ( 01 Dec 2022 06:59 )  PTT:26.1 sec        RADIOLOGY & ADDITIONAL TESTS:  Reviewed.    ALLERGIES:  Cipro (Rash)      MEDS:  acetaminophen     Tablet .. 650 milliGRAM(s) Oral every 6 hours PRN  albuterol/ipratropium for Nebulization 3 milliLiter(s) Nebulizer every 6 hours PRN  ALPRAZolam 0.25 milliGRAM(s) Oral every 8 hours PRN  budesonide  80 MICROgram(s)/formoterol 4.5 MICROgram(s) Inhaler 2 Puff(s) Inhalation two times a day  coronavirus bivalent (EUA) Booster Vaccine (PFIZER) 0.3 milliLiter(s) IntraMuscular once  dexAMETHasone  Injectable 4 milliGRAM(s) IV Push every 6 hours  influenza  Vaccine (HIGH DOSE) 0.7 milliLiter(s) IntraMuscular once  lactated ringers. 1000 milliLiter(s) IV Continuous <Continuous>  oxyCODONE    IR 2.5 milliGRAM(s) Oral every 6 hours PRN

## 2022-12-01 NOTE — PROGRESS NOTE ADULT - ASSESSMENT
Assessment: 67F w/ hx of Stage IV breast cancer w/ lungs and bone mets (s/p L mastectomy, s/p RT for lung mets, now on Xeloda, most recently 4 days ago), clear cell RCC (s/p L nephrectomy), b/l LE DVTs (on Xarelto), COPD, presenting with constant headache x~1 month and found to have a 2.8cm x 2.1cm hemorrhagic mass in L anterior medial frontal lobe of brain on MRI. Surgical Oncology consulted for management of fungating, intermittently oozing, scalp mass. CT scan showing a a 2.4 x 5.7 x 4.0 cm fungating lesion arising from the right posterior scalp, with a focus of infiltration into the subgaleal space, but no evidence for calvarial infiltration.    Plan/recommendations  - No acute surgical intervention  - Continue holding anticoagulation  - Will plan to proceed with surgery for posterior scalp mass during this admission (next week) vs outpatient  - Need to coordinate with Plastics for reconstruction    Red Team Surgery  [id number]  Assessment: 67F w/ hx of Stage IV breast cancer w/ lungs and bone mets (s/p L mastectomy, s/p RT for lung mets, now on Xeloda, most recently 4 days ago), clear cell RCC (s/p L nephrectomy), b/l LE DVTs (on Xarelto), COPD, presenting with constant headache x~1 month and found to have a 2.8cm x 2.1cm hemorrhagic mass in L anterior medial frontal lobe of brain on MRI. Surgical Oncology consulted for management of fungating, intermittently oozing, scalp mass. CT scan showing a a 2.4 x 5.7 x 4.0 cm fungating lesion arising from the right posterior scalp, with a focus of infiltration into the subgaleal space, but no evidence for calvarial infiltration.    Plan/recommendations  - No acute surgical intervention  - Continue holding anticoagulation  - discussed with attending surgeon, plan for outpatient follow up for posterior scalp mass  - Need to coordinate with Plastics for reconstruction as outpatient     Red Team Surgery  [id number]

## 2022-12-01 NOTE — PROGRESS NOTE ADULT - ATTENDING COMMENTS
67F w/ hx of Stage IV breast cancer w/ mets (s/p L mastectomy, s/p RT for lung mets, now on oral chemo), clear cell RCC (s/p L nephrectomy), b/l LE DVTs (on Xarelto), COPD, presenting with constant headache x~1 month and found to have an abnormal outpatient MRI head showing 2.8cm x 2.1cm hemorrhagic mass in L anterior medial frontal lobe of brain; also with fungating occipital scalp lesion. Likely progression of malignancy. Inpatient MRI pituitary protocol demonstrated metastatic lesions in left frontal lobe, sella/infundibulum, right postcentral gyrus, posterior scalp, calvarium, and upper cervical vertebral bodies with vasogenic edema. Labs notable for TSH 0.10 with wnl T4, low hch 0.08, high prolactin 76     Headache unchanged compared to admission. Oxycodone occasionally makes pain worse. No blurry vision, slurred speech or ataxia. Per rad onc- outpatient stereotactic radiosurgery    #Hemorrhagic Brain Mass  #Occipital Scalp Lesion  #Stage4 breast cancer    -f/u neurosurgery recs  -continue with decadron with taper  -pending CT chest and Ab/P   -oncology recs appreciated   -surg onc- inpatient vs outpatient scalp lesion biopsy   -neuro checks q8hr    d/w HS7    Cathy Espinoza MD  Division of Hospital Medicine  Available on [employer] Teams.

## 2022-12-01 NOTE — PROGRESS NOTE ADULT - SUBJECTIVE AND OBJECTIVE BOX
Surgical Oncology Progress Note    SUBJECTIVE: Pt seen and examined at bedside. Patient comfortable and in no-apparent distress. No nausea, vomiting, diarrhea. Pain is controlled.     Vital Signs Last 24 Hrs  T(C): 36.8 (01 Dec 2022 11:50), Max: 36.8 (30 Nov 2022 21:20)  T(F): 98.2 (01 Dec 2022 11:50), Max: 98.3 (30 Nov 2022 21:20)  HR: 56 (01 Dec 2022 11:50) (56 - 83)  BP: 124/66 (01 Dec 2022 11:50) (124/66 - 137/73)  BP(mean): --  RR: 18 (01 Dec 2022 11:50) (18 - 18)  SpO2: 94% (01 Dec 2022 11:50) (94% - 94%)    Parameters below as of 01 Dec 2022 11:50  Patient On (Oxygen Delivery Method): room air    Physical Exam:  General: NAD  HEENT: Palpable occipital nonbleeding mass of approximately 5cm max diameter, no drainage   Cardiac: Regular rate  Respiratory: Breath sounds clear and equal bilaterally.  Abdominal Exam: soft, nontender, nondistended  Muskuloskeletal: Moves all 4 extremities spontaneously  Neurological: Alert and oriented  Psych: AOX3       LABS:                        12.3   16.36 )-----------( 160      ( 01 Dec 2022 06:59 )             38.7     12-01    140  |  106  |  25<H>  ----------------------------<  186<H>  4.3   |  21<L>  |  1.01    Ca    9.1      01 Dec 2022 06:59  Phos  2.1     12-01  Mg     2.2     12-01    TPro  6.1  /  Alb  3.5  /  TBili  0.3  /  DBili  x   /  AST  13  /  ALT  5<L>  /  AlkPhos  46  12-01    PT/INR - ( 01 Dec 2022 06:59 )   PT: 12.2 sec;   INR: 1.05 ratio         PTT - ( 01 Dec 2022 06:59 )  PTT:26.1 sec      INs and OUTs:    11-30-22 @ 07:01  -  12-01-22 @ 07:00  --------------------------------------------------------  IN: 840 mL / OUT: 0 mL / NET: 840 mL

## 2022-12-01 NOTE — PROGRESS NOTE ADULT - PROBLEM SELECTOR PLAN 4
Hx of clear cell RCC (s/p L nephrectomy) and CKD3 with one kidney.  - on admission, SCr 1.06 (baseline SCr ~1.0-1.3 in [id number])  - strict I/Os, renally dose meds, avoid nephrotoxins as able  - monitor SCr and electrolytes

## 2022-12-01 NOTE — PROGRESS NOTE ADULT - PROBLEM SELECTOR PLAN 3
Presented with fungating occipital scalp lesion x7 months.  - Surg Onc consult Presented with fungating occipital scalp lesion x7 months.  - Surg Onc consult  - Recommending outpatient followup

## 2022-12-01 NOTE — PROGRESS NOTE ADULT - PROBLEM SELECTOR PLAN 2
Stage IV breast cancer w/ mets (s/p L mastectomy, s/p RT for lung mets, now on oral chemo aromasin/afinitor/capecitabine at home). Now with new brain mass finding, likely 2/2 progression of malignancy.  - Follows with Oncologist Dr. Leslie Goldstein ([facility])  - CT C/A/P w/ IV contrast ordered as recommended by NSGY, started on IVF hydration to prepare for contrast as patient only has one kidney  - consider Nephrology if CKD worsens  - f/u heme onc recs

## 2022-12-01 NOTE — PROGRESS NOTE ADULT - SUBJECTIVE AND OBJECTIVE BOX
Pt is alert and awake.  Decadron has helped headaches.  Pt seen today with resident.  GARCIA well with no drift.  Has staining on pillow from parietooccipital cutaneous lesion.  Pending CT CAP and head.  MRI sella with lesion, pending read.  Continue to monitor and continue steroids.

## 2022-12-01 NOTE — PROGRESS NOTE ADULT - PROBLEM SELECTOR PLAN 7
Hx of former smoker, COPD, and mets to lungs. Has supplemental home O2 PRN, but stated that she has not really needed to use it. Has had mild wheezing and occasional specks of hemoptysis since receiving RT for lung mets.  - does not appear to be in active exacerbation  - c/w home breo ellipta equivalent, Symbicort

## 2022-12-01 NOTE — PROGRESS NOTE ADULT - PROBLEM SELECTOR PLAN 1
Presented with constant headache x1 month. MRI head showed 2.8cm x 2.1cm hemorrhagic mass in L anterior medial frontal lobe of brain. Exam on admission appeared grossly intact, though possible slight weakening 4+/5 in LUE (unclear if due to positioning). INR on admission was 2.43.   - Appreciate NSGY recs: no acute NSGY intervention, good candidate for SRS  - s/p dex 10mg IV x1 in ED; c/w dex 4mg IV q6h with 1 week taper to off per NSGY  - hold AC/AP   - f/u mets workup  - rad onc consult emailed, f/u recs  - f/u med onc recs  - neurochecks q8h for now  - f/u CT head non-con for interval assessment of hemorrhagic brain mass Presented with constant headache x1 month. MRI head showed 2.8cm x 2.1cm hemorrhagic mass in L anterior medial frontal lobe of brain. Exam on admission appeared grossly intact, though possible slight weakening 4+/5 in LUE (unclear if due to positioning). INR on admission was 2.43.   - Appreciate NSGY recs: no acute NSGY intervention, good candidate for SRS  - s/p dex 10mg IV x1 in ED; c/w dex 4mg IV q6h with 1 week taper to off per NSGY  - hold AC/AP   - f/u mets workup: elevated prolactin, with decreased HGH, TSH  - rad onc consult emailed, recommending outpt SRS  - f/u med onc recs  - neurochecks q8h for now  - MR head interval assessment of hemorrhagic brain mass shows no change

## 2022-12-02 ENCOUNTER — TRANSCRIPTION ENCOUNTER (OUTPATIENT)
Age: 67
End: 2022-12-02

## 2022-12-02 LAB
ANION GAP SERPL CALC-SCNC: 12 MMOL/L — SIGNIFICANT CHANGE UP (ref 5–17)
BUN SERPL-MCNC: 33 MG/DL — HIGH (ref 7–23)
CALCIUM SERPL-MCNC: 9.4 MG/DL — SIGNIFICANT CHANGE UP (ref 8.4–10.5)
CHLORIDE SERPL-SCNC: 103 MMOL/L — SIGNIFICANT CHANGE UP (ref 96–108)
CO2 SERPL-SCNC: 25 MMOL/L — SIGNIFICANT CHANGE UP (ref 22–31)
CORTIS AM PEAK SERPL-MCNC: 0.7 UG/DL — LOW (ref 6–18.4)
CREAT SERPL-MCNC: 1.32 MG/DL — HIGH (ref 0.5–1.3)
EGFR: 44 ML/MIN/1.73M2 — LOW
GLUCOSE SERPL-MCNC: 151 MG/DL — HIGH (ref 70–99)
HCT VFR BLD CALC: 40.8 % — SIGNIFICANT CHANGE UP (ref 34.5–45)
HGB BLD-MCNC: 13.2 G/DL — SIGNIFICANT CHANGE UP (ref 11.5–15.5)
MAGNESIUM SERPL-MCNC: 2.3 MG/DL — SIGNIFICANT CHANGE UP (ref 1.6–2.6)
MCHC RBC-ENTMCNC: 30.4 PG — SIGNIFICANT CHANGE UP (ref 27–34)
MCHC RBC-ENTMCNC: 32.4 GM/DL — SIGNIFICANT CHANGE UP (ref 32–36)
MCV RBC AUTO: 94 FL — SIGNIFICANT CHANGE UP (ref 80–100)
NRBC # BLD: 0 /100 WBCS — SIGNIFICANT CHANGE UP (ref 0–0)
PHOSPHATE SERPL-MCNC: 4 MG/DL — SIGNIFICANT CHANGE UP (ref 2.5–4.5)
PLATELET # BLD AUTO: 173 K/UL — SIGNIFICANT CHANGE UP (ref 150–400)
POTASSIUM SERPL-MCNC: 4.7 MMOL/L — SIGNIFICANT CHANGE UP (ref 3.5–5.3)
POTASSIUM SERPL-SCNC: 4.7 MMOL/L — SIGNIFICANT CHANGE UP (ref 3.5–5.3)
RBC # BLD: 4.34 M/UL — SIGNIFICANT CHANGE UP (ref 3.8–5.2)
RBC # FLD: 22.7 % — HIGH (ref 10.3–14.5)
SODIUM SERPL-SCNC: 140 MMOL/L — SIGNIFICANT CHANGE UP (ref 135–145)
WBC # BLD: 12.37 K/UL — HIGH (ref 3.8–10.5)
WBC # FLD AUTO: 12.37 K/UL — HIGH (ref 3.8–10.5)

## 2022-12-02 PROCEDURE — 99232 SBSQ HOSP IP/OBS MODERATE 35: CPT | Mod: GC

## 2022-12-02 RX ORDER — SODIUM CHLORIDE 9 MG/ML
1000 INJECTION, SOLUTION INTRAVENOUS
Refills: 0 | Status: DISCONTINUED | OUTPATIENT
Start: 2022-12-02 | End: 2022-12-03

## 2022-12-02 RX ORDER — EVEROLIMUS 10 MG/1
1 TABLET ORAL
Qty: 0 | Refills: 0 | DISCHARGE

## 2022-12-02 RX ORDER — OXYCODONE HYDROCHLORIDE 5 MG/1
10 TABLET ORAL EVERY 6 HOURS
Refills: 0 | Status: DISCONTINUED | OUTPATIENT
Start: 2022-12-02 | End: 2022-12-03

## 2022-12-02 RX ORDER — OXYCODONE HYDROCHLORIDE 5 MG/1
5 TABLET ORAL EVERY 6 HOURS
Refills: 0 | Status: DISCONTINUED | OUTPATIENT
Start: 2022-12-02 | End: 2022-12-03

## 2022-12-02 RX ORDER — OXYCODONE HYDROCHLORIDE 5 MG/1
1 TABLET ORAL
Qty: 30 | Refills: 0
Start: 2022-12-02 | End: 2022-12-06

## 2022-12-02 RX ORDER — CAPECITABINE 500 MG/1
0 TABLET ORAL
Qty: 0 | Refills: 0 | DISCHARGE

## 2022-12-02 RX ADMIN — SODIUM CHLORIDE 100 MILLILITER(S): 9 INJECTION, SOLUTION INTRAVENOUS at 10:45

## 2022-12-02 RX ADMIN — OXYCODONE HYDROCHLORIDE 10 MILLIGRAM(S): 5 TABLET ORAL at 19:39

## 2022-12-02 RX ADMIN — OXYCODONE HYDROCHLORIDE 10 MILLIGRAM(S): 5 TABLET ORAL at 21:38

## 2022-12-02 RX ADMIN — Medication 4 MILLIGRAM(S): at 21:40

## 2022-12-02 RX ADMIN — OXYCODONE HYDROCHLORIDE 5 MILLIGRAM(S): 5 TABLET ORAL at 08:09

## 2022-12-02 RX ADMIN — OXYCODONE HYDROCHLORIDE 5 MILLIGRAM(S): 5 TABLET ORAL at 08:38

## 2022-12-02 RX ADMIN — BUDESONIDE AND FORMOTEROL FUMARATE DIHYDRATE 2 PUFF(S): 160; 4.5 AEROSOL RESPIRATORY (INHALATION) at 19:03

## 2022-12-02 RX ADMIN — Medication 4 MILLIGRAM(S): at 10:33

## 2022-12-02 RX ADMIN — Medication 4 MILLIGRAM(S): at 03:31

## 2022-12-02 RX ADMIN — BUDESONIDE AND FORMOTEROL FUMARATE DIHYDRATE 2 PUFF(S): 160; 4.5 AEROSOL RESPIRATORY (INHALATION) at 06:33

## 2022-12-02 RX ADMIN — Medication 4 MILLIGRAM(S): at 15:43

## 2022-12-02 NOTE — DISCHARGE NOTE PROVIDER - NSFOLLOWUPCLINICS_GEN_ALL_ED_FT
[facility]  General Surgery  [address] [place] [address] - [address]rd [place] [address]  Phone: [phone]  Fax:      [facility]  General Surgery  [address] [place] [address] - [address]rd [place] [address]  Phone: [phone]  Fax:     [facility]  Endocrinology  [address] [place] [id number]  Phone: [phone]  Fax:

## 2022-12-02 NOTE — PHYSICAL THERAPY INITIAL EVALUATION ADULT - GAIT DISTANCE, PT EVAL
with standard cane pt ambulates with short step length and turns requires cga. Additionally pt ambulated 15' using RW independent. Gait - normal- IND during turns. Pt agreeable to use RW at home./50 feet

## 2022-12-02 NOTE — PHYSICAL THERAPY INITIAL EVALUATION ADULT - ADDITIONAL COMMENTS
Patient lives alone in pvt house, 1 step to enter.  Patient ambulated without AD independent. pt owns rw, standard cane at home. community amb with one person beside. Patient lives alone in pvt house, 1 step to enter.  Patient ambulated without AD independent. pt owns rw, standard cane at home. community amb with one person beside. No steps inside. Patient lives alone in pvt house, 1 step to enter.  Patient ambulated without AD independent. pt owns  standard cane at home. community amb with one person beside. No steps inside.

## 2022-12-02 NOTE — PROGRESS NOTE ADULT - PROBLEM SELECTOR PLAN 1
Presented with constant headache x1 month. MRI head showed 2.8cm x 2.1cm hemorrhagic mass in L anterior medial frontal lobe of brain. Exam on admission appeared grossly intact, though possible slight weakening 4+/5 in LUE (unclear if due to positioning). INR on admission was 2.43.   - Appreciate NSGY recs: no acute NSGY intervention, good candidate for SRS  - s/p dex 10mg IV x1 in ED; c/w dex 4mg IV q6h with 1 week taper to off per NSGY  - hold AC/AP   - f/u mets workup: elevated prolactin, with decreased HGH, TSH, cortisol  - rad onc consult emailed, recommending outpt SRS  - f/u med onc recs  - neurochecks q8h for now  - CTH showing multiple parenchymal, osseous, and extracalvarial lesions  - MR head interval assessment of hemorrhagic brain mass shows no change Presented with constant headache x1 month. MRI head showed 2.8cm x 2.1cm hemorrhagic mass in L anterior medial frontal lobe of brain. Exam on admission appeared grossly intact, though possible slight weakening 4+/5 in LUE (unclear if due to positioning). INR on admission was 2.43.   - Appreciate NSGY recs: no acute NSGY intervention, good candidate for SRS  - s/p dex 10mg IV x1 in ED; c/w dex 4mg IV q6h with 1 week taper to off per NSGY  - hold AC/AP   - f/u mets workup: elevated prolactin, with decreased HGH, TSH, cortisol  - rad onc consult emailed, recommending outpt SRS  - f/u med onc recs  - neurochecks q8h for now  - oxycodone 5 mg PO Q6H PRN  - CTH showing multiple parenchymal, osseous, and extracalvarial lesions  - MR head interval assessment of hemorrhagic brain mass shows no change

## 2022-12-02 NOTE — PROGRESS NOTE ADULT - PROBLEM SELECTOR PLAN 3
Presented with fungating occipital scalp lesion x7 months.  - Surg Onc consult  - Recommending outpatient followup

## 2022-12-02 NOTE — DISCHARGE NOTE PROVIDER - PROVIDER TOKENS
PROVIDER:[TOKEN:[[id number]],FOLLOWUP:[Routine]] PROVIDER:[TOKEN:[[id number]],FOLLOWUP:[1 week]],PROVIDER:[TOKEN:[[id number]],FOLLOWUP:[2 weeks]] PROVIDER:[TOKEN:[[id number]],FOLLOWUP:[1 week]],PROVIDER:[TOKEN:[[id number]],FOLLOWUP:[2 weeks]],PROVIDER:[TOKEN:[[id number]],FOLLOWUP:[1 week]]

## 2022-12-02 NOTE — PROGRESS NOTE ADULT - PROBLEM SELECTOR PLAN 2
Stage IV breast cancer w/ mets (s/p L mastectomy, s/p RT for lung mets, now on oral chemo aromasin/afinitor/capecitabine at home). Now with new brain mass finding, likely 2/2 progression of malignancy.  - Follows with Oncologist Dr. Leslie Goldstein ([facility])  - CT C/A/P w/ IV contrast ordered as recommended by NSGY, started on IVF hydration to prepare for contrast as patient only has one kidney  - CT C/A/P: left perihilar mass with increased endobronchial extension along the superior segmental bronchus of the left lower lobe  - f/u heme onc recs Stage IV breast cancer w/ mets (s/p L mastectomy, s/p RT for lung mets, now on oral chemo aromasin/afinitor/capecitabine at home). Now with new brain mass finding, likely 2/2 progression of malignancy.  - Follows with Oncologist Dr. Leslie Goldstein ([facility])  - CT C/A/P: left perihilar mass with increased endobronchial extension along the superior segmental bronchus of the left lower lobe  - f/u heme onc recs

## 2022-12-02 NOTE — PROGRESS NOTE ADULT - SUBJECTIVE AND OBJECTIVE BOX
ZEYNEP GOLDMAN  67y  Female      Patient is a 67y old  Female who presents with a chief complaint of Hemorrhagic brain mass (01 Dec 2022 18:15)      INTERVAL HPI/OVERNIGHT EVENTS:  No acute events overnight.    REVIEW OF SYSTEMS:  Please see HPI. All other systems negative.    T(C): 36.4 (12-02-22 @ 04:40), Max: 36.8 (12-01-22 @ 11:50)  HR: 57 (12-02-22 @ 04:40) (56 - 65)  BP: 125/78 (12-02-22 @ 04:40) (124/66 - 140/73)  RR: 16 (12-02-22 @ 04:40) (16 - 18)  SpO2: 94% (12-02-22 @ 04:40) (94% - 97%)  Wt(kg): --Vital Signs Last 24 Hrs  T(C): 36.4 (02 Dec 2022 04:40), Max: 36.8 (01 Dec 2022 11:50)  T(F): 97.5 (02 Dec 2022 04:40), Max: 98.2 (01 Dec 2022 11:50)  HR: 57 (02 Dec 2022 04:40) (56 - 65)  BP: 125/78 (02 Dec 2022 04:40) (124/66 - 140/73)  BP(mean): --  RR: 16 (02 Dec 2022 04:40) (16 - 18)  SpO2: 94% (02 Dec 2022 04:40) (94% - 97%)    Parameters below as of 02 Dec 2022 04:40  Patient On (Oxygen Delivery Method): room air        PHYSICAL EXAM:  GENERAL: NAD, well-groomed, well-developed  HEAD:  Atraumatic, Normocephalic  EYES: EOMI, conjunctiva and sclera clear  ENMT: Moist mucous membranes, Good dentition, No lesions  NECK: Supple, No JVD  NERVOUS SYSTEM:  Alert & Oriented X3; Motor Strength full and symmetric in upper and lower extremities;  CHEST/LUNG: Clear to auscultation bilaterally; No rales, rhonchi, wheezing, or rubs  HEART: Regular rate and rhythm; Normal S1, S2  ABDOMEN: Soft, Nontender, Nondistended; Bowel sounds present  EXTREMITIES:  2+ Peripheral Pulses; No clubbing, cyanosis, or edema  SKIN: No rashes or lesions    Consultant(s) Notes Reviewed:  [x ] YES  [ ] NO  Care Discussed with Consultants/Other Providers [ x] YES  [ ] NO    LABS:                        13.2   12.37 )-----------( 173      ( 02 Dec 2022 06:54 )             40.8     12-01    140  |  106  |  25<H>  ----------------------------<  186<H>  4.3   |  21<L>  |  1.01    Ca    9.1      01 Dec 2022 06:59  Phos  2.1     12-01  Mg     2.2     12-01    TPro  6.1  /  Alb  3.5  /  TBili  0.3  /  DBili  x   /  AST  13  /  ALT  5<L>  /  AlkPhos  46  12-01    LIVER FUNCTIONS - ( 01 Dec 2022 06:59 )  Alb: 3.5 g/dL / Pro: 6.1 g/dL / ALK PHOS: 46 U/L / ALT: 5 U/L / AST: 13 U/L / GGT: x           PT/INR - ( 01 Dec 2022 06:59 )   PT: 12.2 sec;   INR: 1.05 ratio         PTT - ( 01 Dec 2022 06:59 )  PTT:26.1 sec        RADIOLOGY & ADDITIONAL TESTS:  Reviewed.    ALLERGIES:  Cipro (Rash)      MEDS:  acetaminophen     Tablet .. 650 milliGRAM(s) Oral every 6 hours PRN  albuterol/ipratropium for Nebulization 3 milliLiter(s) Nebulizer every 6 hours PRN  ALPRAZolam 0.25 milliGRAM(s) Oral every 8 hours PRN  budesonide  80 MICROgram(s)/formoterol 4.5 MICROgram(s) Inhaler 2 Puff(s) Inhalation two times a day  coronavirus bivalent (EUA) Booster Vaccine (PFIZER) 0.3 milliLiter(s) IntraMuscular once  dexAMETHasone  Injectable 4 milliGRAM(s) IV Push every 6 hours  influenza  Vaccine (HIGH DOSE) 0.7 milliLiter(s) IntraMuscular once  lactated ringers. 1000 milliLiter(s) IV Continuous <Continuous>  oxyCODONE    IR 5 milliGRAM(s) Oral every 6 hours PRN         ZEYNEP GOLDMAN  67y  Female      Patient is a 67y old  Female who presents with a chief complaint of Hemorrhagic brain mass (01 Dec 2022 18:15)      INTERVAL HPI/OVERNIGHT EVENTS:  No acute events overnight. Patient appearing comfortable but reporting 8/10 HA, improves somewhat with PRN pain meds. Denies any vision, hearing, or strength changes. Able to walk around unit without home cane. She has no CP, SOB, fever, dizziness,    REVIEW OF SYSTEMS:  Please see HPI. All other systems negative.    T(C): 36.4 (12-02-22 @ 04:40), Max: 36.8 (12-01-22 @ 11:50)  HR: 57 (12-02-22 @ 04:40) (56 - 65)  BP: 125/78 (12-02-22 @ 04:40) (124/66 - 140/73)  RR: 16 (12-02-22 @ 04:40) (16 - 18)  SpO2: 94% (12-02-22 @ 04:40) (94% - 97%)  Wt(kg): --Vital Signs Last 24 Hrs  T(C): 36.4 (02 Dec 2022 04:40), Max: 36.8 (01 Dec 2022 11:50)  T(F): 97.5 (02 Dec 2022 04:40), Max: 98.2 (01 Dec 2022 11:50)  HR: 57 (02 Dec 2022 04:40) (56 - 65)  BP: 125/78 (02 Dec 2022 04:40) (124/66 - 140/73)  BP(mean): --  RR: 16 (02 Dec 2022 04:40) (16 - 18)  SpO2: 94% (02 Dec 2022 04:40) (94% - 97%)    Parameters below as of 02 Dec 2022 04:40  Patient On (Oxygen Delivery Method): room air          PHYSICAL EXAM:  GENERAL: NAD, well-groomed, well-developed  HEAD: 4-cm posterior scalp mass with dried blood, mild TTP  EYES: EOMI, conjunctiva and sclera clear  ENMT: Moist mucous membranes, Good dentition, No lesions  NECK: Supple, No JVD  NERVOUS SYSTEM:  Alert & Oriented X3; Motor Strength full and symmetric in upper and lower extremities;  CHEST/LUNG: Clear to auscultation bilaterally; No rales, rhonchi, wheezing, or rubs  HEART: Regular rate and rhythm; Normal S1, S2  ABDOMEN: Soft, Nontender, Nondistended; Bowel sounds present  EXTREMITIES:  2+ Peripheral Pulses; No clubbing, cyanosis, or edema  SKIN: No rashes or lesions    Consultant(s) Notes Reviewed:  [x ] YES  [ ] NO  Care Discussed with Consultants/Other Providers [ x] YES  [ ] NO    LABS:                        13.2   12.37 )-----------( 173      ( 02 Dec 2022 06:54 )             40.8     12-01    140  |  106  |  25<H>  ----------------------------<  186<H>  4.3   |  21<L>  |  1.01    Ca    9.1      01 Dec 2022 06:59  Phos  2.1     12-01  Mg     2.2     12-01    TPro  6.1  /  Alb  3.5  /  TBili  0.3  /  DBili  x   /  AST  13  /  ALT  5<L>  /  AlkPhos  46  12-01    LIVER FUNCTIONS - ( 01 Dec 2022 06:59 )  Alb: 3.5 g/dL / Pro: 6.1 g/dL / ALK PHOS: 46 U/L / ALT: 5 U/L / AST: 13 U/L / GGT: x           PT/INR - ( 01 Dec 2022 06:59 )   PT: 12.2 sec;   INR: 1.05 ratio         PTT - ( 01 Dec 2022 06:59 )  PTT:26.1 sec        RADIOLOGY & ADDITIONAL TESTS:  Reviewed.    ALLERGIES:  Cipro (Rash)      MEDS:  acetaminophen     Tablet .. 650 milliGRAM(s) Oral every 6 hours PRN  albuterol/ipratropium for Nebulization 3 milliLiter(s) Nebulizer every 6 hours PRN  ALPRAZolam 0.25 milliGRAM(s) Oral every 8 hours PRN  budesonide  80 MICROgram(s)/formoterol 4.5 MICROgram(s) Inhaler 2 Puff(s) Inhalation two times a day  coronavirus bivalent (EUA) Booster Vaccine (PFIZER) 0.3 milliLiter(s) IntraMuscular once  dexAMETHasone  Injectable 4 milliGRAM(s) IV Push every 6 hours  influenza  Vaccine (HIGH DOSE) 0.7 milliLiter(s) IntraMuscular once  lactated ringers. 1000 milliLiter(s) IV Continuous <Continuous>  oxyCODONE    IR 5 milliGRAM(s) Oral every 6 hours PRN

## 2022-12-02 NOTE — PHYSICAL THERAPY INITIAL EVALUATION ADULT - NSPTDISCHREC_GEN_A_CORE
Home with home PT for safety assessment, gait,stair negotiation, balance, & strength training and to return pt to baseline functional mobility status./Home PT

## 2022-12-02 NOTE — PROGRESS NOTE ADULT - PROBLEM SELECTOR PLAN 8
- DVT ppx: SCDs for now given hemorrhagic brain mass and INR 2.43 on presentation  - Diet: regular  - Dispo: pending further eval by med onc, rad onc - DVT ppx: SCDs for now given hemorrhagic brain mass and INR 2.43 on presentation  - Diet: regular  - Dispo: pending further eval by NSBRENDA

## 2022-12-02 NOTE — PHYSICAL THERAPY INITIAL EVALUATION ADULT - PERTINENT HX OF CURRENT PROBLEM, REHAB EVAL
67-year-old female with PMHx of DVT on Xarelto, breast cancer on the right with mets to kidney, lung,COPD.Has supplemental home O2 PRN, but stated that she has not really needed to use it. presents for abnormal MRI seen today from [facility].  Patient reports having a headache for the past 5 months which was why she had the MRI done. MR which showed a 2.1 x 2.8 cm L frontal enhancing lesion c/f mets. No acute neurosurgery intervention.No duplex evidence of acute DVT in either lower extremity.  Chart review revealed hx of IVC filter, but pt does not recall getting one. On Xarelto at home. 67-year-old female with PMHx of DVT on Xarelto, breast cancer on the right with mets to kidney, lung,COPD.Has supplemental home O2 PRN, but stated that she has not really needed to use it. presents for abnormal MRI seen today from [facility].  Patient reports having a headache for the past 5 months which was why she had the MRI done. MR which showed a 2.1 x 2.8 cm L frontal enhancing lesion c/f mets. No acute neurosurgery intervention.No duplex evidence of acute DVT in either lower extremity.  Chart review revealed hx of IVC filter, but pt does not recall getting one. On Xarelto at home. CT A/P- Diffuse osseous sclerotic metastatic disease, without significant   change. Right hemipelvis fixation hardware again noted.

## 2022-12-02 NOTE — DISCHARGE NOTE PROVIDER - NSDCFUADDAPPT_GEN_ALL_CORE_FT
Please see your oncologist  1. Please see your oncologist, Dr. Ochoa, within 1 week.    2. Please see your radiation oncologist, Dr. Vazquez, within 2 weeks.    3. Please schedule an appointment with our surgical oncology team within 2 weeks.  [address] [place] [address] [address]rd [place] [address] [phone]    4. Please schedule an appointment with our neurosurgery team within 2 weeks:  [address] [place] [address] [address]  [place] [address]  [phone] APPTS ARE READY TO BE MADE: [X ] YES    Best Family or Patient Contact (if needed):    Additional Information about above appointments (if needed):    1:   2:   3:     Other comments or requests:     1. Please see your oncologist, Dr. Ochoa, within 1 week.    2. Please see your radiation oncologist, Dr. Vazquez, within 2 weeks.    3. Please schedule an appointment with our surgical oncology team within 2 weeks.  [address] [facility] 3rd Floor  [place], [place] [address] [phone]    4. Please schedule an appointment with our neurosurgery team within 2 weeks:  [address] [facility] [address]  [place] [address]  [phone] APPTS ARE READY TO BE MADE: [X ] YES    Best Family or Patient Contact (if needed):    Additional Information about above appointments (if needed):    1: Dr. Rodriguez   2:   3:     Other comments or requests:     1. Please see your oncologist, Dr. Ochoa, within 1 week.    2. Please see your radiation oncologist, Dr. Vazquez, within 2 weeks.    3. Please schedule an appointment with our surgical oncology team within 2 weeks.  [address] [place] [address] - 3rd Floor  [place], [place] [id number] [phone]    4. Please schedule an appointment with our neurosurgery team (Dr. Carmelo Rodriguez) within 1 weeks - please call [phone] for an appointment   [address] [facility] [address]  [place], [place] [id number] [phone] APPTS ARE READY TO BE MADE: [X ] YES    Best Family or Patient Contact (if needed):    Additional Information about above appointments (if needed):    1: Dr. Rodriguez   2:   3:     Other comments or requests:     1. Please see your oncologist, Dr. Ochoa, within 1 week.    2. Please see your radiation oncologist, Dr. Vazquez, within 2 weeks.    3. Please schedule an appointment with our surgical oncology team within 2 weeks.  [address] [place] [address] - 3rd Floor  [place], [place] [id number] [phone]    4. Please schedule an appointment with our neurosurgery team (Dr. Carmelo Rodriguez) within 1 weeks - please call [phone] for an appointment   [address] [facility] [address]  [place] [address]  [phone]    Patient was previously scheduled with Dr. Ace for 12/13 at 1:30p.  Patient was provided with follow up request details and was advised to call to schedule follow up within specified time frame.

## 2022-12-02 NOTE — DISCHARGE NOTE PROVIDER - NSDCCPCAREPLAN_GEN_ALL_CORE_FT
PRINCIPAL DISCHARGE DIAGNOSIS  Diagnosis: Brain mass  Assessment and Plan of Treatment:        PRINCIPAL DISCHARGE DIAGNOSIS  Diagnosis: Brain mass  Assessment and Plan of Treatment: You came to the hospital for headaches as well as a new finding of a mass in your brain. We repeated imaging here at the hospital to assess for any rapid change occurring with this mass, but we found that the mass was stable. Some specialty services believe that you may be a good candidate for stereotactic radiosurgery, which can be done outside the hospital. Please follow up with your radiation oncologist and      SECONDARY DISCHARGE DIAGNOSES  Diagnosis: Scalp lesion  Assessment and Plan of Treatment:     Diagnosis: Stage IV breast cancer in female  Assessment and Plan of Treatment:      PRINCIPAL DISCHARGE DIAGNOSIS  Diagnosis: Brain mass  Assessment and Plan of Treatment: You came to the hospital for headaches as well as a new finding of a mass in your brain. We repeated imaging here at the hospital to assess for any rapid change occurring with this mass, but we found that the mass was stable. Some specialty services believe that you may be a good candidate for stereotactic radiosurgery, which can be done outside the hospital. Please follow up with your radiation oncologist and our neurosurgery team to address this new finding. Additionally, stop taking your Xarelto until you get permission from them.      SECONDARY DISCHARGE DIAGNOSES  Diagnosis: Scalp lesion  Assessment and Plan of Treatment: The growing bump on the back of your head was examined by our surgical oncology team. We are concerned that with your medical history, this mass could represent spread of your breast cancer. The team deemed it appropriate for you to see them outpatient, where they will discuss options for biopsy and removal of this mass.    Diagnosis: Stage IV breast cancer in female  Assessment and Plan of Treatment: While you were in the hospital, we checked your chest, abdomen, and pelvis for any new progression of your cancer in any of these areas. We saw mostly stable disease, but the area of tumor that had previously been treated with radiation appeared to be larger than in previous imaging. Please see your oncologist about futher tests and treatment, which may involve different chemotherapy. Please stop taking your previous chemotherapy until you receive further instructions from them.     PRINCIPAL DISCHARGE DIAGNOSIS  Diagnosis: Brain mass  Assessment and Plan of Treatment: You came to the hospital for headaches as well as a new finding of a mass in your brain. We repeated imaging here at the hospital to assess for any rapid change occurring with this mass, but we found that the mass was stable. Some specialty services believe that you may be a good candidate for stereotactic radiosurgery, which can be done outside the hospital. Please follow up with your radiation oncologist and our neurosurgery team to address this new finding. Additionally, stop taking your Xarelto until you get permission from    Please follow this steroid taper:   Dexamethasone 4 mg every 6 hours - for the next 5 days starting 12/4  Dexamethasone 2 mg every 6 hours - for 6 days   Dexamethasone 1 mg every 6 hours - for 5 days   Dexamethasone 1 mg every 8 hours - for 3 days   Dexamethasone 1 mg every 12 hours- for 3 days   Dexamethasone 1 mg daily - for 3 days      SECONDARY DISCHARGE DIAGNOSES  Diagnosis: Scalp lesion  Assessment and Plan of Treatment: The growing bump on the back of your head was examined by our surgical oncology team. We are concerned that with your medical history, this mass could represent spread of your breast cancer. The team deemed it appropriate for you to see them outpatient, where they will discuss options for biopsy and removal of this mass.    Diagnosis: Stage IV breast cancer in female  Assessment and Plan of Treatment: While you were in the hospital, we checked your chest, abdomen, and pelvis for any new progression of your cancer in any of these areas. We saw mostly stable disease, but the area of tumor that had previously been treated with radiation appeared to be larger than in previous imaging. Please see your oncologist about futher tests and treatment, which may involve different chemotherapy. Please stop taking your previous chemotherapy until you receive further instructions from them.     PRINCIPAL DISCHARGE DIAGNOSIS  Diagnosis: Brain mass  Assessment and Plan of Treatment: You came to the hospital for headaches as well as a new finding of a mass in your brain. We repeated imaging here at the hospital to assess for any rapid change occurring with this mass, but we found that the mass was stable. Some specialty services believe that you may be a good candidate for stereotactic radiosurgery, which can be done outside the hospital. Please follow up with your radiation oncologist and our neurosurgery team to address this new finding. Additionally, stop taking your Xarelto until you get permission from    Please follow this steroid taper:   Dexamethasone 4 mg every 6 hours - for the next 5 days starting 12/4  Dexamethasone 2 mg every 6 hours - for 6 days   Dexamethasone 1 mg every 6 hours - for 5 days   Dexamethasone 1 mg every 8 hours - for 3 days   Dexamethasone 1 mg every 12 hours- for 3 days   Dexamethasone 1 mg daily - for 3 days      SECONDARY DISCHARGE DIAGNOSES  Diagnosis: Scalp lesion  Assessment and Plan of Treatment: The growing bump on the back of your head was examined by our surgical oncology team. We are concerned that with your medical history, this mass could represent spread of your breast cancer. The team deemed it appropriate for you to see them outpatient, where they will discuss options for biopsy and removal of this mass.    Diagnosis: Stage IV breast cancer in female  Assessment and Plan of Treatment: While you were in the hospital, we checked your chest, abdomen, and pelvis for any new progression of your cancer in any of these areas. We saw mostly stable disease, but the area of tumor that had previously been treated with radiation appeared to be larger than in previous imaging. Please see your oncologist about futher tests and treatment, which may involve different chemotherapy. Please stop taking your previous chemotherapy including xeloda until you receive further instructions from them.

## 2022-12-02 NOTE — DISCHARGE NOTE PROVIDER - CARE PROVIDER_API CALL
Leslie Islas)  Internal Medicine; Medical Oncology  [address] [facility], [place] [address]  Phone: [phone]  Fax: [phone]  Follow Up Time: Routine   Leslie Islas)  Internal Medicine; Medical Oncology  [address] [place] [address], [place] [address]  Phone: [phone]  Fax: [phone]  Follow Up Time: 1 week    Alejandrina Vazquez)  Radiation Oncology  [address] [place] [address], Entrance A - [place] [address]  Phone: [phone]  Fax: [phone]  Follow Up Time: 2 weeks   Leslie Islas)  Internal Medicine; Medical Oncology  [address] [facility], [place] [address]  Phone: [phone]  Fax: [phone]  Follow Up Time: 1 week    Alejandrina Vazquez)  Radiation Oncology  [address] [facility], Entrance A - [place] [address]  Phone: [phone]  Fax: [phone]  Follow Up Time: 2 weeks    Carmelo Rodriguez)  Neurosurgery  [address] [facility], [address]  [place] [address]  Phone: [phone]  Fax: [phone]  Follow Up Time: 1 week

## 2022-12-02 NOTE — DISCHARGE NOTE PROVIDER - CARE PROVIDERS DIRECT ADDRESSES
,[email or web address]

## 2022-12-02 NOTE — PROGRESS NOTE ADULT - PROBLEM SELECTOR PLAN 4
Hx of clear cell RCC (s/p L nephrectomy) and CKD3 with one kidney.  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  - on admission, SCr 1.06 (baseline SCr ~1.0-1.3 in 1507-6818)  - strict I/Os, renally dose meds, avoid nephrotoxins as able  - monitor SCr and electrolytes  - Hydration for mild ERASMO following CT with contrast

## 2022-12-02 NOTE — PROGRESS NOTE ADULT - ATTENDING COMMENTS
67F w/ hx of Stage IV breast cancer w/ mets (s/p L mastectomy, s/p RT for lung mets, now on oral chemo), clear cell RCC (s/p L nephrectomy), b/l LE DVTs (on Xarelto), COPD, presenting with constant headache x~1 month and found to have an abnormal outpatient MRI head showing 2.8cm x 2.1cm hemorrhagic mass in L anterior medial frontal lobe of brain; also with fungating occipital scalp lesion. Likely progression of malignancy. Inpatient MRI pituitary protocol demonstrated metastatic lesions in left frontal lobe, sella/infundibulum, right postcentral gyrus, posterior scalp, calvarium, and upper cervical vertebral bodies with vasogenic edema. Labs notable for TSH 0.10 with wnl T4, low hch 0.08, high prolactin 76     Headache improved from admission, but still with severe pain. No blurry vision, slurred speech or ataxia.     #Hemorrhagic Brain Mass  #Occipital Scalp Lesion  #Stage4 breast cancer  #ERASMO    - rad-onc recs outpt SRS for treatment for cerebral mets   - CT C/A/P showing known lung involvement; no significant changes noted   - NSX following - c/w decadron 4mg IVP q6h for now, f/u re: need for further testing/imaging and steroid taper   - surg-onc following for fungating occipital lesion - will plan for outpt resection with coordination with plastics for reconstruction   - oncology recs appreciated   - c/w neuro checks q8hr  - mild ERASMO with Cr 1.3 this AM, likely 2/2 contrast yesterday from CTs - will c/w hydration today and recheck BMP in AM  - PT recs home PT     if Cr improves and NSX recommendation and plans clarified, can plan for d/c over the weekend. 67F w/ hx of Stage IV breast cancer w/ mets (s/p L mastectomy, s/p RT for lung mets, now on oral chemo), clear cell RCC (s/p L nephrectomy), b/l LE DVTs (on Xarelto), COPD, presenting with constant headache x~1 month and found to have an abnormal outpatient MRI head showing 2.8cm x 2.1cm hemorrhagic mass in L anterior medial frontal lobe of brain; also with fungating occipital scalp lesion. Likely progression of malignancy. Inpatient MRI pituitary protocol demonstrated metastatic lesions in left frontal lobe, sella/infundibulum, right postcentral gyrus, posterior scalp, calvarium, and upper cervical vertebral bodies with vasogenic edema. Labs notable for TSH 0.10 with wnl T4, low hch 0.08, high prolactin 76     Headache improved from admission, but still with severe pain. No blurry vision, slurred speech or ataxia.     #Hemorrhagic Brain Mass  #Occipital Scalp Lesion  #Stage4 breast cancer  #ERASMO  #DVT (chronic)    - rad-onc recs outpt SRS for treatment for cerebral mets   - CT C/A/P showing known lung involvement; no significant changes noted   - NSX following - c/w decadron 4mg IVP q6h for now, f/u re: need for further testing/imaging and steroid taper   - surg-onc following for fungating occipital lesion - will plan for outpt resection with coordination with plastics for reconstruction   - oncology recs appreciated   - c/w neuro checks q8hr  - mild ERASMO with Cr 1.3 this AM, likely 2/2 contrast yesterday from CTs - will c/w hydration today and recheck BMP in AM  - chronic DVT noted on duplex - monitor off xarelto given hemorrhagic mets; will c/t hold on d/c   - PT recs home PT     if Cr improves and NSX recommendation and plans clarified, can plan for d/c over the weekend.

## 2022-12-02 NOTE — DISCHARGE NOTE PROVIDER - NSDCCPTREATMENT_GEN_ALL_CORE_FT
PRINCIPAL PROCEDURE  Procedure: MRI head wo then w contrast  Findings and Treatment: 11/28  -Interval development of a 2.8 cm hemorrhagic mass in the left   anteromedial frontal lobe with surrounding edema, compatible with   hemorrhagic metastasis.  -Additional punctate enhancing lesion in the right post central gyrus and   new enhancing sellar lesion are also compatible with metastases.  -Fungating posterior scalp lesion measuring up to 5.7 cm, also suspicious   for metastasis.  Findings discussed with the physician assistant from referring   physician's office at 3:08 PM 11/28/2022 with recommendation for patient   to present to the emergency room. This was subsequently discussed with   the patient who will be offered ambulance transportation.        SECONDARY PROCEDURE  Procedure: MRI head wo then w contrast  Findings and Treatment: 12/1  Overall stable follow-up MRI study when compared with   11/28/2022.  Redemonstration of intracranial metastatic lesions involving the left   frontal lobe, sella/infundibulum, right postcentral gyrus, posterior   scalp, calvarium, and upper cervical vertebral bodies.  Vasogenic edema and mass effect surrounding the left frontal lobe   hemorrhagic lesion appears unchanged.  No abnormal leptomeningeal enhancement.    Procedure: CT of chest, abdomen, and pelvis with intravenous contrast  Findings and Treatment: 12/1  Left perihilar mass with increased endobronchial extension along the   superior segmental bronchus of the left lower lobe.  Additional pulmonary nodules and right hilar mass with endobronchial   extension are not significantly changed.  Extensive osseous metastases without significant change.

## 2022-12-02 NOTE — DISCHARGE NOTE PROVIDER - HOSPITAL COURSE
HPI:  67F w/ hx of Stage IV breast cancer w/ mets (s/p L mastectomy, s/p RT for lung mets, now on oral chemo, most recently 4 days ago), clear cell RCC (s/p L nephrectomy), b/l LE DVTs (on Xarelto), COPD, presenting with constant headache x~1 month and found to have an abnormal outpatient MRI head earlier in the day. MRI head showed 2.8cm x 2.1cm hemorrhagic mass in L anterior medial frontal lobe of brain. Pt also with a lump on the back of head for the past ~7 months that is occasionally bleeding and with pain when laying on it. Stated that she had tried oxy q5h at home without any relief of her headache (actually made it worse per pt), which is when her oncologist instructed her get an MRI. Denied f/c/n/v, CP, SOB, abdominal pain, dysuria, hematuria, hematochezia, melena, diarrhea, constipation, extremity numbness/tingling or weakness, vision/hearing changes.    In ED: Afebrile, HR 60s-70s. SBP 110s-120s, RR 16-18, sating 97% on RA. Labs notable for INR 2.43, BG 67. Given dexamethasone 10mg IV x1. Admitted to Medicine for further management.     Hospital course:    HPI:  67F w/ hx of Stage IV breast cancer w/ mets (s/p L mastectomy, s/p RT for lung mets, now on oral chemo, most recently 4 days ago), clear cell RCC (s/p L nephrectomy), b/l LE DVTs (on Xarelto), COPD, presenting with constant headache x~1 month and found to have an abnormal outpatient MRI head earlier in the day. MRI head showed 2.8cm x 2.1cm hemorrhagic mass in L anterior medial frontal lobe of brain. Pt also with a lump on the back of head for the past ~7 months that is occasionally bleeding and with pain when laying on it. Stated that she had tried oxy q5h at home without any relief of her headache (actually made it worse per pt), which is when her oncologist instructed her get an MRI. Denied f/c/n/v, CP, SOB, abdominal pain, dysuria, hematuria, hematochezia, melena, diarrhea, constipation, extremity numbness/tingling or weakness, vision/hearing changes.    In ED: Afebrile, HR 60s-70s. SBP 110s-120s, RR 16-18, sating 97% on RA. Labs notable for INR 2.43, BG 67. Given dexamethasone 10mg IV x1. Admitted to Medicine for further management.     Hospital course:   Due to the hemorrhagic appearance of patient's brain mass, her home a/c was held, as were her home antineoplastics. HA was managed with oxycodone uptitrated to 10 mg PO Q6H PRN. She was also given dexamethasone for 1 week, followed by a taper. Metastatic workup labs and imaging were ordered. Neurosurgery and Rad Onc were consulted - recommended outpt SRS, no WBRT. Surg Onc was consulted for occipital scalp lesion - recommended outpt f/u. MR head showed no interval change of her brain mets. MR head x 2 showed multiple areas of likely metastasis involving the left frontal lobe, sella/infundibulum, right postcentral gyrus, posterior scalp, calvarium, and upper cervical vertebral bodies, with no interval change between the studies done 3 days apart. CT chest/abd/pelvis showed left perihilar mass with increased endobronchial extension along the superior segmental bronchus of the left lower lobe, along with unchanged extensive osseous mets. Patient received some fluids for mild ERASMO post-contrast (Cr bump 1->1.3). Pertinent bloodwork that resulted during her stay included elevated prolactin, with decreased HG, TSH, cortisol. Patient was scheduled outpt f/u with her oncologist, NSGY, Rad Onc, and Surg Onc before being discharged home. HPI:  67F w/ hx of Stage IV breast cancer w/ mets (s/p L mastectomy, s/p RT for lung mets, now on oral chemo, most recently 4 days ago), clear cell RCC (s/p L nephrectomy), b/l LE DVTs (on Xarelto), COPD, presenting with constant headache x~1 month and found to have an abnormal outpatient MRI head earlier in the day. MRI head showed 2.8cm x 2.1cm hemorrhagic mass in L anterior medial frontal lobe of brain. Pt also with a lump on the back of head for the past ~7 months that is occasionally bleeding and with pain when laying on it. Stated that she had tried oxy q5h at home without any relief of her headache (actually made it worse per pt), which is when her oncologist instructed her get an MRI. Denied f/c/n/v, CP, SOB, abdominal pain, dysuria, hematuria, hematochezia, melena, diarrhea, constipation, extremity numbness/tingling or weakness, vision/hearing changes.    In ED: Afebrile, HR 60s-70s. SBP 110s-120s, RR 16-18, sating 97% on RA. Labs notable for INR 2.43, BG 67. Given dexamethasone 10mg IV x1. Admitted to Medicine for further management.     Hospital course:   Due to the hemorrhagic appearance of patient's brain mass, her home a/c was held, as were her home antineoplastics. HA was managed with oxycodone uptitrated to 10 mg PO Q6H PRN. She was also given dexamethasone for 1 week, followed by a taper. Metastatic workup labs and imaging were ordered. Neurosurgery and Rad Onc were consulted - recommended outpt SRS, no WBRT. Surg Onc was consulted for occipital scalp lesion - recommended outpt f/u. MR head showed no interval change of her brain mets. MR head x 2 showed multiple areas of likely metastasis involving the left frontal lobe, sella/infundibulum, right postcentral gyrus, posterior scalp, calvarium, and upper cervical vertebral bodies, with no interval change between the studies done 3 days apart. CT chest/abd/pelvis showed left perihilar mass with increased endobronchial extension along the superior segmental bronchus of the left lower lobe, along with unchanged extensive osseous mets. Patient received some fluids for mild ERASMO post-contrast (Cr bump 1->1.3). Pertinent bloodwork that resulted during her stay included elevated prolactin, with decreased HG, TSH, cortisol. Patient instructed to schedule outpt f/u with her oncologist, NSGY, Rad Onc, and Surg Onc before being discharged home. HPI:  67F w/ hx of Stage IV breast cancer w/ mets (s/p L mastectomy, s/p RT for lung mets, now on oral chemo, most recently 4 days ago), clear cell RCC (s/p L nephrectomy), b/l LE DVTs (on Xarelto), COPD, presenting with constant headache x~1 month and found to have an abnormal outpatient MRI head earlier in the day. MRI head showed 2.8cm x 2.1cm hemorrhagic mass in L anterior medial frontal lobe of brain. Pt also with a lump on the back of head for the past ~7 months that is occasionally bleeding and with pain when laying on it. Stated that she had tried oxy q5h at home without any relief of her headache (actually made it worse per pt), which is when her oncologist instructed her get an MRI. Denied f/c/n/v, CP, SOB, abdominal pain, dysuria, hematuria, hematochezia, melena, diarrhea, constipation, extremity numbness/tingling or weakness, vision/hearing changes.    In ED: Afebrile, HR 60s-70s. SBP 110s-120s, RR 16-18, sating 97% on RA. Labs notable for INR 2.43, BG 67. Given dexamethasone 10mg IV x1. Admitted to Medicine for further management.     Hospital course:   Due to the hemorrhagic appearance of patient's brain mass, her home a/c was held, as were her home antineoplastics. HA was managed with oxycodone uptitrated to 10 mg PO Q6H PRN. She was also given dexamethasone for 1 week, followed by a taper. Metastatic workup labs and imaging were ordered. Neurosurgery and Rad Onc were consulted - recommended outpt SRS, no WBRT. Surg Onc was consulted for occipital scalp lesion - recommended outpt f/u. MR head showed no interval change of her brain mets. MR head x 2 showed multiple areas of likely metastasis involving the left frontal lobe, sella/infundibulum, right postcentral gyrus, posterior scalp, calvarium, and upper cervical vertebral bodies, with no interval change between the studies done 3 days apart. CT chest/abd/pelvis showed left perihilar mass with increased endobronchial extension along the superior segmental bronchus of the left lower lobe, along with unchanged extensive osseous mets. Patient received some fluids for mild ERASMO post-contrast (Cr bump 1->1.3). Pertinent bloodwork that resulted during her stay included elevated prolactin, with decreased HG, TSH, cortisol. Patient instructed to schedule outpt f/u with her oncologist, NSGY, Rad Onc, and Surg Onc before being discharged home. Instructed to follow-up with Dr. Rodriguez within 1 week and repeat CT head.     Steroid taper as follows:   Dexamethasone 4 mg every 6 hours - for the next 5 days starting 12/4  Dexamethasone 2 mg every 6 hours - for 6 days   Dexamethasone 1 mg every 6 hours - for 5 days   Dexamethasone 1 mg every 8 hours - for 3 days   Dexamethasone 1 mg every 12 hours- for 3 days   Dexamethasone 1 mg daily - for 3 days

## 2022-12-02 NOTE — DISCHARGE NOTE PROVIDER - NSDCMRMEDTOKEN_GEN_ALL_CORE_FT
Afinitor 5 mg oral tablet: 1 tab(s) orally once a day  Breo Ellipta 100 mcg-25 mcg/inh inhalation powder: 1 puff(s) inhaled once a day  capecitabine: Per outpt Onc note, &quot;Xgeva.Capecitabine ~750 mg per metered squared p.o. twice daily days 1-14, cycled every 21 days.&quot;  exemestane 25 mg oral tablet: 1 tab(s) orally once a day  oxyCODONE 5 mg oral tablet: 1 tab(s) orally every 5-6 hours  oxyCODONE 5 mg oral tablet: 1 tab(s) orally every 4 hours MDD:6 tablets  rivaroxaban 20 mg oral tablet: 1 tab(s) orally once a day (in the evening)  Xanax 0.25 mg oral tablet: 1 tab(s) orally once a day, As Needed   Breo Ellipta 100 mcg-25 mcg/inh inhalation powder: 1 puff(s) inhaled once a day  oxyCODONE 5 mg oral tablet: 1 tab(s) orally every 5-6 hours  oxyCODONE 5 mg oral tablet: 1 tab(s) orally every 4 hours MDD:6 tablets  rivaroxaban 20 mg oral tablet: 1 tab(s) orally once a day (in the evening)  Rolling walker: 1 rolling walker   ICD-10: C79.31  OLIVIA = 99   Xanax 0.25 mg oral tablet: 1 tab(s) orally once a day, As Needed   Breo Ellipta 100 mcg-25 mcg/inh inhalation powder: 1 puff(s) inhaled once a day  dexamethasone 1 mg oral tablet: 1 tab(s) orally 2 times a day  dexamethasone 1 mg oral tablet: 1 tab(s) orally every 8 hours   dexamethasone 1 mg oral tablet: 1 tab(s) orally once a day   dexamethasone 2 mg oral tablet: 1 tab(s) orally every 6 hours   dexamethasone 6 mg oral tablet: 1 tab(s) orally every 6 hours   oxyCODONE 5 mg oral tablet: 1 tab(s) orally every 4 hours MDD:6 tablets  oxyCODONE 5 mg oral tablet: 1 tab(s) orally every 5-6 hours  Rolling walker: 1 rolling walker   ICD-10: C79.31  OLIVIA = 99   Xanax 0.25 mg oral tablet: 1 tab(s) orally once a day, As Needed   Breo Ellipta 100 mcg-25 mcg/inh inhalation powder: 1 puff(s) inhaled once a day  dexamethasone 1 mg oral tablet: 1 tab(s) orally 2 times a day  dexamethasone 1 mg oral tablet: 1 tab(s) orally every 8 hours   dexamethasone 1 mg oral tablet: 1 tab(s) orally once a day   dexamethasone 1 mg oral tablet: 1 tab(s) orally every 6 hours   dexamethasone 2 mg oral tablet: 1 tab(s) orally every 6 hours   dexamethasone 4 mg oral tablet: 1 tab(s) orally every 6 hours   oxyCODONE 5 mg oral tablet: 1 tab(s) orally every 4 hours MDD:6 tablets  oxyCODONE 5 mg oral tablet: 1 tab(s) orally every 5-6 hours  Rolling walker: 1 rolling walker   ICD-10: C79.31  OLIVIA = 99   Xanax 0.25 mg oral tablet: 1 tab(s) orally once a day, As Needed

## 2022-12-02 NOTE — DISCHARGE NOTE PROVIDER - NSDCFUSCHEDAPPT_GEN_ALL_CORE_FT
Leslie Goldstein  [facility]  [facility]  Scheduled Appointment: 12/13/2022    [facility]  Scheduled Appointment: 12/13/2022    Leslie Goldstein  [facility]  [facility]  Scheduled Appointment: 01/10/2023    [facility]  Scheduled Appointment: 01/10/2023

## 2022-12-03 ENCOUNTER — TRANSCRIPTION ENCOUNTER (OUTPATIENT)
Age: 67
End: 2022-12-03

## 2022-12-03 LAB
ANION GAP SERPL CALC-SCNC: 16 MMOL/L — SIGNIFICANT CHANGE UP (ref 5–17)
BUN SERPL-MCNC: 39 MG/DL — HIGH (ref 7–23)
CALCIUM SERPL-MCNC: 8.9 MG/DL — SIGNIFICANT CHANGE UP (ref 8.4–10.5)
CHLORIDE SERPL-SCNC: 100 MMOL/L — SIGNIFICANT CHANGE UP (ref 96–108)
CO2 SERPL-SCNC: 24 MMOL/L — SIGNIFICANT CHANGE UP (ref 22–31)
CREAT SERPL-MCNC: 1.11 MG/DL — SIGNIFICANT CHANGE UP (ref 0.5–1.3)
EGFR: 54 ML/MIN/1.73M2 — LOW
GLUCOSE SERPL-MCNC: 146 MG/DL — HIGH (ref 70–99)
HCT VFR BLD CALC: 39 % — SIGNIFICANT CHANGE UP (ref 34.5–45)
HGB BLD-MCNC: 12.8 G/DL — SIGNIFICANT CHANGE UP (ref 11.5–15.5)
MAGNESIUM SERPL-MCNC: 2.2 MG/DL — SIGNIFICANT CHANGE UP (ref 1.6–2.6)
MCHC RBC-ENTMCNC: 30.3 PG — SIGNIFICANT CHANGE UP (ref 27–34)
MCHC RBC-ENTMCNC: 32.8 GM/DL — SIGNIFICANT CHANGE UP (ref 32–36)
MCV RBC AUTO: 92.2 FL — SIGNIFICANT CHANGE UP (ref 80–100)
NRBC # BLD: 0 /100 WBCS — SIGNIFICANT CHANGE UP (ref 0–0)
PHOSPHATE SERPL-MCNC: 4.4 MG/DL — SIGNIFICANT CHANGE UP (ref 2.5–4.5)
PLATELET # BLD AUTO: 179 K/UL — SIGNIFICANT CHANGE UP (ref 150–400)
POTASSIUM SERPL-MCNC: 4.7 MMOL/L — SIGNIFICANT CHANGE UP (ref 3.5–5.3)
POTASSIUM SERPL-SCNC: 4.7 MMOL/L — SIGNIFICANT CHANGE UP (ref 3.5–5.3)
RBC # BLD: 4.23 M/UL — SIGNIFICANT CHANGE UP (ref 3.8–5.2)
RBC # FLD: 22.6 % — HIGH (ref 10.3–14.5)
SODIUM SERPL-SCNC: 140 MMOL/L — SIGNIFICANT CHANGE UP (ref 135–145)
WBC # BLD: 10.75 K/UL — HIGH (ref 3.8–10.5)
WBC # FLD AUTO: 10.75 K/UL — HIGH (ref 3.8–10.5)

## 2022-12-03 PROCEDURE — 80048 BASIC METABOLIC PNL TOTAL CA: CPT

## 2022-12-03 PROCEDURE — 86900 BLOOD TYPING SEROLOGIC ABO: CPT

## 2022-12-03 PROCEDURE — 84443 ASSAY THYROID STIM HORMONE: CPT

## 2022-12-03 PROCEDURE — 85025 COMPLETE CBC W/AUTO DIFF WBC: CPT

## 2022-12-03 PROCEDURE — 87637 SARSCOV2&INF A&B&RSV AMP PRB: CPT

## 2022-12-03 PROCEDURE — 84439 ASSAY OF FREE THYROXINE: CPT

## 2022-12-03 PROCEDURE — 93970 EXTREMITY STUDY: CPT

## 2022-12-03 PROCEDURE — 99222 1ST HOSP IP/OBS MODERATE 55: CPT

## 2022-12-03 PROCEDURE — 82533 TOTAL CORTISOL: CPT

## 2022-12-03 PROCEDURE — 83735 ASSAY OF MAGNESIUM: CPT

## 2022-12-03 PROCEDURE — 99285 EMERGENCY DEPT VISIT HI MDM: CPT | Mod: 25

## 2022-12-03 PROCEDURE — 84100 ASSAY OF PHOSPHORUS: CPT

## 2022-12-03 PROCEDURE — 86901 BLOOD TYPING SEROLOGIC RH(D): CPT

## 2022-12-03 PROCEDURE — 71046 X-RAY EXAM CHEST 2 VIEWS: CPT

## 2022-12-03 PROCEDURE — 74177 CT ABD & PELVIS W/CONTRAST: CPT

## 2022-12-03 PROCEDURE — 97161 PT EVAL LOW COMPLEX 20 MIN: CPT

## 2022-12-03 PROCEDURE — 86850 RBC ANTIBODY SCREEN: CPT

## 2022-12-03 PROCEDURE — 85027 COMPLETE CBC AUTOMATED: CPT

## 2022-12-03 PROCEDURE — 83003 ASSAY GROWTH HORMONE (HGH): CPT

## 2022-12-03 PROCEDURE — 84146 ASSAY OF PROLACTIN: CPT

## 2022-12-03 PROCEDURE — 85730 THROMBOPLASTIN TIME PARTIAL: CPT

## 2022-12-03 PROCEDURE — 83001 ASSAY OF GONADOTROPIN (FSH): CPT

## 2022-12-03 PROCEDURE — 85610 PROTHROMBIN TIME: CPT

## 2022-12-03 PROCEDURE — A9585: CPT

## 2022-12-03 PROCEDURE — 70450 CT HEAD/BRAIN W/O DYE: CPT

## 2022-12-03 PROCEDURE — 84305 ASSAY OF SOMATOMEDIN: CPT

## 2022-12-03 PROCEDURE — 71260 CT THORAX DX C+: CPT

## 2022-12-03 PROCEDURE — 99239 HOSP IP/OBS DSCHRG MGMT >30: CPT

## 2022-12-03 PROCEDURE — 70553 MRI BRAIN STEM W/O & W/DYE: CPT

## 2022-12-03 PROCEDURE — 36415 COLL VENOUS BLD VENIPUNCTURE: CPT

## 2022-12-03 PROCEDURE — 94640 AIRWAY INHALATION TREATMENT: CPT

## 2022-12-03 PROCEDURE — 80053 COMPREHEN METABOLIC PANEL: CPT

## 2022-12-03 RX ORDER — DEXAMETHASONE 0.5 MG/5ML
4 ELIXIR ORAL ONCE
Refills: 0 | Status: COMPLETED | OUTPATIENT
Start: 2022-12-03 | End: 2022-12-03

## 2022-12-03 RX ORDER — DEXAMETHASONE 0.5 MG/5ML
1 ELIXIR ORAL
Qty: 12 | Refills: 0
Start: 2022-12-03 | End: 2022-12-06

## 2022-12-03 RX ORDER — DEXAMETHASONE 0.5 MG/5ML
1 ELIXIR ORAL
Qty: 20 | Refills: 0
Start: 2022-12-03 | End: 2022-12-07

## 2022-12-03 RX ORDER — DEXAMETHASONE 0.5 MG/5ML
1 ELIXIR ORAL
Qty: 3 | Refills: 0
Start: 2022-12-03 | End: 2022-12-05

## 2022-12-03 RX ORDER — RIVAROXABAN 15 MG-20MG
1 KIT ORAL
Qty: 0 | Refills: 0 | DISCHARGE

## 2022-12-03 RX ORDER — DEXAMETHASONE 0.5 MG/5ML
1 ELIXIR ORAL
Qty: 24 | Refills: 0
Start: 2022-12-03 | End: 2022-12-08

## 2022-12-03 RX ORDER — DEXAMETHASONE 0.5 MG/5ML
1 ELIXIR ORAL
Qty: 8 | Refills: 0
Start: 2022-12-03 | End: 2022-12-06

## 2022-12-03 RX ADMIN — OXYCODONE HYDROCHLORIDE 5 MILLIGRAM(S): 5 TABLET ORAL at 07:28

## 2022-12-03 RX ADMIN — BUDESONIDE AND FORMOTEROL FUMARATE DIHYDRATE 2 PUFF(S): 160; 4.5 AEROSOL RESPIRATORY (INHALATION) at 07:28

## 2022-12-03 RX ADMIN — Medication 4 MILLIGRAM(S): at 03:51

## 2022-12-03 RX ADMIN — Medication 4 MILLIGRAM(S): at 09:38

## 2022-12-03 RX ADMIN — OXYCODONE HYDROCHLORIDE 5 MILLIGRAM(S): 5 TABLET ORAL at 08:17

## 2022-12-03 RX ADMIN — Medication 4 MILLIGRAM(S): at 17:03

## 2022-12-03 NOTE — CONSULT NOTE ADULT - SUBJECTIVE AND OBJECTIVE BOX
Reason For Consult:   concern for pituitary met    HPI:  67F w/ hx of Stage IV breast cancer w/ mets (s/p L mastectomy, s/p RT for lung mets, now on oral chemo, most recently 4 days ago), clear cell RCC (s/p L nephrectomy), b/l LE DVTs (on Xarelto), COPD, presenting with constant headache x~1 month and found to have an abnormal outpatient MRI head earlier in the day. MRI head showed 2.8cm x 2.1cm hemorrhagic mass in L anterior medial frontal lobe of brain. Pt also with a lump on the back of head for the past ~7 months that is occasionally bleeding and with pain when laying on it. Stated that she had tried oxy q5h at home without any relief of her headache (actually made it worse per pt), which is when her oncologist instructed her get an MRI. Denied f/c/n/v, CP, SOB, abdominal pain, dysuria, hematuria, hematochezia, melena, diarrhea, constipation, extremity numbness/tingling or weakness, vision/hearing changes.    In ED: Afebrile, HR 60s-70s. SBP 110s-120s, RR 16-18, sating 97% on RA. Labs notable for INR 2.43, BG 67. Given dexamethasone 10mg IV x1. Admitted to Medicine for further management.       endocrine called 12/3 for endocrine reccomendation for hormonal workup in the setting of pithiatry metastases  as noted above, pt presenting with constant headache x~1 month and found to have an abnormal outpatient MRI head showing 2.8cm x 2.1cm hemorrhagic mass in L anterior medial frontal lobe of brain; also with fungating occipital scalp lesion. Likely progression of malignancy.  she has been followed by NSGY inpt, now on decadron  per review of NSGY notes, this appears to be consistent with metastic disease    pt has no personal hx of fmaily hx of pituiary disorders, adrenal disorders, or calcium disorders  her main concern is of fatigue and sleepiness    labs reviewed inpt  TSH 0.10  Free T4 1/2  cortisol 0.7 (this is on dex)  undiluted proalctin 75  IGF 1 77   FSH 9.9 do not see LH or estradiol         PAST MEDICAL & SURGICAL HISTORY:  Other specified disorders of kidney and ureter      Anemia      Breast cancer  Left breast cancer - 13 years ago, s/p mastectomy, s/p RT for lung mets, and on oral chemotherapy      Emphysema lung      Renal cell carcinoma  s/p L nephrectomy      Stage 3 chronic kidney disease      History of hip surgery  in 2007- Right hip surgery      H/O left mastectomy      S/p nephrectomy  left      H/O total adrenalectomy          FAMILY HISTORY:  FH: throat cancer  mother      FH: emphysema  father        Social History:  no illiticts       Outpatient Medications:  Home Medications:   * Patient Currently Takes Medications as of 28-Nov-2022 22:51 documented in Structured Notes  · 	exemestane 25 mg oral tablet: 1 tab(s) orally once a day  · 	rivaroxaban 20 mg oral tablet: 1 tab(s) orally once a day (in the evening)  · 	Breo Ellipta 100 mcg-25 mcg/inh inhalation powder: 1 puff(s) inhaled once a day  · 	Afinitor 5 mg oral tablet: 1 tab(s) orally once a day  · 	oxyCODONE 5 mg oral tablet: 1 tab(s) orally every 5-6 hours  · 	Xanax 0.25 mg oral tablet: 1 tab(s) orally once a day, As Needed  · 	capecitabine: Per outpt Onc note, "Xgeva.Capecitabine ~750 mg per metered squared p.o. twice daily days 1-14, cycled every 21 days."    MEDICATIONS  (STANDING):  budesonide  80 MICROgram(s)/formoterol 4.5 MICROgram(s) Inhaler 2 Puff(s) Inhalation two times a day  coronavirus bivalent (EUA) Booster Vaccine (PFIZER) 0.3 milliLiter(s) IntraMuscular once  dexAMETHasone  Injectable 4 milliGRAM(s) IV Push every 6 hours  influenza  Vaccine (HIGH DOSE) 0.7 milliLiter(s) IntraMuscular once  lactated ringers. 1000 milliLiter(s) (100 mL/Hr) IV Continuous <Continuous>    MEDICATIONS  (PRN):  acetaminophen     Tablet .. 650 milliGRAM(s) Oral every 6 hours PRN Temp greater or equal to 38C (100.4F), Mild Pain (1 - 3)  albuterol/ipratropium for Nebulization 3 milliLiter(s) Nebulizer every 6 hours PRN Shortness of Breath and/or Wheezing  ALPRAZolam 0.25 milliGRAM(s) Oral every 8 hours PRN Anxiety  oxyCODONE    IR 5 milliGRAM(s) Oral every 6 hours PRN Moderate Pain (4 - 6)  oxyCODONE    IR 10 milliGRAM(s) Oral every 6 hours PRN Severe Pain (7 - 10)      Allergies    Cipro (Rash)    Intolerances      Review of Systems:  Constitutional: No fever  Eyes: No blurry vision  Neuro: No tremors  HEENT: No pain  Cardiovascular: No chest pain, palpitations  Respiratory: No SOB, no cough  GI: No nausea, vomiting, abdominal pain  : No dysuria  Skin: no rash  Psych: no depression  Endocrine: no polyuria, polydipsia  Hem/lymph: no swelling  Osteoporosis: no fractures    ALL OTHER SYSTEMS REVIEWED AND NEGATIVE      PHYSICAL EXAM:  VITALS: T(C): 36.4 (12-03-22 @ 04:39)  T(F): 97.6 (12-03-22 @ 04:39), Max: 98.2 (12-02-22 @ 21:46)  HR: 52 (12-03-22 @ 04:39) (52 - 66)  BP: 139/79 (12-03-22 @ 04:39) (124/65 - 139/79)  RR:  (18 - 18)  SpO2:  (96% - 98%)  Wt(kg): --  GENERAL: NAD, well-groomed, well-developed  EYES: No proptosis, no lid lag, anicteric  HEENT:  Atraumatic, Normocephalic, moist mucous membranes  THYROID: Normal size, no palpable nodules  RESPIRATORY: Clear to auscultation bilaterally; No rales, rhonchi, wheezing, or rubs  CARDIOVASCULAR: Regular rate and rhythm; No murmurs; no peripheral edema  GI: Soft, nontender, non distended, normal bowel sounds  SKIN: Dry, intact, No rashes or lesions  MUSCULOSKELETAL: Full range of motion, normal strength  NEURO: sensation intact, extraocular movements intact, no tremor, normal reflexes  PSYCH: Alert and oriented x 3, normal affect, normal mood  CUSHING'S SIGNS: no striae                              12.8   10.75 )-----------( 179      ( 03 Dec 2022 07:28 )             39.0       12-03    140  |  100  |  39<H>  ----------------------------<  146<H>  4.7   |  24  |  1.11    eGFR: 54<L>    Ca    8.9      12-03  Mg     2.2     12-03  Phos  4.4     12-03    TPro  6.1  /  Alb  3.5  /  TBili  0.3  /  DBili  x   /  AST  13  /  ALT  5<L>  /  AlkPhos  46  12-01      Thyroid Function Tests:  12-01 @ 07:00 TSH 0.10 FreeT4 1.2 T3 -- Anti TPO -- Anti Thyroglobulin Ab -- TSI --  11-30 @ 13:44 TSH 0.16 FreeT4 -- T3 -- Anti TPO -- Anti Thyroglobulin Ab -- TSI --              Radiology:         INTERPRETATION:  Clinical indication: Brain tumor.    Multiple axial sections were performed from base of vertex without   contrast enhancement. Coronal and sagittal reconstructions were performed   as well.    There is evidence of a heterogeneous area of high attenuation seen   involving the inferior aspect of the left frontal lobe. This is seen   medially and does demonstrate surrounding vasogenic edema. This finding   measures approximately 2.6 x 2.3 x 2.1 cm and could be compatible with a   hemorrhagic or high attenuated neoplasm such as metastasis given   patient's history.    Suspected lesion involving the sella/infundibulum region is identified   which is better demonstrated on MRI. Lesions involving the right   postcentral gyrus region seen on prior MRI is not well demonstrated on   this study. There is evidence of heterogeneous sclerotic changes seen   throughout the calvarium as well as the visualized cervical spine region.   These findings are likely compatible with osseous metastasis.    Large extracalvarial soft tissue mass is seen involving the high right   parietal region. This is seen medially and could be compatible with   underlying metastasis as well.    The visualized paranasal sinuses mastoid and mastoid clear.    IMPRESSION: Parenchymal, osseous and extracalvarial lesions as described   above.

## 2022-12-03 NOTE — PROGRESS NOTE ADULT - PROBLEM SELECTOR PROBLEM 2
Stage IV breast cancer in female

## 2022-12-03 NOTE — PROGRESS NOTE ADULT - PROBLEM SELECTOR PLAN 5
Hx of b/l LE DVTs. Chart review revealed hx of IVC filter, but pt does not recall getting one. On Xarelto at home. On admission, presented with INR 2.43.  - holding Xarelto given hemorrhagic brain lesion found on MRI  - SCDs for DVT ppx for now  - repeat dopplers show chronic post thrombotic change affecting the right femoral and popliteal veins and the left common femoral and femoral veins. No duplex evidence of acute DVT in either lower extremity.

## 2022-12-03 NOTE — PROGRESS NOTE ADULT - PROBLEM SELECTOR PROBLEM 4
History of nephrectomy

## 2022-12-03 NOTE — PROGRESS NOTE ADULT - PROBLEM SELECTOR PLAN 2
Stage IV breast cancer w/ mets (s/p L mastectomy, s/p RT for lung mets, now on oral chemo aromasin/afinitor/capecitabine at home). Now with new brain mass finding, likely 2/2 progression of malignancy.  - Follows with Oncologist Dr. Leslie Goldstein ([facility])  - CT C/A/P: left perihilar mass with increased endobronchial extension along the superior segmental bronchus of the left lower lobe  - f/u heme onc recs

## 2022-12-03 NOTE — DISCHARGE NOTE NURSING/CASE MANAGEMENT/SOCIAL WORK - NSDCFUADDAPPT_GEN_ALL_CORE_FT
APPTS ARE READY TO BE MADE: [X ] YES    Best Family or Patient Contact (if needed):    Additional Information about above appointments (if needed):    1: Dr. Rodriguez   2:   3:     Other comments or requests:     1. Please see your oncologist, Dr. Ochoa, within 1 week.    2. Please see your radiation oncologist, Dr. Vazquez, within 2 weeks.    3. Please schedule an appointment with our surgical oncology team within 2 weeks.  [address] [place] [address] - 3rd Floor  [place], [place] [id number] [phone]    4. Please schedule an appointment with our neurosurgery team (Dr. Carmelo Rodriguez) within 1 weeks - please call [phone] for an appointment   [address] [facility] [address]  [place], [place] [id number] [phone]

## 2022-12-03 NOTE — PROGRESS NOTE ADULT - PROBLEM SELECTOR PLAN 8
- DVT ppx: SCDs for now given hemorrhagic brain mass and INR 2.43 on presentation  - Diet: regular  - Dispo: pending further eval by NSBRENDA - DVT ppx: SCDs for now given hemorrhagic brain mass and INR 2.43 on presentation  - Diet: regular  - Dispo: pending final recs by RILEY

## 2022-12-03 NOTE — DISCHARGE NOTE NURSING/CASE MANAGEMENT/SOCIAL WORK - WILL THE PATIENT ACCEPT THE PFIZER COVID-19 VACCINE IF ELIGIBLE AND IT IS AVAILABLE?
1237 Pt over from procedure room post cardioversion. Pt awake and alert, denies pain and nausea. Pt in SB/SR. VSS.   1245 Tolerating orals  1300 Madelin, pt's spouse, at bedside   1315 EKG at bedside  1335 Discharge instructions provided to pt. Discussed diet, activity, follow up, prescriptions and symptom management. Pt states understanding. Pt states all questions have been answered. Copy of discharge provided to pt.   1340 Criteria met, pt ambulated on unit, tolerated well  1345 Pt wheeled off of unit with all belongings without incident  
Yes

## 2022-12-03 NOTE — DISCHARGE NOTE NURSING/CASE MANAGEMENT/SOCIAL WORK - NSDCVIVACCINE_GEN_ALL_CORE_FT
Influenza, injectable,quadrivalent, preservative free, pediatric; 10-Nov-2020 12:55; Krishna Chong (RN); [employer];  (Exp. Date: 30-Jun-2021); IntraMuscular; Deltoid Right.; 0.5 milliLiter(s); VIS (VIS Published: 15-Aug-2019, VIS Presented: 10-Nov-2020);   influenza, high-dose, quadrivalent; 18-Nov-2021 14:17; Nia Garcia R.N. (RN); [employer]; [id number] (Exp. Date: 30-Jun-2022); IntraMuscular; Deltoid Right.; 0.7 milliLiter(s); VIS (VIS Published: 06-Aug-2021, VIS Presented: 18-Nov-2021);   Tdap; 10-Nov-2020 12:55; Krishna Chong (DONALD); [employer]; [id number] (Exp. Date: 31-Jul-2022); IntraMuscular; Deltoid Left.; 0.5 milliLiter(s); VIS (VIS Published: 10-Dec-2020, VIS Presented: 10-Nov-2020);

## 2022-12-03 NOTE — PROGRESS NOTE ADULT - SUBJECTIVE AND OBJECTIVE BOX
Patient is a 67y old  Female who presents with a chief complaint of Hemorrhagic brain mass (02 Dec 2022 13:05)      SUBJECTIVE :      MEDICATIONS  (STANDING):  budesonide  80 MICROgram(s)/formoterol 4.5 MICROgram(s) Inhaler 2 Puff(s) Inhalation two times a day  coronavirus bivalent (EUA) Booster Vaccine (PFIZER) 0.3 milliLiter(s) IntraMuscular once  dexAMETHasone  Injectable 4 milliGRAM(s) IV Push every 6 hours  influenza  Vaccine (HIGH DOSE) 0.7 milliLiter(s) IntraMuscular once  lactated ringers. 1000 milliLiter(s) (100 mL/Hr) IV Continuous <Continuous>    MEDICATIONS  (PRN):  acetaminophen     Tablet .. 650 milliGRAM(s) Oral every 6 hours PRN Temp greater or equal to 38C (100.4F), Mild Pain (1 - 3)  albuterol/ipratropium for Nebulization 3 milliLiter(s) Nebulizer every 6 hours PRN Shortness of Breath and/or Wheezing  ALPRAZolam 0.25 milliGRAM(s) Oral every 8 hours PRN Anxiety  oxyCODONE    IR 5 milliGRAM(s) Oral every 6 hours PRN Moderate Pain (4 - 6)  oxyCODONE    IR 10 milliGRAM(s) Oral every 6 hours PRN Severe Pain (7 - 10)      CAPILLARY BLOOD GLUCOSE        I&O's Summary    02 Dec 2022 07:01  -  03 Dec 2022 07:00  --------------------------------------------------------  IN: 1750 mL / OUT: 0 mL / NET: 1750 mL        PHYSICAL EXAM:  Vital Signs Last 24 Hrs  T(C): 36.4 (03 Dec 2022 04:39), Max: 36.8 (02 Dec 2022 21:46)  T(F): 97.6 (03 Dec 2022 04:39), Max: 98.2 (02 Dec 2022 21:46)  HR: 52 (03 Dec 2022 04:39) (52 - 71)  BP: 139/79 (03 Dec 2022 04:39) (124/65 - 140/73)  BP(mean): --  RR: 18 (03 Dec 2022 04:39) (16 - 18)  SpO2: 96% (03 Dec 2022 04:39) (93% - 98%)    Parameters below as of 03 Dec 2022 04:39  Patient On (Oxygen Delivery Method): room air      GENERAL: NAD, well-groomed, well-developed  HEAD: 4-cm posterior scalp mass with dried blood, mild TTP  EYES: EOMI, conjunctiva and sclera clear  ENMT: Moist mucous membranes, Good dentition, No lesions  NECK: Supple, No JVD  NERVOUS SYSTEM:  Alert & Oriented X3; Motor Strength full and symmetric in upper and lower extremities;  CHEST/LUNG: Clear to auscultation bilaterally; No rales, rhonchi, wheezing, or rubs  HEART: Regular rate and rhythm; Normal S1, S2  ABDOMEN: Soft, Nontender, Nondistended; Bowel sounds present  EXTREMITIES:  2+ Peripheral Pulses; No clubbing, cyanosis, or edema  SKIN: No rashes or lesions    LABS:                        13.2   12.37 )-----------( 173      ( 02 Dec 2022 06:54 )             40.8     12-02    140  |  103  |  33<H>  ----------------------------<  151<H>  4.7   |  25  |  1.32<H>    Ca    9.4      02 Dec 2022 06:54  Phos  4.0     12-02  Mg     2.3     12-02                  RADIOLOGY & ADDITIONAL TESTS:  Results Reviewed:   Imaging Personally Reviewed:  Electrocardiogram Personally Reviewed:    COORDINATION OF CARE:  Care Discussed with Consultants/Other Providers [Y/N]:  Prior or Outpatient Records Reviewed [Y/N]:   Patient is a 67y old  Female who presents with a chief complaint of Hemorrhagic brain mass (02 Dec 2022 13:05)      SUBJECTIVE : NAEO. Pt seen and examined at bedside. Feels well overall. Looks comfortable however still with headache that is usually relieved with oxy. Good appetite. 0No photophobia, N/V, CP, SOB, fever/chills, dysuria, hematuria.       MEDICATIONS  (STANDING):  budesonide  80 MICROgram(s)/formoterol 4.5 MICROgram(s) Inhaler 2 Puff(s) Inhalation two times a day  coronavirus bivalent (EUA) Booster Vaccine (PFIZER) 0.3 milliLiter(s) IntraMuscular once  dexAMETHasone  Injectable 4 milliGRAM(s) IV Push every 6 hours  influenza  Vaccine (HIGH DOSE) 0.7 milliLiter(s) IntraMuscular once  lactated ringers. 1000 milliLiter(s) (100 mL/Hr) IV Continuous <Continuous>    MEDICATIONS  (PRN):  acetaminophen     Tablet .. 650 milliGRAM(s) Oral every 6 hours PRN Temp greater or equal to 38C (100.4F), Mild Pain (1 - 3)  albuterol/ipratropium for Nebulization 3 milliLiter(s) Nebulizer every 6 hours PRN Shortness of Breath and/or Wheezing  ALPRAZolam 0.25 milliGRAM(s) Oral every 8 hours PRN Anxiety  oxyCODONE    IR 5 milliGRAM(s) Oral every 6 hours PRN Moderate Pain (4 - 6)  oxyCODONE    IR 10 milliGRAM(s) Oral every 6 hours PRN Severe Pain (7 - 10)      CAPILLARY BLOOD GLUCOSE        I&O's Summary    02 Dec 2022 07:01  -  03 Dec 2022 07:00  --------------------------------------------------------  IN: 1750 mL / OUT: 0 mL / NET: 1750 mL        PHYSICAL EXAM:  Vital Signs Last 24 Hrs  T(C): 36.4 (03 Dec 2022 04:39), Max: 36.8 (02 Dec 2022 21:46)  T(F): 97.6 (03 Dec 2022 04:39), Max: 98.2 (02 Dec 2022 21:46)  HR: 52 (03 Dec 2022 04:39) (52 - 71)  BP: 139/79 (03 Dec 2022 04:39) (124/65 - 140/73)  BP(mean): --  RR: 18 (03 Dec 2022 04:39) (16 - 18)  SpO2: 96% (03 Dec 2022 04:39) (93% - 98%)    Parameters below as of 03 Dec 2022 04:39  Patient On (Oxygen Delivery Method): room air      GENERAL: NAD, well-groomed, well-developed  HEAD: 4-cm posterior scalp mass with dried blood, mild TTP  EYES: EOMI, conjunctiva and sclera clear  ENMT: Moist mucous membranes, Good dentition, No lesions  NECK: Supple, No JVD  NERVOUS SYSTEM:  Alert & Oriented X3; Motor Strength full and symmetric in upper and lower extremities;  CHEST/LUNG: Clear to auscultation bilaterally; No rales, rhonchi, wheezing, or rubs  HEART: Regular rate and rhythm; Normal S1, S2  ABDOMEN: Soft, Nontender, Nondistended; Bowel sounds present  EXTREMITIES:  2+ Peripheral Pulses; No clubbing, cyanosis, or edema  SKIN: No rashes or lesions    LABS:                        13.2   12.37 )-----------( 173      ( 02 Dec 2022 06:54 )             40.8     12-02    140  |  103  |  33<H>  ----------------------------<  151<H>  4.7   |  25  |  1.32<H>    Ca    9.4      02 Dec 2022 06:54  Phos  4.0     12-02  Mg     2.3     12-02                  RADIOLOGY & ADDITIONAL TESTS:  Results Reviewed:   Imaging Personally Reviewed:  Electrocardiogram Personally Reviewed:    COORDINATION OF CARE:  Care Discussed with Consultants/Other Providers [Y/N]:  Prior or Outpatient Records Reviewed [Y/N]:

## 2022-12-03 NOTE — PROGRESS NOTE ADULT - PROBLEM SELECTOR PLAN 1
Presented with constant headache x1 month. MRI head showed 2.8cm x 2.1cm hemorrhagic mass in L anterior medial frontal lobe of brain. Exam on admission appeared grossly intact, though possible slight weakening 4+/5 in LUE (unclear if due to positioning). INR on admission was 2.43.   - Appreciate NSGY recs: no acute NSGY intervention, good candidate for SRS  - s/p dex 10mg IV x1 in ED; c/w dex 4mg IV q6h with 1 week taper to off per NSGY  - hold AC/AP   - f/u mets workup: elevated prolactin, with decreased HGH, TSH, cortisol  - rad onc consult emailed, recommending outpt SRS  - f/u med onc recs  - neurochecks q8h for now  - oxycodone 5 mg PO Q6H PRN  - CTH showing multiple parenchymal, osseous, and extracalvarial lesions  - MR head interval assessment of hemorrhagic brain mass shows no change

## 2022-12-03 NOTE — CONSULT NOTE ADULT - ASSESSMENT
67F w/ hx of Stage IV breast cancer w/ lungs and bone mets (s/p L mastectomy, s/p RT for lung mets, now on Xeloda, most recently 4 days ago), clear cell RCC (s/p L nephrectomy), b/l LE DVTs (on Xarelto), COPD, presenting with constant headache x~1 month and found to have a 2.8cm x 2.1cm hemorrhagic mass in L anterior medial frontal lobe of brain on MRI. Surgical Oncology consulted for management of fungating, intermittently oozing, scalp mass. CT scan showing a a 2.4 x 5.7 x 4.0 cm fungating lesion arising from the right posterior scalp, with a focus of infiltration into the subgaleal space, but no evidence for calvarial infiltration.    Plan/recommendations  - No acute surgical intervention  - Continue holding anticoagulation  - Will plan to proceed with surgery for posterior scalp mass during this admission (next week) vs outpatient  - Need to coordinate with Plastics for reconstruction  - Plan discussed with Surgical Oncology Attending on call Dr. Chilo Fan    Red Team Surgery  [id number] 
Marissa Ortega  67F Hx DVT on xarelto, BreastCa (on chemo last injection 4days ago), has had lump on back of head for ~7m and 1m HA, she told her oncologist about this who sent her for MR which showed a 2.1 x 2.8 cm L frontal enhancing lesion c/f mets  Exam: Intact  -adm medicine for mets workup  -Hold AC/AP  -dex 10mg once followed by 4q6 with 1w taper to off  -CT CAP w/con  -no acute neurosurgery intervention  -med onc and rad onc consults, patient is a good candidate for SRS  -plastics consult for scalp lesion
67F w/ hx of Stage IV breast cancer w/ mets (s/p L mastectomy, s/p RT for lung mets, now on oral chemo, most recently 4 days ago), clear cell RCC (s/p L nephrectomy), b/l LE DVTs (on Xarelto), COPD, presenting with constant headache x~1 month and found to have an abnormal outpatient MRI head showing 2.8cm x 2.1cm hemorrhagic mass in L anterior medial frontal lobe of brain; also with fungating occipital scalp lesion. Likely progression of malignancy.  she has been followed by NSGY inpt, now on decadron  per review of NSGY notes, this appears to be consistent with metastic disease    endocrine called 12/3 for further assistance     #lowcortisol  #suppressed TSH  #elevated prolactin   hemorrhagic brain lesion      at this time, can not make any clear recommendations if the pt has Adrenal insufficiency   can not check a cortisol when on dex   it will lead to a false + when on steroids  her HPA axis will need to determined outpt when she is tapered off dexamethasone  either she will need to be switched to hydrocortisone when tapered down to dex 1mg to off and than HPA axis determined outpt on HC     the same for TSH, TSH will be suppressed in the setting of high dose dex  her free T4 is normal which is reassuring as we would go by Free T4 in the setting of pit mass      undiluted prolactin - elevated - would not treat as post menopausal and does not have symptoms   potential stalk effect       check gonadal axis for complete workup outpt for pit panel        plan:  check HPA axis for adrenal axis outpt as noted above   her HPA axis will need to determined outpt when she is tapered off dexamethasone  either she will need to be switched to hydrocortisone when tapered down to dex 1mg to off and than HPA axis determined outpt on HC   repeat TFTS when off dex   fu with NSGY reccs for dex and monitoring of mass and lesions    If patient wishes to follow up with [facility] to help with assessment of HPA axis please call for appt:      [facility]  [address] [place] [address], [address], [place] [address]  [phone]                   Valarie Bolaños MD  Attending Physician   Department of Endocrinology, Diabetes and Metabolism     weekdays: 9am to 5pm: email [facility]endocrine or [facility] and or TEAMS     Nights and weekends: [phone]  Please note that this patient may be followed by a different provider tomorrow.   If no answer or after hours, please contact [phone].  For final dc reccomendations, please call [phone] or page the endocrine fellow on call.      61 minutes spent on total encounter; more than 50% of the visit was spent counseling and / or coordinating care by the attending physician   The necessity of the time spent during the encounter on this date of service was due to:   reviewing prior medical records, imaging  labs and inpt course   
67F w/ hx of Stage IV breast cancer w/ lungs and bone mets (s/p L mastectomy, s/p RT for lung mets, now on Xeloda, most recently 4 days ago), clear cell RCC (s/p L nephrectomy), b/l LE DVTs (on Xarelto), COPD, presenting with constant headache x~1 month and found to have an abnormal outpatient MRI head earlier in the day. MRI head showed 2.8cm x 2.1cm hemorrhagic mass in L anterior medial frontal lobe of brain. Pt also with a lump on the back of head for the past ~7 months that is occasionally bleeding and with pain when laying on it.     In ED: Afebrile, HR 60s-70s. SBP 110s-120s, RR 16-18, sating 97% on RA. Labs notable for INR 2.43, BG 67.   Given dexamethasone 10mg IV x1. Admitted to Medicine for further management.     Oncology is consulted for breast cancer with POD with  likely brain mets.   #Stage IV ILC w/ lungs and bone mets, now with imaging concerning for brain mets  -Follows with Dr[facility]choa at [facility]   -2003: Initial diagnosis of Stage IIB left breast cancer, ER positive/OR positive/HER-2 negative–status post left modified radical mastectomy with reconstruction. 1/15 lymph nodes positive for metastatic carcinoma with focal extranodal extension. Status post AC-T--> Tamoxifen x 5 years.     -6/2018-Presented with general weakness and leg pain. Diagnosed with clear cell renal carcinoma s/p Radical Left Nephrectomy, Para Aortic LN dissection, c/b Splenic Laceration w/ bleeding.   -Right iliac bone biopsy of bone lesion was consistent with metastatic breast cancer–ER positive/OR positive/HER-2 unknown. PD-L1 ()=0%.    -Started Letrozole/Ibrance/Xgeva.   -4/2020-CT scan of the chest/abdomen/pelvis revealed new left lower lung nodule and decreased right apex nodules, extensive bony metastases slightly increased. Changed to Aromasin, with Afinitor added 7/2020.   -6/2021-CT C/A/P-pulmonary metastases, a distal paraesophageal node and extensive osseous metastatic disease without significant change. No acute pathology. Aromasin/Afinitor continued.   -10/2020–[facility]–genomic findings–PIK3CA J7580S, CDH1 Y523fs*13, TBX3 R471fs*161; OTTO; TMB 1Muts/Mb. 3 disease relevant genes with no reportable alterations–BRCA1, BRCA2, ERBB 2.   -2/28/22-CT C/A/P-enlarging pulmonary nodules and a new right pulmonary nodule. Enlarging distal paraesophageal lymph node and a new left internal mammary lymph node. Stable bone metastases.   -3/17/2022-Began Faslodex.   -7/18/2022-CT C/A/P-Enlarging bilateral pulmonary nodules with reference nodules described above. Marked interval enlargement of a right perihilar mass with new endobronchial extension into the right mainstem bronchus. Enlarging right hilar and distal paraesophageal lymph nodes. 3.6 cm indeterminate mass in the mid to lower pole of the right kidney measuring slightly higher than simple fluid attenuation, increased in size. Further evaluation with renal ultrasound is recommended. Extensive sclerotic osseous metastases, without significant interval change.   -8/17/2022-Completed radiation (5/5 fractions)-2000 cGy. Planned Dose: 2000 cGy. Treatment Site: Right hilum.    -9/2022-On Xeloda.   -Patient now with MRI showing .8cm x 2.1cm hemorrhagic mass in L anterior medial frontal lobe and a bleeding mass to her posterior scalp   -Per NSG no acute neurosurgery intervention. Please get NSG to re-eval the patient (INR is improving off AC and pt has only one met in the brain_   -Cont Dex 10mg once followed by 4q6 with 1w taper to off   -Please get RT   -Hold any anti-cancer treatment   -Patient with DVT on AC. Agree with holding the AC in view of ICH. Will follow US duplex   -Clear cell RCC (s/p L nephrectomy) in CR. Can repeat CT C/A/P    -Will follow up     Catarina Thomas MD.   PGY4 HEMONC fellow

## 2022-12-03 NOTE — PROGRESS NOTE ADULT - PROBLEM SELECTOR PLAN 4
Hx of clear cell RCC (s/p L nephrectomy) and CKD3 with one kidney.  - on admission, SCr 1.06 (baseline SCr ~1.0-1.3 in [id number])  - strict I/Os, renally dose meds, avoid nephrotoxins as able  - monitor SCr and electrolytes  - Hydration for mild ERASMO following CT with contrast

## 2022-12-03 NOTE — PROGRESS NOTE ADULT - REASON FOR ADMISSION
Hemorrhagic brain mass

## 2022-12-03 NOTE — CHART NOTE - NSCHARTNOTEFT_GEN_A_CORE
67F Hx DVT on xarelto, Stage IV Breast Ca (on chemo last injection 4days ago), has had lump on back of head for ~7m and 1m HA, she told her oncologist about this who sent her for MR which showed a 2.1 x 2.8 cm L frontal enhancing lesion c/f mets    Per NSG:   -no acute neurosurgery intervention    #Stage IV breast cancer w/ lungs and bone mets  -Follows with Dr. Ochoa   -2003: Initial diagnosis of Stage IIB left breast cancer, ER positive/[place] positive/HER-2 negative–status post left modified radical mastectomy with reconstruction. 1/15 lymph nodes positive for metastatic carcinoma with focal extranodal extension. Status post AC-T--> Tamoxifen x 5 years.       < from: MR Head w/wo IV Cont (11.28.22 @ 15:04) >    IMPRESSION:    -Interval development of a 2.8 cm hemorrhagic mass in the left   anteromedial frontal lobe with surrounding edema, compatible with   hemorrhagic metastasis.    -Additional punctate enhancing lesion in the right post central gyrus and   new enhancing sellar lesion are also compatible with metastases.    -Fungating posterior scalp lesion measuring up to 5.7 cm, also suspicious   for metastasis.    Findings discussed with the physician assistant from referring   physician's office at 3:08 PM 11/28/2022 with recommendation for patient   to present to the emergency room. This was subsequently discussed with   the patient who will be offered ambulance transportation.      a/p:   Agree with NSG: this is not a case for transfer to [facility] for WBRT but for outpatient SRS.  appointments in the outpatient setting at [facility] are made by : [email or web address]  or   would recommend Dr. Timmons or Dr. Harper
Called by vascular tech to report abnormal venous doppler with chronic DVT. No evidence of acute DVT. Patient has a HX of DVT s/p IVF. Patient was taken of AC due to abnormal bleeding. D/W Dr Ying, wants to monitor off AC  IMPRESSION:  Chronic post thrombotic change affecting the right femoral and popliteal   veins and the left common femoral and femoral veins.    No duplex evidence of acute DVT in either lower extremity.  .
Confidential Drug Utilization Report  Search Terms: Marissa Ortega, 1955 Search Date: 11/29/2022 00:08:52 AM  Searching on behalf of: 05 - [facility]  The Drug Utilization Report below displays all of the controlled substance prescriptions, if any, that your patient has filled in the last twelve months. The information displayed on this report is compiled from pharmacy submissions to the Department, and accurately reflects the information as submitted by the pharmacies.    This report was requested by: Flo Belle | Reference #: [id number]    Others' Prescriptions  Patient Name: Marissa Morales Date: 1955  Address: [address] [place] [id number]: Female  Rx Written	Rx Dispensed	Drug	Quantity	Days Supply	Prescriber Name	Prescriber Margie #	Payment Method  11/10/2022	11/15/2022	oxycodone hcl (ir) 5 mg tablet	100	25	Leslie Ochoa	[id number]	Insurance  Dispenser [facility]  11/10/2022	11/15/2022	alprazolam 0.25 mg tablet	30	30	Leslie Ochoa	[id number]	Insurance  Dispenser [facility]  09/19/2022	09/20/2022	alprazolam 0.25 mg tablet	30	30	Leslie Ochoa	[id number]	Insurance  Dispenser [facility]  09/19/2022	09/20/2022	oxycodone hcl (ir) 5 mg tablet	100	25	Leslie Ochoa	[id number]	Insurance  Dispenser [facility]  08/03/2022	08/08/2022	oxycodone hcl (ir) 5 mg tablet	100	25	Leslie Ochoa	[id number]	Insurance  Dispenser [facility]  08/03/2022	08/08/2022	alprazolam 0.25 mg tablet	30	30	Leslie Ochoa	[id number]	Insurance  Dispenser [facility]  06/09/2022	06/16/2022	alprazolam 0.25 mg tablet	30	30	Leslie Ochoa	[id number]	Insurance  Dispenser [facility]  06/13/2022	06/16/2022	oxycodone hcl (ir) 5 mg tablet	100	25	Leslie Ochoa	[id number]	Insurance  Dispenser [facility]  04/13/2022	04/14/2022	oxycodone hcl (ir) 5 mg tablet	100	25	Gabriela, Leslie Ace	[id number]	Insurance  Dispenser [facility]  02/14/2022	02/16/2022	oxycodone hcl (ir) 5 mg tablet	100	25	Leslie Ochoa	[id number]	Insurance  Dispenser [facility]  12/13/2021	12/16/2021	oxycodone hcl (ir) 5 mg tablet	100	25	Gabriela, Leslie Ace	[id number]	Insurance  Dispenser [facility]    Patient Name: Rama Morales Date: 1955  Address: [address] [place] [id number]: Female  Rx Written	Rx Dispensed	Drug	Quantity	Days Supply	Prescriber Name	Prescriber Margie #	Payment Method	Dispenser  04/18/2022	05/02/2022	alprazolam 0.25 mg tablet	30	30	Gabriela, Leslie Ace	[id number]	Medicare	[id number]  03/11/2022	03/20/2022	alprazolam 0.25 mg tablet	30	30	[place], Leslie Ace	[id number]	Medicare	[id number]  12/13/2021	12/24/2021	alprazolam 0.25 mg tablet	30	30	Gabriela, Lesliegregor Solanoy	[id number]	Medicare	[id number]  * - Drugs marked with an asterisk are compound drugs. If the compound drug is made up of more than one controlled substance, then each controlled substance will be a separate row in the table.          †Click the ‘Report Suspicious Activity’ button to report information related to controlled substance suspicious activity to the [place] [employer].    ††Click the ‘Send Questions/Comments’ button to send questions about this report to the [place] [employer], or call 1-321.219.2908.    †††Click the ‘Substance Use Disorder Treatment’ button to go to the Office of Addiction Services and Supports website, www.[place].gov or call 1-592.396.1546.    © 2017 [facility] Department of Health -[place] [employer] Tbqpermhvub26/29/2022 00:08  .
consulted for suspected malignant lesion of scalp. Plastic surgery consulted.  D/w team and will defer to plastics. Remain available as requested
Left a message for patient in regards to follow up care with callback information.

## 2022-12-03 NOTE — PROGRESS NOTE ADULT - ATTENDING COMMENTS
67F w/ hx of Stage IV breast cancer w/ mets (s/p L mastectomy, s/p RT for lung mets, now on oral chemo), clear cell RCC (s/p L nephrectomy), b/l LE DVTs (on Xarelto), COPD, presenting with constant headache x~1 month and found to have an abnormal outpatient MRI head showing 2.8cm x 2.1cm hemorrhagic mass in L anterior medial frontal lobe of brain; also with fungating occipital scalp lesion. Likely progression of malignancy. Inpatient MRI pituitary protocol demonstrated metastatic lesions in left frontal lobe, sella/infundibulum, right postcentral gyrus, posterior scalp, calvarium, and upper cervical vertebral bodies with vasogenic edema. Labs notable for TSH 0.10 with wnl T4, low hch 0.08, high prolactin 76 (no intervention per neurosurgery, endocrine outpatient follow up).    Headache improved from admission. No blurry vision, slurred speech or ataxia.     #Hemorrhagic Brain Mass  #Occipital Scalp Lesion  #Stage4 breast cancer  #ERASMO  #DVT (chronic)    - rad-onc recs outpt SRS for treatment for cerebral mets   - CT C/A/P showing known lung involvement; no significant changes noted   - NSX following - c/w decadron 4mg IVP q6h  - surg-onc following for fungating occipital lesion - will plan for outpt resection with coordination with plastics for reconstruction   - oncology recs appreciated   - c/w neuro checks q8hr  - chronic DVT noted on duplex - monitor off xarelto given hemorrhagic mets; will c/t hold on d/c   - PT recs home PT     Discharge home 40 minutes spent. Outpatient neurosurgery, radiation oncology, oncology, endodrine and surgery f/u. Will go home on steroid taper.    d/w HS7    Cathy Espinoza MD  Division of Hospital Medicine  Available on [employer] Teams

## 2022-12-03 NOTE — PROGRESS NOTE ADULT - PROVIDER SPECIALTY LIST ADULT
Internal Medicine
Neurosurgery
Surgery
Internal Medicine

## 2022-12-03 NOTE — DISCHARGE NOTE NURSING/CASE MANAGEMENT/SOCIAL WORK - PATIENT PORTAL LINK FT
You can access the FollowMyHealth Patient Portal offered by [facility] by registering at the following website: [email or web address]. By joining [employer]’s FollowMyHealth portal, you will also be able to view your health information using other applications (apps) compatible with our system.

## 2022-12-04 VITALS
HEART RATE: 75 BPM | SYSTOLIC BLOOD PRESSURE: 136 MMHG | DIASTOLIC BLOOD PRESSURE: 70 MMHG | RESPIRATION RATE: 18 BRPM | TEMPERATURE: 98 F | OXYGEN SATURATION: 98 %

## 2022-12-05 ENCOUNTER — APPOINTMENT (OUTPATIENT)
Dept: RADIATION ONCOLOGY | Facility: CLINIC | Age: 67
End: 2022-12-05

## 2022-12-05 ENCOUNTER — OUTPATIENT (OUTPATIENT)
Dept: OUTPATIENT SERVICES | Facility: HOSPITAL | Age: 67
LOS: 1 days | Discharge: HOME | End: 2022-12-05

## 2022-12-05 ENCOUNTER — NON-APPOINTMENT (OUTPATIENT)
Age: 67
End: 2022-12-05

## 2022-12-05 DIAGNOSIS — Z98.890 OTHER SPECIFIED POSTPROCEDURAL STATES: Chronic | ICD-10-CM

## 2022-12-05 DIAGNOSIS — Z90.12 ACQUIRED ABSENCE OF LEFT BREAST AND NIPPLE: Chronic | ICD-10-CM

## 2022-12-05 PROBLEM — N18.30 CHRONIC KIDNEY DISEASE, STAGE 3 UNSPECIFIED: Chronic | Status: ACTIVE | Noted: 2022-11-28

## 2022-12-05 PROCEDURE — 99215 OFFICE O/P EST HI 40 MIN: CPT | Mod: 95

## 2022-12-05 NOTE — REASON FOR VISIT
I was able to see the images pushed to the . Thank you for making that happen. We will proceed with facet injections as planned. Minimal cervical stenosis.   Emma Mooney MD    [Brain Metastasis] : brain metastasis [Consideration for Non-Curative Therapy] : consideration for non-curative therapy for

## 2022-12-06 ENCOUNTER — OUTPATIENT (OUTPATIENT)
Dept: OUTPATIENT SERVICES | Facility: HOSPITAL | Age: 67
LOS: 1 days | Discharge: HOME | End: 2022-12-06
Payer: MEDICARE

## 2022-12-06 ENCOUNTER — OUTPATIENT (OUTPATIENT)
Dept: OUTPATIENT SERVICES | Facility: HOSPITAL | Age: 67
LOS: 1 days | End: 2022-12-06
Payer: MEDICARE

## 2022-12-06 ENCOUNTER — APPOINTMENT (OUTPATIENT)
Dept: MRI IMAGING | Facility: IMAGING CENTER | Age: 67
End: 2022-12-06

## 2022-12-06 ENCOUNTER — APPOINTMENT (OUTPATIENT)
Dept: SPINE | Facility: AMBULATORY SURGERY CENTER | Age: 67
End: 2022-12-06

## 2022-12-06 DIAGNOSIS — C79.31 SECONDARY MALIGNANT NEOPLASM OF BRAIN: ICD-10-CM

## 2022-12-06 DIAGNOSIS — E89.6 POSTPROCEDURAL ADRENOCORTICAL (-MEDULLARY) HYPOFUNCTION: Chronic | ICD-10-CM

## 2022-12-06 DIAGNOSIS — Z90.5 ACQUIRED ABSENCE OF KIDNEY: Chronic | ICD-10-CM

## 2022-12-06 DIAGNOSIS — Z90.12 ACQUIRED ABSENCE OF LEFT BREAST AND NIPPLE: Chronic | ICD-10-CM

## 2022-12-06 DIAGNOSIS — Z98.890 OTHER SPECIFIED POSTPROCEDURAL STATES: Chronic | ICD-10-CM

## 2022-12-06 PROBLEM — C50.919 MALIGNANT NEOPLASM OF UNSPECIFIED SITE OF UNSPECIFIED FEMALE BREAST: Chronic | Status: ACTIVE | Noted: 2019-01-31

## 2022-12-06 PROBLEM — C64.9 MALIGNANT NEOPLASM OF UNSPECIFIED KIDNEY, EXCEPT RENAL PELVIS: Chronic | Status: ACTIVE | Noted: 2020-08-27

## 2022-12-06 PROCEDURE — 77263 THER RADIOLOGY TX PLNG CPLX: CPT

## 2022-12-06 PROCEDURE — 77300 RADIATION THERAPY DOSE PLAN: CPT | Mod: 26

## 2022-12-06 PROCEDURE — A9585: CPT

## 2022-12-06 PROCEDURE — 76498 UNLISTED MR PROCEDURE: CPT

## 2022-12-06 PROCEDURE — 61800 APPLY SRS HEADFRAME ADD-ON: CPT

## 2022-12-06 PROCEDURE — 77295 3-D RADIOTHERAPY PLAN: CPT | Mod: 26

## 2022-12-06 PROCEDURE — 77432 STEREOTACTIC RADIATION TRMT: CPT

## 2022-12-06 PROCEDURE — 61797 SRS CRAN LES SIMPLE ADDL: CPT

## 2022-12-06 PROCEDURE — 61798 SRS CRANIAL LESION COMPLEX: CPT

## 2022-12-06 RX ORDER — LIDOCAINE HYDROCHLORIDE 20 MG/ML
2 INJECTION, SOLUTION INFILTRATION; PERINEURAL
Qty: 0 | Refills: 0 | Status: COMPLETED | OUTPATIENT
Start: 2022-12-05

## 2022-12-06 RX ORDER — IBUPROFEN 400 MG/1
400 TABLET, FILM COATED ORAL
Qty: 0 | Refills: 0 | Status: COMPLETED | OUTPATIENT
Start: 2022-12-05

## 2022-12-06 RX ORDER — ACETAMINOPHEN 10 MG/ML
1000 INJECTION INTRAVENOUS
Qty: 0 | Refills: 0 | Status: COMPLETED | OUTPATIENT
Start: 2022-12-05

## 2022-12-06 RX ORDER — SODIUM CHLORIDE 9 G/ML
0.9 INJECTION, SOLUTION INTRAVENOUS
Qty: 0 | Refills: 0 | Status: COMPLETED | OUTPATIENT
Start: 2022-12-05

## 2022-12-07 ENCOUNTER — NON-APPOINTMENT (OUTPATIENT)
Age: 67
End: 2022-12-07

## 2022-12-07 RX ORDER — CLINDAMYCIN PHOSPHATE 10 MG/ML
1 LOTION TOPICAL DAILY
Qty: 60 | Refills: 0 | Status: DISCONTINUED | COMMUNITY
Start: 2020-09-29 | End: 2022-12-07

## 2022-12-07 RX ORDER — CHOLECALCIFEROL (VITAMIN D3) 50 MCG
2000 CAPSULE ORAL
Refills: 0 | Status: DISCONTINUED | COMMUNITY
End: 2022-12-07

## 2022-12-07 RX ORDER — POTASSIUM CHLORIDE 1500 MG/1
20 TABLET, FILM COATED, EXTENDED RELEASE ORAL
Qty: 7 | Refills: 0 | Status: DISCONTINUED | COMMUNITY
Start: 2021-11-19 | End: 2022-12-07

## 2022-12-07 RX ORDER — RIVAROXABAN 20 MG/1
20 TABLET, FILM COATED ORAL
Qty: 30 | Refills: 0 | Status: DISCONTINUED | COMMUNITY
Start: 2022-03-18 | End: 2022-12-07

## 2022-12-07 RX ORDER — CAPECITABINE 500 MG/1
500 TABLET ORAL
Qty: 56 | Refills: 1 | Status: DISCONTINUED | COMMUNITY
Start: 2022-09-20 | End: 2022-12-07

## 2022-12-07 RX ORDER — RIVAROXABAN 15 MG/1
15 TABLET, FILM COATED ORAL
Qty: 30 | Refills: 1 | Status: DISCONTINUED | COMMUNITY
Start: 2020-10-08 | End: 2022-12-07

## 2022-12-07 NOTE — REVIEW OF SYSTEMS
[Fatigue] : fatigue [Shortness Of Breath] : shortness of breath [SOB on Exertion] : shortness of breath during exertion [Skin Wound] : skin wound [Negative] : Heme/Lymph [Fever] : no fever [Chills] : no chills [Night Sweats] : no night sweats [Recent Change In Weight] : ~T no recent weight change [de-identified] : Mass to back of head

## 2022-12-07 NOTE — HISTORY OF PRESENT ILLNESS
[Home] : at home, [unfilled] , at the time of the visit. [Medical Office: ([facility])___] : at the medical office located in  [Verbal consent obtained from patient] : the patient, [unfilled] [FreeTextEntry1] : Ms. Ortega is a 65 yo female with metastatic breast cancer to the lung and bone with marked interval enlargement of a right perihilar mass with endobronchial extension into the right mainstem bronchus, associated with increasing pulmonary symptoms and hemoptysis. She completed a course of palliative radiation to the right hilum, a dose of 2,000 cGy from 8/11/22 - 8/17/22. \par \par 11/28/2022 MRI Brain: IMPRESSION:\par -Interval development of a 2.8 cm hemorrhagic mass in the left anteromedial frontal lobe with surrounding edema, compatible with hemorrhagic metastasis.\par -Additional punctate enhancing lesion in the right post central gyrus and new enhancing sellar lesion are also compatible with metastases.\par -Fungating posterior scalp lesion measuring up to 5.7 cm, also suspicious for metastasis.\par \par 11/28/2022 Admitted to [facility] for hemorrhagic brain mass. Discharged 12/4/2022. \par \par Presents today via telephone for re-evaluation to discuss treatment with [facility]. \par  \par \par

## 2022-12-08 ENCOUNTER — OUTPATIENT (OUTPATIENT)
Dept: OUTPATIENT SERVICES | Facility: HOSPITAL | Age: 67
LOS: 1 days | Discharge: HOME | End: 2022-12-08

## 2022-12-08 DIAGNOSIS — C50.919 MALIGNANT NEOPLASM OF UNSPECIFIED SITE OF UNSPECIFIED FEMALE BREAST: ICD-10-CM

## 2022-12-08 DIAGNOSIS — Z90.5 ACQUIRED ABSENCE OF KIDNEY: Chronic | ICD-10-CM

## 2022-12-08 DIAGNOSIS — Z98.890 OTHER SPECIFIED POSTPROCEDURAL STATES: Chronic | ICD-10-CM

## 2022-12-08 DIAGNOSIS — E89.6 POSTPROCEDURAL ADRENOCORTICAL (-MEDULLARY) HYPOFUNCTION: Chronic | ICD-10-CM

## 2022-12-08 DIAGNOSIS — Z90.12 ACQUIRED ABSENCE OF LEFT BREAST AND NIPPLE: Chronic | ICD-10-CM

## 2022-12-13 ENCOUNTER — APPOINTMENT (OUTPATIENT)
Dept: HEMATOLOGY ONCOLOGY | Facility: CLINIC | Age: 67
End: 2022-12-13

## 2022-12-13 ENCOUNTER — APPOINTMENT (OUTPATIENT)
Dept: INFUSION THERAPY | Facility: HOSPITAL | Age: 67
End: 2022-12-13

## 2022-12-15 DIAGNOSIS — C79.31 SECONDARY MALIGNANT NEOPLASM OF BRAIN: ICD-10-CM

## 2022-12-19 ENCOUNTER — NON-APPOINTMENT (OUTPATIENT)
Age: 67
End: 2022-12-19

## 2022-12-19 PROCEDURE — 77334 RADIATION TREATMENT AID(S): CPT | Mod: 26

## 2022-12-19 PROCEDURE — 77290 THER RAD SIMULAJ FIELD CPLX: CPT | Mod: 26

## 2022-12-19 PROCEDURE — 77332 RADIATION TREATMENT AID(S): CPT | Mod: 26,59

## 2022-12-20 ENCOUNTER — NON-APPOINTMENT (OUTPATIENT)
Age: 67
End: 2022-12-20

## 2022-12-21 ENCOUNTER — NON-APPOINTMENT (OUTPATIENT)
Age: 67
End: 2022-12-21

## 2022-12-23 ENCOUNTER — NON-APPOINTMENT (OUTPATIENT)
Age: 67
End: 2022-12-23

## 2022-12-27 ENCOUNTER — NON-APPOINTMENT (OUTPATIENT)
Age: 67
End: 2022-12-27

## 2022-12-27 RX ORDER — DEXAMETHASONE 2 MG/1
2 TABLET ORAL TWICE DAILY
Qty: 20 | Refills: 0 | Status: ACTIVE | COMMUNITY
Start: 2022-12-27 | End: 1900-01-01

## 2022-12-28 ENCOUNTER — NON-APPOINTMENT (OUTPATIENT)
Age: 67
End: 2022-12-28

## 2022-12-29 PROCEDURE — 77295 3-D RADIOTHERAPY PLAN: CPT | Mod: 26

## 2022-12-29 PROCEDURE — 77334 RADIATION TREATMENT AID(S): CPT | Mod: 26

## 2022-12-29 PROCEDURE — 77300 RADIATION THERAPY DOSE PLAN: CPT | Mod: 26

## 2022-12-30 RX ORDER — OXYCODONE 5 MG/1
5 TABLET ORAL
Qty: 100 | Refills: 0 | Status: ACTIVE | COMMUNITY
Start: 2020-08-10 | End: 1900-01-01

## 2022-12-30 RX ORDER — ALPRAZOLAM 0.25 MG/1
0.25 TABLET ORAL
Qty: 30 | Refills: 0 | Status: ACTIVE | COMMUNITY
Start: 2021-06-17 | End: 1900-01-01

## 2023-01-01 NOTE — ASSESSMENT
[FreeTextEntry1] : Lab work reviewed.\par #1) Patient had a remote h/o breast cancer. She received adjuvant treatment (AC-T ) + 5 years of Tamoxifen with Dr. Cash Bojorquez and subsequently transferred care to Dr. ALTAGRACIA Shields at [facility].  In 2018 she was hospitalized and was found to have bone metastasis and renal mass.  6/2018 She underwent radical left nephrectomy for renal cell carcinoma, with PALND, and a bx. of the rIght iliac bone revealed metastatic breast cancer .  She was placed on Femara/Ibrance.    4/22/20  Interval CT Chest/Abdomen/Pelvis revealed new left lower lobe nodule with interval decrease in size of small nodules at the right lung apex, extensive osseous metastatic disease slightly increased, stable IVC filter.   She was prescribed Aromasin/Afinitor on  8/10/20.  Follow-up chest imaging was done 3/2021 to assess status of lung nodules-showed increase, however, it was being compared to a CT chest from 2019. Most recent F/U imaging 2/2022 c/w POD. Patient began Faslodex.\par 7/18/2022-CT C/A/P-Enlarging bilateral pulmonary nodules with reference nodules described above. Marked interval enlargement of a right perihilar mass with new endobronchial extension into the right mainstem bronchus. Enlarging right hilar and distal paraesophageal lymph nodes. 3.6 cm indeterminate mass in the mid to lower pole of the right kidney measuring slightly higher than simple fluid attenuation, increased in size (f/u renal US 10/6/22 c/w 3.4 cm simple cyst) .Extensive sclerotic osseous metastases, without significant interval change.\par Progression of disease clinically-8/17/2022-completed radiation (5/5 fractions)-2000 cGy. Planned Dose: 2000 cGy. Treatment Site: Right hilum. \par Considered follow-up biopsy to reassess marker studies, however, patient's comorbidities limited further invasive procedures.  Considered adding Piqray (+PIK 3 mutation) to her Faslodex.  However, in light of renal issues (with concern for increasing creatinine with Piqray in this patient with one kidney), considered modified dose of Xeloda, to which patient consented (modified dosing due to renal insufficiency-decreased ~25%).\par Appears to be tolerating Xeloda thus far. However, I am concerned regarding H/A's and what appears to be a scalp mass-head MRI ordered.\par \par #2) history of bilateral DVTs, with history of IVC filter–patient remains on anticoagulation at this time. \par \par #3)  H/O Renal insufficiency/hypokalemia.  P.O. fluid/water hydration as tolerated, and potassium supplementation as needed. Follow- up with PCP; and had recommended she see nephrologist-has kidney disease, and 1 remaining kidney-did not pursue yet.\par \par *Had discussed systemic disease and palliative treatment intent.  Quality of life issues to be considered in decision making.  Should patient decline, need to consider best supportive care.  At this time, patient wishes to continue with cancer directed therapy.  Patient was given the opportunity to ask questions. Her questions have been answered to her apparent satisfaction.\par \par \par  Baby remains stable, no distress noted.

## 2023-01-03 ENCOUNTER — NON-APPOINTMENT (OUTPATIENT)
Age: 68
End: 2023-01-03

## 2023-01-04 ENCOUNTER — NON-APPOINTMENT (OUTPATIENT)
Age: 68
End: 2023-01-04

## 2023-01-05 ENCOUNTER — NON-APPOINTMENT (OUTPATIENT)
Age: 68
End: 2023-01-05

## 2023-01-06 ENCOUNTER — NON-APPOINTMENT (OUTPATIENT)
Age: 68
End: 2023-01-06

## 2023-01-10 ENCOUNTER — APPOINTMENT (OUTPATIENT)
Dept: HEMATOLOGY ONCOLOGY | Facility: CLINIC | Age: 68
End: 2023-01-10

## 2023-01-10 ENCOUNTER — APPOINTMENT (OUTPATIENT)
Dept: INFUSION THERAPY | Facility: HOSPITAL | Age: 68
End: 2023-01-10

## 2023-01-11 ENCOUNTER — NON-APPOINTMENT (OUTPATIENT)
Age: 68
End: 2023-01-11

## 2023-01-11 VITALS
SYSTOLIC BLOOD PRESSURE: 104 MMHG | OXYGEN SATURATION: 98 % | WEIGHT: 143.08 LBS | TEMPERATURE: 97.7 F | HEART RATE: 95 BPM | DIASTOLIC BLOOD PRESSURE: 74 MMHG | RESPIRATION RATE: 16 BRPM | BODY MASS INDEX: 27.03 KG/M2

## 2023-01-11 NOTE — REVIEW OF SYSTEMS
[Nausea: Grade 0] : Nausea: Grade 0 [Vomiting: Grade 0] : Vomiting: Grade 0 [Fatigue: Grade 1 - Fatigue relieved by rest] : Fatigue: Grade 1 - Fatigue relieved by rest [Cognitive Disturbance: Grade 0] : Cognitive Disturbance: Grade 0 [Concentration Impairment: Grade 0] : Concentration Impairment: Grade 0 [Dizziness: Grade 0] : Dizziness: Grade 0  [Facial Muscle Weakness: Grade 0] : Facial Muscle Weakness: Grade 0 [Headache: Grade 0] : Headache: Grade 0 [Lethargy: Grade 0] : Lethargy: Grade 0 [Alopecia: Grade 0] : Alopecia: Grade 0 [Skin Hyperpigmentation: Grade 0] : Skin Hyperpigmentation: Grade 0 [Dermatitis Radiation: Grade 0] : Dermatitis Radiation: Grade 0

## 2023-01-11 NOTE — HISTORY OF PRESENT ILLNESS
[FreeTextEntry1] : Ms. Ortega is a 67 yo female with metastatic breast cancer to the lung and bone with marked interval enlargement of a right perihilar mass with endobronchial extension into the right mainstem bronchus, associated with increasing pulmonary symptoms and hemoptysis. She completed a course of palliative radiation to the right hilum, a dose of 2,000 cGy from 8/11/22 - 8/17/22. \par \par 11/28/2022 MRI Brain: IMPRESSION:\par -Interval development of a 2.8 cm hemorrhagic mass in the left anteromedial frontal lobe with surrounding edema, compatible with hemorrhagic metastasis.\par -Additional punctate enhancing lesion in the right post central gyrus and new enhancing sellar lesion are also compatible with metastases.\par -Fungating posterior scalp lesion measuring up to 5.7 cm, also suspicious for metastasis.\par \par 11/28/2022 Admitted to [facility] for hemorrhagic brain mass. Discharged 12/4/2022. \par \par 1/11/2023 Presents today for OTV. Completed fx 3/5 to brain tumor. Denies headache, nausea or vomiting. Tolerating treatment well.\par

## 2023-01-11 NOTE — DISEASE MANAGEMENT
[Clinical] : TNM Stage: c [IV] : IV [TTNM] : x [NTNM] : x [MTNM] : 1 [de-identified] : 8138 [de-identified] : 2454 [de-identified] : Brain tumor

## 2023-01-13 PROCEDURE — 77435 SBRT MANAGEMENT: CPT

## 2023-01-17 ENCOUNTER — NON-APPOINTMENT (OUTPATIENT)
Age: 68
End: 2023-01-17

## 2023-01-20 ENCOUNTER — NON-APPOINTMENT (OUTPATIENT)
Age: 68
End: 2023-01-20

## 2023-01-21 NOTE — ED ADULT TRIAGE NOTE - WEIGHT METHOD
stated [FreeTextEntry1] : annual physical exam [de-identified] : Patient is a 64-year-old male with PMH hypothyroidism presenting for an annual physical exam. He reports frequent colds/upper respiratory infections, chest congestion, sore throat around [place]. Used to go to the gym frequently before December, but still feels residual from being sick. Still feels sinus congestion and dry cough, using flonase at home. Tested himself several times at home and he was negative. Denies wheezing. Sometimes feels dizzy at home, eats an orange and feels better. Goes long stretches at work without eating or drinking.

## 2023-01-25 ENCOUNTER — RX RENEWAL (OUTPATIENT)
Age: 68
End: 2023-01-25

## 2023-01-25 RX ORDER — FLUTICASONE FUROATE AND VILANTEROL TRIFENATATE 100; 25 UG/1; UG/1
100-25 POWDER RESPIRATORY (INHALATION)
Qty: 60 | Refills: 3 | Status: ACTIVE | COMMUNITY
Start: 2023-01-25 | End: 1900-01-01

## 2023-01-27 ENCOUNTER — NON-APPOINTMENT (OUTPATIENT)
Age: 68
End: 2023-01-27

## 2023-01-30 ENCOUNTER — APPOINTMENT (OUTPATIENT)
Dept: HEMATOLOGY ONCOLOGY | Facility: CLINIC | Age: 68
End: 2023-01-30
Payer: MEDICARE

## 2023-01-30 VITALS
SYSTOLIC BLOOD PRESSURE: 88 MMHG | BODY MASS INDEX: 26.97 KG/M2 | OXYGEN SATURATION: 96 % | DIASTOLIC BLOOD PRESSURE: 65 MMHG | WEIGHT: 142.86 LBS | RESPIRATION RATE: 16 BRPM | TEMPERATURE: 206.24 F | HEIGHT: 60.98 IN

## 2023-01-30 VITALS — HEART RATE: 111 BPM

## 2023-01-30 PROCEDURE — 99215 OFFICE O/P EST HI 40 MIN: CPT

## 2023-01-30 NOTE — END OF VISIT
Needs cardiac risk assessment preop, Dr. Segun Morris at [facility].  I found echo 6/2021 in Care Everywhere. Is there any cardiac testing more recent?
[Time Spent: ___ minutes] : I have spent [unfilled] minutes of time on the encounter.

## 2023-02-09 NOTE — REVIEW OF SYSTEMS
[Fatigue] : fatigue [Negative] : Allergic/Immunologic [Chest Pain] : no chest pain [Fainting] : no fainting [FreeTextEntry9] : right ankle pain

## 2023-02-09 NOTE — ASSESSMENT
[With Patient/Caregiver] : With Patient/Caregiver [Designated Health Care Proxy] : Designated Health Care Proxy [Name: ___] : Name: [unfilled] [Relationship: ___] : Relationship: [unfilled] [DNR] : DNR [DNI] : DNI [FreeTextEntry1] : Lab work, CT C/A/P report, brain MRI report reviewed.\par #1) Patient had a remote h/o breast cancer. She received adjuvant treatment (AC-T ) + 5 years of Tamoxifen with Dr. Cash Bojorquez and subsequently transferred [place]. ALTAGRACIA [place] [facility]  In [place] she was hospitalized and was found to have bone metastasis and renal mass.  6/2018 She underwent radical left nephrectomy for renal cell carcinoma, with PALND, and a bx. of the rIght iliac bone revealed metastatic breast cancer .  She was placed on Femara/Ibrance.    4/22/20  Interval CT Chest/Abdomen/Pelvis revealed new left lower lobe nodule with interval decrease in size of small nodules at the right lung apex, extensive osseous metastatic disease slightly increased, stable IVC filter.   She was prescribed Aromasin/Afinitor on  8/10/20.  Follow-up chest imaging was done 3/2021 to assess status of lung nodules-showed increase, however, it was being compared to a CT chest from 2019. Most recent F/U imaging 2/2022 c/w POD. Patient began Faslodex.\par 7/18/2022-CT C/A/P-Enlarging bilateral pulmonary nodules with reference nodules described above. Marked interval enlargement of a right perihilar mass with new endobronchial extension into the right mainstem bronchus. Enlarging right hilar and distal paraesophageal lymph nodes. 3.6 cm indeterminate mass in the mid to lower pole of the right kidney measuring slightly higher than simple fluid attenuation, increased in size (f/u renal US 10/6/22 c/w 3.4 cm simple cyst) .Extensive sclerotic osseous metastases, without significant interval change.\par Progression of disease clinically-8/17/2022-completed radiation (5/5 fractions)-2000 cGy. Planned Dose: 2000 cGy. Treatment Site: Right hilum. \par Considered follow-up biopsy to reassess marker studies, however, patient's comorbidities limited further invasive procedures.  Considered adding Piqray (+PIK 3 mutation) to her Faslodex.  However, in light of renal issues (with concern for increasing creatinine with Piqray in this patient with one kidney), considered modified dose of Xeloda, to which patient consented (modified dosing due to renal insufficiency-decreased ~25%).\par \par 11/28/2022-Brain MRI-Interval development of a 2.8 cm hemorrhagic mass in the left anteromedial frontal lobe with surrounding edema, compatible with hemorrhagic metastasis.\par Additional punctate enhancing lesion in the right post central gyrus and new enhancing sellar lesion are also compatible with metastases. Fungating posterior scalp lesion measuring up to 5.7 cm, also suspicious for metastasis.\par 12/1/2022-CT C/A/P-Left perihilar mass with increased endobronchial extension along the superior segmental bronchus of the left lower lobe. Additional pulmonary nodules and right hilar mass with endobronchial extension are not significantly changed. Extensive osseous metastases without significant change.\par \par 1/2023-Received brain radiation-Planned Dose: 3000. Treatment Site: Brain tumor (Dr. Timmons). \par \par Reviewed EOD, prognosis with palliative treatment intent and GOC. Discussed treatment options with respective pro's/con's. Also discussed Hospice, BSC option.\par Patient stated she is DNR/DNI code status (and states HCP is aware of her wishes).\par \par At this time, patient wishes to continue with cancer directed therapy expressing her understanding of the serious nature of her illness. Had discussed case at multidisciplinary breast TB 1/9/23-consensus was t/c f/u biopsy at this point-recommend f/u biopsy of a metastatic lesion to recheck ER/OK/Her 2 anne marie studies. Skull lesion appears to be accessible-patient agrees to this. She is now agreeable for VN/aide to return to evaluate and assist her with bathing/washing hair-need to better visualize scalp lesion.\par -Have asked IR (Dr. Burgess) to review case to see if IR guided biopsy can be done of scalp/calvarial lesion..\par -Discontinued xeloda due to POD.\par X-ray right ankle.\par -Further treatment recommendations to be made pending f/u biopsy.\par \par #2) history of bilateral DVTs, with history of IVC filter–patient remains on anticoagulation at this time. \par \par #3)  H/O Renal insufficiency/hypokalemia.  P.O. fluid/water hydration as tolerated, and potassium supplementation as needed. Follow- up with PCP; and had recommended she see nephrologist-has kidney disease, and 1 remaining kidney-did not pursue yet.\par \par Patient was given the opportunity to ask questions. Her questions have been answered to her apparent satisfaction.\par \par \par  [AdvancecareDate] : 01/30/2023

## 2023-02-09 NOTE — REASON FOR VISIT
[Follow-Up Visit] : a follow-up [Friend] : friend [Family Member] : family member [FreeTextEntry2] : breast cancer

## 2023-02-09 NOTE — PHYSICAL EXAM
[Ambulatory and capable of all self care but unable to carry out any work activities] : Status 2- Ambulatory and capable of all self care but unable to carry out any work activities. Up and about more than 50% of waking hours [Normal] : affect appropriate [de-identified] : non-toxic appearing [de-identified] : decreased BS  [de-identified] : left reconstructed breast without palpable abnormality; right breast without new dominant nodular area (has implant there which was placed for symmetry) [de-identified] : right ankle tenderness with any movement [de-identified] : mass posterior scalp-unable to fully visualize due to matted hair [de-identified] : no gross focal motor extremity deficits

## 2023-02-09 NOTE — HISTORY OF PRESENT ILLNESS
[Disease: _____________________] : Disease: [unfilled] [M: ___] : M[unfilled] [AJCC Stage: ____] : AJCC Stage: [unfilled] [de-identified] : 3/2003–Stage IIB left breast cancer, ER positive/MD positive/HER-2 negative–status post left modified radical mastectomy with reconstruction.  1/15 lymph nodes positive for metastatic carcinoma with focal extranodal extension.  Status post AC-T--> Tamoxifen x 5 years.  \par \par 6/2018-Presented with general weakness and leg pain.  Diagnosed with clear cell renal carcinoma  s/p Radical Left Nephrectomy, Para Aortic LN dissection,  c/b Splenic Laceration w/ bleeding. Right iliac bone biopsy of bone lesion was consistent with metastatic breast cancer–ER positive/MD positive/HER-2 unknown.\par Patient was begun on Letrozole/Ibrance/Xgeva.\par \par \par 3/2019–Status post left VATS procedure with pleural decortication.  Left pleural fluid cytology and pleural pathology negative for malignancy.\par 4/2020-CT scan of the chest/abdomen/pelvis revealed new left lower lung nodule and decreased right apex nodules, extensive bony metastases slightly increased. Changed to Aromasin, with Afinitor added 7/2020.\par Patient had been under the oncologic care of Dr. Shields at [facility].\par Hello,\par 8/2020–Patient wished to transfer her oncologic care to the [facility] [place] [facility]. LE venous duplex study-B/L DVT. Begun on Xarelto.\par 12/2020–CT abdomen/pelvis–extensive osseous metastases.\par 6/2021-CT C/A/P-pulmonary metastases, a distal paraesophageal node and extensive osseous metastatic disease without significant change. No acute pathology. Aromasin/Afinitor continued.\par \par 2/28/22-CT C/A/P-enlarging pulmonary nodules and a new right pulmonary nodule. Enlarging distal paraesophageal lymph node and a new left internal mammary lymph node. Stable bone metastases.\par \par 3/17/2022-Began Faslodex.\par \par 7/18/2022-CT C/A/P-Enlarging bilateral pulmonary nodules with reference nodules described above. Marked interval enlargement of a right perihilar mass with new endobronchial extension into the right mainstem bronchus. Enlarging right hilar and distal paraesophageal lymph nodes. 3.6 cm indeterminate mass in the mid to lower pole of the right kidney measuring slightly higher than simple fluid attenuation, increased in size. Further evaluation with renal ultrasound is recommended. Extensive sclerotic osseous metastases, without significant interval change.\par \par 8/17/2022-Completed radiation (5/5 fractions)-2000 cGy. Planned Dose: 2000 cGy. Treatment Site: Right hilum. \par \par 9/2022-Began Xeloda.\par \par 11/28/2022-Brain MRI-Interval development of a 2.8 cm hemorrhagic mass in the left anteromedial frontal lobe with surrounding edema, compatible with hemorrhagic metastasis.\par Additional punctate enhancing lesion in the right post central gyrus and new enhancing sellar lesion are also compatible with metastases. Fungating posterior scalp lesion measuring up to 5.7 cm, also suspicious for metastasis.\par 12/1/2022-CT C/A/P-Left perihilar mass with increased endobronchial extension along the superior segmental bronchus of the left lower lobe. Additional pulmonary nodules and right hilar mass with endobronchial extension are not significantly changed. Extensive osseous metastases without significant change.\par \par 1/2023-Received brain SRS-Planned Dose: 3000. Treatment Site: Brain tumor (Dr. Timmons). Discontinued Xeloda.\par \par  [de-identified] : Infiltrating lobular carcinoma [de-identified] : 7/2020 :  CA 15-3=24 U/mL     CEA =2.9 ng/mL         [de-identified] : 10/2020–[facility]–genomic findings–PIK3CA Q6224N, CDH1 Y523fs*13, TBX3 R471fs*161; OTTO; TMB 1Muts/Mb.\par 3 disease relevant genes with no reportable alterations–BRCA1, BRCA2, ERBB 2.\par \par 6/2018-PD-L1 ()=0%. [de-identified] : S/P hospitalization-diagnosed with brain mets-received RT (Dr. Tmimons).\par Decided she didn't want VN services anymore, but will reconsider.\par Son from [facility] visited her over the holidays.\par Son Darrel ([place]) is HCP.\par Takes oxycodone for intermittent bony/joint aches.\par No c/o dental/jaw pain. \par Has O2 at home; has portable tank now as well.\par No current c/o nausea/vomiting/diarrhea or constipation . No melena.  \par No chills, fever, night sweats, mouth sores. No H/A, light headedness, dizziness or palpitations.\par Taking calcium 1500 mg and vitamin D supplements daily.\par Ex- Don present today.\par \par Has had COVID vaccines(Moderna), along with booster x 1 (5/8/22).\par

## 2023-02-10 ENCOUNTER — NON-APPOINTMENT (OUTPATIENT)
Age: 68
End: 2023-02-10

## 2023-02-13 ENCOUNTER — NON-APPOINTMENT (OUTPATIENT)
Age: 68
End: 2023-02-13

## 2023-02-14 ENCOUNTER — NON-APPOINTMENT (OUTPATIENT)
Age: 68
End: 2023-02-14

## 2023-02-16 ENCOUNTER — NON-APPOINTMENT (OUTPATIENT)
Age: 68
End: 2023-02-16

## 2023-02-16 ENCOUNTER — APPOINTMENT (OUTPATIENT)
Dept: ULTRASOUND IMAGING | Facility: IMAGING CENTER | Age: 68
End: 2023-02-16

## 2023-02-16 NOTE — HISTORY OF PRESENT ILLNESS
[FreeTextEntry1] : Ms. Ortega is a 65 yo female with metastatic breast cancer to the lung and bone with marked interval enlargement of a right perihilar mass with endobronchial extension into the right mainstem bronchus, associated with increasing pulmonary symptoms and hemoptysis. She completed a course of palliative radiation to the right hilum, a dose of 2,000 cGy from 8/11/22 - 8/17/22. \par \par 11/28/2022 MRI Brain: IMPRESSION:\par -Interval development of a 2.8 cm hemorrhagic mass in the left anteromedial frontal lobe with surrounding edema, compatible with hemorrhagic metastasis.\par -Additional punctate enhancing lesion in the right post central gyrus and new enhancing sellar lesion are also compatible with metastases.\par -Fungating posterior scalp lesion measuring up to 5.7 cm, also suspicious for metastasis.\par \par 11/28/2022 Admitted to [facility] for hemorrhagic brain mass. Discharged 12/4/2022. \par \par 1/13/2023 Completed FRST to brain tumor total dose of 3000 cGy in 5 fractions. \par \par Presents today for post treatment evaluation.

## 2023-02-17 ENCOUNTER — NON-APPOINTMENT (OUTPATIENT)
Age: 68
End: 2023-02-17

## 2023-02-19 ENCOUNTER — INPATIENT (INPATIENT)
Facility: HOSPITAL | Age: 68
LOS: 2 days | Discharge: HOME | DRG: 181 | End: 2023-02-22
Attending: HOSPITALIST | Admitting: STUDENT IN AN ORGANIZED HEALTH CARE EDUCATION/TRAINING PROGRAM
Payer: MEDICARE

## 2023-02-19 VITALS
HEART RATE: 100 BPM | HEIGHT: 60.98 IN | WEIGHT: 145.06 LBS | OXYGEN SATURATION: 92 % | RESPIRATION RATE: 18 BRPM | DIASTOLIC BLOOD PRESSURE: 68 MMHG | SYSTOLIC BLOOD PRESSURE: 102 MMHG | TEMPERATURE: 99 F

## 2023-02-19 DIAGNOSIS — R53.1 WEAKNESS: ICD-10-CM

## 2023-02-19 DIAGNOSIS — Z90.12 ACQUIRED ABSENCE OF LEFT BREAST AND NIPPLE: Chronic | ICD-10-CM

## 2023-02-19 DIAGNOSIS — Z98.890 OTHER SPECIFIED POSTPROCEDURAL STATES: Chronic | ICD-10-CM

## 2023-02-19 DIAGNOSIS — Z90.5 ACQUIRED ABSENCE OF KIDNEY: Chronic | ICD-10-CM

## 2023-02-19 DIAGNOSIS — E89.6 POSTPROCEDURAL ADRENOCORTICAL (-MEDULLARY) HYPOFUNCTION: Chronic | ICD-10-CM

## 2023-02-19 LAB
ALBUMIN SERPL ELPH-MCNC: 2.6 G/DL — LOW (ref 3.3–5)
ALP SERPL-CCNC: 61 U/L — SIGNIFICANT CHANGE UP (ref 40–120)
ALT FLD-CCNC: 6 U/L — LOW (ref 10–45)
ANION GAP SERPL CALC-SCNC: 8 MMOL/L — SIGNIFICANT CHANGE UP (ref 5–17)
APTT BLD: 30.7 SEC — SIGNIFICANT CHANGE UP (ref 27.5–35.5)
AST SERPL-CCNC: 20 U/L — SIGNIFICANT CHANGE UP (ref 10–40)
BASOPHILS # BLD AUTO: 0.05 K/UL — SIGNIFICANT CHANGE UP (ref 0–0.2)
BASOPHILS NFR BLD AUTO: 0.4 % — SIGNIFICANT CHANGE UP (ref 0–2)
BILIRUB SERPL-MCNC: 0.7 MG/DL — SIGNIFICANT CHANGE UP (ref 0.2–1.2)
BUN SERPL-MCNC: 9 MG/DL — SIGNIFICANT CHANGE UP (ref 7–23)
CALCIUM SERPL-MCNC: 8.5 MG/DL — SIGNIFICANT CHANGE UP (ref 8.4–10.5)
CHLORIDE SERPL-SCNC: 103 MMOL/L — SIGNIFICANT CHANGE UP (ref 96–108)
CO2 SERPL-SCNC: 33 MMOL/L — HIGH (ref 22–31)
CREAT SERPL-MCNC: 1.15 MG/DL — SIGNIFICANT CHANGE UP (ref 0.5–1.3)
EGFR: 52 ML/MIN/1.73M2 — LOW
EOSINOPHIL # BLD AUTO: 0.11 K/UL — SIGNIFICANT CHANGE UP (ref 0–0.5)
EOSINOPHIL NFR BLD AUTO: 0.9 % — SIGNIFICANT CHANGE UP (ref 0–6)
GLUCOSE SERPL-MCNC: 92 MG/DL — SIGNIFICANT CHANGE UP (ref 70–99)
HCT VFR BLD CALC: 44 % — SIGNIFICANT CHANGE UP (ref 34.5–45)
HGB BLD-MCNC: 14.1 G/DL — SIGNIFICANT CHANGE UP (ref 11.5–15.5)
IMM GRANULOCYTES NFR BLD AUTO: 0.8 % — SIGNIFICANT CHANGE UP (ref 0–0.9)
INR BLD: 1.14 RATIO — SIGNIFICANT CHANGE UP (ref 0.88–1.16)
LYMPHOCYTES # BLD AUTO: 1.4 K/UL — SIGNIFICANT CHANGE UP (ref 1–3.3)
LYMPHOCYTES # BLD AUTO: 11.2 % — LOW (ref 13–44)
MCHC RBC-ENTMCNC: 31.8 PG — SIGNIFICANT CHANGE UP (ref 27–34)
MCHC RBC-ENTMCNC: 32 GM/DL — SIGNIFICANT CHANGE UP (ref 32–36)
MCV RBC AUTO: 99.3 FL — SIGNIFICANT CHANGE UP (ref 80–100)
MONOCYTES # BLD AUTO: 0.85 K/UL — SIGNIFICANT CHANGE UP (ref 0–0.9)
MONOCYTES NFR BLD AUTO: 6.8 % — SIGNIFICANT CHANGE UP (ref 2–14)
NEUTROPHILS # BLD AUTO: 9.94 K/UL — HIGH (ref 1.8–7.4)
NEUTROPHILS NFR BLD AUTO: 79.9 % — HIGH (ref 43–77)
NRBC # BLD: 0 /100 WBCS — SIGNIFICANT CHANGE UP (ref 0–0)
PLATELET # BLD AUTO: 329 K/UL — SIGNIFICANT CHANGE UP (ref 150–400)
POTASSIUM SERPL-MCNC: 3 MMOL/L — LOW (ref 3.5–5.3)
POTASSIUM SERPL-SCNC: 3 MMOL/L — LOW (ref 3.5–5.3)
PROT SERPL-MCNC: 6.8 G/DL — SIGNIFICANT CHANGE UP (ref 6–8.3)
PROTHROM AB SERPL-ACNC: 13.2 SEC — SIGNIFICANT CHANGE UP (ref 10.5–13.4)
RBC # BLD: 4.43 M/UL — SIGNIFICANT CHANGE UP (ref 3.8–5.2)
RBC # FLD: 15.6 % — HIGH (ref 10.3–14.5)
SARS-COV-2 RNA SPEC QL NAA+PROBE: SIGNIFICANT CHANGE UP
SODIUM SERPL-SCNC: 144 MMOL/L — SIGNIFICANT CHANGE UP (ref 135–145)
WBC # BLD: 12.45 K/UL — HIGH (ref 3.8–10.5)
WBC # FLD AUTO: 12.45 K/UL — HIGH (ref 3.8–10.5)

## 2023-02-19 PROCEDURE — 99285 EMERGENCY DEPT VISIT HI MDM: CPT | Mod: FS

## 2023-02-19 PROCEDURE — 93010 ELECTROCARDIOGRAM REPORT: CPT

## 2023-02-19 PROCEDURE — 99223 1ST HOSP IP/OBS HIGH 75: CPT

## 2023-02-19 PROCEDURE — 71045 X-RAY EXAM CHEST 1 VIEW: CPT | Mod: 26

## 2023-02-19 RX ORDER — OXYCODONE HYDROCHLORIDE 5 MG/1
5 TABLET ORAL EVERY 6 HOURS
Refills: 0 | Status: DISCONTINUED | OUTPATIENT
Start: 2023-02-19 | End: 2023-02-22

## 2023-02-19 RX ORDER — LANOLIN ALCOHOL/MO/W.PET/CERES
3 CREAM (GRAM) TOPICAL AT BEDTIME
Refills: 0 | Status: DISCONTINUED | OUTPATIENT
Start: 2023-02-19 | End: 2023-02-21

## 2023-02-19 RX ORDER — ACETAMINOPHEN 500 MG
650 TABLET ORAL EVERY 6 HOURS
Refills: 0 | Status: DISCONTINUED | OUTPATIENT
Start: 2023-02-19 | End: 2023-02-22

## 2023-02-19 RX ORDER — INFLUENZA VIRUS VACCINE 15; 15; 15; 15 UG/.5ML; UG/.5ML; UG/.5ML; UG/.5ML
0.7 SUSPENSION INTRAMUSCULAR ONCE
Refills: 0 | Status: DISCONTINUED | OUTPATIENT
Start: 2023-02-19 | End: 2023-02-22

## 2023-02-19 RX ORDER — ONDANSETRON 8 MG/1
4 TABLET, FILM COATED ORAL EVERY 8 HOURS
Refills: 0 | Status: DISCONTINUED | OUTPATIENT
Start: 2023-02-19 | End: 2023-02-22

## 2023-02-19 RX ORDER — OXYCODONE HYDROCHLORIDE 5 MG/1
1 TABLET ORAL
Qty: 0 | Refills: 0 | DISCHARGE

## 2023-02-19 RX ORDER — BUDESONIDE AND FORMOTEROL FUMARATE DIHYDRATE 160; 4.5 UG/1; UG/1
2 AEROSOL RESPIRATORY (INHALATION)
Refills: 0 | Status: DISCONTINUED | OUTPATIENT
Start: 2023-02-19 | End: 2023-02-22

## 2023-02-19 RX ORDER — POTASSIUM CHLORIDE 20 MEQ
10 PACKET (EA) ORAL
Refills: 0 | Status: COMPLETED | OUTPATIENT
Start: 2023-02-19 | End: 2023-02-19

## 2023-02-19 RX ORDER — POTASSIUM CHLORIDE 20 MEQ
40 PACKET (EA) ORAL ONCE
Refills: 0 | Status: COMPLETED | OUTPATIENT
Start: 2023-02-19 | End: 2023-02-19

## 2023-02-19 RX ORDER — ALPRAZOLAM 0.25 MG
1 TABLET ORAL
Qty: 0 | Refills: 0 | DISCHARGE

## 2023-02-19 RX ADMIN — Medication 40 MILLIEQUIVALENT(S): at 17:22

## 2023-02-19 RX ADMIN — OXYCODONE HYDROCHLORIDE 5 MILLIGRAM(S): 5 TABLET ORAL at 22:15

## 2023-02-19 RX ADMIN — OXYCODONE HYDROCHLORIDE 5 MILLIGRAM(S): 5 TABLET ORAL at 21:15

## 2023-02-19 RX ADMIN — Medication 3 MILLIGRAM(S): at 21:15

## 2023-02-19 RX ADMIN — BUDESONIDE AND FORMOTEROL FUMARATE DIHYDRATE 2 PUFF(S): 160; 4.5 AEROSOL RESPIRATORY (INHALATION) at 21:46

## 2023-02-19 RX ADMIN — Medication 100 MILLIEQUIVALENT(S): at 17:22

## 2023-02-19 NOTE — H&P ADULT - NSHPREVIEWOFSYSTEMS_GEN_ALL_CORE
CONSTITUTIONAL: denies fever, fatigue, weakness, admits to some chills  HEENT: denies blurred vision, sore throat  SKIN: admits to mass on back of head  CARDIOVASCULAR: denies chest pain, chest pressure, palpitations  RESPIRATORY: denies shortness of breath, sputum production  GASTROINTESTINAL: denies nausea, vomiting, diarrhea, abdominal pain  GENITOURINARY: denies dysuria, discharge  NEUROLOGICAL: denies numbness, headache  MUSCULOSKELETAL: denies new joint pain, muscle aches  HEMATOLOGIC: denies gross bleeding, bruising  LYMPHATICS: denies enlarged lymph nodes, extremity swelling  PSYCHIATRIC: denies recent changes in anxiety, depression  ENDOCRINOLOGIC: denies sweating, cold or heat intolerance

## 2023-02-19 NOTE — PATIENT PROFILE ADULT - FALL HARM RISK - HARM RISK INTERVENTIONS
Communicate Risk of Fall with Harm to all staff/Reinforce activity limits and safety measures with patient and family/Tailored Fall Risk Interventions/Visual Cue: Yellow wristband and red socks/Bed in lowest position, wheels locked, appropriate side rails in place/Call bell, personal items and telephone in reach/Instruct patient to call for assistance before getting out of bed or chair/Non-slip footwear when patient is out of bed/[place] to call system/Physically safe environment - no spills, clutter or unnecessary equipment/Purposeful Proactive Rounding/Room/bathroom lighting operational, light cord in reach Assistance with ambulation/Assistance OOB with selected safe patient handling equipment/Communicate Risk of Fall with Harm to all staff/Discuss with provider need for PT consult/Monitor gait and stability/Provide patient with walking aids - walker, cane, crutches/Reinforce activity limits and safety measures with patient and family/Tailored Fall Risk Interventions/Visual Cue: Yellow wristband and red socks/Bed in lowest position, wheels locked, appropriate side rails in place/Call bell, personal items and telephone in reach/Instruct patient to call for assistance before getting out of bed or chair/Non-slip footwear when patient is out of bed/[place] to call system/Physically safe environment - no spills, clutter or unnecessary equipment/Purposeful Proactive Rounding/Room/bathroom lighting operational, light cord in reach

## 2023-02-19 NOTE — ED PROVIDER NOTE - ATTENDING APP SHARED VISIT CONTRIBUTION OF CARE
Ryan with [facility]. 67F w/ hx of Stage IV breast cancer w/ mets (s/p L mastectomy, s/p RT for lung mets, now on oral chemo, most recently 4 days ago), mets to brain. clear cell RCC (s/p L nephrectomy), b/l LE DVTs (on Xarelto), COPD. 2 months ago pt had a CT scan showing a a 2.4 x 5.7 x 4.0 cm fungating lesion arising from the right posterior scalp, with a focus of infiltration into the subgaleal space, but no evidence for calvarial infiltration and was scheduled as an outpt procedure last week but they were unable to get to the mass because of an extensive amount of matted hair. pts family shaved her head and now that it is more exposed she is having more difficulty sleeping. discussed with Dr. Fontanez partner and last visit in the office subacute rehab was discussed for pt.   will admit as family has made it clear that they didn't feel she could be at home like this'.    I performed a face to face bedside interview with patient regarding history of present illness, review of symptoms and past medical history. I completed an independent physical exam.  I have discussed the patient's plan of care with Physician Assistant (PA). I agree with note as stated above, having amended the EMR as needed to reflect my findings.   This includes History of Present Illness, HIV, Past Medical/Surgical/Family/Social History, Allergies and Home Medications, Review of Systems, Physical Exam, and any Progress Notes during the time I functioned as the attending physician for this patient.

## 2023-02-19 NOTE — H&P ADULT - ASSESSMENT
67 year old F PMH stage IV breast cancer with mets (s/p L mastectomy, s/p RT for lung mets, brain mets) now on oral chemo, clear cell RCC (s/p L nephrectomy), b/l LE DVT (was on xarelto), COPD coming in for concerns from family for patient's safety at home by herself admitted for weakness and placement    #weakness  #history of stage IV breast cancer with mets  #scalp mass  - admit to medicine   - patient had MRI done outpatient which showed 2.4 x 5.7 x 4.0 cm fungating lesion arising from the right posterior scalp, with a focus of infiltration into the subgaleal space, but no evidence for calvarial infiltration  - was suppose to have biopsy however unable to perform due to hair, will need to follow up outpatient for biopsy and further evaluation outpatient  - seen by surg onc at [facility] and no acute surgical intervention needed, outpatient workup warranted   - does not appear infectious at this time, leukocytosis could be in setting of cancer  - unsure if procal would be helpful in this situation given cancer  - continue to monitor for fever, follow up labs in the AM  - heme, Dr. Ochoa, consulted  - PT consulted for [facility] placement. Patient at this time seems hesitant to go to [facility] and wants to go home however it was explained that given her current situation, does not appear it is safe for her to go home alone. Family is in process of getting aides set up as per patient, and would benefit from [facility] placement in the interim    #hypokalemia  - K 3.0 on admission s/p supplementation in the ED  - follow up BMP in the AM    #COPD  - on breo elipta at home, will start on symbicort while in hospital    #history of DVT  - has history of b/l DVT and was on Xarelto however found to have hemorrhagic brain lesion on MRI and AC was stopped  - as per chart review, patient has history of IVC filter placement but does not remember having one placed  - dopplers 11/29/22 showed evidence of no DVT in either lower extremity     #DVT ppx  - SCDs    patient states she will update family on her own

## 2023-02-19 NOTE — H&P ADULT - NSHPPHYSICALEXAM_GEN_ALL_CORE
T(C): 37.1 (02-19-23 @ 14:17), Max: 37.1 (02-19-23 @ 14:17)  HR: 100 (02-19-23 @ 14:17) (100 - 100)  BP: 102/68 (02-19-23 @ 14:17) (102/68 - 102/68)  RR: 18 (02-19-23 @ 14:17) (18 - 18)  SpO2: 92% (02-19-23 @ 14:17) (92% - 92%)    GENERAL: NAD, laying in bed, looks very unkempt and disheveled   HEAD: large hard mass noted on R occipital area, some erythema noted around but could be irritation (from hair pulling and cutting), not tender to palpation of area around mass or mass itself, not warm to palpation  EYES: sclera clear, no exudates  ENMT: oropharynx clear without erythema, no exudates, moist mucous membranes  NECK: supple, soft, no thyromegaly noted  LUNGS: good air entry bilaterally, clear to auscultation, symmetric breath sounds, no wheezing or rhonchi appreciated  HEART: soft S1/S2, regular rate and rhythm, no murmurs noted, no lower extremity edema  GASTROINTESTINAL: abdomen is soft, nontender, nondistended, normoactive bowel sounds, no palpable masses  INTEGUMENT: warm and perfused  MUSCULOSKELETAL: no clubbing or cyanosis, no obvious deformity  NEUROLOGIC: awake, alert, oriented x3, good muscle tone in 4 extremities  PSYCHIATRIC: mood is good, affect is congruent, linear and logical thought process

## 2023-02-19 NOTE — ED PROVIDER NOTE - CLINICAL SUMMARY MEDICAL DECISION MAKING FREE TEXT BOX
67F w/ hx of Stage IV breast cancer w/ mets (s/p L mastectomy, s/p RT for lung mets, now on oral chemo, most recently 4 days ago), mets to brain. clear cell RCC (s/p L nephrectomy), b/l LE DVTs (on Xarelto), COPD. 2 months ago pt had a CT scan showing a a 2.4 x 5.7 x 4.0 cm fungating lesion arising from the right posterior scalp, with a focus of infiltration into the subgaleal space, but no evidence for calvarial infiltration and was scheduled as an outpt procedure last week but they were unable to get to the mass because of an extensive amount of matted hair. pts family shaved her head and now that it is more exposed she is having more difficulty sleeping. discussed with Dr. Fontanez partner and last visit in the office [facility] was discussed for pt  will admit

## 2023-02-19 NOTE — ED PROVIDER NOTE - PHYSICAL EXAMINATION
General:     NAD, well-nourished, well-appearing  Eyes: PERRL  Head:     large pedunculated mass noted on R posterior scalp with irritation around where shaved. not warm to palpation  Neck:     trachea midline  Lungs:     CTA b/l  CVS:     RRR  Abd:     +BS, s/nt/nd  Ext:   no deformities   Neuro: AAOx3, no sensory/motor deficits

## 2023-02-19 NOTE — ED ADULT NURSE NOTE - OBJECTIVE STATEMENT
67 yr old female BIBA presents with a mass to the back of head. Pt with hx-renal cell carcinoma and breast CA.  Pt went to ambulatory site to have mass biopsied  but as per family the staff would not biopsy the mass secondary to the hair on pt's head.  The sister reports that she shaved hair around site and was directed by Dr Dale to come to ED for further evaluation.  No acute resp distress noted. Resp even and unlabored. Abd sift, NT. +BS. +PP. GARCIA.  +notable protruding mass noted to the back of head, Skin warm, dry, and normal for race.

## 2023-02-19 NOTE — ED PROVIDER NOTE - NSICDXPASTMEDICALHX_GEN_ALL_CORE_FT
PAST MEDICAL HISTORY:  Anemia     Breast cancer Left breast cancer - 13 years ago, s/p mastectomy, s/p RT for lung mets, and on oral chemotherapy    Emphysema lung     Other specified disorders of kidney and ureter     Renal cell carcinoma s/p L nephrectomy    Stage 3 chronic kidney disease

## 2023-02-19 NOTE — H&P ADULT - HISTORY OF PRESENT ILLNESS
67 year old F PMH stage IV breast cancer with mets (s/p L mastectomy, s/p RT for lung mets, brain mets) now on oral chemo, clear cell RCC (s/p L nephrectomy), b/l LE DVT (was on xarelto), COPD coming in for concerns from family for patient's safety at home by herself. Patient states that for last 10 months, has had growing mass on back of her head and 2 months ago MRI was done which showed 2.4 x 5.7 x 4.0 cm fungating lesion arising from the right posterior scalp, with a focus of infiltration into the subgaleal space, but no evidence for calvarial infiltration. She was scheduled to have biopsy however due to hair being on top of mass, she was unable to have procedure done last week. Her sisters came over today to cut hair with scissors and called Dr. Ochoa, spoke to her partner over facetime and showed patient's mass and were recommended to come to the ED for evaluation. Patient states that she has discomfort to area when she lays down however otherwise denies pain. Admits to some chills for past week but denies fevers, nausea, vomiting, chest pain, sob, abd pain, new numbness or tingling or weakness of extremities. Patient lives at home alone, has landlord across street who helps her and family that come in to check on her, they are in process of getting patient an aide but worried about her care by herself at home. Uses walker to ambulate, waiting for wheelchair to arrive at home.     In the ED, T 98.7F, , /68, RR 18, SpO2 92% on RA. Labs showed WBC 12.45, K 3.0. Received KCl 10mEq IV x1 and KCl 40mEq PO x1 in the ED.    CXR: Stable lung masses and COPD.

## 2023-02-19 NOTE — ED ADULT TRIAGE NOTE - BP NONINVASIVE DIASTOLIC (MM HG)
Problem: MOBILITY - ADULT  Goal: Maintain or return to baseline ADL function  Description: INTERVENTIONS:  -  Assess patient's ability to carry out ADLs; assess patient's baseline for ADL function and identify physical deficits which impact ability to perform ADLs (bathing, care of mouth/teeth, toileting, grooming, dressing, etc )  - Assess/evaluate cause of self-care deficits   - Assess range of motion  - Assess patient's mobility; develop plan if impaired  - Assess patient's need for assistive devices and provide as appropriate  - Encourage maximum independence but intervene and supervise when necessary  - Involve family in performance of ADLs  - Assess for home care needs following discharge   - Consider OT consult to assist with ADL evaluation and planning for discharge  - Provide patient education as appropriate  Outcome: Progressing  Goal: Maintains/Returns to pre admission functional level  Description: INTERVENTIONS:  - Perform BMAT or MOVE assessment daily    - Set and communicate daily mobility goal to care team and patient/family/caregiver  - Collaborate with rehabilitation services on mobility goals if consulted  - Perform Range of Motion 3 times a day  - Reposition patient every 2 hours    - Dangle patient 3 times a day  - Stand patient 3 times a day  - Ambulate patient 3 times a day  - Out of bed to chair 3 times a day   - Out of bed for meals 3 times a day  - Out of bed for toileting  - Record patient progress and toleration of activity level   Outcome: Progressing     Problem: Potential for Falls  Goal: Patient will remain free of falls  Description: INTERVENTIONS:  - Educate patient/family on patient safety including physical limitations  - Instruct patient to call for assistance with activity   - Consult OT/PT to assist with strengthening/mobility   - Keep Call bell within reach  - Keep bed low and locked with side rails adjusted as appropriate  - Keep care items and personal belongings within reach  - Initiate and maintain comfort rounds  - Make Fall Risk Sign visible to staff  - Offer Toileting every 2 Hours, in advance of need  - Initiate/Maintain bed alarm  - Obtain necessary fall risk management equipment: alarms  - Apply yellow socks and bracelet for high fall risk patients  - Consider moving patient to room near nurses station  Outcome: Progressing     Problem: Prexisting or High Potential for Compromised Skin Integrity  Goal: Skin integrity is maintained or improved  Description: INTERVENTIONS:  - Identify patients at risk for skin breakdown  - Assess and monitor skin integrity  - Assess and monitor nutrition and hydration status  - Monitor labs   - Assess for incontinence   - Turn and reposition patient  - Assist with mobility/ambulation  - Relieve pressure over bony prominences  - Avoid friction and shearing  - Provide appropriate hygiene as needed including keeping skin clean and dry  - Evaluate need for skin moisturizer/barrier cream  - Collaborate with interdisciplinary team   - Patient/family teaching  - Consider wound care consult   Outcome: Progressing     Problem: Neurological Deficit  Goal: Neurological status is stable or improving  Description: Interventions:  - Monitor and assess patient's level of consciousness, motor function, sensory function, and level of assistance needed for ADLs  - Monitor and report changes from baseline  Collaborate with interdisciplinary team to initiate plan and implement interventions as ordered  - Provide and maintain a safe environment  - Consider seizure precautions  - Consider fall precautions  - Consider aspiration precautions  - Consider bleeding precautions  Outcome: Progressing     Problem: Activity Intolerance/Impaired Mobility  Goal: Mobility/activity is maintained at optimum level for patient  Description: Interventions:  - Assess and monitor patient  barriers to mobility and need for assistive/adaptive devices    - Assess patient's emotional response to limitations  - Collaborate with interdisciplinary team and initiate plans and interventions as ordered  - Encourage independent activity per ability   - Maintain proper body alignment  - Perform active/passive rom as tolerated/ordered  - Plan activities to conserve energy   - Turn patient as appropriate  Outcome: Progressing     Problem: Communication Impairment  Goal: Ability to express needs and understand communication  Description: Assess patient's communication skills and ability to understand information  Patient will demonstrate use of effective communication techniques, alternative methods of communication and understanding even if not able to speak  - Encourage communication and provide alternate methods of communication as needed  - Collaborate with case management/ for discharge needs  - Include patient/family/caregiver in decisions related to communication  Outcome: Progressing     Problem: Potential for Aspiration  Goal: Non-ventilated patient's risk of aspiration is minimized  Description: Assess and monitor vital signs, respiratory status, and labs (WBC)  Monitor for signs of aspiration (tachypnea, cough, rales, wheezing, cyanosis, fever)  - Assess and monitor patient's ability to swallow  - Place patient up in chair to eat if possible  - HOB up at 90 degrees to eat if unable to get patient up into chair   - Supervise patient during oral intake  - Instruct patient/ family to take small bites  - Instruct patient/ family to take small single sips when taking liquids  - Follow patient-specific strategies generated by speech pathologist   Outcome: Progressing     Problem: Nutrition  Goal: Nutrition/Hydration status is improving  Description: Monitor and assess patient's nutrition/hydration status for malnutrition (ex- brittle hair, bruises, dry skin, pale skin and conjunctiva, muscle wasting, smooth red tongue, and disorientation)   Collaborate with interdisciplinary team and initiate plan and interventions as ordered  Monitor patient's weight and dietary intake as ordered or per policy  Utilize nutrition screening tool and intervene per policy  Determine patient's food preferences and provide high-protein, high-caloric foods as appropriate  - Assist patient with eating   - Allow adequate time for meals   - Encourage patient to take dietary supplement as ordered  - Collaborate with clinical nutritionist   - Include patient/family/caregiver in decisions related to nutrition  Outcome: Progressing     Problem: Nutrition/Hydration-ADULT  Goal: Nutrient/Hydration intake appropriate for improving, restoring or maintaining nutritional needs  Description: Monitor and assess patient's nutrition/hydration status for malnutrition  Collaborate with interdisciplinary team and initiate plan and interventions as ordered  Monitor patient's weight and dietary intake as ordered or per policy  Utilize nutrition screening tool and intervene as necessary  Determine patient's food preferences and provide high-protein, high-caloric foods as appropriate       INTERVENTIONS:  - Monitor oral intake, urinary output, labs, and treatment plans  - Assess nutrition and hydration status and recommend course of action  - Evaluate amount of meals eaten  - Assist patient with eating if necessary   - Allow adequate time for meals  - Recommend/ encourage appropriate diets, oral nutritional supplements, and vitamin/mineral supplements  - Order, calculate, and assess calorie counts as needed  - Recommend, monitor, and adjust tube feedings and TPN/PPN based on assessed needs  - Assess need for intravenous fluids  - Provide specific nutrition/hydration education as appropriate  - Include patient/family/caregiver in decisions related to nutrition  Outcome: Progressing     Problem: NEUROSENSORY - ADULT  Goal: Achieves stable or improved neurological status  Description: INTERVENTIONS  - Monitor and report changes in neurological status  - Monitor vital signs such as temperature, blood pressure, glucose, and any other labs ordered   - Initiate measures to prevent increased intracranial pressure  - Monitor for seizure activity and implement precautions if appropriate      Outcome: Progressing  Goal: Achieves maximal functionality and self care  Description: INTERVENTIONS  - Monitor swallowing and airway patency with patient fatigue and changes in neurological status  - Encourage and assist patient to increase activity and self care     - Encourage visually impaired, hearing impaired and aphasic patients to use assistive/communication devices  Outcome: Progressing     Problem: CARDIOVASCULAR - ADULT  Goal: Maintains optimal cardiac output and hemodynamic stability  Description: INTERVENTIONS:  - Monitor I/O, vital signs and rhythm  - Monitor for S/S and trends of decreased cardiac output  - Administer and titrate ordered vasoactive medications to optimize hemodynamic stability  - Assess quality of pulses, skin color and temperature  - Assess for signs of decreased coronary artery perfusion  - Instruct patient to report change in severity of symptoms  Outcome: Progressing  Goal: Absence of cardiac dysrhythmias or at baseline rhythm  Description: INTERVENTIONS:  - Continuous cardiac monitoring, vital signs, obtain 12 lead EKG if ordered  - Administer antiarrhythmic and heart rate control medications as ordered  - Monitor electrolytes and administer replacement therapy as ordered  Outcome: Progressing     Problem: RESPIRATORY - ADULT  Goal: Achieves optimal ventilation and oxygenation  Description: INTERVENTIONS:  - Assess for changes in respiratory status  - Assess for changes in mentation and behavior  - Position to facilitate oxygenation and minimize respiratory effort  - Oxygen administered by appropriate delivery if ordered  - Initiate smoking cessation education as indicated  - Encourage broncho-pulmonary hygiene including cough, deep breathe, Incentive Spirometry  - Assess the need for suctioning and aspirate as needed  - Assess and instruct to report SOB or any respiratory difficulty  - Respiratory Therapy support as indicated  Outcome: Progressing     Problem: GASTROINTESTINAL - ADULT  Goal: Maintains or returns to baseline bowel function  Description: INTERVENTIONS:  - Assess bowel function  - Encourage oral fluids to ensure adequate hydration  - Administer IV fluids if ordered to ensure adequate hydration  - Administer ordered medications as needed  - Encourage mobilization and activity  - Consider nutritional services referral to assist patient with adequate nutrition and appropriate food choices  Outcome: Progressing  Goal: Maintains adequate nutritional intake  Description: INTERVENTIONS:  - Monitor percentage of each meal consumed  - Identify factors contributing to decreased intake, treat as appropriate  - Assist with meals as needed  - Monitor I&O, weight, and lab values if indicated  - Obtain nutrition services referral as needed  Outcome: Progressing     Problem: GENITOURINARY - ADULT  Goal: Maintains or returns to baseline urinary function  Description: INTERVENTIONS:  - Assess urinary function  - Encourage oral fluids to ensure adequate hydration if ordered  - Administer IV fluids as ordered to ensure adequate hydration  - Administer ordered medications as needed  - Offer frequent toileting  - Follow urinary retention protocol if ordered  Outcome: Progressing  Goal: Urinary catheter remains patent  Description: INTERVENTIONS:  - Assess patency of urinary catheter  - If patient has a chronic schaefer, consider changing catheter if non-functioning  - Follow guidelines for intermittent irrigation of non-functioning urinary catheter  Outcome: Progressing     Problem: METABOLIC, FLUID AND ELECTROLYTES - ADULT  Goal: Electrolytes maintained within normal limits  Description: INTERVENTIONS:  - Monitor labs and assess patient for signs and symptoms of electrolyte imbalances  - Administer electrolyte replacement as ordered  - Monitor response to electrolyte replacements, including repeat lab results as appropriate  - Instruct patient on fluid and nutrition as appropriate  Outcome: Progressing  Goal: Fluid balance maintained  Description: INTERVENTIONS:  - Monitor labs   - Monitor I/O and WT  - Instruct patient on fluid and nutrition as appropriate  - Assess for signs & symptoms of volume excess or deficit  Outcome: Progressing  Goal: Glucose maintained within target range  Description: INTERVENTIONS:  - Monitor Blood Glucose as ordered  - Assess for signs and symptoms of hyperglycemia and hypoglycemia  - Administer ordered medications to maintain glucose within target range  - Assess nutritional intake and initiate nutrition service referral as needed  Outcome: Progressing     Problem: METABOLIC, FLUID AND ELECTROLYTES - ADULT  Goal: Electrolytes maintained within normal limits  Description: INTERVENTIONS:  - Monitor labs and assess patient for signs and symptoms of electrolyte imbalances  - Administer electrolyte replacement as ordered  - Monitor response to electrolyte replacements, including repeat lab results as appropriate  - Instruct patient on fluid and nutrition as appropriate  Outcome: Progressing  Goal: Fluid balance maintained  Description: INTERVENTIONS:  - Monitor labs   - Monitor I/O and WT  - Instruct patient on fluid and nutrition as appropriate  - Assess for signs & symptoms of volume excess or deficit  Outcome: Progressing  Goal: Glucose maintained within target range  Description: INTERVENTIONS:  - Monitor Blood Glucose as ordered  - Assess for signs and symptoms of hyperglycemia and hypoglycemia  - Administer ordered medications to maintain glucose within target range  - Assess nutritional intake and initiate nutrition service referral as needed  Outcome: Progressing     Problem: HEMATOLOGIC - ADULT  Goal: Maintains hematologic stability  Description: INTERVENTIONS  - Assess for signs and symptoms of bleeding or hemorrhage  - Monitor labs  - Administer supportive blood products/factors as ordered and appropriate  Outcome: Progressing     Problem: MUSCULOSKELETAL - ADULT  Goal: Maintain or return mobility to safest level of function  Description: INTERVENTIONS:  - Assess patient's ability to carry out ADLs; assess patient's baseline for ADL function and identify physical deficits which impact ability to perform ADLs (bathing, care of mouth/teeth, toileting, grooming, dressing, etc )  - Assess/evaluate cause of self-care deficits   - Assess range of motion  - Assess patient's mobility  - Assess patient's need for assistive devices and provide as appropriate  - Encourage maximum independence but intervene and supervise when necessary  - Involve family in performance of ADLs  - Assess for home care needs following discharge   - Consider OT consult to assist with ADL evaluation and planning for discharge  - Provide patient education as appropriate  Outcome: Progressing  Goal: Maintain proper alignment of affected body part  Description: INTERVENTIONS:  - Support, maintain and protect limb and body alignment  - Provide patient/ family with appropriate education  Outcome: Progressing     Problem: COPING  Goal: Pt/Family able to verbalize concerns and demonstrate effective coping strategies  Description: INTERVENTIONS:  - Assist patient/family to identify coping skills, available support systems and cultural and spiritual values  - Provide emotional support, including active listening and acknowledgement of concerns of patient and caregivers  - Reduce environmental stimuli, as able  - Provide patient education  - Assess for spiritual pain/suffering and initiate spiritual care, including notification of Pastoral Care or nida based community as needed  - Assess effectiveness of coping strategies  Outcome: Progressing  Goal: Will report anxiety at manageable levels  Description: INTERVENTIONS:  - Administer medication as ordered  - Teach and encourage coping skills  - Provide emotional support  - Assess patient/family for anxiety and ability to cope  Outcome: Progressing     Problem: INFECTION - ADULT  Goal: Absence or prevention of progression during hospitalization  Description: INTERVENTIONS:  - Assess and monitor for signs and symptoms of infection  - Monitor lab/diagnostic results  - Monitor all insertion sites, i e  indwelling lines, tubes, and drains  - Monitor endotracheal if appropriate and nasal secretions for changes in amount and color  - [place] appropriate cooling/warming therapies per order  - Administer medications as ordered  - Instruct and encourage patient and family to use good hand hygiene technique  - Identify and instruct in appropriate isolation precautions for identified infection/condition  Outcome: Progressing     Problem: SAFETY ADULT  Goal: Maintain or return to baseline ADL function  Description: INTERVENTIONS:  -  Assess patient's ability to carry out ADLs; assess patient's baseline for ADL function and identify physical deficits which impact ability to perform ADLs (bathing, care of mouth/teeth, toileting, grooming, dressing, etc )  - Assess/evaluate cause of self-care deficits   - Assess range of motion  - Assess patient's mobility; develop plan if impaired  - Assess patient's need for assistive devices and provide as appropriate  - Encourage maximum independence but intervene and supervise when necessary  - Involve family in performance of ADLs  - Assess for home care needs following discharge   - Consider OT consult to assist with ADL evaluation and planning for discharge  - Provide patient education as appropriate  Outcome: Progressing  Goal: Maintains/Returns to pre admission functional level  Description: INTERVENTIONS:  - Perform BMAT or MOVE assessment daily    - Set and communicate daily mobility goal to care team and patient/family/caregiver  - Collaborate with rehabilitation services on mobility goals if consulted  - Perform Range of Motion 3 times a day  - Reposition patient every 2 hours    - Dangle patient 3 times a day  - Stand patient 3 times a day  - Ambulate patient 3 times a day  - Out of bed to chair 3 times a day   - Out of bed for meals 3 times a day  - Out of bed for toileting  - Record patient progress and toleration of activity level   Outcome: Progressing  Goal: Patient will remain free of falls  Description: INTERVENTIONS:  - Educate patient/family on patient safety including physical limitations  - Instruct patient to call for assistance with activity   - Consult OT/PT to assist with strengthening/mobility   - Keep Call bell within reach  - Keep bed low and locked with side rails adjusted as appropriate  - Keep care items and personal belongings within reach  - Initiate and maintain comfort rounds  - Make Fall Risk Sign visible to staff  - Offer Toileting every 2 Hours, in advance of need  - Initiate/Maintain bed alarm  - Obtain necessary fall risk management equipment: alarms  - Apply yellow socks and bracelet for high fall risk patients  - Consider moving patient to room near nurses station  Outcome: Progressing     Problem: DISCHARGE PLANNING  Goal: Discharge to home or other facility with appropriate resources  Description: INTERVENTIONS:  - Identify barriers to discharge w/patient and caregiver  - Arrange for needed discharge resources and transportation as appropriate  - Identify discharge learning needs (meds, wound care, etc )  - Arrange for interpretive services to assist at discharge as needed  - Refer to Case Management Department for coordinating discharge planning if the patient needs post-hospital services based on physician/advanced practitioner order or complex needs related to functional status, cognitive ability, or social support system  Outcome: Progressing     Problem: Knowledge Deficit  Goal: Patient/family/caregiver demonstrates understanding of disease process, treatment plan, medications, and discharge instructions  Description: Complete learning assessment and assess knowledge base    Interventions:  - Provide teaching at level of understanding  - Provide teaching via preferred learning methods  Outcome: Progressing 66

## 2023-02-19 NOTE — ED PROVIDER NOTE - OBJECTIVE STATEMENT
CT scan showing a a 2.4 x 5.7 x 4.0 cm fungating lesion arising from the right posterior scalp, with a focus of infiltration into the subgaleal space, but no evidence for calvarial infiltration. 67F w/ hx of Stage IV breast cancer w/ mets (s/p L mastectomy, s/p RT for lung mets, now on oral chemo, most recently 4 days ago), mets to brain. clear cell RCC (s/p L nephrectomy), b/l LE DVTs (on Xarelto), COPD. 2 months ago pt had a CT scan showing a a 2.4 x 5.7 x 4.0 cm fungating lesion arising from the right posterior scalp, with a focus of infiltration into the subgaleal space, but no evidence for calvarial infiltration and was scheduled as an outpt procedure last week but they were unable to get to the mass because of an extensive amount of matted hair. pts family shaved her head and now that it is more exposed she is having more difficulty sleeping. discussed with Dr. Fontanez partner and last visit in the office ARMANDO was discussed for pt

## 2023-02-19 NOTE — H&P ADULT - NSHPSOCIALHISTORY_GEN_ALL_CORE
Former smoker, quit about 5 years ago use to smoke 1PPD for 20 years  Occasional EtOH use  Denies illicit drug use  Lives at home alone and performs some ADLs on her own, needs help from family and friends that come visit  Uses walker to ambulate

## 2023-02-19 NOTE — ED PROVIDER NOTE - NSICDXPASTSURGICALHX_GEN_ALL_CORE_FT
PAST SURGICAL HISTORY:  H/O left mastectomy     H/O total adrenalectomy     History of hip surgery in 2007- Right hip surgery    S/p nephrectomy left

## 2023-02-20 ENCOUNTER — TRANSCRIPTION ENCOUNTER (OUTPATIENT)
Age: 68
End: 2023-02-20

## 2023-02-20 DIAGNOSIS — C50.912 MALIGNANT NEOPLASM OF UNSPECIFIED SITE OF LEFT FEMALE BREAST: ICD-10-CM

## 2023-02-20 LAB
ALBUMIN SERPL ELPH-MCNC: 2.1 G/DL — LOW (ref 3.3–5)
ALP SERPL-CCNC: 55 U/L — SIGNIFICANT CHANGE UP (ref 40–120)
ALT FLD-CCNC: <6 U/L — LOW (ref 10–45)
ANION GAP SERPL CALC-SCNC: 9 MMOL/L — SIGNIFICANT CHANGE UP (ref 5–17)
AST SERPL-CCNC: 19 U/L — SIGNIFICANT CHANGE UP (ref 10–40)
BASOPHILS # BLD AUTO: 0.03 K/UL — SIGNIFICANT CHANGE UP (ref 0–0.2)
BASOPHILS NFR BLD AUTO: 0.3 % — SIGNIFICANT CHANGE UP (ref 0–2)
BILIRUB SERPL-MCNC: 0.4 MG/DL — SIGNIFICANT CHANGE UP (ref 0.2–1.2)
BUN SERPL-MCNC: 12 MG/DL — SIGNIFICANT CHANGE UP (ref 7–23)
CALCIUM SERPL-MCNC: 8.3 MG/DL — LOW (ref 8.4–10.5)
CHLORIDE SERPL-SCNC: 108 MMOL/L — SIGNIFICANT CHANGE UP (ref 96–108)
CO2 SERPL-SCNC: 31 MMOL/L — SIGNIFICANT CHANGE UP (ref 22–31)
CREAT SERPL-MCNC: 1 MG/DL — SIGNIFICANT CHANGE UP (ref 0.5–1.3)
EGFR: 61 ML/MIN/1.73M2 — SIGNIFICANT CHANGE UP
EOSINOPHIL # BLD AUTO: 0.29 K/UL — SIGNIFICANT CHANGE UP (ref 0–0.5)
EOSINOPHIL NFR BLD AUTO: 3.2 % — SIGNIFICANT CHANGE UP (ref 0–6)
GLUCOSE SERPL-MCNC: 88 MG/DL — SIGNIFICANT CHANGE UP (ref 70–99)
HCT VFR BLD CALC: 39.6 % — SIGNIFICANT CHANGE UP (ref 34.5–45)
HGB BLD-MCNC: 12.5 G/DL — SIGNIFICANT CHANGE UP (ref 11.5–15.5)
IMM GRANULOCYTES NFR BLD AUTO: 0.9 % — SIGNIFICANT CHANGE UP (ref 0–0.9)
LYMPHOCYTES # BLD AUTO: 1.7 K/UL — SIGNIFICANT CHANGE UP (ref 1–3.3)
LYMPHOCYTES # BLD AUTO: 18.5 % — SIGNIFICANT CHANGE UP (ref 13–44)
MCHC RBC-ENTMCNC: 31.6 GM/DL — LOW (ref 32–36)
MCHC RBC-ENTMCNC: 31.6 PG — SIGNIFICANT CHANGE UP (ref 27–34)
MCV RBC AUTO: 100 FL — SIGNIFICANT CHANGE UP (ref 80–100)
MONOCYTES # BLD AUTO: 0.97 K/UL — HIGH (ref 0–0.9)
MONOCYTES NFR BLD AUTO: 10.6 % — SIGNIFICANT CHANGE UP (ref 2–14)
NEUTROPHILS # BLD AUTO: 6.11 K/UL — SIGNIFICANT CHANGE UP (ref 1.8–7.4)
NEUTROPHILS NFR BLD AUTO: 66.5 % — SIGNIFICANT CHANGE UP (ref 43–77)
NRBC # BLD: 0 /100 WBCS — SIGNIFICANT CHANGE UP (ref 0–0)
PLATELET # BLD AUTO: 279 K/UL — SIGNIFICANT CHANGE UP (ref 150–400)
POTASSIUM SERPL-MCNC: 3.3 MMOL/L — LOW (ref 3.5–5.3)
POTASSIUM SERPL-SCNC: 3.3 MMOL/L — LOW (ref 3.5–5.3)
PROT SERPL-MCNC: 5.7 G/DL — LOW (ref 6–8.3)
RBC # BLD: 3.96 M/UL — SIGNIFICANT CHANGE UP (ref 3.8–5.2)
RBC # FLD: 15.8 % — HIGH (ref 10.3–14.5)
SODIUM SERPL-SCNC: 148 MMOL/L — HIGH (ref 135–145)
WBC # BLD: 9.18 K/UL — SIGNIFICANT CHANGE UP (ref 3.8–10.5)
WBC # FLD AUTO: 9.18 K/UL — SIGNIFICANT CHANGE UP (ref 3.8–10.5)

## 2023-02-20 PROCEDURE — 99497 ADVNCD CARE PLAN 30 MIN: CPT

## 2023-02-20 PROCEDURE — 99223 1ST HOSP IP/OBS HIGH 75: CPT

## 2023-02-20 PROCEDURE — 99233 SBSQ HOSP IP/OBS HIGH 50: CPT

## 2023-02-20 RX ORDER — POTASSIUM CHLORIDE 20 MEQ
40 PACKET (EA) ORAL ONCE
Refills: 0 | Status: COMPLETED | OUTPATIENT
Start: 2023-02-20 | End: 2023-02-20

## 2023-02-20 RX ADMIN — BUDESONIDE AND FORMOTEROL FUMARATE DIHYDRATE 2 PUFF(S): 160; 4.5 AEROSOL RESPIRATORY (INHALATION) at 07:58

## 2023-02-20 RX ADMIN — Medication 40 MILLIEQUIVALENT(S): at 12:21

## 2023-02-20 RX ADMIN — BUDESONIDE AND FORMOTEROL FUMARATE DIHYDRATE 2 PUFF(S): 160; 4.5 AEROSOL RESPIRATORY (INHALATION) at 21:47

## 2023-02-20 NOTE — PHYSICAL THERAPY INITIAL EVALUATION ADULT - PERTINENT HX OF CURRENT PROBLEM, REHAB EVAL
pt is 67 year old F PMH stage IV breast cancer with mets (s/p L mastectomy, s/p RT for lung mets, brain mets) now on oral chemo, clear cell RCC (s/p L nephrectomy), b/l LE DVT (was on xarelto), COPD coming in for concerns from family for patient's safety at home by herself. Patient states that for last 10 months, has had growing mass on back of her head and 2 months ago MRI was done which showed fungating lesion arising from the right posterior scalp, She was scheduled to have biopsy however due to hair being on top of mass, she was unable to have procedure done last week. Her sisters came over to cut hair with scissors and called Dr. Ochoa, spoke to her partner over facetime and showed patient's mass and were recommended to come to the ED for evaluation. Patient states that she has discomfort to area when she lays down however otherwise denies pain. Admits to some chills for past week but denies fevers, nausea, vomiting, chest pain, sob, abd pain, new numbness or tingling or weakness of extremities.

## 2023-02-20 NOTE — PROGRESS NOTE ADULT - CONVERSATION DETAILS
Patient expressed that she has previously had a DNR in place. She has discussed hospice with her heme onc Dr. Ochoa but wishes to continue treatment for her cancer at this time. In agreement with signing a MOLST for DNR DNI.

## 2023-02-20 NOTE — CONSULT NOTE ADULT - ASSESSMENT
67 year old F PMH stage IV breast cancer with mets (s/p L mastectomy, s/p RT for lung mets, brain mets) now on oral chemo, clear cell RCC (s/p L nephrectomy), b/l LE DVT (was on xarelto), COPD coming in for concerns from family for patient's safety at home by herself.     Per Dr. Stevens, Dr. Ochoa (oncology) has been following her for management of her breast cancer. In the past year she has developed a scalp lesion which has progressively grown. She was initially arranged for IR biopsy. However, it was not done. Per the patient, it was not done because there was hair on the site. However, per oncology notes, it was because she was medically declining. When I discussed with Dr. Ochoa, she reported needing the biopsy to assess the ER/VA/HER2 status, as the prior pathology did not have the information to assist in the next step in oncologic management.    Plan for bedside biopsy in the am. Risks and benefits of the procedure were discussed at length with the patient who is in agreement with the procedure.    I attempted to contact the pathology department to obtain formalin for this procedure today, but they were unavailable due to the holiday. I communicated the materials needed for this with the RN. I will likely perform in the AM.

## 2023-02-20 NOTE — PROGRESS NOTE ADULT - SUBJECTIVE AND OBJECTIVE BOX
Patient is a 67y old  Female who presents with a chief complaint of weakness (20 Feb 2023 13:54)    Patient seen and examined at bedside. No acute overnight events.     ALLERGIES:  Cipro (Rash)    MEDICATIONS  (STANDING):  budesonide 160 MICROgram(s)/formoterol 4.5 MICROgram(s) Inhaler 2 Puff(s) Inhalation two times a day  influenza  Vaccine (HIGH DOSE) 0.7 milliLiter(s) IntraMuscular once    MEDICATIONS  (PRN):  acetaminophen     Tablet .. 650 milliGRAM(s) Oral every 6 hours PRN Temp greater or equal to 38C (100.4F), Mild Pain (1 - 3)  aluminum hydroxide/magnesium hydroxide/simethicone Suspension 30 milliLiter(s) Oral every 4 hours PRN Dyspepsia  melatonin 3 milliGRAM(s) Oral at bedtime PRN Insomnia  ondansetron Injectable 4 milliGRAM(s) IV Push every 8 hours PRN Nausea and/or Vomiting  oxyCODONE    IR 5 milliGRAM(s) Oral every 6 hours PRN Severe Pain (7 - 10)    Vital Signs Last 24 Hrs  T(F): 97.4 (20 Feb 2023 05:05), Max: 98.2 (19 Feb 2023 18:39)  HR: 72 (20 Feb 2023 07:59) (72 - 97)  BP: 92/55 (20 Feb 2023 05:05) (92/55 - 93/67)  RR: 15 (20 Feb 2023 05:05) (15 - 16)  SpO2: 92% (20 Feb 2023 07:59) (90% - 96%)    BMI (kg/m2): 26.1 (02-19-23 @ 18:39)    PHYSICAL EXAM:  General: NAD, lying in bed  Eyes: Anicteric  ENT: Moist mucous membranes  Neck: Supple, No JVD  Lungs: Clear to auscultation bilaterally, normal respiratory effort  Cardio: RRR, S1/S2, No murmurs  Abdomen: Soft, Nontender, Nondistended; Bowel sounds present  Extremities: No calf tenderness, No pitting edema, no digital cyanosis  Skin: large hard mass noted on R occipital area, some erythema noted around but could be irritation (from hair pulling and cutting), not tender to palpation of area around mass or mass itself, not warm to palpation  Psych: A&O x 3, follows commands    LABS:                        12.5   9.18  )-----------( 279      ( 20 Feb 2023 06:06 )             39.6       02-20    148  |  108  |  12  ----------------------------<  88  3.3   |  31  |  1.00    Ca    8.3      20 Feb 2023 06:06    TPro  5.7  /  Alb  2.1  /  TBili  0.4  /  DBili  x   /  AST  19  /  ALT  <6  /  AlkPhos  55  02-20       PT/INR - ( 19 Feb 2023 15:33 )   PT: 13.2 sec;   INR: 1.14 ratio         PTT - ( 19 Feb 2023 15:33 )  PTT:30.7 sec       COVID-19 PCR: NotDetec (02-19-23 @ 15:33)      RADIOLOGY & ADDITIONAL TESTS:   Personally reviewed.     Consultant(s) Notes Reviewed:  [x] YES  [ ] NO    Care Discussed with Consultants/Other Providers: PT

## 2023-02-20 NOTE — PROGRESS NOTE ADULT - ASSESSMENT
67 year old F PMH stage IV breast cancer with mets (s/p L mastectomy, s/p RT for lung mets, brain mets) now on oral chemo, clear cell RCC (s/p L nephrectomy), b/l LE DVT (was on xarelto), COPD coming in for concerns from family for patient's safety at home by herself, admitted for weakness and placement.    #weakness  #history of stage IV breast cancer with mets  #scalp mass  - patient had MRI done outpatient which showed 2.4 x 5.7 x 4.0 cm fungating lesion arising from the right posterior scalp, with a focus of infiltration into the subgaleal space, but no evidence for calvarial infiltration  - was suppose to have biopsy however unable to perform due to hair, will need to follow up outpatient for biopsy and further evaluation outpatient  - seen by surg onc at [facility] and no acute surgical intervention needed, outpatient workup warranted   - does not appear infectious at this time, leukocytosis could be in setting of cancer  - unsure if procal would be helpful in this situation given cancer  - continue to monitor for fever, follow up labs  - heme, Dr. Ochoa, recommends consulting surgery to obtain biopsy - reached out to surgery Dr. Perez  - PT consulted for [facility] placement. Patient at this time seems hesitant to go to [facility] and wants to go home however it was explained that given her current situation, does not appear it is safe for her to go home alone. Family is in process of getting aides set up as per patient, and would benefit from [facility] placement in the interim    #hypokalemia  - K still low today, repleted further  - follow up BMP in the AM    #hyponatremia  - mild, asymptomatic  - encouraging PO intake/hydration, patient agreeable to trying to increase  - nutrition eval  - repeat BMP in the AM    #COPD  - on breo elipta at home, started on symbicort while in hospital    #history of DVT  - has history of b/l DVT and was on Xarelto however found to have hemorrhagic brain lesion on MRI and AC was stopped  - as per chart review, patient has history of IVC filter placement but does not remember having one placed  - dopplers 11/29/22 showed evidence of no DVT in either lower extremity     #DVT ppx  - SCDs    Patient has stated she will update family on her own.

## 2023-02-20 NOTE — PHYSICAL THERAPY INITIAL EVALUATION ADULT - ADDITIONAL COMMENTS
Patient lives at home alone, 1 step to enter no rails, no steps to negotiate inside, pt has landlord across street who helps her and family that come in to check on her, per chart family are in process of getting patient an aide but worried about her care by herself at home. Uses walker to ambulate independently, mainly independent with ADLs, pt reports she is waiting for a wheelchair to arrive at home.

## 2023-02-20 NOTE — CONSULT NOTE ADULT - SUBJECTIVE AND OBJECTIVE BOX
67 year old F PMH stage IV breast cancer with mets (s/p L mastectomy, s/p RT for lung mets, brain mets) now on oral chemo, clear cell RCC (s/p L nephrectomy), b/l LE DVT (was on xarelto), COPD coming in for concerns from family for patient's safety at home by herself.     Per Dr. Stevens, Dr. Ochoa (oncology) has been following her for management of her breast cancer. In the past year she has developed a scalp lesion which has progressively grown. She was initially arranged for IR biopsy. However, it was not done. Per the patient, it was not done because there was hair on the site. However, per oncology notes, it was because she was medically declining. When I discussed with Dr. Ochoa, she reported needing the biopsy to assess the ER/AL/HER2 status, as the prior pathology did not have the information to assist in the next step in oncologic management.    On interview with the patient, she states the mass is not painful, itchy or bleeding on a regular basis. She does note however, that it does make it hard for her to lie on her back.     PMH/PSH:   stage IV breast cancer with mets (s/p L mastectomy, s/p RT for lung mets, brain mets), clear cell RCC (s/p L nephrectomy), b/l LE DVT (was on xarelto), COPD, emphysema, hip surgery, total adrenalextomy,   Meds: oxycodone and breo ellipta  Social: former smoker, occasional ETOH, no drug use, lives independently  FH: emphysema, father  FH: throat cancer, mother.  Allergies: cipro (rash)    ROS: A 10 point ROS was performed for which the pertinent positives and negatives were noted in the HPI. All others were reviewed and were negative    Vitals:   T 98 HR 91 /66 RR 16 O2 sat 92  General: No acute distress  HEENT: Large fungating scalp mass, ~8 cm x 4 cm, nontender, no active bleeding. ulcerated  Respiratory: Non-labored breathing  Abdomen: soft, non-tender, non-distended  Extremities: No edema  Neuro: No focal deficits appreciated  Psych: Calm
ZEYNEP GOLDMAN,  67y Female  MRN: [id number]  ATTENDING: Dr. Enrique Morris      HPI:  67F, PMHx COPD, anxiety, left kidney clear-cell carcinoma, s/p nephrectomy (2018), bilateral DVT, s/p IVC filter placement, and known with ER +/OK +/HER2 negative left breast cancer since March 2003, when she had left modified radical mastectomy with reconstruction.  Patient was diagnosed with metastatic bone disease in 2018 and with pulmonary metastasis in 2021.  She is s/p left VATS procedure with pleural decortication in March 2019.  Completed 2000 centigrade RT to right pulmonary hilum in 5 fraction in August 2022 due to enlarged bilateral pulmonary nodules and hilar lymph nodes.  She had multiple lines of systemic treatment including Aromasin, Afinitor, capecitabine (September 2022), which unfortunately did not stop progression to brain metastases and fungating calvarial lesion, diagnosed in November 2022.  Received brain SRS 3000 centigrade (Dr. Timmons).  Of note foundation 1 genetic testing showed PD-L1 0% and no relevant reportable alterations in BRCA1/2 or ERBB2.  Patient was pending biopsy of right posterior scalp mass in ambulatory, which was postponed.  Oncology consultation asked in light of above    PAST MEDICAL & SURGICAL HISTORY:  Other specified disorders of kidney and ureter  Anemia  Breast cancer  Left breast cancer - 13 years ago, s/p mastectomy, s/p RT for lung mets, and on oral chemotherapy  Emphysema lung  Renal cell carcinoma  s/p L nephrectomy  Stage 3 chronic kidney disease  History of hip surgery  in 2007- Right hip surgery  H/O left mastectomy  S/p nephrectomy  left  H/O total adrenalectomy    MEDICATION:  budesonide 160 MICROgram(s)/formoterol 4.5 MICROgram(s) Inhaler 2 Puff(s) Inhalation two times a day  influenza  Vaccine (HIGH DOSE) 0.7 milliLiter(s) IntraMuscular once    ALLERGIES:  Cipro (Rash)    FAMILY HISTORY:  Reviewed, non-contributory: [ x ]     SOCIAL HISTORY:  Tobacco: YES [ ]  ; NO [x ]; Former smoker [ ]  Alcohol:   YES [ ]  ; NO [ x]; Social alcohol user [ ]  Occupation/ marital status/ children: has 2 sons    REVIEW SYSTEMS:  Constitutional: no fever, chills, night sweats, no weight loss  HEENT: right posterior calvarial fungating mass, painful  Respiratory: no dyspnea , wheezing, cough, hemoptysis  Cardiovascular: denies acute chest pain, palpitations  GI: no loss of appetite, dark stools, or abdominal tenderness / pain; no change in bowel habits.  Musculoskeletal: no new back pain, bone/ joint pain ,no extremity swelling  Integumentary: denies pruritus; no skin rash, bruises, no  suspicious skin lesions  Neurologic: denies peripheral numbness, no dizziness, no gait problems.  Heme: no reported easy bruisability; no lymph node enlargement    VITALS:  T(C): 36.3, Max: 37.1 (02-19-23 @ 14:17)  T(F): 97.4, Max: 98.7 (02-19-23 @ 14:17)  HR: 72 (72 - 100)  BP: 92/55 (92/55 - 102/68)  SpO2: 92% (90% - 96%)    PHYSICAL EXAM:  Constitutional: alert, well developed  HEENT: right posterior calvarial fungating mass   Respiratory: bilateral clear to auscultation anteriorly  Cardiovascular : S1, S2 regular, rhythmic, no murmurs, gallops or rubs  Abdomen: soft, distended, + normoactive BS, no palpable HS- megaly  Extremities: no tenderness;  -c/c/e, pulses equal bilaterally  Integumentary: no rashes, scars, or lesions suggestive of malignancy  Neurologic: no gross focal deficits    LABS:  (02-20) WBC: 9.18 K/uL,Hemoglobin: 12.5 g/dL, Hematocrit: 39.6 %,  Platelet: 279 K/uL  (02-20) Na: 148 mmol/L ; K: 3.3 mmol/L ; BUN: 12 mg/dL ; Cr: 1.00 mg/dL.  PT/INR - ( 19 Feb 2023 15:33 )   PT: 13.2 sec;   INR: 1.14 ratio    PTT - ( 19 Feb 2023 15:33 )  PTT:30.7 sec    RADIOLOGY:    ACC: [id number] EXAM:  CT ABDOMEN AND PELVIS IC                        ACC: [id number] EXAM:  CT CHEST IC                          PROCEDURE DATE:  12/01/2022          INTERPRETATION:  CLINICAL INFORMATION: Breast cancer. Metastatic workup.    COMPARISON: CT chest, abdomen, and pelvis 7/12/2022, renal ultrasound   10/6/2022.    CONTRAST/COMPLICATIONS:  IV Contrast: Omnipaque 350 90 cc administered   10 cc discarded  Oral Contrast: NONE  Complications: None reported at time of study completion    PROCEDURE:  CT of the Chest, Abdomen and Pelvis was performed.  Sagittal and coronal reformats were performed.    FINDINGS:  CHEST:  LUNGS AND LARGE AIRWAYS: Emphysema. Left perihilar mass measuring 3.7 x   3.1 cm (3, 76), previously 3.0 x 2.6 cm, demonstrates increased tubular   extension of tumor into the superior segment of the left lower lobe,   likely endobronchial extension along the superior segmental bronchus and   with distal mucoid impaction. Right hilar mass measuring 3.1 x 2.8 cm (3,   63) with endobronchial extension into the right mainstem bronchus, is not   significantly changed. Bilateral pulmonary nodules, without significant   change, for reference:  Right upper lobe nodule (3, 57) 1.7 x 1.4 cm, previously 1.8 x 1.6 cm.  Right lower lobe nodule (3, 63) 1.0 cm, previously 1.1 cm.    PLEURA: No pleural effusion.  VESSELS: Atherosclerotic changes of the aorta and coronary arteries.  HEART: Heart size is normal. No pericardial effusion.  MEDIASTINUM AND RADHA: Right hilar mass as above. Paraesophageal lymph   node (3, 86) 2.3 x 1.9 cm, previously 2.2 x 1.8 cm.  CHEST WALL AND LOWER NECK: Status post bilateral breast implants.   Unchanged bilateral subcentimeter hypodense thyroid nodules.    ABDOMEN AND PELVIS:  LIVER: Unchanged subcentimeter hypodensity in the right hepatic lobe too   small to characterize.  BILE DUCTS: Normal caliber.  GALLBLADDER: Contracted.  SPLEEN: Atrophic spleen with 5.1 cm subcapsular fluid collection,   unchanged.  PANCREAS: Within normal limits.  ADRENALS: Nodular thickening of the right adrenal gland, unchanged. Left   adrenal gland not definitively visualized.  KIDNEYS/URETERS: Status post left nephrectomy. Previously noted 3.1 cm   indeterminate right renal lesion, characterized as a cyst on interval   renal ultrasound 10/6/2022. Additional subcentimeter hypodensity too   small to characterize.    BLADDER: Within normal limits.  REPRODUCTIVE ORGANS: Uterus and right adnexa within normal limits. Stable   1.8 cm left adnexal cyst.    BOWEL: No bowel obstruction. Colonic diverticulosis. Appendix is normal.   Stable duodenal lipoma.  PERITONEUM: No ascites.  VESSELS: IVC filter. Atherosclerotic changes. Common hepatic artery   originates from the superior mesenteric artery.  RETROPERITONEUM/LYMPH NODES: No lymphadenopathy.  ABDOMINAL WALL: Tiny fat-containing umbilical hernia.  BONES: Diffuse osseous sclerotic metastatic disease, without significant   change. Right hemipelvis fixation hardware again noted.    IMPRESSION:  Left perihilar mass with increased endobronchial extension along the   superior segmental bronchus of the left lower lobe.    Additional pulmonary nodules and right hilar mass with endobronchial   extension are not significantly changed.    Extensive osseousmetastases without significant change.    ACC: [id number] EXAM:  CT BRAIN                          PROCEDURE DATE:  12/01/2022          INTERPRETATION:  Clinical indication: Brain tumor.    Multiple axial sections were performed from base of vertex without   contrast enhancement. Coronal andsagittal reconstructions were performed   as well.    There is evidence of a heterogeneous area of high attenuation seen   involving the inferior aspect of the left frontal lobe. This is seen   medially and does demonstrate surrounding vasogenic edema. This finding   measures approximately 2.6 x 2.3 x 2.1 cm and could be compatible with a   hemorrhagic or high attenuated neoplasm such as metastasis given   patient's history.    Suspected lesion involving the sella/infundibulum region is identified   which is better demonstrated on MRI. Lesions involving the right   postcentral gyrus region seen on prior MRI is not well demonstrated on   this study. There is evidence of heterogeneous sclerotic changes seen   throughout the calvarium as well asthe visualized cervical spine region.   These findings are likely compatible with osseous metastasis.    Large extracalvarial soft tissue mass is seen involving the high right   parietal region. This is seen medially and could be compatible with   underlying metastasis as well.    The visualized paranasal sinuses mastoid and mastoid clear.    IMPRESSION: Parenchymal, osseous and extracalvarial lesions as described   above.

## 2023-02-20 NOTE — CONSULT NOTE ADULT - PROBLEM SELECTOR RECOMMENDATION 9
Patient with metastatic breast cancer, s/p left mastectomy, evidence of progression of pulmonary metastases and brain metastases in the past 2-month, admitted at [facility] with weakness and inability to live independently.  Patient was due to have a work-up of recently developed fungating right posterior calvarial mass, likely a metastases; scheduled for IR biopsy in ambulatory, which she could not have due to progressive clinical decline.  Patient has been under multiple lines of oncologic treatment including chemotherapy, AI, mTOR inhibitors, etc.  At present awaiting tissue diagnosis to assess for targetable alteration.  Social work in progress for placement disposition.  Continue supportive care.

## 2023-02-20 NOTE — PHYSICAL THERAPY INITIAL EVALUATION ADULT - LEVEL OF INDEPENDENCE: GAIT, REHAB EVAL
Please document that you agree with my progress note and close chart. Thanks. contact guard/minimum assist (75% patients effort)

## 2023-02-20 NOTE — PROGRESS NOTE ADULT - TIME BILLING
Reviewing chart notes and data, reviewing outpatient heme notes and coordinating with hospitalist team in communication with hematology and surgery, face to face time counseling the patient, discussing physical status/mobility with PT, coordinating care with SW/CM at [facility]

## 2023-02-21 ENCOUNTER — RESULT REVIEW (OUTPATIENT)
Age: 68
End: 2023-02-21

## 2023-02-21 ENCOUNTER — TRANSCRIPTION ENCOUNTER (OUTPATIENT)
Age: 68
End: 2023-02-21

## 2023-02-21 ENCOUNTER — NON-APPOINTMENT (OUTPATIENT)
Age: 68
End: 2023-02-21

## 2023-02-21 DIAGNOSIS — L98.9 DISORDER OF THE SKIN AND SUBCUTANEOUS TISSUE, UNSPECIFIED: ICD-10-CM

## 2023-02-21 PROBLEM — C50.919 BREAST CANCER METASTASIZED TO BONE: Status: ACTIVE | Noted: 2020-08-10

## 2023-02-21 PROBLEM — I82.403 DVT OF LOWER EXTREMITY, BILATERAL: Status: ACTIVE | Noted: 2020-09-29

## 2023-02-21 PROBLEM — R91.8 LUNG NODULES: Status: ACTIVE | Noted: 2019-02-28

## 2023-02-21 PROBLEM — C50.919 BREAST CANCER, FEMALE: Status: ACTIVE | Noted: 2020-08-13

## 2023-02-21 PROBLEM — C79.31 BRAIN METASTASES: Status: ACTIVE | Noted: 2022-12-05

## 2023-02-21 LAB
ANION GAP SERPL CALC-SCNC: 8 MMOL/L — SIGNIFICANT CHANGE UP (ref 5–17)
BUN SERPL-MCNC: 11 MG/DL — SIGNIFICANT CHANGE UP (ref 7–23)
CALCIUM SERPL-MCNC: 8.4 MG/DL — SIGNIFICANT CHANGE UP (ref 8.4–10.5)
CHLORIDE SERPL-SCNC: 109 MMOL/L — HIGH (ref 96–108)
CO2 SERPL-SCNC: 30 MMOL/L — SIGNIFICANT CHANGE UP (ref 22–31)
CREAT SERPL-MCNC: 0.94 MG/DL — SIGNIFICANT CHANGE UP (ref 0.5–1.3)
EGFR: 66 ML/MIN/1.73M2 — SIGNIFICANT CHANGE UP
GLUCOSE SERPL-MCNC: 92 MG/DL — SIGNIFICANT CHANGE UP (ref 70–99)
HCT VFR BLD CALC: 37.7 % — SIGNIFICANT CHANGE UP (ref 34.5–45)
HGB BLD-MCNC: 11.7 G/DL — SIGNIFICANT CHANGE UP (ref 11.5–15.5)
MCHC RBC-ENTMCNC: 31 GM/DL — LOW (ref 32–36)
MCHC RBC-ENTMCNC: 31.7 PG — SIGNIFICANT CHANGE UP (ref 27–34)
MCV RBC AUTO: 102.2 FL — HIGH (ref 80–100)
NRBC # BLD: 0 /100 WBCS — SIGNIFICANT CHANGE UP (ref 0–0)
PLATELET # BLD AUTO: 239 K/UL — SIGNIFICANT CHANGE UP (ref 150–400)
POTASSIUM SERPL-MCNC: 3.9 MMOL/L — SIGNIFICANT CHANGE UP (ref 3.5–5.3)
POTASSIUM SERPL-SCNC: 3.9 MMOL/L — SIGNIFICANT CHANGE UP (ref 3.5–5.3)
RBC # BLD: 3.69 M/UL — LOW (ref 3.8–5.2)
RBC # FLD: 16 % — HIGH (ref 10.3–14.5)
SODIUM SERPL-SCNC: 147 MMOL/L — HIGH (ref 135–145)
WBC # BLD: 10.39 K/UL — SIGNIFICANT CHANGE UP (ref 3.8–10.5)
WBC # FLD AUTO: 10.39 K/UL — SIGNIFICANT CHANGE UP (ref 3.8–10.5)

## 2023-02-21 PROCEDURE — 88341 IMHCHEM/IMCYTCHM EA ADD ANTB: CPT | Mod: 26

## 2023-02-21 PROCEDURE — 11106 INCAL BX SKN SINGLE LES: CPT

## 2023-02-21 PROCEDURE — 99239 HOSP IP/OBS DSCHRG MGMT >30: CPT

## 2023-02-21 PROCEDURE — 88304 TISSUE EXAM BY PATHOLOGIST: CPT | Mod: 26

## 2023-02-21 PROCEDURE — 88342 IMHCHEM/IMCYTCHM 1ST ANTB: CPT | Mod: 26

## 2023-02-21 PROCEDURE — 99232 SBSQ HOSP IP/OBS MODERATE 35: CPT

## 2023-02-21 RX ORDER — FUROSEMIDE 40 MG
20 TABLET ORAL ONCE
Refills: 0 | Status: COMPLETED | OUTPATIENT
Start: 2023-02-21 | End: 2023-02-21

## 2023-02-21 RX ORDER — LIDOCAINE HYDROCHLORIDE AND EPINEPHRINE 10; 10 MG/ML; UG/ML
5 INJECTION, SOLUTION INFILTRATION; PERINEURAL ONCE
Refills: 0 | Status: DISCONTINUED | OUTPATIENT
Start: 2023-02-21 | End: 2023-02-22

## 2023-02-21 RX ORDER — LANOLIN ALCOHOL/MO/W.PET/CERES
3 CREAM (GRAM) TOPICAL AT BEDTIME
Refills: 0 | Status: DISCONTINUED | OUTPATIENT
Start: 2023-02-21 | End: 2023-02-22

## 2023-02-21 RX ADMIN — BUDESONIDE AND FORMOTEROL FUMARATE DIHYDRATE 2 PUFF(S): 160; 4.5 AEROSOL RESPIRATORY (INHALATION) at 21:30

## 2023-02-21 RX ADMIN — Medication 20 MILLIGRAM(S): at 10:53

## 2023-02-21 RX ADMIN — BUDESONIDE AND FORMOTEROL FUMARATE DIHYDRATE 2 PUFF(S): 160; 4.5 AEROSOL RESPIRATORY (INHALATION) at 09:59

## 2023-02-21 RX ADMIN — Medication 3 MILLIGRAM(S): at 21:09

## 2023-02-21 NOTE — PROGRESS NOTE ADULT - SUBJECTIVE AND OBJECTIVE BOX
67 year old F PMH stage IV breast cancer with mets (s/p L mastectomy, s/p RT for lung mets, brain mets) now on oral chemo, clear cell RCC (s/p L nephrectomy), b/l LE DVT (was on xarelto), COPD coming in for concerns from family for patient's safety at home by herself.     Per Dr. Stevens, Dr. Ochoa (oncology) has been following her for management of her breast cancer. In the past year she has developed a scalp lesion which has progressively grown. She was initially arranged for IR biopsy. However, it was not done. Per the patient, it was not done because there was hair on the site. However, per oncology notes, it was because she was medically declining. When I discussed with Dr. Ochoa, she reported needing the biopsy to assess the ER/OR/HER2 status, as the prior pathology did not have the information to assist in the next step in oncologic management.    Doing well today. Ready for bedside biopsy.    General: No acute distress  HEENT: Large fungating scalp mass, ~8 cm x 4 cm, nontender, no active bleeding. ulcerated  Respiratory: Non-labored breathing  Abdomen: soft, non-tender, non-distended  Extremities: No edema  Neuro: No focal deficits appreciated  Psych: Calm

## 2023-02-21 NOTE — DIETITIAN INITIAL EVALUATION ADULT - PERTINENT LABORATORY DATA
02-21    147<H>  |  109<H>  |  11  ----------------------------<  92  3.9   |  30  |  0.94    Ca    8.4      21 Feb 2023 07:49    TPro  5.7<L>  /  Alb  2.1<L>  /  TBili  0.4  /  DBili  x   /  AST  19  /  ALT  <6<L>  /  AlkPhos  55  02-20

## 2023-02-21 NOTE — DIETITIAN INITIAL EVALUATION ADULT - ORAL INTAKE PTA/DIET HISTORY
no Patient reports consuming a regular diet PTA MELVIN noted , not receptive t o po supplements /snacks

## 2023-02-21 NOTE — DISCHARGE NOTE NURSING/CASE MANAGEMENT/SOCIAL WORK - NSDCVIVACCINE_GEN_ALL_CORE_FT
Influenza, injectable,quadrivalent, preservative free, pediatric; 10-Nov-2020 12:55; Krishna Chong (RN); [employer];  (Exp. Date: 30-Jun-2021); IntraMuscular; Deltoid Right.; 0.5 milliLiter(s); VIS (VIS Published: 15-Aug-2019, VIS Presented: 10-Nov-2020);   influenza, high-dose, quadrivalent; 18-Nov-2021 14:17; Nia Garcia R.N. (RN); [employer]; [id number] (Exp. Date: 30-Jun-2022); IntraMuscular; Deltoid Right.; 0.7 milliLiter(s); VIS (VIS Published: 06-Aug-2021, VIS Presented: 18-Nov-2021);   Tdap; 10-Nov-2020 12:55; Krishna Chong (DONALD); [employer]; [id number] (Exp. Date: 31-Jul-2022); IntraMuscular; Deltoid Left.; 0.5 milliLiter(s); VIS (VIS Published: 10-Dec-2020, VIS Presented: 10-Nov-2020);

## 2023-02-21 NOTE — DISCHARGE NOTE PROVIDER - NSDCMRMEDTOKEN_GEN_ALL_CORE_FT
Breo Ellipta 100 mcg-25 mcg/inh inhalation powder: 1 puff(s) inhaled once a day  oxyCODONE 5 mg oral tablet: 1 tab(s) orally every 4 hours, As Needed

## 2023-02-21 NOTE — DIETITIAN INITIAL EVALUATION ADULT - PERTINENT MEDS FT
MEDICATIONS  (STANDING):  budesonide 160 MICROgram(s)/formoterol 4.5 MICROgram(s) Inhaler 2 Puff(s) Inhalation two times a day  furosemide    Tablet 20 milliGRAM(s) Oral once  influenza  Vaccine (HIGH DOSE) 0.7 milliLiter(s) IntraMuscular once    MEDICATIONS  (PRN):  acetaminophen     Tablet .. 650 milliGRAM(s) Oral every 6 hours PRN Temp greater or equal to 38C (100.4F), Mild Pain (1 - 3)  aluminum hydroxide/magnesium hydroxide/simethicone Suspension 30 milliLiter(s) Oral every 4 hours PRN Dyspepsia  melatonin 3 milliGRAM(s) Oral at bedtime PRN Insomnia  ondansetron Injectable 4 milliGRAM(s) IV Push every 8 hours PRN Nausea and/or Vomiting  oxyCODONE    IR 5 milliGRAM(s) Oral every 6 hours PRN Severe Pain (7 - 10)

## 2023-02-21 NOTE — PROGRESS NOTE ADULT - SUBJECTIVE AND OBJECTIVE BOX
ZEYNEP GOLDMAN, 67y Female  MRN: [id number]  ATTENDING: Narcisa Nassar    HPI:  67F, PMHx COPD, anxiety, left kidney clear-cell carcinoma, s/p nephrectomy (2018), bilateral DVT, s/p IVC filter placement, and known with ER +/LA +/HER2 negative left breast cancer since March 2003, when she had left modified radical mastectomy with reconstruction.  Patient was diagnosed with metastatic bone disease in 2018 and with pulmonary metastasis in 2021.  She is s/p left VATS procedure with pleural decortication in March 2019.  Completed 2000 centigrade RT to right pulmonary hilum in 5 fraction in August 2022 due to enlarged bilateral pulmonary nodules and hilar lymph nodes.  She had multiple lines of systemic treatment including Aromasin, Afinitor, capecitabine (September 2022), which unfortunately did not stop progression to brain metastases and fungating calvarial lesion, diagnosed in November 2022.  Received brain SRS 3000 centigrade (Dr. Timmons).  Of note foundation 1 genetic testing showed PD-L1 0% and no relevant reportable alterations in BRCA1/2 or ERBB2.  Patient was pending biopsy of right posterior scalp mass in ambulatory, which was postponed.  Oncology consultation asked in light of above    MEDICATIONS:  acetaminophen     Tablet .. 650 milliGRAM(s) Oral every 6 hours PRN  aluminum hydroxide/magnesium hydroxide/simethicone Suspension 30 milliLiter(s) Oral every 4 hours PRN  budesonide 160 MICROgram(s)/formoterol 4.5 MICROgram(s) Inhaler 2 Puff(s) Inhalation two times a day  influenza  Vaccine (HIGH DOSE) 0.7 milliLiter(s) IntraMuscular once  lidocaine 1%/epinephrine 1:100,000 Inj 5 milliLiter(s) Local Injection once  melatonin 3 milliGRAM(s) Oral at bedtime  ondansetron Injectable 4 milliGRAM(s) IV Push every 8 hours PRN  oxyCODONE    IR 5 milliGRAM(s) Oral every 6 hours PRN    All other medications reviewed.    SUBJECTIVE:  No new constitutional symptoms in past 24 hrs    VITALS:  T(C): 36.7 (02-21-23 @ 12:36), Max: 36.7 (02-20-23 @ 20:29)  T(F): 98 (02-21-23 @ 12:36), Max: 98 (02-20-23 @ 20:29)  HR: 88 (02-21-23 @ 12:36) (81 - 96)  BP: 97/69 (02-21-23 @ 12:36) (97/69 - 115/71)    PHYSICAL EXAM:  Constitutional: alert, well developed  HEENT: normocephalic, anicteric sclerae, no mucositis or thrush  Respiratory: bilateral clear to auscultation anteriorly  Cardiovascular : S1, S2 regular, rhythmic, no murmurs, gallops or rubs  Abdomen: soft, distended, + normoactive BS, no palpable HS- megaly  Extremities: no tenderness;  -c/c/e, pulses equal bilaterally    LABS:  (02-21) WBC: 10.39 K/uL,Hemoglobin: 11.7 g/dL, Hematocrit: 37.7 %,  Platelet: 239 K/uL  (02-21) Na: 147 mmol/L ; K: 3.9 mmol/L ; BUN: 11 mg/dL ; Cr: 0.94 mg/dL.    RADIOLOGY:  ACC: [id number] EXAM:  CT ABDOMEN AND PELVIS IC                        ACC: [id number] EXAM:  CT CHEST IC                          PROCEDURE DATE:  12/01/2022      INTERPRETATION:  CLINICAL INFORMATION: Breast cancer. Metastatic workup.    COMPARISON: CT chest, abdomen, and pelvis 7/12/2022, renal ultrasound   10/6/2022.    CONTRAST/COMPLICATIONS:  IV Contrast: Omnipaque 350 90 cc administered   10 cc discarded  Oral Contrast: NONE  Complications: None reported at time of study completion    PROCEDURE:  CT of the Chest, Abdomen and Pelvis was performed.  Sagittal and coronal reformats were performed.    FINDINGS:  CHEST:  LUNGS AND LARGE AIRWAYS: Emphysema. Left perihilar mass measuring 3.7 x   3.1 cm (3, 76), previously 3.0 x 2.6 cm, demonstrates increased tubular   extension of tumor into the superior segment of the left lower lobe,   likely endobronchial extension along the superior segmental bronchus and   with distal mucoid impaction. Right hilar mass measuring 3.1 x 2.8 cm (3,   63) with endobronchial extension into the right mainstem bronchus, is not   significantly changed. Bilateral pulmonary nodules, without significant   change, for reference:  Right upper lobe nodule (3, 57) 1.7 x 1.4 cm, previously 1.8 x 1.6 cm.  Right lower lobe nodule (3, 63) 1.0 cm, previously 1.1 cm.    PLEURA: No pleural effusion.  VESSELS: Atherosclerotic changes of the aorta and coronary arteries.  HEART: Heart size is normal. No pericardial effusion.  MEDIASTINUM AND RADHA: Right hilar mass as above. Paraesophageal lymph   node (3, 86) 2.3 x 1.9 cm, previously 2.2 x 1.8 cm.  CHEST WALL AND LOWER NECK: Status post bilateral breast implants.   Unchanged bilateral subcentimeter hypodense thyroid nodules.    ABDOMEN AND PELVIS:  LIVER: Unchanged subcentimeter hypodensity in the right hepatic lobe too   small to characterize.  BILE DUCTS: Normal caliber.  GALLBLADDER: Contracted.  SPLEEN: Atrophic spleen with 5.1 cm subcapsular fluid collection,   unchanged.  PANCREAS: Within normal limits.  ADRENALS: Nodular thickening of the right adrenal gland, unchanged. Left   adrenal gland not definitively visualized.  KIDNEYS/URETERS: Status post left nephrectomy. Previously noted 3.1 cm   indeterminate right renal lesion, characterized as a cyst on interval   renal ultrasound 10/6/2022. Additional subcentimeter hypodensity too   small to characterize.    BLADDER: Within normal limits.  REPRODUCTIVE ORGANS: Uterus and right adnexa within normal limits. Stable   1.8 cm left adnexal cyst.    BOWEL: No bowel obstruction. Colonic diverticulosis. Appendix is normal.   Stable duodenal lipoma.  PERITONEUM: No ascites.  VESSELS: IVC filter. Atherosclerotic changes. Common hepatic artery   originates from the superior mesenteric artery.  RETROPERITONEUM/LYMPH NODES: No lymphadenopathy.  ABDOMINAL WALL: Tiny fat-containing umbilical hernia.  BONES: Diffuse osseous sclerotic metastatic disease, without significant   change. Right hemipelvis fixation hardware again noted.    IMPRESSION:  Left perihilar mass with increased endobronchial extension along the   superior segmental bronchus of the left lower lobe.    Additional pulmonary nodules and right hilar mass with endobronchial   extension are not significantly changed.    Extensive osseousmetastases without significant change.    ACC: [id number] EXAM:  CT BRAIN                          PROCEDURE DATE:  12/01/2022          INTERPRETATION:  Clinical indication: Brain tumor.    Multiple axial sections were performed from base of vertex without   contrast enhancement. Coronal andsagittal reconstructions were performed   as well.    There is evidence of a heterogeneous area of high attenuation seen   involving the inferior aspect of the left frontal lobe. This is seen   medially and does demonstrate surrounding vasogenic edema. This finding   measures approximately 2.6 x 2.3 x 2.1 cm and could be compatible with a   hemorrhagic or high attenuated neoplasm such as metastasis given   patient's history.    Suspected lesion involving the sella/infundibulum region is identified   which is better demonstrated on MRI. Lesions involving the right   postcentral gyrus region seen on prior MRI is not well demonstrated on   this study. There is evidence of heterogeneous sclerotic changes seen   throughout the calvarium as well asthe visualized cervical spine region.   These findings are likely compatible with osseous metastasis.    Large extracalvarial soft tissue mass is seen involving the high right   parietal region. This is seen medially and could be compatible with   underlying metastasis as well.    The visualized paranasal sinuses mastoid and mastoid clear.    IMPRESSION: Parenchymal, osseous and extracalvarial lesions as described   above.

## 2023-02-21 NOTE — DISCHARGE NOTE PROVIDER - PROVIDER TOKENS
PROVIDER:[TOKEN:[[id number]]] PROVIDER:[TOKEN:[[id number]]],PROVIDER:[TOKEN:[[id number]]]

## 2023-02-21 NOTE — DISCHARGE NOTE NURSING/CASE MANAGEMENT/SOCIAL WORK - PATIENT PORTAL LINK FT
You can access the FollowMyHealth Patient Portal offered by [facility] by registering at the following website: [email or web address]. By joining [facility]’s FollowMyHealth portal, you will also be able to view your health information using other applications (apps) compatible with our system.

## 2023-02-21 NOTE — DISCHARGE NOTE PROVIDER - HOSPITAL COURSE
Hospital Course  HPI:  67 year old F PMH stage IV breast cancer with mets (s/p L mastectomy, s/p RT for lung mets, brain mets) now on oral chemo, clear cell RCC (s/p L nephrectomy), b/l LE DVT (was on xarelto), COPD coming in for concerns from family for patient's safety at home by herself. Patient states that for last 10 months, has had growing mass on back of her head and 2 months ago MRI was done which showed 2.4 x 5.7 x 4.0 cm fungating lesion arising from the right posterior scalp, with a focus of infiltration into the subgaleal space, but no evidence for calvarial infiltration. She was scheduled to have biopsy however due to hair being on top of mass, she was unable to have procedure done last week. Her sisters came over today to cut hair with scissors and called Dr. Ochoa, spoke to her partner over facetime and showed patient's mass and were recommended to come to the ED for evaluation. Patient states that she has discomfort to area when she lays down however otherwise denies pain. Admits to some chills for past week but denies fevers, nausea, vomiting, chest pain, sob, abd pain, new numbness or tingling or weakness of extremities. Patient lives at home alone, has landlord across street who helps her and family that come in to check on her, they are in process of getting patient an aide but worried about her care by herself at home. Uses walker to ambulate, waiting for wheelchair to arrive at home.  Pt now feels better and would like to go home .  Pt while in hospital had bx of Scap mass-  specimen report to follow up out pt with DR Ochoa.  Pt recommended [facility] but pt declined     In the ED, T 98.7F, , /68, RR 18, SpO2 92% on RA. Labs showed WBC 12.45, K 3.0. Received KCl 10mEq IV x1 and KCl 40mEq PO x1 in the ED.    CXR: Stable lung masses and COPD. (19 Feb 2023 17:03)      You were admitted for unsteady gait   You were diagnosed with electrolyte imbalance and dehydration s/p chemo   You were treated with IV hydration and electrolyte correction   You were prescribed the following new medications: no new meds     You will need to follow up with your primary care physician. Follow up with Dr Ochoa - and bx results     Source of Infection: NA   Antibiotic / Last Day: Na     Palliative Care / Advanced Care Planning  Code Status: DNR  Patient/Family agreeable to Hospice/Palliative (N)?  Summary of Goals of Care Conversation:    Discharging Provider:  Aleisha Packer   Contact Info: Cell [phone] - Please call with any questions or concerns.    Outpatient Provider: Dr Ochoa notified

## 2023-02-21 NOTE — PROGRESS NOTE ADULT - PROBLEM SELECTOR PLAN 1
Metastatic breast cancer, s/p left mastectomy, s/p multiple lines of treatment with systemic chemotherapy, AI, mTOR inhibitors, off medication times past 6 months, with evidence of progression of pulmonary metastases and brain metastases in the past 2-month, admitted at [facility] with weakness and inability to live independently.  Complex social case, with patient unable to support herself independently.  No plans for systemic therapy throughout this hospitalization.  Patient will follow-up with her oncologist after restaging scans completed in ambulatory.  In interim continue present supportive care.

## 2023-02-21 NOTE — DISCHARGE NOTE PROVIDER - NSDCCPCAREPLAN_GEN_ALL_CORE_FT
PRINCIPAL DISCHARGE DIAGNOSIS  Diagnosis: Weakness  Assessment and Plan of Treatment: You were admitted for unsteady gait   You were diagnosed with electrolyte imbalance and dehydration s/p chemo   You were treated with IV hydration and electrolyte correction   You were prescribed the following new medications: no new meds   You will need to follow up with your primary care physician. Follow up with Dr Ochoa for cancer care - and bx results      SECONDARY DISCHARGE DIAGNOSES  Diagnosis: Cancer, metastatic  Assessment and Plan of Treatment:

## 2023-02-21 NOTE — DIETITIAN INITIAL EVALUATION ADULT - ADD RECOMMEND
Encouraged patient to consume calorically dense foods high in protein & calories , not receptive to po supplements

## 2023-02-21 NOTE — DISCHARGE NOTE PROVIDER - CARE PROVIDER_API CALL
Leslie Islas)  Internal Medicine; Medical Oncology  [address], [place] [address]  Phone: [phone]  Fax: [phone]  Follow Up Time:    Leslie Islas)  Internal Medicine; Medical Oncology  [address] [place] [address], [place] [address]  Phone: [phone]  Fax: [phone]  Follow Up Time:     Omar Timmons)  Radiation Oncology  [address] [place] [address], Entrance A- [place] [address]  Phone: [phone]  Fax: [phone]  Follow Up Time:

## 2023-02-21 NOTE — PROGRESS NOTE ADULT - ASSESSMENT
67 year old F PMH stage IV breast cancer with mets (s/p L mastectomy, s/p RT for lung mets, brain mets) now on oral chemo, clear cell RCC (s/p L nephrectomy), b/l LE DVT (was on xarelto), COPD coming in for concerns from family for patient's safety at home by herself.     Per Dr. Stevens, Dr. Ochoa (oncology) has been following her for management of her breast cancer. In the past year she has developed a scalp lesion which has progressively grown. She was initially arranged for IR biopsy. However, it was not done. Per the patient, it was not done because there was hair on the site. However, per oncology notes, it was because she was medically declining. When I discussed with Dr. Ochoa, she reported needing the biopsy to assess the ER/[place]/HER2 status, as the prior pathology did not have the information to assist in the next step in oncologic management.    I performed an uncomplicated bedside biopsy of the lesion and personally brought it down to pathology. Site hemostatic with pressure / surgicel.     Dr. Nassar and RN updated regarding procedure.    I also updated Dr. Ochoa and cc'ed her on the pathology results.

## 2023-02-21 NOTE — DISCHARGE NOTE PROVIDER - CARE PROVIDERS DIRECT ADDRESSES
[email or web address]

## 2023-02-21 NOTE — DISCHARGE NOTE PROVIDER - NSDCFUSCHEDAPPT_GEN_ALL_CORE_FT
Omar Timmons  [facility]  [facility] [address] [facility]  Scheduled Appointment: 02/22/2023    Leslie Goldstein  [facility]  [facility]  Scheduled Appointment: 02/23/2023     Leslie Goldstein  [facility]  Anjali Solo  Scheduled Appointment: 02/23/2023

## 2023-02-21 NOTE — DISCHARGE NOTE PROVIDER - ATTENDING DISCHARGE PHYSICAL EXAMINATION:
GENERAL: NAD, fungating mass on posterior head  NERVOUS SYSTEM:  CN II - XII intact; Sensation intact; Motor Strength 5/5 B/L upper and lower extremities  HEART: Regular rate and rhythm; No murmurs, rubs, or gallops  PSYCH: Appropriate affect, Alert & Oriented x 3

## 2023-02-21 NOTE — DISCHARGE NOTE PROVIDER - NSDCHHPTASSISTHOME_GEN_ALL_CORE
shuffling/impaired balance/decreased ROM/decreased strength
Patient Needs Assistance to Leave Residence...

## 2023-02-21 NOTE — PROGRESS NOTE ADULT - ASSESSMENT
67 year old F PMH stage IV breast cancer with mets (s/p L mastectomy, s/p RT for lung mets, brain mets) now on oral chemo, clear cell RCC (s/p L nephrectomy), b/l LE DVT (was on xarelto), COPD coming in for concerns from family for patient's safety at home by herself, admitted for weakness and placement.    #weakness  #history of stage IV breast cancer with mets  #scalp mass  - patient had MRI done outpatient which showed 2.4 x 5.7 x 4.0 cm fungating lesion arising from the right posterior scalp, with a focus of infiltration into the subgaleal space, but no evidence for calvarial infiltration  - was suppose to have biopsy however unable to perform due to hair, will need to follow up outpatient for biopsy and further evaluation outpatient  - seen by surg onc at [facility] and no acute surgical intervention needed, outpatient workup warranted   - does not appear infectious at this time, leukocytosis could be in setting of cancer  - unsure if procal would be helpful in this situation given cancer  - continue to monitor for fever, follow up labs  - heme, Dr. Ochoa, recommends consulting surgery to obtain biopsy - reached out to surgery Dr. Perez  - PT consulted for [facility] placement. Patient at this time seems hesitant to go to [facility] and wants to go home however it was explained that given her current situation, does not appear it is safe for her to go home alone. Family is in process of getting aides set up as per patient, and would benefit from [facility] placement in the interim    #hypokalemia  - K still low today, repleted further  - follow up BMP in the AM    #hyponatremia  - mild, asymptomatic  - encouraging PO intake/hydration, patient agreeable to trying to increase  - nutrition eval  - repeat BMP in the AM    #COPD  - on breo elipta at home, started on symbicort while in hospital    #history of DVT  - has history of b/l DVT and was on Xarelto however found to have hemorrhagic brain lesion on MRI and AC was stopped  - as per chart review, patient has history of IVC filter placement but does not remember having one placed  - dopplers 11/29/22 showed evidence of no DVT in either lower extremity     #DVT ppx  - SCDs    Patient has stated she will update family on her own. 67 year old F PMH stage IV breast cancer with mets (s/p L mastectomy, s/p RT for lung mets, brain mets) now on oral chemo, clear cell RCC (s/p L nephrectomy), b/l LE DVT (was on xarelto), COPD coming in for concerns from family for patient's safety at home by herself, admitted for weakness and placement.    #weakness  #history of stage IV breast cancer with mets  #scalp mass  - patient had MRI done outpatient which showed 2.4 x 5.7 x 4.0 cm fungating lesion arising from the right posterior scalp, with a focus of infiltration into the subgaleal space, but no evidence for calvarial infiltration  - was suppose to have biopsy however unable to perform due to hair, Surgery to do bedside BX today   - does not appear infectious at this time, leukocytosis could be in setting of cancer  - unsure if procal would be helpful in this situation given cancer  - continue to monitor for fever, follow up labs  - heme, Dr. Ochoa, recommends consulting surgery to obtain biopsy - reached out to surgery Dr. Perez- Surgery to do bedside BX today   - PT consulted for [facility] placement. Patient at this time seems hesitant to go to [facility] and wants to go home however it was explained that given her current situation, does not appear it is safe for her to go home alone. Family is in process of getting aides set up as per patient, and would benefit from [facility] placement in the interim- pt now telling me that family decided to go home     #hypokalemia- resolved   #Hypernatremia  - K 3.9  -   - follow up BMP in the AM  - Lasix 20mg po x1 this am     #hyponatremia- resolved       #COPD  - on breo elipta at home, started on symbicort while in hospital    #history of DVT  - has history of b/l DVT and was on Xarelto however found to have hemorrhagic brain lesion on MRI and AC was stopped  - as per chart review, patient has history of IVC filter placement but does not remember having one placed  - dopplers 11/29/22 showed evidence of no DVT in either lower extremity     #DVT ppx  - SCDs    Patient has stated she will update family on her own.

## 2023-02-21 NOTE — PROGRESS NOTE ADULT - SUBJECTIVE AND OBJECTIVE BOX
Patient is a 67y old  Female who presents with a chief complaint of weakness (20 Feb 2023 17:00)      Patient seen and examined at bedside.    ALLERGIES:  Cipro (Rash)    MEDICATIONS  (STANDING):  budesonide 160 MICROgram(s)/formoterol 4.5 MICROgram(s) Inhaler 2 Puff(s) Inhalation two times a day  influenza  Vaccine (HIGH DOSE) 0.7 milliLiter(s) IntraMuscular once    MEDICATIONS  (PRN):  acetaminophen     Tablet .. 650 milliGRAM(s) Oral every 6 hours PRN Temp greater or equal to 38C (100.4F), Mild Pain (1 - 3)  aluminum hydroxide/magnesium hydroxide/simethicone Suspension 30 milliLiter(s) Oral every 4 hours PRN Dyspepsia  melatonin 3 milliGRAM(s) Oral at bedtime PRN Insomnia  ondansetron Injectable 4 milliGRAM(s) IV Push every 8 hours PRN Nausea and/or Vomiting  oxyCODONE    IR 5 milliGRAM(s) Oral every 6 hours PRN Severe Pain (7 - 10)    Vital Signs Last 24 Hrs  T(F): 98 (21 Feb 2023 05:00), Max: 98 (20 Feb 2023 20:29)  HR: 96 (21 Feb 2023 05:00) (72 - 96)  BP: 115/71 (21 Feb 2023 05:00) (102/66 - 115/71)  RR: 16 (21 Feb 2023 05:00) (16 - 16)  SpO2: 94% (21 Feb 2023 05:00) (92% - 98%)  I&O's Summary    20 Feb 2023 07:01  -  21 Feb 2023 07:00  --------------------------------------------------------  IN: 0 mL / OUT: 1 mL / NET: -1 mL      PHYSICAL EXAM:  General: NAD, A/O x 3  ENT: MMM  Neck: Supple, No JVD  Lungs: Clear to auscultation bilaterally, Non labored breathing   Cardio: RRR, S1/S2, No murmurs  Abdomen: Soft, Nontender, Nondistended; Bowel sounds present  Extremities: No calf tenderness, No pitting edema    LABS:                        12.5   9.18  )-----------( 279      ( 20 Feb 2023 06:06 )             39.6     02-20    148  |  108  |  12  ----------------------------<  88  3.3   |  31  |  1.00    Ca    8.3      20 Feb 2023 06:06    TPro  5.7  /  Alb  2.1  /  TBili  0.4  /  DBili  x   /  AST  19  /  ALT  <6  /  AlkPhos  55  02-20      PT/INR - ( 19 Feb 2023 15:33 )   PT: 13.2 sec;   INR: 1.14 ratio         PTT - ( 19 Feb 2023 15:33 )  PTT:30.7 sec                              COVID-19 PCR: NotDetec (02-19-23 @ 15:33)    RADIOLOGY & ADDITIONAL TESTS:    Care Discussed with Consultants/Other Providers:    Patient is a 67y old  Female who presents with a chief complaint of weakness (20 Feb 2023 17:00)      Patient seen and examined at bedside.  Pt with complaint of right heel pain.  Otherwise no complaints .  No events over night     ALLERGIES:  Cipro (Rash)    MEDICATIONS  (STANDING):  budesonide 160 MICROgram(s)/formoterol 4.5 MICROgram(s) Inhaler 2 Puff(s) Inhalation two times a day  influenza  Vaccine (HIGH DOSE) 0.7 milliLiter(s) IntraMuscular once    MEDICATIONS  (PRN):  acetaminophen     Tablet .. 650 milliGRAM(s) Oral every 6 hours PRN Temp greater or equal to 38C (100.4F), Mild Pain (1 - 3)  aluminum hydroxide/magnesium hydroxide/simethicone Suspension 30 milliLiter(s) Oral every 4 hours PRN Dyspepsia  melatonin 3 milliGRAM(s) Oral at bedtime PRN Insomnia  ondansetron Injectable 4 milliGRAM(s) IV Push every 8 hours PRN Nausea and/or Vomiting  oxyCODONE    IR 5 milliGRAM(s) Oral every 6 hours PRN Severe Pain (7 - 10)    Vital Signs Last 24 Hrs  T(F): 98 (21 Feb 2023 05:00), Max: 98 (20 Feb 2023 20:29)  HR: 96 (21 Feb 2023 05:00) (72 - 96)  BP: 115/71 (21 Feb 2023 05:00) (102/66 - 115/71)  RR: 16 (21 Feb 2023 05:00) (16 - 16)  SpO2: 94% (21 Feb 2023 05:00) (92% - 98%)  I&O's Summary    20 Feb 2023 07:01  -  21 Feb 2023 07:00  --------------------------------------------------------  IN: 0 mL / OUT: 1 mL / NET: -1 mL      PHYSICAL EXAM:  General: 68 y/o female in NAD, A/O x 3  Scalp - lg fungating growth on posterior scalp   ENT: MMM  Neck: Supple, No JVD  Lungs: exp wheezes bilat  auscultation bilaterally, Non labored breathing   Cardio: RRR, S1/S2, No murmurs  Abdomen: Soft, Nontender, Nondistended; Bowel sounds present  Extremities: No calf tenderness, No pitting edema,  no heel decubiti but severe right heel pain     LABS:                        12.5   9.18  )-----------( 279      ( 20 Feb 2023 06:06 )             39.6     02-20    148  |  108  |  12  ----------------------------<  88  3.3   |  31  |  1.00    Ca    8.3      20 Feb 2023 06:06    TPro  5.7  /  Alb  2.1  /  TBili  0.4  /  DBili  x   /  AST  19  /  ALT  <6  /  AlkPhos  55  02-20      PT/INR - ( 19 Feb 2023 15:33 )   PT: 13.2 sec;   INR: 1.14 ratio         PTT - ( 19 Feb 2023 15:33 )  PTT:30.7 sec                              COVID-19 PCR: NotDetec (02-19-23 @ 15:33)    RADIOLOGY & ADDITIONAL TESTS:    Care Discussed with Consultants/Other Providers:

## 2023-02-21 NOTE — DISCHARGE NOTE NURSING/CASE MANAGEMENT/SOCIAL WORK - NSDCPEFALRISK_GEN_ALL_CORE
For information on Fall & Injury Prevention, visit: [email or web address] OR  [email or web address] OR  https://www.cdc.gov/steadi/patient.html

## 2023-02-21 NOTE — DIETITIAN INITIAL EVALUATION ADULT - OTHER INFO
67 year old F PMH stage IV breast cancer with mets (s/p L mastectomy, s/p RT for lung mets, brain mets) now on oral chemo, clear cell RCC (s/p L nephrectomy), b/l LE DVT (was on xarelto), COPD coming in for concerns from family for patient's safety at home by herself. Patient reports a good appetite note to consume ~ 50% of breakfast meal ,noted consuming between 50-75% of meals as per nursing flow sheets , No n/v noted , no recent weight loss noted, Last BM (2/16) No edema, Skin intact, occipital mass noted , surgical eval reviewed ,

## 2023-02-22 ENCOUNTER — APPOINTMENT (OUTPATIENT)
Dept: RADIATION ONCOLOGY | Facility: CLINIC | Age: 68
End: 2023-02-22

## 2023-02-22 VITALS
RESPIRATION RATE: 16 BRPM | TEMPERATURE: 97 F | DIASTOLIC BLOOD PRESSURE: 70 MMHG | OXYGEN SATURATION: 91 % | HEART RATE: 89 BPM | SYSTOLIC BLOOD PRESSURE: 105 MMHG

## 2023-02-22 PROCEDURE — 99232 SBSQ HOSP IP/OBS MODERATE 35: CPT

## 2023-02-22 PROCEDURE — 85610 PROTHROMBIN TIME: CPT

## 2023-02-22 PROCEDURE — 97162 PT EVAL MOD COMPLEX 30 MIN: CPT

## 2023-02-22 PROCEDURE — 88342 IMHCHEM/IMCYTCHM 1ST ANTB: CPT

## 2023-02-22 PROCEDURE — 85027 COMPLETE CBC AUTOMATED: CPT

## 2023-02-22 PROCEDURE — 94760 N-INVAS EAR/PLS OXIMETRY 1: CPT

## 2023-02-22 PROCEDURE — 85025 COMPLETE CBC W/AUTO DIFF WBC: CPT

## 2023-02-22 PROCEDURE — 87635 SARS-COV-2 COVID-19 AMP PRB: CPT

## 2023-02-22 PROCEDURE — 85730 THROMBOPLASTIN TIME PARTIAL: CPT

## 2023-02-22 PROCEDURE — 71045 X-RAY EXAM CHEST 1 VIEW: CPT

## 2023-02-22 PROCEDURE — 88304 TISSUE EXAM BY PATHOLOGIST: CPT

## 2023-02-22 PROCEDURE — 80053 COMPREHEN METABOLIC PANEL: CPT

## 2023-02-22 PROCEDURE — 94640 AIRWAY INHALATION TREATMENT: CPT

## 2023-02-22 PROCEDURE — 80048 BASIC METABOLIC PNL TOTAL CA: CPT

## 2023-02-22 PROCEDURE — 36415 COLL VENOUS BLD VENIPUNCTURE: CPT

## 2023-02-22 PROCEDURE — 99285 EMERGENCY DEPT VISIT HI MDM: CPT | Mod: 25

## 2023-02-22 PROCEDURE — 88341 IMHCHEM/IMCYTCHM EA ADD ANTB: CPT

## 2023-02-22 PROCEDURE — 93005 ELECTROCARDIOGRAM TRACING: CPT

## 2023-02-22 RX ADMIN — BUDESONIDE AND FORMOTEROL FUMARATE DIHYDRATE 2 PUFF(S): 160; 4.5 AEROSOL RESPIRATORY (INHALATION) at 07:57

## 2023-02-22 NOTE — PROGRESS NOTE ADULT - SUBJECTIVE AND OBJECTIVE BOX
CC: Patient is a 67y old  Female who presents with a chief complaint of weakness (21 Feb 2023 17:56)      Interval History:  Patient seen and examined at bedside.  No overnight events  No complaints this morning. For discharge    ROS:  CONSTITUTIONAL: No fever, weight loss, or fatigue  RESPIRATORY: No cough, wheezing, chills or hemoptysis; No shortness of breath  CARDIOVASCULAR: No chest pain, palpitations, dizziness, or leg swelling    ALLERGIES:  Cipro (Rash)    MEDICATIONS  (STANDING):  budesonide 160 MICROgram(s)/formoterol 4.5 MICROgram(s) Inhaler 2 Puff(s) Inhalation two times a day  influenza  Vaccine (HIGH DOSE) 0.7 milliLiter(s) IntraMuscular once  lidocaine 1%/epinephrine 1:100,000 Inj 5 milliLiter(s) Local Injection once  melatonin 3 milliGRAM(s) Oral at bedtime    MEDICATIONS  (PRN):  acetaminophen     Tablet .. 650 milliGRAM(s) Oral every 6 hours PRN Temp greater or equal to 38C (100.4F), Mild Pain (1 - 3)  aluminum hydroxide/magnesium hydroxide/simethicone Suspension 30 milliLiter(s) Oral every 4 hours PRN Dyspepsia  ondansetron Injectable 4 milliGRAM(s) IV Push every 8 hours PRN Nausea and/or Vomiting  oxyCODONE    IR 5 milliGRAM(s) Oral every 6 hours PRN Severe Pain (7 - 10)    Vital Signs Last 24 Hrs  T(F): 98.7 (21 Feb 2023 20:22), Max: 98.7 (21 Feb 2023 20:22)  HR: 61 (22 Feb 2023 07:55) (61 - 95)  BP: 101/66 (22 Feb 2023 05:15) (97/69 - 101/66)  RR: 17 (22 Feb 2023 05:15) (16 - 17)  SpO2: 96% (22 Feb 2023 07:55) (90% - 97%)  I&O's Summary    21 Feb 2023 07:01  -  22 Feb 2023 07:00  --------------------------------------------------------  IN: 560 mL / OUT: 1400 mL / NET: -840 mL      BMI (kg/m2): 26.1 (02-19-23 @ 18:39)    PHYSICAL EXAM:  GENERAL: NAD  HENT:  Atraumatic, Normocephalic; No tonsillar erythema, exudates, or enlargement; Moist mucous membranes  EYES: EOMI, PERRLA, conjunctiva and sclera clear, no lid-lag; moist conjunctivae  NECK: Supple, No JVD, Normal thyroid, FROM of neck  NERVOUS SYSTEM:  CN II - XII intact; Sensation intact; follows commands  CHEST/LUNG: Clear to percussion bilaterally; No rales, rhonchi, wheezing, or rubs; normal respiratory effort, no intercostal retractions  HEART: Regular rate and rhythm; No murmurs, rubs, or gallops; No pitting edema  ABDOMEN: Soft, Nontender, Nondistended; Bowel sounds present; No HSM or masses  MUSCULOSKELETAL/EXTREMITIES:  2+ Peripheral Pulses, No clubbing or digital cyanosis; FROM of extremeties (pain, crepitation or contracture)  PSYCH: Appropriate affect, Alert & Oriented x 3, Good Memory; Good insight    LABS:                        11.7   10.39 )-----------( 239      ( 21 Feb 2023 07:49 )             37.7       02-21    147  |  109  |  11  ----------------------------<  92  3.9   |  30  |  0.94    Ca    8.4      21 Feb 2023 07:49    TPro  5.7  /  Alb  2.1  /  TBili  0.4  /  DBili  x   /  AST  19  /  ALT  <6  /  AlkPhos  55  02-20     PT/INR - ( 19 Feb 2023 15:33 )   PT: 13.2 sec;   INR: 1.14 ratio       PTT - ( 19 Feb 2023 15:33 )  PTT:30.7 sec     COVID-19 PCR: NotDetec (02-19-23 @ 15:33)    Care Discussed with Consultants/Other Providers: Yes

## 2023-02-22 NOTE — PROGRESS NOTE ADULT - ASSESSMENT
67 year old F PMH stage IV breast cancer with mets (s/p L mastectomy, s/p RT for lung mets, brain mets) now on oral chemo, clear cell RCC (s/p L nephrectomy), b/l LE DVT (was on xarelto), COPD coming in for concerns from family for patient's safety at home by herself, admitted for weakness and placement.    #weakness  #history of stage IV breast cancer with mets  #scalp mass  - patient had MRI done outpatient which showed 2.4 x 5.7 x 4.0 cm fungating lesion arising from the right posterior scalp, with a focus of infiltration into the subgaleal space, but no evidence for calvarial infiltration  - was suppose to have biopsy however unable to perform due to hair. BX completed   - does not appear infectious at this time, leukocytosis could be in setting of cancer  - unsure if procal would be helpful in this situation given cancer  - continue to monitor for fever, follow up labs  - heme, Dr. Ochoa, recommends consulting surgery to obtain biopsy - reached out to surgery   - PT consulted for [facility] placement. Patient at this time seems hesitant to go to [facility] and wants to go home however it was explained that given her current situation, does not appear it is safe for her to go home alone. Family is in process of getting aides set up as per patient, and would benefit from [facility] placement in the interim- pt now telling me that family decided to go home     #hypokalemia- resolved   #Hypernatremia  - K 3.9  -   - follow up BMP in the AM  - Lasix 20mg po x1     #hyponatremia- resolved     #COPD  - on breo elipta at home, started on symbicort while in hospital    #history of DVT  - has history of b/l DVT and was on Xarelto however found to have hemorrhagic brain lesion on MRI and AC was stopped  - as per chart review, patient has history of IVC filter placement but does not remember having one placed  - dopplers 11/29/22 showed evidence of no DVT in either lower extremity     #DVT ppx  - SCDs    Patient has stated she will update family on her own. For D/C tod\ay

## 2023-02-23 ENCOUNTER — APPOINTMENT (OUTPATIENT)
Dept: HEMATOLOGY ONCOLOGY | Facility: CLINIC | Age: 68
End: 2023-02-23

## 2023-02-23 ENCOUNTER — NON-APPOINTMENT (OUTPATIENT)
Age: 68
End: 2023-02-23

## 2023-02-23 ENCOUNTER — INPATIENT (INPATIENT)
Facility: HOSPITAL | Age: 68
LOS: 7 days | Discharge: NURSING, REHAB OR LONG-TERM CARE | DRG: 478 | End: 2023-03-03
Attending: STUDENT IN AN ORGANIZED HEALTH CARE EDUCATION/TRAINING PROGRAM | Admitting: HOSPITALIST
Payer: MEDICARE

## 2023-02-23 VITALS
HEIGHT: 63 IN | WEIGHT: 143.08 LBS | DIASTOLIC BLOOD PRESSURE: 76 MMHG | OXYGEN SATURATION: 95 % | RESPIRATION RATE: 17 BRPM | SYSTOLIC BLOOD PRESSURE: 105 MMHG | TEMPERATURE: 98 F | HEART RATE: 111 BPM

## 2023-02-23 DIAGNOSIS — E89.6 POSTPROCEDURAL ADRENOCORTICAL (-MEDULLARY) HYPOFUNCTION: Chronic | ICD-10-CM

## 2023-02-23 DIAGNOSIS — I82.403 ACUTE EMBOLISM AND THROMBOSIS OF UNSPECIFIED DEEP VEINS OF LOWER EXTREMITY, BILATERAL: ICD-10-CM

## 2023-02-23 DIAGNOSIS — Z90.12 ACQUIRED ABSENCE OF LEFT BREAST AND NIPPLE: Chronic | ICD-10-CM

## 2023-02-23 DIAGNOSIS — C50.919 MALIGNANT NEOPLASM OF UNSPECIFIED SITE OF UNSPECIFIED FEMALE BREAST: ICD-10-CM

## 2023-02-23 DIAGNOSIS — C79.31 SECONDARY MALIGNANT NEOPLASM OF BRAIN: ICD-10-CM

## 2023-02-23 DIAGNOSIS — R91.8 OTHER NONSPECIFIC ABNORMAL FINDING OF LUNG FIELD: ICD-10-CM

## 2023-02-23 DIAGNOSIS — C79.51 MALIGNANT NEOPLASM OF UNSPECIFIED SITE OF UNSPECIFIED FEMALE BREAST: ICD-10-CM

## 2023-02-23 DIAGNOSIS — Z90.5 ACQUIRED ABSENCE OF KIDNEY: Chronic | ICD-10-CM

## 2023-02-23 DIAGNOSIS — R53.83 OTHER FATIGUE: ICD-10-CM

## 2023-02-23 DIAGNOSIS — Z98.890 OTHER SPECIFIED POSTPROCEDURAL STATES: Chronic | ICD-10-CM

## 2023-02-23 LAB
ALBUMIN SERPL ELPH-MCNC: 2.5 G/DL — LOW (ref 3.3–5)
ALP SERPL-CCNC: 58 U/L — SIGNIFICANT CHANGE UP (ref 40–120)
ALT FLD-CCNC: 10 U/L — SIGNIFICANT CHANGE UP (ref 10–45)
ANION GAP SERPL CALC-SCNC: 9 MMOL/L — SIGNIFICANT CHANGE UP (ref 5–17)
APPEARANCE UR: ABNORMAL
AST SERPL-CCNC: 24 U/L — SIGNIFICANT CHANGE UP (ref 10–40)
BACTERIA # UR AUTO: ABNORMAL /HPF
BASOPHILS # BLD AUTO: 0.05 K/UL — SIGNIFICANT CHANGE UP (ref 0–0.2)
BASOPHILS NFR BLD AUTO: 0.5 % — SIGNIFICANT CHANGE UP (ref 0–2)
BILIRUB SERPL-MCNC: 0.7 MG/DL — SIGNIFICANT CHANGE UP (ref 0.2–1.2)
BILIRUB UR-MCNC: ABNORMAL
BUN SERPL-MCNC: 17 MG/DL — SIGNIFICANT CHANGE UP (ref 7–23)
CALCIUM SERPL-MCNC: 8.5 MG/DL — SIGNIFICANT CHANGE UP (ref 8.4–10.5)
CHLORIDE SERPL-SCNC: 103 MMOL/L — SIGNIFICANT CHANGE UP (ref 96–108)
CO2 SERPL-SCNC: 32 MMOL/L — HIGH (ref 22–31)
COLOR SPEC: YELLOW — SIGNIFICANT CHANGE UP
CREAT SERPL-MCNC: 1.1 MG/DL — SIGNIFICANT CHANGE UP (ref 0.5–1.3)
DIFF PNL FLD: ABNORMAL
EGFR: 54 ML/MIN/1.73M2 — LOW
EOSINOPHIL # BLD AUTO: 0.18 K/UL — SIGNIFICANT CHANGE UP (ref 0–0.5)
EOSINOPHIL NFR BLD AUTO: 1.8 % — SIGNIFICANT CHANGE UP (ref 0–6)
EPI CELLS # UR: SIGNIFICANT CHANGE UP
GLUCOSE SERPL-MCNC: 94 MG/DL — SIGNIFICANT CHANGE UP (ref 70–99)
GLUCOSE UR QL: NEGATIVE — SIGNIFICANT CHANGE UP
HCT VFR BLD CALC: 43.4 % — SIGNIFICANT CHANGE UP (ref 34.5–45)
HGB BLD-MCNC: 13.7 G/DL — SIGNIFICANT CHANGE UP (ref 11.5–15.5)
IMM GRANULOCYTES NFR BLD AUTO: 0.6 % — SIGNIFICANT CHANGE UP (ref 0–0.9)
KETONES UR-MCNC: ABNORMAL
LEUKOCYTE ESTERASE UR-ACNC: ABNORMAL
LYMPHOCYTES # BLD AUTO: 1.7 K/UL — SIGNIFICANT CHANGE UP (ref 1–3.3)
LYMPHOCYTES # BLD AUTO: 16.5 % — SIGNIFICANT CHANGE UP (ref 13–44)
MCHC RBC-ENTMCNC: 31.4 PG — SIGNIFICANT CHANGE UP (ref 27–34)
MCHC RBC-ENTMCNC: 31.6 GM/DL — LOW (ref 32–36)
MCV RBC AUTO: 99.5 FL — SIGNIFICANT CHANGE UP (ref 80–100)
MONOCYTES # BLD AUTO: 0.96 K/UL — HIGH (ref 0–0.9)
MONOCYTES NFR BLD AUTO: 9.3 % — SIGNIFICANT CHANGE UP (ref 2–14)
NEUTROPHILS # BLD AUTO: 7.33 K/UL — SIGNIFICANT CHANGE UP (ref 1.8–7.4)
NEUTROPHILS NFR BLD AUTO: 71.3 % — SIGNIFICANT CHANGE UP (ref 43–77)
NITRITE UR-MCNC: NEGATIVE — SIGNIFICANT CHANGE UP
NRBC # BLD: 0 /100 WBCS — SIGNIFICANT CHANGE UP (ref 0–0)
PH UR: 5 — SIGNIFICANT CHANGE UP (ref 5–8)
PLATELET # BLD AUTO: 323 K/UL — SIGNIFICANT CHANGE UP (ref 150–400)
POTASSIUM SERPL-MCNC: 4.2 MMOL/L — SIGNIFICANT CHANGE UP (ref 3.5–5.3)
POTASSIUM SERPL-SCNC: 4.2 MMOL/L — SIGNIFICANT CHANGE UP (ref 3.5–5.3)
PROT SERPL-MCNC: 6.7 G/DL — SIGNIFICANT CHANGE UP (ref 6–8.3)
PROT UR-MCNC: 30 MG/DL
RBC # BLD: 4.36 M/UL — SIGNIFICANT CHANGE UP (ref 3.8–5.2)
RBC # FLD: 15.9 % — HIGH (ref 10.3–14.5)
RBC CASTS # UR COMP ASSIST: SIGNIFICANT CHANGE UP /HPF (ref 0–4)
SARS-COV-2 RNA SPEC QL NAA+PROBE: SIGNIFICANT CHANGE UP
SODIUM SERPL-SCNC: 144 MMOL/L — SIGNIFICANT CHANGE UP (ref 135–145)
SP GR SPEC: 1.02 — SIGNIFICANT CHANGE UP (ref 1.01–1.02)
UROBILINOGEN FLD QL: 1
WBC # BLD: 10.28 K/UL — SIGNIFICANT CHANGE UP (ref 3.8–10.5)
WBC # FLD AUTO: 10.28 K/UL — SIGNIFICANT CHANGE UP (ref 3.8–10.5)
WBC UR QL: ABNORMAL /HPF (ref 0–5)

## 2023-02-23 PROCEDURE — 93010 ELECTROCARDIOGRAM REPORT: CPT

## 2023-02-23 PROCEDURE — 99223 1ST HOSP IP/OBS HIGH 75: CPT

## 2023-02-23 PROCEDURE — 99285 EMERGENCY DEPT VISIT HI MDM: CPT

## 2023-02-23 RX ORDER — INFLUENZA VIRUS VACCINE 15; 15; 15; 15 UG/.5ML; UG/.5ML; UG/.5ML; UG/.5ML
0.7 SUSPENSION INTRAMUSCULAR ONCE
Refills: 0 | Status: DISCONTINUED | OUTPATIENT
Start: 2023-02-23 | End: 2023-03-03

## 2023-02-23 RX ORDER — BUDESONIDE AND FORMOTEROL FUMARATE DIHYDRATE 160; 4.5 UG/1; UG/1
2 AEROSOL RESPIRATORY (INHALATION) EVERY 12 HOURS
Refills: 0 | Status: DISCONTINUED | OUTPATIENT
Start: 2023-02-23 | End: 2023-03-03

## 2023-02-23 RX ORDER — TIOTROPIUM BROMIDE 18 UG/1
2 CAPSULE ORAL; RESPIRATORY (INHALATION) DAILY
Refills: 0 | Status: DISCONTINUED | OUTPATIENT
Start: 2023-02-23 | End: 2023-03-03

## 2023-02-23 RX ORDER — LANOLIN ALCOHOL/MO/W.PET/CERES
3 CREAM (GRAM) TOPICAL AT BEDTIME
Refills: 0 | Status: DISCONTINUED | OUTPATIENT
Start: 2023-02-23 | End: 2023-03-03

## 2023-02-23 RX ORDER — ALBUTEROL 90 UG/1
2 AEROSOL, METERED ORAL EVERY 6 HOURS
Refills: 0 | Status: DISCONTINUED | OUTPATIENT
Start: 2023-02-23 | End: 2023-03-03

## 2023-02-23 RX ORDER — OXYCODONE HYDROCHLORIDE 5 MG/1
5 TABLET ORAL EVERY 6 HOURS
Refills: 0 | Status: DISCONTINUED | OUTPATIENT
Start: 2023-02-23 | End: 2023-03-01

## 2023-02-23 RX ORDER — ACETAMINOPHEN 500 MG
650 TABLET ORAL EVERY 6 HOURS
Refills: 0 | Status: DISCONTINUED | OUTPATIENT
Start: 2023-02-23 | End: 2023-03-03

## 2023-02-23 NOTE — ED ADULT NURSE NOTE - DOES PATIENT HAVE ADVANCE DIRECTIVE
David Montalvo MD  Signing Provider: David Montalvo MD    SS/morales ([id number])  DD: 02/14/2020 12:02:32 TD: 02/17/2020 08:23:23    Copy Sent To:    Yes

## 2023-02-23 NOTE — H&P ADULT - HISTORY OF PRESENT ILLNESS
67F, PMHx COPD, anxiety, left kidney clear-cell carcinoma, s/p nephrectomy (2018), bilateral DVT, s/p IVC filter placement, and known with ER +/CO +/HER2 negative left breast cancer since March 2003, when she had left modified radical mastectomy with reconstruction.  Patient was diagnosed with metastatic bone disease in 2018 and with pulmonary metastasis in 2021.  She is s/p left VATS procedure with pleural decortication in March 2019.  Completed 2000 centigrade RT to right pulmonary hilum in 5 fraction in August 2022 due to enlarged bilateral pulmonary nodules and hilar lymph nodes.  She had multiple lines of systemic treatment including Aromasin, Afinitor, capecitabine (September 2022), which unfortunately did not stop progression to brain metastases and fungating calvarial lesion, diagnosed in November 2022.  Received brain SRS 3000 centigrade (Dr. Timmons).  Of note foundation 1 genetic testing showed PD-L1 0% and no relevant reportable alterations in BRCA1/2 or ERBB2.  Patient was pending biopsy of right posterior scalp mass in ambulatory, which was postponed. 67F, PMHx COPD on prn home O2 via NC, anxiety, left kidney clear-cell carcinoma, s/p nephrectomy (2018), Stage IV breast cancer ER +/[place] +/HER2, initially dxed in 2003 ( had eft mastectomy), now w/ mets to bone, lung, brain, s/p RT/chemo but still had progression of disease, hx of bilateral DVT, has IVC filter, was just recently hospitalized for enlarging fungating mass on right side of scalp, w/c was biopsied 2/21/23, was just discharged yesterday with home care, now back to the ED, because visiting nurse concerned that pt is unable to care for herself. It was recommended that pt be discharged o [facility], but pt had refused.  Pt lives by herself, walks with a walker.  Pt denies fever, chills, chest pain, cough, abd pain, nausea, vomiting.  She states she has been wheezing lately, she has chronic dyspnea with exertion and c/o right ankle pain for almost a month.    67F w/ COPD on prn home O2 via NC, hx of left renal cancer-clear cell, s/p nephrectomy (2018), Stage IV breast cancer ER +/[place] +/HER2, initially dxed in 2003 ( had eft mastectomy), now w/ mets to bone, lung, brain, s/p RT/chemo but still had progression of disease, hx of bilateral DVT, has IVC filter, was just recently hospitalized for enlarging fungating mass on right side of scalp, w/c was biopsied 2/21/23, was just discharged yesterday with home care, now back to the ED, because visiting nurse concerned that pt is unable to care for herself. Pt with malodorous scalp mass, s/p biopsy. It was recommended that pt be discharged to [facility], but pt refused, wanted to go home.  Pt lives by herself, walks with a walker.  Pt denies fever, chills, chest pain, cough, abd pain, nausea, vomiting.  She states she has been wheezing lately, she has chronic dyspnea with exertion and c/o right ankle pain for almost a month.

## 2023-02-23 NOTE — PATIENT PROFILE ADULT - DO YOU FEEL UNSAFE AT HOME, WORK, OR SCHOOL?
Final Anesthesia Post-op Assessment    Patient: Rox Whyte  Procedure(s) Performed:  SECTION  Anesthesia type: Spinal    Vital Last Value   Temperature 36.7 °C (98 °F) (18 1630)   Pulse 75 (18 1630)   Respiratory Rate 16 (18 1630)   Non-Invasive   Blood Pressure 98/54 (18 1630)   Arterial  Blood Pressure     Pulse Oximetry 98 % (18 1200)     Last 24 I/O:   Intake/Output Summary (Last 24 hours) at 18  Last data filed at 18   Gross per 24 hour   Intake             1881 ml   Output             1685 ml   Net              196 ml       PATIENT LOCATION: Labor and Delivery  POST-OP VITAL SIGNS: stable  LEVEL OF CONSCIOUSNESS: participates in exam, answers questions appropriately, awake and alert  RESPIRATORY STATUS: spontaneous ventilation  CARDIOVASCULAR: blood pressure returned to baseline and stable  HYDRATION: euvolemic    PAIN MANAGEMENT: well controlled  NAUSEA: None  AIRWAY PATENCY: patent  POST-OP ASSESSMENT: no complications, patient tolerated procedure well with no complications and sufficiently recovered from acute administration of anesthesia effects and able to participate in evaluation  COMPLICATIONS: none  Comments: Pt seen in her room.  Doing well after delivery. Mildly itchy but had thought it was d/t being more dry inside the hospital. I let her know that typically resolved about 24hours after the placement of the spinal.  Ambulating and voiding within normal limits.  Denies headache, back pain, numbness, or weakness.  No anesthetic complications.         no

## 2023-02-23 NOTE — ED PROVIDER NOTE - OBJECTIVE STATEMENT
66 yo female biba from home after recent admission to [facility] for foul smelling fungating scalp mass with biopsy this week. Pt was sent bu visiting nurse who when comaing to her house to care for her today for the first time since d/c from hospital yestereday found house and patient in poor condition and could not care for her. Pt was in chair unable to get up from the 24 hours she was home, did not eat, take her medicine or go to the bathroom. There was no one to help her as she lives by herself. she has ho breast ca with mets to brain and bone.

## 2023-02-23 NOTE — PATIENT PROFILE ADULT - FUNCTIONAL ASSESSMENT - BASIC MOBILITY 6.
3-calculated by average/Not able to assess (calculate score using [facility] averaging method)  2-calculated by average/Not able to assess (calculate score using [facility] averaging method)

## 2023-02-23 NOTE — ED PROVIDER NOTE - PHYSICAL EXAMINATION
Gen:  chronically ill appearing, fungating mass to the back of the head, foul smelling, unkempt  HEENT: pupils perrl,no pharyngeal erythema, uvula midline  Cardiac: S1S2, RRR  Abd: Soft, non tender  Resp: No distress, CTA   musculoskeletal:: no deformities, no swelling, strength +5/+5  Skin: warm and dry as visualized, no rashes, approx 10cm fungating, foul smelling, black mass at back of head at scalp  Neuro: librado GARCIA x 4  Psych:alert, cooperative, appropriate mood and affect for situation

## 2023-02-23 NOTE — PATIENT PROFILE ADULT - FUNCTIONAL ASSESSMENT - BASIC MOBILITY 1.
[id number]      Aura Rangel is feeling uncomfortable, but well. Having occasional cramping. Pink and brown discharge at times. Reviewed bleeding precautions. Reports regular fetal movement; reviewed kick counts. Denies contractions, vaginal bleeding, abnormal discharge, or leaking fluid.     The following topics were reviewed and discussed with the patient at this visit:    Bleeding Precautions    Fetal movement monitoring    Labor signs   GBS negative    RTC 1 week(s)     3 = A little assistance 2 = A lot of assistance

## 2023-02-23 NOTE — ED ADULT NURSE NOTE - NSICDXPASTSURGICALHX_GEN_ALL_CORE_FT
No
PAST SURGICAL HISTORY:  H/O left mastectomy     H/O total adrenalectomy     History of hip surgery in 2007- Right hip surgery    S/p nephrectomy left

## 2023-02-23 NOTE — PATIENT PROFILE ADULT - FALL HARM RISK - HARM RISK INTERVENTIONS

## 2023-02-23 NOTE — ED ADULT TRIAGE NOTE - CHIEF COMPLAINT QUOTE
Pt BIBA from home for a wound check, s/p biopsy back of head, h/o Ca, stated with "smell" from biopsy site

## 2023-02-23 NOTE — ED PROVIDER NOTE - PROGRESS NOTE DETAILS
called [facility] states that the house is unlivable due to the smell, her family who consiists of ex  and sister cannot take care of her and she is unable to take care of herself. she needs admission or [facility] Vallejo DO spoke with Dr. fonseca, heme-onc, no pathology results back for fungating scalp mass, will get sw consult for immediate placement as pt cannot go back home B Genevieve CHING spoke with palliateive care, pt does not want palliateivef care, spoke with  to determine disposition, [facility] vs hospital B Genevieve CHING pt agrees to be placed in [facility], spoke with hospitalist will admit LILIANA Vallejo DO

## 2023-02-23 NOTE — ED PROVIDER NOTE - CLINICAL SUMMARY MEDICAL DECISION MAKING FREE TEXT BOX
pt with chronic illness cancer with mets to bone and brain, fungating mass back of head with biopsy pending unable to walk or care for self by herself at home, declined [facility] when d/c yesterday, will get labs, ekg and admit for gen fatigue and evaluation for stan and sw consult

## 2023-02-23 NOTE — H&P ADULT - NSHPPHYSICALEXAM_GEN_ALL_CORE
Vital Signs (24 Hrs):  T(C): 36.7 (02-23-23 @ 14:20), Max: 36.7 (02-23-23 @ 14:20)  HR: 95 (02-23-23 @ 18:30) (95 - 111)  BP: 106/74 (02-23-23 @ 18:30) (105/76 - 106/74)  RR: 16 (02-23-23 @ 18:30) (16 - 17)  SpO2: 94% (02-23-23 @ 18:30) (94% - 95%)  Daily Height in cm: 160.02 (23 Feb 2023 14:20)

## 2023-02-23 NOTE — ED ADULT NURSE NOTE - OBJECTIVE STATEMENT
home care nurse sent pt to ED from home to evaluate wound posterior scalp that she has had for 10 months

## 2023-02-23 NOTE — H&P ADULT - ASSESSMENT
67F, PMHx COPD on prn home O2 via NC, anxiety, left kidney clear-cell carcinoma, s/p nephrectomy (2018), Stage IV breast cancer ER +/[place] +/HER2, initially dxed in 2003 ( had eft mastectomy), now w/ mets to bone, lung, brain, s/p RT/chemo but still had progression of disease, hx of bilateral DVT, has IVC filter, was just recently hospitalized for enlarging fungating mass on right side of scalp, w/c was biopsied 2/21/23, was just discharged yesterday with home care, now back to the ED, because visiting nurse concerned that pt is unable to care for herself. It was recommended that pt be discharged o [facility], but pt had refused.  Pt lives by herself, walks with a walker.  Pt denies fever, chills, chest pain, cough, abd pain, nausea, vomiting.  She states she has been wheezing lately, she has chronic dyspnea with exertion and c/o right ankle pain for almost a month.  67F, PMHx COPD on prn home O2 via NC, anxiety, left kidney clear-cell carcinoma, s/p nephrectomy (2018), Stage IV breast cancer ER +/[place] +/HER2, initially dxed in 2003 ( had eft mastectomy), now w/ mets to bone, lung, brain, s/p RT/chemo but still had progression of disease, hx of bilateral DVT, has IVC filter, was just recently hospitalized for enlarging fungating mass on right side of scalp, w/c was biopsied 2/21/23, was just discharged yesterday with home care, now back to the ED, because visiting nurse concerned that pt is unable to care for herself. It was recommended that pt be discharged o [facility], but pt had refused.  Pt lives by herself, walks with a walker.  Pt denies fever, chills, chest pain, cough, abd pain, nausea, vomiting.  She states she has been wheezing lately, she has chronic dyspnea with exertion and c/o right ankle pain for almost a month.     Stage IV breast ca w/ brain, bone, lung mets, + scalp mass  COPD w/bronchospasm  Pt is unable to care for herself, lives alone    Admit  Cont bronchodilators- Albuterol prn, Symbicort (she is on Breo as outpt), add Spiriva  Analgesics prn  Wound care   Await/ffup path results  PT eval   Social work referral - [facility] placement  poor prognosis-pt is a DNR/DNI, pt has MOLST signed from recent admission.

## 2023-02-23 NOTE — ED ADULT NURSE NOTE - CHIEF COMPLAINT
Short of breath at home. Gave self neb. EMS found patient in distress and placed on cpap  combi given.   125mg solumederol, 2gm magnesium The patient is a 67y Female complaining of wound check.

## 2023-02-24 PROCEDURE — 99223 1ST HOSP IP/OBS HIGH 75: CPT

## 2023-02-24 PROCEDURE — 73610 X-RAY EXAM OF ANKLE: CPT | Mod: 26,RT

## 2023-02-24 PROCEDURE — 99233 SBSQ HOSP IP/OBS HIGH 50: CPT

## 2023-02-24 RX ORDER — ENOXAPARIN SODIUM 100 MG/ML
40 INJECTION SUBCUTANEOUS EVERY 24 HOURS
Refills: 0 | Status: DISCONTINUED | OUTPATIENT
Start: 2023-02-24 | End: 2023-02-24

## 2023-02-24 RX ADMIN — BUDESONIDE AND FORMOTEROL FUMARATE DIHYDRATE 2 PUFF(S): 160; 4.5 AEROSOL RESPIRATORY (INHALATION) at 21:59

## 2023-02-24 RX ADMIN — TIOTROPIUM BROMIDE 2 PUFF(S): 18 CAPSULE ORAL; RESPIRATORY (INHALATION) at 08:48

## 2023-02-24 RX ADMIN — Medication 30 MILLIGRAM(S): at 12:59

## 2023-02-24 RX ADMIN — BUDESONIDE AND FORMOTEROL FUMARATE DIHYDRATE 2 PUFF(S): 160; 4.5 AEROSOL RESPIRATORY (INHALATION) at 08:48

## 2023-02-24 NOTE — PHYSICAL THERAPY INITIAL EVALUATION ADULT - PERTINENT HX OF CURRENT PROBLEM, REHAB EVAL
67F w/ COPD on prn home O2 via NC, hx of left renal cancer-clear cell, s/p nephrectomy (2018), Stage IV breast cancer ER +/CT +/HER2, initially dxed in 2003 ( had eft mastectomy), now w/ mets to bone, lung, brain, s/p RT/chemo but still had progression of disease, hx of bilateral DVT, has IVC filter, was just recently hospitalized for enlarging fungating mass on right side of scalp, w/c was biopsied 2/21/23, was just discharged yesterday with home care, now back to the ED, because visiting nurse concerned that pt is unable to care for herself. Pt with malodorous scalp mass, s/p biopsy. It was recommended that pt be discharged to [facility], but pt refused, wanted to go home.  Pt lives by herself, walks with a walker.  Pt denies fever, chills, chest pain, cough, abd pain, nausea, vomiting.  She states she has been wheezing lately, she has chronic dyspnea with exertion and c/o right ankle pain for almost a month.

## 2023-02-24 NOTE — PHYSICAL THERAPY INITIAL EVALUATION ADULT - WEIGHT-BEARING RESTRICTIONS: GAIT, REHAB EVAL
From: Mariaa Cuellar  To: Dr. Chucky Roberts: 10/9/2022 10:18 AM EDT  Subject: Appointment    Dr. Richard Diez    I need to make an appointment with you on Monday or Tuesday   to talk to you about me slipping on smoking and talk to you about the medicine I am taking. I would like to get back on the Apo (Chantix) because I only have 30 left in the bottle from when it  was filled back in may of this year.     Thanks  Mariaa Cuellar
full weight-bearing

## 2023-02-24 NOTE — PROGRESS NOTE ADULT - ASSESSMENT
67 year old F PMH stage IV breast cancer with mets (s/p L mastectomy, s/p RT for lung mets, brain mets) now on oral chemo, clear cell RCC (s/p L nephrectomy), b/l LE DVT (was on xarelto), COPD coming in for ambulatory dysfunction and inability to care for self.     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 s/p RT and chemo  #scalp mass  - patient had MRI done outpatient which showed 2.4 x 5.7 x 4.0 cm fungating lesion arising from the right posterior scalp, with a focus of infiltration into the subgaleal space, but no evidence for calvarial infiltration  - s/p biopsy with Dr. Perez last admission. pathology report pending   - spoke to Heme/onc, Dr. Ochoa, no need for chemotherapy/ RT while in rehab     #COPD  - on breo elipta at home, started on symbicort while in hospital    #history of DVT  - has history of b/l DVT and was on Xarelto however found to have hemorrhagic brain lesion on MRI and AC was stopped  - as per chart review, patient has history of IVC filter placement but does not remember having one placed  - dopplers 11/29/22 showed evidence of no DVT in either lower extremity     #DVT ppx  - SCD

## 2023-02-24 NOTE — PROGRESS NOTE ADULT - SUBJECTIVE AND OBJECTIVE BOX
Patient is a 67y old  Female who presents with a chief complaint of pt with stage IV breast ca (brain, bone, lung mets), malodorous scalp mass, unable to care for self (2023 13:32)      Subjective and overnight events:  Patient seen and examined at bedside. reports right ankle pain and difficulty with ambulation due to ankle pain.   no fever, chills, sob, cp, abd pain. mass on scalp with mild bleeding    ALLERGIES:  Cipro (Rash)    MEDICATIONS  (STANDING):  budesonide 160 MICROgram(s)/formoterol 4.5 MICROgram(s) Inhaler 2 Puff(s) Inhalation every 12 hours  enoxaparin Injectable 40 milliGRAM(s) SubCutaneous every 24 hours  influenza  Vaccine (HIGH DOSE) 0.7 milliLiter(s) IntraMuscular once  predniSONE   Tablet 30 milliGRAM(s) Oral daily  tiotropium 2.5 MICROgram(s) Inhaler 2 Puff(s) Inhalation daily    MEDICATIONS  (PRN):  acetaminophen     Tablet .. 650 milliGRAM(s) Oral every 6 hours PRN Temp greater or equal to 38C (100.4F), Mild Pain (1 - 3)  albuterol    90 MICROgram(s) HFA Inhaler 2 Puff(s) Inhalation every 6 hours PRN Shortness of Breath and/or Wheezing  melatonin 3 milliGRAM(s) Oral at bedtime PRN Sleep  oxycodone    5 mG/acetaminophen 325 mG 1 Tablet(s) Oral every 4 hours PRN Severe Pain (7 - 10)  oxyCODONE    IR 5 milliGRAM(s) Oral every 6 hours PRN Moderate Pain (4 - 6)    Vital Signs Last 24 Hrs  T(F): 97.4 (2023 12:47), Max: 98.2 (2023 21:46)  HR: 81 (2023 12:47) (79 - 111)  BP: 113/78 (2023 12:47) (103/65 - 113/78)  RR: 18 (2023 12:47) (16 - 18)  SpO2: 99% (2023 12:47) (94% - 99%)  I&O's Summary    2023 07:01  -  2023 13:41  --------------------------------------------------------  IN: 240 mL / OUT: 0 mL / NET: 240 mL      PHYSICAL EXAM:  General: NAD, A/O x 3  ENT: MMM  Neck: Supple, No JVD  Lungs: Clear to auscultation bilaterally  Cardio: RRR, S1/S2, No murmurs  Abdomen: Soft, Nontender, Nondistended; Bowel sounds present  Extremities: No calf tenderness, No pitting edema    LABS:                        13.7   10.28 )-----------( 323      ( 2023 18:45 )             43.4         144  |  103  |  17  ----------------------------<  94  4.2   |  32  |  1.10    Ca    8.5      2023 18:45    TPro  6.7  /  Alb  2.5  /  TBili  0.7  /  DBili  x   /  AST  24  /  ALT  10  /  AlkPhos  58              Urinalysis Basic - ( 2023 22:05 )    Color: Yellow / Appearance: Slightly Turbid / S.020 / pH: x  Gluc: x / Ketone: Trace  / Bili: Small / Urobili: 1   Blood: x / Protein: 30 mg/dL / Nitrite: Negative   Leuk Esterase: Moderate / RBC: 0-4 /HPF / WBC 11-25 /HPF   Sq Epi: x / Non Sq Epi: Neg.-Few / Bacteria: Few /HPF        COVID-19 PCR: NotDetec (23 @ 19:15)  COVID-19 PCR: NotDetec (23 @ 15:33)      RADIOLOGY & ADDITIONAL TESTS:    Care Discussed with Consultants/Other Providers:

## 2023-02-24 NOTE — PHYSICAL THERAPY INITIAL EVALUATION ADULT - RANGE OF MOTION EXAMINATION, REHAB EVAL
informed pt it's time for her follow seen 8/2019 for wellness and was advised to follow up in feb 2020. PT thought she can just stop in to have labs drawn informed pt she needed to be seen to see what exact labs she needed to have done and to follow up. Pt states she will call back when she's ready to schedule.   no ROM deficits were identified

## 2023-02-24 NOTE — PHYSICAL THERAPY INITIAL EVALUATION ADULT - ADDITIONAL COMMENTS
Patient lives at home alone, 1 step to enter no rails, no steps to negotiate inside, pt states landlord across street who helps her and family assists w/ADLs. pt Used walker to ambulate prior to last d/c but has been having difficulty w/amb and ADL's. pt reports she is waiting for a wheelchair to arrive at home.

## 2023-02-25 LAB
ANION GAP SERPL CALC-SCNC: 9 MMOL/L — SIGNIFICANT CHANGE UP (ref 5–17)
BASOPHILS # BLD AUTO: 0.03 K/UL — SIGNIFICANT CHANGE UP (ref 0–0.2)
BASOPHILS NFR BLD AUTO: 0.3 % — SIGNIFICANT CHANGE UP (ref 0–2)
BUN SERPL-MCNC: 19 MG/DL — SIGNIFICANT CHANGE UP (ref 7–23)
CALCIUM SERPL-MCNC: 9 MG/DL — SIGNIFICANT CHANGE UP (ref 8.4–10.5)
CHLORIDE SERPL-SCNC: 104 MMOL/L — SIGNIFICANT CHANGE UP (ref 96–108)
CO2 SERPL-SCNC: 30 MMOL/L — SIGNIFICANT CHANGE UP (ref 22–31)
CREAT SERPL-MCNC: 1.05 MG/DL — SIGNIFICANT CHANGE UP (ref 0.5–1.3)
EGFR: 57 ML/MIN/1.73M2 — LOW
EOSINOPHIL # BLD AUTO: 0.01 K/UL — SIGNIFICANT CHANGE UP (ref 0–0.5)
EOSINOPHIL NFR BLD AUTO: 0.1 % — SIGNIFICANT CHANGE UP (ref 0–6)
GLUCOSE SERPL-MCNC: 135 MG/DL — HIGH (ref 70–99)
HCT VFR BLD CALC: 44.4 % — SIGNIFICANT CHANGE UP (ref 34.5–45)
HGB BLD-MCNC: 13.7 G/DL — SIGNIFICANT CHANGE UP (ref 11.5–15.5)
IMM GRANULOCYTES NFR BLD AUTO: 1 % — HIGH (ref 0–0.9)
LYMPHOCYTES # BLD AUTO: 1.07 K/UL — SIGNIFICANT CHANGE UP (ref 1–3.3)
LYMPHOCYTES # BLD AUTO: 11.5 % — LOW (ref 13–44)
MCHC RBC-ENTMCNC: 30.9 GM/DL — LOW (ref 32–36)
MCHC RBC-ENTMCNC: 31.6 PG — SIGNIFICANT CHANGE UP (ref 27–34)
MCV RBC AUTO: 102.3 FL — HIGH (ref 80–100)
MONOCYTES # BLD AUTO: 0.64 K/UL — SIGNIFICANT CHANGE UP (ref 0–0.9)
MONOCYTES NFR BLD AUTO: 6.9 % — SIGNIFICANT CHANGE UP (ref 2–14)
NEUTROPHILS # BLD AUTO: 7.47 K/UL — HIGH (ref 1.8–7.4)
NEUTROPHILS NFR BLD AUTO: 80.2 % — HIGH (ref 43–77)
NRBC # BLD: 0 /100 WBCS — SIGNIFICANT CHANGE UP (ref 0–0)
NT-PROBNP SERPL-SCNC: 404 PG/ML — HIGH (ref 0–300)
PLATELET # BLD AUTO: 277 K/UL — SIGNIFICANT CHANGE UP (ref 150–400)
POTASSIUM SERPL-MCNC: 4.4 MMOL/L — SIGNIFICANT CHANGE UP (ref 3.5–5.3)
POTASSIUM SERPL-SCNC: 4.4 MMOL/L — SIGNIFICANT CHANGE UP (ref 3.5–5.3)
RBC # BLD: 4.34 M/UL — SIGNIFICANT CHANGE UP (ref 3.8–5.2)
RBC # FLD: 15.9 % — HIGH (ref 10.3–14.5)
SODIUM SERPL-SCNC: 143 MMOL/L — SIGNIFICANT CHANGE UP (ref 135–145)
URATE SERPL-MCNC: 8.3 MG/DL — HIGH (ref 2.5–7)
URATE SERPL-MCNC: 9.1 MG/DL — HIGH (ref 2.5–7)
WBC # BLD: 9.31 K/UL — SIGNIFICANT CHANGE UP (ref 3.8–10.5)
WBC # FLD AUTO: 9.31 K/UL — SIGNIFICANT CHANGE UP (ref 3.8–10.5)

## 2023-02-25 PROCEDURE — 71045 X-RAY EXAM CHEST 1 VIEW: CPT | Mod: 26

## 2023-02-25 PROCEDURE — 99232 SBSQ HOSP IP/OBS MODERATE 35: CPT

## 2023-02-25 RX ADMIN — BUDESONIDE AND FORMOTEROL FUMARATE DIHYDRATE 2 PUFF(S): 160; 4.5 AEROSOL RESPIRATORY (INHALATION) at 21:03

## 2023-02-25 RX ADMIN — Medication 20 MILLIGRAM(S): at 12:00

## 2023-02-25 RX ADMIN — TIOTROPIUM BROMIDE 2 PUFF(S): 18 CAPSULE ORAL; RESPIRATORY (INHALATION) at 08:17

## 2023-02-25 RX ADMIN — BUDESONIDE AND FORMOTEROL FUMARATE DIHYDRATE 2 PUFF(S): 160; 4.5 AEROSOL RESPIRATORY (INHALATION) at 08:17

## 2023-02-25 RX ADMIN — Medication 30 MILLIGRAM(S): at 05:15

## 2023-02-25 NOTE — PROGRESS NOTE ADULT - ASSESSMENT
67year old female w. PMH Stage IV breast cancer with mets (s/p L mastectomy, s/p RT for lung mets, brain mets) now on oral chemo, clear cell RCC (s/p L nephrectomy), b/l LE DVT (was on xarelto), COPD coming in for ambulatory dysfunction and inability to care for self.     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 s/p RT and chemo  *Scalp mass  - patient had MRI done outpatient which showed 2.4 x 5.7 x 4.0 cm fungating lesion arising from the right posterior scalp, with a focus of infiltration into the subgaleal space, but no evidence for calvarial infiltration  - s/p biopsy with Dr. Perez last admission.    - spoke to Heme/onc, Dr. Ochoa, no need for chemotherapy/ RT while in rehab     *COPD  *02 Requirement  - on Breo Ellipta at home, started on Symbicort while in hospital  - IS  - Wean 02 as tolerated    *History of DVT  - has history of b/l DVT and was on Xarelto however found to have hemorrhagic brain lesion on MRI and AC was stopped  - as per chart review, patient has history of IVC filter placement but does not remember having one placed  - dopplers 11/29/22 showed evidence of no DVT in either lower extremity     *DVT ppx  - SCD  - No AC as above    Dispo: Tentative plan for d/c to [facility] when medically cleared.

## 2023-02-25 NOTE — PROGRESS NOTE ADULT - SUBJECTIVE AND OBJECTIVE BOX
Patient is a 67y old  Female who presents with a chief complaint of pt with stage IV breast ca (brain, bone, lung mets), malodorous scalp mass, unable to care for self (2023 13:39)    Patient seen and examined at bedside.  S: c/o continued R ankle pain- persistent x 1 month.     ALLERGIES:  Cipro (Rash)    MEDICATIONS:  acetaminophen     Tablet .. 650 milliGRAM(s) Oral every 6 hours PRN  albuterol    90 MICROgram(s) HFA Inhaler 2 Puff(s) Inhalation every 6 hours PRN  budesonide 160 MICROgram(s)/formoterol 4.5 MICROgram(s) Inhaler 2 Puff(s) Inhalation every 12 hours  influenza  Vaccine (HIGH DOSE) 0.7 milliLiter(s) IntraMuscular once  melatonin 3 milliGRAM(s) Oral at bedtime PRN  oxycodone    5 mG/acetaminophen 325 mG 1 Tablet(s) Oral every 4 hours PRN  oxyCODONE    IR 5 milliGRAM(s) Oral every 6 hours PRN  predniSONE   Tablet 30 milliGRAM(s) Oral daily  predniSONE   Tablet 20 milliGRAM(s) Oral once  tiotropium 2.5 MICROgram(s) Inhaler 2 Puff(s) Inhalation daily    Vital Signs Last 24 Hrs  T(F): 97.8 (2023 05:13), Max: 98 (2023 21:11)  HR: 71 (2023 08:21) (71 - 83)  BP: 113/76 (2023 05:13) (107/70 - 113/78)  RR: 18 (2023 05:13) (18 - 18)  SpO2: 88% (2023 09:18) (88% - 99%)  I&O's Summary    2023 07:01  -  2023 07:00  --------------------------------------------------------  IN: 240 mL / OUT: 0 mL / NET: 240 mL        PHYSICAL EXAM:  General: NAD, A/O x 3  HENT: MMM,.   Lungs: Clear to auscultation bilaterally (Anteriorly)   Cardio: RR, S1/S2, No murmurs  Abdomen: Soft, NT/ND, Normal active Bowel Sounds   Extremities: No cyanosis. R ankle +edema- lateral malleolus region, very tender touch, warm. +DP pulse.     LABS:                        13.7   9.31  )-----------( 277      ( 2023 06:10 )             44.4         143  |  104  |  19  ----------------------------<  135  4.4   |  30  |  1.05    Ca    9.0      2023 06:10    TPro  6.7  /  Alb  2.5  /  TBili  0.7  /  DBili  x   /  AST  24  /  ALT  10  /  AlkPhos  58            Urinalysis Basic - ( 2023 22:05 )  Color: Yellow / Appearance: Slightly Turbid / S.020 / pH: x  Gluc: x / Ketone: Trace  / Bili: Small / Urobili: 1   Blood: x / Protein: 30 mg/dL / Nitrite: Negative   Leuk Esterase: Moderate / RBC: 0-4 /HPF / WBC 11-25 /HPF   Sq Epi: x / Non Sq Epi: Neg.-Few / Bacteria: Few /HPF        COVID-19 PCR: NotDetec (23 @ 19:15)  COVID-19 PCR: NotDetec (23 @ 15:33)      RADIOLOGY & ADDITIONAL TESTS:  < from: Xray Ankle Complete 3 Views, Right (23 @ 11:09) >  Bimalleolar and diffuse subcutaneous soft tissue swelling with no   fracture or dislocation    < from: Xray Chest 1 View- PORTABLE-Urgent (23 @ 15:30) >  IMPRESSION: Stable lung masses and COPD.      Care Discussed with Consultants/Other Providers:   ROSEANNE Pelayo discussed case and plan with Dr. Soares

## 2023-02-26 DIAGNOSIS — M25.473 EFFUSION, UNSPECIFIED ANKLE: ICD-10-CM

## 2023-02-26 LAB
ANION GAP SERPL CALC-SCNC: 6 MMOL/L — SIGNIFICANT CHANGE UP (ref 5–17)
BUN SERPL-MCNC: 19 MG/DL — SIGNIFICANT CHANGE UP (ref 7–23)
CALCIUM SERPL-MCNC: 8.9 MG/DL — SIGNIFICANT CHANGE UP (ref 8.4–10.5)
CHLORIDE SERPL-SCNC: 105 MMOL/L — SIGNIFICANT CHANGE UP (ref 96–108)
CO2 SERPL-SCNC: 30 MMOL/L — SIGNIFICANT CHANGE UP (ref 22–31)
CREAT SERPL-MCNC: 1.18 MG/DL — SIGNIFICANT CHANGE UP (ref 0.5–1.3)
EGFR: 50 ML/MIN/1.73M2 — LOW
FOLATE SERPL-MCNC: 6.3 NG/ML — SIGNIFICANT CHANGE UP
GLUCOSE SERPL-MCNC: 120 MG/DL — HIGH (ref 70–99)
HCT VFR BLD CALC: 37.9 % — SIGNIFICANT CHANGE UP (ref 34.5–45)
HGB BLD-MCNC: 11.8 G/DL — SIGNIFICANT CHANGE UP (ref 11.5–15.5)
MCHC RBC-ENTMCNC: 31.1 GM/DL — LOW (ref 32–36)
MCHC RBC-ENTMCNC: 31.5 PG — SIGNIFICANT CHANGE UP (ref 27–34)
MCV RBC AUTO: 101.1 FL — HIGH (ref 80–100)
NRBC # BLD: 0 /100 WBCS — SIGNIFICANT CHANGE UP (ref 0–0)
PHOSPHATE SERPL-MCNC: 3.1 MG/DL — SIGNIFICANT CHANGE UP (ref 2.5–4.5)
PLATELET # BLD AUTO: 283 K/UL — SIGNIFICANT CHANGE UP (ref 150–400)
POTASSIUM SERPL-MCNC: 5.2 MMOL/L — SIGNIFICANT CHANGE UP (ref 3.5–5.3)
POTASSIUM SERPL-SCNC: 5.2 MMOL/L — SIGNIFICANT CHANGE UP (ref 3.5–5.3)
RBC # BLD: 3.75 M/UL — LOW (ref 3.8–5.2)
RBC # FLD: 15.7 % — HIGH (ref 10.3–14.5)
SODIUM SERPL-SCNC: 141 MMOL/L — SIGNIFICANT CHANGE UP (ref 135–145)
VIT B12 SERPL-MCNC: 713 PG/ML — SIGNIFICANT CHANGE UP (ref 232–1245)
WBC # BLD: 12.61 K/UL — HIGH (ref 3.8–10.5)
WBC # FLD AUTO: 12.61 K/UL — HIGH (ref 3.8–10.5)

## 2023-02-26 PROCEDURE — 73700 CT LOWER EXTREMITY W/O DYE: CPT | Mod: 26,RT

## 2023-02-26 PROCEDURE — 99233 SBSQ HOSP IP/OBS HIGH 50: CPT

## 2023-02-26 RX ADMIN — BUDESONIDE AND FORMOTEROL FUMARATE DIHYDRATE 2 PUFF(S): 160; 4.5 AEROSOL RESPIRATORY (INHALATION) at 08:03

## 2023-02-26 RX ADMIN — TIOTROPIUM BROMIDE 2 PUFF(S): 18 CAPSULE ORAL; RESPIRATORY (INHALATION) at 08:05

## 2023-02-26 RX ADMIN — BUDESONIDE AND FORMOTEROL FUMARATE DIHYDRATE 2 PUFF(S): 160; 4.5 AEROSOL RESPIRATORY (INHALATION) at 21:16

## 2023-02-26 RX ADMIN — Medication 30 MILLIGRAM(S): at 05:25

## 2023-02-26 NOTE — CONSULT NOTE ADULT - PROBLEM SELECTOR RECOMMENDATION 9
pain management   recommend biologic markers  rheumatology consult to ro RA/gout  ice packs to Right ankle   Will discuss with surgeon

## 2023-02-26 NOTE — PROGRESS NOTE ADULT - SUBJECTIVE AND OBJECTIVE BOX
Patient is a 67y old  Female who presents with a chief complaint of pt with stage IV breast ca (brain, bone, lung mets), malodorous scalp mass, unable to care for self (2023 11:23)    Patient seen and examined at bedside.      ALLERGIES:  Cipro (Rash)    MEDICATIONS  (STANDING):  budesonide 160 MICROgram(s)/formoterol 4.5 MICROgram(s) Inhaler 2 Puff(s) Inhalation every 12 hours  influenza  Vaccine (HIGH DOSE) 0.7 milliLiter(s) IntraMuscular once  predniSONE   Tablet 30 milliGRAM(s) Oral daily  tiotropium 2.5 MICROgram(s) Inhaler 2 Puff(s) Inhalation daily    MEDICATIONS  (PRN):  acetaminophen     Tablet .. 650 milliGRAM(s) Oral every 6 hours PRN Temp greater or equal to 38C (100.4F), Mild Pain (1 - 3)  albuterol    90 MICROgram(s) HFA Inhaler 2 Puff(s) Inhalation every 6 hours PRN Shortness of Breath and/or Wheezing  melatonin 3 milliGRAM(s) Oral at bedtime PRN Sleep  oxycodone    5 mG/acetaminophen 325 mG 1 Tablet(s) Oral every 4 hours PRN Severe Pain (7 - 10)  oxyCODONE    IR 5 milliGRAM(s) Oral every 6 hours PRN Moderate Pain (4 - 6)    Vital Signs Last 24 Hrs  T(F): 98.4 (2023 05:49), Max: 98.4 (2023 05:49)  HR: 65 (2023 08:05) (65 - 771)  BP: 125/76 (2023 05:49) (105/69 - 125/76)  RR: 18 (2023 05:49) (18 - 18)  SpO2: 95% (2023 08:05) (94% - 99%)  I&O's Summary    2023 07:01  -  2023 07:00  --------------------------------------------------------  IN: 200 mL / OUT: 700 mL / NET: -500 mL        PHYSICAL EXAM:  GENERAL: NAD, well-groomed, well-developed  HEAD:  Atraumatic, Normocephalic  EYES: EOMI, conjunctiva and sclera clear  CHEST/LUNG: Clear to auscultation bilaterally  HEART: RRR; S1/S2  ABDOMEN: Soft, Nontender, Nondistended  VASCULAR: Normal pulses, Normal capillary refill  EXTREMITIES: No calf tenderness, Right ankle tender and slightly swollen at lateral malleolus  LYMPH: Normal; No lymphadenopathy noted  SKIN: Warm, Intact  PSYCH: Normal mood, Normal affect  NERVOUS SYSTEM:  A/O x3, No focal deficits    LABS:                        11.8   12.61 )-----------( 283      ( 2023 05:30 )             37.9         141  |  105  |  19  ----------------------------<  120  5.2   |  30  |  1.18    Ca    8.9      2023 05:30    TPro  6.7  /  Alb  2.5  /  TBili  0.7  /  DBili  x   /  AST  24  /  ALT  10  /  AlkPhos  58      Urinalysis Basic - ( 2023 22:05 )    Color: Yellow / Appearance: Slightly Turbid / S.020 / pH: x  Gluc: x / Ketone: Trace  / Bili: Small / Urobili: 1   Blood: x / Protein: 30 mg/dL / Nitrite: Negative   Leuk Esterase: Moderate / RBC: 0-4 /HPF / WBC 11-25 /HPF   Sq Epi: x / Non Sq Epi: Neg.-Few / Bacteria: Few /HPF        COVID-19 PCR: NotDetec (23 @ 19:15)  COVID-19 PCR: NotDetec (23 @ 15:33)      RADIOLOGY & ADDITIONAL TESTS:  < from: Xray Chest 1 View- PORTABLE-Urgent (Xray Chest 1 View- PORTABLE-Urgent .) (23 @ 12:26) >    ACC: [id number] EXAM:  XR CHEST PORTABLE URGENT 1V   ORDERED BY: SHAI MONGE     PROCEDURE DATE:  2023          INTERPRETATION:  Exam:XR CHEST URGENT    clinical history:Shortness of breath    Heart size is normal. Lungs show no acute infiltrate.No pleural   effusion. Stable right perihilar fullness    IMPRESSION:  No acute infiltrate seen        --- End of Report ---            RUFINO BOYD MD; Attending Radiologist  This document has been electronically signed. 2023  8:19AM    < end of copied text >      Care Discussed with Consultants/Other Providers:

## 2023-02-26 NOTE — PROGRESS NOTE ADULT - NS ATTEND AMEND GEN_ALL_CORE FT
*Ambulatory dysfunction  *right ankle pain   - uric acid elevated  - check ESR, CRP  - cont prednisone 30mg daily for now  - ortho consult  - rheum consult  - eventually DC to [facility]
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

## 2023-02-26 NOTE — PROGRESS NOTE ADULT - ASSESSMENT
67year old female w. PMH Stage IV breast cancer with mets (s/p L mastectomy, s/p RT for lung mets, brain mets) now on oral chemo, clear cell RCC (s/p L nephrectomy), b/l LE DVT (was on xarelto), COPD coming in for ambulatory dysfunction and inability to care for self.     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 Smith notified)  - Pain control PRN    *History of stage IV breast cancer with mets to lung, bone and brain. s/p RT and chemo  *Scalp mass  - patient had MRI done outpatient which showed 2.4 x 5.7 x 4.0 cm fungating lesion arising from the right posterior scalp, with a focus of infiltration into the subgaleal space, but no evidence for calvarial infiltration  - s/p biopsy with Dr. Perez last admission.    - spoke to Heme/onc, Dr. Ochoa, no need for chemotherapy/ RT while in rehab     *COPD  *02 Requirement  - on Breo Ellipta at home, started on Symbicort while in hospital  - IS  - Wean 02 as tolerated    *History of DVT  - has history of b/l DVT and was on Xarelto however found to have hemorrhagic brain lesion on MRI and AC was stopped  - as per chart review, patient has history of IVC filter placement but does not remember having one placed  - dopplers 11/29/22 showed evidence of no DVT in either lower extremity     *DVT ppx  - SCD  - No AC as above

## 2023-02-26 NOTE — CONSULT NOTE ADULT - NS PANP COMMENT GEN_ALL_CORE FT
67yF w stage IV br ca and 1m ankle pain - xr negative and compartments soft, no pain w micromotion - rec symptomatic control 67yF w stage IV br ca and clear cell RCC sp nephrectomy and 1m ankle pain - xr negative and compartments soft, no pain w micromotion - formal recs pending advanced imaging

## 2023-02-26 NOTE — CONSULT NOTE ADULT - DECREASED STRENGTH DETAIL
Right lateral malleolus with swelling and tenderness, no bruising noted, range of motion limited due to pain, DP/PT pulses pal, sensation intact  on toe deformity noted bilateral

## 2023-02-27 LAB
CRP SERPL-MCNC: 9 MG/L — HIGH
SURGICAL PATHOLOGY STUDY: SIGNIFICANT CHANGE UP

## 2023-02-27 PROCEDURE — 99232 SBSQ HOSP IP/OBS MODERATE 35: CPT

## 2023-02-27 PROCEDURE — 99497 ADVNCD CARE PLAN 30 MIN: CPT | Mod: 25

## 2023-02-27 PROCEDURE — 99223 1ST HOSP IP/OBS HIGH 75: CPT

## 2023-02-27 RX ORDER — SENNA PLUS 8.6 MG/1
2 TABLET ORAL AT BEDTIME
Refills: 0 | Status: DISCONTINUED | OUTPATIENT
Start: 2023-02-27 | End: 2023-02-28

## 2023-02-27 RX ORDER — POLYETHYLENE GLYCOL 3350 17 G/17G
17 POWDER, FOR SOLUTION ORAL DAILY
Refills: 0 | Status: DISCONTINUED | OUTPATIENT
Start: 2023-02-27 | End: 2023-03-03

## 2023-02-27 RX ORDER — POLYETHYLENE GLYCOL 3350 17 G/17G
17 POWDER, FOR SOLUTION ORAL ONCE
Refills: 0 | Status: COMPLETED | OUTPATIENT
Start: 2023-02-27 | End: 2023-03-02

## 2023-02-27 RX ADMIN — BUDESONIDE AND FORMOTEROL FUMARATE DIHYDRATE 2 PUFF(S): 160; 4.5 AEROSOL RESPIRATORY (INHALATION) at 21:24

## 2023-02-27 RX ADMIN — ALBUTEROL 2 PUFF(S): 90 AEROSOL, METERED ORAL at 09:15

## 2023-02-27 RX ADMIN — OXYCODONE HYDROCHLORIDE 5 MILLIGRAM(S): 5 TABLET ORAL at 10:34

## 2023-02-27 RX ADMIN — Medication 30 MILLIGRAM(S): at 05:10

## 2023-02-27 RX ADMIN — TIOTROPIUM BROMIDE 2 PUFF(S): 18 CAPSULE ORAL; RESPIRATORY (INHALATION) at 09:15

## 2023-02-27 RX ADMIN — BUDESONIDE AND FORMOTEROL FUMARATE DIHYDRATE 2 PUFF(S): 160; 4.5 AEROSOL RESPIRATORY (INHALATION) at 09:14

## 2023-02-27 NOTE — PROGRESS NOTE ADULT - SUBJECTIVE AND OBJECTIVE BOX
Pt resting  comfortably in bed. still c/o r ankle pain with ambulation.   Vital Signs Last 24 Hrs  T(C): 36.4 (27 Feb 2023 11:33), Max: 36.7 (26 Feb 2023 20:25)  T(F): 97.6 (27 Feb 2023 11:33), Max: 98 (26 Feb 2023 20:25)  HR: 72 (27 Feb 2023 11:33) (67 - 72)  BP: 100/67 (27 Feb 2023 11:33) (100/67 - 120/76)  BP(mean): --  RR: 18 (27 Feb 2023 11:33) (18 - 18)  SpO2: 96% (27 Feb 2023 11:33) (92% - 96%)    Parameters below as of 27 Feb 2023 11:33  Patient On (Oxygen Delivery Method): nasal cannula  O2 Flow (L/min): 3       PE: no new changes    < from: CT Ankle No Cont, Right (02.26.23 @ 18:16) >  ACC: [id number] EXAM:  CT ANKLE ONLY RT   ORDERED BY: SHAI MONGE     PROCEDURE DATE:  02/26/2023          INTERPRETATION:  CT ANKLE RIGHT    HISTORY: Right ankle pain. Stage IV breast cancer. Evaluate for   metastatic disease.    TECHNIQUE: Contiguousaxial imaging was performed through the right ankle   without contrast. Coronal and sagittal reformatting was utilized.    COMPARISON:  Right ankle x-rays dated 2/24/2023    FINDINGS:    OSSEOUS STRUCTURES    Acute Fractures:  There is a presumed pathologic lytic fragmentation of   the talar dome with mild displacement and greatest involvement of the   medial cortex. There is mild subchondral step-off/collapse and   nondisplaced fracture extends into the lateral process. There is   associated soft tissue prominence along the medial cortex.    Chronic Fractures/Ossifications:  None.    JOINTS    Tibiotalar Joint:  The subchondral step-off/collapse involves the   anterior superior talar dome with up to 0.3 cm of depression.    Subtalar Joints:Preserved.    Intertarsal Joints:  Preserved.    Tarsometatarsal Joints:  Preserved.    MYOTENDINOUS STRUCTURES  Intact within limits of CT.    OTHER SOFT TISSUES  Mild to moderate subcutaneous edema is present.  There is mild to moderate generalizedmuscle atrophy.    IMPRESSION:  1.  Presumed pathologic lytic fragmentation of the talar dome is present   likely, related to metastatic disease with the given history.    --- End of Report ---            TYSON SMITH MD; Attending Radiologist  This document has been electronically signed. Feb 27 2023  8:46AM    < end of copied text >

## 2023-02-27 NOTE — CONSULT NOTE ADULT - CONSULT REASON
possible bleeding from scalp tumor
Pathologic fracture right angle, scalp lesion, history of breast cancer and clear cell kidney cancer.
right ankle pain
goc assist/pt w/ metastatic breast ca

## 2023-02-27 NOTE — CONSULT NOTE ADULT - SUBJECTIVE AND OBJECTIVE BOX
67 year old F PMH stage IV breast cancer with mets (s/p L mastectomy, s/p RT for lung mets, brain mets) now on oral chemo, clear cell RCC (s/p L nephrectomy), b/l LE DVT (was on xarelto), COPD coming in for concerns from family for patient's safety at home by herself. She was recently admitted a few days ago and initially refused ARMANDO, but as admitted for ARMANDO placement. She underwent a biopsy of a scalp lesion on 2/21 for determination of ER/IL/HER 2 status.    Surgery was re-notified that she was re-admitted for placement due to drainage from the scalp tumor.     On interview with the patient, she feels well, just overall weak, needing placement. No increased bleeding or drainage from the site.     PMH/PSH:   stage IV breast cancer with mets (s/p L mastectomy, s/p RT for lung mets, brain mets), clear cell RCC (s/p L nephrectomy), b/l LE DVT (was on xarelto), COPD, emphysema, hip surgery, total adrenalextomy,   Meds: oxycodone and breo ellipta  Social: former smoker, occasional ETOH, no drug use, lives independently  FH: emphysema, father  FH: throat cancer, mother.  Allergies: cipro (rash)    ROS: A 10 point ROS was performed for which the pertinent positives and negatives were noted in the HPI. All others were reviewed and were negative    Vitals:   T 98 HR 80 /70 RR 18 O2 sat 97  General: No acute distress  HEENT: Large fungating scalp mass, ~8 cm x 4 cm, nontender, no active bleeding. ulcerated, serosanguinous overall weeping from the tumor, staining on ABD, not focal to biopsy site.  Respiratory: Non-labored breathing  Abdomen: soft, non-tender, non-distended  Extremities: No edema  Neuro: No focal deficits appreciated  Psych: Calm  
      67 YOF PMH Stage IV breast cancer with mets (s/p L mastectomy, s/p RT for lung mets, brain mets) now on oral chemo, clear cell RCC (s/p L nephrectomy), b/l LE DVT (was on xarelto), COPD coming in for ambulatory dysfunction and inability to care for self. Orthopedic surgery consulted for pain on the Right ankle. Pt states she has been having ongoing pain on the Right ankle for over one month. Tingling to both toes but this has been occurring for over 4 years. She denies any trauma or falls. Denies any hx of ankle surgeries.       PAST MEDICAL & SURGICAL HISTORY:  Other specified disorders of kidney and ureter      Anemia      Breast cancer  Left breast cancer - 13 years ago, s/p mastectomy, s/p RT for lung mets, and on oral chemotherapy      Emphysema lung      Renal cell carcinoma  s/p L nephrectomy      Stage 3 chronic kidney disease      History of hip surgery  in 2007- Right hip surgery      H/O left mastectomy      S/p nephrectomy  left      H/O total adrenalectomy      MEDICATIONS  (STANDING):  budesonide 160 MICROgram(s)/formoterol 4.5 MICROgram(s) Inhaler 2 Puff(s) Inhalation every 12 hours  influenza  Vaccine (HIGH DOSE) 0.7 milliLiter(s) IntraMuscular once  predniSONE   Tablet 30 milliGRAM(s) Oral daily  tiotropium 2.5 MICROgram(s) Inhaler 2 Puff(s) Inhalation daily    MEDICATIONS  (PRN):  acetaminophen     Tablet .. 650 milliGRAM(s) Oral every 6 hours PRN Temp greater or equal to 38C (100.4F), Mild Pain (1 - 3)  albuterol    90 MICROgram(s) HFA Inhaler 2 Puff(s) Inhalation every 6 hours PRN Shortness of Breath and/or Wheezing  melatonin 3 milliGRAM(s) Oral at bedtime PRN Sleep  oxycodone    5 mG/acetaminophen 325 mG 1 Tablet(s) Oral every 4 hours PRN Severe Pain (7 - 10)  oxyCODONE    IR 5 milliGRAM(s) Oral every 6 hours PRN Moderate Pain (4 - 6)  
This is a 67 year old woman with a history of resected renal cell clear cell carcinoma 2018 s/p left nephrectomy, Metastatic breast cancer ER+NH+Her 2 negative initially diagnosed in 2003, s/p left modified radical mastectomy with reconstruction.  1/15 LN positive for disease.  Patient received adjuvant AC-T followed by 5 years of tamoxifen.  Patient was later foudn to have metastatic breast cancer in the right illiac bone which was again ER positive, NH positive.  Was treated with letrozole, Ibrance, and Xgeva.  After several years of this patient was noted to have lung nodules in 2020 and increase in bony metastases, was treated with aromasin and afinitor,.  In July 2022 patient was noted to have bilateral pulmonary nodules with enlarged perihailar mass and paraesophageal LN, and a mass in the right kidney that had been progressing.  Was treated with Xeloda in September 2022.  She was also found to have brain metastases with a 2.8cm hemorragic mass in the left anteromedial frontal lobe with surrounding edema.  She received brain JOSEPH in January with Dr. Timmons.  Patient now admitted for right ankle pain, CT scan found a pathologic fracture of the right dome of the talus.  Scalp lesions were biopsed on 2/21/22 results demonstrate a carcinoma, further immunophenotypic analysis is still pending.      MEDICATIONS  (STANDING):  budesonide 160 MICROgram(s)/formoterol 4.5 MICROgram(s) Inhaler 2 Puff(s) Inhalation every 12 hours  influenza  Vaccine (HIGH DOSE) 0.7 milliLiter(s) IntraMuscular once  polyethylene glycol 3350 17 Gram(s) Oral daily  polyethylene glycol 3350 17 Gram(s) Oral once  tiotropium 2.5 MICROgram(s) Inhaler 2 Puff(s) Inhalation daily    MEDICATIONS  (PRN):  acetaminophen     Tablet .. 650 milliGRAM(s) Oral every 6 hours PRN Temp greater or equal to 38C (100.4F), Mild Pain (1 - 3)  albuterol    90 MICROgram(s) HFA Inhaler 2 Puff(s) Inhalation every 6 hours PRN Shortness of Breath and/or Wheezing  melatonin 3 milliGRAM(s) Oral at bedtime PRN Sleep  oxycodone    5 mG/acetaminophen 325 mG 1 Tablet(s) Oral every 4 hours PRN Severe Pain (7 - 10)  oxyCODONE    IR 5 milliGRAM(s) Oral every 6 hours PRN Moderate Pain (4 - 6)  senna 2 Tablet(s) Oral at bedtime PRN Constipation    Vital Signs Last 24 Hrs  T(C): 36.4 (27 Feb 2023 11:33), Max: 36.7 (26 Feb 2023 20:25)  T(F): 97.6 (27 Feb 2023 11:33), Max: 98 (26 Feb 2023 20:25)  HR: 72 (27 Feb 2023 11:33) (67 - 72)  BP: 100/67 (27 Feb 2023 11:33) (100/67 - 120/76)  BP(mean): --  RR: 18 (27 Feb 2023 11:33) (18 - 18)  SpO2: 96% (27 Feb 2023 11:33) (92% - 96%)    Parameters below as of 27 Feb 2023 11:33  Patient On (Oxygen Delivery Method): nasal cannula  O2 Flow (L/min): 3  02-26    141  |  105  |  19  ----------------------------<  120<H>  5.2   |  30  |  1.18    Ca    8.9      26 Feb 2023 05:30  Phos  3.1     02-26                          11.8   12.61 )-----------( 283      ( 26 Feb 2023 05:30 )             37.9   
HPI: 67F w/ COPD on prn home O2 via NC, hx of left renal cancer-clear cell, s/p nephrectomy (2018), Stage IV breast cancer ER +/MN +/HER2, initially dxed in 2003 ( had eft mastectomy), now w/ mets to bone, lung, brain, s/p RT/chemo but still had progression of disease, hx of bilateral DVT, has IVC filter, was just recently hospitalized for enlarging fungating mass on right side of scalp, w/c was biopsied 2/21/23, was just discharged on 2/22  with home care, now back to the ED, because visiting nurse concerned that pt is unable to care for herself. Pt with malodorous scalp mass, s/p biopsy. It was recommended that pt be discharged to [facility], but pt refused, and wanted to go home.  Pt lives by herself, walks with a walker.  Pt denies fever, chills, chest pain, cough, abd pain, nausea, vomiting.  She states she has been wheezing lately, she has chronic dyspnea with exertion and c/o right ankle pain for almost a month.         PAST MEDICAL & SURGICAL HISTORY:  Other specified disorders of kidney and ureter      Anemia      Breast cancer  Left breast cancer - 13 years ago, s/p mastectomy, s/p RT for lung mets, and on oral chemotherapy      Emphysema lung      Renal cell carcinoma  s/p L nephrectomy      Stage 3 chronic kidney disease      History of hip surgery  in 2007- Right hip surgery      H/O left mastectomy      S/p nephrectomy  left      H/O total adrenalectomy          SOCIAL HISTORY:    Admitted from:  home    Substance abuse history:              Tobacco hx:                  Alcohol hx:              Home Opioid hx:  Confucianist:                                    Preferred Language:    Surrogate/HCP/:     Kevin Rojo         Phone#: [phone]    FAMILY HISTORY:  FH: throat cancer  mother    FH: emphysema  father      Baseline ADLs (prior to admission):    Allergies    Cipro (Rash)    Intolerances      Present Symptoms:   Dyspnea: no  Nausea/Vomiting: no  Anxiety: a little   Depressed   Fatigue: no  Loss of appetite: no  Pain:              yes                   location:        r ankle   Review of Systems: [All others negative MEDICATIONS  (STANDING):  budesonide 160 MICROgram(s)/formoterol 4.5 MICROgram(s) Inhaler 2 Puff(s) Inhalation every 12 hours  influenza  Vaccine (HIGH DOSE) 0.7 milliLiter(s) IntraMuscular once  tiotropium 2.5 MICROgram(s) Inhaler 2 Puff(s) Inhalation daily    MEDICATIONS  (PRN):  acetaminophen     Tablet .. 650 milliGRAM(s) Oral every 6 hours PRN Temp greater or equal to 38C (100.4F), Mild Pain (1 - 3)  albuterol    90 MICROgram(s) HFA Inhaler 2 Puff(s) Inhalation every 6 hours PRN Shortness of Breath and/or Wheezing  melatonin 3 milliGRAM(s) Oral at bedtime PRN Sleep  oxycodone    5 mG/acetaminophen 325 mG 1 Tablet(s) Oral every 4 hours PRN Severe Pain (7 - 10)  oxyCODONE    IR 5 milliGRAM(s) Oral every 6 hours PRN Moderate Pain (4 - 6)      PHYSICAL EXAM:    Vital Signs Last 24 Hrs  T(C): 36.4 (27 Feb 2023 11:33), Max: 36.7 (26 Feb 2023 20:25)  T(F): 97.6 (27 Feb 2023 11:33), Max: 98 (26 Feb 2023 20:25)  HR: 72 (27 Feb 2023 11:33) (67 - 72)  BP: 100/67 (27 Feb 2023 11:33) (100/67 - 120/76)  BP(mean): --  RR: 18 (27 Feb 2023 11:33) (18 - 18)  SpO2: 96% (27 Feb 2023 11:33) (92% - 96%)    Parameters below as of 27 Feb 2023 11:33  Patient On (Oxygen Delivery Method): nasal cannula  O2 Flow (L/min): 3      General: alert  oriented x 3, pleasant,  verbal  Karnofsky Performance Score/Palliative Performance Status Version2: 50    %  PPSV: 50%  HEENT: n/c, mass to back of head,  dry mouth  /lips, glasses  Lungs: ess clear dim bases , breathing comfortably   CV: normal rate  GI: abd lrg., soft, n/t    : normal    Musculoskeletal: normal , w/ weakness, tr edema   Skin: normal, w/d , scalp mass  Neuro: awake, alert, oriented, converses   Oral intake ability:  moderate full capability  Diet: reg as saman     LABS:                        11.8   12.61 )-----------( 283      ( 26 Feb 2023 05:30 )             37.9     02-26    141  |  105  |  19  ----------------------------<  120<H>  5.2   |  30  |  1.18    Ca    8.9      26 Feb 2023 05:30  Phos  3.1     02-26          RADIOLOGY & ADDITIONAL STUDIES: < from: Xray Chest 1 View- PORTABLE-Urgent (Xray Chest 1 View- PORTABLE-Urgent .) (02.25.23 @ 12:26) >  ACC: [id number] EXAM:  XR CHEST PORTABLE URGENT 1V   ORDERED BY: SHAI MONGE     PROCEDURE DATE:  02/25/2023          INTERPRETATION:  Exam:XR CHEST URGENT    clinical history:Shortness of breath    Heart size is normal. Lungs show no acute infiltrate.No pleural   effusion. Stable right perihilar fullness    IMPRESSION:  No acute infiltrate seen          < from: CT Ankle No Cont, Right (02.26.23 @ 18:16) >    ACC: [id number] EXAM:  CT ANKLE ONLY RT   ORDERED BY: SHAI MONGE     PROCEDURE DATE:  02/26/2023          INTERPRETATION:  CT ANKLE RIGHT    HISTORY: Right ankle pain. Stage IV breast cancer. Evaluate for   metastatic disease.    TECHNIQUE: Contiguousaxial imaging was performed through the right ankle   without contrast. Coronal and sagittal reformatting was utilized.    COMPARISON:  Right ankle x-rays dated 2/24/2023    FINDINGS:    OSSEOUS STRUCTURES    Acute Fractures:  There is a presumed pathologic lytic fragmentation of   the talar dome with mild displacement and greatest involvement of the   medial cortex. There is mild subchondral step-off/collapse and   nondisplaced fracture extends into the lateral process. There is   associated soft tissue prominence along the medial cortex.    Chronic Fractures/Ossifications:  None.    JOINTS    Tibiotalar Joint:  The subchondral step-off/collapse involves the   anterior superior talar dome with up to 0.3 cm of depression.    Subtalar Joints: Preserved.    Intertarsal Joints:  Preserved.    Tarsometatarsal Joints:  Preserved.    MYOTENDINOUS STRUCTURES  Intact within limits of CT.    OTHER SOFT TISSUES  Mild to moderate subcutaneous edema is present.  There is mild to moderate generalizedmuscle atrophy.    IMPRESSION:  1.  Presumed pathologic lytic fragmentation of the talar dome is present   likely, related to metastatic disease with the given history.            < from: MR Head w/wo IV Cont (11.28.22 @ 15:04) >  EXAM: [id number] - MR BRAIN WAW IC  - ORDERED BY:  ANTONIO WALLIS      PROCEDURE DATE:  11/28/2022           < from: MR Head w/wo IV Cont (11.28.22 @ 15:04) >  IMPRESSION:    -Interval development of a 2.8 cm hemorrhagic mass in the left   anteromedial frontal lobe with surrounding edema, compatible with   hemorrhagic metastasis.    -Additional punctate enhancing lesion in the right post central gyrus and   new enhancing sellar lesion are also compatible with metastases.    -Fungating posterior scalp lesion measuring up to 5.7 cm, also suspicious   for metastasis.          ADVANCE DIRECTIVES: HCP   Molst dnr/dni  Advanced Care Planning discussion total time spent:

## 2023-02-27 NOTE — PROGRESS NOTE ADULT - ASSESSMENT
67year old female w. PMH Stage IV breast cancer with mets (s/p L mastectomy, s/p RT for lung mets, brain mets) now on oral chemo, clear cell RCC (s/p L nephrectomy), b/l LE DVT (was on xarelto), COPD coming in for ambulatory dysfunction and inability to care for self.     *Ambulatory dysfunction  - PT eval, recs for ARMANDO    *Right ankle pain   *Likely pathological right ankle fracture  -CT ankle showed presumed pathological lytic fragmentation of the talar dome, related to metastatic disease   -DC prednisone   -Pain control PRN  -Orthopedics follow up      *History of stage IV breast cancer with mets to lung, bone and brain. s/p RT and chemo  *Scalp mass  - patient had MRI done outpatient which showed 2.4 x 5.7 x 4.0 cm fungating lesion arising from the right posterior scalp, with a focus of infiltration into the subgaleal space, but no evidence for calvarial infiltration  - s/p biopsy with Dr. Perez last admission.    - spoke to Heme/onc, Dr. Ochoa, no need for chemotherapy/ RT while in rehab   - Per Dr. Perez, no active bleeding on scalp mass, expect drainage from mass as it is likely a malignant mass. Cont with xeroform dressing on mass  - chemical DVT ppx on hold due to underlying hemorrhagic brain mets    *COPD  - on Breo Ellipta at home, started on Symbicort while in hospital  - IS  - Wean 02 as tolerated    *History of DVT  - has history of b/l DVT and was on Xarelto however found to have hemorrhagic brain lesion on MRI and AC was stopped  - as per chart review, patient has history of IVC filter placement but does not remember having one placed  - dopplers 11/29/22 showed evidence of no DVT in either lower extremity     *DVT ppx  - SCD  - No chemical AC as above

## 2023-02-27 NOTE — CONSULT NOTE ADULT - REASON FOR ADMISSION
pt with stage IV breast ca (brain, bone, lung mets), malodorous scalp mass, unable to care for self

## 2023-02-27 NOTE — PROGRESS NOTE ADULT - NS PANP COMMENT GEN_ALL_CORE FT
67yF w metastatic stage 4 breast cancer and pathologic talar dome frx revealed by CT with inability to ambulate, also found to have scalp lesion sp biopsy.  A discussion with medicine revealed her overall prognosis oncologically remains unclear.  For treatment of her pain and bony talar metastasis, radiation to the talus should be considered.  Her healing potential from instrumentation of her talus is likely poor and should be reserved for failure of radiation to improve her pain.  In the interim, she is unable to be anticoagulated per medicine and is too weak to walk while maintaining full NWB on one LE - therefore I recommend PWB in a CAM boot.  I will also ask my orthopedic oncological colleague for input given the rarity of talar metastatic disease.    Please do not hesitate to reach out with questions or further developments. 67yF w metastatic stage 4 breast cancer and clear cell rcc sp nephrectomy 4y ago and pathologic talar dome frx revealed by CT with inability to ambulate, also found to have scalp lesion sp biopsy.  A discussion with medicine revealed her overall prognosis oncologically remains unclear.  For treatment of her pain and bony talar metastasis, radiation to the talus should be considered.    While she was ambulatory without assistive device prior to this lesion, her healing potential from instrumentation of her talus is likely poor and should be reserved for failure of radiation to improve her pain - percutaneous osteoplasty would likely be my recommendation in this case.   In the interim, she is unable to be anticoagulated per medicine and is too weak to walk while maintaining full NWB on one LE - therefore I recommend PWB in a CAM boot.  I will also ask my orthopedic oncological colleague for input given the rarity of talar metastatic disease.    Please do not hesitate to reach out with questions or further developments.

## 2023-02-27 NOTE — CONSULT NOTE ADULT - ASSESSMENT
A/P 67year old female w. PMH Stage IV breast cancer with mets (s/p L mastectomy, s/p RT for lung mets, brain mets) now on oral chemo, clear cell RCC (s/p L nephrectomy), b/l LE DVT (was on xarelto), COPD coming in for ambulatory dysfunction and inability to care for self, as pt lives alone.        Assessment/Plans:        Ambulatory dysfunction  - PT eval, recs for ARMANDO    Right ankle pain   Likely pathological right ankle fracture    -CT ankle showed presumed pathological lytic fragmentation of the talar dome, related to metastatic disease   -DC prednisone   -Pain control PRN  -Orthopedics follow up      History of stage IV breast cancer with mets to lung, bone and brain. s/p RT and chemo  Scalp mass  - patient had MRI done outpatient which showed 2.4 x 5.7 x 4.0 cm fungating lesion arising from the right posterior scalp, with a focus of infiltration into the subgaleal space, but no evidence for calvarial infiltration  - s/p biopsy with Dr. Perez last admission.    -  Heme/onc, Dr. Ochoa, no need for chemotherapy/ RT while in rehab  - Onc to Follow up w/ pt      - Per Dr. Perze, no active bleeding on scalp mass, expect drainage from mass as it is likely a malignant mass. Cont with xeroform dressing on mass  - chemical DVT ppx on hold due to underlying hemorrhagic brain mets    COPD  - on Breo Ellipta at home, started on Symbicort while in hospital  - IS  - Wean 02 as tolerated    History of DVT  - has history of b/l DVT and was on Xarelto however found to have hemorrhagic brain lesion on MRI and AC was stopped  - as per chart review, patient has history of IVC filter placement but does not remember having one placed  - dopplers 11/29/22 showed evidence of no DVT in either lower extremity       Palliative :  asked for goc assist, 68 yo F w/ metastatic breast CA , mets to brain, bones, and lung , now here w/ r ankle pathological fx likely due to bony mets . In addition pt w/ lrg scalp mass w/ drainage, s/p bx.  case d/w dr Zavala today, chart reviewed, and pt seen bedside. She is awake, alert, oriented, was pleasant and conversant. Was not in any distress,  had lengthy conversation w/ pt and  Dr Zavala as well.   See Goc note above.   Pt completed Molst for Dnr/Dni today- placed on chart  .      Metastatic breast CA-     Pt needs to meet with her oncology team, Consult pending - to discuss any possible next steps, if there are any treatment options available .  Pt does not know at this time if she would even want to have treatment again .   R ankle path fx -   Surgical consult - Pt to met w/ surgical  team to see if pt a surgical candidate .   Pain-  Cont Prn tylenol, and Prn  Oxy - presently effective , monitor    Avoid constipation -  will add  senna /miralax    Scalp mass -  consider wound care evaluation   Plan to follow w/ medical  team , cont supportive care, goc   Reviewed above w/ med team today .       
67 year old F PMH stage IV breast cancer with mets (s/p L mastectomy, s/p RT for lung mets, brain mets) now on oral chemo, clear cell RCC (s/p L nephrectomy), b/l LE DVT (was on xarelto), COPD coming in for concerns from family for patient's safety at home by herself. She was recently admitted a few days ago and initially refused ARMANDO, but as admitted for ARMANDO placement. She underwent a biopsy of a scalp lesion on 2/21 for determination of ER/IN/HER 2 status.    Surgery was re-notified that she was re-admitted for placement due to drainage from the scalp tumor.     No active bleeding from biopsy site on my assessment. Given that this is likely a metastatic breast cancer, expect drainage from the site. I dressed with xeroform for now, but can transition to xeroform so it doesn't stick to ABD dressing. Anesthesia "doughnut" pillow requested for comfort.    OK for chemical DVT ppx given hypercoagulable state with metastatic breast cancer.    Pathology results cc'ed to Dr. Ochoa, the oncologist who requested the biopsy.    Recommendations communicated with hospitalist Dr. Soares.
   67 YOF PMH Stage IV breast cancer with mets (s/p L mastectomy, s/p RT for lung mets, brain mets) now on oral chemo, clear cell RCC (s/p L nephrectomy), b/l LE DVT (was on xarelto), COPD, Orthopedic surgery consulted for pain on the Right ankle. Pt states she has been having ongoing pain on the Right ankle for over one month., Xrays negative for acute fx. 
This is a 67 year old woman with a history of resected renal cell clear cell carcinoma 2018 s/p left nephrectomy, Metastatic breast cancer ER+AR+Her 2 negative initially diagnosed in 2003, s/p left modified radical mastectomy with reconstruction.  1/15 LN positive for disease.  Patient received adjuvant AC-T followed by 5 years of tamoxifen.  Patient was later foudn to have metastatic breast cancer in the right illiac bone which was again ER positive, AR positive.  Was treated with letrozole, Ibrance, and Xgeva.  After several years of this patient was noted to have lung nodules in 2020 and increase in bony metastases, was treated with aromasin and afinitor,.  In July 2022 patient was noted to have bilateral pulmonary nodules with enlarged perihailar mass and paraesophageal LN, and a mass in the right kidney that had been progressing.  Was treated with Xeloda in September 2022.  She was also found to have brain metastases with a 2.8cm hemorragic mass in the left anteromedial frontal lobe with surrounding edema.  She received brain JOSEPH in January with Dr. Timmons.  Patient now admitted for right ankle pain, CT scan found a pathologic fracture of the right dome of the talus.  Scalp lesions were biopsed on 2/21/22 results demonstrate a carcinoma, further immunophenotypic analysis is still pending.      Currently it is difficult to give a prognosis given we do not understand the identity of the metastases right now.  The biopsy of the scalp lesion is still pending.  This may be either the renal clear cell or the breast cancer. There are still additional lines of therapy that have yet to be offered for both.  Recommend patient have a repeat CT scan and MRI of the brain for evaluation of the effects of last months palliative radiation, and a stay at rehab while we are awaiting more information.  Asked patient if she is willing to accept another line of therapy though I could not offer any specifics given we are still unclear whether this is a metastatic recurrence fo the breast cancer or the renal cancer, both are possible.  A short stay at rehab ma help her from a functional standpoint and give us time for the information to come back.    Orothopedics consultation was appreciated, if patient is not surgically amenable we can have patient return to radiation oncology for palliative radiation to the right talus.

## 2023-02-27 NOTE — PROGRESS NOTE ADULT - SUBJECTIVE AND OBJECTIVE BOX
Patient is a 67y old  Female who presents with a chief complaint of pt with stage IV breast ca (brain, bone, lung mets), malodorous scalp mass, unable to care for self (26 Feb 2023 14:07)      Subjective and overnight events:    Patient seen and examined at bedside. reports persistent right ankle pain. no fever, chills, sob, cp, abd pain.    ALLERGIES:  Cipro (Rash)    MEDICATIONS  (STANDING):  budesonide 160 MICROgram(s)/formoterol 4.5 MICROgram(s) Inhaler 2 Puff(s) Inhalation every 12 hours  influenza  Vaccine (HIGH DOSE) 0.7 milliLiter(s) IntraMuscular once  predniSONE   Tablet 30 milliGRAM(s) Oral daily  tiotropium 2.5 MICROgram(s) Inhaler 2 Puff(s) Inhalation daily    MEDICATIONS  (PRN):  acetaminophen     Tablet .. 650 milliGRAM(s) Oral every 6 hours PRN Temp greater or equal to 38C (100.4F), Mild Pain (1 - 3)  albuterol    90 MICROgram(s) HFA Inhaler 2 Puff(s) Inhalation every 6 hours PRN Shortness of Breath and/or Wheezing  melatonin 3 milliGRAM(s) Oral at bedtime PRN Sleep  oxycodone    5 mG/acetaminophen 325 mG 1 Tablet(s) Oral every 4 hours PRN Severe Pain (7 - 10)  oxyCODONE    IR 5 milliGRAM(s) Oral every 6 hours PRN Moderate Pain (4 - 6)    Vital Signs Last 24 Hrs  T(F): 97.5 (27 Feb 2023 05:55), Max: 98 (26 Feb 2023 20:25)  HR: 72 (27 Feb 2023 11:33) (67 - 72)  BP: 100/67 (27 Feb 2023 11:33) (100/67 - 120/76)  RR: 18 (27 Feb 2023 11:33) (18 - 18)  SpO2: 96% (27 Feb 2023 11:33) (92% - 96%)  I&O's Summary    26 Feb 2023 07:01  -  27 Feb 2023 07:00  --------------------------------------------------------  IN: 0 mL / OUT: 600 mL / NET: -600 mL      PHYSICAL EXAM:  General: NAD, A/O x 3  ENT: MMM  Neck: Supple, No JVD  Lungs: Clear to auscultation bilaterally  Cardio: RRR, S1/S2, No murmurs  Abdomen: Soft, Nontender, Nondistended; Bowel sounds present  Extremities: No calf tenderness, No pitting edema    LABS:                        11.8   12.61 )-----------( 283      ( 26 Feb 2023 05:30 )             37.9     02-26    141  |  105  |  19  ----------------------------<  120  5.2   |  30  |  1.18    Ca    8.9      26 Feb 2023 05:30  Phos  3.1     02-26        COVID-19 PCR: NotDetec (02-23-23 @ 19:15)  COVID-19 PCR: NotDetec (02-19-23 @ 15:33)      RADIOLOGY & ADDITIONAL TESTS:  < from: CT Ankle No Cont, Right (02.26.23 @ 18:16) >    IMPRESSION:  1.  Presumed pathologic lytic fragmentation of the talar dome is present   likely, related to metastatic disease with the given history.    --- End of Report ---    < end of copied text >        Care Discussed with Consultants/Other Providers: d/w orthopedics, will follow up

## 2023-02-27 NOTE — CONSULT NOTE ADULT - NEUROLOGICAL
normal/cranial nerves II-XII intact/sensation intact
sensation intact/responds to pain/responds to verbal commands

## 2023-02-27 NOTE — PROGRESS NOTE ADULT - ASSESSMENT
A: R ankle pain/ presumed pathologic lytic Fx talar dome    P: WBAT  RLE with cam boot and walker will contact orthotist Solomon  if sugery indicated pt will be referred to Dr sheffield as outpatient  -await heme/onc evaluation re: prognosis A: R ankle pain/ presumed pathologic lytic Fx talar dome    P: Protected WB  RLE with cam boot and walker will contact orthotist Solomon  if sugery indicated pt will be referred to Dr sheffield as outpatient  -await heme/onc evaluation re: prognosis

## 2023-02-27 NOTE — CONSULT NOTE ADULT - CONVERSATION DETAILS
Met and spoke with pt at bedside this afternoon, she is aware of her metastatic cancer diagnoses, and now made aware that R ankle fracture is likely due to her bony mets. Pt said she was aware that her cancer had spread to her bones , but did not realize how much spread. Discussed with pt that ankle surgery may be an option, but she would be considered a high risk candidate due to her underlying medical issues, and  recovery may also be very difficult. In addition, she may be prone to other fractures in the future, due the cancer being in the bones. Pt stated understanding, and wants to  wait to see what the surgical team has to say.   In addition we spoke about how her cancer is progressing, and what her thoughts are regarding possible treatments in the future. Pt stated  I did that already , and did that 20 years ago and " I hated it" .  But pt then said she would not know what to do at this point, saying " what will it really do for me?". I explained to her that someone from her oncology team will be in to speak with her about her tests and any possible treatment options, if any. Pt says she will be better able to decide after speaking with her oncology team and getting more information from them.    We also spoke about if in the event she becomes sicker and needed some medical interventions how much would she want done, reviewed cpr and intubation, pt stated she remembers filling out that paperwork, and she said I would not want cpr or intubation performed. Pt wishes for Dnr/Dni status. Molst form completed today and placed on chart .  Pt also confirmed her HCP is Son Darrel, copy of HCP placed on chart.     Pt thankful for discussion , has my contact info for questions .

## 2023-02-28 ENCOUNTER — NON-APPOINTMENT (OUTPATIENT)
Age: 68
End: 2023-02-28

## 2023-02-28 LAB
ANION GAP SERPL CALC-SCNC: 7 MMOL/L — SIGNIFICANT CHANGE UP (ref 5–17)
BUN SERPL-MCNC: 28 MG/DL — HIGH (ref 7–23)
CALCIUM SERPL-MCNC: 8.7 MG/DL — SIGNIFICANT CHANGE UP (ref 8.4–10.5)
CHLORIDE SERPL-SCNC: 102 MMOL/L — SIGNIFICANT CHANGE UP (ref 96–108)
CO2 SERPL-SCNC: 32 MMOL/L — HIGH (ref 22–31)
CREAT SERPL-MCNC: 1.21 MG/DL — SIGNIFICANT CHANGE UP (ref 0.5–1.3)
EGFR: 49 ML/MIN/1.73M2 — LOW
GLUCOSE SERPL-MCNC: 99 MG/DL — SIGNIFICANT CHANGE UP (ref 70–99)
HCT VFR BLD CALC: 41.5 % — SIGNIFICANT CHANGE UP (ref 34.5–45)
HGB BLD-MCNC: 13.1 G/DL — SIGNIFICANT CHANGE UP (ref 11.5–15.5)
MCHC RBC-ENTMCNC: 31.6 GM/DL — LOW (ref 32–36)
MCHC RBC-ENTMCNC: 31.8 PG — SIGNIFICANT CHANGE UP (ref 27–34)
MCV RBC AUTO: 100.7 FL — HIGH (ref 80–100)
NRBC # BLD: 0 /100 WBCS — SIGNIFICANT CHANGE UP (ref 0–0)
PLATELET # BLD AUTO: 299 K/UL — SIGNIFICANT CHANGE UP (ref 150–400)
POTASSIUM SERPL-MCNC: 4.3 MMOL/L — SIGNIFICANT CHANGE UP (ref 3.5–5.3)
POTASSIUM SERPL-SCNC: 4.3 MMOL/L — SIGNIFICANT CHANGE UP (ref 3.5–5.3)
RBC # BLD: 4.12 M/UL — SIGNIFICANT CHANGE UP (ref 3.8–5.2)
RBC # FLD: 15.8 % — HIGH (ref 10.3–14.5)
SARS-COV-2 RNA SPEC QL NAA+PROBE: SIGNIFICANT CHANGE UP
SODIUM SERPL-SCNC: 141 MMOL/L — SIGNIFICANT CHANGE UP (ref 135–145)
WBC # BLD: 12.38 K/UL — HIGH (ref 3.8–10.5)
WBC # FLD AUTO: 12.38 K/UL — HIGH (ref 3.8–10.5)

## 2023-02-28 PROCEDURE — 99233 SBSQ HOSP IP/OBS HIGH 50: CPT

## 2023-02-28 PROCEDURE — 99232 SBSQ HOSP IP/OBS MODERATE 35: CPT

## 2023-02-28 RX ORDER — SENNA PLUS 8.6 MG/1
2 TABLET ORAL AT BEDTIME
Refills: 0 | Status: DISCONTINUED | OUTPATIENT
Start: 2023-02-28 | End: 2023-03-03

## 2023-02-28 RX ADMIN — POLYETHYLENE GLYCOL 3350 17 GRAM(S): 17 POWDER, FOR SOLUTION ORAL at 12:15

## 2023-02-28 RX ADMIN — OXYCODONE HYDROCHLORIDE 5 MILLIGRAM(S): 5 TABLET ORAL at 21:26

## 2023-02-28 RX ADMIN — TIOTROPIUM BROMIDE 2 PUFF(S): 18 CAPSULE ORAL; RESPIRATORY (INHALATION) at 09:00

## 2023-02-28 RX ADMIN — SENNA PLUS 2 TABLET(S): 8.6 TABLET ORAL at 21:01

## 2023-02-28 RX ADMIN — OXYCODONE HYDROCHLORIDE 5 MILLIGRAM(S): 5 TABLET ORAL at 20:56

## 2023-02-28 RX ADMIN — BUDESONIDE AND FORMOTEROL FUMARATE DIHYDRATE 2 PUFF(S): 160; 4.5 AEROSOL RESPIRATORY (INHALATION) at 09:00

## 2023-02-28 NOTE — PROGRESS NOTE ADULT - ASSESSMENT
A/P 67year old female w. PMH Stage IV breast cancer with mets (s/p L mastectomy, s/p RT for lung mets, brain mets) now on oral chemo, clear cell RCC (s/p L nephrectomy), b/l LE DVT (was on xarelto), COPD coming in for ambulatory dysfunction and inability to care for self, as pt lives alone. Does have support of family, 2 sons , and her ex  as well.           Assessment/Plans:        Ambulatory dysfunction  - PT jesus, recs for [facility]    Right ankle pain   Likely pathological right ankle fracture    - Dr. Vuong, Orthopedics, will speak to Oncology orthopedics for further recommendations.   -CT ankle showed presumed pathological lytic fragmentation of the talar dome, related to metastatic disease   -DC prednisone   -Pain control PRN  -Orthopedics follow up      History of stage IV breast cancer with mets to lung, bone and brain. s/p RT and chemo  Scalp mass  - patient had MRI done outpatient which showed 2.4 x 5.7 x 4.0 cm fungating lesion arising from the right posterior scalp, with a focus of infiltration into the subgaleal space, but no evidence for calvarial infiltration  - s/p biopsy with Dr. Perez last admission.- final path report pending    -  Heme/onc, Dr. Ochoa, no need for chemotherapy/ RT while in rehab  - Onc to Follow up w/ pt - Consult by  Dr Vazquez/Onc done 2/27,  noted      - Per Dr. Perez, no active bleeding on scalp mass, expect drainage from mass as it is likely a malignant mass. Cont with xeroform dressing on mass  - chemical DVT ppx on hold due to underlying hemorrhagic brain mets    COPD  - on Breo Ellipta at home, started on Symbicort while in hospital  - IS  - Wean 02 as tolerated    History of DVT  - has history of b/l DVT and was on Xarelto however found to have hemorrhagic brain lesion on MRI and AC was stopped  - as per chart review, patient has history of IVC filter placement but does not remember having one placed  - dopplers 11/29/22 showed evidence of no DVT in either lower extremity       Palliative ; as a follow up today , pt case d/w med team Dr Zavala this morning and chart reviewed.  Oncology consult - noted, appreciated.   Pt seen bedside , she is awake, alert, oriented. Mainly in bed, sits up and is eating meals.   Has c/o pain R foot - relieved w/ percocet for now.    Pt also fitted for cam boot today .  Pt asked me to call and speak  to her ex  Ruslan today .  Confirms Darrel her son is her HCP, but he is in [place] with a young infant, so Don pts ex  is helping and pt is in agreement with this.  Spoke w/ patient and Ruslan  today - they are  aware pt scalp bx results are still not completely back yet. Discussed that as per oncology consult , that they would need those   definitive results to determine best course of treatment if any.      A/P 67year old female w. PMH Stage IV breast cancer with mets (s/p L mastectomy, s/p RT for lung mets, brain mets) now on oral chemo, clear cell RCC (s/p L nephrectomy), b/l LE DVT (was on xarelto), COPD coming in for ambulatory dysfunction and inability to care for self, as pt lives alone. Does have support of family, 2 sons , and her ex  as well.           Assessment/Plans:        Ambulatory dysfunction  - PT jesus, recs for [facility]    Right ankle pain   Likely pathological right ankle fracture    - Dr. Vuong, Orthopedics, will speak to Oncology orthopedics for further recommendations.   -CT ankle showed presumed pathological lytic fragmentation of the talar dome, related to metastatic disease   -DC prednisone   -Pain control PRN  -Orthopedics follow up      History of stage IV breast cancer with mets to lung, bone and brain. s/p RT and chemo  Scalp mass  - patient had MRI done outpatient which showed 2.4 x 5.7 x 4.0 cm fungating lesion arising from the right posterior scalp, with a focus of infiltration into the subgaleal space, but no evidence for calvarial infiltration  - s/p biopsy with Dr. Perez last admission.- final path report pending    -  Heme/onc, Dr. Ochoa, no need for chemotherapy/ RT while in rehab  - Onc to Follow up w/ pt - Consult by  Dr Vazquez/Onc done 2/27,  noted      - Per Dr. Perez, no active bleeding on scalp mass, expect drainage from mass as it is likely a malignant mass. Cont with xeroform dressing on mass  - chemical DVT ppx on hold due to underlying hemorrhagic brain mets    COPD  - on Breo Ellipta at home, started on Symbicort while in hospital  - IS  - Wean 02 as tolerated    History of DVT  - has history of b/l DVT and was on Xarelto however found to have hemorrhagic brain lesion on MRI and AC was stopped  - as per chart review, patient has history of IVC filter placement but does not remember having one placed  - dopplers 11/29/22 showed evidence of no DVT in either lower extremity       Palliative ; as a follow up today , pt case d/w med team Dr Zavala this morning and chart reviewed.  Oncology consult - noted, appreciated.   Pt seen bedside , she is awake, alert, oriented. Mainly in bed, sits up and is eating meals.   Has c/o pain R foot - relieved w/ percocet for now.    Pt also fitted for cam boot today per Ortho recs    Pt asked me to call and speak  to her ex  Ruslan today .  Confirms Darrel her son is her HCP, but he is in [place] with a young infant, so Don pts ex  is helping and pt is in agreement with this.  Spoke w/ patient and Don  today - they are  aware pt scalp bx results are still not completely back yet. Discussed that as per oncology consult , that they would need those final results to determine best course of treatment if any.    Pt still trying to determine if foot surgery is an option or if she is even willing to go for surgery- as pt undecided about having surgery.      Information gathering still underway, pt can make a more informed decision when  all results are available. If not available in next few days, pt may go to [facility] until all results are back.    Pt says she would prefer to go home, but is aware she would need 24/7 help at home.   Will  cont to follow w/ med team  , cont supportive care.

## 2023-02-28 NOTE — DIETITIAN INITIAL EVALUATION ADULT - PERTINENT MEDS FT
MEDICATIONS  (STANDING):  budesonide 160 MICROgram(s)/formoterol 4.5 MICROgram(s) Inhaler 2 Puff(s) Inhalation every 12 hours  influenza  Vaccine (HIGH DOSE) 0.7 milliLiter(s) IntraMuscular once  polyethylene glycol 3350 17 Gram(s) Oral daily  polyethylene glycol 3350 17 Gram(s) Oral once  tiotropium 2.5 MICROgram(s) Inhaler 2 Puff(s) Inhalation daily    MEDICATIONS  (PRN):  acetaminophen     Tablet .. 650 milliGRAM(s) Oral every 6 hours PRN Temp greater or equal to 38C (100.4F), Mild Pain (1 - 3)  albuterol    90 MICROgram(s) HFA Inhaler 2 Puff(s) Inhalation every 6 hours PRN Shortness of Breath and/or Wheezing  melatonin 3 milliGRAM(s) Oral at bedtime PRN Sleep  oxycodone    5 mG/acetaminophen 325 mG 1 Tablet(s) Oral every 4 hours PRN Severe Pain (7 - 10)  oxyCODONE    IR 5 milliGRAM(s) Oral every 6 hours PRN Moderate Pain (4 - 6)  senna 2 Tablet(s) Oral at bedtime PRN Constipation

## 2023-02-28 NOTE — PROGRESS NOTE ADULT - ASSESSMENT
67year old female w. PMH Stage IV breast cancer with mets (s/p L mastectomy, s/p RT for lung mets, brain mets) now on oral chemo, clear cell RCC (s/p L nephrectomy), b/l LE DVT (was on xarelto), COPD coming in for ambulatory dysfunction and inability to care for self.     *Ambulatory dysfunction  - PT eval, recs for [facility]    *Right ankle pain   *Likely pathological right ankle fracture  -CT ankle showed presumed pathological lytic fragmentation of the talar dome, related to metastatic disease   -Pain control PRN  -spoke to Dr. Vuong, Orthopedics, will speak to Oncology orthopedics for further recommendations.     *History of stage IV breast cancer with mets to lung, bone and brain. s/p RT and chemo  *Scalp mass  - patient had MRI done outpatient which showed 2.4 x 5.7 x 4.0 cm fungating lesion arising from the right posterior scalp, with a focus of infiltration into the subgaleal space, but no evidence for calvarial infiltration  - s/p biopsy with Dr. Perez last admission.    - spoke to Heme/onc, Dr. Ochoa, no need for chemotherapy/ RT while in rehab   - Per Dr. Perez, no active bleeding on scalp mass, expect drainage from mass as it is likely a malignant mass. Cont with xeroform dressing on mass  - chemical DVT ppx on hold due to underlying hemorrhagic brain mets    *COPD  - on Breo Ellipta at home, started on Symbicort while in hospital  - IS  - Wean 02 as tolerated    *History of DVT  - has history of b/l DVT and was on Xarelto however found to have hemorrhagic brain lesion on MRI and AC was stopped  - as per chart review, patient has history of IVC filter placement but does not remember having one placed  - dopplers 11/29/22 showed evidence of no DVT in either lower extremity     *DVT ppx  - SCD  - No chemical AC as above   67year old female w. PMH Stage IV breast cancer with mets (s/p L mastectomy, s/p RT for lung mets, brain mets) now on oral chemo, clear cell RCC (s/p L nephrectomy), b/l LE DVT (was on xarelto), COPD coming in for ambulatory dysfunction and inability to care for self.     *Ambulatory dysfunction  - PT eval, recs for [facility]    *Right ankle pain   *Likely pathological right ankle fracture  -CT ankle showed presumed pathological lytic fragmentation of the talar dome, related to metastatic disease   -Pain control PRN  -spoke to Dr. Vuong, Orthopedics, whom spoke with Orthopedic Oncology, Dr. Jamel Jaramillo, recommends IR biopsy of the R talar dome and then follow up as outpatient   -continue with CAM boot, WBAT    *History of stage IV breast cancer with mets to lung, bone and brain. s/p RT and chemo  *Scalp mass  - patient had MRI done outpatient which showed 2.4 x 5.7 x 4.0 cm fungating lesion arising from the right posterior scalp, with a focus of infiltration into the subgaleal space, but no evidence for calvarial infiltration  - s/p biopsy with Dr. Perez last admission.    - spoke to Heme/onc, Dr. Ochoa, no need for chemotherapy/ RT while in rehab   - Per Dr. Perez, no active bleeding on scalp mass, expect drainage from mass as it is likely a malignant mass. Cont with xeroform dressing on mass  - chemical DVT ppx on hold due to underlying hemorrhagic brain mets    *COPD  - on Breo Ellipta at home, started on Symbicort while in hospital  - IS  - Wean 02 as tolerated    *History of DVT  - has history of b/l DVT and was on Xarelto however found to have hemorrhagic brain lesion on MRI and AC was stopped  - as per chart review, patient has history of IVC filter placement but does not remember having one placed  - dopplers 11/29/22 showed evidence of no DVT in either lower extremity     *DVT ppx  - SCD  - No chemical AC as above    CODE status: DNR DNI    Dispo: [facility] after IR biopsy     Updated pt's ex- , Don per pt's preference. 2/28 67year old female w. PMH Stage IV breast cancer with mets (s/p L mastectomy, s/p RT for lung mets, brain mets) now on oral chemo, clear cell RCC (s/p L nephrectomy), b/l LE DVT (was on xarelto), COPD coming in for ambulatory dysfunction and inability to care for self.     *Ambulatory dysfunction  - PT eval, recs for [facility]    *Right ankle pain   *Likely pathological right ankle fracture  -CT ankle showed presumed pathological lytic fragmentation of the talar dome, related to metastatic disease   -Pain control PRN  -spoke to Dr. Vuong, Orthopedics, whom spoke with Orthopedic Oncology, Dr. Jamel Jaramillo, recommends IR biopsy of the R talar dome and then follow up as outpatient   -continue with CAM boot, WBAT    *History of stage IV breast cancer with mets to lung, bone and brain. s/p RT and chemo  *Scalp mass  - patient had MRI done outpatient which showed 2.4 x 5.7 x 4.0 cm fungating lesion arising from the right posterior scalp, with a focus of infiltration into the subgaleal space, but no evidence for calvarial infiltration  - s/p biopsy with Dr. Perez last admission.    - spoke to Heme/onc, Dr. Ochoa, no need for chemotherapy/ RT while in rehab   - Per Dr. Perez, no active bleeding on scalp mass, expect drainage from mass as it is likely a malignant mass. Cont with xeroform dressing on mass  - chemical DVT ppx on hold due to possible underlying hemorrhagic brain mets    *COPD  - on Breo Ellipta at home, started on Symbicort while in hospital  - IS  - Wean 02 as tolerated    *History of DVT  - has history of b/l DVT and was on Xarelto however found to have hemorrhagic brain lesion on MRI and AC was stopped  - as per chart review, patient has history of IVC filter placement but does not remember having one placed  - dopplers 11/29/22 showed evidence of no DVT in either lower extremity     *DVT ppx  - SCD  - No chemical AC as above    CODE status: DNR DNI    Dispo: [facility] after IR biopsy     Updated pt's ex- , Don per pt's preference. 2/28 67year old female w. PMH Stage IV breast cancer with mets (s/p L mastectomy, s/p RT for lung mets, brain mets) now on oral chemo, clear cell RCC (s/p L nephrectomy), b/l LE DVT (was on xarelto), COPD coming in for ambulatory dysfunction and inability to care for self.     *Ambulatory dysfunction  - PT eval, recs for [facility]    *Right ankle pain   *Likely pathological right ankle fracture  -CT ankle showed presumed pathological lytic fragmentation of the talar dome, related to metastatic disease   -Pain control PRN  -spoke to Dr. Vuong, Orthopedics, whom spoke with Orthopedic Oncology, Dr. Jamel Jaramillo, recommends IR biopsy of the R talar dome and then follow up as outpatient   -continue with CAM boot, WBAT    *History of stage IV breast cancer with mets to lung, bone and brain. s/p RT and chemo  *Scalp mass  - patient had MRI done outpatient which showed 2.4 x 5.7 x 4.0 cm fungating lesion arising from the right posterior scalp, with a focus of infiltration into the subgaleal space, but no evidence for calvarial infiltration  - s/p biopsy with Dr. Perez last admission.    - spoke to Heme/onc, Dr. Ochoa, no need for chemotherapy/ RT while in rehab   - Per Dr. Perez, no active bleeding on scalp mass, expect drainage from mass as it is likely a malignant mass. Cont with xeroform dressing on mass  - chemical DVT ppx on hold due to possible underlying hemorrhagic brain mets    *COPD  - on Breo Ellipta at home, started on Symbicort while in hospital  - IS  - Wean 02 as tolerated    *History of DVT  - has history of b/l DVT and was on Xarelto however found to have hemorrhagic brain lesion on MRI and AC was stopped  - as per chart review, patient has history of IVC filter placement but does not remember having one placed  - dopplers 11/29/22 showed evidence of no DVT in either lower extremity     *DVT ppx  - SCD  - No chemical AC as above    CODE status: DNR DNI    Dispo: [facility] after IR biopsy     Updated pt's ex- , Don per pt's preference. 2/28    Spoke to IR from [facility], shuttle for IR biopsy this Thurs/Fri, requested to call transfer center back tomorrow for exact time.  67year old female w. PMH Stage IV breast cancer with mets (s/p L mastectomy, s/p RT for lung mets, brain mets) now on oral chemo, clear cell RCC (s/p L nephrectomy), b/l LE DVT (was on xarelto), COPD coming in for ambulatory dysfunction and inability to care for self.     *Ambulatory dysfunction  - PT eval, recs for [facility]    *Right ankle pain   *Likely pathological right ankle fracture  -CT ankle showed presumed pathological lytic fragmentation of the talar dome, related to metastatic disease   -Pain control PRN  -spoke to Dr. Vuong, Orthopedics, whom spoke with Orthopedic Oncology, Dr. Jamel Jaramillo, recommends IR biopsy of the R talar dome and then follow up as outpatient   -continue with CAM boot, WBAT    *History of stage IV breast cancer with mets to lung, bone and brain. s/p RT and chemo  *Scalp mass  - s/p biopsy with Dr. Perez last admission.     - Per Dr. Perez, no active bleeding on scalp mass, expect drainage from mass as it is likely a malignant mass. Cont with xeroform dressing on mass  - chemical DVT ppx on hold due to possible underlying hemorrhagic brain mets    *Leukocytosis   -suspect steroid induced  -was given steroid for previously thought to be R ankle gout     *COPD  - on Breo Ellipta at home, started on Symbicort while in hospital  - IS  - Wean 02 as tolerated    *History of DVT  - has history of b/l DVT and was on Xarelto however found to have hemorrhagic brain lesion on MRI and AC was stopped  - as per chart review, patient has history of IVC filter placement but does not remember having one placed  - dopplers 11/29/22 showed evidence of no DVT in either lower extremity     *DVT ppx  - SCD  - No chemical AC as above    CODE status: DNR DNI    Dispo: [facility] after IR biopsy     Updated pt's ex- , Don per pt's preference. 2/28    Spoke to IR from [facility], shuttle for IR biopsy this Thurs/Fri, requested to call transfer center back tomorrow for exact time.

## 2023-02-28 NOTE — PROGRESS NOTE ADULT - SUBJECTIVE AND OBJECTIVE BOX
Progress: in bed, awake, alert, converses , has int c/o pain to R foot     Present Symptoms:   Dyspnea: no  Nausea/Vomiting: no  Anxiety:  some  Depressed Mood:   Fatigue: yes  Loss of appetite: no  Pain:         yes          location: R foot  Review of Systems: [All others negative   MEDICATIONS  (STANDING):  budesonide 160 MICROgram(s)/formoterol 4.5 MICROgram(s) Inhaler 2 Puff(s) Inhalation every 12 hours  influenza  Vaccine (HIGH DOSE) 0.7 milliLiter(s) IntraMuscular once  polyethylene glycol 3350 17 Gram(s) Oral daily  polyethylene glycol 3350 17 Gram(s) Oral once  senna 2 Tablet(s) Oral at bedtime  tiotropium 2.5 MICROgram(s) Inhaler 2 Puff(s) Inhalation daily    MEDICATIONS  (PRN):  acetaminophen     Tablet .. 650 milliGRAM(s) Oral every 6 hours PRN Temp greater or equal to 38C (100.4F), Mild Pain (1 - 3)  albuterol    90 MICROgram(s) HFA Inhaler 2 Puff(s) Inhalation every 6 hours PRN Shortness of Breath and/or Wheezing  melatonin 3 milliGRAM(s) Oral at bedtime PRN Sleep  oxycodone    5 mG/acetaminophen 325 mG 1 Tablet(s) Oral every 4 hours PRN Severe Pain (7 - 10)  oxyCODONE    IR 5 milliGRAM(s) Oral every 6 hours PRN Moderate Pain (4 - 6)      PHYSICAL EXAM:  Vital Signs Last 24 Hrs  T(C): 36.4 (28 Feb 2023 12:00), Max: 36.6 (27 Feb 2023 20:46)  T(F): 97.6 (28 Feb 2023 12:00), Max: 97.8 (27 Feb 2023 20:46)  HR: 75 (28 Feb 2023 12:00) (70 - 75)  BP: 97/63 (28 Feb 2023 12:00) (97/63 - 115/76)  BP(mean): --  RR: 18 (28 Feb 2023 12:00) (16 - 18)  SpO2: 93% (28 Feb 2023 12:00) (93% - 97%)    Parameters below as of 28 Feb 2023 12:00  Patient On (Oxygen Delivery Method): nasal cannula  O2 Flow (L/min): 4    General: alert  oriented x 3 , converses       HEENT: n/c, scalp lesion to back of head , lips. mouth dry     Lungs: few coarse, dim to bases , breathing presently comfortable   CV: normal  rate   GI: abd soft, n/t     : normal    Musculoskeletal: nml w/ weakness, dec rom R foot    Skin: w/d scalp lesion back of head     Neuro: awake, alert, oriented, periods forgetfulness    Oral intake ability:  oral feeding- fully capable   Diet: reg      LABS:                          13.1   12.38 )-----------( 299      ( 28 Feb 2023 12:22 )             41.5     02-28    141  |  102  |  28<H>  ----------------------------<  99  4.3   |  32<H>  |  1.21    Ca    8.7      28 Feb 2023 12:22  Phos  3.1     02-26          RADIOLOGY & ADDITIONAL STUDIES: < from: CT Ankle No Cont, Right (02.26.23 @ 18:16) >  ACC: [id number] EXAM:  CT ANKLE ONLY RT   ORDERED BY: SHAI MONGE     PROCEDURE DATE:  02/26/2023          INTERPRETATION:  CT ANKLE RIGHT    HISTORY: Right ankle pain. Stage IV breast cancer. Evaluate for   metastatic disease.    TECHNIQUE: Contiguousaxial imaging was performed through the right ankle   without contrast. Coronal and sagittal reformatting was utilized.    COMPARISON:  Right ankle x-rays dated 2/24/2023    FINDINGS:    OSSEOUS STRUCTURES    Acute Fractures:  There is a presumed pathologic lytic fragmentation of   the talar dome with mild displacement and greatest involvement of the   medial cortex. There is mild subchondral step-off/collapse and   nondisplaced fracture extends into the lateral process. There is   associated soft tissue prominence along the medial cortex.    Chronic Fractures/Ossifications:  None.    JOINTS    Tibiotalar Joint:  The subchondral step-off/collapse involves the   anterior superior talar dome with up to 0.3 cm of depression.    Subtalar Joints:Preserved.    Intertarsal Joints:  Preserved.    Tarsometatarsal Joints:  Preserved.    MYOTENDINOUS STRUCTURES  Intact within limits of CT.    OTHER SOFT TISSUES  Mild to moderate subcutaneous edema is present.  There is mild to moderate generalizedmuscle atrophy.    IMPRESSION:  1.  Presumed pathologic lytic fragmentation of the talar dome is present   likely, related to metastatic disease with the given history.          ADVANCE DIRECTIVES:  Hcp  Molst Dnr/Dni   Advanced Care Planning discussion total time spent:

## 2023-02-28 NOTE — DIETITIAN INITIAL EVALUATION ADULT - REASON
patient did not appear receptive to NFPE , no overt signs of muscle wasting observed, patient appeared "guarded"

## 2023-02-28 NOTE — CHART NOTE - NSCHARTNOTEFT_GEN_A_CORE
SW consult placed to discuss placement with patient.  Patient is a 67 year old female presenting to ED due to wound check.  As per MD, patients visiting nurse called ambulance after she believes patient was unable to take care of herself living alone, reported concerns regarding living situation.  SW met with patient at bedside, introduced self and role of SW.  Patient was DC home yesterday with home care after declining [facility] placement.  SW spoke with patient regarding visiting nurses concerns and to explore the possibility of [facility].  SW educated patient on [facility] and what services are offered.  Patient verbalized agreeable to [facility] placement at this time.  SW unable to place patient into [facility] at this time due to after hours.  SW advised MD of patients agreement of [facility] placement at this time.  SW will follow for placement.
Pt fitted with pneumatic camboot for right foot talar fracture.  Pt instructed to don and doff  Written insteructions provided  Follow up if needed    Solomon Ibarra [facility]  [phone]  [facility]

## 2023-02-28 NOTE — PROGRESS NOTE ADULT - SUBJECTIVE AND OBJECTIVE BOX
Patient is a 67y old  Female who presents with a chief complaint of pt with stage IV breast ca (brain, bone, lung mets), malodorous scalp mass, unable to care for self (27 Feb 2023 19:04)      Subjective and overnight events:  Patient seen and examined at bedside.    ALLERGIES:  Cipro (Rash)    MEDICATIONS  (STANDING):  budesonide 160 MICROgram(s)/formoterol 4.5 MICROgram(s) Inhaler 2 Puff(s) Inhalation every 12 hours  influenza  Vaccine (HIGH DOSE) 0.7 milliLiter(s) IntraMuscular once  polyethylene glycol 3350 17 Gram(s) Oral daily  polyethylene glycol 3350 17 Gram(s) Oral once  tiotropium 2.5 MICROgram(s) Inhaler 2 Puff(s) Inhalation daily    MEDICATIONS  (PRN):  acetaminophen     Tablet .. 650 milliGRAM(s) Oral every 6 hours PRN Temp greater or equal to 38C (100.4F), Mild Pain (1 - 3)  albuterol    90 MICROgram(s) HFA Inhaler 2 Puff(s) Inhalation every 6 hours PRN Shortness of Breath and/or Wheezing  melatonin 3 milliGRAM(s) Oral at bedtime PRN Sleep  oxycodone    5 mG/acetaminophen 325 mG 1 Tablet(s) Oral every 4 hours PRN Severe Pain (7 - 10)  oxyCODONE    IR 5 milliGRAM(s) Oral every 6 hours PRN Moderate Pain (4 - 6)  senna 2 Tablet(s) Oral at bedtime PRN Constipation    Vital Signs Last 24 Hrs  T(F): 97.6 (28 Feb 2023 05:11), Max: 97.8 (27 Feb 2023 20:46)  HR: 70 (28 Feb 2023 09:01) (70 - 72)  BP: 114/73 (28 Feb 2023 05:11) (100/67 - 115/76)  RR: 16 (28 Feb 2023 05:11) (16 - 18)  SpO2: 95% (28 Feb 2023 09:01) (95% - 97%)  I&O's Summary    27 Feb 2023 07:01  -  28 Feb 2023 07:00  --------------------------------------------------------  IN: 200 mL / OUT: 800 mL / NET: -600 mL      PHYSICAL EXAM:  General: NAD, A/O x 3  ENT: MMM  Neck: Supple, No JVD  Lungs: Clear to auscultation bilaterally  Cardio: RRR, S1/S2, No murmurs  Abdomen: Soft, Nontender, Nondistended; Bowel sounds present  Extremities: No calf tenderness, No pitting edema    LABS:                        11.8   12.61 )-----------( 283      ( 26 Feb 2023 05:30 )             37.9     02-26    141  |  105  |  19  ----------------------------<  120  5.2   |  30  |  1.18    Ca    8.9      26 Feb 2023 05:30  Phos  3.1     02-26                                      COVID-19 PCR: NotDetec (02-23-23 @ 19:15)  COVID-19 PCR: NotDetec (02-19-23 @ 15:33)      RADIOLOGY & ADDITIONAL TESTS:    Care Discussed with Consultants/Other Providers:    Patient is a 67y old  Female who presents with a chief complaint of pt with stage IV breast ca (brain, bone, lung mets), malodorous scalp mass, unable to care for self (27 Feb 2023 19:04)      Subjective and overnight events:  Patient seen and examined at bedside. + persistent right ankle pain. no other complaints. no fever, chills, sob, cp, abd pain.     ALLERGIES:  Cipro (Rash)    MEDICATIONS  (STANDING):  budesonide 160 MICROgram(s)/formoterol 4.5 MICROgram(s) Inhaler 2 Puff(s) Inhalation every 12 hours  influenza  Vaccine (HIGH DOSE) 0.7 milliLiter(s) IntraMuscular once  polyethylene glycol 3350 17 Gram(s) Oral daily  polyethylene glycol 3350 17 Gram(s) Oral once  tiotropium 2.5 MICROgram(s) Inhaler 2 Puff(s) Inhalation daily    MEDICATIONS  (PRN):  acetaminophen     Tablet .. 650 milliGRAM(s) Oral every 6 hours PRN Temp greater or equal to 38C (100.4F), Mild Pain (1 - 3)  albuterol    90 MICROgram(s) HFA Inhaler 2 Puff(s) Inhalation every 6 hours PRN Shortness of Breath and/or Wheezing  melatonin 3 milliGRAM(s) Oral at bedtime PRN Sleep  oxycodone    5 mG/acetaminophen 325 mG 1 Tablet(s) Oral every 4 hours PRN Severe Pain (7 - 10)  oxyCODONE    IR 5 milliGRAM(s) Oral every 6 hours PRN Moderate Pain (4 - 6)  senna 2 Tablet(s) Oral at bedtime PRN Constipation    Vital Signs Last 24 Hrs  T(F): 97.6 (28 Feb 2023 05:11), Max: 97.8 (27 Feb 2023 20:46)  HR: 70 (28 Feb 2023 09:01) (70 - 72)  BP: 114/73 (28 Feb 2023 05:11) (100/67 - 115/76)  RR: 16 (28 Feb 2023 05:11) (16 - 18)  SpO2: 95% (28 Feb 2023 09:01) (95% - 97%)  I&O's Summary    27 Feb 2023 07:01  -  28 Feb 2023 07:00  --------------------------------------------------------  IN: 200 mL / OUT: 800 mL / NET: -600 mL      PHYSICAL EXAM:  General: NAD, A/O x 3  ENT: MMM  Neck: Supple, No JVD  Lungs: Clear to auscultation bilaterally  Cardio: RRR, S1/S2, No murmurs  Abdomen: Soft, Nontender, Nondistended; Bowel sounds present  Extremities: No calf tenderness,  right ankle edema with increased warmth. no redness    LABS:                        11.8   12.61 )-----------( 283      ( 26 Feb 2023 05:30 )             37.9     02-26    141  |  105  |  19  ----------------------------<  120  5.2   |  30  |  1.18    Ca    8.9      26 Feb 2023 05:30  Phos  3.1     02-26        COVID-19 PCR: NotDetec (02-23-23 @ 19:15)  COVID-19 PCR: NotDetec (02-19-23 @ 15:33)      RADIOLOGY & ADDITIONAL TESTS:  < from: CT Ankle No Cont, Right (02.26.23 @ 18:16) >  IMPRESSION:  1.  Presumed pathologic lytic fragmentation of the talar dome is present   likely, related to metastatic disease with the given history.    --- End of Report ---    < end of copied text >      Care Discussed with Consultants/Other Providers: d/w Dr. Vuong, may need RT to right ankle

## 2023-02-28 NOTE — DIETITIAN INITIAL EVALUATION ADULT - ORAL INTAKE PTA/DIET HISTORY
Patient reports tolerating diet , appetite varies consumed a regular diet PTA , not receptive to Po supplements

## 2023-02-28 NOTE — DIETITIAN INITIAL EVALUATION ADULT - OTHER INFO
67F w/ COPD on prn home O2 via NC, hx of left renal cancer-clear cell, s/p nephrectomy (2018), Stage IV breast cancer ER +/DE +/HER2, initially dxed in 2003 ( had eft mastectomy), now w/ mets to bone, lung, brain, s/p RT/chemo but still had progression of disease, hx of bilateral DVT, has IVC filter, was just recently hospitalized for enlarging fungating mass on right side of scalp, w/c was biopsied 2/21/23, was just discharged yesterday with home care, now back to the ED, because visiting nurse concerned that pt is unable to care for herself. patient tolerating diet po varies , % < (+) BM 92/27) Skin intact, (+) mass noted on head ,Patient reports no recent weight loss , not receptive to po snacks/supplements at present ,

## 2023-03-01 LAB — PROCALCITONIN SERPL-MCNC: 0.18 NG/ML — HIGH

## 2023-03-01 PROCEDURE — 99233 SBSQ HOSP IP/OBS HIGH 50: CPT

## 2023-03-01 PROCEDURE — 99232 SBSQ HOSP IP/OBS MODERATE 35: CPT

## 2023-03-01 PROCEDURE — 71045 X-RAY EXAM CHEST 1 VIEW: CPT | Mod: 26

## 2023-03-01 RX ORDER — OXYCODONE HYDROCHLORIDE 5 MG/1
5 TABLET ORAL EVERY 6 HOURS
Refills: 0 | Status: DISCONTINUED | OUTPATIENT
Start: 2023-03-01 | End: 2023-03-03

## 2023-03-01 RX ORDER — OXYCODONE AND ACETAMINOPHEN 5; 325 MG/1; MG/1
1 TABLET ORAL EVERY 4 HOURS
Refills: 0 | Status: DISCONTINUED | OUTPATIENT
Start: 2023-03-01 | End: 2023-03-03

## 2023-03-01 RX ADMIN — SENNA PLUS 2 TABLET(S): 8.6 TABLET ORAL at 21:22

## 2023-03-01 RX ADMIN — TIOTROPIUM BROMIDE 2 PUFF(S): 18 CAPSULE ORAL; RESPIRATORY (INHALATION) at 08:11

## 2023-03-01 RX ADMIN — OXYCODONE HYDROCHLORIDE 5 MILLIGRAM(S): 5 TABLET ORAL at 17:03

## 2023-03-01 RX ADMIN — BUDESONIDE AND FORMOTEROL FUMARATE DIHYDRATE 2 PUFF(S): 160; 4.5 AEROSOL RESPIRATORY (INHALATION) at 08:11

## 2023-03-01 RX ADMIN — POLYETHYLENE GLYCOL 3350 17 GRAM(S): 17 POWDER, FOR SOLUTION ORAL at 11:34

## 2023-03-01 RX ADMIN — BUDESONIDE AND FORMOTEROL FUMARATE DIHYDRATE 2 PUFF(S): 160; 4.5 AEROSOL RESPIRATORY (INHALATION) at 21:44

## 2023-03-01 RX ADMIN — Medication 3 MILLIGRAM(S): at 21:22

## 2023-03-01 NOTE — PROGRESS NOTE ADULT - ASSESSMENT
67year old female w. PMH Stage IV breast cancer with mets (s/p L mastectomy, s/p RT for lung mets, brain mets) now on oral chemo, clear cell RCC (s/p L nephrectomy), b/l LE DVT (was on xarelto), COPD coming in for ambulatory dysfunction and inability to care for self.     #Right ankle pain   #Likely pathological right ankle fracture  - CT ankle showed presumed pathological lytic fragmentation of the talar dome, related to metastatic disease   - Pain control PRN  - spoke to Dr. Vuong, Orthopedics, whom spoke with Orthopedic Oncology, Dr. Jamel Jaramillo, recommends IR biopsy of the R talar dome and then follow up as outpatient   - discussed with transfer center this morning about timing for IR biopsy, still no time at this point and pending callback  - continue with CAM boot, WBAT    #History of stage IV breast cancer with mets to lung, bone and brain. s/p RT and chemo  #Scalp mass  - s/p biopsy with Dr. Perez last admission.     - Per Dr. Perez, no active bleeding on scalp mass, expect drainage from mass as it is likely a malignant mass. Cont with xeroform dressing on mass  - chemical DVT ppx on hold due to possible underlying hemorrhagic brain mets    #Leukocytosis   - suspect steroid induced  - was given steroid for previously thought to be R ankle gout     #COPD  - on Breo Ellipta at home, started on Symbicort while in hospital  - currently on 3L NC at this time, wean 02 as tolerated  - maintain saturations 88-92%    #History of DVT  - has history of b/l DVT and was on Xarelto however found to have hemorrhagic brain lesion on MRI and AC was stopped  - as per chart review, patient has history of IVC filter placement but does not remember having one placed  - dopplers 11/29/22 showed evidence of no DVT in either lower extremity     #DVT ppx  - SCD  - No chemical AC as above    Dispo: [facility] after IR biopsy     Patient states she will update family on her own    Discussed with transfer center, pending time for IR biopsy Thurs/Fri- will need shuttle  67year old female w. PMH Stage IV breast cancer with mets (s/p L mastectomy, s/p RT for lung mets, brain mets) now on oral chemo, clear cell RCC (s/p L nephrectomy), b/l LE DVT (was on xarelto), COPD coming in for ambulatory dysfunction and inability to care for self.     #Right ankle pain   #Likely pathological right ankle fracture  - CT ankle showed presumed pathological lytic fragmentation of the talar dome, related to metastatic disease   - Pain control PRN  - spoke to Dr. Voung, Orthopedics, whom spoke with Orthopedic Oncology, Dr. Jamel Jaramillo, recommends IR biopsy of the R talar dome and then follow up as outpatient   - discussed with transfer center this morning about timing for IR biopsy, still no time at this point and pending callback  - continue with CAM boot, WBAT    #History of stage IV breast cancer with mets to lung, bone and brain. s/p RT and chemo  #Scalp mass  - s/p biopsy with Dr. Perez last admission.     - Per Dr. Perez, no active bleeding on scalp mass, expect drainage from mass as it is likely a malignant mass. Cont with xeroform dressing on mass  - chemical DVT ppx on hold due to possible underlying hemorrhagic brain mets    #Leukocytosis   - suspect steroid induced  - was given steroid for previously thought to be R ankle gout   - will add procal    #COPD  - on Breo Ellipta at home, started on Symbicort while in hospital  - currently on 3L NC at this time, wean 02 as tolerated  - maintain saturations 88-92%    #History of DVT  - has history of b/l DVT and was on Xarelto however found to have hemorrhagic brain lesion on MRI and AC was stopped  - as per chart review, patient has history of IVC filter placement but does not remember having one placed  - dopplers 11/29/22 showed evidence of no DVT in either lower extremity     #DVT ppx  - SCD  - No chemical AC as above    Dispo: [facility] after IR biopsy     Patient states she will update family on her own    Discussed with transfer center, pending time for IR biopsy Thurs/Fri- will need shuttle  67year old female w. PMH Stage IV breast cancer with mets (s/p L mastectomy, s/p RT for lung mets, brain mets) now on oral chemo, clear cell RCC (s/p L nephrectomy), b/l LE DVT (was on xarelto), COPD coming in for ambulatory dysfunction and inability to care for self.     #Right ankle pain   #Likely pathological right ankle fracture  - CT ankle showed presumed pathological lytic fragmentation of the talar dome, related to metastatic disease   - Pain control PRN  - spoke to Dr. Vuong, Orthopedics, whom spoke with Orthopedic Oncology, Dr. Jamel Jaramillo, recommends IR biopsy of the R talar dome and then follow up as outpatient   - discussed with transfer center this morning about timing for IR biopsy, still no time at this point and pending callback  - continue with CAM boot, WBAT    #History of stage IV breast cancer with mets to lung, bone and brain. s/p RT and chemo  #Scalp mass  - s/p biopsy with Dr. Perez last admission.     - Per Dr. Perez, no active bleeding on scalp mass, expect drainage from mass as it is likely a malignant mass. Cont with xeroform dressing on mass  - chemical DVT ppx on hold due to possible underlying hemorrhagic brain mets    #Leukocytosis   - suspect steroid induced  - was given steroid for previously thought to be R ankle gout   - will add procal    #COPD  - on Breo Ellipta at home, started on Symbicort while in hospital  - currently on 3L NC at this time, wean 02 as tolerated  - maintain saturations 88-92%    #History of DVT  - has history of b/l DVT and was on Xarelto however found to have hemorrhagic brain lesion on MRI and AC was stopped  - as per chart review, patient has history of IVC filter placement but does not remember having one placed  - dopplers 11/29/22 showed evidence of no DVT in either lower extremity     #DVT ppx  - SCD  - No chemical AC as above    Dispo: [facility] after IR biopsy     Patient states she will update family on her own    Discussed with transfer center, pending time for IR biopsy Thurs/Fri- will need shuttle    ----------  addendum:  patient schedule for IR biopsy on 3/2 at 10am- discussed with transfer center and shuttle arranged

## 2023-03-01 NOTE — PROGRESS NOTE ADULT - ASSESSMENT
A/P 67year old female w. PMH Stage IV breast cancer with mets (s/p L mastectomy, s/p RT for lung mets, brain mets) now on oral chemo, clear cell RCC (s/p L nephrectomy), b/l LE DVT (was on xarelto), COPD coming in for ambulatory dysfunction and inability to care for self.         Right ankle pain   Likely pathological right ankle fracture  - CT ankle showed presumed pathological lytic fragmentation of the talar dome, related to metastatic disease   - Pain control PRN- presently effective   - Dr. Vuong, Orthopedics,  spoke with Orthopedic Oncology, Dr. Jamel Jaramillo, recommends IR biopsy of the R talar dome and then follow up as outpatient   - med team d/w  transfer center this morning about timing for IR biopsy, still no time at this point and pending callback  - continue with CAM boot, WBAT    History of stage IV breast cancer with mets to lung, bone and brain. s/p RT and chemo  Scalp mass  - s/p biopsy with Dr. Perez last admission.     - Per Dr. Perez, no active bleeding on scalp mass, expect drainage from mass as it is likely a malignant mass. Cont with xeroform dressing on mass  - chemical DVT ppx on hold due to possible underlying hemorrhagic brain mets    Leukocytosis   - suspect steroid induced  - was given steroid for previously thought to be R ankle gout   - will add procal    COPD  - on Breo Ellipta at home, started on Symbicort while in hospital  - currently on 3L NC at this time, wean 02 as tolerated  - maintain saturations 88-92%    History of DVT  - has history of b/l DVT and was on Xarelto however found to have hemorrhagic brain lesion on MRI and AC was stopped  - as per chart review, patient has history of IVC filter placement but does not remember having one placed  - dopplers 11/29/22 showed evidence of no DVT in either lower extremity       Present POC- [facility] after IR biopsy   med team discussed with transfer center, pending time for IR biopsy Thurs/Fri- will need shuttle      Palliative :  as a follow up today, pt in bed, awake, alert, oriented , converses.  Has c/o unable to get any rest , she naps t/o the day .   I asked her about pain - says her percocet helps , will not change at this time .  constipation- added senna qhs and miralax , pt no c/o abd pain.   D/w pt her plan at this time, she is in agreement with R foot bx, feels this may help make some of her decisions.  Scalp Bx still pending - this is needed to determine proper course of tx by oncology ,if any.   Pt in agreement to go to [facility], prior to any possible tx for her cancer.  If pt opts not to take any treatment for her cancer, she would then be home hospice appropriate.   case d/w med team Dr Stevens today .

## 2023-03-01 NOTE — PROGRESS NOTE ADULT - SUBJECTIVE AND OBJECTIVE BOX
Patient is a 67y old  Female who presents with a chief complaint of pt with stage IV breast ca (brain, bone, lung mets), malodorous scalp mass, unable to care for self (28 Feb 2023 13:56)      Patient seen and examined at bedside. No acute events overnight. Patient continues to endorse R ankle pain. States she has felt a little SOB but better with supplemental O2, used to have it however last time 4/2021 and has not required since then. Patient denies chest pain, headache, dizziness or lightheadedness.    ALLERGIES:  Cipro (Rash)    MEDICATIONS  (STANDING):  budesonide 160 MICROgram(s)/formoterol 4.5 MICROgram(s) Inhaler 2 Puff(s) Inhalation every 12 hours  influenza  Vaccine (HIGH DOSE) 0.7 milliLiter(s) IntraMuscular once  polyethylene glycol 3350 17 Gram(s) Oral daily  polyethylene glycol 3350 17 Gram(s) Oral once  senna 2 Tablet(s) Oral at bedtime  tiotropium 2.5 MICROgram(s) Inhaler 2 Puff(s) Inhalation daily    MEDICATIONS  (PRN):  acetaminophen     Tablet .. 650 milliGRAM(s) Oral every 6 hours PRN Temp greater or equal to 38C (100.4F), Mild Pain (1 - 3)  albuterol    90 MICROgram(s) HFA Inhaler 2 Puff(s) Inhalation every 6 hours PRN Shortness of Breath and/or Wheezing  melatonin 3 milliGRAM(s) Oral at bedtime PRN Sleep  oxycodone    5 mG/acetaminophen 325 mG 1 Tablet(s) Oral every 4 hours PRN Severe Pain (7 - 10)  oxyCODONE    IR 5 milliGRAM(s) Oral every 6 hours PRN Moderate Pain (4 - 6)    Vital Signs Last 24 Hrs  T(F): 97.9 (01 Mar 2023 12:11), Max: 97.9 (01 Mar 2023 12:11)  HR: 93 (01 Mar 2023 12:11) (75 - 93)  BP: 96/62 (01 Mar 2023 12:11) (96/62 - 102/67)  RR: 17 (01 Mar 2023 12:11) (17 - 18)  SpO2: 94% (01 Mar 2023 12:11) (91% - 98%)  I&O's Summary    28 Feb 2023 07:01  -  01 Mar 2023 07:00  --------------------------------------------------------  IN: 200 mL / OUT: 1300 mL / NET: -1100 mL        PHYSICAL EXAM:  GENERAL: NAD, laying in bed, on 3L NC  HEAD:  large hard mass noted on R occipital area  CHEST/LUNG: Clear to auscultation bilaterally, good air entry, non-labored breathing  HEART: RRR; S1/S2, No murmur  ABDOMEN: Soft, Nontender, Nondistended; Bowel sounds present  EXTREMITIES: No calf tenderness, No cyanosis, No edema, R ankle pain  SKIN: Warm, perfused  PSYCH: Normal mood, Normal affect  NERVOUS SYSTEM:  A/O x3, Good concentration    LABS:                        13.1   12.38 )-----------( 299      ( 28 Feb 2023 12:22 )             41.5     02-28    141  |  102  |  28  ----------------------------<  99  4.3   |  32  |  1.21    Ca    8.7      28 Feb 2023 12:22  Phos  3.1     02-26                                      COVID-19 PCR: NotDetec (02-28-23 @ 16:34)  COVID-19 PCR: NotDetec (02-23-23 @ 19:15)  COVID-19 PCR: NotDetec (02-19-23 @ 15:33)      RADIOLOGY & ADDITIONAL TESTS:    Care Discussed with Consultants/Other Providers:

## 2023-03-01 NOTE — PROGRESS NOTE ADULT - SUBJECTIVE AND OBJECTIVE BOX
Progress: in bed, awake, alert, converses, c/o not getting any rest     Present Symptoms:   Dyspnea: no  Nausea/Vomiting: no  Anxiety:    Depressed Mood:   Fatigue: yes  Loss of appetite: no  Pain:         yes          location: r foot  Review of Systems: [All others negative or Unable to obtain due to poor mentation]    MEDICATIONS  (STANDING):  budesonide 160 MICROgram(s)/formoterol 4.5 MICROgram(s) Inhaler 2 Puff(s) Inhalation every 12 hours  influenza  Vaccine (HIGH DOSE) 0.7 milliLiter(s) IntraMuscular once  polyethylene glycol 3350 17 Gram(s) Oral daily  polyethylene glycol 3350 17 Gram(s) Oral once  senna 2 Tablet(s) Oral at bedtime  tiotropium 2.5 MICROgram(s) Inhaler 2 Puff(s) Inhalation daily    MEDICATIONS  (PRN):  acetaminophen     Tablet .. 650 milliGRAM(s) Oral every 6 hours PRN Temp greater or equal to 38C (100.4F), Mild Pain (1 - 3)  albuterol    90 MICROgram(s) HFA Inhaler 2 Puff(s) Inhalation every 6 hours PRN Shortness of Breath and/or Wheezing  melatonin 3 milliGRAM(s) Oral at bedtime PRN Sleep  oxycodone    5 mG/acetaminophen 325 mG 1 Tablet(s) Oral every 4 hours PRN Severe Pain (7 - 10)  oxyCODONE    IR 5 milliGRAM(s) Oral every 6 hours PRN Moderate Pain (4 - 6)      PHYSICAL EXAM:  Vital Signs Last 24 Hrs  T(C): 36.6 (01 Mar 2023 12:11), Max: 36.6 (28 Feb 2023 20:44)  T(F): 97.9 (01 Mar 2023 12:11), Max: 97.9 (01 Mar 2023 12:11)  HR: 93 (01 Mar 2023 12:11) (75 - 93)  BP: 96/62 (01 Mar 2023 12:11) (96/62 - 102/67)  BP(mean): --  RR: 17 (01 Mar 2023 12:11) (17 - 18)  SpO2: 94% (01 Mar 2023 12:11) (91% - 98%)    Parameters below as of 01 Mar 2023 09:28  Patient On (Oxygen Delivery Method): nasal cannula      General: alert  oriented converses, tired overall       HEENT: n/c, a/t scalp lesion     Lungs: clear breathing comfortable   CV: normal  -tachy  GI: abd soft, n/t     : normal /prima fit    Musculoskeletal: granados's, weakened,    Skin: w/d, scalp lesion back of head     Neuro: no deficits, occ forgetful    Oral intake ability:  oral feeding -  good    Diet: reg         LABS:                          13.1   12.38 )-----------( 299      ( 28 Feb 2023 12:22 )             41.5     02-28    141  |  102  |  28<H>  ----------------------------<  99  4.3   |  32<H>  |  1.21    Ca    8.7      28 Feb 2023 12:22          RADIOLOGY & ADDITIONAL STUDIES: < from: CT Ankle No Cont, Right (02.26.23 @ 18:16) >  ACC: [id number] EXAM:  CT ANKLE ONLY RT   ORDERED BY: SHAI MONGE     PROCEDURE DATE:  02/26/2023          INTERPRETATION:  CT ANKLE RIGHT    HISTORY: Right ankle pain. Stage IV breast cancer. Evaluate for   metastatic disease.    TECHNIQUE: Contiguousaxial imaging was performed through the right ankle   without contrast. Coronal and sagittal reformatting was utilized.    COMPARISON:  Right ankle x-rays dated 2/24/2023    FINDINGS:    OSSEOUS STRUCTURES    Acute Fractures:  There is a presumed pathologic lytic fragmentation of   the talar dome with mild displacement and greatest involvement of the   medial cortex. There is mild subchondral step-off/collapse and   nondisplaced fracture extends into the lateral process. There is   associated soft tissue prominence along the medial cortex.    Chronic Fractures/Ossifications:  None.    JOINTS    Tibiotalar Joint:  The subchondral step-off/collapse involves the   anterior superior talar dome with up to 0.3 cm of depression.    Subtalar Joints:Preserved.    Intertarsal Joints:  Preserved.    Tarsometatarsal Joints:  Preserved.    MYOTENDINOUS STRUCTURES  Intact within limits of CT.    OTHER SOFT TISSUES  Mild to moderate subcutaneous edema is present.  There is mild to moderate generalizedmuscle atrophy.    IMPRESSION:  1.  Presumed pathologic lytic fragmentation of the talar dome is present   likely, related to metastatic disease with the given history.        ADVANCE DIRECTIVES: HCP  Molst dnr dni   Advanced Care Planning discussion total time spent:

## 2023-03-02 ENCOUNTER — TRANSCRIPTION ENCOUNTER (OUTPATIENT)
Age: 68
End: 2023-03-02

## 2023-03-02 ENCOUNTER — RESULT REVIEW (OUTPATIENT)
Age: 68
End: 2023-03-02

## 2023-03-02 ENCOUNTER — OUTPATIENT (OUTPATIENT)
Dept: INPATIENT UNIT | Facility: HOSPITAL | Age: 68
LOS: 1 days | End: 2023-03-02
Payer: MEDICARE

## 2023-03-02 VITALS
TEMPERATURE: 98 F | HEART RATE: 84 BPM | DIASTOLIC BLOOD PRESSURE: 82 MMHG | SYSTOLIC BLOOD PRESSURE: 127 MMHG | RESPIRATION RATE: 20 BRPM | OXYGEN SATURATION: 93 %

## 2023-03-02 DIAGNOSIS — Z90.5 ACQUIRED ABSENCE OF KIDNEY: Chronic | ICD-10-CM

## 2023-03-02 DIAGNOSIS — Z98.890 OTHER SPECIFIED POSTPROCEDURAL STATES: Chronic | ICD-10-CM

## 2023-03-02 DIAGNOSIS — E89.6 POSTPROCEDURAL ADRENOCORTICAL (-MEDULLARY) HYPOFUNCTION: Chronic | ICD-10-CM

## 2023-03-02 DIAGNOSIS — Z90.12 ACQUIRED ABSENCE OF LEFT BREAST AND NIPPLE: Chronic | ICD-10-CM

## 2023-03-02 DIAGNOSIS — R93.5 ABNORMAL FINDINGS ON DIAGNOSTIC IMAGING OF OTHER ABDOMINAL REGIONS, INCLUDING RETROPERITONEUM: ICD-10-CM

## 2023-03-02 LAB
ANION GAP SERPL CALC-SCNC: 8 MMOL/L — SIGNIFICANT CHANGE UP (ref 5–17)
BASOPHILS # BLD AUTO: 0.06 K/UL — SIGNIFICANT CHANGE UP (ref 0–0.2)
BASOPHILS NFR BLD AUTO: 0.4 % — SIGNIFICANT CHANGE UP (ref 0–2)
BUN SERPL-MCNC: 32 MG/DL — HIGH (ref 7–23)
CALCIUM SERPL-MCNC: 9 MG/DL — SIGNIFICANT CHANGE UP (ref 8.4–10.5)
CHLORIDE SERPL-SCNC: 101 MMOL/L — SIGNIFICANT CHANGE UP (ref 96–108)
CO2 SERPL-SCNC: 30 MMOL/L — SIGNIFICANT CHANGE UP (ref 22–31)
CREAT SERPL-MCNC: 1.17 MG/DL — SIGNIFICANT CHANGE UP (ref 0.5–1.3)
EGFR: 51 ML/MIN/1.73M2 — LOW
EOSINOPHIL # BLD AUTO: 0.23 K/UL — SIGNIFICANT CHANGE UP (ref 0–0.5)
EOSINOPHIL NFR BLD AUTO: 1.6 % — SIGNIFICANT CHANGE UP (ref 0–6)
GLUCOSE SERPL-MCNC: 113 MG/DL — HIGH (ref 70–99)
HCT VFR BLD CALC: 42.9 % — SIGNIFICANT CHANGE UP (ref 34.5–45)
HGB BLD-MCNC: 13.3 G/DL — SIGNIFICANT CHANGE UP (ref 11.5–15.5)
IMM GRANULOCYTES NFR BLD AUTO: 1.9 % — HIGH (ref 0–0.9)
LYMPHOCYTES # BLD AUTO: 1.65 K/UL — SIGNIFICANT CHANGE UP (ref 1–3.3)
LYMPHOCYTES # BLD AUTO: 11.4 % — LOW (ref 13–44)
MCHC RBC-ENTMCNC: 31 GM/DL — LOW (ref 32–36)
MCHC RBC-ENTMCNC: 31.4 PG — SIGNIFICANT CHANGE UP (ref 27–34)
MCV RBC AUTO: 101.2 FL — HIGH (ref 80–100)
MONOCYTES # BLD AUTO: 1.02 K/UL — HIGH (ref 0–0.9)
MONOCYTES NFR BLD AUTO: 7 % — SIGNIFICANT CHANGE UP (ref 2–14)
NEUTROPHILS # BLD AUTO: 11.24 K/UL — HIGH (ref 1.8–7.4)
NEUTROPHILS NFR BLD AUTO: 77.7 % — HIGH (ref 43–77)
NRBC # BLD: 0 /100 WBCS — SIGNIFICANT CHANGE UP (ref 0–0)
PLATELET # BLD AUTO: 266 K/UL — SIGNIFICANT CHANGE UP (ref 150–400)
POTASSIUM SERPL-MCNC: 4.8 MMOL/L — SIGNIFICANT CHANGE UP (ref 3.5–5.3)
POTASSIUM SERPL-SCNC: 4.8 MMOL/L — SIGNIFICANT CHANGE UP (ref 3.5–5.3)
RBC # BLD: 4.24 M/UL — SIGNIFICANT CHANGE UP (ref 3.8–5.2)
RBC # FLD: 16.2 % — HIGH (ref 10.3–14.5)
SODIUM SERPL-SCNC: 139 MMOL/L — SIGNIFICANT CHANGE UP (ref 135–145)
WBC # BLD: 14.48 K/UL — HIGH (ref 3.8–10.5)
WBC # FLD AUTO: 14.48 K/UL — HIGH (ref 3.8–10.5)

## 2023-03-02 PROCEDURE — 88341 IMHCHEM/IMCYTCHM EA ADD ANTB: CPT | Mod: 26

## 2023-03-02 PROCEDURE — 20220 BONE BIOPSY TROCAR/NDL SUPFC: CPT

## 2023-03-02 PROCEDURE — 88342 IMHCHEM/IMCYTCHM 1ST ANTB: CPT | Mod: 26

## 2023-03-02 PROCEDURE — 77012 CT SCAN FOR NEEDLE BIOPSY: CPT

## 2023-03-02 PROCEDURE — 88305 TISSUE EXAM BY PATHOLOGIST: CPT | Mod: 26

## 2023-03-02 PROCEDURE — 99233 SBSQ HOSP IP/OBS HIGH 50: CPT

## 2023-03-02 PROCEDURE — 88305 TISSUE EXAM BY PATHOLOGIST: CPT

## 2023-03-02 PROCEDURE — 77012 CT SCAN FOR NEEDLE BIOPSY: CPT | Mod: 26

## 2023-03-02 PROCEDURE — 88341 IMHCHEM/IMCYTCHM EA ADD ANTB: CPT

## 2023-03-02 PROCEDURE — 88342 IMHCHEM/IMCYTCHM 1ST ANTB: CPT

## 2023-03-02 RX ORDER — OXYCODONE AND ACETAMINOPHEN 5; 325 MG/1; MG/1
2 TABLET ORAL EVERY 4 HOURS
Refills: 0 | Status: DISCONTINUED | OUTPATIENT
Start: 2023-03-02 | End: 2023-03-02

## 2023-03-02 RX ADMIN — OXYCODONE AND ACETAMINOPHEN 2 TABLET(S): 5; 325 TABLET ORAL at 12:48

## 2023-03-02 RX ADMIN — OXYCODONE AND ACETAMINOPHEN 2 TABLET(S): 5; 325 TABLET ORAL at 16:02

## 2023-03-02 RX ADMIN — BUDESONIDE AND FORMOTEROL FUMARATE DIHYDRATE 2 PUFF(S): 160; 4.5 AEROSOL RESPIRATORY (INHALATION) at 22:37

## 2023-03-02 RX ADMIN — OXYCODONE HYDROCHLORIDE 5 MILLIGRAM(S): 5 TABLET ORAL at 21:40

## 2023-03-02 RX ADMIN — SENNA PLUS 2 TABLET(S): 8.6 TABLET ORAL at 21:09

## 2023-03-02 RX ADMIN — TIOTROPIUM BROMIDE 2 PUFF(S): 18 CAPSULE ORAL; RESPIRATORY (INHALATION) at 08:06

## 2023-03-02 RX ADMIN — BUDESONIDE AND FORMOTEROL FUMARATE DIHYDRATE 2 PUFF(S): 160; 4.5 AEROSOL RESPIRATORY (INHALATION) at 08:06

## 2023-03-02 RX ADMIN — OXYCODONE HYDROCHLORIDE 5 MILLIGRAM(S): 5 TABLET ORAL at 21:10

## 2023-03-02 RX ADMIN — OXYCODONE AND ACETAMINOPHEN 2 TABLET(S): 5; 325 TABLET ORAL at 12:02

## 2023-03-02 RX ADMIN — POLYETHYLENE GLYCOL 3350 17 GRAM(S): 17 POWDER, FOR SOLUTION ORAL at 17:32

## 2023-03-02 NOTE — ASU DISCHARGE PLAN (ADULT/PEDIATRIC) - ASU DC SPECIAL INSTRUCTIONSFT
Biopsy Discharge    Discharge Instructions  - You have had a biopsy of right talus  - You may shower in 24 hours. No soaking or swimming until the site is completely healed.  - Keep the area covered and dry for the next 24 hours.  - Do not perform any heavy lifting for the next few days or until the site is healed.  - You may resume your normal diet.  - You may resume your normal medications however you should wait 48 hours before restarting aspirin, plavix, or blood thinners.  - It is normal to experience some pain over the site for the next few days. You may take apply ice to the area (20 minutes on, 20 minutes off) and take Tylenol for that pain. Do not take more frequently than every 6 hours and do not exceed more than 3000mg of Tylenol in a 24 hour period.    - You may resume normal activity in 24 hours.    Notify your primary physician and/or Interventional Radiology IMMEDIATELY if you experience any of the following       - Fever of 101F or 38C       - Chills or Rigors/ Shakes       - Swelling and/or Redness in the area around the biopsy site       - Worsening Pain       - Blood soaked bandages or worsening bleeding       - Lightheadedness and/or dizziness upon standing       - Chest Pain/ Tightness       - Shortness of Breath       - Difficulty walking    If you have a problem that you believe requires IMMEDIATE attention, please go to your NEAREST Emergency Room. If you believe your problem can safely wait until you speak to a physician, please call Interventional Radiology at [phone] for any concerns.    During Normal Weekday Business Hours- You can contact the Interventional Radiology department at [phone] during normal business hours via telephone.  During Evenings and Weekends- If you need to contact Interventional Radiology during off hours, do so by calling the hospital at [phone] and request to be connected to the Interventional Radiology Resident on call.

## 2023-03-02 NOTE — PROGRESS NOTE ADULT - ASSESSMENT
67year old female w. PMH Stage IV breast cancer with mets (s/p L mastectomy, s/p RT for lung mets, brain mets) now on oral chemo, clear cell RCC (s/p L nephrectomy), b/l LE DVT (was on xarelto), COPD coming in for ambulatory dysfunction and inability to care for self.     #Right ankle pain   #Likely pathological right ankle fracture  - CT ankle showed presumed pathological lytic fragmentation of the talar dome, related to metastatic disease   - Pain control PRN  - spoke to Dr. Vuong, Orthopedics, whom spoke with Orthopedic Oncology, Dr. Jamel Jaramillo, recommends IR biopsy of the R talar dome and then follow up as outpatient   - shuttle to [facility] for IR biopsy today  - pending results- will need to determine if patient will require chemo or other treatment and then can make decision regarding [facility]  - continue with CAM boot, WBAT    #History of stage IV breast cancer with mets to lung, bone and brain. s/p RT and chemo  #Scalp mass  - s/p biopsy with Dr. Perez last admission.     - Per Dr. Perez, no active bleeding on scalp mass, expect drainage from mass as it is likely a malignant mass. Cont with xeroform dressing on mass  - chemical DVT ppx on hold due to possible underlying hemorrhagic brain mets    #Leukocytosis   - suspect steroid induced  - was given steroid for previously thought to be R ankle gout   - procal 0.18, monitor off abx for now  - CXR performed 3/1 and shows no acute infectious disease, slight nodularity in L apex somewhat worse (patient was informed of this)    #COPD  - on Breo Ellipta at home, started on Symbicort while in hospital  - currently on 3L NC at this time, wean 02 as tolerated  - maintain saturations 88-92%    #History of DVT  - has history of b/l DVT and was on Xarelto however found to have hemorrhagic brain lesion on MRI and AC was stopped  - as per chart review, patient has history of IVC filter placement but does not remember having one placed  - dopplers 11/29/22 showed evidence of no DVT in either lower extremity     #DVT ppx  - SCD  - No chemical AC as above    Dispo: [facility] after IR biopsy, pending results to determine treatment    Patient states she will update family on her own

## 2023-03-02 NOTE — PROGRESS NOTE ADULT - SUBJECTIVE AND OBJECTIVE BOX
Patient is a 67y old  Female who presents with a chief complaint of pt with stage IV breast ca (brain, bone, lung mets), malodorous scalp mass, unable to care for self (01 Mar 2023 15:23)      Patient seen and examined at bedside. No events overnight. Continues to have R ankle pain but no other acute concerns, denies SOB, chest pain, abd pain, but currently on 3L NC.    ALLERGIES:  Cipro (Rash)    MEDICATIONS  (STANDING):  budesonide 160 MICROgram(s)/formoterol 4.5 MICROgram(s) Inhaler 2 Puff(s) Inhalation every 12 hours  influenza  Vaccine (HIGH DOSE) 0.7 milliLiter(s) IntraMuscular once  polyethylene glycol 3350 17 Gram(s) Oral once  polyethylene glycol 3350 17 Gram(s) Oral daily  senna 2 Tablet(s) Oral at bedtime  tiotropium 2.5 MICROgram(s) Inhaler 2 Puff(s) Inhalation daily    MEDICATIONS  (PRN):  acetaminophen     Tablet .. 650 milliGRAM(s) Oral every 6 hours PRN Temp greater or equal to 38C (100.4F), Mild Pain (1 - 3)  albuterol    90 MICROgram(s) HFA Inhaler 2 Puff(s) Inhalation every 6 hours PRN Shortness of Breath and/or Wheezing  melatonin 3 milliGRAM(s) Oral at bedtime PRN Sleep  oxycodone    5 mG/acetaminophen 325 mG 1 Tablet(s) Oral every 4 hours PRN Severe Pain (7 - 10)  oxyCODONE    IR 5 milliGRAM(s) Oral every 6 hours PRN Moderate Pain (4 - 6)    Vital Signs Last 24 Hrs  T(F): 98 (02 Mar 2023 11:27), Max: 98 (02 Mar 2023 11:27)  HR: 84 (02 Mar 2023 11:27) (79 - 93)  BP: 127/82 (02 Mar 2023 11:27) (93/63 - 127/82)  RR: 20 (02 Mar 2023 11:27) (17 - 20)  SpO2: 93% (02 Mar 2023 11:27) (93% - 96%)  I&O's Summary      PHYSICAL EXAM:  GENERAL: NAD, laying in bed, on 3L NC  HEAD:  large hard mass noted on R occipital area  CHEST/LUNG: Clear to auscultation bilaterally, good air entry, non-labored breathing  HEART: RRR; S1/S2, No murmur  ABDOMEN: Soft, Nontender, Nondistended; Bowel sounds present  EXTREMITIES: No calf tenderness, No cyanosis, No edema, R ankle pain  SKIN: Warm, perfused  PSYCH: Normal mood, Normal affect  NERVOUS SYSTEM:  A/O x3, Good concentration    LABS:                        13.3   14.48 )-----------( 266      ( 02 Mar 2023 06:30 )             42.9     03-02    139  |  101  |  32  ----------------------------<  113  4.8   |  30  |  1.17    Ca    9.0      02 Mar 2023 06:30                                      COVID-19 PCR: NotDetec (02-28-23 @ 16:34)  COVID-19 PCR: NotDetec (02-23-23 @ 19:15)  COVID-19 PCR: NotDetec (02-19-23 @ 15:33)      RADIOLOGY & ADDITIONAL TESTS:    Care Discussed with Consultants/Other Providers:

## 2023-03-02 NOTE — ASU DISCHARGE PLAN (ADULT/PEDIATRIC) - NURSING INSTRUCTIONS
Please feel free to contact us at [phone] if any problems arise. After 6PM, Monday through Friday, on weekends and on holidays, please call [phone] and ask for the radiology resident on call to be paged.

## 2023-03-03 ENCOUNTER — NON-APPOINTMENT (OUTPATIENT)
Age: 68
End: 2023-03-03

## 2023-03-03 ENCOUNTER — TRANSCRIPTION ENCOUNTER (OUTPATIENT)
Age: 68
End: 2023-03-03

## 2023-03-03 VITALS
TEMPERATURE: 98 F | SYSTOLIC BLOOD PRESSURE: 122 MMHG | DIASTOLIC BLOOD PRESSURE: 80 MMHG | HEART RATE: 100 BPM | OXYGEN SATURATION: 92 % | RESPIRATION RATE: 18 BRPM

## 2023-03-03 LAB
ANION GAP SERPL CALC-SCNC: 8 MMOL/L — SIGNIFICANT CHANGE UP (ref 5–17)
BUN SERPL-MCNC: 39 MG/DL — HIGH (ref 7–23)
CALCIUM SERPL-MCNC: 8.9 MG/DL — SIGNIFICANT CHANGE UP (ref 8.4–10.5)
CHLORIDE SERPL-SCNC: 102 MMOL/L — SIGNIFICANT CHANGE UP (ref 96–108)
CO2 SERPL-SCNC: 29 MMOL/L — SIGNIFICANT CHANGE UP (ref 22–31)
CREAT SERPL-MCNC: 1.33 MG/DL — HIGH (ref 0.5–1.3)
EGFR: 43 ML/MIN/1.73M2 — LOW
GLUCOSE SERPL-MCNC: 140 MG/DL — HIGH (ref 70–99)
HCT VFR BLD CALC: 40.3 % — SIGNIFICANT CHANGE UP (ref 34.5–45)
HGB BLD-MCNC: 12.5 G/DL — SIGNIFICANT CHANGE UP (ref 11.5–15.5)
MCHC RBC-ENTMCNC: 30.9 PG — SIGNIFICANT CHANGE UP (ref 27–34)
MCHC RBC-ENTMCNC: 31 GM/DL — LOW (ref 32–36)
MCV RBC AUTO: 99.8 FL — SIGNIFICANT CHANGE UP (ref 80–100)
NRBC # BLD: 0 /100 WBCS — SIGNIFICANT CHANGE UP (ref 0–0)
PLATELET # BLD AUTO: 249 K/UL — SIGNIFICANT CHANGE UP (ref 150–400)
POTASSIUM SERPL-MCNC: 4.5 MMOL/L — SIGNIFICANT CHANGE UP (ref 3.5–5.3)
POTASSIUM SERPL-SCNC: 4.5 MMOL/L — SIGNIFICANT CHANGE UP (ref 3.5–5.3)
RBC # BLD: 4.04 M/UL — SIGNIFICANT CHANGE UP (ref 3.8–5.2)
RBC # FLD: 16 % — HIGH (ref 10.3–14.5)
SARS-COV-2 RNA SPEC QL NAA+PROBE: SIGNIFICANT CHANGE UP
SODIUM SERPL-SCNC: 139 MMOL/L — SIGNIFICANT CHANGE UP (ref 135–145)
WBC # BLD: 11.78 K/UL — HIGH (ref 3.8–10.5)
WBC # FLD AUTO: 11.78 K/UL — HIGH (ref 3.8–10.5)

## 2023-03-03 PROCEDURE — 97162 PT EVAL MOD COMPLEX 30 MIN: CPT

## 2023-03-03 PROCEDURE — 82746 ASSAY OF FOLIC ACID SERUM: CPT

## 2023-03-03 PROCEDURE — 86140 C-REACTIVE PROTEIN: CPT

## 2023-03-03 PROCEDURE — 84550 ASSAY OF BLOOD/URIC ACID: CPT

## 2023-03-03 PROCEDURE — 93005 ELECTROCARDIOGRAM TRACING: CPT

## 2023-03-03 PROCEDURE — 87635 SARS-COV-2 COVID-19 AMP PRB: CPT

## 2023-03-03 PROCEDURE — 94640 AIRWAY INHALATION TREATMENT: CPT

## 2023-03-03 PROCEDURE — 82607 VITAMIN B-12: CPT

## 2023-03-03 PROCEDURE — 80048 BASIC METABOLIC PNL TOTAL CA: CPT

## 2023-03-03 PROCEDURE — 85025 COMPLETE CBC W/AUTO DIFF WBC: CPT

## 2023-03-03 PROCEDURE — 84100 ASSAY OF PHOSPHORUS: CPT

## 2023-03-03 PROCEDURE — 73610 X-RAY EXAM OF ANKLE: CPT

## 2023-03-03 PROCEDURE — 71045 X-RAY EXAM CHEST 1 VIEW: CPT

## 2023-03-03 PROCEDURE — 85027 COMPLETE CBC AUTOMATED: CPT

## 2023-03-03 PROCEDURE — 81001 URINALYSIS AUTO W/SCOPE: CPT

## 2023-03-03 PROCEDURE — 99285 EMERGENCY DEPT VISIT HI MDM: CPT

## 2023-03-03 PROCEDURE — 99232 SBSQ HOSP IP/OBS MODERATE 35: CPT

## 2023-03-03 PROCEDURE — 73700 CT LOWER EXTREMITY W/O DYE: CPT

## 2023-03-03 PROCEDURE — 99239 HOSP IP/OBS DSCHRG MGMT >30: CPT

## 2023-03-03 PROCEDURE — 84145 PROCALCITONIN (PCT): CPT

## 2023-03-03 PROCEDURE — 85652 RBC SED RATE AUTOMATED: CPT

## 2023-03-03 PROCEDURE — 80053 COMPREHEN METABOLIC PANEL: CPT

## 2023-03-03 PROCEDURE — 36415 COLL VENOUS BLD VENIPUNCTURE: CPT

## 2023-03-03 RX ORDER — OXYCODONE HYDROCHLORIDE 5 MG/1
1 TABLET ORAL
Qty: 0 | Refills: 0 | DISCHARGE
Start: 2023-03-03

## 2023-03-03 RX ORDER — SENNA PLUS 8.6 MG/1
2 TABLET ORAL
Qty: 0 | Refills: 0 | DISCHARGE
Start: 2023-03-03

## 2023-03-03 RX ORDER — MORPHINE SULFATE 50 MG/1
2 CAPSULE, EXTENDED RELEASE ORAL ONCE
Refills: 0 | Status: DISCONTINUED | OUTPATIENT
Start: 2023-03-03 | End: 2023-03-03

## 2023-03-03 RX ORDER — ACETAMINOPHEN 500 MG
2 TABLET ORAL
Qty: 0 | Refills: 0 | DISCHARGE
Start: 2023-03-03

## 2023-03-03 RX ORDER — LANOLIN ALCOHOL/MO/W.PET/CERES
1 CREAM (GRAM) TOPICAL
Qty: 0 | Refills: 0 | DISCHARGE
Start: 2023-03-03

## 2023-03-03 RX ORDER — ALBUTEROL 90 UG/1
2 AEROSOL, METERED ORAL
Qty: 0 | Refills: 0 | DISCHARGE
Start: 2023-03-03

## 2023-03-03 RX ORDER — OXYCODONE HYDROCHLORIDE 5 MG/1
10 TABLET ORAL EVERY 6 HOURS
Refills: 0 | Status: DISCONTINUED | OUTPATIENT
Start: 2023-03-03 | End: 2023-03-03

## 2023-03-03 RX ORDER — POLYETHYLENE GLYCOL 3350 17 G/17G
17 POWDER, FOR SOLUTION ORAL
Qty: 0 | Refills: 0 | DISCHARGE
Start: 2023-03-03

## 2023-03-03 RX ADMIN — MORPHINE SULFATE 2 MILLIGRAM(S): 50 CAPSULE, EXTENDED RELEASE ORAL at 12:27

## 2023-03-03 RX ADMIN — MORPHINE SULFATE 2 MILLIGRAM(S): 50 CAPSULE, EXTENDED RELEASE ORAL at 12:45

## 2023-03-03 RX ADMIN — Medication 650 MILLIGRAM(S): at 03:13

## 2023-03-03 RX ADMIN — OXYCODONE HYDROCHLORIDE 10 MILLIGRAM(S): 5 TABLET ORAL at 10:19

## 2023-03-03 RX ADMIN — Medication 650 MILLIGRAM(S): at 03:43

## 2023-03-03 RX ADMIN — OXYCODONE HYDROCHLORIDE 5 MILLIGRAM(S): 5 TABLET ORAL at 09:56

## 2023-03-03 RX ADMIN — OXYCODONE HYDROCHLORIDE 5 MILLIGRAM(S): 5 TABLET ORAL at 08:56

## 2023-03-03 RX ADMIN — OXYCODONE HYDROCHLORIDE 10 MILLIGRAM(S): 5 TABLET ORAL at 11:20

## 2023-03-03 NOTE — DISCHARGE NOTE NURSING/CASE MANAGEMENT/SOCIAL WORK - NSDCVIVACCINE_GEN_ALL_CORE_FT
Influenza, injectable,quadrivalent, preservative free, pediatric; 10-Nov-2020 12:55; Krishna Chong (RN); [employer];  (Exp. Date: 30-Jun-2021); IntraMuscular; Deltoid Right.; 0.5 milliLiter(s); VIS (VIS Published: 15-Aug-2019, VIS Presented: 10-Nov-2020);   influenza, high-dose, quadrivalent; 18-Nov-2021 14:17; Nia Garcia R.N. (RN); [employer]; [id number] (Exp. Date: 30-Jun-2022); IntraMuscular; Deltoid Right.; 0.7 milliLiter(s); VIS (VIS Published: 06-Aug-2021, VIS Presented: 18-Nov-2021);   Tdap; 10-Nov-2020 12:55; Krishna Chong (DONALD); [employer]; [id number] (Exp. Date: 31-Jul-2022); IntraMuscular; Deltoid Left.; 0.5 milliLiter(s); VIS (VIS Published: 10-Dec-2020, VIS Presented: 10-Nov-2020);

## 2023-03-03 NOTE — DISCHARGE NOTE PROVIDER - CARE PROVIDER_API CALL
Leslie Islas)  Internal Medicine; Medical Oncology  [address], [place] [address]  Phone: [phone]  Fax: [phone]  Follow Up Time:

## 2023-03-03 NOTE — DISCHARGE NOTE PROVIDER - NSDCMRMEDTOKEN_GEN_ALL_CORE_FT
acetaminophen 325 mg oral tablet: 2 tab(s) orally every 6 hours, As needed, Temp greater or equal to 38C (100.4F), Mild Pain (1 - 3)  albuterol 90 mcg/inh inhalation aerosol: 2 puff(s) inhaled every 6 hours, As needed, Shortness of Breath and/or Wheezing  Breo Ellipta 100 mcg-25 mcg/inh inhalation powder: 1 puff(s) inhaled once a day  melatonin 3 mg oral tablet: 1 tab(s) orally once a day (at bedtime), As needed, Sleep  oxyCODONE 10 mg oral tablet: 1 tab(s) orally every 6 hours, As needed, Severe Pain (7 - 10)  oxyCODONE 5 mg oral tablet: 1 tab(s) orally every 6 hours, As needed, Moderate Pain (4 - 6)  polyethylene glycol 3350 oral powder for reconstitution: 17 gram(s) orally once a day  Right LE CAM boot:   senna leaf extract oral tablet: 2 tab(s) orally once a day (at bedtime)

## 2023-03-03 NOTE — PROGRESS NOTE ADULT - ASSESSMENT
67year old female w. PMH Stage IV breast cancer with mets (s/p L mastectomy, s/p RT for lung mets, brain mets) now on oral chemo, clear cell RCC (s/p L nephrectomy), b/l LE DVT (was on xarelto), COPD coming in for ambulatory dysfunction and inability to care for self.     #Right ankle pain   #Likely pathological right ankle fracture  - CT ankle showed presumed pathological lytic fragmentation of the talar dome, related to metastatic disease   - Pain control PRN- meds adjusted  - spoke to Dr. Vuong, Orthopedics, whom spoke with Orthopedic Oncology, Dr. Jamel Jaramillo, recommends IR biopsy of the R talar dome and then follow up as outpatient   - s/p IR biopsy R ankle 3/3- results pending palliative radiation treatment vs surgical however can go to [facility] in the meantime  - does not need chemo or radiation while in [facility], results from biopsy pending  - continue with CAM boot, WBAT    #History of stage IV breast cancer with mets to lung, bone and brain. s/p RT and chemo  #Scalp mass  - s/p biopsy with Dr. Perez last admission.     - Per Dr. Perez, no active bleeding on scalp mass, expect drainage from mass as it is likely a malignant mass. Cont with xeroform dressing on mass  - chemical DVT ppx on hold due to possible underlying hemorrhagic brain mets  - CT head ordered to evaluated brain mets s/p radiation last month however patient refusing    #Leukocytosis   - suspect steroid induced, trending down  - was given steroid for previously thought to be R ankle gout   - procal 0.18, monitor off abx for now  - CXR performed 3/1 and shows no acute infectious disease, slight nodularity in L apex somewhat worse (patient was informed of this)    #COPD  - on Breo Ellipta at home, started on Symbicort while in hospital  - saturating well on RA  - maintain saturations 88-92%    #History of DVT  - has history of b/l DVT and was on Xarelto however found to have hemorrhagic brain lesion on MRI and AC was stopped  - as per chart review, patient has history of IVC filter placement but does not remember having one placed  - dopplers 11/29/22 showed evidence of no DVT in either lower extremity     #DVT ppx  - SCD  - No chemical AC as above    Dispo: [facility]    Patient states she will update family on her own

## 2023-03-03 NOTE — DISCHARGE NOTE PROVIDER - HOSPITAL COURSE
Hospital Course  HPI:  67F w/ COPD on prn home O2 via NC, hx of left renal cancer-clear cell, s/p nephrectomy (2018), Stage IV breast cancer ER +/RI +/HER2, initially dxed in 2003 ( had eft mastectomy), now w/ mets to bone, lung, brain, s/p RT/chemo but still had progression of disease, hx of bilateral DVT, has IVC filter, was just recently hospitalized for enlarging fungating mass on right side of scalp, w/c was biopsied 2/21/23, was just discharged yesterday with home care, now back to the ED, because visiting nurse concerned that pt is unable to care for herself. Pt with malodorous scalp mass, s/p biopsy. It was recommended that pt be discharged to [facility], but pt refused, wanted to go home.  Pt lives by herself, walks with a walker.  Pt denies fever, chills, chest pain, cough, abd pain, nausea, vomiting.  She states she has been wheezing lately, she has chronic dyspnea with exertion and c/o right ankle pain for almost a month.    (23 Feb 2023 19:41)    Patient was admitted for ambulatory dysfunction and inability to take care of herself. During hospitalization she was found to have R ankle pain and CT ankle performed showed  presumed pathological lytic fragmentation of the talar dome, related to metastatic disease. The finding were discussed with orthopedics and ortho oncology and patient was set up to have IR guided R ankle biopsy which  was performed on 3/2. Her results are pending, heme saw patient and pending results patient will require treatment. Patient was seen by PT and recommended [facility] placement. SHE DOES NOT REQUIRE CHEMO OR RADIATION WHILE IN [facility]. Patient seen on day of discharge and medically stable for discharge to [facility].    You were admitted for difficulty walking  You were diagnosed with ambulatory dysfunction possibly due to right ankle fracture  You were treated with IR biopsy was performed of right ankle and pain medication    You will need to follow up with your primary care physician.    Discharging Provider:  Matilde Stevens D.O.  Contact Info: Cell [phone] - Please call with any questions or concerns.    Outpatient Provider: Dr. Ochoa    Hospital Course  HPI:  67F w/ COPD on prn home O2 via NC, hx of left renal cancer-clear cell, s/p nephrectomy (2018), Stage IV breast cancer ER +/MD +/HER2, initially dxed in 2003 ( had eft mastectomy), now w/ mets to bone, lung, brain, s/p RT/chemo but still had progression of disease, hx of bilateral DVT, has IVC filter, was just recently hospitalized for enlarging fungating mass on right side of scalp, w/c was biopsied 2/21/23, was just discharged yesterday with home care, now back to the ED, because visiting nurse concerned that pt is unable to care for herself. Pt with malodorous scalp mass, s/p biopsy. It was recommended that pt be discharged to [facility], but pt refused, wanted to go home.  Pt lives by herself, walks with a walker.  Pt denies fever, chills, chest pain, cough, abd pain, nausea, vomiting.  She states she has been wheezing lately, she has chronic dyspnea with exertion and c/o right ankle pain for almost a month.    (23 Feb 2023 19:41)    Patient was admitted for ambulatory dysfunction and inability to take care of herself. During hospitalization she was found to have R ankle pain and CT ankle performed showed  presumed pathological lytic fragmentation of the talar dome, related to metastatic disease. The finding were discussed with orthopedics and ortho oncology and patient was set up to have IR guided R ankle biopsy which  was performed on 3/2. Her results are pending, heme saw patient and pending results patient will require treatment. Patient was seen by PT and recommended [facility] placement. SHE DOES NOT REQUIRE CHEMO OR RADIATION WHILE IN [facility]. Patient seen on day of discharge and medically stable for discharge to [facility].    You were admitted for difficulty walking  You were diagnosed with ambulatory dysfunction possibly due to right ankle fracture  You were treated with IR biopsy was performed of right ankle and pain medication    You will need to follow up with your primary care physician.    Discharging Provider:  Matilde Stevens D.O.  Contact Info: Cell [phone] - Please call with any questions or concerns.    Outpatient Provider: Dr. Ochoa (informed)  [facility] Provider: Dr. Duran (informed)

## 2023-03-03 NOTE — PROGRESS NOTE ADULT - SUBJECTIVE AND OBJECTIVE BOX
Progress:  s/p R foot bone bx, pt c/o pain to that area today     Present Symptoms:   Dyspnea: no  Nausea/Vomiting: no  Anxiety:    Depressed Mood:   Fatigue: yes  Loss of appetite: no  Pain:             yes      location: R foot  Review of Systems: [All others negative  MEDICATIONS  (STANDING):  budesonide 160 MICROgram(s)/formoterol 4.5 MICROgram(s) Inhaler 2 Puff(s) Inhalation every 12 hours  influenza  Vaccine (HIGH DOSE) 0.7 milliLiter(s) IntraMuscular once  polyethylene glycol 3350 17 Gram(s) Oral daily  senna 2 Tablet(s) Oral at bedtime  tiotropium 2.5 MICROgram(s) Inhaler 2 Puff(s) Inhalation daily    MEDICATIONS  (PRN):  acetaminophen     Tablet .. 650 milliGRAM(s) Oral every 6 hours PRN Temp greater or equal to 38C (100.4F), Mild Pain (1 - 3)  albuterol    90 MICROgram(s) HFA Inhaler 2 Puff(s) Inhalation every 6 hours PRN Shortness of Breath and/or Wheezing  melatonin 3 milliGRAM(s) Oral at bedtime PRN Sleep  oxyCODONE    IR 5 milliGRAM(s) Oral every 6 hours PRN Moderate Pain (4 - 6)  oxyCODONE    IR 10 milliGRAM(s) Oral every 6 hours PRN Severe Pain (7 - 10)      PHYSICAL EXAM:  Vital Signs Last 24 Hrs  T(C): 36.6 (03 Mar 2023 13:54), Max: 36.6 (02 Mar 2023 18:17)  T(F): 97.9 (03 Mar 2023 13:54), Max: 97.9 (03 Mar 2023 13:54)  HR: 91 (03 Mar 2023 13:54) (78 - 93)  BP: 98/68 (03 Mar 2023 13:54) (94/60 - 121/73)  BP(mean): --  RR: 18 (03 Mar 2023 13:54) (18 - 18)  SpO2: 94% (03 Mar 2023 13:54) (92% - 97%)    Parameters below as of 03 Mar 2023 13:54  Patient On (Oxygen Delivery Method): nasal cannula      General: alert  oriented converses      HEENT: n/c, a/t, scalp lesion back of head     Lungs: ess clear, breathing comfortable   CV: nml rate     GI: abd soft / n/t   , +bs   : normal  /prima fit   Musculoskeletal: granados's, weakened, R foot in  dsg    Skin: w/d    Neuro: no deficits , awake, alert, conversant   Oral intake ability:  oral feeding, w/ set up   Diet: reg diet      LABS:                          12.5   11.78 )-----------( 249      ( 03 Mar 2023 06:40 )             40.3     03-03    139  |  102  |  39<H>  ----------------------------<  140<H>  4.5   |  29  |  1.33<H>    Ca    8.9      03 Mar 2023 06:40          RADIOLOGY & ADDITIONAL STUDIES: < from: IR Procedure (03.02.23 @ 14:12) >  ACC: [id number] EXAM:  IR PROCEDURE NON PICC   ORDERED BY: NESTOR LEE     PROCEDURE DATE:  03/02/2023        < from: IR Procedure (03.02.23 @ 14:12) >  INTERPRETATION:  INDICATION AND FOCUSED HISTORY: Right talar bone mass   with pathologic fracture Interventional radiology consulted for bone mass   core biopsy    PROCEDURE:  CT-guided bone mass core biopsy.      < from: IR Procedure (03.02.23 @ 14:12) >  IMPRESSION:  Successful bone mass biopsy of right talar dome mass    PLAN:  Follow final pathology      < from: Xray Chest 1 View- PORTABLE-Routine (Xray Chest 1 View- PORTABLE-Routine .) (03.01.23 @ 15:32) >  ACC: [id number] EXAM:  XR CHEST PORTABLE ROUTINE 1V   ORDERED BY: FELICIA KAPADIA     PROCEDURE DATE:  03/01/2023          INTERPRETATION:  AP semisupine chest on March 1, 2023 at 3:24 PM. Patient   is short of breath.    CAT scan of similar first 2022 show bilateral perihilar masses with   extension along the bronchi. There are also pulmonary nodules and   sclerotic bony metastatic disease.    On present exam heart size is within normal limits.    Mass measuring with a prominent right hilum is again noted.    No infiltrates are seen.    Slight nodularity particularly in left apex is somewhat more advanced   compared to February 25.    Chronic blunting left base is again seen.    IMPRESSION: Findings are relatively unchanged and again suggest  malignancy.        ADVANCE DIRECTIVES: Hcp Molst dnr/dni    Advanced Care Planning discussion total time spent:

## 2023-03-03 NOTE — PROGRESS NOTE ADULT - SUBJECTIVE AND OBJECTIVE BOX
Patient is a 67y old  Female who presents with a chief complaint of pt with stage IV breast ca (brain, bone, lung mets), malodorous scalp mass, unable to care for self (02 Mar 2023 12:05)    Patient seen and examined at bedside. No events overnight. Patient with uncontrolled R ankle pain but no other acute concerns, denies SOB, chest pain, abd pain. Was placed on 6L NC overnight however titrated off slowly this morning by myself and now saturating 92-93% on RA.     ALLERGIES:  Cipro (Rash)    MEDICATIONS  (STANDING):  budesonide 160 MICROgram(s)/formoterol 4.5 MICROgram(s) Inhaler 2 Puff(s) Inhalation every 12 hours  influenza  Vaccine (HIGH DOSE) 0.7 milliLiter(s) IntraMuscular once  morphine  - Injectable 2 milliGRAM(s) IV Push once  polyethylene glycol 3350 17 Gram(s) Oral daily  senna 2 Tablet(s) Oral at bedtime  tiotropium 2.5 MICROgram(s) Inhaler 2 Puff(s) Inhalation daily    MEDICATIONS  (PRN):  acetaminophen     Tablet .. 650 milliGRAM(s) Oral every 6 hours PRN Temp greater or equal to 38C (100.4F), Mild Pain (1 - 3)  albuterol    90 MICROgram(s) HFA Inhaler 2 Puff(s) Inhalation every 6 hours PRN Shortness of Breath and/or Wheezing  melatonin 3 milliGRAM(s) Oral at bedtime PRN Sleep  oxyCODONE    IR 5 milliGRAM(s) Oral every 6 hours PRN Moderate Pain (4 - 6)  oxyCODONE    IR 10 milliGRAM(s) Oral every 6 hours PRN Severe Pain (7 - 10)    Vital Signs Last 24 Hrs  T(F): 97.4 (03 Mar 2023 05:08), Max: 97.8 (02 Mar 2023 18:17)  HR: 78 (03 Mar 2023 08:24) (78 - 93)  BP: 94/60 (03 Mar 2023 05:08) (94/60 - 121/73)  RR: 18 (03 Mar 2023 05:08) (18 - 18)  SpO2: 92% (03 Mar 2023 08:24) (92% - 97%)  I&O's Summary    02 Mar 2023 07:01  -  03 Mar 2023 07:00  --------------------------------------------------------  IN: 300 mL / OUT: 0 mL / NET: 300 mL      PHYSICAL EXAM:  GENERAL: NAD, laying in bed, on RA  HEAD:  large hard mass noted on R occipital area  CHEST/LUNG: Clear to auscultation bilaterally, good air entry, non-labored breathing  HEART: RRR; S1/S2, No murmur  ABDOMEN: Soft, Nontender, Nondistended; Bowel sounds present  EXTREMITIES: No calf tenderness, No cyanosis, No edema, R ankle pain  SKIN: Warm, perfused  PSYCH: Normal mood, Normal affect  NERVOUS SYSTEM:  A/O x3, Good concentration    LABS:                        12.5   11.78 )-----------( 249      ( 03 Mar 2023 06:40 )             40.3     03-03    139  |  102  |  39  ----------------------------<  140  4.5   |  29  |  1.33    Ca    8.9      03 Mar 2023 06:40                                      COVID-19 PCR: NotDetec (02-28-23 @ 16:34)  COVID-19 PCR: NotDetec (02-23-23 @ 19:15)  COVID-19 PCR: NotDetec (02-19-23 @ 15:33)      RADIOLOGY & ADDITIONAL TESTS:    Care Discussed with Consultants/Other Providers:

## 2023-03-03 NOTE — PROGRESS NOTE ADULT - ASSESSMENT
A/P  67year old female w. PMH Stage IV breast cancer with mets (s/p L mastectomy, s/p RT for lung mets, brain mets) now on oral chemo, clear cell RCC (s/p L nephrectomy), b/l LE DVT (was on xarelto), COPD coming in for ambulatory dysfunction and inability to care for self.         Right ankle pain   Likely pathological right ankle fracture  - CT ankle showed presumed pathological lytic fragmentation of the talar dome, related to metastatic disease   - Pain control PRN-   was increased today for c/o R ankle pain s/p bone BX    - Dr. Vuong, Orthopedics,  spoke with Orthopedic Oncology, Dr. Jamel Jaramillo, recommends IR biopsy of the R talar dome and then follow up as outpatient   - s/p IR biopsy R ankle 3/3- results pending palliative radiation treatment vs surgical however can go to [facility] in the meantime  - does not need chemo or radiation while in [facility], results from biopsy pending  - continue with CAM boot, WBAT    History of stage IV breast cancer with mets to lung, bone and brain. s/p RT and chemo  Scalp mass  - s/p biopsy with Dr. Perez last admission, will need final path report to determine any treatment      - Per Dr. Perez, no active bleeding on scalp mass, expect drainage from mass as it is likely a malignant mass. Cont with xeroform dressing on mass  - chemical DVT ppx on hold due to possible underlying hemorrhagic brain mets    Leukocytosis   - suspect steroid induced  - was given steroid for previously thought to be R ankle gout   - procal added     COPD  - on Breo Ellipta at home, started on Symbicort while in hospital  - currently on 3L NC at this time, wean 02 as tolerated  - maintain saturations 88-92%    History of DVT  - has history of b/l DVT and was on Xarelto however found to have hemorrhagic brain lesion on MRI and AC was stopped    Present POC- [facility] after IR biopsy       Palliative :   seen as a follow up today, pt in bed, awake, alert, oriented , converses.  Reports went for R foot bone bx yesterday and has c/o pain to that area.   Agree  with pain med  increase  as done  by med team today for that c/o.    Cont to monitor.   Cont bowel regime , monitor    Pt's  appetite remains good.   Pt aware plan is for [facility] from here, before any treatment given.    Chaplaincy support requested   cont supportive care

## 2023-03-03 NOTE — DISCHARGE NOTE NURSING/CASE MANAGEMENT/SOCIAL WORK - PATIENT PORTAL LINK FT
You can access the FollowMyHealth Patient Portal offered by [facility] by registering at the following website: [email or web address]. By joining [facility]’s FollowMyHealth portal, you will also be able to view your health information using other applications (apps) compatible with our system.

## 2023-03-03 NOTE — DISCHARGE NOTE PROVIDER - NSDCFUSCHEDAPPT_GEN_ALL_CORE_FT
Leslie Goldstein  [facility]  Anjali Solo  Scheduled Appointment: 03/14/2023

## 2023-03-03 NOTE — DISCHARGE NOTE NURSING/CASE MANAGEMENT/SOCIAL WORK - NSDCPEFALRISK_GEN_ALL_CORE
For information on Fall & Injury Prevention, visit: [email or web address] OR  [email or web address] OR  https://www.cdc.gov/steadi/patient.html

## 2023-03-03 NOTE — DISCHARGE NOTE PROVIDER - NSDCCPCAREPLAN_GEN_ALL_CORE_FT
PRINCIPAL DISCHARGE DIAGNOSIS  Diagnosis: Fatigue  Assessment and Plan of Treatment: You were admitted for difficulty walking  You were diagnosed with ambulatory dysfunction possibly due to right ankle fracture  You were treated with IR biopsy was performed of right ankle and pain medication  You will need to follow up with your primary care physician.  Follow up with oncologist regarding results of biopsy.  Discharging Provider:  Matilde Stevens D.O.  Contact Info: Cell [phone] - Please call with any questions or concerns.

## 2023-03-03 NOTE — PROGRESS NOTE ADULT - PROVIDER SPECIALTY LIST ADULT
Hospitalist
Hospitalist
Internal Medicine
Palliative Care
Palliative Care
Hospitalist
Orthopedics
Palliative Care

## 2023-03-08 ENCOUNTER — NON-APPOINTMENT (OUTPATIENT)
Age: 68
End: 2023-03-08

## 2023-03-09 DIAGNOSIS — M89.9 DISORDER OF BONE, UNSPECIFIED: ICD-10-CM

## 2023-03-12 ENCOUNTER — OUTPATIENT (OUTPATIENT)
Dept: OUTPATIENT SERVICES | Facility: HOSPITAL | Age: 68
LOS: 1 days | Discharge: HOME | End: 2023-03-12

## 2023-03-12 DIAGNOSIS — C50.919 MALIGNANT NEOPLASM OF UNSPECIFIED SITE OF UNSPECIFIED FEMALE BREAST: ICD-10-CM

## 2023-03-12 DIAGNOSIS — Z90.12 ACQUIRED ABSENCE OF LEFT BREAST AND NIPPLE: Chronic | ICD-10-CM

## 2023-03-12 DIAGNOSIS — E89.6 POSTPROCEDURAL ADRENOCORTICAL (-MEDULLARY) HYPOFUNCTION: Chronic | ICD-10-CM

## 2023-03-12 DIAGNOSIS — Z90.5 ACQUIRED ABSENCE OF KIDNEY: Chronic | ICD-10-CM

## 2023-03-12 DIAGNOSIS — Z98.890 OTHER SPECIFIED POSTPROCEDURAL STATES: Chronic | ICD-10-CM

## 2023-03-14 ENCOUNTER — APPOINTMENT (OUTPATIENT)
Dept: HEMATOLOGY ONCOLOGY | Facility: CLINIC | Age: 68
End: 2023-03-14

## 2023-03-14 ENCOUNTER — NON-APPOINTMENT (OUTPATIENT)
Age: 68
End: 2023-03-14

## 2023-03-15 NOTE — ED ADULT TRIAGE NOTE - CHIEF COMPLAINT QUOTE
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, OHT 2013, acute renal failur s/p DDKT 2014, CMV colitis, hypothyroidism, HLD, chronic anemia, depression, and BPH who was admitted to [facility] 3/13/2023 with recurrent encephalopathy     Recurrent encephalopathy: Third admission in the last few months for AMS  - ddx discussion: thought seems to be primarily metabolic vs structural/medication related or multifactorial  --> His ammonia is elevated but it's half what it was when he was admitted with severe HE in January and he's been adherent to HE prophylaxis with lactulose + rifaximin, also he doesn't demonstrate clonus or asterixis. I'll talk with hepatology about potentially treating more aggressively with lactulose tomorrow to see if this helps with his encephalopathy as sort of a therapeutic trial.   --> psych got an ECG that seemed to demonstrate an atrial tachycardia but repeat formal ECG does not demonstrate an arrhythmia; transplant cards asked to be consulted  - next diagnostics: MRI brain w and wo - couldn't lie flat, vEEG now with possible sedated MRI tomorrow afternoon; ID with recs for additional testing of CSF if LP deemed necessary;   - next therapeutics: continue to observe  - consultants: neurology, GI (hep), ID, transplant nephro; psych, transplant Cardiology    ARCEO cirrhosis c/b recent GIB 2/2 EV, HE: Dx during 12/2022 admission. Found to have GIB 2/2 EV s/p banding 12/27 and 12/29. PTA on PPI, Rifaximin and lactulose. Hgb stable. Ammonia 72 on presentation  - Scheduled and prn Lactulose; PPI and rifaximin     DDKT 2014, CKD III-IV: Per notes, suffered acute renal failure following OHT. PTA on Tacro 0.38 qam and 0.5 at bedtime (goal 4-6), mycophenolate 250 bid, Valganciclovir. Baseline Cr  around 1.4. Per spouse, off iHD since 2/2023  - Transplant nephrology consult  - Continue mycophenolate, tacro, and Valganciclovir   - holding diuretic (bumex vs torsemide) due to mild free water deficit    Recent hospitalization for CMV colitis: Completed ganciclovir  - CMV pending, ID consulted     Idiopathic cardiomyopathy s/p OHT: 2013. Echo 3/3/2023 EF 55-60%   - Consider Cardiology consult, not consulted overnight   - Immunosuppression as above  - Continue Coreg with hold parameters    Hypernatremia, hypermagnesemia: his sodium is 'normal' at 144 but is elevated relative to prior discharge sodiums in the mid to upper 130s; I'd estimate his free water deficit is about 1.5 L --> will give 1L of free water via IV and recheck BMP in the AM, correct to closer to 140 to see if this improves mentation    DMII: A1c 7.9. Appears to have been on Metformin in the past and currently on Novolog only. Glucose stable  - Sliding scale insulin, hypoglycemia protocol     Other Medical Issues:  Anemia of chronic disease: BL hgb 8.5-10 and stable. Recent GIB as above. Daily CBC  GERD: Continue PPI  Hypothyroidism: TSH 3.76 1/2023. Continue synthroid   HLD: Continue statin   Depression: Continue Sertraline   BPH: Continue Flomax  RLS: Continue pramipexole       Diet: Combination Diet Regular Diet Adult    DVT Prophylaxis: Enoxaparin (Lovenox) SQ  Brian Catheter: Not present  Lines: PRESENT             Cardiac Monitoring: None  Code Status: Full Code      Clinically Significant Risk Factors         # Hypernatremia: Highest Na = 146 mmol/L in last 2 days, will monitor as appropriate   # Hypercalcemia: corrected calcium is >10.1, will monitor as appropriate    # Hypoalbuminemia: Lowest albumin = 3.1 g/dL at 3/15/2023  6:17 AM, will monitor as appropriate           # DMII: A1C = N/A within past 6 months           Disposition Plan     Expected Discharge Date: 03/15/2023                  Matheus Miller DO  Hospitalist Service, GOLD  TEAM 11  [facility]  Securely message with [employer] (more info)  Text page via [employer] Paging/Directory   See signed in provider for up to date coverage information  ______________________________________________________________________    Interval History     Couldn't get MRI yesterday due to inability to lie flat, was quite sedated with pre-MRI ativan and made NPO, diet reordered when more awake. Patient remains encephalopathic, waxing/waning. Hooked up to vEEG now. He is having regular bm's and eating/drinking.    Physical Exam   Vital Signs: Temp: 97.7  F (36.5  C) Temp src: Axillary BP: 109/65 Pulse: 84   Resp: 20 SpO2: 97 % O2 Device: None (Room air)    Weight: 0 lbs 0 oz    Lying down in no distress  Answers yes/no questions, sleepy this morning when I went to see him  Respiratory rate and work of breathing are normal  HRRR, extremities warm and well perfused  Abdomen is soft, non tender, non distended  Extremities are without edema or cyanosis      Medical Decision Making       70 MINUTES SPENT BY ME on the date of service doing chart review, history, exam, documentation & further activities per the note.      Data     I have personally reviewed the following data over the past 24 hrs:    4.7  \   9.7 (L)   / 152     144 108 (H) 37.9 (H) /  145 (H)   3.7 24 1.77 (H) \       ALT: 10 AST: 38 AP: 72 TBILI: 0.8   ALB: 3.1 (L) TOT PROTEIN: 6.7 LIPASE: N/A       Imaging results reviewed over the past 24 hrs:   No results found for this or any previous visit (from the past 24 hour(s)).   abnormal MRI

## 2023-03-16 NOTE — ED ADULT TRIAGE NOTE - BMI (KG/M2)
27.5 Dupixent Counseling: I discussed with the patient the risks of dupilumab including but not limited to eye infection and irritation, cold sores, injection site reactions, worsening of asthma, allergic reactions and increased risk of parasitic infection.  Live vaccines should be avoided while taking dupilumab. Dupilumab will also interact with certain medications such as warfarin and cyclosporine. The patient understands that monitoring is required and they must alert us or the primary physician if symptoms of infection or other concerning signs are noted.

## 2023-03-21 ENCOUNTER — EMERGENCY (EMERGENCY)
Facility: HOSPITAL | Age: 68
LOS: 1 days | Discharge: ANOTHER HOSPITAL | End: 2023-03-21
Attending: EMERGENCY MEDICINE | Admitting: EMERGENCY MEDICINE
Payer: MEDICARE

## 2023-03-21 VITALS
SYSTOLIC BLOOD PRESSURE: 120 MMHG | DIASTOLIC BLOOD PRESSURE: 78 MMHG | HEIGHT: 63 IN | RESPIRATION RATE: 18 BRPM | TEMPERATURE: 98 F | HEART RATE: 106 BPM | WEIGHT: 145.06 LBS | OXYGEN SATURATION: 90 %

## 2023-03-21 DIAGNOSIS — Z90.5 ACQUIRED ABSENCE OF KIDNEY: Chronic | ICD-10-CM

## 2023-03-21 DIAGNOSIS — Z98.890 OTHER SPECIFIED POSTPROCEDURAL STATES: Chronic | ICD-10-CM

## 2023-03-21 DIAGNOSIS — E89.6 POSTPROCEDURAL ADRENOCORTICAL (-MEDULLARY) HYPOFUNCTION: Chronic | ICD-10-CM

## 2023-03-21 DIAGNOSIS — Z90.12 ACQUIRED ABSENCE OF LEFT BREAST AND NIPPLE: Chronic | ICD-10-CM

## 2023-03-21 LAB
ALBUMIN SERPL ELPH-MCNC: 2.3 G/DL — LOW (ref 3.3–5)
ALP SERPL-CCNC: 50 U/L — SIGNIFICANT CHANGE UP (ref 40–120)
ALT FLD-CCNC: 7 U/L — LOW (ref 10–45)
ANION GAP SERPL CALC-SCNC: 14 MMOL/L — SIGNIFICANT CHANGE UP (ref 5–17)
APTT BLD: 32 SEC — SIGNIFICANT CHANGE UP (ref 27.5–35.5)
AST SERPL-CCNC: 20 U/L — SIGNIFICANT CHANGE UP (ref 10–40)
BASOPHILS # BLD AUTO: 0.02 K/UL — SIGNIFICANT CHANGE UP (ref 0–0.2)
BASOPHILS NFR BLD AUTO: 0.2 % — SIGNIFICANT CHANGE UP (ref 0–2)
BILIRUB SERPL-MCNC: 0.5 MG/DL — SIGNIFICANT CHANGE UP (ref 0.2–1.2)
BUN SERPL-MCNC: 25 MG/DL — HIGH (ref 7–23)
CALCIUM SERPL-MCNC: 9.5 MG/DL — SIGNIFICANT CHANGE UP (ref 8.4–10.5)
CHLORIDE SERPL-SCNC: 107 MMOL/L — SIGNIFICANT CHANGE UP (ref 96–108)
CO2 SERPL-SCNC: 22 MMOL/L — SIGNIFICANT CHANGE UP (ref 22–31)
CREAT SERPL-MCNC: 1.09 MG/DL — SIGNIFICANT CHANGE UP (ref 0.5–1.3)
EGFR: 55 ML/MIN/1.73M2 — LOW
EOSINOPHIL # BLD AUTO: 0.01 K/UL — SIGNIFICANT CHANGE UP (ref 0–0.5)
EOSINOPHIL NFR BLD AUTO: 0.1 % — SIGNIFICANT CHANGE UP (ref 0–6)
GLUCOSE SERPL-MCNC: 108 MG/DL — HIGH (ref 70–99)
HCT VFR BLD CALC: 38 % — SIGNIFICANT CHANGE UP (ref 34.5–45)
HGB BLD-MCNC: 11.9 G/DL — SIGNIFICANT CHANGE UP (ref 11.5–15.5)
IMM GRANULOCYTES NFR BLD AUTO: 2.2 % — HIGH (ref 0–0.9)
INR BLD: 1.14 RATIO — SIGNIFICANT CHANGE UP (ref 0.88–1.16)
LACTATE SERPL-SCNC: 3.2 MMOL/L — HIGH (ref 0.7–2)
LYMPHOCYTES # BLD AUTO: 0.47 K/UL — LOW (ref 1–3.3)
LYMPHOCYTES # BLD AUTO: 5.8 % — LOW (ref 13–44)
MCHC RBC-ENTMCNC: 29.7 PG — SIGNIFICANT CHANGE UP (ref 27–34)
MCHC RBC-ENTMCNC: 31.3 GM/DL — LOW (ref 32–36)
MCV RBC AUTO: 94.8 FL — SIGNIFICANT CHANGE UP (ref 80–100)
MONOCYTES # BLD AUTO: 0.44 K/UL — SIGNIFICANT CHANGE UP (ref 0–0.9)
MONOCYTES NFR BLD AUTO: 5.4 % — SIGNIFICANT CHANGE UP (ref 2–14)
NEUTROPHILS # BLD AUTO: 6.99 K/UL — SIGNIFICANT CHANGE UP (ref 1.8–7.4)
NEUTROPHILS NFR BLD AUTO: 86.3 % — HIGH (ref 43–77)
NRBC # BLD: 0 /100 WBCS — SIGNIFICANT CHANGE UP (ref 0–0)
PLATELET # BLD AUTO: 360 K/UL — SIGNIFICANT CHANGE UP (ref 150–400)
POTASSIUM SERPL-MCNC: 3.8 MMOL/L — SIGNIFICANT CHANGE UP (ref 3.5–5.3)
POTASSIUM SERPL-SCNC: 3.8 MMOL/L — SIGNIFICANT CHANGE UP (ref 3.5–5.3)
PROT SERPL-MCNC: 6.5 G/DL — SIGNIFICANT CHANGE UP (ref 6–8.3)
PROTHROM AB SERPL-ACNC: 13.3 SEC — SIGNIFICANT CHANGE UP (ref 10.5–13.4)
RBC # BLD: 4.01 M/UL — SIGNIFICANT CHANGE UP (ref 3.8–5.2)
RBC # FLD: 15.9 % — HIGH (ref 10.3–14.5)
SARS-COV-2 RNA SPEC QL NAA+PROBE: DETECTED
SODIUM SERPL-SCNC: 143 MMOL/L — SIGNIFICANT CHANGE UP (ref 135–145)
WBC # BLD: 8.11 K/UL — SIGNIFICANT CHANGE UP (ref 3.8–10.5)
WBC # FLD AUTO: 8.11 K/UL — SIGNIFICANT CHANGE UP (ref 3.8–10.5)

## 2023-03-21 PROCEDURE — 71045 X-RAY EXAM CHEST 1 VIEW: CPT | Mod: 26

## 2023-03-21 PROCEDURE — 70450 CT HEAD/BRAIN W/O DYE: CPT | Mod: 26,MA

## 2023-03-21 PROCEDURE — 99285 EMERGENCY DEPT VISIT HI MDM: CPT | Mod: CS,GC

## 2023-03-21 PROCEDURE — 93010 ELECTROCARDIOGRAM REPORT: CPT

## 2023-03-21 RX ORDER — IPRATROPIUM/ALBUTEROL SULFATE 18-103MCG
3 AEROSOL WITH ADAPTER (GRAM) INHALATION ONCE
Refills: 0 | Status: COMPLETED | OUTPATIENT
Start: 2023-03-21 | End: 2023-03-21

## 2023-03-21 RX ORDER — SODIUM CHLORIDE 9 MG/ML
1000 INJECTION, SOLUTION INTRAVENOUS ONCE
Refills: 0 | Status: COMPLETED | OUTPATIENT
Start: 2023-03-21 | End: 2023-03-21

## 2023-03-21 RX ADMIN — Medication 3 MILLILITER(S): at 21:20

## 2023-03-21 RX ADMIN — SODIUM CHLORIDE 1000 MILLILITER(S): 9 INJECTION, SOLUTION INTRAVENOUS at 21:40

## 2023-03-21 RX ADMIN — SODIUM CHLORIDE 1000 MILLILITER(S): 9 INJECTION, SOLUTION INTRAVENOUS at 20:55

## 2023-03-21 RX ADMIN — Medication 3 MILLILITER(S): at 20:55

## 2023-03-21 RX ADMIN — Medication 3 MILLILITER(S): at 20:15

## 2023-03-21 NOTE — ED PROVIDER NOTE - CLINICAL SUMMARY MEDICAL DECISION MAKING FREE TEXT BOX
Kendrick Roy,  PGY-2: 67-year-old female past medical history of COPD on home O2, CKD, breast CA, renal CA status post nephrectomy, metastatic disease to the brain and bone presents ED complaining of altered mental status from unclear duration from SNF.  As per patient friend who is bedside patient is normally conversational but bedbound and patient is now nonverbal and unable to participate in the exam.  As per patient's friend patient has right midline or PICC line that was recently placed earlier in March for IV antibiotics for some kind of infection unclear what. Patient nonverbal but awake, otherwise nonremarkable exam.  Differential includes not limited to sepsis, intracranial pathology, electrolyte abnormality.  Plan for sepsis work-up, CT head.  Likely admission Kendrick Roy,  PGY-2: 67-year-old female past medical history of COPD on home O2, CKD, breast CA, renal CA status post nephrectomy, metastatic disease to the brain and bone presents ED complaining of altered mental status from unclear duration from SNF.  As per patient friend who is bedside patient is normally conversational but bedbound and patient is now nonverbal and unable to participate in the exam.  As per patient's friend patient has right midline or PICC line that was recently placed earlier in March for IV antibiotics for some kind of infection unclear what. Patient nonverbal but awake, otherwise nonremarkable exam.  Differential includes not limited to sepsis, intracranial pathology, electrolyte abnormality.  Plan for sepsis work-up, CT head.  Likely admission    dr hurst:  Patient CT shows left frontal hemorrhagic process about 1 x 1 x 0.8 cm, with decreased edema compared to December 2022.  Patient not on anticoagulant or antiplatelets per medication reconciliation from nursing home.No fever or leukocytosis or other findings to suggest sepsis/infection as main cause of AMS.  Patient does have MOLST indicating DNR/DNI status.  Discussed with next of kin and healthcare proxy son Darrel Ortega.  Explained to him that given patient's advanced cancer that neurosurgical intervention would be unlikely.  Son still insist on formal evaluation by neurosurgery and transfer to [facility].  Discussed with Dr. Daley (neurosurgical ICU) via transfer center, accepting transfer to ED evaluation.  Also discussed with [facility] ED Dr. Daley, accepting transfer.

## 2023-03-21 NOTE — ED PROVIDER NOTE - CARE PLAN
1 Principal Discharge DX:	Intraparenchymal hemorrhage of brain   Principal Discharge DX:	Metastatic cancer to brain

## 2023-03-21 NOTE — ED ADULT TRIAGE NOTE - CHIEF COMPLAINT QUOTE
BIB EMS from [facility] for altered mental status, unknown onset. As per EMS, pt usually more talkative but when doctor assessed pt, pt was no longer speaking.

## 2023-03-21 NOTE — PROVIDER CONTACT NOTE (CRITICAL VALUE NOTIFICATION) - TEST AND RESULT REPORTED:
Alert-The patient is alert, awake and responds to voice. The patient is oriented to time, place, and person. The triage nurse is able to obtain subjective information.
lact  3.2

## 2023-03-21 NOTE — ED PROVIDER NOTE - PROGRESS NOTE DETAILS
Kendrick Roy, DO PGY-2: Received a call from radiology, concern for hemorrhagic process in the left inferior frontal region, could be related to patient's underlying metastatic disease.  Will speak with patient's next of kin regarding whether to transfer patient to [place] for neurosurgery or admit patient for medical management.

## 2023-03-21 NOTE — ED PROVIDER NOTE - ATTENDING CONTRIBUTION TO CARE
67F w/ COPD on prn home O2 via NC, hx of left renal cancer-clear cell, s/p nephrectomy (2018), Stage IV breast cancer ER +/MS +/HER2, initially dxed in 2003 ( had eft mastectomy), now w/ mets to bone, lung, brain, s/p RT/chemo but still had progression of disease, hx of bilateral DVT, has IVC filter, was just recently hospitalized for enlarging fungating mass on right side of scalp, w/c was biopsied 2/21/23,  [phone] sent from nursing home today for altered mental status, unclear duration/exact onset.  Patient reportedly not speaking today.  Friend Angelina at bedside states she saw her few days ago and patient was speaking normally, joking around, fully coherent.  Patient unable to give any history.  Following only some simple commands.    exam:   General: NAD. Not conversing.  Unable to state name, but will respond yes when calling her name.  Will close eyes on command, otherwise not following other commands  HEENT: eyes perrl, nose normal,   cor: RRR, s1s2, 2+rad pulses.   lungs: ctabl, no resp distress.   abd: soft, ntnd.   neuro: alert, not oriented, no facial asymmetry. Not lifting arms but will clench fist and squeeze equally bilateral hands.  Spontaneously moves feet but not lifting legs.  Skin: normal, no rash    AP:Patient with metastatic breast cancer to brain bone and lung presents with altered mental status unknown exact onset.  Not speaking/conversing.  Abrupt change in baseline mental status.  Partial DDx: Infection, dehydration, metabolic abnormality, worsening brain mets, intracranial hemorrhage.  Check labs CT head reassess

## 2023-03-21 NOTE — ED PROVIDER NOTE - NS ED ATTENDING STATEMENT MOD
I have seen and examined this patient and fully participated in the care of this patient as the teaching attending.  The service was shared with the YING.  I reviewed and verified the documentation and independently performed the documented:

## 2023-03-21 NOTE — ED PROVIDER NOTE - OBJECTIVE STATEMENT
67-year-old female past medical history of COPD on home O2, CKD, breast CA, renal CA status post nephrectomy, metastatic disease to the brain and bone presents ED complaining of altered mental status from unclear duration from SNF.  As per patient friend who is bedside patient is normally conversational but bedbound and patient is now nonverbal and unable to participate in the exam.  As per patient's friend patient has right midline or PICC line that was recently placed earlier in March for IV antibiotics for some kind of infection unclear what.

## 2023-03-21 NOTE — ED ADULT TRIAGE NOTE - HISTORY OF COVID-19 VACCINATION
What Type Of Note Output Would You Prefer (Optional)?: Standard Output Hpi Title: Evaluation of Skin Lesions How Severe Are Your Spot(S)?: mild Have Your Spot(S) Been Treated In The Past?: has been treated When Was It Treated?: Last week Additional History: Patient has a home health nurse that comes over to his house to treat the open wound Vaccine status unknown

## 2023-03-21 NOTE — ED PROVIDER NOTE - PHYSICAL EXAMINATION
GEN: Patient awake, nonverbal  HEENT: normocephalic, atraumatic, dry MM  CARDIAC: RRR, S1, S2, no murmur.   PULM: CTA B/L no wheeze, rhonchi, rales.   ABD: soft NT, ND, no rebound no guarding,   MSK: Moving all extremities, no edema. ttp to RLE, no obvious deformity or skin changes    NEURO: A&Ox0, no focal neurological deficits  SKIN: warm, dry, no rash.

## 2023-03-22 ENCOUNTER — NON-APPOINTMENT (OUTPATIENT)
Age: 68
End: 2023-03-22

## 2023-03-22 ENCOUNTER — INPATIENT (INPATIENT)
Facility: HOSPITAL | Age: 68
LOS: 9 days | Discharge: HOSPICE | DRG: 54 | End: 2023-04-01
Attending: FAMILY MEDICINE | Admitting: HOSPITALIST
Payer: MEDICARE

## 2023-03-22 VITALS
SYSTOLIC BLOOD PRESSURE: 148 MMHG | DIASTOLIC BLOOD PRESSURE: 99 MMHG | HEART RATE: 97 BPM | OXYGEN SATURATION: 96 % | RESPIRATION RATE: 19 BRPM | TEMPERATURE: 99 F

## 2023-03-22 VITALS
DIASTOLIC BLOOD PRESSURE: 111 MMHG | HEART RATE: 108 BPM | OXYGEN SATURATION: 97 % | SYSTOLIC BLOOD PRESSURE: 169 MMHG | HEIGHT: 63 IN | RESPIRATION RATE: 20 BRPM

## 2023-03-22 DIAGNOSIS — R41.82 ALTERED MENTAL STATUS, UNSPECIFIED: ICD-10-CM

## 2023-03-22 DIAGNOSIS — Z29.9 ENCOUNTER FOR PROPHYLACTIC MEASURES, UNSPECIFIED: ICD-10-CM

## 2023-03-22 DIAGNOSIS — U07.1 COVID-19: ICD-10-CM

## 2023-03-22 DIAGNOSIS — Z90.12 ACQUIRED ABSENCE OF LEFT BREAST AND NIPPLE: Chronic | ICD-10-CM

## 2023-03-22 DIAGNOSIS — Z98.890 OTHER SPECIFIED POSTPROCEDURAL STATES: Chronic | ICD-10-CM

## 2023-03-22 DIAGNOSIS — J44.9 CHRONIC OBSTRUCTIVE PULMONARY DISEASE, UNSPECIFIED: ICD-10-CM

## 2023-03-22 DIAGNOSIS — M79.89 OTHER SPECIFIED SOFT TISSUE DISORDERS: ICD-10-CM

## 2023-03-22 DIAGNOSIS — C50.919 MALIGNANT NEOPLASM OF UNSPECIFIED SITE OF UNSPECIFIED FEMALE BREAST: ICD-10-CM

## 2023-03-22 DIAGNOSIS — E89.6 POSTPROCEDURAL ADRENOCORTICAL (-MEDULLARY) HYPOFUNCTION: Chronic | ICD-10-CM

## 2023-03-22 DIAGNOSIS — Z90.5 ACQUIRED ABSENCE OF KIDNEY: Chronic | ICD-10-CM

## 2023-03-22 LAB
ALBUMIN SERPL ELPH-MCNC: 3.2 G/DL — LOW (ref 3.3–5)
ALBUMIN SERPL ELPH-MCNC: 3.2 G/DL — LOW (ref 3.3–5)
ALBUMIN SERPL ELPH-MCNC: 3.3 G/DL — SIGNIFICANT CHANGE UP (ref 3.3–5)
ALP SERPL-CCNC: 47 U/L — SIGNIFICANT CHANGE UP (ref 40–120)
ALP SERPL-CCNC: 48 U/L — SIGNIFICANT CHANGE UP (ref 40–120)
ALP SERPL-CCNC: 48 U/L — SIGNIFICANT CHANGE UP (ref 40–120)
ALT FLD-CCNC: 5 U/L — LOW (ref 10–45)
ALT FLD-CCNC: <5 U/L — LOW (ref 10–45)
ALT FLD-CCNC: <5 U/L — LOW (ref 10–45)
ANION GAP SERPL CALC-SCNC: 16 MMOL/L — SIGNIFICANT CHANGE UP (ref 5–17)
ANION GAP SERPL CALC-SCNC: 19 MMOL/L — HIGH (ref 5–17)
ANISOCYTOSIS BLD QL: SLIGHT — SIGNIFICANT CHANGE UP
APPEARANCE UR: CLEAR — SIGNIFICANT CHANGE UP
APPEARANCE UR: CLEAR — SIGNIFICANT CHANGE UP
APTT BLD: 24.4 SEC — LOW (ref 27.5–35.5)
AST SERPL-CCNC: 12 U/L — SIGNIFICANT CHANGE UP (ref 10–40)
AST SERPL-CCNC: 12 U/L — SIGNIFICANT CHANGE UP (ref 10–40)
AST SERPL-CCNC: 15 U/L — SIGNIFICANT CHANGE UP (ref 10–40)
BACTERIA # UR AUTO: NEGATIVE — SIGNIFICANT CHANGE UP
BASOPHILS # BLD AUTO: 0 K/UL — SIGNIFICANT CHANGE UP (ref 0–0.2)
BASOPHILS NFR BLD AUTO: 0 % — SIGNIFICANT CHANGE UP (ref 0–2)
BILIRUB DIRECT SERPL-MCNC: 0.1 MG/DL — SIGNIFICANT CHANGE UP (ref 0–0.3)
BILIRUB INDIRECT FLD-MCNC: 0.3 MG/DL — SIGNIFICANT CHANGE UP (ref 0.2–1)
BILIRUB SERPL-MCNC: 0.4 MG/DL — SIGNIFICANT CHANGE UP (ref 0.2–1.2)
BILIRUB UR-MCNC: NEGATIVE — SIGNIFICANT CHANGE UP
BILIRUB UR-MCNC: NEGATIVE — SIGNIFICANT CHANGE UP
BLD GP AB SCN SERPL QL: NEGATIVE — SIGNIFICANT CHANGE UP
BUN SERPL-MCNC: 22 MG/DL — SIGNIFICANT CHANGE UP (ref 7–23)
BUN SERPL-MCNC: 22 MG/DL — SIGNIFICANT CHANGE UP (ref 7–23)
BURR CELLS BLD QL SMEAR: PRESENT — SIGNIFICANT CHANGE UP
CALCIUM SERPL-MCNC: 10.1 MG/DL — SIGNIFICANT CHANGE UP (ref 8.4–10.5)
CALCIUM SERPL-MCNC: 9.8 MG/DL — SIGNIFICANT CHANGE UP (ref 8.4–10.5)
CHLORIDE SERPL-SCNC: 106 MMOL/L — SIGNIFICANT CHANGE UP (ref 96–108)
CHLORIDE SERPL-SCNC: 109 MMOL/L — HIGH (ref 96–108)
CO2 SERPL-SCNC: 20 MMOL/L — LOW (ref 22–31)
CO2 SERPL-SCNC: 22 MMOL/L — SIGNIFICANT CHANGE UP (ref 22–31)
COLOR SPEC: SIGNIFICANT CHANGE UP
COLOR SPEC: YELLOW — SIGNIFICANT CHANGE UP
CREAT SERPL-MCNC: 0.78 MG/DL — SIGNIFICANT CHANGE UP (ref 0.5–1.3)
CREAT SERPL-MCNC: 0.79 MG/DL — SIGNIFICANT CHANGE UP (ref 0.5–1.3)
CREAT SERPL-MCNC: 0.86 MG/DL — SIGNIFICANT CHANGE UP (ref 0.5–1.3)
DACRYOCYTES BLD QL SMEAR: SLIGHT — SIGNIFICANT CHANGE UP
DIFF PNL FLD: ABNORMAL
DIFF PNL FLD: ABNORMAL
EGFR: 73 ML/MIN/1.73M2 — SIGNIFICANT CHANGE UP
EGFR: 81 ML/MIN/1.73M2 — SIGNIFICANT CHANGE UP
EGFR: 82 ML/MIN/1.73M2 — SIGNIFICANT CHANGE UP
ELLIPTOCYTES BLD QL SMEAR: SLIGHT — SIGNIFICANT CHANGE UP
EOSINOPHIL # BLD AUTO: 0 K/UL — SIGNIFICANT CHANGE UP (ref 0–0.5)
EOSINOPHIL NFR BLD AUTO: 0 % — SIGNIFICANT CHANGE UP (ref 0–6)
EPI CELLS # UR: 1 /HPF — SIGNIFICANT CHANGE UP
GLUCOSE BLDC GLUCOMTR-MCNC: 109 MG/DL — HIGH (ref 70–99)
GLUCOSE SERPL-MCNC: 118 MG/DL — HIGH (ref 70–99)
GLUCOSE SERPL-MCNC: 81 MG/DL — SIGNIFICANT CHANGE UP (ref 70–99)
GLUCOSE UR QL: NEGATIVE — SIGNIFICANT CHANGE UP
GLUCOSE UR QL: NEGATIVE — SIGNIFICANT CHANGE UP
HCT VFR BLD CALC: 39.1 % — SIGNIFICANT CHANGE UP (ref 34.5–45)
HGB BLD-MCNC: 11.9 G/DL — SIGNIFICANT CHANGE UP (ref 11.5–15.5)
HYALINE CASTS # UR AUTO: 4 /LPF — HIGH (ref 0–2)
INR BLD: 1.16 RATIO — SIGNIFICANT CHANGE UP (ref 0.88–1.16)
INR BLD: 1.17 RATIO — HIGH (ref 0.88–1.16)
KETONES UR-MCNC: ABNORMAL
KETONES UR-MCNC: ABNORMAL
LACTATE BLDV-MCNC: 2.3 MMOL/L — HIGH (ref 0.5–2)
LACTATE SERPL-SCNC: 2.8 MMOL/L — HIGH (ref 0.7–2)
LEUKOCYTE ESTERASE UR-ACNC: NEGATIVE — SIGNIFICANT CHANGE UP
LEUKOCYTE ESTERASE UR-ACNC: NEGATIVE — SIGNIFICANT CHANGE UP
LYMPHOCYTES # BLD AUTO: 0.86 K/UL — LOW (ref 1–3.3)
LYMPHOCYTES # BLD AUTO: 9.7 % — LOW (ref 13–44)
MANUAL SMEAR VERIFICATION: SIGNIFICANT CHANGE UP
MCHC RBC-ENTMCNC: 29.4 PG — SIGNIFICANT CHANGE UP (ref 27–34)
MCHC RBC-ENTMCNC: 30.4 GM/DL — LOW (ref 32–36)
MCV RBC AUTO: 96.5 FL — SIGNIFICANT CHANGE UP (ref 80–100)
MONOCYTES # BLD AUTO: 0.55 K/UL — SIGNIFICANT CHANGE UP (ref 0–0.9)
MONOCYTES NFR BLD AUTO: 6.2 % — SIGNIFICANT CHANGE UP (ref 2–14)
NEUTROPHILS # BLD AUTO: 7.42 K/UL — HIGH (ref 1.8–7.4)
NEUTROPHILS NFR BLD AUTO: 83.2 % — HIGH (ref 43–77)
NEUTS BAND # BLD: 0.9 % — SIGNIFICANT CHANGE UP (ref 0–8)
NITRITE UR-MCNC: NEGATIVE — SIGNIFICANT CHANGE UP
NITRITE UR-MCNC: NEGATIVE — SIGNIFICANT CHANGE UP
OVALOCYTES BLD QL SMEAR: SLIGHT — SIGNIFICANT CHANGE UP
PH UR: 6 — SIGNIFICANT CHANGE UP (ref 5–8)
PH UR: 6 — SIGNIFICANT CHANGE UP (ref 5–8)
PLAT MORPH BLD: NORMAL — SIGNIFICANT CHANGE UP
PLATELET # BLD AUTO: 390 K/UL — SIGNIFICANT CHANGE UP (ref 150–400)
POIKILOCYTOSIS BLD QL AUTO: SLIGHT — SIGNIFICANT CHANGE UP
POLYCHROMASIA BLD QL SMEAR: SLIGHT — SIGNIFICANT CHANGE UP
POTASSIUM SERPL-MCNC: 3.7 MMOL/L — SIGNIFICANT CHANGE UP (ref 3.5–5.3)
POTASSIUM SERPL-MCNC: 4.3 MMOL/L — SIGNIFICANT CHANGE UP (ref 3.5–5.3)
POTASSIUM SERPL-SCNC: 3.7 MMOL/L — SIGNIFICANT CHANGE UP (ref 3.5–5.3)
POTASSIUM SERPL-SCNC: 4.3 MMOL/L — SIGNIFICANT CHANGE UP (ref 3.5–5.3)
PROT SERPL-MCNC: 6.5 G/DL — SIGNIFICANT CHANGE UP (ref 6–8.3)
PROT SERPL-MCNC: 6.6 G/DL — SIGNIFICANT CHANGE UP (ref 6–8.3)
PROT SERPL-MCNC: 6.6 G/DL — SIGNIFICANT CHANGE UP (ref 6–8.3)
PROT UR-MCNC: 100
PROT UR-MCNC: ABNORMAL
PROTHROM AB SERPL-ACNC: 13.5 SEC — HIGH (ref 10.5–13.4)
PROTHROM AB SERPL-ACNC: 13.5 SEC — HIGH (ref 10.5–13.4)
RBC # BLD: 4.05 M/UL — SIGNIFICANT CHANGE UP (ref 3.8–5.2)
RBC # FLD: 16 % — HIGH (ref 10.3–14.5)
RBC BLD AUTO: ABNORMAL
RBC CASTS # UR COMP ASSIST: 2 /HPF — SIGNIFICANT CHANGE UP (ref 0–4)
RH IG SCN BLD-IMP: POSITIVE — SIGNIFICANT CHANGE UP
SODIUM SERPL-SCNC: 145 MMOL/L — SIGNIFICANT CHANGE UP (ref 135–145)
SODIUM SERPL-SCNC: 147 MMOL/L — HIGH (ref 135–145)
SP GR SPEC: 1.01 — SIGNIFICANT CHANGE UP (ref 1.01–1.02)
SP GR SPEC: 1.02 — SIGNIFICANT CHANGE UP (ref 1.01–1.02)
UROBILINOGEN FLD QL: NEGATIVE — SIGNIFICANT CHANGE UP
UROBILINOGEN FLD QL: NEGATIVE — SIGNIFICANT CHANGE UP
WBC # BLD: 8.82 K/UL — SIGNIFICANT CHANGE UP (ref 3.8–10.5)
WBC # FLD AUTO: 8.82 K/UL — SIGNIFICANT CHANGE UP (ref 3.8–10.5)
WBC UR QL: 1 /HPF — SIGNIFICANT CHANGE UP (ref 0–5)

## 2023-03-22 PROCEDURE — 80053 COMPREHEN METABOLIC PANEL: CPT

## 2023-03-22 PROCEDURE — 71045 X-RAY EXAM CHEST 1 VIEW: CPT | Mod: 26

## 2023-03-22 PROCEDURE — 83605 ASSAY OF LACTIC ACID: CPT

## 2023-03-22 PROCEDURE — 51702 INSERT TEMP BLADDER CATH: CPT

## 2023-03-22 PROCEDURE — 99223 1ST HOSP IP/OBS HIGH 75: CPT | Mod: GC

## 2023-03-22 PROCEDURE — 85610 PROTHROMBIN TIME: CPT

## 2023-03-22 PROCEDURE — 73610 X-RAY EXAM OF ANKLE: CPT | Mod: 26,RT

## 2023-03-22 PROCEDURE — 70450 CT HEAD/BRAIN W/O DYE: CPT | Mod: MA

## 2023-03-22 PROCEDURE — 36415 COLL VENOUS BLD VENIPUNCTURE: CPT

## 2023-03-22 PROCEDURE — 70450 CT HEAD/BRAIN W/O DYE: CPT | Mod: 26,MA

## 2023-03-22 PROCEDURE — 84484 ASSAY OF TROPONIN QUANT: CPT

## 2023-03-22 PROCEDURE — 99285 EMERGENCY DEPT VISIT HI MDM: CPT

## 2023-03-22 PROCEDURE — 96360 HYDRATION IV INFUSION INIT: CPT

## 2023-03-22 PROCEDURE — 87635 SARS-COV-2 COVID-19 AMP PRB: CPT

## 2023-03-22 PROCEDURE — 93005 ELECTROCARDIOGRAM TRACING: CPT

## 2023-03-22 PROCEDURE — 87040 BLOOD CULTURE FOR BACTERIA: CPT

## 2023-03-22 PROCEDURE — 94640 AIRWAY INHALATION TREATMENT: CPT

## 2023-03-22 PROCEDURE — 99285 EMERGENCY DEPT VISIT HI MDM: CPT | Mod: 25

## 2023-03-22 PROCEDURE — 71045 X-RAY EXAM CHEST 1 VIEW: CPT

## 2023-03-22 PROCEDURE — 85025 COMPLETE CBC W/AUTO DIFF WBC: CPT

## 2023-03-22 PROCEDURE — 87086 URINE CULTURE/COLONY COUNT: CPT

## 2023-03-22 PROCEDURE — 85730 THROMBOPLASTIN TIME PARTIAL: CPT

## 2023-03-22 PROCEDURE — 81001 URINALYSIS AUTO W/SCOPE: CPT

## 2023-03-22 RX ORDER — LEVETIRACETAM 250 MG/1
500 TABLET, FILM COATED ORAL EVERY 12 HOURS
Refills: 0 | Status: DISCONTINUED | OUTPATIENT
Start: 2023-03-22 | End: 2023-04-01

## 2023-03-22 RX ORDER — FLUTICASONE FUROATE AND VILANTEROL TRIFENATATE 100; 25 UG/1; UG/1
1 POWDER RESPIRATORY (INHALATION)
Qty: 0 | Refills: 0 | DISCHARGE

## 2023-03-22 RX ORDER — ACETAMINOPHEN 500 MG
650 TABLET ORAL EVERY 6 HOURS
Refills: 0 | Status: DISCONTINUED | OUTPATIENT
Start: 2023-03-22 | End: 2023-03-22

## 2023-03-22 RX ORDER — DEXAMETHASONE 0.5 MG/5ML
10 ELIXIR ORAL ONCE
Refills: 0 | Status: COMPLETED | OUTPATIENT
Start: 2023-03-22 | End: 2023-03-22

## 2023-03-22 RX ORDER — IPRATROPIUM/ALBUTEROL SULFATE 18-103MCG
3 AEROSOL WITH ADAPTER (GRAM) INHALATION EVERY 6 HOURS
Refills: 0 | Status: DISCONTINUED | OUTPATIENT
Start: 2023-03-22 | End: 2023-03-28

## 2023-03-22 RX ORDER — LEVETIRACETAM 250 MG/1
500 TABLET, FILM COATED ORAL ONCE
Refills: 0 | Status: COMPLETED | OUTPATIENT
Start: 2023-03-22 | End: 2023-03-22

## 2023-03-22 RX ORDER — CHLORHEXIDINE GLUCONATE 213 G/1000ML
1 SOLUTION TOPICAL DAILY
Refills: 0 | Status: DISCONTINUED | OUTPATIENT
Start: 2023-03-22 | End: 2023-04-01

## 2023-03-22 RX ORDER — SODIUM CHLORIDE 9 MG/ML
1000 INJECTION, SOLUTION INTRAVENOUS
Refills: 0 | Status: DISCONTINUED | OUTPATIENT
Start: 2023-03-22 | End: 2023-03-23

## 2023-03-22 RX ORDER — REMDESIVIR 5 MG/ML
INJECTION INTRAVENOUS
Refills: 0 | Status: COMPLETED | OUTPATIENT
Start: 2023-03-22 | End: 2023-03-24

## 2023-03-22 RX ORDER — REMDESIVIR 5 MG/ML
200 INJECTION INTRAVENOUS EVERY 24 HOURS
Refills: 0 | Status: COMPLETED | OUTPATIENT
Start: 2023-03-22 | End: 2023-03-22

## 2023-03-22 RX ORDER — DEXAMETHASONE 0.5 MG/5ML
6 ELIXIR ORAL DAILY
Refills: 0 | Status: COMPLETED | OUTPATIENT
Start: 2023-03-23 | End: 2023-03-31

## 2023-03-22 RX ORDER — REMDESIVIR 5 MG/ML
100 INJECTION INTRAVENOUS EVERY 24 HOURS
Refills: 0 | Status: COMPLETED | OUTPATIENT
Start: 2023-03-23 | End: 2023-03-24

## 2023-03-22 RX ADMIN — LEVETIRACETAM 400 MILLIGRAM(S): 250 TABLET, FILM COATED ORAL at 05:52

## 2023-03-22 RX ADMIN — Medication 102 MILLIGRAM(S): at 06:15

## 2023-03-22 RX ADMIN — CHLORHEXIDINE GLUCONATE 1 APPLICATION(S): 213 SOLUTION TOPICAL at 17:21

## 2023-03-22 RX ADMIN — REMDESIVIR 200 MILLIGRAM(S): 5 INJECTION INTRAVENOUS at 18:28

## 2023-03-22 RX ADMIN — LEVETIRACETAM 400 MILLIGRAM(S): 250 TABLET, FILM COATED ORAL at 18:09

## 2023-03-22 RX ADMIN — SODIUM CHLORIDE 75 MILLILITER(S): 9 INJECTION, SOLUTION INTRAVENOUS at 22:01

## 2023-03-22 RX ADMIN — Medication 3 MILLILITER(S): at 18:09

## 2023-03-22 NOTE — H&P ADULT - ASSESSMENT
68 yo F PMHx of COPD on 2L NC, metastatic clear cell RCC, metastatic breast CA, hx bilateral DVT s/p IVC filter off AC due to hx of ICH, who presents for AMS with CTH notable for hemorrhagic metastatic lesions

## 2023-03-22 NOTE — ED ADULT NURSE NOTE - OBJECTIVE STATEMENT
68y/o female with history of breast CA (mets to brain and bones), BIBEMS, for neuro consult. Per EMS, patient typically a&ox4 at baseline but is now currently altered/unable to speak other than grunting. EMS reports patient with brain mass that has increased in size and is now "bulging from back of head". Also report patient was found to have frontal brain hemorrhage and right ankle fracture. Incidentally found to be +covid. Patient presents to ED with midline to right upper arm, not currently patent/blood return not present. Patient grunting and agitated, pulling at nasal cannula. Right ankle appears deformed, pain with movement. Tumor noted to be protruding out back of head, patient's head in donut pillow on arrival to relieve pressure to area. MAEx4. PERRL. 66y/o female with history of breast CA (mets to brain and bones), BIBEMS, for neuro consult. Per EMS, patient typically a&ox4 at baseline but is now currently altered/unable to speak other than grunting. EMS reports patient with brain mass that has increased in size and is now "bulging from back of head". Also report patient was found to have frontal brain hemorrhage and right ankle fracture. Incidentally found to be +covid. Patient presents to ED with midline to right upper arm, not currently patent/blood return not present. Patient grunting and agitated, pulling at nasal cannula. Right ankle appears deformed, pain with movement. Tumor noted to be protruding out back of head, patient's head in donut pillow on arrival to relieve pressure to area. MAEx4.

## 2023-03-22 NOTE — CONSULT NOTE ADULT - SUBJECTIVE AND OBJECTIVE BOX
p (1480)     HPI: 67F DNR/DNI, COVID +, PMHx COPD, CKD, breast/renal Ca w/ mets to brain/bone/lung, p/w unknown duration of AMS from [facility]. had SRS 12/2022, previously seen by neurosurgery Dr. Rodriguez.       Imaging:  CT 3/22 L frontal lesion read as hemorragic, much smaller compared to prior imaging, + diffuse calvarial mets       --Anticoagulation:    =====================  PAST MEDICAL HISTORY   Other specified disorders of kidney and ureter    Anemia    Breast cancer    Emphysema lung    Renal cell carcinoma    Stage 3 chronic kidney disease      PAST SURGICAL HISTORY   History of hip surgery    H/O left mastectomy    S/p nephrectomy    H/O total adrenalectomy      Cipro (Rash)      MEDICATIONS:  Antibiotics:    Neuro:    Other:  dexAMETHasone  IVPB 10 milliGRAM(s) IV Intermittent Once      SOCIAL HISTORY:   Occupation:   Marital Status:     FAMILY HISTORY:  No pertinent family history in first degree relatives    FH: throat cancer    FH: emphysema        ROS: Negative except per HPI    LABS:  PT/INR - ( 21 Mar 2023 19:50 )   PT: 13.3 sec;   INR: 1.14 ratio         PTT - ( 21 Mar 2023 19:50 )  PTT:32.0 sec                        11.9   8.11  )-----------( 360      ( 21 Mar 2023 19:50 )             38.0     03-21    143  |  107  |  25<H>  ----------------------------<  108<H>  3.8   |  22  |  1.09    Ca    9.5      21 Mar 2023 19:50    TPro  6.5  /  Alb  2.3<L>  /  TBili  0.5  /  DBili  x   /  AST  20  /  ALT  7<L>  /  AlkPhos  50  03-21

## 2023-03-22 NOTE — ED ADULT NURSE NOTE - NSFALLRSKHARMRISK_ED_ALL_ED

## 2023-03-22 NOTE — H&P ADULT - PROBLEM SELECTOR PLAN 1
Per chart review, patient A&Ox2-3, conversant but bedbound. On admission A&Ox0, unable to participate in any meaningful conversation, only intermittently following commands.   -CTH:  hemorrhagic metastatic lesions - see plan below   -Infectious w/u: f/u UCx, BCx, COVID + (see covid section)

## 2023-03-22 NOTE — H&P ADULT - PROBLEM SELECTOR PLAN 4
On 2L NC at baseline per facility report. Currently on 5-4L NC satting low 90s. Wean as tolerated; c/w carmina

## 2023-03-22 NOTE — H&P ADULT - MLM HIDDEN
Occupational Therapy Daily Treatment Note      Patient: Abelino Canales   : 1966  Referring practitioner: John Corral, *  Date of Initial Visit: Type: THERAPY  Noted: 2021  Today's Date: 10/21/2021  Patient seen for 27 sessions    ICD-10-CM ICD-9-CM   1. Cubital tunnel syndrome on right  G56.21 354.2   2. Stiffness of right elbow joint  M25.621 719.52   3. Right elbow pain  M25.521 719.42          Abelino Canales reports the doctor started me on gabapentin. 3-4/10 pain.       Objective   See Exercise, Manual, and Modality Logs for complete treatment.   Pt continues to have aching along ulnar nerve path.  Progressing with functional tasks, gradually tolerating increased strengthening.     Assessment/Plan  Pt continues to have difficulty with tight fisting.        Cont per POC           Timed:  Manual Therapy:    0     mins  17680;  Therapeutic Exercise:    10     mins  13105;  Therapeutic Activity:    10     mins  89255;     Neuromuscular [place]:    0    mins  17972;    Ultrasound:     0     mins  22578;    Electrical Stimulation:    0     mins  45121;    Untimed:  Electrical Stimulation:    10     mins  16752 ( );  Fluidotherapy:        0    mins  15460  Paraffin:                          0    mins  36082    Timed Treatment:   20   mins   Total Treatment:     30   mins    OT SIGNATURE: TAURUS Jarquin, OTR/L, CHT     Electronically signed    [place] LICENSE: [id number]      yes

## 2023-03-22 NOTE — ED ADULT NURSE NOTE - CHIEF COMPLAINT QUOTE
BIB EMS from [facility] for altered mental status, unknown onset. As per EMS, pt usually more talkative but when doctor assessed pt, pt was no longer speaking.

## 2023-03-22 NOTE — ED ADULT NURSE REASSESSMENT NOTE - NS ED NURSE REASSESS COMMENT FT1
Report received from Angelica BENNETT in green. Pt is A&Ox0. Responds to painful stimuli. Awaiting dispo.

## 2023-03-22 NOTE — ED PROVIDER NOTE - CARE PLAN
1 Principal Discharge DX:	AMS (altered mental status)  Secondary Diagnosis:	Metastatic breast cancer

## 2023-03-22 NOTE — ED PROVIDER NOTE - PROGRESS NOTE DETAILS
phone call to first contact curt ortiz, who is good friend of pt listed as 1st emergency contact, who states son phone number 1 week ago; rosalva told her was speaking on monday at [facility]  [phone] son Darrel no response x3   updated first contact curt who will inform son, regarding pt w/ stable scan, need for admission will be in later to visit pt covid positive

## 2023-03-22 NOTE — ED ADULT NURSE REASSESSMENT NOTE - NS ED NURSE REASSESS COMMENT FT1
Pt was bladder scanned showing 238 cc. Pt was bladder scanned showing 238 cc. MD Ruvalcaba aware. MD Ruvalcaba OK to place pt on purwick. Will reassess need for mcgee.

## 2023-03-22 NOTE — ED PROVIDER NOTE - OBJECTIVE STATEMENT
67 year old female with history of COPD on home O2, CKD, metastatic breast cancer with mets to brain and bone presenting as transfer from  ED 67 year old female with history of COPD on home O2, CKD, DVT on Xarelto, metastatic breast cancer with mets to brain and bone presenting as transfer from  ED for AMS, found to have possible brain hemorrhage. Patient unable to provide history here. Per EMS, patient had acute decline in mental status, baseline AOx4 despite bedbound status, now only grunting, also found to have ?R ankle fx. Arrives with signed MOLST--DNR/DNI status.

## 2023-03-22 NOTE — CONSULT NOTE ADULT - SUBJECTIVE AND OBJECTIVE BOX
HPI:  This patient is a 68yo lady with PMH of COPD on O2 via NC PRN, hx clear cell RCC s/p nephrectomy in 2018, stage IV breast CA, hx bilateral DVT s/p IVC filter, who presents due to change in mental status while residing at nursing home. At baseline, patient is conversant but bedbound. She was seen at [place] ED. Due to concern on imaging for possible hemorrhage at L inferior frontal region on CTH, patient was transferred to [facility].   Patient was seen by neurosurgical team. She was started on keppra 500mg BID. Repeat CTH was stable. There is no plan for acute surgical intervention.   Dexamethasone...?   MR brain stereo w/wo contrast requested.       Patient was recently hospitalized for enlarging fungating mass on R side of scalp. She had biopsy and was dsicahrged to home care.    History of Breast Cancer:  3/2023- Patient diagnosed with stage IIB left Breast CA, ER+, HI+, HER2 negative, s/p L modififed radical mastectomy with reconstruction. 1/15 LN positive for metastatic carcinoma with focal extranodal extension.  Patient received AC-T, then had tamoxifen x 5 years.  2018- Patinet had generalized weakness and leg pain, found to have clear cell RCC s/p radical left nephrectomy and para-aortic lymph node disesction c/b splenic laceration and bleeding.  R iliac bone biopsy performed of bone lesion was consistent with metastatic breast cancer (ER+, HI+, HER2 unknown).  Patient was started on letrozole+palbociclib CDK4/6 inhibitor+ denosumab.   3/2019- s/p L VATS with pleural decorticlation. Left pleural fluid cytology ad pleural pathology negative for malignancy.  2020- CT scan found new LLL nodule, decreased R apex nodules, extensive bony mets slightly increased.   Changed to exemestane. Everolimus was added in 2020.  Patient transferred her care to [facility].  2020- Found to have bilateral DVT, started on xarelto.  2020- CT found extensive osseous mets  2021- CT found pulmonary mets, distal paraesophageal node, osseous mets without significant change. Continued on exemestane and everolimus.  2022- CT found enlarging pulmonary nodules, new R pulmonary nodule, enlargin distal paraesophagela LN, new L internal mammary LN. Stable bone mets  3/2022- Due to POD, patient started fulvestrant  2022- CT found enlarging bilateral pumonary nodules. Marked interval enlargement of right perihilar mass with new endobronchial extension to R mainstem bronchus. Enlarging R hilar and distal paraesophageal LN, 3.6 cm indeterminate mass in mid-lower lobe of R kidney.   2022- Patient received RT 2000cGy in 5 fractions to R hilum.  Patinet was considered for follow-up  biopsy, but her comorbidities limited further invasive procedures. Due to concern for her solitary kidney, patient was started on xeloda at 25% decreased dose instead of adding piqray for PIK3 mutation.   2022- Patient started xeloda.  2022- Brain MRI found 2.8cm hemorrhagic mass in L anteromedial fronat llobe w/ edema, c/w hemorrhagic mets.   Additional punctate enhance lesion at R post central gyrus, new enhancing sellar lesion also c/w mets. Fungating scalp lesion 5.7cm also suspicious for mets  2022- CT found L perihilar mass with increased endobronchial extension along superior segmental bronchus of LLL. Additional pulmonary nodules and R  hilar mass w/ endobronchial extension not significantl changed. Extensive osseous mets w/o significant change.  2023- completed FRST to brain tumor total dose 3000cGy in 5 fractions.  Based on breast tumor board on 2023, there was plan to follow up biopsy of a metastatic lesion.   2023- Scalp lesion   Pathology:   Scalp, lesion, debridement  - Invasive malignant neoplasm consistent with metastatic carcinoma to skin (see comment).    Immunohistochemical stains demonstrate the lesion is positive for PAX8, CD10, carbonic anhydrase and RCC and negative for ER and GATA3.  Morphologically, the carcinoma demonstrates clear cell features.  The morphologic and immunohistochemical results are consistent with metastatic renal cell carcinoma, clear cell type.    Given metastatic renal cell carcinoma, patient was considered to be in the intermediate risk group.  Patient was planned for axitinib 5mg PO q12h + pembrolizumab 200mg IV [id number].   Patient has been off of anticoagulation due to bleeding risk given her history of hemorrhagic brain met.     Recent History:   -3/3/2023- Patient hospitalized due to visiting nurse concern that patient was unable to perform self-care. Patient lives alone and uses walker to ambulate at baseline. She has had persistent R ankle pain and NGO. Imaging found presume dpathologic lytic fragmentation of talar dome, related to metastatic disease. After discussion between orthopedics and orthopedic oncology, patient had IR guided R ankle biopsy on 3/2/23. Patient was seen by PT and discharged to [facility].           PAST MEDICAL & SURGICAL HISTORY:  Other specified disorders of kidney and ureter  Anemia  Breast cancer Left breast cancer - 13 years ago, s/p mastectomy, s/p RT for lung mets, and on oral chemotherapy  Emphysema lung  Renal cell carcinoma s/p L nephrectomy  Stage 3 chronic kidney disease  History of hip surgery  in - Right hip surgery  H/O left mastectomy  S/p nephrectomy   left  H/O total adrenalectomy    FAMILY HISTORY:  FH: throat cancer  mother    FH: emphysema  father      Social History:        REVIEW OF SYSTEMS  CONSTITUTIONAL: No fever, no chills, no fatigue  EYES: No eye pain, no vision changes  ENMT:  No difficulty hearing, no throat pain  RESPIRATORY: No cough,  No shortness of breath  CARDIOVASCULAR: No chest pain, no palpitations  GASTROINTESTINAL: No abdominal pain, no nausea, no vomiting, no diarrhea, no constipation,  GENITOURINARY: No dysuria, no hematuria  NEUROLOGICAL: No numbness , no loss of strength  SKIN: No itching, no rashes,  HEME/LYMPH: No easy bruising, bleeding      >>> <<<>>> <<<  >>> <<<  Allergies  Allergies    Cipro (Rash)    Intolerances        Medications  MEDICATIONS  (STANDING):  levETIRAcetam  IVPB 500 milliGRAM(s) IV Intermittent every 12 hours    MEDICATIONS  (PRN):      PHYSICAL EXAM:  GENERAL: NAD, well-groomed  HEAD:  Atraumatic, Normocephalic  EYES: EOMI, PERRLA, conjunctiva and sclera clear  ENMT: No oropharyngeal exudates, Moist mucous membranes  NECK: Supple, no cervical lymphadenopathy  NERVOUS SYSTEM:  alert and conversant, moving all extremities spontaneously   CHEST/LUNG: Clear to auscultation bilaterally; no rhonchi  HEART: Regular rate and rhythm; No murmurs  ABDOMEN: Soft, Nontender, Nondistended  EXTREMITIES:  2+ radial Pulses, No cyanosis or edema  SKIN: warm, dry    LABS:                        11.9   8.82  )-----------( 390      ( 22 Mar 2023 05:35 )             39.1     03-    145  |  106  |  22  ----------------------------<  81  3.7   |  20<L>  |  0.86    Ca    10.1      22 Mar 2023 05:35    TPro  6.6  /  Alb  3.2<L>  /  TBili  0.4  /  DBili  x   /  AST  15  /  ALT  <5<L>  /  AlkPhos  48      PT/INR - ( 22 Mar 2023 05:35 )   PT: 13.5 sec;   INR: 1.16 ratio         PTT - ( 22 Mar 2023 05:35 )  PTT:24.4 sec  Urinalysis Basic - ( 22 Mar 2023 00:44 )    Color: Yellow / Appearance: Clear / S.020 / pH: x  Gluc: x / Ketone: Moderate  / Bili: Negative / Urobili: Negative   Blood: x / Protein: 100 / Nitrite: Negative   Leuk Esterase: Negative / RBC: 0-4 /HPF / WBC 0-2 /HPF   Sq Epi: x / Non Sq Epi: Neg.-Few / Bacteria: Trace /HPF        RADIOLOGY & ADDITIONAL STUDIES:  PATHOLOGY:     HPI:  This patient is a 68yo lady with PMH of COPD on O2 via NC PRN, hx clear cell RCC s/p nephrectomy in 2018, stage IV breast CA, hx bilateral DVT s/p IVC filter, who presents due to change in mental status while residing at nursing home. Patient is unable to provide history at this time. She is awake and looking around but not verbal and not following commands.   Per chart notes, at baseline, patient is conversant but bedbound. She was seen at [place] ED. Due to concern on imaging for possible hemorrhage at L inferior frontal region on CTH, patient was transferred to [facility].  Patient was seen by neurosurgical team. She was started on keppra 500mg BID. Repeat CTH was stable. There is no plan for acute surgical intervention.     History of Breast Cancer:  3/2023- Patient diagnosed with stage IIB left Breast CA, ER+, LA+, HER2 negative, s/p L modified radical mastectomy with reconstruction. 1/15 LN positive for metastatic carcinoma with focal extranodal extension.  Patient received AC-T, then had tamoxifen x 5 years.  2018- Patient had generalized weakness and leg pain, found to have clear cell RCC s/p radical left nephrectomy and para-aortic lymph node dissection c/b splenic laceration and bleeding.  R iliac bone biopsy performed of bone lesion was consistent with metastatic breast cancer (ER+, LA+, HER2 unknown).  Patient was started on letrozole+palbociclib CDK4/6 inhibitor+ denosumab.   3/2019- s/p L VATS with pleural decortication. Left pleural fluid cytology ad pleural pathology negative for malignancy.  2020- CT scan found new LLL nodule, decreased R apex nodules, extensive bony mets slightly increased.   Changed to exemestane. Everolimus was added in 2020.  Patient transferred her care to [facility].  2020- Found to have bilateral DVT, started on xarelto.  2020- CT found extensive osseous mets  2021- CT found pulmonary mets, distal paraesophageal node, osseous mets without significant change. Continued on exemestane and everolimus.  2022- CT found enlarging pulmonary nodules, new R pulmonary nodule, enlarginh distal paraesophageal LN, new L internal mammary LN. Stable bone mets  3/2022- Due to POD, patient started fulvestrant  2022- CT found enlarging bilateral pulmonary nodules. Marked interval enlargement of right perihilar mass with new endobronchial extension to R mainstem bronchus. Enlarging R hilar and distal paraesophageal LN, 3.6 cm indeterminate mass in mid-lower lobe of R kidney.   2022- Patient received RT 2000cGy in 5 fractions to R hilum.  Patient was considered for follow-up  biopsy, but her comorbidities limited further invasive procedures. Due to concern for her solitary kidney, patient was started on xeloda at 25% decreased dose instead of adding piqray for PIK3 mutation.   2022- Patient started xeloda.  2022- Brain MRI found 2.8cm hemorrhagic mass in L anteromedial frontal lobe w/ edema, c/w hemorrhagic mets.   Additional punctate enhance lesion at R post central gyrus, new enhancing sellar lesion also c/w mets. Fungating scalp lesion 5.7cm also suspicious for mets  2022- CT found L perihilar mass with increased endobronchial extension along superior segmental bronchus of LLL. Additional pulmonary nodules and R  hilar mass w/ endobronchial extension not significantly changed. Extensive osseous mets w/o significant change.  2023- completed FRST to brain tumor total dose 3000cGy in 5 fractions.  Based on breast tumor board on 2023, there was plan to follow up biopsy of a metastatic lesion.   2023- Scalp lesion   Pathology:   Scalp, lesion, debridement  - Invasive malignant neoplasm consistent with metastatic carcinoma to skin (see comment).    Immunohistochemical stains demonstrate the lesion is positive for PAX8, CD10, carbonic anhydrase and RCC and negative for ER and GATA3.  Morphologically, the carcinoma demonstrates clear cell features.  The morphologic and immunohistochemical results are consistent with metastatic renal cell carcinoma, clear cell type.    Given metastatic renal cell carcinoma, patient was considered to be in the intermediate risk group.  Patient was planned for axitinib 5mg PO q12h + pembrolizumab 200mg IV [id number].   Patient has been off of anticoagulation due to bleeding risk given her history of hemorrhagic brain met.     Recent History:   -3/3/2023- Patient hospitalized due to visiting nurse concern that patient was unable to perform self-care. Patient lives alone and uses walker to ambulate at baseline. She has had persistent R ankle pain and NGO. Imaging found presume dpathologic lytic fragmentation of talar dome, related to metastatic disease. After discussion between orthopedics and orthopedic oncology, patient had IR guided R ankle biopsy on 3/2/23. Patient was seen by PT and discharged to [facility].           PAST MEDICAL & SURGICAL HISTORY:  Other specified disorders of kidney and ureter  Anemia  Breast cancer Left breast cancer - 13 years ago, s/p mastectomy, s/p RT for lung mets, and on oral chemotherapy  Emphysema lung  Renal cell carcinoma s/p L nephrectomy  Stage 3 chronic kidney disease  History of hip surgery  in - Right hip surgery  H/O left mastectomy  S/p nephrectomy   left  H/O total adrenalectomy    FAMILY HISTORY:  FH: throat cancer  mother    FH: emphysema  father      Social History: Patient cannot provide social Hx      REVIEW OF SYSTEMS  Patient is awake but confused and not following commands and cannot provide ROS.       >>> <<<>>> <<<  >>> <<<  Allergies  Allergies    Cipro (Rash)    Intolerances        Medications  MEDICATIONS  (STANDING):  levETIRAcetam  IVPB 500 milliGRAM(s) IV Intermittent every 12 hours    MEDICATIONS  (PRN):      PHYSICAL EXAM:  GENERAL: confused  HEAD:  Atraumatic  EYES: blinks to threat, EOMI, clear sclera  ENMT: No oropharyngeal exudates, Moist mucous membranes  NECK: Supple, no cervical lymphadenopathy  NERVOUS SYSTEM:  awake, not following commands, no purposeful movements, not verbal   CHEST/LUNG: wheezing diffusely with expiration  HEART: Regular rate and rhythm; No murmurs  ABDOMEN: Soft, Nontender, Nondistended  EXTREMITIES:  2+ radial Pulses, + R ankle edema and tenderness   SKIN: warm, dry    LABS:                        11.9   8.82  )-----------( 390      ( 22 Mar 2023 05:35 )             39.1     03-    145  |  106  |  22  ----------------------------<  81  3.7   |  20<L>  |  0.86    Ca    10.1      22 Mar 2023 05:35    TPro  6.6  /  Alb  3.2<L>  /  TBili  0.4  /  DBili  x   /  AST  15  /  ALT  <5<L>  /  AlkPhos  48  03-    PT/INR - ( 22 Mar 2023 05:35 )   PT: 13.5 sec;   INR: 1.16 ratio         PTT - ( 22 Mar 2023 05:35 )  PTT:24.4 sec  Urinalysis Basic - ( 22 Mar 2023 00:44 )    Color: Yellow / Appearance: Clear / S.020 / pH: x  Gluc: x / Ketone: Moderate  / Bili: Negative / Urobili: Negative   Blood: x / Protein: 100 / Nitrite: Negative   Leuk Esterase: Negative / RBC: 0-4 /HPF / WBC 0-2 /HPF   Sq Epi: x / Non Sq Epi: Neg.-Few / Bacteria: Trace /HPF        RADIOLOGY & ADDITIONAL STUDIES:  PATHOLOGY:

## 2023-03-22 NOTE — ED ADULT NURSE REASSESSMENT NOTE - NS ED NURSE REASSESS COMMENT FT1
Pt cleaned and changed. Pt placed on purwick. Pupils are 4 mm equal and reactive. Pt is A&Ox0. Pt responds to painful stimuli. Pt cleaned and changed. Pt placed on purwick. Pupils are 4 mm equal and reactive. Pt is A&Ox0. Pt responds to painful stimuli. Stage 1 pressure injury noted on sacrum.

## 2023-03-22 NOTE — H&P ADULT - PROBLEM SELECTOR PLAN 3
Given increase in oxygen requirement. Will c/w steroids and Remdesivir (D1: 3/22)  -wean oxygen as tolerated Given increase in oxygen requirement. Will c/w steroids and Remdesivir (D1: 3/22)  -wean oxygen as tolerated  -f/u infectious w/u - UCx, BCx   -trend lactate till cleared #Acute on chronic hypoxic respiratory failure   Given increase in oxygen requirement. Will c/w steroids and Remdesivir (D1: 3/22)  -wean oxygen as tolerated  -f/u infectious w/u - UCx, BCx   -trend lactate till cleared

## 2023-03-22 NOTE — ED PROVIDER NOTE - ATTENDING CONTRIBUTION TO CARE
67 F w/ PMH Stage IV breast cancer with mets (s/p L mastectomy, s/p RT for lung mets, brain mets) now on oral chemo, clear cell RCC (s/p L nephrectomy), b/l LE DVT (was on xarelto), COPD here w/ brain mass presents to the ER w/ abnl head ct, pt found to have a mass, w/ concern for hemorrhage, sent to [facility] for NSG eval. Pt DNR/DNI, MOLST prior R ankle fx.   On exam, pt is awake and alert 67 F w/ PMH Stage IV breast cancer with mets (s/p L mastectomy, s/p RT for lung mets, brain mets) now on oral chemo, clear cell RCC (s/p L nephrectomy), b/l LE DVT (was on xarelto), COPD here w/ brain mass presents to the ER w/ abnl head ct, pt found to have a mass, w/ concern for hemorrhage, sent to [facility] for NSG eval. Pt DNR/DNI, MOLST prior R ankle fx no splint on the leg  On exam, pt is awake intermittnet groaning, she is baseline oriented and conversational. pt w/ clear lungs soft abd no lower extremity edema, pt withdrawin all 4 extremities, deformity to the R ankle. Plan for admission for likely palliative care. will need to discuss w/ son who is next of kin, unable to reach x2

## 2023-03-22 NOTE — H&P ADULT - HISTORY OF PRESENT ILLNESS
66 yo F PMHx of COPD on 2L NC, metastatic clear cell RCC, metastatic breast CA, hx bilateral DVT s/p IVC filter off AC due to hx of ICH, who presents due to change in mental status while at nursing home facility.   Attempted to call family for collateral information but unable to reach. Called [facility] but current staff unable to provide much details. Majority of history obtained through chart review. At baseline patient A&Ox2-3, conversant but bedbound. Noted to be altered day of admission by nursing facility staff and initially brought to [place] ED for evaluation.   CTH: Hemorrhagic process involving the left inferior frontal brain parenchyma measuring 1.1 x 1.0 x 0.8 cm in diameter, suggestive of a hemorrhagic process.  However, area of involvement is significantly decreased compared with the earlier examination dated 12/1/2022. Findings do include surrounding vasogenic edema, which is also less prominent in appearance compared with the prior study. Patient transferred to [facility] for neurosurgical evaluation.   Evaluated by neurosurgical team; no acute intervention at this time. Patient to be admitted to medicine for further management.   Patient seen and evaluated on 5 [place]. Afebrile, HR 92, 125/78, 92% on 4L NC. Awake and alert x0. Unable to participate in any meaningful conversation. Not following commands.

## 2023-03-22 NOTE — CONSULT NOTE ADULT - ASSESSMENT
67F DNR/DNI, COVID +, PMHx COPD, CKD, breast/renal Ca w/ mets to brain/bone/lung, p/w unknown duration of AMS from [facility]. CT 3/22 L frontal lesion read as hemorragic, much smaller compared to prior imaging, had SRS 12/2022 w/ Dr. Rodriguez. + diffuse calvarial mets   -no acute nsgy intervention  -rpt CTH for stability, if stable no c/i to medicine admission for oncologic management or discharge  -rec keppra 500BID  -steroids per heme/onc team  -MR brain stereo w/wo if family would like further workup of mets

## 2023-03-22 NOTE — H&P ADULT - PROBLEM SELECTOR PLAN 5
XRAY: Presumed pathologic lytic fragmentation of the talar dome is again noted, as seen on CT ankle 2/26/2023. Appearance is similar to the prior. Preserved joint spaces. There is soft tissue swelling about the ankle.  IR guided R ankle biopsy on 3/2/23.     -D/w ortho - rec PWB in a CAM boot and outpatient f/u XRAY: Presumed pathologic lytic fragmentation of the talar dome is again noted, as seen on CT ankle 2/26/2023. Appearance is similar to the prior. Preserved joint spaces. There is soft tissue swelling about the ankle.  IR guided R ankle biopsy on 3/2/23 positive for metastatic RCC.      -D/w ortho - rec PWB in a CAM boot (ordered) and outpatient f/u

## 2023-03-22 NOTE — ED PROVIDER NOTE - CLINICAL SUMMARY MEDICAL DECISION MAKING FREE TEXT BOX
67 year old female with history of COPD on home O2, CKD, DVT on Xarelto, metastatic breast cancer with mets to brain and bone presenting as transfer from  ED for AMS, found to have possible brain hemorrhage--pending rpt CTH and nsx eval, unclear exact GOC at this time but likely poor prognosis/advanced metastatic disease, DNR/DNI established, admit for ?palliative care if no surgical intervention

## 2023-03-22 NOTE — H&P ADULT - NSHPLABSRESULTS_GEN_ALL_CORE
LABS:                         11.9   8.82  )-----------( 390      ( 22 Mar 2023 05:35 )             39.1         145  |  106  |  22  ----------------------------<  81  3.7   |  20<L>  |  0.86    Ca    10.1      22 Mar 2023 05:35    TPro  6.6  /  Alb  3.2<L>  /  TBili  0.4  /  DBili  x   /  AST  15  /  ALT  <5<L>  /  AlkPhos  48  -    PT/INR - ( 22 Mar 2023 05:35 )   PT: 13.5 sec;   INR: 1.16 ratio         PTT - ( 22 Mar 2023 05:35 )  PTT:24.4 sec  Urinalysis Basic - ( 22 Mar 2023 14:08 )    Color: Light Yellow / Appearance: Clear / S.015 / pH: x  Gluc: x / Ketone: Small  / Bili: Negative / Urobili: Negative   Blood: x / Protein: 30 mg/dL / Nitrite: Negative   Leuk Esterase: Negative / RBC: 2 /hpf / WBC 1 /HPF   Sq Epi: x / Non Sq Epi: 1 /hpf / Bacteria: Negative    CTH    Redemonstrated hemorrhagic metastatic lesions in the left frontal and   right parietals lobe, and extensive calvarial metastasis, unchanged from   2 days prior.      X-ray R foot  Presumed pathologic lytic fragmentation of the talar dome is again noted,   as seen on CT ankle 2023. Appearance is similar to the prior.  Preserved joint spaces. There is soft tissue swelling about the ankle.    CXR  No acute pulmonary processes

## 2023-03-22 NOTE — CONSULT NOTE ADULT - ASSESSMENT
68yo lady with PMH of COPD on O2 via NC PRN, metastatic clear cell RCC, metastatic breast CA, hx bilateral DVT s/p IVC filter off AC due to hx of ICH, who presents due to change in mental status while residing at nursing home, found to have     #Brain metastases:   3/21- CT head: Hemorrhagic process involving the left inferior frontal brain parenchyma measuring 1.1 x 1.0 x 0.8 cm in diameter, suggestive of a hemorrhagic process. Cannot exclude an underlying neoplastic lesion. However, area of involvement is significantly decreased compared with the earlier examination dated 12/1/2022. Findings do include surrounding vasogenic edema, which is also less prominent in appearance compared with the prior study. Consider further evaluation to include pre and postcontrast MR imaging of the brain parenchyma, provided the patient has no contraindications.  2. Extracalvarial soft tissue process in a right occipital location.  Question presence of a dermal process. Finding was present at the time of the prior study. Correlate with direct visualization.  3. Calvarial sclerotic lesions are again appreciated, without significant interval change, compared with prior.  3/22- Repeat CTH: Redemonstrated hemorrhagic metastatic lesions in the left frontal and   right parietals lobe, and extensive calvarial metastasis, unchanged from 2 days prior.    - No acute neurosurgical intervention, per neurosurgery team  - Patient is continued on keppra 500mg BID   - Imaging suggests that brain findings have improved since last imaging in Dec 2022.   Consider further workup for changes in mental status.  UA was negative.  COVID19 PCR positive, and patient  has known hx of COPD. Check VBG for PCO2. Get blood cultures. Consider EEG.   - Obtain MR head w/wo IVC  - Consult radiation oncology.     Metastatic breast cancer:    Patient was diagnosed with stage IIB left Breast CA, ER+, [place]+, HER2 negative, s/p L modified radical mastectomy with reconstruction. 1/15 LN positive for metastatic carcinoma with focal extranodal extension, received AC-T, then had tamoxifen x 5 years.   In 2018, she was found to have bone mets (ER+, [place]+, HER2 unknown), started on letrozole+palbociclib CDK4/6 inhibitor+ denosumab, with POD, thereafter started on exemestane and everolimus in 7/2020, with POD and switched to fulvestrant in 2022, with POD and had perihilar RT in 8/2022, and started on xeloda, then brain mets in 11/2022 and received FRST to brain tumor 3000cGy in 1/2023.     Metastatic RCC:   She was diagnosed with clear cell RCC in 6/2018 and underwent radical left nephrectomy and para-aortic lymph node disesction c/b splenic laceration and bleeding.   On 2/21/2023- Scalp lesion biopsy identified metastatic RCC   Patient was planned for axitinib 5mg PO q12h + pembrolizumab 200mg IV [id number].     Hx of DVT:  -Patient has been off of anticoagulation due to bleeding risk given her history of hemorrhagic brain met.      66yo lady with PMH of COPD on O2 via NC PRN, metastatic clear cell RCC, metastatic breast CA, hx bilateral DVT s/p IVC filter off AC due to hx of ICH, who presents due to change in mental status while residing at nursing home.     #Brain metastases:   3/21- CT head: Hemorrhagic process involving the left inferior frontal brain parenchyma measuring 1.1 x 1.0 x 0.8 cm in diameter, suggestive of a hemorrhagic process. Cannot exclude an underlying neoplastic lesion. However, area of involvement is significantly decreased compared with the earlier examination dated 12/1/2022. Findings do include surrounding vasogenic edema, which is also less prominent in appearance compared with the prior study. Consider further evaluation to include pre and postcontrast MR imaging of the brain parenchyma, provided the patient has no contraindications.  2. Extracalvarial soft tissue process in a right occipital location.  Question presence of a dermal process. Finding was present at the time of the prior study. Correlate with direct visualization.  3. Calvarial sclerotic lesions are again appreciated, without significant interval change, compared with prior.  3/22- Repeat CTH: Redemonstrated hemorrhagic metastatic lesions in the left frontal and   right parietals lobe, and extensive calvarial metastasis, unchanged from 2 days prior.    - No acute neurosurgical intervention, per neurosurgery team  - Patient is continued on keppra 500mg BID   - Imaging suggests that brain findings of vasogenic edema are less prominent on recent study compared to prior studies. Calvarial lesions and extracalvarial soft tissue lesions were also present previously.  - Obtain MR head w/wo IVC for better assessment of intracranial lesions  - Consider further workup for changes in mental status.  UA was negative. COVID19 PCR positive, and patient  has known hx of COPD. Check VBG for PCO2. Get blood cultures. Consider EEG.   - Consult radiation oncology to determine utility of further steroids at this point. Can also consider neuro-oncology evaluation.     Metastatic breast cancer:    Patient was diagnosed with stage IIB left Breast CA, ER+, NC+, HER2 negative, s/p L modified radical mastectomy with reconstruction. 1/15 LN positive for metastatic carcinoma with focal extranodal extension, received AC-T, then had tamoxifen x 5 years.   In 2018, she was found to have bone mets (ER+, NC+, HER2 unknown), started on letrozole+palbociclib CDK4/6 inhibitor+ denosumab, with POD, thereafter started on exemestane and everolimus in 7/2020, with POD and switched to fulvestrant in 2022, with POD and had perihilar RT in 8/2022, and started on xeloda, then brain mets in 11/2022 and received FRST to brain tumor 3000cGy in 1/2023.   There is no plan for systemic therapy at this time.     Metastatic RCC:   She was diagnosed with clear cell RCC in 6/2018 and underwent radical left nephrectomy and para-aortic lymph node dissection c/b splenic laceration and bleeding.   On 2/21/2023- Scalp lesion biopsy identified metastatic RCC   Patient was planned for axitinib 5mg PO q12h + pembrolizumab 200mg IV [id number]. There is no plan for systemic therapy at this time.   R talus biopsy also positive for metastatic RCC.  Consult orthopedic oncology for recommendations regarding management.     Hx of DVT:  -Patient has been off of anticoagulation due to bleeding risk given her history of hemorrhagic brain met.   - Continue venodyne boots for DVT ppx.     Note not finalized until signed by attending.   Please do not hesitate to page with questions.     Mary Anne Garcia MD PGY5   [phone]  Hematology-Oncology Fellow  [facility]- Please call  to page on-call fellow 68yo lady with PMH of COPD on O2 via NC PRN, metastatic clear cell RCC, metastatic breast CA, hx bilateral DVT s/p IVC filter off AC due to hx of ICH, who presents due to change in mental status while residing at nursing home.     #Brain metastases:   3/21- CT head: Hemorrhagic process involving the left inferior frontal brain parenchyma measuring 1.1 x 1.0 x 0.8 cm in diameter, suggestive of a hemorrhagic process. Cannot exclude an underlying neoplastic lesion. However, area of involvement is significantly decreased compared with the earlier examination dated 12/1/2022. Findings do include surrounding vasogenic edema, which is also less prominent in appearance compared with the prior study. Consider further evaluation to include pre and postcontrast MR imaging of the brain parenchyma, provided the patient has no contraindications.  2. Extracalvarial soft tissue process in a right occipital location.  Question presence of a dermal process. Finding was present at the time of the prior study. Correlate with direct visualization.  3. Calvarial sclerotic lesions are again appreciated, without significant interval change, compared with prior.  3/22- Repeat CTH: Redemonstrated hemorrhagic metastatic lesions in the left frontal and   right parietals lobe, and extensive calvarial metastasis, unchanged from 2 days prior.    - No acute neurosurgical intervention, per neurosurgery team  - Patient is continued on keppra 500mg BID   - Imaging suggests that brain findings of vasogenic edema are less prominent on recent study compared to prior studies. Calvarial lesions and extracalvarial soft tissue lesions were also present previously.  - Obtain MR head w/wo IVC for better assessment of intracranial lesions  - Consider further workup for changes in mental status.  UA was negative. COVID19 PCR positive, and patient  has known hx of COPD. Check VBG for PCO2. Get blood cultures. Consider EEG.   - Consult radiation oncology to determine utility of further steroids at this point. Can also consider neuro-oncology evaluation.     Metastatic breast cancer:    Patient was diagnosed with stage IIB left Breast CA, ER+, [place]+, HER2 negative, s/p L modified radical mastectomy with reconstruction. 1/15 LN positive for metastatic carcinoma with focal extranodal extension, received AC-T, then had tamoxifen x 5 years.   In 2018, she was found to have bone mets (ER+, IA+, HER2 unknown), started on letrozole+palbociclib CDK4/6 inhibitor+ denosumab, with POD, thereafter started on exemestane and everolimus in 7/2020, with POD and switched to fulvestrant in 2022, with POD and had perihilar RT in 8/2022, and started on xeloda, then brain mets in 11/2022 and received FRST to brain tumor 3000cGy in 1/2023.   There is no plan for systemic therapy at this time.     Metastatic RCC:   She was diagnosed with clear cell RCC in 6/2018 and underwent radical left nephrectomy and para-aortic lymph node dissection c/b splenic laceration and bleeding.   On 2/21/2023- Scalp lesion biopsy identified metastatic RCC   Patient was planned for axitinib 5mg PO q12h + pembrolizumab 200mg IV [id number]. There is no plan for systemic therapy at this time.   R talus biopsy also positive for metastatic RCC.  Consult orthopedic oncology for recommendations regarding management.     Hx of DVT:  -Patient has been off of anticoagulation due to bleeding risk given her history of hemorrhagic brain met.   - Continue venodyne boots for DVT ppx.     Please do not hesitate to page with questions.     Mary Anne Garcia MD PGY5   [phone]  Hematology-Oncology Fellow  [facility]- Please call  to page on-call fellow    Addendum:  Patient was seen & examined by Dr. Garcia.  Avoided seeing patient directly given active Covid-19 infection; patient unable to provide history as described above.      Hemal Richter MD

## 2023-03-22 NOTE — H&P ADULT - NSHPPHYSICALEXAM_GEN_ALL_CORE
Vital Signs Last 24 Hrs  T(C): 36.9 (22 Mar 2023 10:23), Max: 37.2 (22 Mar 2023 04:15)  T(F): 98.4 (22 Mar 2023 10:23), Max: 98.9 (22 Mar 2023 04:15)  HR: 92 (22 Mar 2023 10:23) (84 - 108)  BP: 125/78 (22 Mar 2023 10:23) (110/72 - 169/111)  BP(mean): 90 (22 Mar 2023 07:30) (90 - 90)  RR: 18 (22 Mar 2023 10:23) (17 - 22)  SpO2: 92% (22 Mar 2023 10:23) (90% - 97%)    Parameters below as of 22 Mar 2023 10:23  Patient On (Oxygen Delivery Method): nasal cannula  O2 Flow (L/min): 4      CONSTITUTIONAL: chronically ill appearing, in no apparent distress  EYES: No conjunctival or scleral injection, non-icteric; PERRLA and symmetric  ENMT: No external nasal lesions; poor dentition; no pharyngeal injection or exudates, oral mucosa with moist membranes  NECK: supple; trachea midline   RESPIRATORY: Breathing comfortably on 4L NC; mild wheezes   CARDIOVASCULAR: +S1S2, RRR, trace lower extremity edema  GASTROINTESTINAL: No tenderness, +BS throughout, no rebound/guarding  MUSCULOSKELETAL: R ankle with swelling and pain with movement   SKIN: No rashes noted on examined portions   NEUROLOGIC: limited exam given mental status; A&Ox0. Spontaneously opens eyes, intermittently follows commands (squeeze hand/move toes)

## 2023-03-22 NOTE — H&P ADULT - PROBLEM SELECTOR PLAN 6
DVT ppx: SCDs   Diet: NPO; pending dysphagia screen  Code status: DNR/DNI - prior completed molst in chart   Attempted to contact HCP x2 (Darrel [phone]); will try again

## 2023-03-22 NOTE — ED PROVIDER NOTE - PHYSICAL EXAMINATION
Gen - Unable to follow commands, responds to touch/painful stimuli, grunts  HEENT - unable to assess EOMs, moist mucous membranes  Neck - supple  Resp - CTAB, no increased WOB  CV -  RRR, no m/r/g  Abd - soft, NT, ND; no guarding or rebound  MSK - +R ankle deformity with medial deviation, soft compartments, palpable DP pulse  Extrem - no LE edema  Neuro - limited exam but no gross focal motor deficit  Skin - warm, well perfused
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
## 2023-03-22 NOTE — H&P ADULT - PROBLEM SELECTOR PLAN 2
#Metastatic Breast Ca  #Metastatic RCC  #Hemorrhagic brain metastatic lesions     Stage IIB left Breast CA, ER+, [place]+, HER2 negative, s/p L modified radical mastectomy with reconstruction. S/p tamoxifen x 5 years. Mets to bone and brain mets seen on 11/22 s/p FRST to brain tumor 3000cGy in 1/2023.   Dx w/ clear cell RCC in 6/2018 and underwent radical left nephrectomy and para-aortic lymph node dissection c/b splenic laceration and bleeding. On 2/21/2023- Scalp lesion biopsy identified metastatic RCC     CTH 3/22: Redemonstrated hemorrhagic metastatic lesions in the left frontal and right parietals lobe, and extensive calvarial metastasis    -Heme-onc following: rec Rad Onc consult (emailed)   -Continue Keppra 500mg BID   -MRI Brain w/wo contrast   -Neurosurgery following; Repeat CTH unchanged; given stable imaging, no further role of neurosurgical intervention. #Metastatic Breast Ca  #Metastatic RCC  #Hemorrhagic brain metastatic lesions     Stage IIB left Breast CA, ER+, [place]+, HER2 negative, s/p L modified radical mastectomy with reconstruction. S/p tamoxifen x 5 years. Mets to bone and brain mets seen on 11/22 s/p FRST to brain tumor 3000cGy in 1/2023.   Dx w/ clear cell RCC in 6/2018 and underwent radical left nephrectomy and para-aortic lymph node dissection c/b splenic laceration and bleeding. On 2/21/2023- Scalp lesion biopsy identified metastatic RCC     CTH 3/22: Redemonstrated hemorrhagic metastatic lesions in the left frontal and right parietals lobe, and extensive calvarial metastasis    -Heme-onc following: rec Rad Onc consult (emailed)   -Continue Keppra 500mg BID   -MRI Brain w/wo contrast pending   -Neurosurgery following; Repeat CTH unchanged; given stable imaging, no further role of neurosurgical interventio.

## 2023-03-22 NOTE — ED ADULT NURSE NOTE - NSIMPLEMENTINTERV_GEN_ALL_ED
Implemented All Fall with Harm Risk Interventions:  [place] to call system. Call bell, personal items and telephone within reach. Instruct patient to call for assistance. Room bathroom lighting operational. Non-slip footwear when patient is off stretcher. Physically safe environment: no spills, clutter or unnecessary equipment. Stretcher in lowest position, wheels locked, appropriate side rails in place. Provide visual cue, wrist band, yellow gown, etc. Monitor gait and stability. Monitor for mental status changes and reorient to person, place, and time. Review medications for side effects contributing to fall risk. Reinforce activity limits and safety measures with patient and family. Provide visual clues: red socks.

## 2023-03-23 ENCOUNTER — APPOINTMENT (OUTPATIENT)
Dept: HEMATOLOGY ONCOLOGY | Facility: CLINIC | Age: 68
End: 2023-03-23

## 2023-03-23 DIAGNOSIS — Z71.89 OTHER SPECIFIED COUNSELING: ICD-10-CM

## 2023-03-23 DIAGNOSIS — R53.2 FUNCTIONAL QUADRIPLEGIA: ICD-10-CM

## 2023-03-23 LAB
ALBUMIN SERPL ELPH-MCNC: 3.5 G/DL — SIGNIFICANT CHANGE UP (ref 3.3–5)
ALP SERPL-CCNC: 51 U/L — SIGNIFICANT CHANGE UP (ref 40–120)
ALT FLD-CCNC: <5 U/L — LOW (ref 10–45)
ANION GAP SERPL CALC-SCNC: 17 MMOL/L — SIGNIFICANT CHANGE UP (ref 5–17)
AST SERPL-CCNC: 11 U/L — SIGNIFICANT CHANGE UP (ref 10–40)
BASE EXCESS BLDV CALC-SCNC: -1.8 MMOL/L — SIGNIFICANT CHANGE UP (ref -2–3)
BASOPHILS # BLD AUTO: 0.03 K/UL — SIGNIFICANT CHANGE UP (ref 0–0.2)
BASOPHILS NFR BLD AUTO: 0.3 % — SIGNIFICANT CHANGE UP (ref 0–2)
BILIRUB DIRECT SERPL-MCNC: 0.1 MG/DL — SIGNIFICANT CHANGE UP (ref 0–0.3)
BILIRUB INDIRECT FLD-MCNC: 0.3 MG/DL — SIGNIFICANT CHANGE UP (ref 0.2–1)
BILIRUB SERPL-MCNC: 0.4 MG/DL — SIGNIFICANT CHANGE UP (ref 0.2–1.2)
BLOOD GAS VENOUS - CREATININE: SIGNIFICANT CHANGE UP MG/DL (ref 0.5–1.3)
BUN SERPL-MCNC: 20 MG/DL — SIGNIFICANT CHANGE UP (ref 7–23)
CA-I SERPL-SCNC: 1.25 MMOL/L — SIGNIFICANT CHANGE UP (ref 1.15–1.33)
CALCIUM SERPL-MCNC: 9.7 MG/DL — SIGNIFICANT CHANGE UP (ref 8.4–10.5)
CHLORIDE BLDV-SCNC: 108 MMOL/L — SIGNIFICANT CHANGE UP (ref 96–108)
CHLORIDE SERPL-SCNC: 107 MMOL/L — SIGNIFICANT CHANGE UP (ref 96–108)
CO2 BLDV-SCNC: 26 MMOL/L — SIGNIFICANT CHANGE UP (ref 22–26)
CO2 SERPL-SCNC: 21 MMOL/L — LOW (ref 22–31)
CREAT SERPL-MCNC: 0.76 MG/DL — SIGNIFICANT CHANGE UP (ref 0.5–1.3)
CREAT SERPL-MCNC: 0.76 MG/DL — SIGNIFICANT CHANGE UP (ref 0.5–1.3)
CULTURE RESULTS: NO GROWTH — SIGNIFICANT CHANGE UP
CULTURE RESULTS: SIGNIFICANT CHANGE UP
D DIMER BLD IA.RAPID-MCNC: 1814 NG/ML DDU — HIGH
EGFR: 85 ML/MIN/1.73M2 — SIGNIFICANT CHANGE UP
EGFR: 85 ML/MIN/1.73M2 — SIGNIFICANT CHANGE UP
EOSINOPHIL # BLD AUTO: 0.01 K/UL — SIGNIFICANT CHANGE UP (ref 0–0.5)
EOSINOPHIL NFR BLD AUTO: 0.1 % — SIGNIFICANT CHANGE UP (ref 0–6)
FERRITIN SERPL-MCNC: 1547 NG/ML — HIGH (ref 15–150)
GAS PNL BLDV: 144 MMOL/L — SIGNIFICANT CHANGE UP (ref 136–145)
GAS PNL BLDV: SIGNIFICANT CHANGE UP
GAS PNL BLDV: SIGNIFICANT CHANGE UP
GLUCOSE BLDC GLUCOMTR-MCNC: 119 MG/DL — HIGH (ref 70–99)
GLUCOSE BLDC GLUCOMTR-MCNC: 121 MG/DL — HIGH (ref 70–99)
GLUCOSE BLDC GLUCOMTR-MCNC: 127 MG/DL — HIGH (ref 70–99)
GLUCOSE BLDC GLUCOMTR-MCNC: 91 MG/DL — SIGNIFICANT CHANGE UP (ref 70–99)
GLUCOSE BLDV-MCNC: 139 MG/DL — HIGH (ref 70–99)
GLUCOSE SERPL-MCNC: 137 MG/DL — HIGH (ref 70–99)
HCO3 BLDV-SCNC: 25 MMOL/L — SIGNIFICANT CHANGE UP (ref 22–29)
HCT VFR BLD CALC: 42.5 % — SIGNIFICANT CHANGE UP (ref 34.5–45)
HCT VFR BLDA CALC: 37 % — SIGNIFICANT CHANGE UP (ref 34.5–46.5)
HGB BLD CALC-MCNC: 12.2 G/DL — SIGNIFICANT CHANGE UP (ref 11.7–16.1)
HGB BLD-MCNC: 12.8 G/DL — SIGNIFICANT CHANGE UP (ref 11.5–15.5)
IMM GRANULOCYTES NFR BLD AUTO: 2 % — HIGH (ref 0–0.9)
INR BLD: 1.09 RATIO — SIGNIFICANT CHANGE UP (ref 0.88–1.16)
LACTATE BLDV-MCNC: 2.5 MMOL/L — HIGH (ref 0.5–2)
LYMPHOCYTES # BLD AUTO: 0.27 K/UL — LOW (ref 1–3.3)
LYMPHOCYTES # BLD AUTO: 2.9 % — LOW (ref 13–44)
MCHC RBC-ENTMCNC: 29.3 PG — SIGNIFICANT CHANGE UP (ref 27–34)
MCHC RBC-ENTMCNC: 30.1 GM/DL — LOW (ref 32–36)
MCV RBC AUTO: 97.3 FL — SIGNIFICANT CHANGE UP (ref 80–100)
MONOCYTES # BLD AUTO: 0.48 K/UL — SIGNIFICANT CHANGE UP (ref 0–0.9)
MONOCYTES NFR BLD AUTO: 5.1 % — SIGNIFICANT CHANGE UP (ref 2–14)
MRSA PCR RESULT.: SIGNIFICANT CHANGE UP
NEUTROPHILS # BLD AUTO: 8.45 K/UL — HIGH (ref 1.8–7.4)
NEUTROPHILS NFR BLD AUTO: 89.6 % — HIGH (ref 43–77)
NRBC # BLD: 0 /100 WBCS — SIGNIFICANT CHANGE UP (ref 0–0)
PCO2 BLDV: 48 MMHG — HIGH (ref 39–42)
PH BLDV: 7.32 — SIGNIFICANT CHANGE UP (ref 7.32–7.43)
PLATELET # BLD AUTO: 334 K/UL — SIGNIFICANT CHANGE UP (ref 150–400)
PO2 BLDV: 30 MMHG — SIGNIFICANT CHANGE UP (ref 25–45)
POTASSIUM BLDV-SCNC: 4 MMOL/L — SIGNIFICANT CHANGE UP (ref 3.5–5.1)
POTASSIUM SERPL-MCNC: 4.2 MMOL/L — SIGNIFICANT CHANGE UP (ref 3.5–5.3)
POTASSIUM SERPL-SCNC: 4.2 MMOL/L — SIGNIFICANT CHANGE UP (ref 3.5–5.3)
PROT SERPL-MCNC: 6.7 G/DL — SIGNIFICANT CHANGE UP (ref 6–8.3)
PROTHROM AB SERPL-ACNC: 12.7 SEC — SIGNIFICANT CHANGE UP (ref 10.5–13.4)
RBC # BLD: 4.37 M/UL — SIGNIFICANT CHANGE UP (ref 3.8–5.2)
RBC # FLD: 16.1 % — HIGH (ref 10.3–14.5)
S AUREUS DNA NOSE QL NAA+PROBE: SIGNIFICANT CHANGE UP
SAO2 % BLDV: 47.4 % — LOW (ref 67–88)
SODIUM SERPL-SCNC: 145 MMOL/L — SIGNIFICANT CHANGE UP (ref 135–145)
SPECIMEN SOURCE: SIGNIFICANT CHANGE UP
SPECIMEN SOURCE: SIGNIFICANT CHANGE UP
TSH SERPL-MCNC: 0.7 UIU/ML — SIGNIFICANT CHANGE UP (ref 0.27–4.2)
VIT B12 SERPL-MCNC: 1296 PG/ML — HIGH (ref 232–1245)
WBC # BLD: 9.43 K/UL — SIGNIFICANT CHANGE UP (ref 3.8–10.5)
WBC # FLD AUTO: 9.43 K/UL — SIGNIFICANT CHANGE UP (ref 3.8–10.5)

## 2023-03-23 PROCEDURE — 99233 SBSQ HOSP IP/OBS HIGH 50: CPT

## 2023-03-23 PROCEDURE — 99222 1ST HOSP IP/OBS MODERATE 55: CPT

## 2023-03-23 PROCEDURE — 99223 1ST HOSP IP/OBS HIGH 75: CPT

## 2023-03-23 RX ORDER — ACETAMINOPHEN 500 MG
650 TABLET ORAL ONCE
Refills: 0 | Status: COMPLETED | OUTPATIENT
Start: 2023-03-23 | End: 2023-03-23

## 2023-03-23 RX ORDER — SODIUM CHLORIDE 9 MG/ML
1000 INJECTION, SOLUTION INTRAVENOUS
Refills: 0 | Status: DISCONTINUED | OUTPATIENT
Start: 2023-03-23 | End: 2023-03-28

## 2023-03-23 RX ADMIN — Medication 3 MILLILITER(S): at 00:11

## 2023-03-23 RX ADMIN — Medication 3 MILLILITER(S): at 17:36

## 2023-03-23 RX ADMIN — Medication 3 MILLILITER(S): at 23:25

## 2023-03-23 RX ADMIN — LEVETIRACETAM 400 MILLIGRAM(S): 250 TABLET, FILM COATED ORAL at 17:33

## 2023-03-23 RX ADMIN — Medication 650 MILLIGRAM(S): at 23:45

## 2023-03-23 RX ADMIN — REMDESIVIR 200 MILLIGRAM(S): 5 INJECTION INTRAVENOUS at 17:33

## 2023-03-23 RX ADMIN — Medication 6 MILLIGRAM(S): at 05:10

## 2023-03-23 RX ADMIN — Medication 3 MILLILITER(S): at 05:10

## 2023-03-23 RX ADMIN — CHLORHEXIDINE GLUCONATE 1 APPLICATION(S): 213 SOLUTION TOPICAL at 13:38

## 2023-03-23 RX ADMIN — SODIUM CHLORIDE 50 MILLILITER(S): 9 INJECTION, SOLUTION INTRAVENOUS at 21:39

## 2023-03-23 RX ADMIN — Medication 3 MILLILITER(S): at 13:37

## 2023-03-23 RX ADMIN — SODIUM CHLORIDE 50 MILLILITER(S): 9 INJECTION, SOLUTION INTRAVENOUS at 14:25

## 2023-03-23 RX ADMIN — LEVETIRACETAM 400 MILLIGRAM(S): 250 TABLET, FILM COATED ORAL at 05:09

## 2023-03-23 NOTE — CONSULT NOTE ADULT - SUBJECTIVE AND OBJECTIVE BOX
HPI:  66 yo F PMHx of COPD on 2L NC, metastatic clear cell RCC, metastatic breast CA, hx bilateral DVT s/p IVC filter off AC due to hx of ICH, who presents due to change in mental status while at nursing home facility.   Attempted to call family for collateral information but unable to reach. Called [facility] but current staff unable to provide much details. Majority of history obtained through chart review. At baseline patient A&Ox2-3, conversant but bedbound. Noted to be altered day of admission by nursing facility staff and initially brought to [place] ED for evaluation.   CTH: Hemorrhagic process involving the left inferior frontal brain parenchyma measuring 1.1 x 1.0 x 0.8 cm in diameter, suggestive of a hemorrhagic process.  However, area of involvement is significantly decreased compared with the earlier examination dated 2022. Findings do include surrounding vasogenic edema, which is also less prominent in appearance compared with the prior study. Patient transferred to [facility] for neurosurgical evaluation.   Evaluated by neurosurgical team; no acute intervention at this time. Patient to be admitted to medicine for further management.   Patient seen and evaluated on  [place]. Afebrile, HR 92, 125/78, 92% on 4L NC. Awake and alert x0. Unable to participate in any meaningful conversation. Not following commands.      (22 Mar 2023 15:58)    PERTINENT PM/SXH:   Other specified disorders of kidney and ureter    Anemia    Breast cancer    Emphysema lung    Renal cell carcinoma    Stage 3 chronic kidney disease      History of hip surgery    H/O left mastectomy    S/p nephrectomy    H/O total adrenalectomy      FAMILY HISTORY:  FH: throat cancer  mother    FH: emphysema  father      Family Hx substance abuse [ ]yes [ ]no  ITEMS NOT CHECKED ARE NOT PRESENT    SOCIAL HISTORY:   Significant other/partner[ ]  Children[ X]  Latter-day/Spirituality:  Substance hx:  [ ]   Tobacco hx:  [ ]   Alcohol hx: [ ]   Home Opioid hx:  [ ] I-Stop Reference No:  Living Situation: [ X]Home  [ ]Long term care  [ ]Rehab [ ]Other    ADVANCE DIRECTIVES:    DNR/MOLST  [ X]  Living Will  [ ]   DECISION MAKER(s):  [ ] Health Care Proxy(s)  [ X] Surrogate(s)  [ ] Guardian           Name(s): Phone Number(s):  SON:  IAM GOLDMAN   BASELINE (I)ADL(s) (prior to admission):  [place]: [ ]Total  [ ] Moderate [X ]Dependent    Allergies    Cipro (Rash)    Intolerances    MEDICATIONS  (STANDING):  albuterol/ipratropium for Nebulization 3 milliLiter(s) Nebulizer every 6 hours  chlorhexidine 2% Cloths 1 Application(s) Topical daily  dexAMETHasone  Injectable 6 milliGRAM(s) IV Push daily  levETIRAcetam  IVPB 500 milliGRAM(s) IV Intermittent every 12 hours  remdesivir  IVPB 100 milliGRAM(s) IV Intermittent every 24 hours  remdesivir  IVPB   IV Intermittent   sodium chloride 0.45%. 1000 milliLiter(s) (50 mL/Hr) IV Continuous <Continuous>    MEDICATIONS  (PRN):    PRESENT SYMPTOMS: [ ]Unable to self-report  [ ] CPOT [X ] PAINADs [ ] RDOS  Source if other than patient:  [ ]Family   [ ]Team     Pain: [ ]yes [ ]no  QOL impact -   Location -                    Aggravating factors -  Quality -  Radiation -  Timing-  Severity (0-10 scale):  Minimal acceptable level (0-10 scale):     CPOT:    https://www.[facility].org/getattachment/xgp06h46-9d0j-7h8l-6k3o-3219z7175z0l/Critical-Care-Pain-Observation-Tool-(CPOT)    PAIN AD Score: 0  http://geriatrictoolkit.[place].[email or web address]/cog/painad.pdf (press ctrl +  left click to view)    Dyspnea:                           [ ]Mild [ ]Moderate [ ]Severe      RDOS:  0 to 2  minimal or no respiratory distress   3  mild distress  3-4  4 to 6 moderate distress  >7 severe distress  https://homecareinformation.net/handouts/hen/Respiratory_Distress_Observation_Scale.pdf (Ctrl +  left click to view)     Anxiety:                             [ ]Mild [X ]Moderate [ ]Severe  Fatigue:                             [ ]Mild [X ]Moderate [ ]Severe  Nausea:                             [ ]Mild [ ]Moderate [ ]Severe  Loss of appetite:              [ ]Mild [ X]Moderate [ ]Severe  Constipation:                    [ ]Mild [ ]Moderate [ ]Severe    PCSSQ [Palliative Care Spiritual Screening Question]   Severity (0-10):0  Score of 4 or > indicate consideration of Chaplaincy referral.  Chaplaincy Referral: [ ] yes [ ] refused [ ] following  XX DEFERRED     Caregiver [place]? : [ X] yes [ ] no  Social work referral [ ] Patient & Family Centered Care Referral [ ]     Anticipatory Grief Present?: [ X] yes [ ] no  Social work referral [ ]  Patient & Family Centered Care Referral [ ]       Other Symptoms:  [X ]All other review of systems negative     Palliative Performance Status Version 2:     20    %    http://[facility].org/files/news/palliative_performance_scale_ppsv2.pdf  PHYSICAL EXAM:  Vital Signs Last 24 Hrs  T(C): 36.4 (23 Mar 2023 12:33), Max: 36.7 (23 Mar 2023 04:25)  T(F): 97.5 (23 Mar 2023 12:33), Max: 98 (23 Mar 2023 04:25)  HR: 72 (23 Mar 2023 12:) (72 - 79)  BP: 125/80 (23 Mar 2023 12:) (107/69 - 125/80)  BP(mean): --  RR: 18 (23 Mar 2023 12:) (18 - 18)  SpO2: 100% (23 Mar 2023 12:) (97% - 100%)    Parameters below as of 23 Mar 2023 12:33  Patient On (Oxygen Delivery Method): nasal cannula  O2 Flow (L/min): 4   I&O's Summary    22 Mar 2023 07:01  -  23 Mar 2023 07:00  --------------------------------------------------------  IN: 0 mL / OUT: 500 mL / NET: -500 mL    23 Mar 2023 07:01  -  23 Mar 2023 15:27  --------------------------------------------------------  IN: 50 mL / OUT: 0 mL / NET: 50 mL      GENERAL: [ ]Cachexia    [ ]Alert  [ ]Oriented x   [X ]Lethargic  [ ]Unarousable  [ X] MINIMALLY Verbal  [ ]Non-Verbal  Behavioral:   [ X] Anxiety  [ ] Delirium [ ] Agitation [ ] Other  HEENT:  [ ]Normal   [ X]Dry mouth   [ ]ET Tube/Trach  [ ]Oral lesions  PULMONARY:   [ ]Clear [ X]Tachypnea  [ ]Audible excessive secretions   [ ]Rhonchi        [ ]Right [ ]Left [ ]Bilateral  [ ]Crackles        [ ]Right [ ]Left [ ]Bilateral  [ ]Wheezing     [ ]Right [ ]Left [ ]Bilateral  [ ]Diminished breath sounds [ ]right [ ]left [ ]bilateral  CARDIOVASCULAR:    [ ]Regular [ X]Irregular [ ]Tachy  [ ]Jose Angel [ ]Murmur [ ]Other  GASTROINTESTINAL:  [ X]Soft  [ ]Distended   [ X]+BS  [ ]Non tender [ ]Tender  [ ]Other [ ]PEG [ ]OGT/ NGT  Last BM:  GENITOURINARY:  [ ]Normal [ X] Incontinent   [ ]Oliguria/Anuria   [ ][place]  MUSCULOSKELETAL:   [ ]Normal   [ ]Weakness  [ X]Bed/Wheelchair bound [ ]Edema  NEUROLOGIC:   [ ]No focal deficits  [ X]Cognitive impairment  [ ]Dysphagia [ ]Dysarthria [ ]Paresis [ ]Other   SKIN:   [ ]Normal  [ ]Rash  [X ]Other  [ ]Pressure ulcer(s)       Present on admission [ ]y [ ]n    CRITICAL CARE:  [ ] Shock Present  [ ]Septic [ ]Cardiogenic [ ]Neurologic [ ]Hypovolemic  [ ]  Vasopressors [ ]  Inotropes   [ X]Respiratory failure present [ ]Mechanical ventilation [ ]Non-invasive ventilatory support [ ]High flow    [ ]Acute  [ ]Chronic [ ]Hypoxic  [ ]Hypercarbic [ ]Other  [ ]Other organ failure     LABS:                        12.8   9.43  )-----------( 334      ( 23 Mar 2023 11:40 )             42.5       145  |  107  |  20  ----------------------------<  137<H>  4.2   |  21<L>  |  0.76    Ca    9.7      23 Mar 2023 11:40    TPro  6.7  /  Alb  3.5  /  TBili  0.4  /  DBili  0.1  /  AST  11  /  ALT  <5<L>  /  AlkPhos  51  03-23  PT/INR - ( 23 Mar 2023 11:40 )   PT: 12.7 sec;   INR: 1.09 ratio         PTT - ( 22 Mar 2023 05:35 )  PTT:24.4 sec    Urinalysis Basic - ( 22 Mar 2023 14:08 )    Color: Light Yellow / Appearance: Clear / S.015 / pH: x  Gluc: x / Ketone: Small  / Bili: Negative / Urobili: Negative   Blood: x / Protein: 30 mg/dL / Nitrite: Negative   Leuk Esterase: Negative / RBC: 2 /hpf / WBC 1 /HPF   Sq Epi: x / Non Sq Epi: 1 /hpf / Bacteria: Negative      RADIOLOGY & ADDITIONAL STUDIES:  Consult Note:   Provider Specialty:  Rad Onc.    Referral/Consultation:   Initial Consult:  · Requested by Name:	Chaka Barone  · Date/Time:	23-Mar-2023 08:45  · Reason for Referral/Consultation:	brain mets  · Reason for Admission	CC: AMS      · Subjective and Objective:   HPI:  66 yo F PMHx of COPD on 2L NC, metastatic clear cell RCC, metastatic breast CA, hx bilateral DVT s/p IVC filter off AC due to hx of ICH, who presents due to change in mental status while at nursing home facility.   Attempted to call family for collateral information but unable to reach. Called [facility] but current staff unable to provide much details. Majority of history obtained through chart review. At baseline patient A&Ox2-3, conversant but bedbound. Noted to be altered day of admission by nursing facility staff and initially brought to [place] ED for evaluation.       CTH: Hemorrhagic process involving the left inferior frontal brain parenchyma measuring 1.1 x 1.0 x 0.8 cm in diameter, suggestive of a hemorrhagic process.  However, area of involvement is significantly decreased compared with the earlier examination dated 2022. Findings do include surrounding vasogenic edema, which is also less prominent in appearance compared with the prior study. Patient transferred to [facility] for neurosurgical evaluation.   Evaluated by neurosurgical team; no acute intervention at this time. Patient to be admitted to medicine for further management.   Patient seen and evaluated on  [place]. Afebrile, HR 92, 125/78, 92% on 4L NC. Awake and alert x0. Unable to participate in any meaningful conversation. Not following commands.     Seen by [facility]- no intervention planned.     Prior RT history courses:   1) R hilum 400 x5 2022 Dr. Vazquez  2) Left frontal Gamm[place]nife 2022 Dr. Timmons 2000 x 1  3) Left frontal SBRT 600 x 5 fractions 30gy also to the brain. Dr. Timmons.    poor KPS 40.      < from: CT Head No Cont (23 @ 05:40) >  IMPRESSION:    Redemonstrated hemorrhagic metastatic lesions in the left frontal and   right parietals lobe, and extensive calvarial metastasis, unchanged from   2 days prior.        Allergies    Cipro (Rash)    Intolerances        ROS: [  ] Fever  [  ] Chills  [  ]Chest Pain [  ] SOB  [  ]Cough [  ] N/V  [  ] Diarrhea [  ]Constipation [  ]Other ROS:  [  ] ROS otherwise negative    PAST MEDICAL & SURGICAL HISTORY:  Other specified disorders of kidney and ureter    Anemia    Breast cancer  Left breast cancer - 13 years ago, s/p mastectomy, s/p RT for lung mets, and on oral chemotherapy    Emphysema lung    Renal cell carcinoma  s/p L nephrectomy    Stage 3 chronic kidney disease    History of hip surgery  in - Right hip surgery    H/O left mastectomy    S/p nephrectomy  left    H/O total adrenalectomy        FAMILY HISTORY:  FH: throat cancer  mother    FH: emphysema  father        MEDICATIONS  (STANDING):  albuterol/ipratropium for Nebulization 3 milliLiter(s) Nebulizer every 6 hours  chlorhexidine 2% Cloths 1 Application(s) Topical daily  dexAMETHasone  Injectable 6 milliGRAM(s) IV Push daily  dextrose 5% + sodium chloride 0.45%. 1000 milliLiter(s) (75 mL/Hr) IV Continuous <Continuous>  levETIRAcetam  IVPB 500 milliGRAM(s) IV Intermittent every 12 hours  remdesivir  IVPB   IV Intermittent   remdesivir  IVPB 100 milliGRAM(s) IV Intermittent every 24 hours    MEDICATIONS  (PRN):      PHYSICAL EXAM  Vital Signs Last 24 Hrs  T(C): 36.7 (23 Mar 2023 04:25), Max: 36.9 (22 Mar 2023 10:23)  T(F): 98 (23 Mar 2023 04:25), Max: 98.4 (22 Mar 2023 10:23)  HR: 79 (23 Mar 2023 04:25) (72 - 92)  BP: 124/74 (23 Mar 2023 04:25) (107/69 - 125/78)  BP(mean): --  RR: 18 (23 Mar 2023 04:25) (18 - 18)  SpO2: 98% (23 Mar 2023 04:25) (92% - 98%)    Parameters below as of 23 Mar 2023 04:25  Patient On (Oxygen Delivery Method): nasal cannula  O2 Flow (L/min): 4        IMAGING/LABS/PATHOLOGY: I have personally reviewed the relevant labs, pathology, and imaging as noted in the HPI.  In addition,                          11.9   8.82  )-----------( 390      ( 22 Mar 2023 05:35 )             39.1         147<H>  |  109<H>  |  22  ----------------------------<  118<H>  4.3   |  22  |  0.78    Ca    9.8      22 Mar 2023 18:11    TPro  6.6  /  Alb  3.3  /  TBili  0.4  /  DBili  0.1  /  AST  12  /  ALT  <5<L>  /  AlkPhos  48          ASSESSMENT/PLAN    ZEYNEP GOLDMAN is a 67y woman with h/o breast cancer, renal cell cancer, s/p chemotherapy prior. s/p 3 courses of radiation treatment, two of them for brain metastases treated 2022 with Dr. Timmons,  transferred from [place] to [facility] for NSG evaluation; now AMS, unable to respond, bedbound, no neurosurgical intervention and Rad Onc consult requested.  Case reviewed, there is NO role for any transfer  to [facility] for further RT to a previously irradiated brain treated twice with RT in the past one year and now unable to speak about any treatment course as she is unresponsive and nonverbal.  This is clearly a case  for a hospice discussion and for palliative care to address with staff and family if the patient can no longer participate in decision making.           Electronic Signatures:  Navjot Traore (PA)   (Signed 23-Mar-2023 08:48)  	Authored: Consult Note, Referral/Consultation, Subjective and Objective  Cayetano Pichardo ()   (Signed 23-Mar-2023 09:14)  	Co-Signer: Consult Note, Referral/Consultation, Subjective and Objective    Last Updated: 23-Mar-2023 09:14 by Cayetano Pichardo ()< from: CT Head No Cont (23 @ 05:40) >    ACC: [id number] EXAM:  CT BRAIN   ORDERED BY: RAMONA OLIVAREZ     PROCEDURE DATE:  2023          INTERPRETATION:  CLINICAL INFORMATION: Altered mental status. Metastatic   breast cancer. Brain mass versus intracranial hemorrhage.    TECHNIQUE: Noncontrast axial CT images were acquired through the head.   Two-dimensional sagittal and coronal reformats were generated.    COMPARISON STUDY: CT head 3/21/2023    FINDINGS:    9 mm peripherally hyperdense nodule in the inferior left frontal lobe   withsurrounding vasogenic edema. 8 mm hyperdense nodule in the high   right subcortical parietal lobe with surrounding vasogenic edema. These   are unchanged.    1.3 cm nodule in the sella, unchanged.    No midline shift. The basal cisterns are patent without evidence of   central herniation. No hydrocephalus. No new area of intracranial   hemorrhage.    Extensive sclerotic calvarial metastasis. Unchanged soft tissue mass in   the right occipital scalp.      IMPRESSION:    Redemonstrated hemorrhagic metastatic lesions in the left frontal and   right parietals lobe, and extensive calvarial metastasis, unchanged from   2 days prior.    --- End of Report ---           SHALONDA VANESSA MD; Resident Radiology  This document has been electronically signed.  SERAFIN PRATT MD; Attending Radiologist  This document has been electronically signed. Mar 22 2023  6:32AM    < end of copied text >      PROTEIN CALORIE MALNUTRITION PRESENT: [ ]mild [ ]moderate [X ]severe [ ]underweight [ ]morbid obesity  https://www.andeal.org/vault/2440/web/files/ONC/Table_Clinical%20Characteristics%20to%20Document%20Malnutrition-White%20JV%20et%20al%2020.pdf    Height (cm): 160 (23 @ 05:01), 160 (23 @ 19:09), 160 (23 @ 14:20)  Weight (kg): 65.8 (23 @ 19:09), 64.9 (23 @ 14:20), 66.9 (23 @ 18:39)  BMI (kg/m2): 25.7 (23 @ 05:01), 25.7 (23 @ 19:09), 25.4 (23 @ 14:20)    [ ]PPSV2 < or = to 30% [ ]significant weight loss  [ ]poor nutritional intake  [ ]anasarca[ ]Artificial Nutrition      Other REFERRALS:  [ ]Hospice  [ ]Child Life  [ X]Social Work  [ ]Case management [ ]Holistic Therapy

## 2023-03-23 NOTE — CONSULT NOTE ADULT - PROBLEM SELECTOR RECOMMENDATION 3
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

## 2023-03-23 NOTE — SWALLOW BEDSIDE ASSESSMENT ADULT - SWALLOW EVAL: RECOMMENDED DIET
Soft/bite size solids, thin liquids. Only provide PO if patient is awake/alert/accepting. Crush medications in applesauce in keeping with pt's reported preferences.

## 2023-03-23 NOTE — SWALLOW BEDSIDE ASSESSMENT ADULT - SLP PERTINENT HISTORY OF CURRENT PROBLEM
66 yo F PMHx of COPD on 2L NC, metastatic clear cell RCC, metastatic breast CA, hx bilateral DVT s/p IVC filter off AC due to hx of ICH, who presents due to change in mental status while at nursing home facility. Attempted to call family for collateral information but unable to reach. Called [facility] but current staff unable to provide much details. Majority of history obtained through chart review. At baseline patient A&Ox2-3, conversant but bedbound. Noted to be altered day of admission by nursing facility staff and initially brought to [place] ED for evaluation. CTH: Hemorrhagic process involving the left inferior frontal brain parenchyma measuring 1.1 x 1.0 x 0.8 cm in diameter, suggestive of a hemorrhagic process.  However, area of involvement is significantly decreased compared with the earlier examination dated 12/1/2022. Findings do include surrounding vasogenic edema, which is also less prominent in appearance compared with the prior study.

## 2023-03-23 NOTE — CONSULT NOTE ADULT - ASSESSMENT
66 yo F PMHx of COPD on 2L NC, metastatic clear cell RCC, metastatic breast CA, hx bilateral DVT s/p IVC filter off AC due to hx of ICH, who presents for AMS with CTH notable for hemorrhagic metastatic lesions   .  Palliative care consulted for goals of care.  Patient is DNR

## 2023-03-23 NOTE — CONSULT NOTE ADULT - SUBJECTIVE AND OBJECTIVE BOX
HPI:  68 yo F PMHx of COPD on 2L NC, metastatic clear cell RCC, metastatic breast CA, hx bilateral DVT s/p IVC filter off AC due to hx of ICH, who presents due to change in mental status while at nursing home facility.   Attempted to call family for collateral information but unable to reach. Called [facility] but current staff unable to provide much details. Majority of history obtained through chart review. At baseline patient A&Ox2-3, conversant but bedbound. Noted to be altered day of admission by nursing facility staff and initially brought to [place] ED for evaluation.       CTH: Hemorrhagic process involving the left inferior frontal brain parenchyma measuring 1.1 x 1.0 x 0.8 cm in diameter, suggestive of a hemorrhagic process.  However, area of involvement is significantly decreased compared with the earlier examination dated 12/1/2022. Findings do include surrounding vasogenic edema, which is also less prominent in appearance compared with the prior study. Patient transferred to [facility] for neurosurgical evaluation.   Evaluated by neurosurgical team; no acute intervention at this time. Patient to be admitted to medicine for further management.   Patient seen and evaluated on [address]. Afebrile, HR 92, 125/78, 92% on 4L NC. Awake and alert x0. Unable to participate in any meaningful conversation. Not following commands.     Seen by NSG- no intervention planned.     Prior RT history courses:   1) R hilum 400 x5 8/2022 Dr. Vazquez  2) Left frontal Gammaknife 12/6/2022 Dr. Timmons 2000 x 1  3) Left frontal SBRT 600 x 5 fractions 30gy also to the brain. Dr. Timmons.    poor KPS 40.      < from: CT Head No Cont (03.22.23 @ 05:40) >  IMPRESSION:    Redemonstrated hemorrhagic metastatic lesions in the left frontal and   right parietals lobe, and extensive calvarial metastasis, unchanged from   2 days prior.        Allergies    Cipro (Rash)    Intolerances        ROS: [  ] Fever  [  ] Chills  [  ]Chest Pain [  ] SOB  [  ]Cough [  ] N/V  [  ] Diarrhea [  ]Constipation [  ]Other ROS:  [  ] ROS otherwise negative    PAST MEDICAL & SURGICAL HISTORY:  Other specified disorders of kidney and ureter    Anemia    Breast cancer  Left breast cancer - 13 years ago, s/p mastectomy, s/p RT for lung mets, and on oral chemotherapy    Emphysema lung    Renal cell carcinoma  s/p L nephrectomy    Stage 3 chronic kidney disease    History of hip surgery  in 2007- Right hip surgery    H/O left mastectomy    S/p nephrectomy  left    H/O total adrenalectomy        FAMILY HISTORY:  FH: throat cancer  mother    FH: emphysema  father        MEDICATIONS  (STANDING):  albuterol/ipratropium for Nebulization 3 milliLiter(s) Nebulizer every 6 hours  chlorhexidine 2% Cloths 1 Application(s) Topical daily  dexAMETHasone  Injectable 6 milliGRAM(s) IV Push daily  dextrose 5% + sodium chloride 0.45%. 1000 milliLiter(s) (75 mL/Hr) IV Continuous <Continuous>  levETIRAcetam  IVPB 500 milliGRAM(s) IV Intermittent every 12 hours  remdesivir  IVPB   IV Intermittent   remdesivir  IVPB 100 milliGRAM(s) IV Intermittent every 24 hours    MEDICATIONS  (PRN):      PHYSICAL EXAM  Vital Signs Last 24 Hrs  T(C): 36.7 (23 Mar 2023 04:25), Max: 36.9 (22 Mar 2023 10:23)  T(F): 98 (23 Mar 2023 04:25), Max: 98.4 (22 Mar 2023 10:23)  HR: 79 (23 Mar 2023 04:25) (72 - 92)  BP: 124/74 (23 Mar 2023 04:25) (107/69 - 125/78)  BP(mean): --  RR: 18 (23 Mar 2023 04:25) (18 - 18)  SpO2: 98% (23 Mar 2023 04:25) (92% - 98%)    Parameters below as of 23 Mar 2023 04:25  Patient On (Oxygen Delivery Method): nasal cannula  O2 Flow (L/min): 4        IMAGING/LABS/PATHOLOGY: I have personally reviewed the relevant labs, pathology, and imaging as noted in the HPI.  In addition,                          11.9   8.82  )-----------( 390      ( 22 Mar 2023 05:35 )             39.1     03-22    147<H>  |  109<H>  |  22  ----------------------------<  118<H>  4.3   |  22  |  0.78    Ca    9.8      22 Mar 2023 18:11    TPro  6.6  /  Alb  3.3  /  TBili  0.4  /  DBili  0.1  /  AST  12  /  ALT  <5<L>  /  AlkPhos  48  03-22        ASSESSMENT/PLAN    ZEYNEP GOLDMAN is a 67y woman with h/o breast cancer, renal cell cancer, s/p chemotherapy prior. s/p 3 courses of radiation treatment, two of them for brain metastases treated 12/2022 with Dr. Timmons,  transferred from [place] to [facility] for NSG evaluation; now AMS, unable to respond, bedbound, no neurosurgical intervention and Rad Onc consult requested.  Case reviewed, there is NO role for any transfer  to [facility] for further RT to a previously irradiated brain treated twice with RT in the past one year and now unable to speak about any treatment course as she is unresponsive and nonverbal.  This is clearly a case  for a hospice discussion and for palliative care to address with staff and family if the patient can no longer participate in decision making.

## 2023-03-23 NOTE — ED POST DISCHARGE NOTE - RESULT SUMMARY
Blood cultures negative COVID-positive again per primary to leave the message.  Call if no response voicemail.  full

## 2023-03-23 NOTE — SWALLOW BEDSIDE ASSESSMENT ADULT - SWALLOW EVAL: DIAGNOSIS
66 yo F PMHx of COPD on 2L NC, metastatic clear cell RCC, metastatic breast CA, hx bilateral DVT s/p IVC filter off AC due to hx of ICH, who presents for AMS with CTH notable for hemorrhagic metastatic lesions. Now seen for swallow evaluation; pt presents with a largely functional oropharyngeal swallow; prolonged yet adequate mastication is seen with solid textures, with timely swlalow trigger across consistencies and no overt signs of laryngeal penetration/aspiration observed. Given waxing/ waning mentation, would opt for more conservative solid textures at this time and only provide PO if pt is awake/alert.

## 2023-03-23 NOTE — CONSULT NOTE ADULT - CONVERSATION DETAILS
Spoke with son/surogate Darrel Ortega by phone for greater than 20 minutes.   Darrel has a good understanding of mothers grave prognosis and end stage disease process.  I offered a transfer to PCU and although he agrees this is the likely next step he would like to take the night to think it over.   I left him with my contact information , and we agreed to speak in the AM

## 2023-03-23 NOTE — SWALLOW BEDSIDE ASSESSMENT ADULT - COMMENTS
Patient transferred to [facility] for neurosurgical evaluation.     Evaluated by neurosurgical team; no acute intervention at this time. Patient to be admitted to medicine for further management. Patient seen and evaluated on 5 Sacul. Afebrile, HR 92, 125/78, 92% on 4L NC. Awake and alert x0. Unable to participate in any meaningful conversation. Not following commands.   Rad/onc consulted: there is NO role for any transfer to [facility] for further RT to a previously irradiated brain treated twice with RT in the past one year and now unable to speak about any treatment course as she is unresponsive and nonverbal.  This is clearly a case for a hospice discussion and for palliative care to address with staff and family if the patient can no longer participate in decision making.     Per palliative: Darrel has a good understanding of mothers grave prognosis and end stage disease process.  I offered a transfer to PCU and although he agrees this is the likely next step he would like to take the night to think it over. I left him with my contact information , and we agreed to speak in the AM

## 2023-03-23 NOTE — PROGRESS NOTE ADULT - PROBLEM SELECTOR PLAN 6
DVT ppx: SCDs   Diet: NPO; pending dysphagia screen  Code status: DNR/DNI - prior completed molst in chart       Spoke to Darrel vargas ([phone]) 3/23 about updates and plan. Explained prognosis and no plans for any intervention per Nsx and rad onc. Discussed about possible transition to hospice. Son appears to be agreeable. Palliative to reach out to son as well. SW aware. Continue treatment for COVID infection at this time

## 2023-03-23 NOTE — SWALLOW BEDSIDE ASSESSMENT ADULT - SWALLOW EVAL: CRITERIA FOR SKILLED INTERVENTION MET
given poor medical prognosis per discussion with NP/no significant expected improvement in functional status

## 2023-03-23 NOTE — CONSULT NOTE ADULT - PROBLEM SELECTOR RECOMMENDATION 2
Patient had been alert and oriented x 3 , conversant but bedbound  Now alert and oriented x 0   Not able to participate in health care planning   CTH with hemorrhagic mets lesions  Now with Covid

## 2023-03-23 NOTE — SWALLOW BEDSIDE ASSESSMENT ADULT - SLP GENERAL OBSERVATIONS
Pt encountered in bed, awake/alert, on 4L NC. Oriented to self and current month. Able to follow simple directives. Vocal quality WFL. Pleasantly confused.

## 2023-03-23 NOTE — PROGRESS NOTE ADULT - SUBJECTIVE AND OBJECTIVE BOX
[facility] Division of Hospital Medicine  Evelia Arredondo MD  Available via MS Teams    SUBJECTIVE / OVERNIGHT EVENTS: Patient AAO x1-2 currently. Does not appear to be in any acute distress.     ADDITIONAL REVIEW OF SYSTEMS: Unable to obtain complete ROS due to AMS    MEDICATIONS  (STANDING):  albuterol/ipratropium for Nebulization 3 milliLiter(s) Nebulizer every 6 hours  chlorhexidine 2% Cloths 1 Application(s) Topical daily  dexAMETHasone  Injectable 6 milliGRAM(s) IV Push daily  levETIRAcetam  IVPB 500 milliGRAM(s) IV Intermittent every 12 hours  remdesivir  IVPB 100 milliGRAM(s) IV Intermittent every 24 hours  remdesivir  IVPB   IV Intermittent   sodium chloride 0.45%. 1000 milliLiter(s) (50 mL/Hr) IV Continuous <Continuous>      I&O's Summary    22 Mar 2023 07:01  -  23 Mar 2023 07:00  --------------------------------------------------------  IN: 0 mL / OUT: 500 mL / NET: -500 mL      PHYSICAL EXAM:  Vital Signs Last 24 Hrs  T(C): 36.4 (23 Mar 2023 12:33), Max: 36.7 (23 Mar 2023 04:25)  T(F): 97.5 (23 Mar 2023 12:33), Max: 98 (23 Mar 2023 04:25)  HR: 72 (23 Mar 2023 12:33) (72 - 79)  BP: 125/80 (23 Mar 2023 12:33) (107/69 - 125/80)  RR: 18 (23 Mar 2023 12:33) (18 - 18)  SpO2: 100% (23 Mar 2023 12:33) (97% - 100%)    Parameters below as of 23 Mar 2023 12:33  Patient On (Oxygen Delivery Method): nasal cannula  O2 Flow (L/min): 4    CONSTITUTIONAL: NAD, well-developed   EYES: PERRLA; conjunctiva and sclera clear  ENMT: Moist oral mucosa, no pharyngeal injection or exudates  NECK: Supple   RESPIRATORY: Normal respiratory effort; lungs are clear to auscultation bilaterally  CARDIOVASCULAR: Regular rate and rhythm, normal S1 and S2   ABDOMEN: Nontender to palpation, normoactive bowel sounds   MUSCULOSKELETAL: no clubbing or cyanosis of digits; no joint swelling or tenderness to palpation  PSYCH: A+O x1-2 at this time  NEUROLOGY: unable to fully assess due to AMS    SKIN: No rashes     LABS:                        12.8   9.43  )-----------( 334      ( 23 Mar 2023 11:40 )             42.5         145  |  107  |  20  ----------------------------<  137<H>  4.2   |  21<L>  |  0.76    Ca    9.7      23 Mar 2023 11:40    TPro  6.7  /  Alb  3.5  /  TBili  0.4  /  DBili  0.1  /  AST  11  /  ALT  <5<L>  /  AlkPhos  51      PT/INR - ( 23 Mar 2023 11:40 )   PT: 12.7 sec;   INR: 1.09 ratio         PTT - ( 22 Mar 2023 05:35 )  PTT:24.4 sec      Urinalysis Basic - ( 22 Mar 2023 14:08 )    Color: Light Yellow / Appearance: Clear / S.015 / pH: x  Gluc: x / Ketone: Small  / Bili: Negative / Urobili: Negative   Blood: x / Protein: 30 mg/dL / Nitrite: Negative   Leuk Esterase: Negative / RBC: 2 /hpf / WBC 1 /HPF   Sq Epi: x / Non Sq Epi: 1 /hpf / Bacteria: Negative        Culture - Urine (collected 22 Mar 2023 00:44)  Source: Clean Catch Clean Catch (Midstream)  Final Report (23 Mar 2023 07:33):    <10,000 CFU/mL Normal Urogenital Myesha    Culture - Blood (collected 21 Mar 2023 20:45)  Source: .Blood Blood-Peripheral  Preliminary Report (23 Mar 2023 01:03):    No growth to date.      COVID-19 PCR: Detected (21 Mar 2023 22:10)  COVID-19 PCR: NotDetec (03 Mar 2023 12:30)  COVID-19 PCR: NotDetec (2023 16:34)  COVID-19 PCR: NotDetec (2023 19:15)  COVID-19 PCR: NotDetec (2023 15:33)      RADIOLOGY & ADDITIONAL TESTS:  New Imaging Personally Reviewed Today:  New Electrocardiogram Personally Reviewed Today:  Other Results Reviewed Today:   Prior or Outpatient Records Reviewed Today with Summary:    COORDINATION OF CARE:  Consultant Communication and Details of Discussion (where applicable):

## 2023-03-24 DIAGNOSIS — G89.3 NEOPLASM RELATED PAIN (ACUTE) (CHRONIC): ICD-10-CM

## 2023-03-24 LAB
ALBUMIN SERPL ELPH-MCNC: 3.1 G/DL — LOW (ref 3.3–5)
ALP SERPL-CCNC: 46 U/L — SIGNIFICANT CHANGE UP (ref 40–120)
ALT FLD-CCNC: <5 U/L — LOW (ref 10–45)
ANION GAP SERPL CALC-SCNC: 12 MMOL/L — SIGNIFICANT CHANGE UP (ref 5–17)
AST SERPL-CCNC: 12 U/L — SIGNIFICANT CHANGE UP (ref 10–40)
BASOPHILS # BLD AUTO: 0.01 K/UL — SIGNIFICANT CHANGE UP (ref 0–0.2)
BASOPHILS NFR BLD AUTO: 0.1 % — SIGNIFICANT CHANGE UP (ref 0–2)
BILIRUB SERPL-MCNC: 0.4 MG/DL — SIGNIFICANT CHANGE UP (ref 0.2–1.2)
BUN SERPL-MCNC: 20 MG/DL — SIGNIFICANT CHANGE UP (ref 7–23)
CALCIUM SERPL-MCNC: 9.1 MG/DL — SIGNIFICANT CHANGE UP (ref 8.4–10.5)
CHLORIDE SERPL-SCNC: 106 MMOL/L — SIGNIFICANT CHANGE UP (ref 96–108)
CO2 SERPL-SCNC: 25 MMOL/L — SIGNIFICANT CHANGE UP (ref 22–31)
CREAT SERPL-MCNC: 0.79 MG/DL — SIGNIFICANT CHANGE UP (ref 0.5–1.3)
D DIMER BLD IA.RAPID-MCNC: 1736 NG/ML DDU — HIGH
EGFR: 81 ML/MIN/1.73M2 — SIGNIFICANT CHANGE UP
EOSINOPHIL # BLD AUTO: 0 K/UL — SIGNIFICANT CHANGE UP (ref 0–0.5)
EOSINOPHIL NFR BLD AUTO: 0 % — SIGNIFICANT CHANGE UP (ref 0–6)
GLUCOSE BLDC GLUCOMTR-MCNC: 108 MG/DL — HIGH (ref 70–99)
GLUCOSE BLDC GLUCOMTR-MCNC: 113 MG/DL — HIGH (ref 70–99)
GLUCOSE BLDC GLUCOMTR-MCNC: 176 MG/DL — HIGH (ref 70–99)
GLUCOSE SERPL-MCNC: 137 MG/DL — HIGH (ref 70–99)
HCT VFR BLD CALC: 37.9 % — SIGNIFICANT CHANGE UP (ref 34.5–45)
HGB BLD-MCNC: 11.4 G/DL — LOW (ref 11.5–15.5)
IMM GRANULOCYTES NFR BLD AUTO: 1.6 % — HIGH (ref 0–0.9)
LYMPHOCYTES # BLD AUTO: 0.21 K/UL — LOW (ref 1–3.3)
LYMPHOCYTES # BLD AUTO: 2 % — LOW (ref 13–44)
MAGNESIUM SERPL-MCNC: 1.9 MG/DL — SIGNIFICANT CHANGE UP (ref 1.6–2.6)
MCHC RBC-ENTMCNC: 29.1 PG — SIGNIFICANT CHANGE UP (ref 27–34)
MCHC RBC-ENTMCNC: 30.1 GM/DL — LOW (ref 32–36)
MCV RBC AUTO: 96.7 FL — SIGNIFICANT CHANGE UP (ref 80–100)
MONOCYTES # BLD AUTO: 0.63 K/UL — SIGNIFICANT CHANGE UP (ref 0–0.9)
MONOCYTES NFR BLD AUTO: 5.9 % — SIGNIFICANT CHANGE UP (ref 2–14)
NEUTROPHILS # BLD AUTO: 9.72 K/UL — HIGH (ref 1.8–7.4)
NEUTROPHILS NFR BLD AUTO: 90.4 % — HIGH (ref 43–77)
NRBC # BLD: 0 /100 WBCS — SIGNIFICANT CHANGE UP (ref 0–0)
PHOSPHATE SERPL-MCNC: 2.3 MG/DL — LOW (ref 2.5–4.5)
PLATELET # BLD AUTO: 359 K/UL — SIGNIFICANT CHANGE UP (ref 150–400)
POTASSIUM SERPL-MCNC: 4 MMOL/L — SIGNIFICANT CHANGE UP (ref 3.5–5.3)
POTASSIUM SERPL-SCNC: 4 MMOL/L — SIGNIFICANT CHANGE UP (ref 3.5–5.3)
PROT SERPL-MCNC: 5.9 G/DL — LOW (ref 6–8.3)
RBC # BLD: 3.92 M/UL — SIGNIFICANT CHANGE UP (ref 3.8–5.2)
RBC # FLD: 16.5 % — HIGH (ref 10.3–14.5)
SODIUM SERPL-SCNC: 143 MMOL/L — SIGNIFICANT CHANGE UP (ref 135–145)
WBC # BLD: 10.74 K/UL — HIGH (ref 3.8–10.5)
WBC # FLD AUTO: 10.74 K/UL — HIGH (ref 3.8–10.5)

## 2023-03-24 PROCEDURE — 99233 SBSQ HOSP IP/OBS HIGH 50: CPT

## 2023-03-24 PROCEDURE — 99232 SBSQ HOSP IP/OBS MODERATE 35: CPT

## 2023-03-24 RX ORDER — MORPHINE SULFATE 50 MG/1
1 CAPSULE, EXTENDED RELEASE ORAL
Refills: 0 | Status: DISCONTINUED | OUTPATIENT
Start: 2023-03-24 | End: 2023-03-28

## 2023-03-24 RX ORDER — MORPHINE SULFATE 50 MG/1
2 CAPSULE, EXTENDED RELEASE ORAL
Refills: 0 | Status: DISCONTINUED | OUTPATIENT
Start: 2023-03-24 | End: 2023-03-28

## 2023-03-24 RX ORDER — POTASSIUM PHOSPHATE, MONOBASIC POTASSIUM PHOSPHATE, DIBASIC 236; 224 MG/ML; MG/ML
15 INJECTION, SOLUTION INTRAVENOUS ONCE
Refills: 0 | Status: COMPLETED | OUTPATIENT
Start: 2023-03-24 | End: 2023-03-24

## 2023-03-24 RX ADMIN — POTASSIUM PHOSPHATE, MONOBASIC POTASSIUM PHOSPHATE, DIBASIC 62.5 MILLIMOLE(S): 236; 224 INJECTION, SOLUTION INTRAVENOUS at 15:06

## 2023-03-24 RX ADMIN — LEVETIRACETAM 400 MILLIGRAM(S): 250 TABLET, FILM COATED ORAL at 06:14

## 2023-03-24 RX ADMIN — Medication 3 MILLILITER(S): at 17:31

## 2023-03-24 RX ADMIN — Medication 6 MILLIGRAM(S): at 06:14

## 2023-03-24 RX ADMIN — REMDESIVIR 200 MILLIGRAM(S): 5 INJECTION INTRAVENOUS at 18:49

## 2023-03-24 RX ADMIN — Medication 3 MILLILITER(S): at 12:14

## 2023-03-24 RX ADMIN — Medication 650 MILLIGRAM(S): at 00:01

## 2023-03-24 RX ADMIN — MORPHINE SULFATE 2 MILLIGRAM(S): 50 CAPSULE, EXTENDED RELEASE ORAL at 17:30

## 2023-03-24 RX ADMIN — MORPHINE SULFATE 2 MILLIGRAM(S): 50 CAPSULE, EXTENDED RELEASE ORAL at 18:00

## 2023-03-24 RX ADMIN — LEVETIRACETAM 400 MILLIGRAM(S): 250 TABLET, FILM COATED ORAL at 17:32

## 2023-03-24 RX ADMIN — CHLORHEXIDINE GLUCONATE 1 APPLICATION(S): 213 SOLUTION TOPICAL at 17:37

## 2023-03-24 RX ADMIN — Medication 3 MILLILITER(S): at 06:13

## 2023-03-24 NOTE — CONSULT NOTE ADULT - PROBLEM SELECTOR RECOMMENDATION 2
Patient had been alert and oriented x 3 , conversant but bedbound  Was unresponsive yesterday, today more alert, conversant and able express needs   CTH with hemorrhagic mets lesions  Now with Covid Will start IVP morphine for le pain   1 mg morphine IVP q 2 hours prn mild mod pain   2 mg morphine IVP q 2 hours prn severe pain  2 mg morphine IVP q 2 hours prn dyspnea

## 2023-03-24 NOTE — CONSULT NOTE ADULT - PROBLEM SELECTOR RECOMMENDATION 9
PPS 20% needs assistance with all basic needs  Patient arrived with completed MOLST- DNR/DNI  Per son Darrel- patient has been living alone and is currently without  home care services in place. Son states that, he and patients ex-  Ruslan Ortega ([phone]) have been trying to obtain help at home as they  recognize patient needs 24 hour care at this time.
PPS 20% needs assistance with all basic needs  Patient arrived with completed MOLST- DNR/DNI  Per son Darrel- patient has been living alone and is currently without  home care services in place. Son states that, he and patients ex-  Ruslan Ortega ([phone]) have been trying to obtain help at home as they  recognize patient needs 24 hour care at this time.

## 2023-03-24 NOTE — PROGRESS NOTE ADULT - NSPROGADDITIONALINFOA_GEN_ALL_CORE
Discussed with ACPMisael Discussed with ACP, Misael and palliative team     Plan for transfer to PCU with plans for possible home hospice. Awaiting for PCU bed.

## 2023-03-24 NOTE — PROGRESS NOTE ADULT - SUBJECTIVE AND OBJECTIVE BOX
[facility] Division of Hospital Medicine  Evelia Arredondo MD  Available via MS Teams    SUBJECTIVE / OVERNIGHT EVENTS: No acute events overnight. Patient appears to be more awake and alert this morning. Aware in hospital and able to state name and year. Denies any SOB or chest pain. No other concerns or complaints at this time.     Review of Systems:   CONSTITUTIONAL: No fever   EYES: No eye pain, visual disturbances, or discharge  ENMT: No difficulty hearing   RESPIRATORY: No SOB. No cough   CARDIOVASCULAR: No chest pain   GASTROINTESTINAL: No abdominal or epigastric pain. No nausea, vomiting, or hematemesis; No diarrhea    GENITOURINARY: No dysuria   NEUROLOGICAL: No headaches   SKIN: No itching    MUSCULOSKELETAL: pain in right foot; No muscle or back pain  PSYCHIATRIC: No depression or anxiety  HEME/LYMPH: No easy bruising or bleeding gums      MEDICATIONS  (STANDING):  albuterol/ipratropium for Nebulization 3 milliLiter(s) Nebulizer every 6 hours  chlorhexidine 2% Cloths 1 Application(s) Topical daily  dexAMETHasone  Injectable 6 milliGRAM(s) IV Push daily  levETIRAcetam  IVPB 500 milliGRAM(s) IV Intermittent every 12 hours  potassium phosphate IVPB 15 milliMole(s) IV Intermittent once  remdesivir  IVPB   IV Intermittent   remdesivir  IVPB 100 milliGRAM(s) IV Intermittent every 24 hours  sodium chloride 0.45%. 1000 milliLiter(s) (50 mL/Hr) IV Continuous <Continuous>      I&O's Summary    23 Mar 2023 07:01  -  24 Mar 2023 07:00  --------------------------------------------------------  IN: 170 mL / OUT: 650 mL / NET: -480 mL      PHYSICAL EXAM:  Vital Signs Last 24 Hrs  T(C): 36.9 (24 Mar 2023 12:11), Max: 36.9 (24 Mar 2023 12:11)  T(F): 98.5 (24 Mar 2023 12:11), Max: 98.5 (24 Mar 2023 12:11)  HR: 81 (24 Mar 2023 12:11) (72 - 81)  BP: 124/70 (24 Mar 2023 12:11) (99/72 - 124/70)  RR: 18 (24 Mar 2023 12:11) (18 - 18)  SpO2: 99% (24 Mar 2023 12:11) (95% - 99%)    Parameters below as of 24 Mar 2023 12:11  Patient On (Oxygen Delivery Method): nasal cannula  O2 Flow (L/min): 4    CONSTITUTIONAL: NAD, well-developed   EYES: PERRLA; conjunctiva and sclera clear  ENMT: Moist oral mucosa, no pharyngeal injection or exudates   NECK: Supple   RESPIRATORY: Normal respiratory effort; lungs are clear to auscultation bilaterally  CARDIOVASCULAR: Regular rate and rhythm, normal S1 and S2, no murmur   ABDOMEN: Nontender to palpation, normoactive bowel sounds   MUSCULOSKELETAL: no clubbing or cyanosis of digits; no joint swelling or tenderness to palpation; CAM boot in place on right foot  PSYCH: affect appropriate  NEUROLOGY: no gross sensory deficits   SKIN: No rashes     LABS:                        11.4   10.74 )-----------( 359      ( 24 Mar 2023 11:21 )             37.9     03-24    143  |  106  |  20  ----------------------------<  137<H>  4.0   |  25  |  0.79    Ca    9.1      24 Mar 2023 11:21  Phos  2.3     03-24  Mg     1.9     03-24    TPro  5.9<L>  /  Alb  3.1<L>  /  TBili  0.4  /  DBili  x   /  AST  12  /  ALT  <5<L>  /  AlkPhos  46  03-24    PT/INR - ( 23 Mar 2023 11:40 )   PT: 12.7 sec;   INR: 1.09 ratio         Culture - Blood (collected 22 Mar 2023 18:11)  Source: .Blood Blood  Preliminary Report (24 Mar 2023 01:03):    No growth to date.    Culture - Urine (collected 22 Mar 2023 14:08)  Source: Catheterized Catheterized  Final Report (23 Mar 2023 22:19):    No growth    Culture - Urine (collected 22 Mar 2023 00:44)  Source: Clean Catch Clean Catch (Midstream)  Final Report (23 Mar 2023 07:33):    <10,000 CFU/mL Normal Urogenital Myesha    Culture - Blood (collected 21 Mar 2023 20:45)  Source: .Blood Blood-Peripheral  Preliminary Report (23 Mar 2023 01:03):    No growth to date.      COVID-19 PCR: Detected (21 Mar 2023 22:10)  COVID-19 PCR: NotDetec (03 Mar 2023 12:30)  COVID-19 PCR: NotDetec (28 Feb 2023 16:34)  COVID-19 PCR: NotDetec (23 Feb 2023 19:15)  COVID-19 PCR: NotDetec (19 Feb 2023 15:33)      RADIOLOGY & ADDITIONAL TESTS:  New Imaging Personally Reviewed Today:  New Electrocardiogram Personally Reviewed Today:  Other Results Reviewed Today:   Prior or Outpatient Records Reviewed Today with Summary:    COORDINATION OF CARE:  Consultant Communication and Details of Discussion (where applicable):

## 2023-03-24 NOTE — CONSULT NOTE ADULT - PROBLEM SELECTOR RECOMMENDATION 4
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

## 2023-03-24 NOTE — CONSULT NOTE ADULT - CONVERSATION DETAILS
Spoke with Darrel/son/HCP who agrees with transfer to PCU.  I explained that patient can transfer to PCU for better pain management and symptom directed approach but the ultimate plan is for patient to be discharged home or long term care facility with hospice services if patient stabilizes and able to take PO.  Currently patient on IV antiseizure RX   Darrel verbalizes understanding and agrees with plan.     HCP and LIVING Will document received and placed in chart

## 2023-03-24 NOTE — CONSULT NOTE ADULT - SUBJECTIVE AND OBJECTIVE BOX
HPI:  68 yo F PMHx of COPD on 2L NC, metastatic clear cell RCC, metastatic breast CA, hx bilateral DVT s/p IVC filter off AC due to hx of ICH, who presents due to change in mental status while at nursing home facility.   Attempted to call family for collateral information but unable to reach. Called [facility] but current staff unable to provide much details. Majority of history obtained through chart review. At baseline patient A&Ox2-3, conversant but bedbound. Noted to be altered day of admission by nursing facility staff and initially brought to [place] ED for evaluation.   CTH: Hemorrhagic process involving the left inferior frontal brain parenchyma measuring 1.1 x 1.0 x 0.8 cm in diameter, suggestive of a hemorrhagic process.  However, area of involvement is significantly decreased compared with the earlier examination dated 12/1/2022. Findings do include surrounding vasogenic edema, which is also less prominent in appearance compared with the prior study. Patient transferred to [facility] for neurosurgical evaluation.   Evaluated by neurosurgical team; no acute intervention at this time. Patient to be admitted to medicine for further management.   Patient seen and evaluated on 5 [place]. Afebrile, HR 92, 125/78, 92% on 4L NC. Awake and alert x0. Unable to participate in any meaningful conversation. Not following commands.      (22 Mar 2023 15:58)    PERTINENT PM/SXH:   Other specified disorders of kidney and ureter    Anemia    Breast cancer    Emphysema lung    Renal cell carcinoma    Stage 3 chronic kidney disease      History of hip surgery    H/O left mastectomy    S/p nephrectomy    H/O total adrenalectomy      FAMILY HISTORY:  FH: throat cancer  mother    FH: emphysema  father      Family Hx substance abuse [ ]yes [ ]no  ITEMS NOT CHECKED ARE NOT PRESENT    SOCIAL HISTORY:   Significant other/partner[ ]  Children[X ]  Sabianism/Spirituality:  Substance hx:  [ ]   Tobacco hx:  [ ]   Alcohol hx: [ ]   Home Opioid hx:  [ ] I-Stop Reference No:  Living Situation: [ ]Home  [ ]Long term care  [X ]Rehab [ ]Other    ADVANCE DIRECTIVES:    DNR/MOLST  [X ]  Living Will  [ ]   DECISION MAKER(s):  [X ] Health Care Proxy(s)  [ ] Surrogate(s)  [ ] Guardian           Name(s): Phone Number(s):  IAM GOLDMAN   BASELINE (I)ADL(s) (prior to admission):  Divide: [ ]Total  [ ] Moderate [X ]Dependent    Allergies    Cipro (Rash)    Intolerances    MEDICATIONS  (STANDING):  albuterol/ipratropium for Nebulization 3 milliLiter(s) Nebulizer every 6 hours  chlorhexidine 2% Cloths 1 Application(s) Topical daily  dexAMETHasone  Injectable 6 milliGRAM(s) IV Push daily  levETIRAcetam  IVPB 500 milliGRAM(s) IV Intermittent every 12 hours  remdesivir  IVPB   IV Intermittent   remdesivir  IVPB 100 milliGRAM(s) IV Intermittent every 24 hours  sodium chloride 0.45%. 1000 milliLiter(s) (50 mL/Hr) IV Continuous <Continuous>    MEDICATIONS  (PRN):    PRESENT SYMPTOMS: [ ]Unable to self-report  [ ] CPOT [ ] PAINADs [ ] RDOS  Source if other than patient:  [ ]Family   [ ]Team     Pain: [ X]yes [ ]no  QOL impact -   Location -     DEFORMED FOOT              Aggravating factors - MOVEMENT  Quality -  DULL , SHARP WHEN MOVING   Radiation - UP THE LEG   Timing-  Severity (0-10 scale):  8  Minimal acceptable level (0-10 scale): <5    CPOT:    https://www.[facility].org/getattachment/ueg30v42-1h9k-4r1a-3e8m-4226n5371f4r/Critical-Care-Pain-Observation-Tool-(CPOT)    PAIN AD Score:   http://geriatrictoolkit.[facility]/cog/painad.pdf (press ctrl +  left click to view)    Dyspnea:                           [ ]Mild [ ]Moderate [ ]Severe      RDOS:  0 to 2  minimal or no respiratory distress   3  mild distress    4 to 6 moderate distress  >7 severe distress  https://homecareinformation.net/handouts/hen/Respiratory_Distress_Observation_Scale.pdf (Ctrl +  left click to view)     Anxiety:                             [ ]Mild [ X]Moderate [ ]Severe  Fatigue:                             [ ]Mild [X ]Moderate [ ]Severe  Nausea:                             [ ]Mild [ ]Moderate [ ]Severe  Loss of appetite:              [X ]Mild [ ]Moderate [ ]Severe  Constipation:                    [ ]Mild [ ]Moderate [ ]Severe    PCSSQ [Palliative Care Spiritual Screening Question]   Severity (0-10):  2  Score of 4 or > indicate consideration of Chaplaincy referral.  Chaplaincy Referral: [ ] yes [ ] refused [ ] following  XX DEFERRED     Caregiver [place]? : [X ] yes [ ] no  Social work referral [ ] Patient & Family Centered Care Referral [ ]     Anticipatory Grief Present?: X[ ] yes [ ] no  Social work referral [ ]  Patient & Family Centered Care Referral [ ]       Other Symptoms:  [ X]All other review of systems negative     Palliative Performance Status Version 2:    30     %    http://[facility].org/files/news/palliative_performance_scale_ppsv2.pdf  PHYSICAL EXAM:  Vital Signs Last 24 Hrs  T(C): 36.9 (24 Mar 2023 12:11), Max: 36.9 (24 Mar 2023 12:11)  T(F): 98.5 (24 Mar 2023 12:11), Max: 98.5 (24 Mar 2023 12:11)  HR: 81 (24 Mar 2023 12:11) (72 - 81)  BP: 124/70 (24 Mar 2023 12:11) (99/72 - 124/70)  BP(mean): --  RR: 18 (24 Mar 2023 12:11) (18 - 18)  SpO2: 99% (24 Mar 2023 12:11) (95% - 99%)    Parameters below as of 24 Mar 2023 12:11  Patient On (Oxygen Delivery Method): nasal cannula  O2 Flow (L/min): 4   I&O's Summary    23 Mar 2023 07:01  -  24 Mar 2023 07:00  --------------------------------------------------------  IN: 170 mL / OUT: 650 mL / NET: -480 mL      GENERAL: [ ]Cachexia    [X ]Alert  [ X]Oriented x 2-3  [ ]Lethargic  [ ]Unarousable  [X ]Verbal  [ ]Non-Verbal  Behavioral:   [ X] Anxiety  [ X] Delirium [ ] Agitation [ ] Other  HEENT:  [ ]Normal   [ X]Dry mouth   [ ]ET Tube/Trach  [ ]Oral lesions  PULMONARY:   [ ]Clear [ ]Tachypnea  [ ]Audible excessive secretions   [ ]Rhonchi        [ ]Right [ ]Left [ ]Bilateral  [ ]Crackles        [ ]Right [ ]Left [ ]Bilateral  [ ]Wheezing     [ ]Right [ ]Left [ ]Bilateral  [X ]Diminished breath sounds [ ]right [ ]left [ ]bilateral  CARDIOVASCULAR:    [ ]Regular [X ]Irregular [ ]Tachy  [ ][place] [ ]Murmur [ ]Other  GASTROINTESTINAL:  [X ]Soft  [ ]Distended   [ X]+BS  [ ]Non tender [ ]Tender  [ ]Other [ ]PEG [ ]OGT/ NGT  Last BM:  GENITOURINARY:  [ ]Normal [X ] Incontinent   [ ]Oliguria/Anuria   [ ][place]  MUSCULOSKELETAL:   [ ]Normal   [ ]Weakness  [ X]Bed/Wheelchair bound [ ]Edema  NEUROLOGIC:   [ ]No focal deficits  [X ] [employer]AXES [employer]AN[employer] Cognitive impairment  [ ]Dysphagia [ ]Dysarthria [ ]Paresis [ ]Other   SKIN:   [ ]Normal  [ ]Rash  [ X]Other  [ ]Pressure ulcer(s)       Present on admission [ ]y [ ]n    CRITICAL CARE:  [ ] Shock Present  [ ]Septic [ ]Cardiogenic [ ]Neurologic [ ]Hypovolemic  [ ]  Vasopressors [ ]  Inotropes   [ ]Respiratory failure present [ ]Mechanical ventilation [ ]Non-invasive ventilatory support [ ]High flow    [ ]Acute  [ ]Chronic [ ]Hypoxic  [ ]Hypercarbic [ ]Other  [ ]Other organ failure     LABS:                        11.4   10.74 )-----------( 359      ( 24 Mar 2023 11:21 )             37.9   03-24    143  |  106  |  20  ----------------------------<  137<H>  4.0   |  25  |  0.79    Ca    9.1      24 Mar 2023 11:21  Phos  2.3     03-24  Mg     1.9     03-24    TPro  5.9<L>  /  Alb  3.1<L>  /  TBili  0.4  /  DBili  x   /  AST  12  /  ALT  <5<L>  /  AlkPhos  46  03-24  PT/INR - ( 23 Mar 2023 11:40 )   PT: 12.7 sec;   INR: 1.09 ratio               RADIOLOGY & ADDITIONAL STUDIES:    < from: Xray Ankle Complete 3 Views, Right (03.22.23 @ 06:04) >    ACC: [id number] EXAM:  XR ANKLE COMP MIN 3 VIEWS RT   ORDERED BY: RAMONA OLIVAREZ     PROCEDURE DATE:  03/22/2023          INTERPRETATION:  CLINICAL INFORMATION: Right ankle deformity, metastatic   breast cancer.    EXAM: 3 views of the right ankle    COMPARISON: Ankle x-ray 2/24/2023; ankle CT 2/26/2023.    FINDINGS/  IMPRESSION:  Presumed pathologic lytic fragmentation of the talar dome is again noted,   as seen on CT ankle 2/26/2023. Appearance is similar to the prior.  Preserved joint spaces.  There is soft tissue swelling about the ankle.    --- End of Report ---      < end of copied text >  Provider Specialty:  Rad Onc.    Referral/Consultation:   Initial Consult:  · Requested by Name:	Chaka Barone  · Date/Time:	23-Mar-2023 08:45  · Reason for Referral/Consultation:	brain mets    · Reason for Admission	CC: AMS      · Subjective and Objective:   HPI:  68 yo F PMHx of COPD on 2L NC, metastatic clear cell RCC, metastatic breast CA, hx bilateral DVT s/p IVC filter off AC due to hx of ICH, who presents due to change in mental status while at nursing home facility.   Attempted to call family for collateral information but unable to reach. Called [facility] but current staff unable to provide much details. Majority of history obtained through chart review. At baseline patient A&Ox2-3, conversant but bedbound. Noted to be altered day of admission by nursing facility staff and initially brought to [place] ED for evaluation.       CTH: Hemorrhagic process involving the left inferior frontal brain parenchyma measuring 1.1 x 1.0 x 0.8 cm in diameter, suggestive of a hemorrhagic process.  However, area of involvement is significantly decreased compared with the earlier examination dated 12/1/2022. Findings do include surrounding vasogenic edema, which is also less prominent in appearance compared with the prior study. Patient transferred to [facility] for neurosurgical evaluation.   Evaluated by neurosurgical team; no acute intervention at this time. Patient to be admitted to medicine for further management.   Patient seen and evaluated on 5 [place]. Afebrile, HR 92, 125/78, 92% on 4L NC. Awake and alert x0. Unable to participate in any meaningful conversation. Not following commands.     Seen by [facility]- no intervention planned.     Prior RT history courses:   1) R hilum 400 x5 8/2022 Dr. Vazquez  2) Left frontal Gammaknife 12/6/2022 Dr. Timmons 2000 x 1  3) Left frontal SBRT 600 x 5 fractions 30gy also to the brain. Dr. Timmons.    poor KPS 40.      < from: CT Head No Cont (03.22.23 @ 05:40) >  IMPRESSION:    Redemonstrated hemorrhagic metastatic lesions in the left frontal and   right parietals lobe, and extensive calvarial metastasis, unchanged from   2 days prior.        Allergies    Cipro (Rash)    Intolerances        ROS: [  ] Fever  [  ] Chills  [  ]Chest Pain [  ] SOB  [  ]Cough [  ] N/V  [  ] Diarrhea [  ]Constipation [  ]Other ROS:  [  ] ROS otherwise negative    PAST MEDICAL & SURGICAL HISTORY:  Other specified disorders of kidney and ureter    Anemia    Breast cancer  Left breast cancer - 13 years ago, s/p mastectomy, s/p RT for lung mets, and on oral chemotherapy    Emphysema lung    Renal cell carcinoma  s/p L nephrectomy    Stage 3 chronic kidney disease    History of hip surgery  in 2007- Right hip surgery    H/O left mastectomy    S/p nephrectomy  left    H/O total adrenalectomy        FAMILY HISTORY:  FH: throat cancer  mother    FH: emphysema  father        MEDICATIONS  (STANDING):  albuterol/ipratropium for Nebulization 3 milliLiter(s) Nebulizer every 6 hours  chlorhexidine 2% Cloths 1 Application(s) Topical daily  dexAMETHasone  Injectable 6 milliGRAM(s) IV Push daily  dextrose 5% + sodium chloride 0.45%. 1000 milliLiter(s) (75 mL/Hr) IV Continuous <Continuous>  levETIRAcetam  IVPB 500 milliGRAM(s) IV Intermittent every 12 hours  remdesivir  IVPB   IV Intermittent   remdesivir  IVPB 100 milliGRAM(s) IV Intermittent every 24 hours    MEDICATIONS  (PRN):      PHYSICAL EXAM  Vital Signs Last 24 Hrs  T(C): 36.7 (23 Mar 2023 04:25), Max: 36.9 (22 Mar 2023 10:23)  T(F): 98 (23 Mar 2023 04:25), Max: 98.4 (22 Mar 2023 10:23)  HR: 79 (23 Mar 2023 04:25) (72 - 92)  BP: 124/74 (23 Mar 2023 04:25) (107/69 - 125/78)  BP(mean): --  RR: 18 (23 Mar 2023 04:25) (18 - 18)  SpO2: 98% (23 Mar 2023 04:25) (92% - 98%)    Parameters below as of 23 Mar 2023 04:25  Patient On (Oxygen Delivery Method): nasal cannula  O2 Flow (L/min): 4        IMAGING/LABS/PATHOLOGY: I have personally reviewed the relevant labs, pathology, and imaging as noted in the HPI.  In addition,                          11.9   8.82  )-----------( 390      ( 22 Mar 2023 05:35 )             39.1     03-22    147<H>  |  109<H>  |  22  ----------------------------<  118<H>  4.3   |  22  |  0.78    Ca    9.8      22 Mar 2023 18:11    TPro  6.6  /  Alb  3.3  /  TBili  0.4  /  DBili  0.1  /  AST  12  /  ALT  <5<L>  /  AlkPhos  48  03-22        ASSESSMENT/PLAN    ZEYNEP GOLDMAN is a 67y woman with h/o breast cancer, renal cell cancer, s/p chemotherapy prior. s/p 3 courses of radiation treatment, two of them for brain metastases treated 12/2022 with Dr. Timmons,  transferred from [place] to [facility] for NSG evaluation; now AMS, unable to respond, bedbound, no neurosurgical intervention and Rad Onc consult requested.  Case reviewed, there is NO role for any transfer  to [facility] for [facility] RT to a previously irradiated brain treated twice with RT in the past one year and now unable to speak about any treatment course as she is unresponsive and nonverbal.  This is clearly a case  for a hospice discussion and for palliative care to address with staff and family if the patient can no longer participate in decision making.       < from: CT Head No Cont (03.22.23 @ 05:40) >    ACC: [id number] EXAM:  CT BRAIN   ORDERED BY: RAMONA ANGELA DATE:  03/22/2023          INTERPRETATION:  CLINICAL INFORMATION: Altered mental status. Metastatic   breast cancer. Brain mass versus intracranial hemorrhage.    TECHNIQUE: Noncontrast axial CT images were acquired through the head.   Two-dimensional sagittal and coronal reformats were generated.    COMPARISON STUDY: CT head 3/21/2023    FINDINGS:    9 mm peripherally hyperdense nodule in the inferior left frontal lobe   withsurrounding vasogenic edema. 8 mm hyperdense nodule in the high   right subcortical parietal lobe with surrounding vasogenic edema. These   are unchanged.    1.3 cm nodule in the sella, unchanged.    No midline shift. The basal cisterns are patent without evidence of   central herniation. No hydrocephalus. No new area of intracranial   hemorrhage.    Extensive sclerotic calvarial metastasis. Unchanged soft tissue mass in   the right occipital scalp.      < end of copied text >      PROTEIN CALORIE MALNUTRITION PRESENT: [ ]mild [X ]moderate [ ]severe [ ]underweight [ ]morbid obesity  https://www.andeal.org/vault/2440/web/files/ONC/Table_Clinical%20Characteristics%20to%20Document%20Malnutrition-White%20JV%20et%20al%202012.pdf    Height (cm): 160 (03-22-23 @ 05:01), 160 (03-21-23 @ 19:09), 160 (02-23-23 @ 14:20)  Weight (kg): 65.8 (03-21-23 @ 19:09), 64.9 (02-23-23 @ 14:20), 66.9 (02-19-23 @ 18:39)  BMI (kg/m2): 25.7 (03-22-23 @ 05:01), 25.7 (03-21-23 @ 19:09), 25.4 (02-23-23 @ 14:20)    [ ]PPSV2 < or = to 30% [ ]significant weight loss  [ ]poor nutritional intake  [ ]anasarca[ ]Artificial Nutrition      Other REFERRALS:  [ ]Hospice  [ ]Child Life  [ ]Social Work  [ ]Case management [ ]Holistic Therapy

## 2023-03-24 NOTE — PROGRESS NOTE ADULT - PROBLEM SELECTOR PLAN 6
DVT ppx: SCDs   Diet: soft   Code status: DNR/DNI - prior completed molst in chart     Spoke to sonDarrel ([phone]) 3/23 about updates and plan. Explained prognosis and no plans for any intervention per Nsx and rad onc. Discussed about possible transition to hospice. Son appears to be agreeable. Palliative recs noted; son thinking about it at this time; f/u palliative and SW   - continue treatment for COVID infection at this time DVT ppx: SCDs   Diet: soft   Code status: DNR/DNI - prior completed molst in chart     Spoke to sonDarrel ([phone]) 3/23 about updates and plan. Explained prognosis and no plans for any intervention per Nsx and rad onc. Discussed about possible transition to hospice. Son appears to be agreeable. Palliative recs noted; son thinking about it at this time; f/u palliative and SW   - continue treatment for COVID infection at this time    3/24: Discussed with palliative. Plan for transfer to PCU and eventual home hospice if patient remains stable and mental status continues to improve. Awaiting PCU bed.

## 2023-03-24 NOTE — CONSULT NOTE ADULT - PROBLEM SELECTOR RECOMMENDATION 3
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

## 2023-03-25 PROCEDURE — 99232 SBSQ HOSP IP/OBS MODERATE 35: CPT

## 2023-03-25 RX ORDER — ACETAMINOPHEN 500 MG
650 TABLET ORAL EVERY 4 HOURS
Refills: 0 | Status: DISCONTINUED | OUTPATIENT
Start: 2023-03-25 | End: 2023-04-01

## 2023-03-25 RX ADMIN — MORPHINE SULFATE 2 MILLIGRAM(S): 50 CAPSULE, EXTENDED RELEASE ORAL at 18:35

## 2023-03-25 RX ADMIN — Medication 3 MILLILITER(S): at 00:20

## 2023-03-25 RX ADMIN — CHLORHEXIDINE GLUCONATE 1 APPLICATION(S): 213 SOLUTION TOPICAL at 12:07

## 2023-03-25 RX ADMIN — Medication 3 MILLILITER(S): at 17:44

## 2023-03-25 RX ADMIN — Medication 6 MILLIGRAM(S): at 06:05

## 2023-03-25 RX ADMIN — LEVETIRACETAM 400 MILLIGRAM(S): 250 TABLET, FILM COATED ORAL at 17:44

## 2023-03-25 RX ADMIN — MORPHINE SULFATE 2 MILLIGRAM(S): 50 CAPSULE, EXTENDED RELEASE ORAL at 12:41

## 2023-03-25 RX ADMIN — Medication 3 MILLILITER(S): at 06:05

## 2023-03-25 RX ADMIN — MORPHINE SULFATE 2 MILLIGRAM(S): 50 CAPSULE, EXTENDED RELEASE ORAL at 17:45

## 2023-03-25 RX ADMIN — MORPHINE SULFATE 2 MILLIGRAM(S): 50 CAPSULE, EXTENDED RELEASE ORAL at 12:11

## 2023-03-25 RX ADMIN — Medication 3 MILLILITER(S): at 12:08

## 2023-03-25 RX ADMIN — MORPHINE SULFATE 2 MILLIGRAM(S): 50 CAPSULE, EXTENDED RELEASE ORAL at 23:48

## 2023-03-25 RX ADMIN — LEVETIRACETAM 400 MILLIGRAM(S): 250 TABLET, FILM COATED ORAL at 06:05

## 2023-03-25 NOTE — PATIENT PROFILE ADULT - STATED REASON FOR ADMISSION
Spoke to patient on the phone in regards to Lab work specifically vitamin-D level.  Which was low.  Patient was currently taking vitamin-D 1000 mg daily, she was encouraged and directed to take 2000 mg of vitamin-D daily with calcium and we will repeat vitamin-D level in 6 months  
AMS

## 2023-03-25 NOTE — PATIENT PROFILE ADULT - FALL HARM RISK - HARM RISK INTERVENTIONS

## 2023-03-25 NOTE — PROGRESS NOTE ADULT - SUBJECTIVE AND OBJECTIVE BOX
[facility] Division of Hospital Medicine  Evelia Arredondo MD  Available via MS Teams    SUBJECTIVE / OVERNIGHT EVENTS: No acute events overnight. Patient well appearing. Denies any chest pain or SOB. No other concerns or complaints at this time.     Review of Systems:   CONSTITUTIONAL: No fever   EYES: No eye pain, visual disturbances, or discharge  ENMT: No difficulty hearing   RESPIRATORY: No SOB. No cough   CARDIOVASCULAR: No chest pain   GASTROINTESTINAL: No abdominal or epigastric pain. No nausea, vomiting, or hematemesis; No diarrhea   GENITOURINARY: No dysuria   NEUROLOGICAL: No headache   SKIN: No itching    MUSCULOSKELETAL: No joint pain or swelling; No muscle or back pain  PSYCHIATRIC: No depression or anxiety  HEME/LYMPH: No easy bruising or bleeding gums      MEDICATIONS  (STANDING):  albuterol/ipratropium for Nebulization 3 milliLiter(s) Nebulizer every 6 hours  chlorhexidine 2% Cloths 1 Application(s) Topical daily  dexAMETHasone  Injectable 6 milliGRAM(s) IV Push daily  levETIRAcetam  IVPB 500 milliGRAM(s) IV Intermittent every 12 hours  sodium chloride 0.45%. 1000 milliLiter(s) (50 mL/Hr) IV Continuous <Continuous>    MEDICATIONS  (PRN):  morphine  - Injectable 2 milliGRAM(s) IV Push every 2 hours PRN Severe Pain (7 - 10)  morphine  - Injectable 2 milliGRAM(s) IV Push every 2 hours PRN dyspnea  morphine  - Injectable 1 milliGRAM(s) IV Push every 2 hours PRN mild mod pain      I&O's Summary    24 Mar 2023 07:01  -  25 Mar 2023 07:00  --------------------------------------------------------  IN: 200 mL / OUT: 1500 mL / NET: -1300 mL      PHYSICAL EXAM:  Vital Signs Last 24 Hrs  T(C): 36.5 (25 Mar 2023 13:05), Max: 37.1 (24 Mar 2023 21:01)  T(F): 97.7 (25 Mar 2023 13:05), Max: 98.7 (24 Mar 2023 21:01)  HR: 73 (25 Mar 2023 13:05) (73 - 80)  BP: 118/83 (25 Mar 2023 13:05) (118/83 - 125/78)  RR: 18 (25 Mar 2023 13:05) (18 - 18)  SpO2: 100% (25 Mar 2023 13:05) (99% - 100%)    Parameters below as of 25 Mar 2023 13:05  Patient On (Oxygen Delivery Method): nasal cannula  O2 Flow (L/min): 4    CONSTITUTIONAL: NAD, well-developed   EYES: PERRLA; conjunctiva and sclera clear  ENMT: Moist oral mucosa, no pharyngeal injection or exudates   NECK: Supple   RESPIRATORY: Normal respiratory effort; lungs are clear to auscultation bilaterally  CARDIOVASCULAR: Regular rate and rhythm, normal S1 and S2, no murmur   ABDOMEN: Nontender to palpation, normoactive bowel sounds   MUSCULOSKELETAL: no clubbing or cyanosis of digits; no joint swelling or tenderness to palpation  PSYCH: A+O to person, place, and time; affect appropriate  NEUROLOGY: no gross sensory deficits   SKIN: No rashes     LABS:                        11.4   10.74 )-----------( 359      ( 24 Mar 2023 11:21 )             37.9     03-24    143  |  106  |  20  ----------------------------<  137<H>  4.0   |  25  |  0.79    Ca    9.1      24 Mar 2023 11:21  Phos  2.3     03-24  Mg     1.9     03-24    TPro  5.9<L>  /  Alb  3.1<L>  /  TBili  0.4  /  DBili  x   /  AST  12  /  ALT  <5<L>  /  AlkPhos  46  03-24      Culture - Blood (collected 22 Mar 2023 18:11)  Source: .Blood Blood  Preliminary Report (24 Mar 2023 01:03):    No growth to date.      COVID-19 PCR: Detected (21 Mar 2023 22:10)  COVID-19 PCR: NotDetec (03 Mar 2023 12:30)  COVID-19 PCR: NotDetec (28 Feb 2023 16:34)  COVID-19 PCR: NotDetec (23 Feb 2023 19:15)  COVID-19 PCR: NotDetec (19 Feb 2023 15:33)      RADIOLOGY & ADDITIONAL TESTS:  New Imaging Personally Reviewed Today:  New Electrocardiogram Personally Reviewed Today:  Other Results Reviewed Today:   Prior or Outpatient Records Reviewed Today with Summary:    COORDINATION OF CARE:  Consultant Communication and Details of Discussion (where applicable):

## 2023-03-25 NOTE — PROGRESS NOTE ADULT - PROBLEM SELECTOR PLAN 6
DVT ppx: SCDs   Diet: soft   Code status: DNR/DNI - prior completed molst in chart     Spoke to sonDarrel ([phone]) 3/23 about updates and plan. Explained prognosis and no plans for any intervention per Nsx and rad onc. Discussed about possible transition to hospice. Son appears to be agreeable. Palliative recs noted; son thinking about it at this time; f/u palliative and SW   - continue treatment for COVID infection at this time    3/24: Discussed with palliative. Plan for transfer to PCU and eventual home hospice if patient remains stable and mental status continues to improve. Awaiting PCU bed.

## 2023-03-26 DIAGNOSIS — Z51.5 ENCOUNTER FOR PALLIATIVE CARE: ICD-10-CM

## 2023-03-26 DIAGNOSIS — G89.3 NEOPLASM RELATED PAIN (ACUTE) (CHRONIC): ICD-10-CM

## 2023-03-26 PROCEDURE — 99232 SBSQ HOSP IP/OBS MODERATE 35: CPT

## 2023-03-26 PROCEDURE — 99233 SBSQ HOSP IP/OBS HIGH 50: CPT

## 2023-03-26 RX ADMIN — Medication 3 MILLILITER(S): at 00:15

## 2023-03-26 RX ADMIN — Medication 3 MILLILITER(S): at 12:35

## 2023-03-26 RX ADMIN — MORPHINE SULFATE 2 MILLIGRAM(S): 50 CAPSULE, EXTENDED RELEASE ORAL at 00:03

## 2023-03-26 RX ADMIN — LEVETIRACETAM 400 MILLIGRAM(S): 250 TABLET, FILM COATED ORAL at 05:55

## 2023-03-26 RX ADMIN — MORPHINE SULFATE 2 MILLIGRAM(S): 50 CAPSULE, EXTENDED RELEASE ORAL at 20:35

## 2023-03-26 RX ADMIN — MORPHINE SULFATE 2 MILLIGRAM(S): 50 CAPSULE, EXTENDED RELEASE ORAL at 20:20

## 2023-03-26 RX ADMIN — MORPHINE SULFATE 1 MILLIGRAM(S): 50 CAPSULE, EXTENDED RELEASE ORAL at 08:18

## 2023-03-26 RX ADMIN — CHLORHEXIDINE GLUCONATE 1 APPLICATION(S): 213 SOLUTION TOPICAL at 12:13

## 2023-03-26 RX ADMIN — Medication 3 MILLILITER(S): at 17:32

## 2023-03-26 RX ADMIN — Medication 3 MILLILITER(S): at 05:55

## 2023-03-26 RX ADMIN — LEVETIRACETAM 400 MILLIGRAM(S): 250 TABLET, FILM COATED ORAL at 17:32

## 2023-03-26 RX ADMIN — Medication 6 MILLIGRAM(S): at 05:54

## 2023-03-26 RX ADMIN — MORPHINE SULFATE 1 MILLIGRAM(S): 50 CAPSULE, EXTENDED RELEASE ORAL at 08:03

## 2023-03-26 NOTE — PROGRESS NOTE ADULT - PROBLEM SELECTOR PLAN 6
DVT ppx: SCDs   Diet: soft   Code status: DNR/DNI - prior completed molst in chart     Spoke to sonDarrel ([phone]) 3/23 about updates and plan. Explained prognosis and no plans for any intervention per Nsx and rad onc. Discussed about possible transition to hospice. Son appears to be agreeable. Palliative recs noted; son thinking about it at this time; f/u palliative and SW   - continue treatment for COVID infection at this time    3/24: Discussed with palliative. Plan for transfer to PCU and eventual home hospice if patient remains stable and mental status continues to improve.

## 2023-03-26 NOTE — PROGRESS NOTE ADULT - SUBJECTIVE AND OBJECTIVE BOX
[facility] Division of Hospital Medicine  Evelia Arredondo MD  Available via MS Teams    SUBJECTIVE / OVERNIGHT EVENTS: No acute events overnight. Feeling well. Denies any chest pain or SOB. No other concerns or complaints at this time.     Review of Systems:   CONSTITUTIONAL: No fever   EYES: No eye pain, visual disturbances, or discharge  ENMT: No difficulty hearing   RESPIRATORY: No SOB. No cough   CARDIOVASCULAR: No chest pain   GASTROINTESTINAL: No abdominal or epigastric pain. No nausea, vomiting, or hematemesis; No diarrhea   GENITOURINARY: No dysuria   NEUROLOGICAL: No headaches   SKIN: No itching    MUSCULOSKELETAL: No joint pain or swelling; No muscle or back pain  PSYCHIATRIC: No depression or anxiety   HEME/LYMPH: No easy bruising or bleeding gums      MEDICATIONS  (STANDING):  albuterol/ipratropium for Nebulization 3 milliLiter(s) Nebulizer every 6 hours  chlorhexidine 2% Cloths 1 Application(s) Topical daily  dexAMETHasone  Injectable 6 milliGRAM(s) IV Push daily  levETIRAcetam  IVPB 500 milliGRAM(s) IV Intermittent every 12 hours  sodium chloride 0.45%. 1000 milliLiter(s) (50 mL/Hr) IV Continuous <Continuous>    MEDICATIONS  (PRN):  acetaminophen  Suppository .. 650 milliGRAM(s) Rectal every 4 hours PRN Temp greater or equal to 38C (100.4F)  bisacodyl Suppository 10 milliGRAM(s) Rectal daily PRN Constipation  LORazepam   Injectable 0.5 milliGRAM(s) IV Push every 1 hour PRN Agitation  morphine  - Injectable 2 milliGRAM(s) IV Push every 2 hours PRN Severe Pain (7 - 10)  morphine  - Injectable 2 milliGRAM(s) IV Push every 2 hours PRN dyspnea  morphine  - Injectable 1 milliGRAM(s) IV Push every 2 hours PRN mild mod pain      I&O's Summary    25 Mar 2023 07:01  -  26 Mar 2023 07:00  --------------------------------------------------------  IN: 0 mL / OUT: 800 mL / NET: -800 mL    26 Mar 2023 07:01  -  26 Mar 2023 14:40  --------------------------------------------------------  IN: 0 mL / OUT: 650 mL / NET: -650 mL      PHYSICAL EXAM:  Vital Signs Last 24 Hrs  T(C): 36.9 (26 Mar 2023 08:01), Max: 37 (25 Mar 2023 22:36)  T(F): 98.4 (26 Mar 2023 08:01), Max: 98.6 (25 Mar 2023 22:36)  HR: 73 (26 Mar 2023 08:01) (70 - 73)  BP: 104/63 (26 Mar 2023 08:01) (104/63 - 111/72)  RR: 20 (26 Mar 2023 08:01) (18 - 20)  SpO2: 88% (26 Mar 2023 08:01) (88% - 98%)    Parameters below as of 26 Mar 2023 08:01  Patient On (Oxygen Delivery Method): nasal cannula      CONSTITUTIONAL: NAD, well-developed   EYES: PERRLA; conjunctiva and sclera clear  ENMT: Moist oral mucosa, no pharyngeal injection or exudates   NECK: Supple   RESPIRATORY: Normal respiratory effort; lungs are clear to auscultation bilaterally  CARDIOVASCULAR: Regular rate and rhythm, normal S1 and S2, no murmur   ABDOMEN: Nontender to palpation, normoactive bowel sounds   MUSCULOSKELETAL: no clubbing or cyanosis of digits; no joint swelling or tenderness to palpation  PSYCH: A+O to person, place, and time; affect appropriate  NEUROLOGY: no gross sensory deficits   SKIN: No rashes     LABS:      COVID-19 PCR: Detected (21 Mar 2023 22:10)  COVID-19 PCR: NotDetec (03 Mar 2023 12:30)  COVID-19 PCR: NotDetec (28 Feb 2023 16:34)  COVID-19 PCR: NotDetec (23 Feb 2023 19:15)  COVID-19 PCR: NotDetec (19 Feb 2023 15:33)      RADIOLOGY & ADDITIONAL TESTS:  New Imaging Personally Reviewed Today:  New Electrocardiogram Personally Reviewed Today:  Other Results Reviewed Today:   Prior or Outpatient Records Reviewed Today with Summary:    COORDINATION OF CARE:  Consultant Communication and Details of Discussion (where applicable):

## 2023-03-26 NOTE — PROGRESS NOTE ADULT - SUBJECTIVE AND OBJECTIVE BOX
GAP TEAM PALLIATIVE CARE UNIT PROGRESS NOTE:      [  ] Patient on hospice program.    INDICATION FOR PALLIATIVE CARE UNIT SERVICES/Interval HPI: symptom directed care    OVERNIGHT EVENTS: Pt seen and examined at bedside this morning. She reports the IV Morphine helps her pain and that she has difficulty taking oral pills. States her last BM was 3 days ago but denies abd pain. Chart reviewed. Pt required Morphine 1mg IV x1 PRN mod pain and Morphine 2mg IV x3 PRN severe pain in the last 24 hours.     DNR on chart: Yes  Yes      Allergies    Cipro (Rash)    Intolerances    MEDICATIONS  (STANDING):  albuterol/ipratropium for Nebulization 3 milliLiter(s) Nebulizer every 6 hours  chlorhexidine 2% Cloths 1 Application(s) Topical daily  dexAMETHasone  Injectable 6 milliGRAM(s) IV Push daily  levETIRAcetam  IVPB 500 milliGRAM(s) IV Intermittent every 12 hours  sodium chloride 0.45%. 1000 milliLiter(s) (50 mL/Hr) IV Continuous <Continuous>    MEDICATIONS  (PRN):  acetaminophen  Suppository .. 650 milliGRAM(s) Rectal every 4 hours PRN Temp greater or equal to 38C (100.4F)  bisacodyl Suppository 10 milliGRAM(s) Rectal daily PRN Constipation  LORazepam   Injectable 0.5 milliGRAM(s) IV Push every 1 hour PRN Agitation  morphine  - Injectable 2 milliGRAM(s) IV Push every 2 hours PRN Severe Pain (7 - 10)  morphine  - Injectable 2 milliGRAM(s) IV Push every 2 hours PRN dyspnea  morphine  - Injectable 1 milliGRAM(s) IV Push every 2 hours PRN mild mod pain    ITEMS UNCHECKED ARE NOT PRESENT    PRESENT SYMPTOMS: [ ]Unable to self-report  [ ]PAINADs [ ]RDOS  Source if other than patient:  [ ]Family   [ ]Team     Pain: [X ] yes [ ] no  QOL impact - moderate  Location -       R foot             Aggravating factors - movement  Quality - sharp  Radiation -  Timing-   Severity (0-10 scale): 8  Minimal acceptable level (0-10 scale): 4-5    Dyspnea:                           [ ]Mild [ ]Moderate [ ]Severe  Anxiety:                             [ ]Mild [ ]Moderate [ ]Severe  Fatigue:                             [ ]Mild [ ]Moderate [ ]Severe  Nausea:                             [ ]Mild [ ]Moderate [ ]Severe  Loss of appetite:              [ ]Mild [ ]Moderate [ ]Severe  Constipation:                    [ ]Mild [ ]Moderate [ ]Severe    PCSSQ [Palliative Care Spiritual Screening Question]   Severity (0-10):  Score of 4 or > indicate consideration of Chaplaincy referral.  Chaplaincy Referral: [ ] yes [ ] refused [ ] following [ X] deferred    Caregiver [place]? : [ ] yes [X ] no [ ] deferred:  Social work referral [ ] Patient & Family Centered Care Referral [ ]     Anticipatory Grief present?:  [ ] yes [ ] no [ X] deferred:  Social work referral [ ] Patient & Family Centered Care Referral [ ]    		  Other Symptoms:  [X ]All other review of systems negative     Palliative Performance Status Version 2:      40   %         [email or web address]  PHYSICAL EXAM:   Vital Signs Last 24 Hrs  T(C): 36.9 (26 Mar 2023 08:01), Max: 37 (25 Mar 2023 22:36)  T(F): 98.4 (26 Mar 2023 08:01), Max: 98.6 (25 Mar 2023 22:36)  HR: 73 (26 Mar 2023 08:01) (70 - 73)  BP: 104/63 (26 Mar 2023 08:01) (104/63 - 111/72)  BP(mean): --  RR: 20 (26 Mar 2023 08:01) (18 - 20)  SpO2: 88% (26 Mar 2023 08:01) (88% - 98%)    Parameters below as of 26 Mar 2023 08:01  Patient On (Oxygen Delivery Method): nasal cannula     I&O's Summary    25 Mar 2023 07:01  -  26 Mar 2023 07:00  --------------------------------------------------------  IN: 0 mL / OUT: 800 mL / NET: -800 mL    26 Mar 2023 07:01  -  26 Mar 2023 14:52  --------------------------------------------------------  IN: 0 mL / OUT: 650 mL / NET: -650 mL      GENERAL: [ ] Cachexia  [X ]Alert  [ X]Oriented x   [ ]Lethargic  [ ]Unarousable  [X ]Verbal  [ ]Non-Verbal  Behavioral:   [ ] Anxiety  [ ] Delirium [ ] Agitation [ ] Other  HEENT:  [ ]Normal   [ ]Dry mouth   [ ]ET Tube/Trach  [ ]Oral lesions  PULMONARY:   [ X]Clear [ ]Tachypnea  [ ]Audible excessive secretions   [ ]Rhonchi        [ ]Right [ ]Left [ ]Bilateral  [ ]Crackles        [ ]Right [ ]Left [ ]Bilateral  [ ]Wheezing     [ ]Right [ ]Left [ ]Bilateral  [ ]Diminished BS [ ]Right [ ]Left [ ]Bilateral    CARDIOVASCULAR:    [ X]Regular [ ]Irregular [ ]Tachy  [ ]Jose Angel [ ]Murmur [ ]Other  GASTROINTESTINAL:  [X ]Soft  [ ]Distended   [ ]+BS  [X ]Non tender [ ]Tender  [ ]Other [ ]PEG [ ]OGT/ NGT   Last BM:  reported 3 days ago  GENITOURINARY:  [ ]Normal [ ] Incontinent   [ ]Oliguria/Anuria   [ X]Amador  MUSCULOSKELETAL:   [ ]Normal   [ ]Weakness  [X ]Bed/Wheelchair bound [ ]Edema  NEUROLOGIC:   [X ]No focal deficits  [ ] Cognitive impairment  [ ] Dysphagia [ ]Dysarthria [ ] Paresis [ ]Other   SKIN: see nursing assessment for details  [ ]Normal  [ ]Rash  [ ]Other  [ ]Pressure ulcer(s)  [ ]y [ ]n  Present on admission      CRITICAL CARE:  [ ] Shock Present  [ ]Septic [ ]Cardiogenic [ ]Neurologic [ ]Hypovolemic  [ ]  Vasopressors [ ]  Inotropes   [ ] Respiratory failure present [ ] Mechanical Ventilation [ ] Non-invasive ventilatory support [ ] High-Flow  [ ] Acute  [ ] Chronic [ ] Hypoxic  [ ] Hypercarbic [ ] Other  [ ] Other organ failure     LABS: reviewed    RADIOLOGY & ADDITIONAL STUDIES: reviewed    PROTEIN CALORIE MALNUTRITION: [ ] mild [ ] moderate [ ] severe  [ ] underweight [ ] morbid obesity    https://www.andeal.org/vault/1789/web/files/ONC/Table_Clinical%20Characteristics%20to%20Document%20Malnutrition-White%20JV%20et%20al%202012.pdf    Height (cm): 160 (03-22-23 @ 05:01), 160 (03-21-23 @ 19:09), 160 (02-23-23 @ 14:20)  Weight (kg): 65.8 (03-21-23 @ 19:09), 64.9 (02-23-23 @ 14:20), 66.9 (02-19-23 @ 18:39)  BMI (kg/m2): 25.7 (03-22-23 @ 05:01), 25.7 (03-21-23 @ 19:09), 25.4 (02-23-23 @ 14:20)    [ ] PPSV2 < or = 30% [ ] significant weight loss [ ] poor nutritional intake [ ] anasarca   Artificial Nutrition [ ]     Other REFERRALS:    [ ] Hospice  [ ]Child Life  [ ]Social Work  [ ]Case management [ ]Holistic Therapy [ ] Physical Therapy [ ] Dietary     Progress Notes - Care Coordination [C. Provider] (03-23-23 @ 15:06)     GAP TEAM PALLIATIVE CARE UNIT PROGRESS NOTE:      [  ] Patient on hospice program.    INDICATION FOR PALLIATIVE CARE UNIT SERVICES/Interval HPI: symptom directed care    OVERNIGHT EVENTS: Pt seen and examined at bedside this morning. She reports the IV Morphine helps her pain and that she has difficulty taking oral pills. States her last BM was 3 days ago but denies abd pain. Chart reviewed. Pt required Morphine 1mg IV x1 PRN mod pain and Morphine 2mg IV x3 PRN severe pain in the last 24 hours.     DNR on chart: Yes  Yes    Allergies    Cipro (Rash)    Intolerances    MEDICATIONS  (STANDING):  albuterol/ipratropium for Nebulization 3 milliLiter(s) Nebulizer every 6 hours  chlorhexidine 2% Cloths 1 Application(s) Topical daily  dexAMETHasone  Injectable 6 milliGRAM(s) IV Push daily  levETIRAcetam  IVPB 500 milliGRAM(s) IV Intermittent every 12 hours  sodium chloride 0.45%. 1000 milliLiter(s) (50 mL/Hr) IV Continuous <Continuous>    MEDICATIONS  (PRN):  acetaminophen  Suppository .. 650 milliGRAM(s) Rectal every 4 hours PRN Temp greater or equal to 38C (100.4F)  bisacodyl Suppository 10 milliGRAM(s) Rectal daily PRN Constipation  LORazepam   Injectable 0.5 milliGRAM(s) IV Push every 1 hour PRN Agitation  morphine  - Injectable 2 milliGRAM(s) IV Push every 2 hours PRN Severe Pain (7 - 10)  morphine  - Injectable 2 milliGRAM(s) IV Push every 2 hours PRN dyspnea  morphine  - Injectable 1 milliGRAM(s) IV Push every 2 hours PRN mild mod pain    ITEMS UNCHECKED ARE NOT PRESENT    PRESENT SYMPTOMS: [ ]Unable to self-report  [ ]PAINADs [ ]RDOS  Source if other than patient:  [ ]Family   [ ]Team     Pain: [X ] yes [ ] no  QOL impact - moderate  Location - R foot             Aggravating factors - movement  Quality - sharp  Radiation -  Timing-   Severity (0-10 scale): 8  Minimal acceptable level (0-10 scale): 4-5    Dyspnea:                           [ ]Mild [ ]Moderate [ ]Severe  Anxiety:                             [ ]Mild [ ]Moderate [ ]Severe  Fatigue:                             [ ]Mild [ ]Moderate [ ]Severe  Nausea:                             [ ]Mild [ ]Moderate [ ]Severe  Loss of appetite:              [ ]Mild [ ]Moderate [ ]Severe  Constipation:                    [ ]Mild [ ]Moderate [ ]Severe    PCSSQ [Palliative Care Spiritual Screening Question]   Severity (0-10):  Score of 4 or > indicate consideration of Chaplaincy referral.  Chaplaincy Referral: [ ] yes [ ] refused [ ] following [ X] deferred    Caregiver [place]? : [ ] yes [X ] no [ ] deferred:  Social work referral [ ] Patient & Family Centered Care Referral [ ]     Anticipatory Grief present?:  [ ] yes [ ] no [ X] deferred:  Social work referral [ ] Patient & Family Centered Care Referral [ ]    		  Other Symptoms:  [X ]All other review of systems negative     Palliative Performance Status Version 2:      40   %         [email or web address]  PHYSICAL EXAM:   Vital Signs Last 24 Hrs  T(C): 36.9 (26 Mar 2023 08:01), Max: 37 (25 Mar 2023 22:36)  T(F): 98.4 (26 Mar 2023 08:01), Max: 98.6 (25 Mar 2023 22:36)  HR: 73 (26 Mar 2023 08:01) (70 - 73)  BP: 104/63 (26 Mar 2023 08:01) (104/63 - 111/72)  BP(mean): --  RR: 20 (26 Mar 2023 08:01) (18 - 20)  SpO2: 88% (26 Mar 2023 08:01) (88% - 98%)    Parameters below as of 26 Mar 2023 08:01  Patient On (Oxygen Delivery Method): nasal cannula     I&O's Summary    25 Mar 2023 07:01  -  26 Mar 2023 07:00  --------------------------------------------------------  IN: 0 mL / OUT: 800 mL / NET: -800 mL    26 Mar 2023 07:01  -  26 Mar 2023 14:52  --------------------------------------------------------  IN: 0 mL / OUT: 650 mL / NET: -650 mL      GENERAL: [ ] Cachexia  [X ]Alert  [ X]Oriented x   [ ]Lethargic  [ ]Unarousable  [X ]Verbal  [ ]Non-Verbal  Behavioral:   [ ] Anxiety  [ ] Delirium [ ] Agitation [ ] Other  HEENT:  [ ]Normal   [ ]Dry mouth   [ ]ET Tube/Trach  [ ]Oral lesions  PULMONARY:   [ X]Clear [ ]Tachypnea  [ ]Audible excessive secretions   [ ]Rhonchi        [ ]Right [ ]Left [ ]Bilateral  [ ]Crackles        [ ]Right [ ]Left [ ]Bilateral  [ ]Wheezing     [ ]Right [ ]Left [ ]Bilateral  [ ]Diminished BS [ ]Right [ ]Left [ ]Bilateral    CARDIOVASCULAR:    [ X]Regular [ ]Irregular [ ]Tachy  [ ]Jose Angel [ ]Murmur [ ]Other  GASTROINTESTINAL:  [X ]Soft  [ ]Distended   [ ]+BS  [X ]Non tender [ ]Tender  [ ]Other [ ]PEG [ ]OGT/ NGT   Last BM:  reported 3 days ago  GENITOURINARY:  [ ]Normal [ ] Incontinent   [ ]Oliguria/Anuria   [ X]Amador  MUSCULOSKELETAL:   [ ]Normal   [ ]Weakness  [X ]Bed/Wheelchair bound [ ]Edema  NEUROLOGIC:   [X ]No focal deficits  [ ] Cognitive impairment  [ ] Dysphagia [ ]Dysarthria [ ] Paresis [ ]Other   SKIN: see nursing assessment for details  [ ]Normal  [ ]Rash  [ ]Other  [ ]Pressure ulcer(s)  [ ]y [ ]n  Present on admission      CRITICAL CARE:  [ ] Shock Present  [ ]Septic [ ]Cardiogenic [ ]Neurologic [ ]Hypovolemic  [ ]  Vasopressors [ ]  Inotropes   [ ] Respiratory failure present [ ] Mechanical Ventilation [ ] Non-invasive ventilatory support [ ] High-Flow  [ ] Acute  [ ] Chronic [ ] Hypoxic  [ ] Hypercarbic [ ] Other  [ ] Other organ failure     LABS: reviewed    RADIOLOGY & ADDITIONAL STUDIES: reviewed    PROTEIN CALORIE MALNUTRITION: [ ] mild [ ] moderate [ ] severe  [ ] underweight [ ] morbid obesity    https://www.andeal.org/vault/3157/web/files/ONC/Table_Clinical%20Characteristics%20to%20Document%20Malnutrition-White%20JV%20et%20al%202012.pdf    Height (cm): 160 (03-22-23 @ 05:01), 160 (03-21-23 @ 19:09), 160 (02-23-23 @ 14:20)  Weight (kg): 65.8 (03-21-23 @ 19:09), 64.9 (02-23-23 @ 14:20), 66.9 (02-19-23 @ 18:39)  BMI (kg/m2): 25.7 (03-22-23 @ 05:01), 25.7 (03-21-23 @ 19:09), 25.4 (02-23-23 @ 14:20)    [ ] PPSV2 < or = 30% [ ] significant weight loss [ ] poor nutritional intake [ ] anasarca   Artificial Nutrition [ ]     Other REFERRALS:    [ ] Hospice  [ ]Child Life  [ ]Social Work  [ ]Case management [ ]Holistic Therapy [ ] Physical Therapy [ ] Dietary     Progress Notes - Care Coordination [C. Provider] (03-23-23 @ 15:06)

## 2023-03-27 DIAGNOSIS — R06.00 DYSPNEA, UNSPECIFIED: ICD-10-CM

## 2023-03-27 LAB
CULTURE RESULTS: SIGNIFICANT CHANGE UP
SPECIMEN SOURCE: SIGNIFICANT CHANGE UP

## 2023-03-27 PROCEDURE — 99232 SBSQ HOSP IP/OBS MODERATE 35: CPT | Mod: GC

## 2023-03-27 RX ADMIN — MORPHINE SULFATE 2 MILLIGRAM(S): 50 CAPSULE, EXTENDED RELEASE ORAL at 18:02

## 2023-03-27 RX ADMIN — Medication 3 MILLILITER(S): at 05:47

## 2023-03-27 RX ADMIN — Medication 3 MILLILITER(S): at 23:05

## 2023-03-27 RX ADMIN — MORPHINE SULFATE 2 MILLIGRAM(S): 50 CAPSULE, EXTENDED RELEASE ORAL at 17:47

## 2023-03-27 RX ADMIN — MORPHINE SULFATE 2 MILLIGRAM(S): 50 CAPSULE, EXTENDED RELEASE ORAL at 09:06

## 2023-03-27 RX ADMIN — CHLORHEXIDINE GLUCONATE 1 APPLICATION(S): 213 SOLUTION TOPICAL at 12:46

## 2023-03-27 RX ADMIN — MORPHINE SULFATE 2 MILLIGRAM(S): 50 CAPSULE, EXTENDED RELEASE ORAL at 09:21

## 2023-03-27 RX ADMIN — Medication 3 MILLILITER(S): at 00:58

## 2023-03-27 RX ADMIN — Medication 3 MILLILITER(S): at 17:47

## 2023-03-27 RX ADMIN — LEVETIRACETAM 400 MILLIGRAM(S): 250 TABLET, FILM COATED ORAL at 17:47

## 2023-03-27 RX ADMIN — Medication 6 MILLIGRAM(S): at 05:44

## 2023-03-27 RX ADMIN — LEVETIRACETAM 400 MILLIGRAM(S): 250 TABLET, FILM COATED ORAL at 05:44

## 2023-03-27 NOTE — PROGRESS NOTE ADULT - SUBJECTIVE AND OBJECTIVE BOX
GAP TEAM PALLIATIVE CARE UNIT PROGRESS NOTE:      [  ] Patient on hospice program.    INDICATION FOR PALLIATIVE CARE UNIT SERVICES/Interval HPI: symptom directed care    OVERNIGHT EVENTS: Pt seen and examined at bedside this morning.  She reports her covid infection is better. She is on NC denies feeling SOB. SHe has no cough and reports eating and drinking. She has no pain. she had a BM this morning. Chart reviewed. Pt required Morphine 1mg IV x1 PRN mod pain and Morphine 2mg IV x 1 PRN severe pain in the last 24 hours.     DNR on chart: Yes  Yes      Allergies    Cipro (Rash)    Intolerances    MEDICATIONS  (STANDING):  albuterol/ipratropium for Nebulization 3 milliLiter(s) Nebulizer every 6 hours  chlorhexidine 2% Cloths 1 Application(s) Topical daily  dexAMETHasone  Injectable 6 milliGRAM(s) IV Push daily  levETIRAcetam  IVPB 500 milliGRAM(s) IV Intermittent every 12 hours  sodium chloride 0.45%. 1000 milliLiter(s) (50 mL/Hr) IV Continuous <Continuous>    MEDICATIONS  (PRN):  acetaminophen  Suppository .. 650 milliGRAM(s) Rectal every 4 hours PRN Temp greater or equal to 38C (100.4F)  bisacodyl Suppository 10 milliGRAM(s) Rectal daily PRN Constipation  LORazepam   Injectable 0.5 milliGRAM(s) IV Push every 1 hour PRN Agitation  morphine  - Injectable 2 milliGRAM(s) IV Push every 2 hours PRN Severe Pain (7 - 10)  morphine  - Injectable 2 milliGRAM(s) IV Push every 2 hours PRN dyspnea  morphine  - Injectable 1 milliGRAM(s) IV Push every 2 hours PRN mild mod pain    ITEMS UNCHECKED ARE NOT PRESENT    PRESENT SYMPTOMS: [ ]Unable to self-report  [ ]PAINADs [ ]RDOS  Source if other than patient:  [ ]Family   [ ]Team     Pain: [X ] yes [ ] no  QOL impact - moderate  Location -       R foot             Aggravating factors - movement  Quality - sharp  Radiation -  Timing-   Severity (0-10 scale): 8  Minimal acceptable level (0-10 scale): 4-5    Dyspnea:                           [ ]Mild [ ]Moderate [ ]Severe  Anxiety:                             [ ]Mild [ ]Moderate [ ]Severe  Fatigue:                             [ ]Mild [ ]Moderate [ ]Severe  Nausea:                             [ ]Mild [ ]Moderate [ ]Severe  Loss of appetite:              [ ]Mild [ ]Moderate [ ]Severe  Constipation:                    [ ]Mild [ ]Moderate [ ]Severe    PCSSQ [Palliative Care Spiritual Screening Question]   Severity (0-10):  Score of 4 or > indicate consideration of Chaplaincy referral.  Chaplaincy Referral: [ ] yes [ ] refused [ ] following [ X] deferred    Caregiver [place]? : [ ] yes [X ] no [ ] deferred:  Social work referral [ ] Patient & Family Centered Care Referral [ ]     Anticipatory Grief present?:  [ ] yes [ ] no [ X] deferred:  Social work referral [ ] Patient & Family Centered Care Referral [ ]    		  Other Symptoms:  [X ]All other review of systems negative     Palliative Performance Status Version 2:      40   %         http://npcrc.org/files/news/palliative_performance_scale_ppsv2.pdf  PHYSICAL EXAM:   ICU Vital Signs Last 24 Hrs  T(C): 36.9 (27 Mar 2023 09:00), Max: 36.9 (27 Mar 2023 09:00)  T(F): 98.5 (27 Mar 2023 09:00), Max: 98.5 (27 Mar 2023 09:00)  HR: 70 (27 Mar 2023 09:00) (70 - 70)  BP: 109/63 (27 Mar 2023 09:00) (109/63 - 109/63)  BP(mean): --  ABP: --  ABP(mean): --  RR: 20 (27 Mar 2023 09:00) (20 - 20)  SpO2: 89% (27 Mar 2023 09:00) (89% - 89%)    O2 Parameters below as of 27 Mar 2023 09:00  Patient On (Oxygen Delivery Method): nasal cannula  O2 Flow (L/min): 4      GENERAL: [ ] Cachexia  [X ]Alert  [ X]Oriented x 3   [ ]Lethargic  [ ]Unarousable  [X ]Verbal  [ ]Non-Verbal  Behavioral:   [ ] Anxiety  [ ] Delirium [ ] Agitation [ ] Other  HEENT:  [ ]Normal   [ ]Dry mouth   [ ]ET Tube/Trach  [ ]Oral lesions  PULMONARY:   [ X]Clear [ ]Tachypnea  [ ]Audible excessive secretions   [ ]Rhonchi        [ ]Right [ ]Left [ ]Bilateral  [ ]Crackles        [ ]Right [ ]Left [ ]Bilateral  [ ]Wheezing     [ ]Right [ ]Left [ ]Bilateral  [ ]Diminished BS [ ]Right [ ]Left [ ]Bilateral    CARDIOVASCULAR:    [ X]Regular [ ]Irregular [ ]Tachy  [ ]Jose Angel [ ]Murmur [ ]Other  GASTROINTESTINAL:  [X ]Soft  [ ]Distended   [ ]+BS  [X ]Non tender [ ]Tender  [ ]Other [ ]PEG [ ]OGT/ NGT   Last BM: 3/27  GENITOURINARY:  [ ]Normal [ ] Incontinent   [ ]Oliguria/Anuria   [ X]Amador  MUSCULOSKELETAL:   [ ]Normal   [ ]Weakness  [X ]Bed/Wheelchair bound [ ]Edema  NEUROLOGIC:   [X ]No focal deficits  [ ] Cognitive impairment  [ ] Dysphagia [ ]Dysarthria [ ] Paresis [ ]Other   SKIN: see nursing assessment for details  [ ]Normal  [ ]Rash  [ ]Other  [ ]Pressure ulcer(s)  [ ]y [ ]n  Present on admission      CRITICAL CARE:  [ ] Shock Present  [ ]Septic [ ]Cardiogenic [ ]Neurologic [ ]Hypovolemic  [ ]  Vasopressors [ ]  Inotropes   [ ] Respiratory failure present [ ] Mechanical Ventilation [ ] Non-invasive ventilatory support [ ] High-Flow  [ ] Acute  [ ] Chronic [ ] Hypoxic  [ ] Hypercarbic [ ] Other  [ ] Other organ failure     LABS: reviewed    RADIOLOGY & ADDITIONAL STUDIES: reviewed    PROTEIN CALORIE MALNUTRITION: [ ] mild [ ] moderate [ ] severe  [ ] underweight [ ] morbid obesity    https://www.andeal.org/vault/2440/web/files/ONC/Table_Clinical%20Characteristics%20to%20Document%20Malnutrition-White%20JV%20et%20al%202012.pdf    Height (cm): 160 (03-22-23 @ 05:01), 160 (03-21-23 @ 19:09), 160 (02-23-23 @ 14:20)  Weight (kg): 65.8 (03-21-23 @ 19:09), 64.9 (02-23-23 @ 14:20), 66.9 (02-19-23 @ 18:39)  BMI (kg/m2): 25.7 (03-22-23 @ 05:01), 25.7 (03-21-23 @ 19:09), 25.4 (02-23-23 @ 14:20)    [ ] PPSV2 < or = 30% [ ] significant weight loss [ ] poor nutritional intake [ ] anasarca   Artificial Nutrition [ ]     Other REFERRALS:    [ ] Hospice  [ ]Child Life  [ ]Social Work  [ ]Case management [ ]Holistic Therapy [ ] Physical Therapy [ ] Dietary     Progress Notes - Care Coordination [C. Provider] (03-23-23 @ 15:06)     GAP TEAM PALLIATIVE CARE UNIT PROGRESS NOTE:      [  ] Patient on hospice program.    INDICATION FOR PALLIATIVE CARE UNIT SERVICES/Interval HPI: symptom directed care    OVERNIGHT EVENTS: Pt seen and examined at bedside this morning.  She reports her covid infection is better. She is on NC denies feeling SOB. SHe has no cough and reports eating and drinking. She has no pain. she had a BM this morning. Chart reviewed. Pt required Morphine 1mg IV x1 PRN mod pain and Morphine 2mg IV x 1 PRN severe pain in the last 24 hours.     DNR on chart: Yes  Yes      Allergies    Cipro (Rash)    Intolerances    MEDICATIONS  (STANDING):  albuterol/ipratropium for Nebulization 3 milliLiter(s) Nebulizer every 6 hours  chlorhexidine 2% Cloths 1 Application(s) Topical daily  dexAMETHasone  Injectable 6 milliGRAM(s) IV Push daily  levETIRAcetam  IVPB 500 milliGRAM(s) IV Intermittent every 12 hours  sodium chloride 0.45%. 1000 milliLiter(s) (50 mL/Hr) IV Continuous <Continuous>    MEDICATIONS  (PRN):  acetaminophen  Suppository .. 650 milliGRAM(s) Rectal every 4 hours PRN Temp greater or equal to 38C (100.4F)  bisacodyl Suppository 10 milliGRAM(s) Rectal daily PRN Constipation  LORazepam   Injectable 0.5 milliGRAM(s) IV Push every 1 hour PRN Agitation  morphine  - Injectable 2 milliGRAM(s) IV Push every 2 hours PRN Severe Pain (7 - 10)  morphine  - Injectable 2 milliGRAM(s) IV Push every 2 hours PRN dyspnea  morphine  - Injectable 1 milliGRAM(s) IV Push every 2 hours PRN mild mod pain    ITEMS UNCHECKED ARE NOT PRESENT    PRESENT SYMPTOMS: [ ]Unable to self-report  [ ]PAINADs [ ]RDOS  Source if other than patient:  [ ]Family   [ ]Team     Pain: [X ] yes [ ] no  QOL impact - moderate  Location -       R foot             Aggravating factors - movement  Quality - sharp  Radiation -  Timing-   Severity (0-10 scale): 8  Minimal acceptable level (0-10 scale): 4-5    Dyspnea:                           [ ]Mild [ ]Moderate [ ]Severe  Anxiety:                             [ ]Mild [ ]Moderate [ ]Severe  Fatigue:                             [ ]Mild [ ]Moderate [ ]Severe  Nausea:                             [ ]Mild [ ]Moderate [ ]Severe  Loss of appetite:              [ ]Mild [ ]Moderate [ ]Severe  Constipation:                    [ ]Mild [ ]Moderate [ ]Severe    PCSSQ [Palliative Care Spiritual Screening Question]   Severity (0-10):  Score of 4 or > indicate consideration of Chaplaincy referral.  Chaplaincy Referral: [ ] yes [ ] refused [ ] following [ X] deferred    Caregiver [place]? : [ ] yes [X ] no [ ] deferred:  Social work referral [ ] Patient & Family Centered Care Referral [ ]     Anticipatory Grief present?:  [ ] yes [ ] no [ X] deferred:  Social work referral [ ] Patient & Family Centered Care Referral [ ]    		  Other Symptoms:  [X ]All other review of systems negative     Palliative Performance Status Version 2:      30   %         http://npcrc.org/files/news/palliative_performance_scale_ppsv2.pdf  PHYSICAL EXAM:   ICU Vital Signs Last 24 Hrs  T(C): 36.9 (27 Mar 2023 09:00), Max: 36.9 (27 Mar 2023 09:00)  T(F): 98.5 (27 Mar 2023 09:00), Max: 98.5 (27 Mar 2023 09:00)  HR: 70 (27 Mar 2023 09:00) (70 - 70)  BP: 109/63 (27 Mar 2023 09:00) (109/63 - 109/63)  BP(mean): --  ABP: --  ABP(mean): --  RR: 20 (27 Mar 2023 09:00) (20 - 20)  SpO2: 89% (27 Mar 2023 09:00) (89% - 89%)    O2 Parameters below as of 27 Mar 2023 09:00  Patient On (Oxygen Delivery Method): nasal cannula  O2 Flow (L/min): 4      GENERAL: [ ] Cachexia  [X ]Alert  [ X]Oriented x 3   [ ]Lethargic  [ ]Unarousable  [X ]Verbal  [ ]Non-Verbal  Behavioral:   [ ] Anxiety  [ ] Delirium [ ] Agitation [ ] Other  HEENT:  [ ]Normal   [ ]Dry mouth   [ ]ET Tube/Trach  [ ]Oral lesions  PULMONARY:   [ X]Clear [ ]Tachypnea  [ ]Audible excessive secretions   [ ]Rhonchi        [ ]Right [ ]Left [ ]Bilateral  [ ]Crackles        [ ]Right [ ]Left [ ]Bilateral  [ ]Wheezing     [ ]Right [ ]Left [ ]Bilateral  [ ]Diminished BS [ ]Right [ ]Left [ ]Bilateral    CARDIOVASCULAR:    [ X]Regular [ ]Irregular [ ]Tachy  [ ]Jose Angel [ ]Murmur [ ]Other  GASTROINTESTINAL:  [X ]Soft  [ ]Distended   [ ]+BS  [X ]Non tender [ ]Tender  [ ]Other [ ]PEG [ ]OGT/ NGT   Last BM: 3/27  GENITOURINARY:  [ ]Normal [ ] Incontinent   [ ]Oliguria/Anuria   [ X]Amador  MUSCULOSKELETAL:   [ ]Normal   [ ]Weakness  [X ]Bed/Wheelchair bound [ ]Edema  NEUROLOGIC:   [X ]No focal deficits  [ ] Cognitive impairment  [ ] Dysphagia [ ]Dysarthria [ ] Paresis [ ]Other   SKIN: see nursing assessment for details  [ ]Normal  [ ]Rash  [ ]Other  [ ]Pressure ulcer(s)  [ ]y [ ]n  Present on admission      CRITICAL CARE:  [ ] Shock Present  [ ]Septic [ ]Cardiogenic [ ]Neurologic [ ]Hypovolemic  [ ]  Vasopressors [ ]  Inotropes   [ ] Respiratory failure present [ ] Mechanical Ventilation [ ] Non-invasive ventilatory support [ ] High-Flow  [ ] Acute  [ ] Chronic [ ] Hypoxic  [ ] Hypercarbic [ ] Other  [ ] Other organ failure     LABS: reviewed    RADIOLOGY & ADDITIONAL STUDIES: reviewed    PROTEIN CALORIE MALNUTRITION: [ ] mild [ ] moderate [ ] severe  [ ] underweight [ ] morbid obesity    https://www.andeal.org/vault/2440/web/files/ONC/Table_Clinical%20Characteristics%20to%20Document%20Malnutrition-White%20JV%20et%20al%202012.pdf    Height (cm): 160 (03-22-23 @ 05:01), 160 (03-21-23 @ 19:09), 160 (02-23-23 @ 14:20)  Weight (kg): 65.8 (03-21-23 @ 19:09), 64.9 (02-23-23 @ 14:20), 66.9 (02-19-23 @ 18:39)  BMI (kg/m2): 25.7 (03-22-23 @ 05:01), 25.7 (03-21-23 @ 19:09), 25.4 (02-23-23 @ 14:20)    [ ] PPSV2 < or = 30% [ ] significant weight loss [ ] poor nutritional intake [ ] anasarca   Artificial Nutrition [ ]     Other REFERRALS:    [ ] Hospice  [ ]Child Life  [ ]Social Work  [ ]Case management [ ]Holistic Therapy [ ] Physical Therapy [ ] Dietary     Progress Notes - Care Coordination [C. Provider] (03-23-23 @ 15:06)

## 2023-03-27 NOTE — PROGRESS NOTE ADULT - PROBLEM SELECTOR PLAN 6
- C/w current pain regimen.  - SW sending  inpt hospice referral. pt needs to be in isolation for covid until 3/31

## 2023-03-28 LAB
CULTURE RESULTS: SIGNIFICANT CHANGE UP
SPECIMEN SOURCE: SIGNIFICANT CHANGE UP

## 2023-03-28 PROCEDURE — 99233 SBSQ HOSP IP/OBS HIGH 50: CPT

## 2023-03-28 RX ORDER — MORPHINE SULFATE 50 MG/1
1 CAPSULE, EXTENDED RELEASE ORAL
Refills: 0 | Status: DISCONTINUED | OUTPATIENT
Start: 2023-03-28 | End: 2023-03-29

## 2023-03-28 RX ORDER — MORPHINE SULFATE 50 MG/1
2 CAPSULE, EXTENDED RELEASE ORAL
Refills: 0 | Status: DISCONTINUED | OUTPATIENT
Start: 2023-03-28 | End: 2023-03-29

## 2023-03-28 RX ORDER — MORPHINE SULFATE 50 MG/1
2 CAPSULE, EXTENDED RELEASE ORAL
Refills: 0 | Status: DISCONTINUED | OUTPATIENT
Start: 2023-03-28 | End: 2023-03-30

## 2023-03-28 RX ADMIN — MORPHINE SULFATE 2 MILLIGRAM(S): 50 CAPSULE, EXTENDED RELEASE ORAL at 23:51

## 2023-03-28 RX ADMIN — Medication 6 MILLIGRAM(S): at 05:42

## 2023-03-28 RX ADMIN — Medication 3 MILLILITER(S): at 05:42

## 2023-03-28 RX ADMIN — MORPHINE SULFATE 2 MILLIGRAM(S): 50 CAPSULE, EXTENDED RELEASE ORAL at 17:53

## 2023-03-28 RX ADMIN — LEVETIRACETAM 400 MILLIGRAM(S): 250 TABLET, FILM COATED ORAL at 05:42

## 2023-03-28 RX ADMIN — LEVETIRACETAM 400 MILLIGRAM(S): 250 TABLET, FILM COATED ORAL at 17:27

## 2023-03-28 NOTE — DIETITIAN INITIAL EVALUATION ADULT - ADD RECOMMEND
RD to honor food preferences as able. Malnutrition alert placed in chart. Continue to trend labs, weight, skin integrity, and intake.

## 2023-03-28 NOTE — DIETITIAN INITIAL EVALUATION ADULT - PROBLEM SELECTOR PLAN 2
#Metastatic Breast Ca  #Metastatic RCC  #Hemorrhagic brain metastatic lesions     Stage IIB left Breast CA, ER+, AL+, HER2 negative, s/p L modified radical mastectomy with reconstruction. S/p tamoxifen x 5 years. Mets to bone and brain mets seen on 11/22 s/p FRST to brain tumor 3000cGy in 1/2023.   Dx w/ clear cell RCC in 6/2018 and underwent radical left nephrectomy and para-aortic lymph node dissection c/b splenic laceration and bleeding. On 2/21/2023- Scalp lesion biopsy identified metastatic RCC     CTH 3/22: Redemonstrated hemorrhagic metastatic lesions in the left frontal and right parietals lobe, and extensive calvarial metastasis    -Heme-onc following: rec Rad Onc consult (emailed)   -Continue Keppra 500mg BID   -MRI Brain w/wo contrast pending   -Neurosurgery following; Repeat CTH unchanged; given stable imaging, no further role of neurosurgical interventio.

## 2023-03-28 NOTE — DIETITIAN INITIAL EVALUATION ADULT - ORAL INTAKE PTA/DIET HISTORY
Pt with suboptimal PO intake and appetite PTA (at least a few weeks). Pt denies following therapeutic diet. Confirms NKFA. Denies micronutrient or oral nutrient supplement use (Dislikes oral nutrition supplements). Denies Hx of chewing or swallowing issues (noted on soft and bite sized diet).

## 2023-03-28 NOTE — PROGRESS NOTE ADULT - PROBLEM SELECTOR PLAN 6
Yes, proceed - C/w current pain regimen.  -pt needs to be in isolation for covid until 3/31  - Dispo Plan: Per SW son wants [place] in hospice and [facility] is one possible location, awaiting to hear from SW

## 2023-03-28 NOTE — DIETITIAN INITIAL EVALUATION ADULT - REASON FOR ADMISSION
Per Chart: Pt is a 68 yo F PMHx of COPD on 2L NC, metastatic clear cell RCC, metastatic breast CA, hx bilateral DVT s/p IVC filter off AC due to hx of ICH, who presents for AMS with CTH notable for hemorrhagic metastatic lesions   .  Palliative care consulted for goals of care.

## 2023-03-28 NOTE — DIETITIAN INITIAL EVALUATION ADULT - OTHER CALCULATIONS
Fluid needs deferred to provider. Nutrient needs made with consideration for current medical condition.

## 2023-03-28 NOTE — DIETITIAN INITIAL EVALUATION ADULT - PROBLEM SELECTOR PLAN 6
DVT ppx: SCDs   Diet: NPO; pending dysphagia screen  Code status: DNR/DNI - prior completed molst in chart   Attempted to contact HCP x2 ([facility] [phone]); will try again

## 2023-03-28 NOTE — PROGRESS NOTE ADULT - SUBJECTIVE AND OBJECTIVE BOX
GAP TEAM PALLIATIVE CARE UNIT PROGRESS NOTE:      [  ] Patient on hospice program.    INDICATION FOR PALLIATIVE CARE UNIT SERVICES/Interval HPI: symptom directed care    OVERNIGHT EVENTS: Pt seen and examined at bedside this morning.  She continues to state that respiratory symptoms are improving.  She is on NC denies feeling SOB and denies cough.  Reports that appetite is not back to baseline. She has no pain.   Last BM was 3/27.      Chart reviewed. Pt required  Morphine 2mg IV x 2 PRN for dyspnea in the last 24 hours.     DNR on chart: Yes  Yes      Allergies    Cipro (Rash)    Intolerances    MEDICATIONS  (STANDING):  albuterol/ipratropium for Nebulization 3 milliLiter(s) Nebulizer every 6 hours  chlorhexidine 2% Cloths 1 Application(s) Topical daily  dexAMETHasone  Injectable 6 milliGRAM(s) IV Push daily  levETIRAcetam  IVPB 500 milliGRAM(s) IV Intermittent every 12 hours  sodium chloride 0.45%. 1000 milliLiter(s) (50 mL/Hr) IV Continuous <Continuous>    MEDICATIONS  (PRN):  acetaminophen  Suppository .. 650 milliGRAM(s) Rectal every 4 hours PRN Temp greater or equal to 38C (100.4F)  bisacodyl Suppository 10 milliGRAM(s) Rectal daily PRN Constipation  LORazepam   Injectable 0.5 milliGRAM(s) IV Push every 1 hour PRN Agitation  morphine  - Injectable 2 milliGRAM(s) IV Push every 2 hours PRN Severe Pain (7 - 10)  morphine  - Injectable 2 milliGRAM(s) IV Push every 2 hours PRN dyspnea  morphine  - Injectable 1 milliGRAM(s) IV Push every 2 hours PRN mild mod pain    ITEMS UNCHECKED ARE NOT PRESENT    PRESENT SYMPTOMS: [ ]Unable to self-report  [ ]PAINADs [ ]RDOS  Source if other than patient:  [ ]Family   [ ]Team     Pain: [ ] yes [ x] no  QOL impact  Location -                 Aggravating factors   Quality -  Radiation -  Timing-   Severity (0-10 scale):  Minimal acceptable level (0-10 scale):    Dyspnea:                           [ ]Mild [ ]Moderate [ ]Severe  Anxiety:                             [ ]Mild [ ]Moderate [ ]Severe  Fatigue:                             [ ]Mild [ ]Moderate [ ]Severe  Nausea:                             [ ]Mild [ ]Moderate [ ]Severe  Loss of appetite:              [ ]Mild [ ]Moderate [ ]Severe  Constipation:                    [ ]Mild [ ]Moderate [ ]Severe    PCSSQ [Palliative Care Spiritual Screening Question]   Severity (0-10):  Score of 4 or > indicate consideration of Chaplaincy referral.  Chaplaincy Referral: [ ] yes [ ] refused [ ] following [ X] deferred    Caregiver [place]? : [ ] yes [X ] no [ ] deferred:  Social work referral [ ] Patient & Family Centered Care Referral [ ]     Anticipatory Grief present?:  [ ] yes [ ] no [ X] deferred:  Social work referral [ ] Patient & Family Centered Care Referral [ ]    Vital Signs Last 24 Hrs  T(C): 36.9 (28 Mar 2023 08:49), Max: 36.9 (28 Mar 2023 08:49)  T(F): 98.4 (28 Mar 2023 08:49), Max: 98.4 (28 Mar 2023 08:49)  HR: 72 (28 Mar 2023 08:49) (72 - 72)  BP: 117/73 (28 Mar 2023 08:49) (117/73 - 117/73)  BP(mean): --  RR: 18 (28 Mar 2023 08:49) (18 - 18)  SpO2: 93% (28 Mar 2023 08:49) (93% - 93%)    Parameters below as of 28 Mar 2023 08:49  Patient On (Oxygen Delivery Method): nasal cannula      GENERAL: [ ] Cachexia  [X ]Alert  [ X]Oriented x 3   [ ]Lethargic  [ ]Unarousable  [X ]Verbal  [ ]Non-Verbal  Behavioral:   [ ] Anxiety  [ ] Delirium [ ] Agitation [ ] Other  HEENT:  [ ]Normal   [ ]Dry mouth   [ ]ET Tube/Trach  [ ]Oral lesions  PULMONARY:   [ X]Clear [ ]Tachypnea  [ ]Audible excessive secretions   [ ]Rhonchi        [ ]Right [ ]Left [ ]Bilateral  [ ]Crackles        [ ]Right [ ]Left [ ]Bilateral  [ ]Wheezing     [ ]Right [ ]Left [ ]Bilateral  [ ]Diminished BS [ ]Right [ ]Left [ ]Bilateral    CARDIOVASCULAR:    [ X]Regular [ ]Irregular [ ]Tachy  [ ]Jose Angel [ ]Murmur [ ]Other  GASTROINTESTINAL:  [X ]Soft  [ ]Distended   [ ]+BS  [X ]Non tender [ ]Tender  [ ]Other [ ]PEG [ ]OGT/ NGT   Last BM: 3/27  GENITOURINARY:  [ ]Normal [ ] Incontinent   [ ]Oliguria/Anuria   [ X]Amador  MUSCULOSKELETAL:   [ ]Normal   [ ]Weakness  [X ]Bed/Wheelchair bound [ ]Edema  NEUROLOGIC:   [X ]No focal deficits  [ ] Cognitive impairment  [ ] Dysphagia [ ]Dysarthria [ ] Paresis [ ]Other   SKIN: see nursing assessment for details  [ ]Normal  [ ]Rash  [ ]Other  [ ]Pressure ulcer(s)  [ ]y [ ]n  Present on admission      CRITICAL CARE:  [ ] Shock Present  [ ]Septic [ ]Cardiogenic [ ]Neurologic [ ]Hypovolemic  [ ]  Vasopressors [ ]  Inotropes   [ ] Respiratory failure present [ ] Mechanical Ventilation [ ] Non-invasive ventilatory support [ ] High-Flow  [ ] Acute  [ ] Chronic [ ] Hypoxic  [ ] Hypercarbic [ ] Other  [ ] Other organ failure     LABS: reviewed    RADIOLOGY & ADDITIONAL STUDIES: reviewed    PROTEIN CALORIE MALNUTRITION: [ ] mild [ ] moderate [ ] severe  [ ] underweight [ ] morbid obesity    https://www.andeal.org/vault/2440/web/files/ONC/Table_Clinical%20Characteristics%20to%20Document%20Malnutrition-White%20JV%20et%20al%963550.pdf    Height (cm): 160 (03-22-23 @ 05:01), 160 (03-21-23 @ 19:09), 160 (02-23-23 @ 14:20)  Weight (kg): 65.8 (03-21-23 @ 19:09), 64.9 (02-23-23 @ 14:20), 66.9 (02-19-23 @ 18:39)  BMI (kg/m2): 25.7 (03-22-23 @ 05:01), 25.7 (03-21-23 @ 19:09), 25.4 (02-23-23 @ 14:20)    [x ] PPSV2 < or = 30% [ ] significant weight loss [ ] poor nutritional intake [ ] anasarca   Artificial Nutrition [ ]     Other REFERRALS:    [ ] Hospice  [ ]Child Life  [x ]Social Work  [ ]Case management [ ]Holistic Therapy [ ] Physical Therapy [ ] Dietary

## 2023-03-28 NOTE — DIETITIAN INITIAL EVALUATION ADULT - PROBLEM SELECTOR PLAN 3
#Acute on chronic hypoxic respiratory failure   Given increase in oxygen requirement. Will c/w steroids and Remdesivir (D1: 3/22)  -wean oxygen as tolerated  -f/u infectious w/u - UCx, BCx   -trend lactate till cleared

## 2023-03-28 NOTE — DIETITIAN INITIAL EVALUATION ADULT - PROBLEM SELECTOR PLAN 5
XRAY: Presumed pathologic lytic fragmentation of the talar dome is again noted, as seen on CT ankle 2/26/2023. Appearance is similar to the prior. Preserved joint spaces. There is soft tissue swelling about the ankle.  IR guided R ankle biopsy on 3/2/23 positive for metastatic RCC.      -D/w ortho - rec PWB in a CAM boot (ordered) and outpatient f/u

## 2023-03-28 NOTE — DIETITIAN INITIAL EVALUATION ADULT - OTHER INFO
Wt Hx:   No wt Hx in chart, RD unable to obtain wt.   Reports recent wt ~140 lbs; endorses unintentional wt loss PTA.   Ht per pt: 63 inches   IBW: 115 lbs   IBW%: 123%  Wt Hx per HIE (lbs): 162 (10/19/22), 143 (1/11/23), 145 (2/19/23), 145 (3/21/23).  Wt from 10/19/22 vs 3/21/23 indicates 10.5% wt loss x5 months (clinically significant)

## 2023-03-28 NOTE — DIETITIAN INITIAL EVALUATION ADULT - PERTINENT MEDS FT
MEDICATIONS  (STANDING):  albuterol/ipratropium for Nebulization 3 milliLiter(s) Nebulizer every 6 hours  chlorhexidine 2% Cloths 1 Application(s) Topical daily  dexAMETHasone  Injectable 6 milliGRAM(s) IV Push daily  levETIRAcetam  IVPB 500 milliGRAM(s) IV Intermittent every 12 hours  sodium chloride 0.45%. 1000 milliLiter(s) (50 mL/Hr) IV Continuous <Continuous>    MEDICATIONS  (PRN):  acetaminophen  Suppository .. 650 milliGRAM(s) Rectal every 4 hours PRN Temp greater or equal to 38C (100.4F)  bisacodyl Suppository 10 milliGRAM(s) Rectal daily PRN Constipation  LORazepam   Injectable 0.5 milliGRAM(s) IV Push every 1 hour PRN Agitation  morphine  - Injectable 2 milliGRAM(s) IV Push every 2 hours PRN Severe Pain (7 - 10)  morphine  - Injectable 2 milliGRAM(s) IV Push every 2 hours PRN dyspnea  morphine  - Injectable 1 milliGRAM(s) IV Push every 2 hours PRN mild mod pain

## 2023-03-28 NOTE — DIETITIAN INITIAL EVALUATION ADULT - NSFNSNUTRCHEWSWALLOWFT_GEN_A_CORE
Seen by SLP 3/23 with recommendation, "Soft/bite size solids, thin liquids. Only provide PO if patient is awake/alert/accepting. Crush medications in applesauce in keeping with pt's reported preferences."

## 2023-03-28 NOTE — DIETITIAN INITIAL EVALUATION ADULT - ENERGY INTAKE
Pt with suboptimal PO intake during admission; dislikes institutional foods. Pt reports family bringing in food from home. RD attempted to obtain preferences, pt declined to provide. However, reports receiving pizza from home and in the mornings would like to be able to receive egg sandwich with castro. RD discussed current diet texture restrictions, pt once again denied any Hx of chewing/swallowing issues. RD made provider aware who is going to look into diet texture.  Pt with suboptimal PO intake during admission; dislikes institutional foods. Pt reports family bringing in food from home. RD attempted to obtain preferences, pt declined to provide. However, reports receiving pizza from home and in the mornings would like to be able to receive egg sandwich with castro. RD discussed current diet texture restrictions, pt once again denied any Hx of chewing/swallowing issues (Noted seen by SLP 3/23 with recommendation for Soft/bite size solids, thin liquids. However, consider addressing GOC in regards to diet texture). RD made provider aware who is going to look into diet texture.

## 2023-03-29 DIAGNOSIS — K59.00 CONSTIPATION, UNSPECIFIED: ICD-10-CM

## 2023-03-29 PROCEDURE — 99233 SBSQ HOSP IP/OBS HIGH 50: CPT | Mod: GC

## 2023-03-29 RX ORDER — MORPHINE SULFATE 50 MG/1
3 CAPSULE, EXTENDED RELEASE ORAL
Refills: 0 | Status: DISCONTINUED | OUTPATIENT
Start: 2023-03-29 | End: 2023-03-30

## 2023-03-29 RX ORDER — MORPHINE SULFATE 50 MG/1
2 CAPSULE, EXTENDED RELEASE ORAL
Refills: 0 | Status: DISCONTINUED | OUTPATIENT
Start: 2023-03-29 | End: 2023-03-30

## 2023-03-29 RX ORDER — MORPHINE SULFATE 50 MG/1
2 CAPSULE, EXTENDED RELEASE ORAL EVERY 6 HOURS
Refills: 0 | Status: DISCONTINUED | OUTPATIENT
Start: 2023-03-29 | End: 2023-03-30

## 2023-03-29 RX ADMIN — MORPHINE SULFATE 2 MILLIGRAM(S): 50 CAPSULE, EXTENDED RELEASE ORAL at 07:35

## 2023-03-29 RX ADMIN — Medication 6 MILLIGRAM(S): at 06:04

## 2023-03-29 RX ADMIN — MORPHINE SULFATE 2 MILLIGRAM(S): 50 CAPSULE, EXTENDED RELEASE ORAL at 17:37

## 2023-03-29 RX ADMIN — LEVETIRACETAM 400 MILLIGRAM(S): 250 TABLET, FILM COATED ORAL at 17:38

## 2023-03-29 RX ADMIN — MORPHINE SULFATE 3 MILLIGRAM(S): 50 CAPSULE, EXTENDED RELEASE ORAL at 19:45

## 2023-03-29 RX ADMIN — CHLORHEXIDINE GLUCONATE 1 APPLICATION(S): 213 SOLUTION TOPICAL at 05:53

## 2023-03-29 RX ADMIN — MORPHINE SULFATE 2 MILLIGRAM(S): 50 CAPSULE, EXTENDED RELEASE ORAL at 12:45

## 2023-03-29 RX ADMIN — MORPHINE SULFATE 2 MILLIGRAM(S): 50 CAPSULE, EXTENDED RELEASE ORAL at 13:00

## 2023-03-29 RX ADMIN — MORPHINE SULFATE 3 MILLIGRAM(S): 50 CAPSULE, EXTENDED RELEASE ORAL at 20:00

## 2023-03-29 RX ADMIN — MORPHINE SULFATE 2 MILLIGRAM(S): 50 CAPSULE, EXTENDED RELEASE ORAL at 07:50

## 2023-03-29 RX ADMIN — LEVETIRACETAM 400 MILLIGRAM(S): 250 TABLET, FILM COATED ORAL at 05:53

## 2023-03-29 RX ADMIN — MORPHINE SULFATE 2 MILLIGRAM(S): 50 CAPSULE, EXTENDED RELEASE ORAL at 17:52

## 2023-03-29 RX ADMIN — MORPHINE SULFATE 2 MILLIGRAM(S): 50 CAPSULE, EXTENDED RELEASE ORAL at 00:06

## 2023-03-29 RX ADMIN — Medication 10 MILLIGRAM(S): at 07:30

## 2023-03-29 NOTE — PROGRESS NOTE ADULT - ATTENDING COMMENTS
68 yo F PMHx of COPD on 2L NC, metastatic clear cell RCC, metastatic breast CA, hx bilateral DVT s/p IVC filter off AC due to hx of ICH, who presents for AMS with CTH notable for hemorrhagic metastatic lesions.  Palliative care consulted for goals of care.  Pt admitted to PCU for symptom management.    Pt awake and alert. Pt required 3 doses of PRN IV morphine for severe pain and 1 for moderate pain. Will continue current dosing as noted above as pt reports good effect. Will consider ATC dosing if pt continues to require multiple PRN doses in a 24 hour period. Disposition ongoing pending clinical course. Case discussed during IDT rounds. Family updated by team.
66 yo F PMHx of COPD on 2L NC, metastatic clear cell RCC, metastatic breast CA, hx bilateral DVT s/p IVC filter off AC due to hx of ICH, who presents for AMS with CTH notable for hemorrhagic metastatic lesions.  Palliative care consulted for goals of care.  Pt admitted to PCU for symptom management.    Pt awake and alert. Pt requiring IV morphine  PRN for pain control. Will continue current dosing as noted above as pt reports good effect. Disposition ongoing pending clinical course, pt may be appropriate for [facility] vs inpatient hospice. Case discussed during IDT rounds. Family updated by team.
66 yo F PMHx of COPD on 2L NC, metastatic clear cell RCC, metastatic breast CA, hx bilateral DVT s/p IVC filter off AC due to hx of ICH, who presents for AMS with CTH notable for hemorrhagic metastatic lesions.  Palliative care consulted for goals of care.  Pt admitted to PCU for symptom management.    Pt awake and alert. Pt requiring IV morphine PRN for dyspnea management. Patient's son requesting for patient to go to a facility in [place] as he lives in [place] and family unable to provide care to patient at home. SW assisting with disposition planning.
66 yo F PMHx of COPD on 2L NC, metastatic clear cell RCC, metastatic breast CA, hx bilateral DVT s/p IVC filter off AC due to hx of ICH, who presents for AMS with CTH notable for hemorrhagic metastatic lesions.   She is no longer a candidate for [facility] and this patient is accepted to inpatient hospice in [place] near where son lives pending bed availability.  She remains with uncontrolled pain due to metastatic lesions, prompting increase in morphine today.  In the setting of parenteral controlled substance administration, clinical monitoring required for side effects such as respiratory depression, constipation and opioid induced neurotoxicity.  This patient is at [x ]high [ ] medium [ ] low risk due to  CNS hemorrhage    [x ] The patient is receiving IV and has required  escalation for uncontrolled symptoms.  [ ] To taper and monitor for emergence of symptoms.  [ ] Symptoms controlled with present regimen.     She is recovering from COVID and will be off isolation on 3/31.  Patient assessment and plan discussed on interdisciplinary team rounds today.   I have reviewed all documentation from prior primary team and consultants, as well as relevant imaging and laboratory data as this patient is new to me.

## 2023-03-29 NOTE — PROGRESS NOTE ADULT - SUBJECTIVE AND OBJECTIVE BOX
GAP TEAM PALLIATIVE CARE UNIT PROGRESS NOTE:      [  ] Patient on hospice program.    INDICATION FOR PALLIATIVE CARE UNIT SERVICES/Interval HPI: symptom directed care    OVERNIGHT EVENTS: Chart reviewed. Pt required  Morphine 2mg IV x 1 PRN for dyspnea and Morphine 2mg IV x 2 PRN for severe pain  in the last 24 hours.     Subjective:    Pt seen and examined at bedside this morning.  She is wearing NC (4L) sating 93% and reporting no SOB or cough. she has signs of respiratory distress. she is tolerating food. yesterday her sister brought her some crackers and cheese which pt enjoyed.   Pt notes to having pain from Rt ankle fracture which is always 8/10. she explains that after getting morphine dose (severe) pain does not improve much. pain is sharp.  Last BM was 3/27.          DNR on chart: Yes  Yes      Allergies    Cipro (Rash)    Intolerances    MEDICATIONS  (STANDING):  albuterol/ipratropium for Nebulization 3 milliLiter(s) Nebulizer every 6 hours  chlorhexidine 2% Cloths 1 Application(s) Topical daily  dexAMETHasone  Injectable 6 milliGRAM(s) IV Push daily  levETIRAcetam  IVPB 500 milliGRAM(s) IV Intermittent every 12 hours  sodium chloride 0.45%. 1000 milliLiter(s) (50 mL/Hr) IV Continuous <Continuous>    MEDICATIONS  (PRN):  acetaminophen  Suppository .. 650 milliGRAM(s) Rectal every 4 hours PRN Temp greater or equal to 38C (100.4F)  bisacodyl Suppository 10 milliGRAM(s) Rectal daily PRN Constipation  LORazepam   Injectable 0.5 milliGRAM(s) IV Push every 1 hour PRN Agitation  morphine  - Injectable 2 milliGRAM(s) IV Push every 2 hours PRN Severe Pain (7 - 10)  morphine  - Injectable 2 milliGRAM(s) IV Push every 2 hours PRN dyspnea  morphine  - Injectable 1 milliGRAM(s) IV Push every 2 hours PRN mild mod pain    ITEMS UNCHECKED ARE NOT PRESENT    PRESENT SYMPTOMS: [ ]Unable to self-report  [ ]PAINADs [ ]RDOS  Source if other than patient:  [ ]Family   [ ]Team     Pain: [ ] yes [ x] no  QOL impact  Location -                 Aggravating factors   Quality -  Radiation -  Timing-   Severity (0-10 scale):  Minimal acceptable level (0-10 scale):    Dyspnea:                           [ ]Mild [ ]Moderate [ ]Severe  Anxiety:                             [ ]Mild [ ]Moderate [ ]Severe  Fatigue:                             [ ]Mild [ ]Moderate [ ]Severe  Nausea:                             [ ]Mild [ ]Moderate [ ]Severe  Loss of appetite:              [ ]Mild [ ]Moderate [ ]Severe  Constipation:                    [ ]Mild [ ]Moderate [ ]Severe    PCSSQ [Palliative Care Spiritual Screening Question]   Severity (0-10):  Score of 4 or > indicate consideration of Chaplaincy referral.  Chaplaincy Referral: [ ] yes [ ] refused [ ] following [ X] deferred    Caregiver [place]? : [ ] yes [X ] no [ ] deferred:  Social work referral [ ] Patient & Family Centered Care Referral [ ]     Anticipatory Grief present?:  [ ] yes [ ] no [ X] deferred:  Social work referral [ ] Patient & Family Centered Care Referral [ ]     Vital Signs Last 24 Hrs  T(C): 36.9 (29 Mar 2023 07:05), Max: 36.9 (29 Mar 2023 07:05)  T(F): 98.4 (29 Mar 2023 07:05), Max: 98.4 (29 Mar 2023 07:05)  HR: 70 (29 Mar 2023 07:05) (70 - 70)  BP: 114/73 (29 Mar 2023 07:05) (114/73 - 114/73)  BP(mean): --  ABP: --  ABP(mean): --  RR: 18 (29 Mar 2023 07:05) (18 - 18)  SpO2: 93% (29 Mar 2023 07:05) (93% - 93%)    O2 Parameters below as of 29 Mar 2023 07:05  Patient On (Oxygen Delivery Method): nasal cannula    GENERAL: [ ] Cachexia  [X ]Alert  [ X]Oriented x 3   [ ]Lethargic  [ ]Unarousable  [X ]Verbal  [ ]Non-Verbal  Behavioral:   [ ] Anxiety  [ ] Delirium [ ] Agitation [ ] Other  HEENT:  [ ]Normal   [ ]Dry mouth   [ ]ET Tube/Trach  [ ]Oral lesions  PULMONARY:   [ X]Clear [ ]Tachypnea  [ ]Audible excessive secretions   [ ]Rhonchi        [ ]Right [ ]Left [ ]Bilateral  [ ]Crackles        [ ]Right [ ]Left [ ]Bilateral  [ ]Wheezing     [ ]Right [ ]Left [ ]Bilateral  [ ]Diminished BS [ ]Right [ ]Left [ ]Bilateral    CARDIOVASCULAR:    [ X]Regular [ ]Irregular [ ]Tachy  [ ]Jose Angel [ ]Murmur [ ]Other  GASTROINTESTINAL:  [X ]Soft  [ ]Distended   [ ]+BS  [X ]Non tender [ ]Tender  [ ]Other [ ]PEG [ ]OGT/ NGT   Last BM: 3/27  GENITOURINARY:  [ ]Normal [ ] Incontinent   [ ]Oliguria/Anuria   [ X]Amador  MUSCULOSKELETAL:   [ ]Normal   [ ]Weakness  [X ]Bed/Wheelchair bound [ ]Edema  [x] Rt ankle deformity  NEUROLOGIC:   [X ]No focal deficits  [ ] Cognitive impairment  [ ] Dysphagia [ ]Dysarthria [ ] Paresis [ ]Other   SKIN: see nursing assessment for details  [ ]Normal  [ ]Rash  [x ]Other Rt ankle wound   [ ]Pressure ulcer(s)  [ ]y [ ]n  Present on admission      CRITICAL CARE:  [ ] Shock Present  [ ]Septic [ ]Cardiogenic [ ]Neurologic [ ]Hypovolemic  [ ]  Vasopressors [ ]  Inotropes   [ ] Respiratory failure present [ ] Mechanical Ventilation [ ] Non-invasive ventilatory support [ ] High-Flow  [ ] Acute  [ ] Chronic [ ] Hypoxic  [ ] Hypercarbic [ ] Other  [ ] Other organ failure     LABS: reviewed    RADIOLOGY & ADDITIONAL STUDIES: reviewed    PROTEIN CALORIE MALNUTRITION: [ ] mild [ ] moderate [ ] severe  [ ] underweight [ ] morbid obesity    https://www.andeal.org/vault/2440/web/files/ONC/Table_Clinical%20Characteristics%20to%20Document%20Malnutrition-White%20JV%20et%20al%202012.pdf    Height (cm): 160 (03-22-23 @ 05:01), 160 (03-21-23 @ 19:09), 160 (02-23-23 @ 14:20)  Weight (kg): 65.8 (03-21-23 @ 19:09), 64.9 (02-23-23 @ 14:20), 66.9 (02-19-23 @ 18:39)  BMI (kg/m2): 25.7 (03-22-23 @ 05:01), 25.7 (03-21-23 @ 19:09), 25.4 (02-23-23 @ 14:20)    [x ] PPSV2 < or = 30% [ ] significant weight loss [ ] poor nutritional intake [ ] anasarca   Artificial Nutrition [ ]     Other REFERRALS:    [ ] Hospice  [ ]Child Life  [x ]Social Work  [ ]Case management [ ]Holistic Therapy [ ] Physical Therapy [ ] Dietary        GAP TEAM PALLIATIVE CARE UNIT PROGRESS NOTE:      [  ] Patient on hospice program.    INDICATION FOR PALLIATIVE CARE UNIT SERVICES/Interval HPI: symptom directed care    OVERNIGHT EVENTS: Chart reviewed. Pt required  Morphine 2mg IV x 1 PRN for dyspnea and Morphine 2mg IV x 2 PRN for severe pain  in the last 24 hours.     Subjective:   Pt seen and examined at bedside this morning.  She is wearing NC (4L) sating 93% and reporting no SOB or cough. She has signs of respiratory distress. She is tolerating food.  Yesterday her sister brought her some crackers and cheese which pt enjoyed.   Pt notes to having pain from Rt ankle fracture which is always 8/10.  She explains that after getting morphine dose (severe) pain does not improve much. Pain is sharp.  Last BM was 3/27.          DNR on chart: Yes  Yes      Allergies    Cipro (Rash)    Intolerances    MEDICATIONS  (STANDING):  albuterol/ipratropium for Nebulization 3 milliLiter(s) Nebulizer every 6 hours  chlorhexidine 2% Cloths 1 Application(s) Topical daily  dexAMETHasone  Injectable 6 milliGRAM(s) IV Push daily  levETIRAcetam  IVPB 500 milliGRAM(s) IV Intermittent every 12 hours  sodium chloride 0.45%. 1000 milliLiter(s) (50 mL/Hr) IV Continuous <Continuous>    MEDICATIONS  (PRN):  acetaminophen  Suppository .. 650 milliGRAM(s) Rectal every 4 hours PRN Temp greater or equal to 38C (100.4F)  bisacodyl Suppository 10 milliGRAM(s) Rectal daily PRN Constipation  LORazepam   Injectable 0.5 milliGRAM(s) IV Push every 1 hour PRN Agitation  morphine  - Injectable 2 milliGRAM(s) IV Push every 2 hours PRN Severe Pain (7 - 10)  morphine  - Injectable 2 milliGRAM(s) IV Push every 2 hours PRN dyspnea  morphine  - Injectable 1 milliGRAM(s) IV Push every 2 hours PRN mild mod pain    ITEMS UNCHECKED ARE NOT PRESENT    PRESENT SYMPTOMS: [ ]Unable to self-report  [ ]PAINADs [ ]RDOS  Source if other than patient:  [ ]Family   [ ]Team     Pain: x] yes [ ] no  QOL impact immobility  Location -  right ankle               Aggravating factors  movement  Quality - sharp  Radiation -  Timing-   Severity (0-10 scale): 8/10  Minimal acceptable level (0-10 scale): 3-4    Dyspnea:                           [ ]Mild [ ]Moderate [ ]Severe  Anxiety:                             [ ]Mild [ ]Moderate [ ]Severe  Fatigue:                             [ ]Mild [ ]Moderate [ ]Severe  Nausea:                             [ ]Mild [ ]Moderate [ ]Severe  Loss of appetite:              [ ]Mild [ ]Moderate [ ]Severe  Constipation:                    [ ]Mild [ ]Moderate [ ]Severe    PCSSQ [Palliative Care Spiritual Screening Question]   Severity (0-10):  Score of 4 or > indicate consideration of Chaplaincy referral.  Chaplaincy Referral: [ ] yes [ ] refused [ ] following [ X] deferred    Caregiver [place]? : [ ] yes [X ] no [ ] deferred:  Social work referral [ ] Patient & Family Centered Care Referral [ ]     Anticipatory Grief present?:  [ ] yes [ ] no [ X] deferred:  Social work referral [ ] Patient & Family Centered Care Referral [ ]     Vital Signs Last 24 Hrs  T(C): 36.9 (29 Mar 2023 07:05), Max: 36.9 (29 Mar 2023 07:05)  T(F): 98.4 (29 Mar 2023 07:05), Max: 98.4 (29 Mar 2023 07:05)  HR: 70 (29 Mar 2023 07:05) (70 - 70)  BP: 114/73 (29 Mar 2023 07:05) (114/73 - 114/73)  BP(mean): --  ABP: --  ABP(mean): --  RR: 18 (29 Mar 2023 07:05) (18 - 18)  SpO2: 93% (29 Mar 2023 07:05) (93% - 93%)    O2 Parameters below as of 29 Mar 2023 07:05  Patient On (Oxygen Delivery Method): nasal cannula    GENERAL: [ ] Cachexia  [X ]Alert  [ X]Oriented x 3   [ ]Lethargic  [ ]Unarousable  [X ]Verbal  [ ]Non-Verbal  Behavioral:   [ ] Anxiety  [ ] Delirium [ ] Agitation [ ] Other  HEENT:  [ ]Normal   [ ]Dry mouth   [ ]ET Tube/Trach  [ ]Oral lesions  PULMONARY:   [ X]Clear [ ]Tachypnea  [ ]Audible excessive secretions   [ ]Rhonchi        [ ]Right [ ]Left [ ]Bilateral  [ ]Crackles        [ ]Right [ ]Left [ ]Bilateral  [ ]Wheezing     [ ]Right [ ]Left [ ]Bilateral  [ ]Diminished BS [ ]Right [ ]Left [ ]Bilateral    CARDIOVASCULAR:    [ X]Regular [ ]Irregular [ ]Tachy  [ ]Jose Angel [ ]Murmur [ ]Other  GASTROINTESTINAL:  [X ]Soft  [ ]Distended   [x ]+BS  [X ]Non tender [ ]Tender  [ ]Other [ ]PEG [ ]OGT/ NGT   Last BM: 3/27  GENITOURINARY:  [x ]Normal [x ] Incontinent   [ ]Oliguria/Anuria   [ X]Amador  MUSCULOSKELETAL:   [ ]Normal   [ ]Weakness  [X ]Bed/Wheelchair bound [ ]Edema  [x] Rt ankle deformity  NEUROLOGIC:   [X ]No focal deficits  [ ] Cognitive impairment  [ ] Dysphagia [ ]Dysarthria [ ] Paresis [ ]Other   SKIN: see nursing assessment for details  [x ]Normal  [ ]Rash  [x ]Other Rt ankle wound   [ ]Pressure ulcer(s)  [ ]y [ ]n  Present on admission      CRITICAL CARE:  [ ] Shock Present  [ ]Septic [ ]Cardiogenic [ ]Neurologic [ ]Hypovolemic  [ ]  Vasopressors [ ]  Inotropes   [ ] Respiratory failure present [ ] Mechanical Ventilation [ ] Non-invasive ventilatory support [ ] High-Flow  [ ] Acute  [ ] Chronic [ ] Hypoxic  [ ] Hypercarbic [ ] Other  [ ] Other organ failure     LABS: I have reviewed all documentation from prior primary team and consultants, as well as relevant imaging and laboratory data as this patient is new to me.     RADIOLOGY & ADDITIONAL STUDIES:  < from: Xray Ankle Complete 3 Views, Right (03.22.23 @ 06:04) >    ACC: [id number] EXAM:  XR ANKLE COMP MIN 3 VIEWS RT   ORDERED BY: RAMONA OLIVAREZ     PROCEDURE DATE:  03/22/2023          INTERPRETATION:  CLINICAL INFORMATION: Right ankle deformity, metastatic   breast cancer.    EXAM: 3 views of the right ankle    COMPARISON: Ankle x-ray 2/24/2023; ankle CT 2/26/2023.    FINDINGS/  IMPRESSION:  Presumed pathologic lytic fragmentation of the talar dome is again noted,   as seen on CT ankle 2/26/2023. Appearance is similar to the prior.  Preserved joint spaces.  There is soft tissue swelling about the ankle.    --- End of Report ---           SHALONDA VANESSA MD; Resident Radiology  This document has been electronically signed.  TYSON SMITH MD; Attending Radiologist  This document has been electronically signed.Mar 22 2023  9:52AM    < end of copied text >  < from: CT Head No Cont (03.22.23 @ 05:40) >    ACC: [id number] EXAM:  CT BRAIN   ORDERED BY: RAMONA OLIVAREZ     PROCEDURE DATE:  03/22/2023          INTERPRETATION:  CLINICAL INFORMATION: Altered mental status. Metastatic   breast cancer. Brain mass versus intracranial hemorrhage.    TECHNIQUE: Noncontrast axial CT images were acquired through the head.   Two-dimensional sagittal and coronal reformats were generated.    COMPARISON STUDY: CT head 3/21/2023    FINDINGS:    9 mm peripherally hyperdense nodule in the inferior left frontal lobe   withsurrounding vasogenic edema. 8 mm hyperdense nodule in the high   right subcortical parietal lobe with surrounding vasogenic edema. These   are unchanged.    1.3 cm nodule in the sella, unchanged.    No midline shift. The basal cisterns are patent without evidence of   central herniation. No hydrocephalus. No new area of intracranial   hemorrhage.    Extensive sclerotic calvarial metastasis. Unchanged soft tissue mass in   the right occipital scalp.      IMPRESSION:    Redemonstrated hemorrhagic metastatic lesions in the left frontal and   right parietals lobe, and extensive calvarial metastasis, unchanged from   2 days prior.    --- End of Report ---           SHALONDA VANESSA MD; Resident Radiology  This document has been electronically signed.  SERAFIN PRATT MD; Attending Radiologist  This document has been electronically signed. Mar 22 2023  6:32AM    < end of copied text >  < from: Xray Chest 1 View- PORTABLE-Urgent (Xray Chest 1 View- PORTABLE-Urgent .) (03.22.23 @ 06:03) >    ACC: [id number] EXAM:  XR CHEST PORTABLE URGENT 1V   ORDERED BY: FROILAN LEUNG     PROCEDURE DATE:  03/22/2023          INTERPRETATION:  CLINICAL INFORMATION: Shortness of breath.    EXAM: Chest X-ray, AP View    COMPARISON: Chest X-ray from 3/21/2023.    FINDINGS:    The lungs are clear.    Heart size cannot be accurately assessed on this projection.    No acute osseous findings.    IMPRESSION:    Clear lungs.    --- End of Report ---           SHALONDA VANESSA MD; Resident Radiology  This document has been electronically signed.  REBECCA GOLDBERG MD; Attending Radiologist  This document has been electronically signed. Mar 22 2023 12:00PM    < end of copied text >        PROTEIN CALORIE MALNUTRITION: [ ] mild [ ] moderate [ ] severe  [ ] underweight [ ] morbid obesity    https://www.andeal.org/vault/2440/web/files/ONC/Table_Clinical%20Characteristics%20to%20Document%20Malnutrition-White%20JV%20et%20al%202012.pdf    Height (cm): 160 (03-22-23 @ 05:01), 160 (03-21-23 @ 19:09), 160 (02-23-23 @ 14:20)  Weight (kg): 65.8 (03-21-23 @ 19:09), 64.9 (02-23-23 @ 14:20), 66.9 (02-19-23 @ 18:39)  BMI (kg/m2): 25.7 (03-22-23 @ 05:01), 25.7 (03-21-23 @ 19:09), 25.4 (02-23-23 @ 14:20)    [x ] PPSV2 < or = 30% [ ] significant weight loss [ ] poor nutritional intake [ ] anasarca   Artificial Nutrition [ ]     Other REFERRALS:    [ ] Hospice  [ ]Child Life  [x ]Social Work  [ ]Case management [ ]Holistic Therapy [ ] Physical Therapy [ ] Dietary

## 2023-03-30 PROCEDURE — 99232 SBSQ HOSP IP/OBS MODERATE 35: CPT

## 2023-03-30 RX ORDER — MORPHINE SULFATE 50 MG/1
4 CAPSULE, EXTENDED RELEASE ORAL
Refills: 0 | Status: DISCONTINUED | OUTPATIENT
Start: 2023-03-30 | End: 2023-04-01

## 2023-03-30 RX ORDER — MORPHINE SULFATE 50 MG/1
2 CAPSULE, EXTENDED RELEASE ORAL
Refills: 0 | Status: DISCONTINUED | OUTPATIENT
Start: 2023-03-30 | End: 2023-04-01

## 2023-03-30 RX ORDER — MORPHINE SULFATE 50 MG/1
2 CAPSULE, EXTENDED RELEASE ORAL EVERY 4 HOURS
Refills: 0 | Status: DISCONTINUED | OUTPATIENT
Start: 2023-03-30 | End: 2023-04-01

## 2023-03-30 RX ADMIN — MORPHINE SULFATE 2 MILLIGRAM(S): 50 CAPSULE, EXTENDED RELEASE ORAL at 00:19

## 2023-03-30 RX ADMIN — MORPHINE SULFATE 2 MILLIGRAM(S): 50 CAPSULE, EXTENDED RELEASE ORAL at 22:30

## 2023-03-30 RX ADMIN — MORPHINE SULFATE 2 MILLIGRAM(S): 50 CAPSULE, EXTENDED RELEASE ORAL at 09:54

## 2023-03-30 RX ADMIN — LEVETIRACETAM 400 MILLIGRAM(S): 250 TABLET, FILM COATED ORAL at 05:48

## 2023-03-30 RX ADMIN — MORPHINE SULFATE 2 MILLIGRAM(S): 50 CAPSULE, EXTENDED RELEASE ORAL at 06:02

## 2023-03-30 RX ADMIN — MORPHINE SULFATE 2 MILLIGRAM(S): 50 CAPSULE, EXTENDED RELEASE ORAL at 05:48

## 2023-03-30 RX ADMIN — MORPHINE SULFATE 2 MILLIGRAM(S): 50 CAPSULE, EXTENDED RELEASE ORAL at 09:39

## 2023-03-30 RX ADMIN — MORPHINE SULFATE 2 MILLIGRAM(S): 50 CAPSULE, EXTENDED RELEASE ORAL at 22:15

## 2023-03-30 RX ADMIN — Medication 6 MILLIGRAM(S): at 05:48

## 2023-03-30 RX ADMIN — MORPHINE SULFATE 4 MILLIGRAM(S): 50 CAPSULE, EXTENDED RELEASE ORAL at 11:30

## 2023-03-30 RX ADMIN — MORPHINE SULFATE 2 MILLIGRAM(S): 50 CAPSULE, EXTENDED RELEASE ORAL at 17:48

## 2023-03-30 RX ADMIN — CHLORHEXIDINE GLUCONATE 1 APPLICATION(S): 213 SOLUTION TOPICAL at 11:32

## 2023-03-30 RX ADMIN — MORPHINE SULFATE 4 MILLIGRAM(S): 50 CAPSULE, EXTENDED RELEASE ORAL at 11:15

## 2023-03-30 RX ADMIN — Medication 0.5 MILLIGRAM(S): at 10:06

## 2023-03-30 RX ADMIN — MORPHINE SULFATE 2 MILLIGRAM(S): 50 CAPSULE, EXTENDED RELEASE ORAL at 18:03

## 2023-03-30 RX ADMIN — MORPHINE SULFATE 2 MILLIGRAM(S): 50 CAPSULE, EXTENDED RELEASE ORAL at 14:06

## 2023-03-30 RX ADMIN — MORPHINE SULFATE 2 MILLIGRAM(S): 50 CAPSULE, EXTENDED RELEASE ORAL at 00:04

## 2023-03-30 RX ADMIN — MORPHINE SULFATE 2 MILLIGRAM(S): 50 CAPSULE, EXTENDED RELEASE ORAL at 13:51

## 2023-03-30 RX ADMIN — LEVETIRACETAM 400 MILLIGRAM(S): 250 TABLET, FILM COATED ORAL at 17:49

## 2023-03-30 NOTE — PROGRESS NOTE ADULT - SUBJECTIVE AND OBJECTIVE BOX
GAP TEAM PALLIATIVE CARE UNIT PROGRESS NOTE:      [  ] Patient on hospice program.    INDICATION FOR PALLIATIVE CARE UNIT SERVICES/Interval HPI: 57 y/o female with hx of met breast cancer, now with widely metastatic RCC and hemorrhagic lesions to brain.  No longer a candidate for DDT.  Transferred to PCU for management of pain and disposition planning.  Patient accepted to Hospice in [place], pending available bed.      OVERNIGHT EVENTS:  Received morphine x 2 (1 3mg and 1 2mg dose) for severe pain.  States getting inadequate relief, meds adjusted.      DNR on chart: Yes  Yes    Allergies    Cipro (Rash)    Intolerances    MEDICATIONS  (STANDING):  chlorhexidine 2% Cloths 1 Application(s) Topical daily  dexAMETHasone  Injectable 6 milliGRAM(s) IV Push daily  levETIRAcetam  IVPB 500 milliGRAM(s) IV Intermittent every 12 hours  morphine  - Injectable 2 milliGRAM(s) IV Push every 4 hours    MEDICATIONS  (PRN):  acetaminophen  Suppository .. 650 milliGRAM(s) Rectal every 4 hours PRN Temp greater or equal to 38C (100.4F)  bisacodyl Suppository 10 milliGRAM(s) Rectal daily PRN Constipation  LORazepam   Injectable 0.5 milliGRAM(s) IV Push every 1 hour PRN Agitation  morphine  - Injectable 4 milliGRAM(s) IV Push every 1 hour PRN dyspnea  morphine  - Injectable 4 milliGRAM(s) IV Push every 1 hour PRN Severe Pain (7 - 10)  morphine  - Injectable 2 milliGRAM(s) IV Push every 1 hour PRN Moderate Pain (4 - 6)    ITEMS UNCHECKED ARE NOT PRESENT    PRESENT SYMPTOMS: [ ]Unable to self-report see PAINAD, RDOS below  Source if other than patient:  [ ]Family   [ ]Team     Pain: [ ] yes [x ] no  QOL impact -   Location -                    Aggravating factors -  Quality -  Radiation -  Timing-  Severity (0-10 scale):  Minimal acceptable level (0-10 scale):     Dyspnea:                           [ ]Mild [ ]Moderate [ ]Severe  Anxiety:                             [ ]Mild [ ]Moderate [ ]Severe  Fatigue:                             [ ]Mild [ ]Moderate [ ]Severe  Nausea:                             [ ]Mild [ ]Moderate [ ]Severe  Loss of appetite:              [ ]Mild [ ]Moderate [ ]Severe  Constipation:                    [ ]Mild [ ]Moderate [ ]Severe    PCSSQ [Palliative Care Spiritual Screening Question]   Severity (0-10):  Score of 4 or > indicate consideration of Chaplaincy referral.  Chaplaincy Referral: [ ] yes [ ] refused [x ] following [ ] deferred    Caregiver [place]? : [x ] yes [ ] no [ ] deferred:  Social work referral [ ] Patient & Family Centered Care Referral [ ]   Anticipatory Grief present?:  [x ] yes [ ] no [ ] deferred:  Social work referral [ ] Patient & Family Centered Care Referral [ ]  	  Other Symptoms:  [ x]All other review of systems negative     PHYSICAL EXAM:   Vital Signs Last 24 Hrs  T(C): 36.6 (30 Mar 2023 09:01), Max: 36.6 (30 Mar 2023 09:01)  T(F): 97.8 (30 Mar 2023 09:01), Max: 97.8 (30 Mar 2023 09:01)  HR: 61 (30 Mar 2023 09:01) (61 - 61)  BP: 112/68 (30 Mar 2023 09:01) (112/68 - 112/68)  BP(mean): --  RR: 18 (30 Mar 2023 09:01) (18 - 18)  SpO2: 96% (30 Mar 2023 09:01) (96% - 96%)    Parameters below as of 30 Mar 2023 09:01  Patient On (Oxygen Delivery Method): nasal cannula     I&O's Summary    29 Mar 2023 07:01  -  30 Mar 2023 07:00  --------------------------------------------------------  IN: 0 mL / OUT: 250 mL / NET: -250 mL      GENERAL: [ ] Cachexia  [ x]Alert  [x ]Oriented x 2  [ ]Lethargic  [ ]Unarousable  [x ]Verbal  [ ]Non-Verbal  Behavioral:   [ ] Anxiety  [ ] Delirium [ ] Agitation [ ] Other  HEENT:  [x ]Normal   [ ]Dry mouth   [ ]ET Tube/Trach  [ ]Oral lesions  PULMONARY:   [ x]Clear [ ]Tachypnea  [ ]Audible excessive secretions   [ ]Rhonchi        [ ]Right [ ]Left [ ]Bilateral  [ ]Crackles        [ ]Right [ ]Left [ ]Bilateral  [ ]Wheezing     [ ]Right [ ]Left [ ]Bilateral  [ ]Diminished BS [ ]Right [ ]Left [ ]Bilateral    CARDIOVASCULAR:    x[ ]Regular [ ]Irregular [ ]Tachy  [ ]Jose Angel [ ]Murmur [ ]Other  GASTROINTESTINAL:  [ x]Soft  [ ]Distended   [x ]+BS  [x ]Non tender [ ]Tender  [ ]Other [ ]PEG [ ]OGT/ NGT   Last BM:  3/29  GENITOURINARY:  [x ]Normal [ ] Incontinent   [ ]Oliguria/Anuria   [x ][place]  MUSCULOSKELETAL:   [ ]Normal   [ ]Weakness  x[ ]Bed/Wheelchair bound [ ]Edema  NEUROLOGIC:   [ x]No focal deficits  [ ] Cognitive impairment  [ ] Dysphagia [ ]Dysarthria [ ] Paresis [ ]Other   SKIN:   [ ]Normal  [ ]Rash  [x ]Other tumor occipital region  [ ]Pressure ulcer(s)  [ ]y [ ]n  Present on admission      CRITICAL CARE:  [ ] Shock Present  [ ]Septic [ ]Cardiogenic [ ]Neurologic [ ]Hypovolemic  [ ]  Vasopressors [ ]  Inotropes   [ ] Respiratory failure present [ ] Mechanical Ventilation [ ] Non-invasive ventilatory support [ ] High-Flow  [ ] Acute  [ ] Chronic [ ] Hypoxic  [ ] Hypercarbic [ ] Other  [ ] Other organ failure     LABS: None new.             RADIOLOGY & ADDITIONAL STUDIES: None new.     PROTEIN CALORIE MALNUTRITION: [ ] mild [ ] moderate [ ] severe  [ ] underweight [ ] morbid obesity    https://www.andeal.org/vault/2440/web/files/ONC/Table_Clinical%20Characteristics%20to%20Document%20Malnutrition-White%20JV%20et%20al%202012.pdf    Height (cm): 160 (03-22-23 @ 05:01), 160 (03-21-23 @ 19:09), 160 (02-23-23 @ 14:20)  Weight (kg): 65.8 (03-21-23 @ 19:09), 64.9 (02-23-23 @ 14:20), 66.9 (02-19-23 @ 18:39)  BMI (kg/m2): 25.7 (03-22-23 @ 05:01), 25.7 (03-21-23 @ 19:09), 25.4 (02-23-23 @ 14:20)    [ ] PPSV2 < or = 30% [ ] significant weight loss [ ] poor nutritional intake [ ] anasarca   Artificial Nutrition [ ]     Other REFERRALS:    [x ] Hospice  [ ]Child Life  [ ]Social Work  [ ]Case management [ ]Holistic Therapy [ ] Physical Therapy [ ] Dietary     Progress Notes - Care Coordination [C. Provider] (03-30-23 @ 15:35)      Palliative Performance Scale:  http://npcrc.org/files/news/palliative_performance_scale_ppsv2.pdf  (Ctrl +  left click to view)  Respiratory Distress Observation Tool:  https://homecareinformation.net/handouts/hen/Respiratory_Distress_Observation_Scale.pdf (Ctrl +  left click to view)  PAINAD Score:  http://geriatrictoolkit.[place].[facility]/cog/painad.pdf (Ctrl +  left click to view)

## 2023-03-31 PROCEDURE — 99232 SBSQ HOSP IP/OBS MODERATE 35: CPT

## 2023-03-31 RX ADMIN — CHLORHEXIDINE GLUCONATE 1 APPLICATION(S): 213 SOLUTION TOPICAL at 17:34

## 2023-03-31 RX ADMIN — MORPHINE SULFATE 2 MILLIGRAM(S): 50 CAPSULE, EXTENDED RELEASE ORAL at 06:07

## 2023-03-31 RX ADMIN — MORPHINE SULFATE 2 MILLIGRAM(S): 50 CAPSULE, EXTENDED RELEASE ORAL at 17:49

## 2023-03-31 RX ADMIN — LEVETIRACETAM 400 MILLIGRAM(S): 250 TABLET, FILM COATED ORAL at 05:51

## 2023-03-31 RX ADMIN — MORPHINE SULFATE 2 MILLIGRAM(S): 50 CAPSULE, EXTENDED RELEASE ORAL at 05:52

## 2023-03-31 RX ADMIN — LEVETIRACETAM 400 MILLIGRAM(S): 250 TABLET, FILM COATED ORAL at 17:34

## 2023-03-31 RX ADMIN — MORPHINE SULFATE 2 MILLIGRAM(S): 50 CAPSULE, EXTENDED RELEASE ORAL at 02:09

## 2023-03-31 RX ADMIN — MORPHINE SULFATE 2 MILLIGRAM(S): 50 CAPSULE, EXTENDED RELEASE ORAL at 13:55

## 2023-03-31 RX ADMIN — MORPHINE SULFATE 2 MILLIGRAM(S): 50 CAPSULE, EXTENDED RELEASE ORAL at 22:08

## 2023-03-31 RX ADMIN — MORPHINE SULFATE 4 MILLIGRAM(S): 50 CAPSULE, EXTENDED RELEASE ORAL at 10:57

## 2023-03-31 RX ADMIN — MORPHINE SULFATE 2 MILLIGRAM(S): 50 CAPSULE, EXTENDED RELEASE ORAL at 09:15

## 2023-03-31 RX ADMIN — MORPHINE SULFATE 2 MILLIGRAM(S): 50 CAPSULE, EXTENDED RELEASE ORAL at 21:53

## 2023-03-31 RX ADMIN — MORPHINE SULFATE 4 MILLIGRAM(S): 50 CAPSULE, EXTENDED RELEASE ORAL at 10:42

## 2023-03-31 RX ADMIN — MORPHINE SULFATE 2 MILLIGRAM(S): 50 CAPSULE, EXTENDED RELEASE ORAL at 01:54

## 2023-03-31 RX ADMIN — MORPHINE SULFATE 2 MILLIGRAM(S): 50 CAPSULE, EXTENDED RELEASE ORAL at 17:33

## 2023-03-31 RX ADMIN — MORPHINE SULFATE 2 MILLIGRAM(S): 50 CAPSULE, EXTENDED RELEASE ORAL at 13:40

## 2023-03-31 RX ADMIN — MORPHINE SULFATE 2 MILLIGRAM(S): 50 CAPSULE, EXTENDED RELEASE ORAL at 09:00

## 2023-03-31 RX ADMIN — Medication 6 MILLIGRAM(S): at 05:51

## 2023-03-31 NOTE — PROGRESS NOTE ADULT - PROBLEM SELECTOR PROBLEM 5
Functional quadriplegia
Swelling of right foot
Palliative care encounter
Metastatic breast cancer
Swelling of right foot
Metastatic breast cancer
Functional quadriplegia
Swelling of right foot
Swelling of right foot
Metastatic breast cancer
Principal Discharge DX:	Sore throat  Assessment and plan of treatment:	1) Follow up with the  for further evaluation and to answer any questions you have.    2) Return to the emergency department if you experience worsening symptoms, pain, fever, chills, nausea, vomiting or other concerning symptoms.

## 2023-03-31 NOTE — CHART NOTE - NSCHARTNOTEFT_GEN_A_CORE
Patient's repeat CT head was stable this AM. Not able to contact family. Given patient is DNR/DNI and has stable imaging, no further role of neurosurgical intervention. Recommend continued care per medicine team.     Neurosurgery   [id number]
Patient was fit and delivered a pneumatic walking boot CAM type with protective type socks for skin protection and hygiene. The CAM boot will stabilize and control the right lower extremity, and safely protect the injury site. Marissa was unresponsive at the time of fitting. Contact info was left bedside. All went without incident.   [facility]  [phone]
Pt admitted to hospice care for continued medical care per family request.   Medical team will place orders and assist with transition.

## 2023-03-31 NOTE — PROGRESS NOTE ADULT - PROBLEM SELECTOR PLAN 3
#Acute on chronic hypoxic respiratory failure   Given increase in oxygen requirement. Will c/w steroids and Remdesivir (D1: 3/22)  -wean oxygen as tolerated  -f/u infectious w/u - UCx, BCx  - elevated d-dimer noted likely in setting of COVID infection; will hold off on imaging for possible DVT/PE since not able to receive AC in setting of hemorrhagic metastatic lesions and if possibly for hospice
#Acute on chronic hypoxic respiratory failure   Given increase in oxygen requirement. continue with steroids   -wean oxygen as tolerated  - UCx and blood cx neg to date   - elevated d-dimer noted likely in setting of COVID infection; will hold off on imaging for possible DVT/PE since not able to receive AC in setting of hemorrhagic metastatic lesions and for hospice; already has IVC in place; d-dimer noted to slightly downtrend 3/24
#Acute on chronic hypoxic respiratory failure   Given increase in oxygen requirement. Will c/w steroids and Remdesivir (D1: 3/22)  -wean oxygen as tolerated  -f/u infectious w/u - UCx and blood cx neg to date   - elevated d-dimer noted likely in setting of COVID infection; will hold off on imaging for possible DVT/PE since not able to receive AC in setting of hemorrhagic metastatic lesions and if possibly for hospice; already has IVC in place; d-dimer noted to slightly downtrend this morning
-last BM 3/29  -hx chronic constipation  -continue to monitor as on opiates  -bowel regimen ordered
-last BM 3/29  -hx chronic constipation  -continue to monitor as on opiates  -bowel regimen ordered
resolved, pt A&Ox3 today  CTH with hemorrhagic mets lesions
-last BM 3/27  -got 1 bisacodyl suppository this morning.   -pt reports chronic constipation
HER2 NEG ER+ SD +  S/P L modified radical mastectomy with reconstruction , Tamoxifen x 5 years   Mets RCC  Hemorrhagic brain lesions   S/P radical left nephrectomy  Lymph node dissection  Splenic laceration / bleeding.
#Acute on chronic hypoxic respiratory failure   Given increase in oxygen requirement. Will c/w steroids and Remdesivir (D1: 3/22)  -wean oxygen as tolerated  -f/u infectious w/u - UCx and blood cx neg to date   - elevated d-dimer noted likely in setting of COVID infection; will hold off on imaging for possible DVT/PE since not able to receive AC in setting of hemorrhagic metastatic lesions and if possibly for hospice; already has IVC in place; d-dimer noted to slightly downtrend 3/24
pt A&Ox3 today, will continue to monitor   CTH with hemorrhagic mets lesions  Now with Covid

## 2023-03-31 NOTE — PROGRESS NOTE ADULT - PROBLEM SELECTOR PLAN 2
#Metastatic Breast Ca  #Metastatic RCC  #Hemorrhagic brain metastatic lesions     Stage IIB left Breast CA, ER+, [place]+, HER2 negative, s/p L modified radical mastectomy with reconstruction. S/p tamoxifen x 5 years. Mets to bone and brain mets seen on 11/22 s/p FRST to brain tumor 3000cGy in 1/2023.   Dx w/ clear cell RCC in 6/2018 and underwent radical left nephrectomy and para-aortic lymph node dissection c/b splenic laceration and bleeding. On 2/21/2023- Scalp lesion biopsy identified metastatic RCC     CTH 3/22: Redemonstrated hemorrhagic metastatic lesions in the left frontal and right parietals lobe, and extensive calvarial metastasis    -Heme-onc following  - Rad Onc recs appreciated; recommend hospice and palliative eval    -Continue Keppra 500mg BID   -MRI Brain w/wo contrast pending   -Neurosurgery following; Repeat CTH unchanged; given stable imaging, no further role of neurosurgical intervention
-s/p covid, on NC, not complaining of sob  -c/w 2mg Morphine IV q1hr prn (used 1 dose in last 24hrs)
-on NC, not complaining of sob  -c/w 2mg Morphine IV q1hr prn (used 2 dose in last 24hrs)
-s/p covid, on NC, not complaining of sob  -c/w 2mg Morphine IV q1hr prn (used 0 dose in last 24hrs)
#Metastatic Breast Ca  #Metastatic RCC  #Hemorrhagic brain metastatic lesions     Stage IIB left Breast CA, ER+, [place]+, HER2 negative, s/p L modified radical mastectomy with reconstruction. S/p tamoxifen x 5 years. Mets to bone and brain mets seen on 11/22 s/p FRST to brain tumor 3000cGy in 1/2023.   Dx w/ clear cell RCC in 6/2018 and underwent radical left nephrectomy and para-aortic lymph node dissection c/b splenic laceration and bleeding. On 2/21/2023- Scalp lesion biopsy identified metastatic RCC     CTH 3/22: Redemonstrated hemorrhagic metastatic lesions in the left frontal and right parietals lobe, and extensive calvarial metastasis    - Rad Onc recs appreciated; recommend hospice and palliative eval    -Continue Keppra 500mg BID   -Neurosurgery following; Repeat CTH unchanged; given stable imaging, no further role of neurosurgical intervention
pt A&Ox3 today  CTH with hemorrhagic mets lesions  Now with Covid.
#Metastatic Breast Ca  #Metastatic RCC  #Hemorrhagic brain metastatic lesions     Stage IIB left Breast CA, ER+, [place]+, HER2 negative, s/p L modified radical mastectomy with reconstruction. S/p tamoxifen x 5 years. Mets to bone and brain mets seen on 11/22 s/p FRST to brain tumor 3000cGy in 1/2023.   Dx w/ clear cell RCC in 6/2018 and underwent radical left nephrectomy and para-aortic lymph node dissection c/b splenic laceration and bleeding. On 2/21/2023- Scalp lesion biopsy identified metastatic RCC     CTH 3/22: Redemonstrated hemorrhagic metastatic lesions in the left frontal and right parietals lobe, and extensive calvarial metastasis    -Heme-onc following  - Rad Onc recs appreciated; recommend hospice and palliative eval    -Continue Keppra 500mg BID   -MRI Brain w/wo contrast pending   -Neurosurgery following; Repeat CTH unchanged; given stable imaging, no further role of neurosurgical intervention
-s/p covid, on NC, not complaining of sob  -c/w 2mg Morphine IV q1hr prn (used 0 dose in last 24hrs)  DC decadron tomorrow
#Metastatic Breast Ca  #Metastatic RCC  #Hemorrhagic brain metastatic lesions     Stage IIB left Breast CA, ER+, [place]+, HER2 negative, s/p L modified radical mastectomy with reconstruction. S/p tamoxifen x 5 years. Mets to bone and brain mets seen on 11/22 s/p FRST to brain tumor 3000cGy in 1/2023.   Dx w/ clear cell RCC in 6/2018 and underwent radical left nephrectomy and para-aortic lymph node dissection c/b splenic laceration and bleeding. On 2/21/2023- Scalp lesion biopsy identified metastatic RCC     CTH 3/22: Redemonstrated hemorrhagic metastatic lesions in the left frontal and right parietals lobe, and extensive calvarial metastasis    -Heme-onc following  - Rad Onc recs appreciated; recommend hospice and palliative eval    -Continue Keppra 500mg BID   -MRI Brain w/wo contrast pending   -Neurosurgery following; Repeat CTH unchanged; given stable imaging, no further role of neurosurgical intervention
-on NC, not complaining of sob  -c/w 2mg Morphine IV q1hr prn (used no doses in last 24hrs)

## 2023-03-31 NOTE — PROGRESS NOTE ADULT - SUBJECTIVE AND OBJECTIVE BOX
GAP TEAM PALLIATIVE CARE UNIT PROGRESS NOTE:      [  ] Patient on hospice program.    INDICATION FOR PALLIATIVE CARE UNIT SERVICES/Interval HPI: 59 y/o female with hx of met breast cancer, now with widely metastatic RCC and hemorrhagic lesions to brain.  No longer a candidate for DDT.  Transferred to PCU for management of pain and disposition planning.  Patient accepted to Hospice in [place], bed to be available tomorrow morning.  Son informed.     OVERNIGHT EVENTS:  Received morphine x 0 for severe pain.  Pain improved with RTC dosing at higher dose.    DNR on chart: Yes  Yes    Allergies    Cipro (Rash)    Intolerances    MEDICATIONS  (STANDING):  chlorhexidine 2% Cloths 1 Application(s) Topical daily  dexAMETHasone  Injectable 6 milliGRAM(s) IV Push daily  levETIRAcetam  IVPB 500 milliGRAM(s) IV Intermittent every 12 hours  morphine  - Injectable 2 milliGRAM(s) IV Push every 4 hours    MEDICATIONS  (PRN):  acetaminophen  Suppository .. 650 milliGRAM(s) Rectal every 4 hours PRN Temp greater or equal to 38C (100.4F)  bisacodyl Suppository 10 milliGRAM(s) Rectal daily PRN Constipation  LORazepam   Injectable 0.5 milliGRAM(s) IV Push every 1 hour PRN Agitation  morphine  - Injectable 4 milliGRAM(s) IV Push every 1 hour PRN dyspnea  morphine  - Injectable 4 milliGRAM(s) IV Push every 1 hour PRN Severe Pain (7 - 10)  morphine  - Injectable 2 milliGRAM(s) IV Push every 1 hour PRN Moderate Pain (4 - 6)    ITEMS UNCHECKED ARE NOT PRESENT    PRESENT SYMPTOMS: [ ]Unable to self-report see PAINAD, RDOS below  Source if other than patient:  [ ]Family   [ ]Team     Pain: [ ] yes [x ] no  QOL impact -   Location -                    Aggravating factors -  Quality -  Radiation -  Timing-  Severity (0-10 scale):  Minimal acceptable level (0-10 scale):     Dyspnea:                           [ ]Mild [ ]Moderate [ ]Severe  Anxiety:                             [ ]Mild [ ]Moderate [ ]Severe  Fatigue:                             [ ]Mild [ ]Moderate [ ]Severe  Nausea:                             [ ]Mild [ ]Moderate [ ]Severe  Loss of appetite:              [ ]Mild [ ]Moderate [ ]Severe  Constipation:                    [ ]Mild [ ]Moderate [ ]Severe    PCSSQ [Palliative Care Spiritual Screening Question]   Severity (0-10):  Score of 4 or > indicate consideration of Chaplaincy referral.  Chaplaincy Referral: [ ] yes [ ] refused [x ] following [ ] deferred    Caregiver [place]? : [x ] yes [ ] no [ ] deferred:  Social work referral [ ] Patient & Family Centered Care Referral [ ]   Anticipatory Grief present?:  [x ] yes [ ] no [ ] deferred:  Social work referral [ ] Patient & Family Centered Care Referral [ ]  	  Other Symptoms:  [ x]All other review of systems negative     PHYSICAL EXAM:   Vital Signs Last 24 Hrs  T(C): 36.3 (31 Mar 2023 10:58), Max: 36.3 (31 Mar 2023 10:58)  T(F): 97.4 (31 Mar 2023 10:58), Max: 97.4 (31 Mar 2023 10:58)  HR: 60 (31 Mar 2023 10:58) (60 - 60)  BP: 115/72 (31 Mar 2023 10:58) (115/72 - 115/72)  BP(mean): --  RR: 18 (31 Mar 2023 10:58) (18 - 18)  SpO2: 95% (31 Mar 2023 10:58) (95% - 95%)    Parameters below as of 31 Mar 2023 10:58  Patient On (Oxygen Delivery Method): nasal cannula        GENERAL: [ ] Cachexia  [ x]Alert  [x ]Oriented x 2  [ ]Lethargic  [ ]Unarousable  [x ]Verbal  [ ]Non-Verbal  Behavioral:   [ ] Anxiety  [ ] Delirium [ ] Agitation [ ] Other  HEENT:  [x ]Normal   [ ]Dry mouth   [ ]ET Tube/Trach  [ ]Oral lesions  PULMONARY:   [ x]Clear [ ]Tachypnea  [ ]Audible excessive secretions   [ ]Rhonchi        [ ]Right [ ]Left [ ]Bilateral  [ ]Crackles        [ ]Right [ ]Left [ ]Bilateral  [ ]Wheezing     [ ]Right [ ]Left [ ]Bilateral  [ ]Diminished BS [ ]Right [ ]Left [ ]Bilateral    CARDIOVASCULAR:    x[ ]Regular [ ]Irregular [ ]Tachy  [ ]Jose Angel [ ]Murmur [ ]Other  GASTROINTESTINAL:  [ x]Soft  [ ]Distended   [x ]+BS  [x ]Non tender [ ]Tender  [ ]Other [ ]PEG [ ]OGT/ NGT   Last BM:  3/29  GENITOURINARY:  [x ]Normal [ ] Incontinent   [ ]Oliguria/Anuria   [x ][place]  MUSCULOSKELETAL:   [ ]Normal   [ ]Weakness  x[ ]Bed/Wheelchair bound [ ]Edema  NEUROLOGIC:   [ x]No focal deficits  [ ] Cognitive impairment  [ ] Dysphagia [ ]Dysarthria [ ] Paresis [ ]Other   SKIN:   [ ]Normal  [ ]Rash  [x ]Other tumor occipital region  [ ]Pressure ulcer(s)  [ ]y [ ]n  Present on admission      CRITICAL CARE:  [ ] Shock Present  [ ]Septic [ ]Cardiogenic [ ]Neurologic [ ]Hypovolemic  [ ]  Vasopressors [ ]  Inotropes   [ ] Respiratory failure present [ ] Mechanical Ventilation [ ] Non-invasive ventilatory support [ ] High-Flow  [ ] Acute  [ ] Chronic [ ] Hypoxic  [ ] Hypercarbic [ ] Other  [ ] Other organ failure     LABS: None new.             RADIOLOGY & ADDITIONAL STUDIES: None new.     PROTEIN CALORIE MALNUTRITION: [ ] mild [ ] moderate [ ] severe  [ ] underweight [ ] morbid obesity    https://www.andeal.org/vault/2440/web/files/ONC/Table_Clinical%20Characteristics%20to%20Document%20Malnutrition-White%20JV%20et%20al%186088.pdf    Height (cm): 160 (03-22-23 @ 05:01), 160 (03-21-23 @ 19:09), 160 (02-23-23 @ 14:20)  Weight (kg): 65.8 (03-21-23 @ 19:09), 64.9 (02-23-23 @ 14:20), 66.9 (02-19-23 @ 18:39)  BMI (kg/m2): 25.7 (03-22-23 @ 05:01), 25.7 (03-21-23 @ 19:09), 25.4 (02-23-23 @ 14:20)    [ ] PPSV2 < or = 30% [ ] significant weight loss [ ] poor nutritional intake [ ] anasarca   Artificial Nutrition [ ]     Other REFERRALS:    [x ] Hospice  [ ]Child Life  [ ]Social Work  [ ]Case management [ ]Holistic Therapy [ ] Physical Therapy [ ] Dietary     Progress Notes - Care Coordination [C. Provider] (03-30-23 @ 15:35)      Palliative Performance Scale:  http://npcrc.org/files/news/palliative_performance_scale_ppsv2.pdf  (Ctrl +  left click to view)  Respiratory Distress Observation Tool:  https://homecareinformation.net/handouts/hen/Respiratory_Distress_Observation_Scale.pdf (Ctrl +  left click to view)  PAINAD Score:  http://geriatrictoolkit.[email or web address]/cog/painad.pdf (Ctrl +  left click to view)

## 2023-03-31 NOTE — PROGRESS NOTE ADULT - PROBLEM SELECTOR PLAN 7
- C/w current pain regimen.  -pt needs to be in isolation for covid until 3/31  - Dispo Plan: pt accepted to  [facility] in hospice facility in  [place] but no bed is available. Sw will inform team when bed opens up.
Accepted to hospice pending available bed at [facility] in [place].    In the setting of parenteral controlled substance administration, clinical monitoring required for side effects such as respiratory depression, constipation and opioid induced neurotoxicity.  This patient is at [ x]high [ ] medium [ ] low risk due to functional quadriplegia.      [ ] The patient is receiving IV and has required  escalation for uncontrolled symptoms.  [ ] To taper and monitor for emergence of symptoms.  [ x] Symptoms controlled with present regimen.     Patient assessment and plan discussed on interdisciplinary team rounds today.
Accepted to hospice pending available bed at [facility] in [place].    In the setting of parenteral controlled substance administration, clinical monitoring required for side effects such as respiratory depression, constipation and opioid induced neurotoxicity.  This patient is at [ x]high [ ] medium [ ] low risk due to functional quadriplegia.      [x ] The patient is receiving IV and has required  escalation for uncontrolled symptoms.  [ ] To taper and monitor for emergence of symptoms.  [ ] Symptoms controlled with present regimen.     Patient assessment and plan discussed on interdisciplinary team rounds today.

## 2023-03-31 NOTE — PROGRESS NOTE ADULT - NUTRITIONAL ASSESSMENT
This patient has been assessed with a concern for Malnutrition and has been determined to have a diagnosis/diagnoses of Severe protein-calorie malnutrition.    This patient is being managed with:   Diet Regular-  Entered: Mar 28 2023 10:45PM  
This patient has been assessed with a concern for Malnutrition and has been determined to have a diagnosis/diagnoses of Severe protein-calorie malnutrition.    This patient is being managed with:   Diet Regular-  Entered: Mar 28 2023 10:45PM

## 2023-03-31 NOTE — PROGRESS NOTE ADULT - PROBLEM SELECTOR PROBLEM 2
Dyspnea
Altered mental status
Metastatic breast cancer
Metastatic breast cancer
Dyspnea
Metastatic breast cancer
Dyspnea
Dyspnea
Metastatic breast cancer
Dyspnea

## 2023-03-31 NOTE — PROGRESS NOTE ADULT - PROBLEM SELECTOR PLAN 5
PPSv 30-40%
- C/w current pain regimen.  - Will f/w tomorrow with SW and pt's son regarding dispo planning.
XRAY: Presumed pathologic lytic fragmentation of the talar dome is again noted, as seen on CT ankle 2/26/2023. Appearance is similar to the prior. Preserved joint spaces. There is soft tissue swelling about the ankle.  IR guided R ankle biopsy on 3/2/23 positive for metastatic RCC.      -D/w ortho - rec PWB in a CAM boot (ordered) and outpatient f/u
XRAY: Presumed pathologic lytic fragmentation of the talar dome is again noted, as seen on CT ankle 2/26/2023. Appearance is similar to the prior. Preserved joint spaces. There is soft tissue swelling about the ankle.  IR guided R ankle biopsy on 3/2/23 positive for metastatic RCC.      -D/w ortho - rec PWB in a CAM boot (placed) and outpatient f/u
XRAY: Presumed pathologic lytic fragmentation of the talar dome is again noted, as seen on CT ankle 2/26/2023. Appearance is similar to the prior. Preserved joint spaces. There is soft tissue swelling about the ankle.  IR guided R ankle biopsy on 3/2/23 positive for metastatic RCC.      -D/w ortho - rec PWB in a CAM boot (placed) and outpatient f/u
HER2 NEG ER+ [place] +  S/P L modified radical mastectomy with reconstruction , Tamoxifen x 5 years   Mets RCC  Hemorrhagic brain lesions   S/P radical left nephrectomy  Lymph node dissection  Splenic laceration / bleeding.
HER2 NEG ER+ [place] +  S/P L modified radical mastectomy with reconstruction , Tamoxifen x 5 years   Mets RCC  Hemorrhagic brain lesions   S/P radical left nephrectomy  Lymph node dissection  No further DDT
XRAY: Presumed pathologic lytic fragmentation of the talar dome is again noted, as seen on CT ankle 2/26/2023. Appearance is similar to the prior. Preserved joint spaces. There is soft tissue swelling about the ankle.  IR guided R ankle biopsy on 3/2/23 positive for metastatic RCC.      -D/w ortho - rec PWB in a CAM boot (placed) and outpatient f/u
PPSv 30  -requires assistance with ADLs
HER2 NEG ER+ [place] +  S/P L modified radical mastectomy with reconstruction , Tamoxifen x 5 years   Mets RCC  Hemorrhagic brain lesions   S/P radical left nephrectomy  Lymph node dissection  No further DDT

## 2023-03-31 NOTE — PROGRESS NOTE ADULT - PROBLEM/PLAN-1
DISPLAY PLAN FREE TEXT
decreased strength

## 2023-03-31 NOTE — PROGRESS NOTE ADULT - PROBLEM SELECTOR PROBLEM 6
Palliative care encounter
Prophylactic measure
Prophylactic measure
Palliative care encounter
Functional quadriplegia
Prophylactic measure
Prophylactic measure
Functional quadriplegia
Functional quadriplegia

## 2023-03-31 NOTE — PROGRESS NOTE ADULT - PROBLEM SELECTOR PROBLEM 1
Neoplasm related pain
Neoplasm related pain
Altered mental status
Neoplasm related pain
Neoplasm related pain
Altered mental status
Neoplasm related pain
Neoplasm related pain

## 2023-03-31 NOTE — PROGRESS NOTE ADULT - PROBLEM SELECTOR PLAN 1
-Pt has uncontrolled pain due to ankle deformity 2/2 cancer mets  -start ATC morphine 2mg IV q6hr  -on 1-2 mg IV q2h PRN mod-severe pain (used 2 PRNs of severe pain 2mg morphine  in last 24hrs)  --will increase Prn  Morphine to 2-3mg q1hr for mod- severe pain   -on dexamethasone  - Bowel regimen while on opioids
Per chart review, patient A&Ox2-3, conversant but bedbound. On admission A&Ox0, unable to participate in any meaningful conversation, only intermittently following commands.   mental status appears to be improving   -CTH: hemorrhagic metastatic lesions - see plan below   -Infectious w/u: blood and urine cx neg   - COVID + (see covid section)
-Pt has uncontrolled pain due to ankle deformity 2/2 cancer mets  - ATC morphine 2mg IV q4hr  -on 1-2 mg IV q2h PRN mod-severe pain (used 0 PRNs in last 24hrs)      - Bowel regimen while on opioids
- C/w Morphine 1-2 mg IV q2h PRN mod-severe pain (used 0 PRNs in last 24hrs)  - Bowel regimen while on opioids
-Pt has uncontrolled pain due to ankle deformity 2/2 cancer mets  -start ATC morphine 2mg IV q6hr  -on 1-2 mg IV q2h PRN mod-severe pain (used 2 PRNs of severe pain 2mg  and 3mg morphine  in last 24hrs)  --will increase Prn  Morphine to 2-4mg q1hr for mod- severe pain   -on dexamethasone  - Bowel regimen while on opioids
Per chart review, patient A&Ox2-3, conversant but bedbound. On admission A&Ox0, unable to participate in any meaningful conversation, only intermittently following commands.   mental status appears to be improving   -CTH: hemorrhagic metastatic lesions - see plan below   -Infectious w/u: blood and urine cx neg   - COVID + (see covid section)
Pt notes difficulty with swallowing pills  - C/w Morphine 1-2 mg IV q2h PRN mod-severe pain  - Bowel regimen while on opioids
Per chart review, patient A&Ox2-3, conversant but bedbound. On admission A&Ox0, unable to participate in any meaningful conversation, only intermittently following commands.   mental status appears to be improving   -CTH: hemorrhagic metastatic lesions - see plan below   -Infectious w/u: blood and urine cx neg   - COVID + (see covid section)
- C/w Morphine 1-2 mg IV q2h PRN mod-severe pain (used 1 dose for severe pain and 1 dose for moderate pain in last 24hrs)  - Bowel regimen while on opioids
Per chart review, patient A&Ox2-3, conversant but bedbound. On admission A&Ox0, unable to participate in any meaningful conversation, only intermittently following commands.   currently appears to be AAO x1-2   -CTH: hemorrhagic metastatic lesions - see plan below   -Infectious w/u: f/u UCx, BCx, COVID + (see covid section)

## 2023-03-31 NOTE — PROGRESS NOTE ADULT - TIME BILLING
Total time spent in care of patient and family as noted below:    [ ] Preparation to see the patient  [ ] Obtained and/or reviewed separately obtained history  [ x] Performed a medically appropriate examination and/or evaluation  [ ] Counseled and educated the patient/family/caregiver  [ ] Ordered medications, tests, or procedures  [x ] Referred and communicated with other health care professionals  [x ] Documentation of clinical information in the electronic or other health care record  [ ] Independently interpreted results and communicated results to patient/family/caregiver  [x ] Care coordination  with [facility] in [place] for DC tomorrow
Total time spent in care of patient and family as noted below:    [x ] Preparation to see the patient  [ ] Obtained and/or reviewed separately obtained history  [x ] Performed a medically appropriate examination and/or evaluation  [ x] Counseled and educated the patient/family/caregiver  [ x] Ordered medications, tests, or procedures  x[ ] Referred and communicated with other health care professionals  [x ] Documentation of clinical information in the electronic or other health care record  [ ] Independently interpreted results and communicated results to patient/family/caregiver  [ x] Care coordination
SSKI Counseling:  I discussed with the patient the risks of SSKI including but not limited to thyroid abnormalities, metallic taste, GI upset, fever, headache, acne, arthralgias, paraesthesias, lymphadenopathy, easy bleeding, arrhythmias, and allergic reaction.

## 2023-03-31 NOTE — PROGRESS NOTE ADULT - ASSESSMENT
68 yo F PMHx of COPD on 2L NC, metastatic clear cell RCC, metastatic breast CA, hx bilateral DVT s/p IVC filter off AC due to hx of ICH, who presents for AMS with CTH notable for hemorrhagic metastatic lesions   .  Palliative care consulted for goals of care.  
68 yo F PMHx of COPD on 2L NC, metastatic clear cell RCC, metastatic breast CA, hx bilateral DVT s/p IVC filter off AC due to hx of ICH, who presents for AMS with CTH notable for hemorrhagic metastatic lesions.  Patient Covid + off isolation.  Patient with significant pain to right ankle due to metastatic lesion currently under control on present regimen.  
66 yo F PMHx of COPD on 2L NC, metastatic clear cell RCC, metastatic breast CA, hx bilateral DVT s/p IVC filter off AC due to hx of ICH, who presents for AMS with CTH notable for hemorrhagic metastatic lesions   .  Palliative care consulted for goals of care.  
68 yo F PMHx of COPD on 2L NC, metastatic clear cell RCC, metastatic breast CA, hx bilateral DVT s/p IVC filter off AC due to hx of ICH, who presents for AMS with CTH notable for hemorrhagic metastatic lesions.  Patient Covid + off isolation tomorrow.  Patient with significant pain to right ankle due to metastatic lesion.  
66 yo F PMHx of COPD on 2L NC, metastatic clear cell RCC, metastatic breast CA, hx bilateral DVT s/p IVC filter off AC due to hx of ICH, who presents for AMS with CTH notable for hemorrhagic metastatic lesions 
68 yo F PMHx of COPD on 2L NC, metastatic clear cell RCC, metastatic breast CA, hx bilateral DVT s/p IVC filter off AC due to hx of ICH, who presents for AMS with CTH notable for hemorrhagic metastatic lesions 
68 yo F PMHx of COPD on 2L NC, metastatic clear cell RCC, metastatic breast CA, hx bilateral DVT s/p IVC filter off AC due to hx of ICH, who presents for AMS with CTH notable for hemorrhagic metastatic lesions   .  Palliative care consulted for goals of care.  
66 yo F PMHx of COPD on 2L NC, metastatic clear cell RCC, metastatic breast CA, hx bilateral DVT s/p IVC filter off AC due to hx of ICH, who presents for AMS with CTH notable for hemorrhagic metastatic lesions 
68 yo F PMHx of COPD on 2L NC, metastatic clear cell RCC, metastatic breast CA, hx bilateral DVT s/p IVC filter off AC due to hx of ICH, who presents for AMS with CTH notable for hemorrhagic metastatic lesions   .  Palliative care consulted for goals of care.  
68 yo F PMHx of COPD on 2L NC, metastatic clear cell RCC, metastatic breast CA, hx bilateral DVT s/p IVC filter off AC due to hx of ICH, who presents for AMS with CTH notable for hemorrhagic metastatic lesions

## 2023-03-31 NOTE — PROGRESS NOTE ADULT - PROBLEM SELECTOR PROBLEM 3
Altered mental status
Metastatic breast cancer
Constipation
COVID
COVID
Constipation
Altered mental status
Constipation
COVID
COVID

## 2023-03-31 NOTE — PROGRESS NOTE ADULT - PROBLEM SELECTOR PLAN 4
PPSv 30-40%
On 2L NC at baseline per facility report.  Wean as tolerated
Improved, decadron to be dc'd am
On 2L NC at baseline per facility report. Currently on 4L NC satting low 90s. Wean as tolerated; c/w carmina
Improved, decadron to be dc'd am
HER2 NEG ER+ [place] +  S/P L modified radical mastectomy with reconstruction , Tamoxifen x 5 years   Mets RCC  Hemorrhagic brain lesions   S/P radical left nephrectomy  Lymph node dissection  Splenic laceration / bleeding.
resolved, pt A&Ox3 today  CTH with hemorrhagic mets lesions
On 2L NC at baseline per facility report. Currently on 4L NC satting low 90s. Wean as tolerated; c/w carmina
On 2L NC at baseline per facility report. Currently on 4L NC satting low 90s. Wean as tolerated; c/w carmina
HER2 NEG ER+ [place] +  S/P L modified radical mastectomy with reconstruction , Tamoxifen x 5 years   Mets RCC  Hemorrhagic brain lesions   S/P radical left nephrectomy  Lymph node dissection  Splenic laceration / bleeding.

## 2023-03-31 NOTE — PROGRESS NOTE ADULT - PROBLEM SELECTOR PROBLEM 4
Metastatic breast cancer
Functional quadriplegia
Altered mental status
COPD, moderate
Metastatic breast cancer
COPD, moderate
Altered mental status
Altered mental status

## 2023-03-31 NOTE — PROGRESS NOTE ADULT - PROVIDER SPECIALTY LIST ADULT
Hospitalist
Palliative Care

## 2023-04-01 ENCOUNTER — TRANSCRIPTION ENCOUNTER (OUTPATIENT)
Age: 68
End: 2023-04-01

## 2023-04-01 VITALS
OXYGEN SATURATION: 95 % | TEMPERATURE: 97 F | DIASTOLIC BLOOD PRESSURE: 67 MMHG | RESPIRATION RATE: 18 BRPM | HEART RATE: 55 BPM | SYSTOLIC BLOOD PRESSURE: 91 MMHG

## 2023-04-01 PROCEDURE — 99239 HOSP IP/OBS DSCHRG MGMT >30: CPT

## 2023-04-01 RX ADMIN — Medication 0.5 MILLIGRAM(S): at 17:45

## 2023-04-01 RX ADMIN — MORPHINE SULFATE 2 MILLIGRAM(S): 50 CAPSULE, EXTENDED RELEASE ORAL at 17:06

## 2023-04-01 RX ADMIN — LEVETIRACETAM 400 MILLIGRAM(S): 250 TABLET, FILM COATED ORAL at 17:06

## 2023-04-01 RX ADMIN — MORPHINE SULFATE 2 MILLIGRAM(S): 50 CAPSULE, EXTENDED RELEASE ORAL at 05:57

## 2023-04-01 RX ADMIN — MORPHINE SULFATE 2 MILLIGRAM(S): 50 CAPSULE, EXTENDED RELEASE ORAL at 17:22

## 2023-04-01 RX ADMIN — LEVETIRACETAM 400 MILLIGRAM(S): 250 TABLET, FILM COATED ORAL at 05:42

## 2023-04-01 RX ADMIN — MORPHINE SULFATE 2 MILLIGRAM(S): 50 CAPSULE, EXTENDED RELEASE ORAL at 05:42

## 2023-04-01 RX ADMIN — MORPHINE SULFATE 2 MILLIGRAM(S): 50 CAPSULE, EXTENDED RELEASE ORAL at 02:36

## 2023-04-01 RX ADMIN — MORPHINE SULFATE 2 MILLIGRAM(S): 50 CAPSULE, EXTENDED RELEASE ORAL at 02:21

## 2023-04-01 RX ADMIN — MORPHINE SULFATE 4 MILLIGRAM(S): 50 CAPSULE, EXTENDED RELEASE ORAL at 08:46

## 2023-04-01 RX ADMIN — Medication 0.5 MILLIGRAM(S): at 09:15

## 2023-04-01 NOTE — DISCHARGE NOTE PROVIDER - ATTENDING DISCHARGE PHYSICAL EXAMINATION:
Karnofsky Performance Score/Palliative Performance Status Version 2:  40 %    General:  [ x] Alert  [x ] Oriented x      [ ] Lethargic  [ ] Agitated   [ ] Cachexia   [ ] Unarousable  [ x] Verbal  [ ] Non-Verbal    HEENT:  [x ] Normal   [ ] Dry mouth   [ ] ET Tube    [ ] Trach  [ ] Oral lesions    Lungs:   [ ] Clear [ ] Tachypnea  [ ] Audible excessive secretions   [x ] Rhonchi        [ ] Right [ ] Left [ ] Bilateral  [ ] Crackles        [ ] Right [ ] Left [ ] Bilateral  [ ] Wheezing     [ ] Right [ ] Left [ ] Bilateral  [ ] Respiratory failure [ ] Acute [ ] Chronic [ ] Hypoxemic [ ] Hypercarbic [ ] Other    Cardiovascular:   [ x] Regular [ ] Irregular [ ] Tachycardia   [ ] Bradycardia  [ ] Murmur [ ] Other  [ ] Shock [ ] Septic [ ] Hypovolemic [ ] Neurogenic [ ] Cardiogenic   [ ] Vasopressors [ ] Inotrophs    Abdomen:   [ x] Soft  [ ] Distended   [ x] +BS  [ x] Non tender [ ] Tender  [ ]PEG   [ ]OGT/ NGT      [ ] Other    Genitourinary:  [ ] Normal [x ] Incontinent   [ ] Oliguria/Anuria   [ ] Amador  [ ] Other       Musculoskeletal:    [ ] Normal   [x ] Weakness  [ ] Edema  [ ] Bedbound/Wheelchair bound [ ]  Ambulatory [ ] with [ ] without assistance [ ] Functional quadriplegia    Neurological: [ ] No focal deficits  [ ] Cognitive impairment  [ ] Dysphagia [ ] Dysarthria [ ] Paresis [ ] Other   [ ] Brain compression  [ ] Cerebral edema [ ] Encephalopathy

## 2023-04-01 NOTE — DISCHARGE NOTE NURSING/CASE MANAGEMENT/SOCIAL WORK - NSDCPEFALRISK_GEN_ALL_CORE
For information on Fall & Injury Prevention, visit: [email or web address] OR  [email or web address] OR  https://www.cdc.gov/steadi/patient.html

## 2023-04-01 NOTE — DISCHARGE NOTE PROVIDER - NSDCMRMEDTOKEN_GEN_ALL_CORE_FT
bisacodyl 10 mg rectal suppository: 1 suppository(ies) rectal once a day As needed Constipation  polyethylene glycol 3350 oral powder for reconstitution: 17 gram(s) orally once a day

## 2023-04-01 NOTE — DISCHARGE NOTE PROVIDER - HOSPITAL COURSE
67 f metastatic RCC s/p nephrectomy METS breast ca s/p mastectomy bilat DVT, +IVC filter Hx ICH admitted altered mental status   from [facility]. Baseline A/O x 3 bedbound. CTH showing hemorrhagic process/lesions with extensive calvarial mets unchanged mass occipital lobe.  Family and pt understand that breast cancer has spread to several organs, including brain and prognosis is poor.   Family and pt requested symptom-directed care in palliative care unit. Symptomatic regimen was optimized and family requested hospice.  Of note, during hospitalization pt was found to have covid infection, treated and taken off isolation on 3/31/23.   Hospice facility in [place] accepted pt and on 4/1/23 pt was transferred. 67 f metastatic RCC s/p nephrectomy METS breast ca s/p mastectomy bilat DVT, +IVC filter Hx ICH admitted altered mental status   from [facility]. Baseline A/O x 3 bedbound. CTH showing hemorrhagic process/lesions with extensive calvarial mets unchanged mass occipital lobe.  Family and pt understand that breast cancer has spread to several organs, including brain and prognosis is poor.   Family and pt requested symptom-directed care in palliative care unit. Symptomatic regimen was optimized and family requested hospice.  Of note, during hospitalization pt was found to have covid infection, treated and taken off isolation on 3/31/23.   Hospice facility in [place] accepted pt and on 4/1/23 pt was transferred.      Symptomatic Regimen that was started and continued in Palliative Unit  Standing -  morphine 2mg IV q6hr    Morphine 4mg IV q1h prn for severe pain  Morphine 4mg IV q1hr PRn for Dyspnea   Morphine 2 mg iv q1h prn for mod pain.   Ativan 0.5mg IV q1h PRN for agitation          67 f metastatic RCC s/p nephrectomy METS breast ca s/p mastectomy bilat DVT, +IVC filter Hx ICH admitted altered mental status   from [facility]. Baseline A/O x 3 bedbound. CTH showing hemorrhagic process/lesions with extensive calvarial mets unchanged mass occipital lobe.  Family and pt understand that breast cancer has spread to several organs, including brain and prognosis is poor.   Family and pt requested symptom-directed care in palliative care unit. Symptomatic regimen was optimized and family requested hospice.  Of note, during hospitalization pt was found to have covid infection, treated and taken off isolation on 3/31/23.   Hospice facility in [place] accepted pt and on 4/1/23 pt was transferred.      Symptomatic Regimen that was started and continued in Palliative Unit  Standing -  morphine 2mg IV q6hr    Morphine 4mg IV q1h prn for severe pain  Morphine 4mg IV q1hr PRn for Dyspnea   Morphine 2 mg iv q1h prn for mod pain.   Ativan 0.5mg IV q1h PRN for agitation     Also give Keppra 500mg IV solution q12hrs, infuse over 15 minutes

## 2023-04-01 NOTE — DISCHARGE NOTE NURSING/CASE MANAGEMENT/SOCIAL WORK - PATIENT PORTAL LINK FT
You can access the FollowMyHealth Patient Portal offered by [facility] by registering at the following website: [email or web address]. By joining [employer]’s FollowMyHealth portal, you will also be able to view your health information using other applications (apps) compatible with our system.

## 2023-04-01 NOTE — DISCHARGE NOTE NURSING/CASE MANAGEMENT/SOCIAL WORK - NSDCVIVACCINE_GEN_ALL_CORE_FT
Influenza, injectable,quadrivalent, preservative free, pediatric; 10-Nov-2020 12:55; Krishna Chong (RN); [employer];  (Exp. Date: 30-Jun-2021); IntraMuscular; Deltoid Right.; 0.5 milliLiter(s); VIS (VIS Published: 15-Aug-2019, VIS Presented: 10-Nov-2020);   influenza, high-dose, quadrivalent; 18-Nov-2021 14:17; Nia Garcia R.N. (RN); [employer]; [id number] (Exp. Date: 30-Jun-2022); IntraMuscular; Deltoid Right.; 0.7 milliLiter(s); VIS (VIS Published: 06-Aug-2021, VIS Presented: 18-Nov-2021);   Tdap; 10-Nov-2020 12:55; Krishna Chong (DONALD); [employer]; [id number] (Exp. Date: 31-Jul-2022); IntraMuscular; Deltoid Left.; 0.5 milliLiter(s); VIS (VIS Published: 10-Dec-2020, VIS Presented: 10-Nov-2020);

## 2023-04-01 NOTE — DISCHARGE NOTE PROVIDER - DETAILS OF MALNUTRITION DIAGNOSIS/DIAGNOSES
This patient has been assessed with a concern for Malnutrition and was treated during this hospitalization for the following Nutrition diagnosis/diagnoses:     -  03/28/2023: Severe protein-calorie malnutrition

## 2023-04-01 NOTE — DISCHARGE NOTE PROVIDER - NSDCFUADDINST_GEN_ALL_CORE_FT
Pt is comfort care.  Pt is comfort care.   Please see HPI for medication list. This list states most recent optimized of symptomatic medication regimen.   pt is a hospice patient who will be going to [place] inpatient hospice facility.

## 2023-04-03 ENCOUNTER — NON-APPOINTMENT (OUTPATIENT)
Age: 68
End: 2023-04-03

## 2023-04-04 ENCOUNTER — APPOINTMENT (OUTPATIENT)
Dept: HEMATOLOGY ONCOLOGY | Facility: CLINIC | Age: 68
End: 2023-04-04

## 2023-04-20 PROCEDURE — 86900 BLOOD TYPING SEROLOGIC ABO: CPT

## 2023-04-20 PROCEDURE — 85730 THROMBOPLASTIN TIME PARTIAL: CPT

## 2023-04-20 PROCEDURE — 84295 ASSAY OF SERUM SODIUM: CPT

## 2023-04-20 PROCEDURE — 82728 ASSAY OF FERRITIN: CPT

## 2023-04-20 PROCEDURE — 87641 MR-STAPH DNA AMP PROBE: CPT

## 2023-04-20 PROCEDURE — 96374 THER/PROPH/DIAG INJ IV PUSH: CPT

## 2023-04-20 PROCEDURE — 87640 STAPH A DNA AMP PROBE: CPT

## 2023-04-20 PROCEDURE — 87040 BLOOD CULTURE FOR BACTERIA: CPT

## 2023-04-20 PROCEDURE — 99285 EMERGENCY DEPT VISIT HI MDM: CPT | Mod: 25

## 2023-04-20 PROCEDURE — 84100 ASSAY OF PHOSPHORUS: CPT

## 2023-04-20 PROCEDURE — 86850 RBC ANTIBODY SCREEN: CPT

## 2023-04-20 PROCEDURE — 87086 URINE CULTURE/COLONY COUNT: CPT

## 2023-04-20 PROCEDURE — 82330 ASSAY OF CALCIUM: CPT

## 2023-04-20 PROCEDURE — 84132 ASSAY OF SERUM POTASSIUM: CPT

## 2023-04-20 PROCEDURE — 82565 ASSAY OF CREATININE: CPT

## 2023-04-20 PROCEDURE — 36415 COLL VENOUS BLD VENIPUNCTURE: CPT

## 2023-04-20 PROCEDURE — 83735 ASSAY OF MAGNESIUM: CPT

## 2023-04-20 PROCEDURE — 82962 GLUCOSE BLOOD TEST: CPT

## 2023-04-20 PROCEDURE — 85018 HEMOGLOBIN: CPT

## 2023-04-20 PROCEDURE — 81001 URINALYSIS AUTO W/SCOPE: CPT

## 2023-04-20 PROCEDURE — 85610 PROTHROMBIN TIME: CPT

## 2023-04-20 PROCEDURE — 71045 X-RAY EXAM CHEST 1 VIEW: CPT

## 2023-04-20 PROCEDURE — 83605 ASSAY OF LACTIC ACID: CPT

## 2023-04-20 PROCEDURE — 85379 FIBRIN DEGRADATION QUANT: CPT

## 2023-04-20 PROCEDURE — 80048 BASIC METABOLIC PNL TOTAL CA: CPT

## 2023-04-20 PROCEDURE — 80053 COMPREHEN METABOLIC PANEL: CPT

## 2023-04-20 PROCEDURE — 82947 ASSAY GLUCOSE BLOOD QUANT: CPT

## 2023-04-20 PROCEDURE — 92610 EVALUATE SWALLOWING FUNCTION: CPT

## 2023-04-20 PROCEDURE — 82803 BLOOD GASES ANY COMBINATION: CPT

## 2023-04-20 PROCEDURE — 85025 COMPLETE CBC W/AUTO DIFF WBC: CPT

## 2023-04-20 PROCEDURE — 70450 CT HEAD/BRAIN W/O DYE: CPT | Mod: MA

## 2023-04-20 PROCEDURE — 96375 TX/PRO/DX INJ NEW DRUG ADDON: CPT

## 2023-04-20 PROCEDURE — 82435 ASSAY OF BLOOD CHLORIDE: CPT

## 2023-04-20 PROCEDURE — 84443 ASSAY THYROID STIM HORMONE: CPT

## 2023-04-20 PROCEDURE — 85014 HEMATOCRIT: CPT

## 2023-04-20 PROCEDURE — 82607 VITAMIN B-12: CPT

## 2023-04-20 PROCEDURE — 73610 X-RAY EXAM OF ANKLE: CPT

## 2023-04-20 PROCEDURE — 94640 AIRWAY INHALATION TREATMENT: CPT

## 2023-04-20 PROCEDURE — 86901 BLOOD TYPING SEROLOGIC RH(D): CPT

## 2023-04-20 PROCEDURE — 80076 HEPATIC FUNCTION PANEL: CPT

## 2023-04-26 ENCOUNTER — NON-APPOINTMENT (OUTPATIENT)
Age: 68
End: 2023-04-26

## 2023-05-10 ENCOUNTER — NON-APPOINTMENT (OUTPATIENT)
Age: 68
End: 2023-05-10

## 2023-06-29 NOTE — ED PROVIDER NOTE - MDM ORDERS SUBMITTED SELECTION
Labs/EKG/Imaging Studies/Medications Terbinafine Pregnancy And Lactation Text: This medication is Pregnancy Category B and is considered safe during pregnancy. It is also excreted in breast milk and breast feeding isn't recommended.

## 2023-07-11 NOTE — PATIENT PROFILE ADULT - NSPROGENSOURCEINFO_GEN_A_NUR
Palliative team was reconsulted for complex decision making regarding goc. Case discussed with MICU team who spoke with patient's family. They want to continue with current medical management. Team is aware that palliative can be reconsulted if goals change. patient

## 2023-07-18 NOTE — ED PROVIDER NOTE - BIRTH SEX
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

## 2023-08-17 NOTE — PATIENT PROFILE ADULT - WAS YOUR LAST COVID-19 VACCINE GREATER THAN OR EQUAL TO TWO MONTHS AGO?
[FreeTextEntry3] : I, Artie Tompkins MD, personally saw and evaluated the patient and developed the plan as documented above. I concur or have edited the note as appropriate.
Yes

## 2023-08-29 NOTE — CHART NOTE - NSCHARTNOTESELECT_GEN_ALL_CORE
d/c appt/Event Note
Event Note
ISTOP#: [id number]
rad onc/Event Note
wound team/Event Note
PAIN SCALE 9 OF 10.

## 2023-09-13 NOTE — PATIENT PROFILE ADULT - FALL HARM RISK - DEVICES
Cane Itraconazole Counseling:  I discussed with the patient the risks of itraconazole including but not limited to liver damage, nausea/vomiting, neuropathy, and severe allergy.  The patient understands that this medication is best absorbed when taken with acidic beverages such as non-diet cola or ginger ale.  The patient understands that monitoring is required including baseline LFTs and repeat LFTs at intervals.  The patient understands that they are to contact us or the primary physician if concerning signs are noted.

## 2023-09-22 NOTE — DISCHARGE NOTE PROVIDER - DISCHARGE SERVICE FOR PATIENT
on the discharge service for the patient. I have reviewed and made amendments to the documentation where necessary. 16

## 2023-10-03 NOTE — PROGRESS NOTE ADULT - ASSESSMENT
Assessment:   63 year old female with a history of COPD, depression, left breast cancer, large left renal mass, s/p  left lap radical nephrectomy/adrenalectomy on 2/13, discharged yesterday, had postop fever 101.8F, pre-discharge Hgb 7.7, developed weakness, dizziness, found to be in shock, Hgb 6.6, CT showed large subcapsular splenic hematoma measuring [id number] cm, 2 units pRBC at [facility] ED and transferred to [facility] for further management. Now s/p IR angiogram     Plan:  Neurologic: patient AAOx3  - tylenol PRN for pain    Respiratory: hx of empyhsema,   - saturating 100% on RA    Cardiovascular: + hemorrhagic shock  - hypotensive to 50s on arrival; improving to 80s systolic  - will resuscitate with transfusion   - start pressors if BPs dont improve  - lactate 8  - IVF @ 100     Gastrointenstinal: CT w/ subcapsular splenic hematoma, + hemoperitoneum  - pending IR for possible embolization   - NPO    Renal/: s/p left lap radical nephrectomy/adrenalectomy on 2/13/19  - mcgee inserted  - ERASMO: worsening Cr fx 1.55, baseline .77    Heme: acute blood loss anemia  - current H/H 6.5/21.1  - monitor H/H  - patient requiring 2 u pRBC at ; predischarge Hgb 7.7  - rapid transfusion protocol initiated: 2 pRBC, 2 ffp, 1 plt  - IVC filter in place   - going to IR     Infectious Disease:  - flu and RVP (-)   - white count elevated (14)     Endocrine: VITALIY     Disposition: SICU     --------------------------------------------------------------------------------------    Critical Care Diagnoses: acute blood loss anemia, subcapsular hematoma, s/p nephrectomy, hemorrhagic shock No Assessment:   63 year old female with a history of COPD, depression, left breast cancer, large left renal mass, s/p  left lap radical nephrectomy/adrenalectomy on 2/13, discharged yesterday, had postop fever 101.8F, pre-discharge Hgb 7.7, developed weakness, dizziness, found to be in shock, Hgb 6.6, CT showed large subcapsular splenic hematoma measuring [id number] cm, 2 units pRBC at [facility] ED and transferred to [facility] for further management. Now s/p IR angiogram     Plan:  Neurologic: patient AAOx3  - tylenol PRN for pain  - dilaudid PRN    Respiratory: hx of empyhsema,   - on 2L NC  - oxygen at home intermittently     Cardiovascular: + hemorrhagic shock  - hypotensive to 50s on arrival; improving to 80s systolic  -resuscitated with transfusion   - did not require pressors  - lactate normal  - IVF @ 100     Gastrointenstinal: CT w/ subcapsular splenic hematoma, + hemoperitoneum  - s/p IR embolization  - protonix  - NPO; 3 stable h/hs and will feed     Renal/: s/p left lap radical nephrectomy/adrenalectomy on 2/13/19  - mcgee inserted, reduced UOP  - repleted ca   - ERASMO: worsening Cr fx 1.34, baseline .77; downtrending     Heme: acute blood loss anemia  - monitor H/H  - patient requiring 2 u pRBC at ; predischarge Hgb 7.7 = in total 6prb, 2ffp, 1 platelet  - IVC filter in place   - s/p embolization of proximal portion  of splenic artery by IR  - SCDS in place       Infectious Disease:  - flu and RVP (-)   - white count elevated (11)  - take introducer out      Endocrine: VITALIY     Disposition: SICU     --------------------------------------------------------------------------------------    Critical Care Diagnoses: acute blood loss anemia, subcapsular splenic hematoma, s/p embolization of splenic artery, hemorrhagic shock, COPD, respiratory abnormality

## 2023-11-03 ENCOUNTER — APPOINTMENT (OUTPATIENT)
Dept: SURGERY | Facility: CLINIC | Age: 68
End: 2023-11-03

## 2024-03-04 NOTE — PATIENT PROFILE ADULT - NSSCCAGEALCOHOLGUILTYABOUT_GEN_A_NUR
Diet, Consistent Carbohydrate Renal/No Snacks:   No Concentrated Potassium (03-02-24 @ 12:40) [Active]      
no

## 2024-03-16 NOTE — PATIENT PROFILE ADULT - LAST ORAL INTAKE
Reason For Visit  Deanne Reyes is a(n) 74 year old patient here today for post op visit.         SUBJECTIVE  Patient presents for follow-up.  She is doing much better.  She has followed up with her endocrinologist and her papillary cancer is being addressed at the cancer conference; she is taking her levothyroxine and feeling better.  Her symptoms of hypocalcemia are much better as well.  She still has some soreness in her neck and I told her that we will take some time but that she should extend her neck and twisted back-and-forth to help with the scar tissue.  It is really her own physical therapy doing that.  Her incision is healing well.         Active Problems  Patient Active Problem List   Diagnosis    Allergic rhinitis    Anemia of chronic disease    Atopic dermatitis    Benign essential hypertension    Chronic kidney disease, stage III (moderate) (CMD)    Cholelithiases    Depression with anxiety    Gastroesophageal reflux disease without esophagitis    Hypertonicity of bladder    Irritable bowel syndrome (IBS)    Mild intermittent asthma without complication    Multiple sclerosis (CMD)    Pure hypercholesterolemia    Vitamin D deficiency    Post-surgical hypoparathyroidism (CMD)    Difficult airway    Postoperative hypothyroidism    Hypocalcemia    Papillary carcinoma of thyroid (CMD)         Review  Past medical history, problem list, family medical history, surgical history and social history reviewed.       Current Meds  Current Outpatient Medications   Medication Sig Dispense Refill    clobetasol (TEMOVATE) 0.05 % cream Apply topically 2 times daily. 60 g 1    calcitRIOL (ROCALTROL) 0.5 MCG capsule Take 1 capsule by mouth daily. 30 capsule 3    levothyroxine 100 MCG tablet Take 1 tablet by mouth daily. 90 tablet 3    turmeric 500 MG capsule Take 500 mg by mouth daily.      Multiple Vitamins-Minerals (vitamin - therapeutic multivitamins w/minerals) tablet Take 1 tablet by mouth daily.       fluticasone (VERAMYST) 27.5 MCG/SPRAY nasal spray Spray 1 spray in each nostril in the morning and 1 spray in the evening. 10 g 11    triamcinolone (ARISTOCORT) 0.1 % cream Apply topically 2 times daily. 45 g 3    losartan-hydrochlorothiazide (HYZAAR) 100-25 MG per tablet Take 1 tablet by mouth daily. 90 tablet 3    amLODIPine (NORVASC) 2.5 MG tablet Take 1 tablet by mouth daily. 90 tablet 3    oxybutynin (DITROPAN XL) 15 MG 24 hr tablet Take 1 tablet by mouth daily. 90 tablet 3    citalopram (CeleXA) 40 MG tablet Take 1 tablet by mouth daily. 90 tablet 3    tiZANidine (ZANAFLEX) 2 MG tablet Take 1 tablet by mouth at bedtime.      atorvastatin (LIPITOR) 20 MG tablet Take 1 tablet by mouth daily. 90 tablet 3    cholecalciferol (VITAMIN D) 25 mcg (1,000 units) tablet Take 25 mcg by mouth daily.      cyanocobalamin 2000 MCG tablet Take 0.5 tablets by mouth daily.      acetaminophen (TYLENOL) 650 MG CR tablet Take 650 mg by mouth every 6 hours as needed.      AZO-CRANBERRY PO Take 1 tablet by mouth daily.       No current facility-administered medications for this visit.       Current Allergies      ALLERGIES:   Allergen Reactions    Chocolate   (Food Or Med) HIVES    Coconut   (Food Or Med) Other (See Comments)     Itchy    Onion   (Food Or Med) HIVES    Peanuts [Peanut   (Food Or Med)] SWELLING    Sulfa Antibiotics RASH, PRURITUS and HIVES    Tomato (Diagnostic) RASH    Peaches [Peach Flavor   (Food Or Med)] GI UPSET       Vitals  Visit Vitals  BP (!) 143/53 (BP Location: LUE - Left upper extremity, Patient Position: Sitting, Cuff Size: Regular)   Pulse 84   Ht 5' 3\" (1.6 m)   Wt 81.1 kg (178 lb 12.7 oz)   LMP  (LMP Unknown)   BMI 31.67 kg/m²             OBJECTIVE  Surgical site looks good.  Her incision continues to improve.  There is no neck swelling or residual seroma from the thyroidectomy.        Assessment  Status post total thyroidectomy for multiple thyroid nodules; thyroid enlargement and and papillary  13-Feb-2019 07:22 cancer      Plan  Patient is improving.  Her symptoms of hypocalcemia are better.  Her wound is healing fine.  She is following up with Dr. Harrison for her papillary cancer, hypocalcemia and other post thyroid concerns.  She will follow-up with me again in 3 months.        Medical compliance with plan discussed and risks of non-compliance reviewed.    Patient education completed on disease process, etiology & prognosis.    Patient expresses understanding of the plan.    Return to clinic as clinically indicated as discussed with patient who verbalized understanding of & agreement with the plan.     Signatures  Davis Smalls MD     12-Feb-2019 07:22

## 2024-03-27 NOTE — ED ADULT NURSE NOTE - NS ED NURSE LEVEL OF CONSCIOUSNESS SPEECH
NEPHRECTOMY PARTIAL LAPAROSCOPIC  Procedure Note    Lester Benoit  3/27/2024    Pre-op Diagnosis:   Right renal mass with a history of renal cell carcinoma    Post-op Diagnosis:     Post-Op Diagnosis Codes:     * Right renal mass [N28.89]     * History of renal cell carcinoma [Z85.528]    Procedure(s):  LAPAROSCOPIC RIGHT PARTIAL NEPHRECTOMY    Surgeon(s):  Frandy Palomares MD    Anesthesia: General    Staff:   Circulator: Donna Harris RN  Scrub Person: Faisal Auguste Katherine  Assistant: Miriam Laird    Estimated Blood Loss: 150 mL    Specimens:                Order Name Source Comment Collection Info Order Time   TISSUE PATHOLOGY EXAM Kidney, Right  Collected By: Frandy Palomares MD 3/27/2024  3:55 PM     Release to patient   Routine Release              Drains:   Closed/Suction Drain 1 Lateral RLQ Bulb 15 Fr. (Active)   Dressing Status Clean 03/27/24 1600       Urethral Catheter Silicone 16 Fr. (Active)       Findings: Solid mass anterior lower pole kidney excised in its entirety.  Margins appear to be clear.  Expected scar tissue    Complications: None apparent    Indications: 46-year-old gentleman history of renal cell carcinoma.  I excised a right upper pole renal mass about 3-1/2 years ago he has a mass that has been slowly involving the lower pole anterior kidney that is solid and concerning for recurrent carcinoma.  He now presents for laparoscopic partial nephrectomy reoperative of this right kidney.  Again the mass is in the lower pole and a different site from his previous malignancy.    Procedure: Patient was taken to the operative suite given general anesthesia.  Placed in couple supine position where Valera catheter was placed.  Flipped over to the right flank position left lateral cubitus.  Axillary roll was placed.  All layers appropriately padded.  Secured to the table.  Prepped and draped in a sterile fashion.  Surgical timeout was performed.  An incision was made  just around the tip of the 12th rib.  The difficult time feeling his 12th rib but once I got in open the retroperitoneal space I was able to balloon dissecting this space and open it up to allow placement of her other trocars.  A 10 mm trocar at the anterior axial line above the iliac crest.  A 5 mm trocar near the tip the 11th rib.  A 5 mm trocar at the posterior axial line above the 12th rib.  At our initial incision site we placed a balloon Jalloh trocar.  Pneumoretroperitoneum was now achieved.  Our space and opened up nicely.  She Rhodus fascia to reestablish itself so I incised this opened up the plane and got into the perirenal fat.  Kidney dissected in the lower pole pretty easily.  Much easier than I thought and is able to gradually dissected out the kidney.  Medial dissection was a little tough.  This mass was very anterior medial try to really bring the kidney down dissected out this whole lower pole.  I stayed away from the deeper portions where the cava and the ureter was instate very much anterior and then once I got to the anterior space I was able to identify the mass.  I dissected this out further coming around the mass in its entirety and allowed me to flip the lower pole down.  Using a harmonic scalpel then I cut the mass out.  Try to get about as comfortable as I could taking some wide margins around the edge and then underneath it I could not be as generous but I did not feel like I got well away from the pseudocapsule of the mass.  I cauterized the edges.  Once the mass was excised we had a few small bleeding areas that we cauterized for excellent hemostasis.  I took a margin of some of the deeper areas and more medial for additional margin of tissue and sent this off as well.  I did not do a frozen.  He was sent in formalin.  I placed the mass in the laparoscopic treatment bag and set aside.  Floseal was placed in the defect.  I dropped the pressure irrigated any blood out.  We have had about  150 cc of blood loss at this point and then irrigated this off and then placed some Tisseel in the defect with some of the perirenal fat.  I dropped the kidney down the kidney looks well-perfused.  A 15 Cuban Suman drain was placed through our 11th rib trocar site secured with 2-0 silk.  All the trocars were removed.  Our specimen was brought out through our 10 mm trocar site and sent this off to pathology.  Once all the trocars were removed and the specimen was removed I closed the 2 larger incisions with 0 Vicryl on a CT 2 needle and the skin wounds were all closed with 4-0 Vicryl subcuticular.  2-0 silk for the drain site.  Marcaine was injected.  He was then flipped back over to a supine position awoken extubated.  Assistant: Miriam Laird was responsible for performing the following activities: Retraction, Suction, Irrigation, Suturing, Closing, Placing Dressing, and Held/Positioned Camera and their skilled assistance was necessary for the success of this case.      Frandy Palomares MD     Date: 3/27/2024  Time: 16:07 DILLANT   Speaking Coherently

## 2024-06-04 NOTE — OB HISTORY
Hold statin for now given the presentation of uncontrolled ulcerative colitis     [Menarche Age: ____] : age at menarche was [unfilled] [Post-Menopause, No Sxs] : post-menopausal, currently without symptoms [Menopause Age: ____] : age at menopause was [unfilled] [___] : Living: [unfilled]

## 2024-10-13 NOTE — ED PROVIDER NOTE - HIV OFFER
Spoke with patient and she confirmed she has called medical transport regarding transportation to and from appointment.    Previously Declined (within the last year) none

## 2025-03-11 NOTE — PROGRESS NOTE ADULT - LOCATION OF DISCUSSION
DATE OF OPERATION:   3/11/2025     PREOPERATIVE DIAGNOSIS: Right upper quadrant pain, cholelithiasis, and acute cholecystitis.    POSTOPERATIVE DIAGNOSIS: Right upper quadrant pain, cholelithiasis, and acute cholecystitis.    PROCEDURE PERFORMED: Laparoscopic cholecystectomy    SURGEON:    Marty Walker M.D.    ASSISTANT:    Romel Granados PA-C.     ANESTHESIOLOGIST:  Marcos Noel M.D.    ANESTHESIA:   General endotracheal anesthesia.    ASA CLASSIFICATION:  II. Emergent.    INDICATIONS: The patient is a 52 year-old woman with clinical and radiographic findings of acute cholecystitis. She is taken to the operating room for a laparoscopic cholecystectomy.     The nature of the surgical procedure warranted additional skilled operative assistance from a licensed Physician Assistant (YOVANI). The assistant was present during the entire operation. The surgical assistant performed the following: provided assistance with optimal surgical exposure of the operative field, provided high complexity, subspecialty decision making input, operated the camera for the laparoscopic portion of the procedure, assisted with the surgical resection, and performed the skin closure and dressing application.     FINDINGS: Gallbladder wall thickening and acute inflammation.    WOUND CLASSIFICATION:  Class III, Contaminated.    SPECIMEN:    Gallbladder.    ESTIMATED BLOOD LOSS:  20 mL.    PROCEDURE: Following informed consent consent, the patient was properly identified, taken to the operating room and placed in supine position where general endotracheal anesthesia was administered. Intravenous antibiotics were administered by the anesthesiologist in correct time interval. The patient voided prior to surgery. A urinary catheter was not placed. Sequential compression devices were employed. The abdomen was prepped and draped into a sterile field. A timeout was conducted with the full attention and participation of all operating room  personnel.    Marcaine 0.5% was used to infiltrate the port sites. A 5 mm infraumbilical midline incision was made and the subcutaneous tissues spread bluntly. The fascia was elevated with a hook and a Veress needle was atraumatically inserted. Carbon dioxide pneumoperitoneum was instilled. A 5 mm separator port was passed. A 5 mm 30 degree lens and camera was passed into the peritoneal cavity. An 11 mm port was placed in the epigastric midline under direct vision. Two 5 mm right subcostal ports were placed under direct vision.     The gallbladder was identified and elevated. Dissection was carried out to completely expose and delineate the hepatocystic triangle. The critical view was achieved definitively identifying the single cystic duct and single cystic artery entering the gallbladder. These structures were multiply clipped proximally, once distally and divided. The gallbladder was dissected free from the undersurface of the liver using electrocautery and placed within a laparoscopic specimen retrieval bag. The gallbladder was delivered intact from the abdominal cavity and submitted for pathology.  The gallbladder fossa was irrigated. All excess irrigant was evacuated from the abdominal cavity.  The gallbladder fossa was inspected. Hemostasis was satisfactory. The gallbladder fossa was inspected. Hemostasis was controlled with application of SURGICEL® Powder Absorbable Hemostat.    The epigastric port site fascia was approximated using a trocar site closure device with a 0 VICRYL® Plus Antibacterial suture. The abdomen was desufflated and the ports were removed.  The port site skin incisions were closed with interrupted 4-0 VICRYL® Plus Antibacterial subcuticular sutures. A DERMABOND ADVANCED® Topical Skin Adhesive dressing was applied.    The patient tolerated the procedure well, and there were no apparent complications. All sponge, needle, and instrument counts were correct on 2 separate occasions. The  patient was awakened, extubated, and transferred to  to the post anesthesia care unit (PACU) in satisfactory condition.       ____________________________________     Marty Walker M.D.    DD: 3/11/2025  7:32 AM     Face to face

## 2025-05-15 NOTE — ED ADULT NURSE NOTE - PAIN RATING/NUMBER SCALE (0-10): REST
Patient verified name and .  Order for consent  was not found in EHR and does not match case posting; patient verifies procedure.   Type II surgery, phone assessment complete.  Orders not received.  Labs per surgeon: none  Labs per anesthesia protocol: Hgb s/h for DOS     Patient answered medical/surgical history questions at their best of ability. All prior to admission medications documented in EPIC.  Patient instructed on the following:    > Surgery Location: [address] [place] [address] Entrance   > Time of arrival for surgery: A nurse will call you by 5:00 pm the business day before your surgery      to tell you the time you should arrive. Your arrival time may be different than your surgery time. If there is no      answer; the nurse will leave you a voicemail. If you have not received a phone call and do not have a voicemail     by 5:00 pm the day before your surgery please call Main Pre-op: [phone].    > No food after midnight, patient may drink clear liquids up until 2 hours prior to arrival. No gum, candy, mints.   > Responsible adult must drive patient to the hospital, stay during surgery, and patient will need supervision 24 hours after anesthesia  > Use non moisturizing soap in shower the night before surgery and on the morning of surgery  > All piercings must be removed prior to arrival.    > Leave all valuables (money and jewelry) at home but bring insurance card and ID on day of surgery.   > You may be required to pay a deductible or co-pay on the day of your procedure. You can pre-pay by calling [phone] if your surgery is at the   [facility] or [phone] if your surgery is at the [facility].  > Do not wear make-up, nail polish, lotions, cologne, perfumes, powders, or oil on skin. Artificial nails are not permitted.     PLEASE CONTINUE TAKING ALL PRESCRIPTION MEDICATIONS UP TO THE DAY OF SURGERY UNLESS OTHERWISE DIRECTED BELOW. You may take Tylenol, 
Discharge instructions reviewed with pt and family member who verbalize understanding of follow up care.   
Pt to CT with RN   
4

## 2025-06-19 NOTE — ED ADULT NURSE NOTE - NS ED NURSE LEVEL OF CONSCIOUSNESS ORIENTATION
Patient Specific Counseling (Will Not Stick From Patient To Patient): SD pretty active today in beard. Discussed pros and cons of Zoryve foam given inadequate response to ketoconazole and topical steroids.
Detail Level: Detailed
Oriented - self; Oriented - place; Oriented - time

## 2025-07-08 NOTE — SWALLOW BEDSIDE ASSESSMENT ADULT - SWALLOW EVAL: ANTICIPATED DISCHARGE DISPOSITION
Patient son called to cancel the 6 month follow up. I call him back to reschedule had to leave a voice message.    D/c TBD, plan for possible transfer to [facility]

## 2025-07-31 NOTE — ED PROVIDER NOTE - NS_BEDUNITTYPES_ED_ALL_ED
Patient rounding sent through [employer].     [facility]   7/31/2025   abdominal pain TELEMETRY